# Patient Record
Sex: MALE | Race: WHITE | Employment: OTHER | URBAN - NONMETROPOLITAN AREA
[De-identification: names, ages, dates, MRNs, and addresses within clinical notes are randomized per-mention and may not be internally consistent; named-entity substitution may affect disease eponyms.]

---

## 2017-03-02 ENCOUNTER — OFFICE VISIT (OUTPATIENT)
Dept: FAMILY MEDICINE CLINIC | Age: 72
End: 2017-03-02

## 2017-03-02 VITALS
OXYGEN SATURATION: 95 % | WEIGHT: 302.6 LBS | DIASTOLIC BLOOD PRESSURE: 88 MMHG | SYSTOLIC BLOOD PRESSURE: 138 MMHG | HEART RATE: 57 BPM | BODY MASS INDEX: 40.11 KG/M2 | HEIGHT: 73 IN

## 2017-03-02 DIAGNOSIS — I10 ESSENTIAL HYPERTENSION: ICD-10-CM

## 2017-03-02 DIAGNOSIS — Z23 NEED FOR PNEUMOCOCCAL VACCINATION: Primary | ICD-10-CM

## 2017-03-02 PROCEDURE — 99214 OFFICE O/P EST MOD 30 MIN: CPT | Performed by: FAMILY MEDICINE

## 2017-03-02 PROCEDURE — G0009 ADMIN PNEUMOCOCCAL VACCINE: HCPCS | Performed by: FAMILY MEDICINE

## 2017-03-02 PROCEDURE — G8510 SCR DEP NEG, NO PLAN REQD: HCPCS | Performed by: FAMILY MEDICINE

## 2017-03-02 PROCEDURE — 90732 PPSV23 VACC 2 YRS+ SUBQ/IM: CPT | Performed by: FAMILY MEDICINE

## 2017-03-02 PROCEDURE — 3288F FALL RISK ASSESSMENT DOCD: CPT | Performed by: FAMILY MEDICINE

## 2017-03-02 ASSESSMENT — PATIENT HEALTH QUESTIONNAIRE - PHQ9
SUM OF ALL RESPONSES TO PHQ9 QUESTIONS 1 & 2: 0
1. LITTLE INTEREST OR PLEASURE IN DOING THINGS: 0
2. FEELING DOWN, DEPRESSED OR HOPELESS: 0
SUM OF ALL RESPONSES TO PHQ QUESTIONS 1-9: 0

## 2017-03-15 RX ORDER — BUPROPION HYDROCHLORIDE 75 MG/1
TABLET ORAL
Qty: 180 TABLET | Refills: 0 | Status: SHIPPED | OUTPATIENT
Start: 2017-03-15 | End: 2017-03-29 | Stop reason: SDUPTHER

## 2017-03-29 RX ORDER — BUPROPION HYDROCHLORIDE 75 MG/1
TABLET ORAL
Qty: 60 TABLET | Refills: 0 | Status: SHIPPED | OUTPATIENT
Start: 2017-03-29 | End: 2017-07-18 | Stop reason: SDUPTHER

## 2017-04-05 ENCOUNTER — OFFICE VISIT (OUTPATIENT)
Dept: FAMILY MEDICINE CLINIC | Age: 72
End: 2017-04-05
Payer: MEDICARE

## 2017-04-05 VITALS
SYSTOLIC BLOOD PRESSURE: 150 MMHG | BODY MASS INDEX: 39.23 KG/M2 | HEART RATE: 61 BPM | WEIGHT: 296 LBS | OXYGEN SATURATION: 96 % | HEIGHT: 73 IN | DIASTOLIC BLOOD PRESSURE: 80 MMHG

## 2017-04-05 DIAGNOSIS — H53.2 DIPLOPIA: Primary | ICD-10-CM

## 2017-04-05 DIAGNOSIS — I10 ESSENTIAL HYPERTENSION: ICD-10-CM

## 2017-04-05 PROCEDURE — 93880 EXTRACRANIAL BILAT STUDY: CPT | Performed by: FAMILY MEDICINE

## 2017-04-05 PROCEDURE — 99214 OFFICE O/P EST MOD 30 MIN: CPT | Performed by: FAMILY MEDICINE

## 2017-04-05 RX ORDER — LISINOPRIL 10 MG/1
10 TABLET ORAL 3 TIMES DAILY
Qty: 270 TABLET | Refills: 3 | Status: SHIPPED | OUTPATIENT
Start: 2017-04-05 | End: 2017-05-26 | Stop reason: SDUPTHER

## 2017-04-12 ENCOUNTER — OFFICE VISIT (OUTPATIENT)
Dept: FAMILY MEDICINE CLINIC | Age: 72
End: 2017-04-12
Payer: MEDICARE

## 2017-04-12 VITALS
HEIGHT: 73 IN | SYSTOLIC BLOOD PRESSURE: 134 MMHG | RESPIRATION RATE: 16 BRPM | HEART RATE: 80 BPM | WEIGHT: 302 LBS | BODY MASS INDEX: 40.02 KG/M2 | DIASTOLIC BLOOD PRESSURE: 76 MMHG

## 2017-04-12 DIAGNOSIS — I10 ESSENTIAL HYPERTENSION: Primary | ICD-10-CM

## 2017-04-12 DIAGNOSIS — H53.2 DIPLOPIA: ICD-10-CM

## 2017-04-12 PROCEDURE — 99213 OFFICE O/P EST LOW 20 MIN: CPT | Performed by: FAMILY MEDICINE

## 2017-04-12 PROCEDURE — 93880 EXTRACRANIAL BILAT STUDY: CPT | Performed by: FAMILY MEDICINE

## 2017-04-13 ENCOUNTER — OFFICE VISIT (OUTPATIENT)
Dept: OPHTHALMOLOGY | Age: 72
End: 2017-04-13
Payer: MEDICARE

## 2017-04-13 ENCOUNTER — TELEPHONE (OUTPATIENT)
Dept: GENERAL RADIOLOGY | Age: 72
End: 2017-04-13

## 2017-04-13 DIAGNOSIS — G45.0 VERTEBROBASILAR TIAS: Primary | ICD-10-CM

## 2017-04-13 DIAGNOSIS — H53.2 DIPLOPIA: Primary | ICD-10-CM

## 2017-04-13 PROCEDURE — 99204 OFFICE O/P NEW MOD 45 MIN: CPT | Performed by: OPHTHALMOLOGY

## 2017-04-13 RX ORDER — BENOXINATE HCL/FLUORESCEIN SOD 0.4%-0.25%
1 DROPS OPHTHALMIC (EYE) ONCE
Status: COMPLETED | OUTPATIENT
Start: 2017-04-13 | End: 2017-04-13

## 2017-04-13 RX ADMIN — Medication 1 DROP: at 11:32

## 2017-04-13 ASSESSMENT — TONOMETRY
OS_IOP_MMHG: 13
OD_IOP_MMHG: 14
IOP_METHOD: APPLANATION W FLURESS DROP

## 2017-04-13 ASSESSMENT — CONF VISUAL FIELD
OS_NORMAL: 1
OD_NORMAL: 1
METHOD: COUNTING FINGERS

## 2017-04-13 ASSESSMENT — VISUAL ACUITY
OD_SC: 20/30
METHOD: SNELLEN - LINEAR
OS_SC: 20/30

## 2017-04-13 ASSESSMENT — SLIT LAMP EXAM - LIDS
COMMENTS: 1+ DERMATOCHALASIS - UPPER LID
COMMENTS: 1+ DERMATOCHALASIS - UPPER LID

## 2017-04-14 DIAGNOSIS — I65.21 CAROTID STENOSIS, RIGHT: Primary | ICD-10-CM

## 2017-04-17 ENCOUNTER — OFFICE VISIT (OUTPATIENT)
Dept: CARDIOLOGY CLINIC | Age: 72
End: 2017-04-17
Payer: MEDICARE

## 2017-04-17 VITALS
HEART RATE: 60 BPM | HEIGHT: 73 IN | SYSTOLIC BLOOD PRESSURE: 136 MMHG | DIASTOLIC BLOOD PRESSURE: 80 MMHG | WEIGHT: 303 LBS | BODY MASS INDEX: 40.16 KG/M2

## 2017-04-17 DIAGNOSIS — G45.9 TRANSIENT CEREBRAL ISCHEMIA, UNSPECIFIED TYPE: ICD-10-CM

## 2017-04-17 DIAGNOSIS — I10 ESSENTIAL HYPERTENSION: Primary | ICD-10-CM

## 2017-04-17 DIAGNOSIS — E78.5 HYPERLIPIDEMIA, UNSPECIFIED HYPERLIPIDEMIA TYPE: ICD-10-CM

## 2017-04-17 DIAGNOSIS — I25.810 CORONARY ARTERY DISEASE INVOLVING CORONARY BYPASS GRAFT OF NATIVE HEART WITHOUT ANGINA PECTORIS: ICD-10-CM

## 2017-04-17 PROCEDURE — 99213 OFFICE O/P EST LOW 20 MIN: CPT | Performed by: INTERNAL MEDICINE

## 2017-04-17 PROCEDURE — 93000 ELECTROCARDIOGRAM COMPLETE: CPT | Performed by: INTERNAL MEDICINE

## 2017-04-17 RX ORDER — CLOPIDOGREL BISULFATE 75 MG/1
75 TABLET ORAL DAILY
Qty: 30 TABLET | Refills: 6 | Status: SHIPPED | OUTPATIENT
Start: 2017-04-17 | End: 2017-11-09 | Stop reason: SDUPTHER

## 2017-05-26 DIAGNOSIS — I10 ESSENTIAL HYPERTENSION: ICD-10-CM

## 2017-05-30 RX ORDER — LISINOPRIL 10 MG/1
10 TABLET ORAL 3 TIMES DAILY
Qty: 270 TABLET | Refills: 3 | Status: SHIPPED | OUTPATIENT
Start: 2017-05-30 | End: 2018-07-19 | Stop reason: SDUPTHER

## 2017-06-12 ENCOUNTER — OFFICE VISIT (OUTPATIENT)
Dept: FAMILY MEDICINE CLINIC | Age: 72
End: 2017-06-12
Payer: MEDICARE

## 2017-06-12 VITALS
HEART RATE: 59 BPM | BODY MASS INDEX: 41.22 KG/M2 | DIASTOLIC BLOOD PRESSURE: 84 MMHG | RESPIRATION RATE: 18 BRPM | HEIGHT: 73 IN | WEIGHT: 311 LBS | OXYGEN SATURATION: 97 % | SYSTOLIC BLOOD PRESSURE: 152 MMHG

## 2017-06-12 DIAGNOSIS — N40.0 BENIGN NON-NODULAR PROSTATIC HYPERPLASIA WITHOUT LOWER URINARY TRACT SYMPTOMS: ICD-10-CM

## 2017-06-12 DIAGNOSIS — I10 ESSENTIAL HYPERTENSION: Primary | ICD-10-CM

## 2017-06-12 DIAGNOSIS — I25.810 CORONARY ARTERY DISEASE INVOLVING CORONARY BYPASS GRAFT OF NATIVE HEART WITHOUT ANGINA PECTORIS: ICD-10-CM

## 2017-06-12 DIAGNOSIS — Z12.5 PROSTATE CANCER SCREENING: ICD-10-CM

## 2017-06-12 DIAGNOSIS — M17.12 PRIMARY OSTEOARTHRITIS OF LEFT KNEE: ICD-10-CM

## 2017-06-12 PROCEDURE — 99214 OFFICE O/P EST MOD 30 MIN: CPT | Performed by: FAMILY MEDICINE

## 2017-07-10 DIAGNOSIS — R60.0 PEDAL EDEMA: ICD-10-CM

## 2017-07-10 RX ORDER — FUROSEMIDE 20 MG/1
TABLET ORAL
Qty: 60 TABLET | Refills: 3 | Status: SHIPPED | OUTPATIENT
Start: 2017-07-10 | End: 2018-03-05 | Stop reason: SDUPTHER

## 2017-07-18 RX ORDER — BUPROPION HYDROCHLORIDE 75 MG/1
TABLET ORAL
Qty: 60 TABLET | Refills: 0 | Status: SHIPPED | OUTPATIENT
Start: 2017-07-18 | End: 2017-08-15 | Stop reason: SDUPTHER

## 2017-08-16 RX ORDER — BUPROPION HYDROCHLORIDE 75 MG/1
TABLET ORAL
Qty: 60 TABLET | Refills: 0 | Status: SHIPPED | OUTPATIENT
Start: 2017-08-16 | End: 2017-09-19 | Stop reason: SDUPTHER

## 2017-08-29 ENCOUNTER — HOSPITAL ENCOUNTER (OUTPATIENT)
Dept: LAB | Age: 72
Discharge: HOME | End: 2017-08-29
Payer: MEDICARE

## 2017-08-29 DIAGNOSIS — Z12.5 PROSTATE CANCER SCREENING: ICD-10-CM

## 2017-08-29 DIAGNOSIS — I10 ESSENTIAL HYPERTENSION: ICD-10-CM

## 2017-08-29 LAB
ALBUMIN SERPL-MCNC: 4.5 G/DL (ref 3.5–5.2)
ALBUMIN/GLOBULIN RATIO: 1.5 (ref 1–2.5)
ALP BLD-CCNC: 85 U/L (ref 40–129)
ALT SERPL-CCNC: 22 U/L (ref 5–41)
ANION GAP SERPL CALCULATED.3IONS-SCNC: 15 MMOL/L (ref 9–17)
AST SERPL-CCNC: 23 U/L
BILIRUB SERPL-MCNC: 0.54 MG/DL (ref 0.3–1.2)
BUN BLDV-MCNC: 22 MG/DL (ref 8–23)
BUN/CREAT BLD: 22 (ref 9–20)
CALCIUM SERPL-MCNC: 9.5 MG/DL (ref 8.6–10.4)
CHLORIDE BLD-SCNC: 103 MMOL/L (ref 98–107)
CHOLESTEROL/HDL RATIO: 2.9
CHOLESTEROL: 92 MG/DL
CO2: 24 MMOL/L (ref 20–31)
CREAT SERPL-MCNC: 1.01 MG/DL (ref 0.7–1.2)
GFR AFRICAN AMERICAN: >60 ML/MIN
GFR NON-AFRICAN AMERICAN: >60 ML/MIN
GFR SERPL CREATININE-BSD FRML MDRD: ABNORMAL ML/MIN/{1.73_M2}
GFR SERPL CREATININE-BSD FRML MDRD: ABNORMAL ML/MIN/{1.73_M2}
GLUCOSE BLD-MCNC: 104 MG/DL (ref 70–99)
HCT VFR BLD CALC: 45.7 % (ref 41–53)
HDLC SERPL-MCNC: 32 MG/DL
HEMOGLOBIN: 14.6 G/DL (ref 13.5–17.5)
LDL CHOLESTEROL: 36 MG/DL (ref 0–130)
MCH RBC QN AUTO: 30.6 PG (ref 26–34)
MCHC RBC AUTO-ENTMCNC: 31.9 G/DL (ref 31–37)
MCV RBC AUTO: 96.1 FL (ref 80–100)
PDW BLD-RTO: 14.9 % (ref 11–14.5)
PLATELET # BLD: 196 K/UL (ref 140–450)
PMV BLD AUTO: 7.9 FL (ref 6–12)
POTASSIUM SERPL-SCNC: 4.7 MMOL/L (ref 3.7–5.3)
PROSTATE SPECIFIC ANTIGEN: 2.4 UG/L
RBC # BLD: 4.76 M/UL (ref 4.5–5.9)
SODIUM BLD-SCNC: 142 MMOL/L (ref 135–144)
TOTAL PROTEIN: 7.6 G/DL (ref 6.4–8.3)
TRIGL SERPL-MCNC: 118 MG/DL
VLDLC SERPL CALC-MCNC: ABNORMAL MG/DL (ref 1–30)
WBC # BLD: 7.5 K/UL (ref 3.5–11)

## 2017-08-29 PROCEDURE — 36415 COLL VENOUS BLD VENIPUNCTURE: CPT

## 2017-08-29 PROCEDURE — 80061 LIPID PANEL: CPT

## 2017-08-29 PROCEDURE — G0103 PSA SCREENING: HCPCS

## 2017-08-29 PROCEDURE — 82652 VIT D 1 25-DIHYDROXY: CPT

## 2017-08-29 PROCEDURE — 80053 COMPREHEN METABOLIC PANEL: CPT

## 2017-08-29 PROCEDURE — 85027 COMPLETE CBC AUTOMATED: CPT

## 2017-09-01 LAB — VITAMIN D 1,25-DIHYDROXY: 32.8 PG/ML (ref 19.9–79.3)

## 2017-09-05 ENCOUNTER — HOSPITAL ENCOUNTER (OUTPATIENT)
Dept: GENERAL RADIOLOGY | Age: 72
Discharge: HOME | End: 2017-09-05
Payer: MEDICARE

## 2017-09-05 ENCOUNTER — OFFICE VISIT (OUTPATIENT)
Dept: FAMILY MEDICINE CLINIC | Age: 72
End: 2017-09-05
Payer: MEDICARE

## 2017-09-05 ENCOUNTER — HOSPITAL ENCOUNTER (OUTPATIENT)
Dept: LAB | Age: 72
Discharge: HOME | End: 2017-09-05
Payer: MEDICARE

## 2017-09-05 VITALS
HEIGHT: 73 IN | BODY MASS INDEX: 40.95 KG/M2 | SYSTOLIC BLOOD PRESSURE: 138 MMHG | HEART RATE: 62 BPM | OXYGEN SATURATION: 95 % | WEIGHT: 309 LBS | DIASTOLIC BLOOD PRESSURE: 70 MMHG

## 2017-09-05 DIAGNOSIS — M10.9 ACUTE GOUT INVOLVING TOE OF RIGHT FOOT, UNSPECIFIED CAUSE: ICD-10-CM

## 2017-09-05 DIAGNOSIS — I10 ESSENTIAL HYPERTENSION: Primary | ICD-10-CM

## 2017-09-05 DIAGNOSIS — I25.810 CORONARY ARTERY DISEASE INVOLVING CORONARY BYPASS GRAFT OF NATIVE HEART WITHOUT ANGINA PECTORIS: ICD-10-CM

## 2017-09-05 DIAGNOSIS — M17.12 PRIMARY OSTEOARTHRITIS OF LEFT KNEE: ICD-10-CM

## 2017-09-05 LAB
ABSOLUTE EOS #: 0.1 K/UL (ref 0–0.4)
ABSOLUTE LYMPH #: 2.6 K/UL (ref 1–4.8)
ABSOLUTE MONO #: 1.1 K/UL (ref 0.1–1.2)
BASOPHILS # BLD: 0 %
BASOPHILS ABSOLUTE: 0 K/UL (ref 0–0.2)
DIFFERENTIAL TYPE: ABNORMAL
EOSINOPHILS RELATIVE PERCENT: 1 %
HCT VFR BLD CALC: 46.1 % (ref 41–53)
HEMOGLOBIN: 14.6 G/DL (ref 13.5–17.5)
LYMPHOCYTES # BLD: 28 %
MCH RBC QN AUTO: 30.3 PG (ref 26–34)
MCHC RBC AUTO-ENTMCNC: 31.7 G/DL (ref 31–37)
MCV RBC AUTO: 95.6 FL (ref 80–100)
MONOCYTES # BLD: 13 %
PDW BLD-RTO: 14.7 % (ref 11–14.5)
PLATELET # BLD: 193 K/UL (ref 140–450)
PLATELET ESTIMATE: ABNORMAL
PMV BLD AUTO: 8.2 FL (ref 6–12)
RBC # BLD: 4.83 M/UL (ref 4.5–5.9)
RBC # BLD: ABNORMAL 10*6/UL
SEG NEUTROPHILS: 58 %
SEGMENTED NEUTROPHILS ABSOLUTE COUNT: 5.3 K/UL (ref 1.8–7.7)
URIC ACID: 11 MG/DL (ref 3.4–7)
WBC # BLD: 9.1 K/UL (ref 3.5–11)
WBC # BLD: ABNORMAL 10*3/UL

## 2017-09-05 PROCEDURE — 85025 COMPLETE CBC W/AUTO DIFF WBC: CPT

## 2017-09-05 PROCEDURE — 84550 ASSAY OF BLOOD/URIC ACID: CPT

## 2017-09-05 PROCEDURE — 73620 X-RAY EXAM OF FOOT: CPT

## 2017-09-05 PROCEDURE — 36415 COLL VENOUS BLD VENIPUNCTURE: CPT

## 2017-09-05 PROCEDURE — 99214 OFFICE O/P EST MOD 30 MIN: CPT | Performed by: FAMILY MEDICINE

## 2017-09-05 RX ORDER — CEPHALEXIN 500 MG/1
500 CAPSULE ORAL 4 TIMES DAILY
Qty: 1 CAPSULE | Refills: 0 | Status: SHIPPED | OUTPATIENT
Start: 2017-09-05 | End: 2017-09-15

## 2017-09-20 RX ORDER — BUPROPION HYDROCHLORIDE 75 MG/1
TABLET ORAL
Qty: 60 TABLET | Refills: 5 | Status: SHIPPED | OUTPATIENT
Start: 2017-09-20 | End: 2018-03-11 | Stop reason: SDUPTHER

## 2017-10-06 ENCOUNTER — OFFICE VISIT (OUTPATIENT)
Dept: FAMILY MEDICINE CLINIC | Age: 72
End: 2017-10-06
Payer: MEDICARE

## 2017-10-06 VITALS
HEIGHT: 73 IN | WEIGHT: 313 LBS | RESPIRATION RATE: 16 BRPM | BODY MASS INDEX: 41.48 KG/M2 | HEART RATE: 68 BPM | SYSTOLIC BLOOD PRESSURE: 130 MMHG | DIASTOLIC BLOOD PRESSURE: 78 MMHG

## 2017-10-06 DIAGNOSIS — I10 ESSENTIAL HYPERTENSION: ICD-10-CM

## 2017-10-06 DIAGNOSIS — Z23 NEED FOR INFLUENZA VACCINATION: ICD-10-CM

## 2017-10-06 DIAGNOSIS — M17.12 PRIMARY OSTEOARTHRITIS OF LEFT KNEE: ICD-10-CM

## 2017-10-06 DIAGNOSIS — M1A.9XX0 CHRONIC GOUT INVOLVING TOE OF RIGHT FOOT WITHOUT TOPHUS, UNSPECIFIED CAUSE: Primary | ICD-10-CM

## 2017-10-06 PROCEDURE — 99213 OFFICE O/P EST LOW 20 MIN: CPT | Performed by: FAMILY MEDICINE

## 2017-10-06 PROCEDURE — 90662 IIV NO PRSV INCREASED AG IM: CPT | Performed by: FAMILY MEDICINE

## 2017-10-06 PROCEDURE — G0008 ADMIN INFLUENZA VIRUS VAC: HCPCS | Performed by: FAMILY MEDICINE

## 2017-10-06 RX ORDER — ALLOPURINOL 300 MG/1
300 TABLET ORAL DAILY
COMMUNITY
Start: 2017-10-03 | End: 2018-04-14 | Stop reason: SDUPTHER

## 2017-10-06 NOTE — PROGRESS NOTES
HPI:  Patient comes in today for   Chief Complaint   Patient presents with    Gout     1 month follow up   Patient here for f/u on  Gout. Pain and swelling in right big toe  is improved ,was started on zyloprim. H/O HTN ,CAD is stable,is awaiting surgery  On his left knee. No chest pain or SOB. No urinary problems since his Prostate surgery. HISTORY:  Past Medical History:   Diagnosis Date    Adenomatous polyp     history of     Chronic venous insufficiency     DVT (deep venous thrombosis) (HCC)     history of     Edema     related to venous stasis     Gout     history of     Hypertension     Onychomycosis     Osteoarthritis     Plantar fasciitis     Tobacco abuse        Family History   Problem Relation Age of Onset    Other Mother 52     Rheumatic fever    Kidney Disease Brother     Heart Disease Brother     Sudden Death Father      auto accident     Heart Surgery Sister      bypass surgery    Other Brother      abdominal aortic aneursym    Hypertension Other      siblings       Social History     Social History    Marital status: Single     Spouse name: N/A    Number of children: N/A    Years of education: N/A     Occupational History    Not on file.      Social History Main Topics    Smoking status: Former Smoker     Packs/day: 0.00     Years: 55.00     Types: Cigarettes     Quit date: 10/27/2014    Smokeless tobacco: Never Used    Alcohol use 0.0 oz/week     0 Standard drinks or equivalent per week      Comment: occasionally    Drug use: No    Sexual activity: Yes     Partners: Female     Other Topics Concern    Not on file     Social History Narrative       Current Outpatient Prescriptions   Medication Sig Dispense Refill    allopurinol (ZYLOPRIM) 300 MG tablet Take 300 mg by mouth daily      buPROPion (WELLBUTRIN) 75 MG tablet TAKE ONE TABLET BY MOUTH TWICE DAILY 60 tablet 5    furosemide (LASIX) 20 MG tablet TAKE ONE TABLET BY MOUTH ONCE DAILY AS NEEDED FOR  PEDAL  EDEMA 60 tablet

## 2017-10-06 NOTE — MR AVS SNAPSHOT
Additional Information about your Body Mass Index (BMI)           Your BMI as listed above is considered obese (30 or more). BMI is an estimate of body fat, calculated from your height and weight. The higher your BMI, the greater your risk of heart disease, high blood pressure, type 2 diabetes, stroke, gallstones, arthritis, sleep apnea, and certain cancers. BMI is not perfect. It may overestimate body fat in athletes and people who are more muscular. Even a small weight loss (between 5 and 10 percent of your current weight) by decreasing your calorie intake and becoming more physically active will help lower your risk of developing or worsening diseases associated with obesity. Learn more at: Showbucks.uk          [facility]    Hospital Outpatient Visit on 09/05/2017   Component Date Value Ref Range Status    Uric Acid 09/05/2017 11.0* 3.4 - 7.0 mg/dL Final    Comment: Performed at [facility] [address], [address]   [phone]      WBC 09/05/2017 9.1  3.5 - 11.0 k/uL Final    RBC 09/05/2017 4.83  4.5 - 5.9 m/uL Final    Hemoglobin 09/05/2017 14.6  13.5 - 17.5 g/dL Final    Hematocrit 09/05/2017 46.1  41 - 53 % Final    MCV 09/05/2017 95.6  80 - 100 fL Final    MCH 09/05/2017 30.3  26 - 34 pg Final    MCHC 09/05/2017 31.7  31 - 37 g/dL Final    RDW 09/05/2017 14.7* 11.0 - 14.5 % Final    Platelets 17/71/7597 193  140 - 450 k/uL Final    MPV 09/05/2017 8.2  6.0 - 12.0 fL Final    Differential Type 09/05/2017 NOT REPORTED   Final    WBC Morphology 09/05/2017 NOT REPORTED   Final    RBC Morphology 09/05/2017 NOT REPORTED   Final    Platelet Estimate 55/76/3394 NOT REPORTED   Final    Seg Neutrophils 09/05/2017 58  % Final    Lymphocytes 09/05/2017 28  % Final    Monocytes 09/05/2017 13  % Final    Eosinophils % 09/05/2017 1  % Final    Basophils 09/05/2017 0  % Final  Segs Absolute 09/05/2017 5.30  1.8 - 7.7 k/uL Final    Absolute Lymph # 09/05/2017 2.60  1.0 - 4.8 k/uL Final    Absolute Mono # 09/05/2017 1.10  0.1 - 1.2 k/uL Final    Absolute Eos # 09/05/2017 0.10  0.0 - 0.4 k/uL Final    Basophils # 09/05/2017 0.00  0.0 - 0.2 k/uL Final    Comment: Performed at [facility] [address], [address] [place]   [phone]          Gout: Care Instructions  Your Care Instructions  Gout is a form of arthritis caused by a buildup of uric acid crystals in a joint. It causes sudden attacks of pain, swelling, redness, and stiffness, usually in one joint, especially the big toe. Gout usually comes on without a cause. But it can be brought on by drinking alcohol (especially beer) or eating seafood and red meat. Taking certain medicines, such as diuretics or aspirin, also can bring on an attack of gout. Taking your medicines as prescribed and following up with your doctor regularly can help you avoid gout attacks in the future. Follow-up care is a key part of your treatment and safety. Be sure to make and go to all appointments, and call your doctor if you are having problems. Its also a good idea to know your test results and keep a list of the medicines you take. How can you care for yourself at home? · If the joint is swollen, put ice or a cold pack on the area for 10 to 20 minutes at a time. Put a thin cloth between the ice and your skin. · Prop up the sore limb on a pillow when you ice it or anytime you sit or lie down during the next 3 days. Try to keep it above the level of your heart. This will help reduce swelling. · Rest sore joints. Avoid activities that put weight or strain on the joints for a few days. Take short rest breaks from your regular activities during the day. · Take your medicines exactly as prescribed. Call your doctor if you think you are having a problem with your medicine. · Take pain medicines exactly as directed. ¨ If the doctor gave you a prescription medicine for pain, take it as prescribed. ¨ If you are not taking a prescription pain medicine, ask your doctor if you can take an over-the-counter medicine. · Eat less seafood and red meat. · Check with your doctor before drinking alcohol. · Losing weight, if you are overweight, may help reduce attacks of gout. But do not go on a [employer]. \" Losing a lot of weight in a short amount of time can cause a gout attack. When should you call for help? Call your doctor now or seek immediate medical care if:  · You have a fever. · The joint is so painful you cannot use it. · You have sudden, unexplained swelling, redness, warmth, or severe pain in one or more joints. Watch closely for changes in your health, and be sure to contact your doctor if:  · You have joint pain. · Your symptoms get worse or are not improving after 2 or 3 days. Where can you learn more? Go to [email or web address] org and sign in to your [employer] account. Enter W695 in the Decisive [employer] box to learn more about \"Gout: Care Instructions. \"     If you do not have an account, please click on the \"Sign Up Now\" link. Current as of: October 31, 2016  Content Version: 11.3  © 1092-0944 Room. Care instructions adapted under license by [facility] ([facility]). If you have questions about a medical condition or this instruction, always ask your healthcare professional. [place] [address] any warranty or liability for your use of this information. Purine-Restricted Diet: Care Instructions  Your Care Instructions  Purines are substances that are found in some foods. Your body turns purines into uric acid. High levels of uric acid can cause gout, which is a form of arthritis that causes pain and inflammation in joints. You may be able to help control the amount of uric acid in your body by limiting high-purine foods in your diet. Pneumococcal Polysaccharide (Tnapyolos63) 3/2/2017      Preventive Care        Date Due    One-time abdominal aortic aneurism (AAA) screening is recommended for all men between the age of 73-68 who have ever smoked 10/31/2017 (Originally 1945)    Yearly Flu Vaccine (1) 11/5/2017 (Originally 9/1/2017)    Zoster Vaccine 12/12/2017 (Originally 9/2/2004)    Tetanus Combination Vaccine (1 - Tdap) 12/31/2018 (Originally 9/2/1964)    Colonoscopy 12/12/2017    Cholesterol Screening 8/29/2022            MyChart Signup           Workspace allows you to send messages to your doctor, view your test results, renew your prescriptions, schedule appointments, view visit notes, and more. How Do I Sign Up? 1. In your Internet browser, go to [email or web address]  2. Click on the Sign Up Now link in the Sign In box. You will see the New Member Sign Up page. 3. Enter your Workspace Access Code exactly as it appears below. You will not need to use this code after youve completed the sign-up process. If you do not sign up before the expiration date, you must request a new code. Workspace Access Code: [email or web address]  Expires: 11/4/2017 10:05 AM    4. Enter your Social Security Number ([id number]) and Date of Birth (mm/dd/yyyy) as indicated and click Submit. You will be taken to the next sign-up page. 5. Create a Workspace ID. This will be your Workspace login ID and cannot be changed, so think of one that is secure and easy to remember. 6. Create a Workspace password. You can change your password at any time. 7. Enter your Password Reset Question and Answer. This can be used at a later time if you forget your password. 8. Enter your e-mail address. You will receive e-mail notification when new information is available in [address]. 9. Click Sign Up. You can now view your medical record.      Additional Information  If you have questions, please contact the physician practice where you

## 2017-10-06 NOTE — PATIENT INSTRUCTIONS
Hospital Outpatient Visit on 09/05/2017   Component Date Value Ref Range Status    Uric Acid 09/05/2017 11.0* 3.4 - 7.0 mg/dL Final    Comment: Performed at [facility] [address], [address] [place]   [phone]      WBC 09/05/2017 9.1  3.5 - 11.0 k/uL Final    RBC 09/05/2017 4.83  4.5 - 5.9 m/uL Final    Hemoglobin 09/05/2017 14.6  13.5 - 17.5 g/dL Final    Hematocrit 09/05/2017 46.1  41 - 53 % Final    MCV 09/05/2017 95.6  80 - 100 fL Final    MCH 09/05/2017 30.3  26 - 34 pg Final    MCHC 09/05/2017 31.7  31 - 37 g/dL Final    RDW 09/05/2017 14.7* 11.0 - 14.5 % Final    Platelets 39/04/2825 193  140 - 450 k/uL Final    MPV 09/05/2017 8.2  6.0 - 12.0 fL Final    Differential Type 09/05/2017 NOT REPORTED   Final    WBC Morphology 09/05/2017 NOT REPORTED   Final    RBC Morphology 09/05/2017 NOT REPORTED   Final    Platelet Estimate 07/59/3293 NOT REPORTED   Final    Seg Neutrophils 09/05/2017 58  % Final    Lymphocytes 09/05/2017 28  % Final    Monocytes 09/05/2017 13  % Final    Eosinophils % 09/05/2017 1  % Final    Basophils 09/05/2017 0  % Final    Segs Absolute 09/05/2017 5.30  1.8 - 7.7 k/uL Final    Absolute Lymph # 09/05/2017 2.60  1.0 - 4.8 k/uL Final    Absolute Mono # 09/05/2017 1.10  0.1 - 1.2 k/uL Final    Absolute Eos # 09/05/2017 0.10  0.0 - 0.4 k/uL Final    Basophils # 09/05/2017 0.00  0.0 - 0.2 k/uL Final    Comment: Performed at [address], [address]   [phone]          Gout: Care Instructions  Your Care Instructions  Gout is a form of arthritis caused by a buildup of uric acid crystals in a joint. It causes sudden attacks of pain, swelling, redness, and stiffness, usually in one joint, especially the big toe. Gout usually comes on without a cause. But it can be brought on by drinking alcohol (especially beer) or eating seafood and red meat.  Taking certain medicines, such as diuretics or aspirin, also can bring on an attack of gout. Taking your medicines as prescribed and following up with your doctor regularly can help you avoid gout attacks in the future. Follow-up care is a key part of your treatment and safety. Be sure to make and go to all appointments, and call your doctor if you are having problems. Its also a good idea to know your test results and keep a list of the medicines you take. How can you care for yourself at home? · If the joint is swollen, put ice or a cold pack on the area for 10 to 20 minutes at a time. Put a thin cloth between the ice and your skin. · Prop up the sore limb on a pillow when you ice it or anytime you sit or lie down during the next 3 days. Try to keep it above the level of your heart. This will help reduce swelling. · Rest sore joints. Avoid activities that put weight or strain on the joints for a few days. Take short rest breaks from your regular activities during the day. · Take your medicines exactly as prescribed. Call your doctor if you think you are having a problem with your medicine. · Take pain medicines exactly as directed. ¨ If the doctor gave you a prescription medicine for pain, take it as prescribed. ¨ If you are not taking a prescription pain medicine, ask your doctor if you can take an over-the-counter medicine. · Eat less seafood and red meat. · Check with your doctor before drinking alcohol. · Losing weight, if you are overweight, may help reduce attacks of gout. But do not go on a [employer]. \" Losing a lot of weight in a short amount of time can cause a gout attack. When should you call for help? Call your doctor now or seek immediate medical care if:  · You have a fever. · The joint is so painful you cannot use it. · You have sudden, unexplained swelling, redness, warmth, or severe pain in one or more joints. Watch closely for changes in your health, and be sure to contact your doctor if:  · You have joint pain.   · Your symptoms

## 2017-10-23 ENCOUNTER — OFFICE VISIT (OUTPATIENT)
Dept: CARDIOLOGY | Age: 72
End: 2017-10-23
Payer: MEDICARE

## 2017-10-23 VITALS
SYSTOLIC BLOOD PRESSURE: 130 MMHG | HEIGHT: 73 IN | WEIGHT: 308.2 LBS | BODY MASS INDEX: 40.85 KG/M2 | HEART RATE: 62 BPM | DIASTOLIC BLOOD PRESSURE: 70 MMHG

## 2017-10-23 DIAGNOSIS — I44.7 LBBB (LEFT BUNDLE BRANCH BLOCK): ICD-10-CM

## 2017-10-23 DIAGNOSIS — I10 ESSENTIAL HYPERTENSION: Primary | ICD-10-CM

## 2017-10-23 DIAGNOSIS — I25.10 CORONARY ARTERY DISEASE INVOLVING NATIVE CORONARY ARTERY OF NATIVE HEART WITHOUT ANGINA PECTORIS: ICD-10-CM

## 2017-10-23 PROCEDURE — G8598 ASA/ANTIPLAT THER USED: HCPCS | Performed by: INTERNAL MEDICINE

## 2017-10-23 PROCEDURE — 1123F ACP DISCUSS/DSCN MKR DOCD: CPT | Performed by: INTERNAL MEDICINE

## 2017-10-23 PROCEDURE — 93000 ELECTROCARDIOGRAM COMPLETE: CPT | Performed by: INTERNAL MEDICINE

## 2017-10-23 PROCEDURE — 1036F TOBACCO NON-USER: CPT | Performed by: INTERNAL MEDICINE

## 2017-10-23 PROCEDURE — 3017F COLORECTAL CA SCREEN DOC REV: CPT | Performed by: INTERNAL MEDICINE

## 2017-10-23 PROCEDURE — 99213 OFFICE O/P EST LOW 20 MIN: CPT | Performed by: INTERNAL MEDICINE

## 2017-10-23 PROCEDURE — G8484 FLU IMMUNIZE NO ADMIN: HCPCS | Performed by: INTERNAL MEDICINE

## 2017-10-23 PROCEDURE — G8417 CALC BMI ABV UP PARAM F/U: HCPCS | Performed by: INTERNAL MEDICINE

## 2017-10-23 PROCEDURE — 4040F PNEUMOC VAC/ADMIN/RCVD: CPT | Performed by: INTERNAL MEDICINE

## 2017-10-23 PROCEDURE — G8427 DOCREV CUR MEDS BY ELIG CLIN: HCPCS | Performed by: INTERNAL MEDICINE

## 2017-11-09 DIAGNOSIS — G45.9 TRANSIENT CEREBRAL ISCHEMIA, UNSPECIFIED TYPE: ICD-10-CM

## 2017-11-09 RX ORDER — CLOPIDOGREL BISULFATE 75 MG/1
75 TABLET ORAL DAILY
Qty: 90 TABLET | Refills: 3 | Status: SHIPPED | OUTPATIENT
Start: 2017-11-09 | End: 2018-11-21 | Stop reason: SDUPTHER

## 2017-12-04 DIAGNOSIS — E78.5 HYPERLIPIDEMIA, UNSPECIFIED HYPERLIPIDEMIA TYPE: ICD-10-CM

## 2017-12-04 DIAGNOSIS — R60.0 PEDAL EDEMA: ICD-10-CM

## 2017-12-04 DIAGNOSIS — I10 ESSENTIAL HYPERTENSION: ICD-10-CM

## 2017-12-04 RX ORDER — METOPROLOL TARTRATE 50 MG/1
TABLET, FILM COATED ORAL
Qty: 180 TABLET | Refills: 3 | Status: SHIPPED | OUTPATIENT
Start: 2017-12-04 | End: 2018-11-13 | Stop reason: SDUPTHER

## 2017-12-04 RX ORDER — ATORVASTATIN CALCIUM 40 MG/1
TABLET, FILM COATED ORAL
Qty: 90 TABLET | Refills: 3 | Status: SHIPPED | OUTPATIENT
Start: 2017-12-04 | End: 2018-12-17 | Stop reason: SDUPTHER

## 2017-12-05 ENCOUNTER — OFFICE VISIT (OUTPATIENT)
Dept: FAMILY MEDICINE CLINIC | Age: 72
End: 2017-12-05
Payer: MEDICARE

## 2017-12-05 VITALS
RESPIRATION RATE: 16 BRPM | HEART RATE: 59 BPM | DIASTOLIC BLOOD PRESSURE: 72 MMHG | SYSTOLIC BLOOD PRESSURE: 110 MMHG | OXYGEN SATURATION: 96 % | HEIGHT: 73 IN | WEIGHT: 314 LBS | BODY MASS INDEX: 41.62 KG/M2

## 2017-12-05 DIAGNOSIS — I25.810 CORONARY ARTERY DISEASE INVOLVING CORONARY BYPASS GRAFT OF NATIVE HEART WITHOUT ANGINA PECTORIS: ICD-10-CM

## 2017-12-05 DIAGNOSIS — E79.0 HYPERURICEMIA: Primary | ICD-10-CM

## 2017-12-05 DIAGNOSIS — M1A.0710 CHRONIC GOUT OF RIGHT FOOT, UNSPECIFIED CAUSE: ICD-10-CM

## 2017-12-05 DIAGNOSIS — M17.12 PRIMARY OSTEOARTHRITIS OF LEFT KNEE: ICD-10-CM

## 2017-12-05 DIAGNOSIS — I10 ESSENTIAL HYPERTENSION: ICD-10-CM

## 2017-12-05 PROCEDURE — 4040F PNEUMOC VAC/ADMIN/RCVD: CPT | Performed by: FAMILY MEDICINE

## 2017-12-05 PROCEDURE — G8427 DOCREV CUR MEDS BY ELIG CLIN: HCPCS | Performed by: FAMILY MEDICINE

## 2017-12-05 PROCEDURE — 99214 OFFICE O/P EST MOD 30 MIN: CPT | Performed by: FAMILY MEDICINE

## 2017-12-05 PROCEDURE — 3017F COLORECTAL CA SCREEN DOC REV: CPT | Performed by: FAMILY MEDICINE

## 2017-12-05 PROCEDURE — G8598 ASA/ANTIPLAT THER USED: HCPCS | Performed by: FAMILY MEDICINE

## 2017-12-05 PROCEDURE — 1036F TOBACCO NON-USER: CPT | Performed by: FAMILY MEDICINE

## 2017-12-05 PROCEDURE — G8484 FLU IMMUNIZE NO ADMIN: HCPCS | Performed by: FAMILY MEDICINE

## 2017-12-05 PROCEDURE — G8417 CALC BMI ABV UP PARAM F/U: HCPCS | Performed by: FAMILY MEDICINE

## 2017-12-05 PROCEDURE — 1123F ACP DISCUSS/DSCN MKR DOCD: CPT | Performed by: FAMILY MEDICINE

## 2017-12-05 NOTE — PATIENT INSTRUCTIONS
\"crash diet. \" Losing a lot of weight in a short amount of time can cause a gout attack. When should you call for help? Call your doctor now or seek immediate medical care if:  · You have a fever. · The joint is so painful you cannot use it. · You have sudden, unexplained swelling, redness, warmth, or severe pain in one or more joints. Watch closely for changes in your health, and be sure to contact your doctor if:  · You have joint pain. · Your symptoms get worse or are not improving after 2 or 3 days. Where can you learn more? Go to [email or web address] and sign in to your [employer] account. Enter V502 in the [employer] box to learn more about \"Gout: Care Instructions. \"     If you do not have an account, please click on the \"Sign Up Now\" link. Current as of: October 31, 2016  Content Version: 11.3  © 3122-7719 [employer] Care instructions adapted under license by [facility] ([facility]). If you have questions about a medical condition or this instruction, always ask your healthcare professional. [place] [address] any warranty or liability for your use of this information.

## 2018-03-05 DIAGNOSIS — R60.0 PEDAL EDEMA: ICD-10-CM

## 2018-03-06 ENCOUNTER — OFFICE VISIT (OUTPATIENT)
Dept: FAMILY MEDICINE CLINIC | Age: 73
End: 2018-03-06
Payer: MEDICARE

## 2018-03-06 VITALS
RESPIRATION RATE: 16 BRPM | BODY MASS INDEX: 41.08 KG/M2 | SYSTOLIC BLOOD PRESSURE: 124 MMHG | WEIGHT: 310 LBS | DIASTOLIC BLOOD PRESSURE: 78 MMHG | HEART RATE: 65 BPM | OXYGEN SATURATION: 95 % | HEIGHT: 73 IN

## 2018-03-06 DIAGNOSIS — I44.7 LBBB (LEFT BUNDLE BRANCH BLOCK): ICD-10-CM

## 2018-03-06 DIAGNOSIS — M17.12 PRIMARY OSTEOARTHRITIS OF LEFT KNEE: ICD-10-CM

## 2018-03-06 DIAGNOSIS — Z01.818 PREOP EXAMINATION: Primary | ICD-10-CM

## 2018-03-06 DIAGNOSIS — I10 ESSENTIAL HYPERTENSION: ICD-10-CM

## 2018-03-06 DIAGNOSIS — I25.810 CORONARY ARTERY DISEASE INVOLVING CORONARY BYPASS GRAFT OF NATIVE HEART WITHOUT ANGINA PECTORIS: ICD-10-CM

## 2018-03-06 PROCEDURE — 1036F TOBACCO NON-USER: CPT | Performed by: FAMILY MEDICINE

## 2018-03-06 PROCEDURE — 99215 OFFICE O/P EST HI 40 MIN: CPT | Performed by: FAMILY MEDICINE

## 2018-03-06 PROCEDURE — 4040F PNEUMOC VAC/ADMIN/RCVD: CPT | Performed by: FAMILY MEDICINE

## 2018-03-06 PROCEDURE — G8427 DOCREV CUR MEDS BY ELIG CLIN: HCPCS | Performed by: FAMILY MEDICINE

## 2018-03-06 PROCEDURE — G8598 ASA/ANTIPLAT THER USED: HCPCS | Performed by: FAMILY MEDICINE

## 2018-03-06 PROCEDURE — G8417 CALC BMI ABV UP PARAM F/U: HCPCS | Performed by: FAMILY MEDICINE

## 2018-03-06 PROCEDURE — G8484 FLU IMMUNIZE NO ADMIN: HCPCS | Performed by: FAMILY MEDICINE

## 2018-03-06 PROCEDURE — 3017F COLORECTAL CA SCREEN DOC REV: CPT | Performed by: FAMILY MEDICINE

## 2018-03-06 PROCEDURE — 1123F ACP DISCUSS/DSCN MKR DOCD: CPT | Performed by: FAMILY MEDICINE

## 2018-03-06 RX ORDER — FUROSEMIDE 20 MG/1
TABLET ORAL
Qty: 90 TABLET | Refills: 3 | Status: SHIPPED | OUTPATIENT
Start: 2018-03-06 | End: 2019-04-15 | Stop reason: SDUPTHER

## 2018-03-06 ASSESSMENT — PATIENT HEALTH QUESTIONNAIRE - PHQ9
SUM OF ALL RESPONSES TO PHQ QUESTIONS 1-9: 0
SUM OF ALL RESPONSES TO PHQ9 QUESTIONS 1 & 2: 0
1. LITTLE INTEREST OR PLEASURE IN DOING THINGS: 0
2. FEELING DOWN, DEPRESSED OR HOPELESS: 0

## 2018-03-06 NOTE — PROGRESS NOTES
HPI:  Patient comes in today for   Chief Complaint   Patient presents with    Knee Pain     here for pre op for knee surgery Left knee   Patient here  Preop exam for left knee replacement later this month at [facility] had knee problems for several years had rightTKA  3 years ago. H/O HTN ,LBBB,CAD,s/p CABG. BPH,s/p TURP and Gout.uses a cane on and off,uses tylenol for pain. No chest pain or SOB. No weight gain in fact   has lost a few pounds. HISTORY:  Past Medical History:   Diagnosis Date    Adenomatous polyp     history of     Chronic venous insufficiency     DVT (deep venous thrombosis) (HCC)     history of     Edema     related to venous stasis     Gout     history of     Hypertension     Onychomycosis     Osteoarthritis     Plantar fasciitis     Tobacco abuse        Family History   Problem Relation Age of Onset    Other Mother 52     Rheumatic fever    Kidney Disease Brother     Heart Disease Brother     Sudden Death Father      auto accident     Heart Surgery Sister      bypass surgery    Other Brother      abdominal aortic aneursym    Hypertension Other      siblings       Social History     Social History    Marital status: Single     Spouse name: N/A    Number of children: N/A    Years of education: N/A     Occupational History    Not on file.      Social History Main Topics    Smoking status: Former Smoker     Packs/day: 0.00     Years: 55.00     Types: Cigarettes     Quit date: 10/27/2014    Smokeless tobacco: Never Used    Alcohol use 0.0 oz/week      Comment: occasionally    Drug use: No    Sexual activity: Yes     Partners: Female     Other Topics Concern    Not on file     Social History Narrative    No narrative on file       Current Outpatient Prescriptions   Medication Sig Dispense Refill    metoprolol tartrate (LOPRESSOR) 50 MG tablet TAKE ONE TABLET BY MOUTH TWICE DAILY 180 tablet 3    atorvastatin (LIPITOR) 40 MG tablet TAKE ONE TABLET BY MOUTH ONCE DAILY 90

## 2018-03-12 ENCOUNTER — OFFICE VISIT (OUTPATIENT)
Dept: CARDIOLOGY | Age: 73
End: 2018-03-12
Payer: MEDICARE

## 2018-03-12 VITALS
HEART RATE: 69 BPM | DIASTOLIC BLOOD PRESSURE: 70 MMHG | SYSTOLIC BLOOD PRESSURE: 118 MMHG | WEIGHT: 310 LBS | BODY MASS INDEX: 41.08 KG/M2 | HEIGHT: 73 IN

## 2018-03-12 DIAGNOSIS — I25.10 CORONARY ARTERY DISEASE INVOLVING NATIVE CORONARY ARTERY OF NATIVE HEART WITHOUT ANGINA PECTORIS: ICD-10-CM

## 2018-03-12 DIAGNOSIS — I10 ESSENTIAL HYPERTENSION: Primary | ICD-10-CM

## 2018-03-12 PROCEDURE — 93000 ELECTROCARDIOGRAM COMPLETE: CPT | Performed by: INTERNAL MEDICINE

## 2018-03-12 PROCEDURE — 99213 OFFICE O/P EST LOW 20 MIN: CPT | Performed by: INTERNAL MEDICINE

## 2018-03-13 RX ORDER — BUPROPION HYDROCHLORIDE 75 MG/1
TABLET ORAL
Qty: 60 TABLET | Refills: 5 | Status: SHIPPED | OUTPATIENT
Start: 2018-03-13 | End: 2018-09-21 | Stop reason: SDUPTHER

## 2018-04-16 RX ORDER — ALLOPURINOL 300 MG/1
TABLET ORAL
Qty: 30 TABLET | Refills: 6 | Status: SHIPPED | OUTPATIENT
Start: 2018-04-16 | End: 2018-11-21 | Stop reason: SDUPTHER

## 2018-06-11 ENCOUNTER — OFFICE VISIT (OUTPATIENT)
Dept: FAMILY MEDICINE CLINIC | Age: 73
End: 2018-06-11
Payer: MEDICARE

## 2018-06-11 VITALS
OXYGEN SATURATION: 98 % | HEIGHT: 73 IN | WEIGHT: 301 LBS | BODY MASS INDEX: 39.89 KG/M2 | DIASTOLIC BLOOD PRESSURE: 80 MMHG | SYSTOLIC BLOOD PRESSURE: 140 MMHG | HEART RATE: 62 BPM

## 2018-06-11 DIAGNOSIS — M25.472 EDEMA OF BOTH ANKLES: ICD-10-CM

## 2018-06-11 DIAGNOSIS — M17.0 PRIMARY OSTEOARTHRITIS OF BOTH KNEES: ICD-10-CM

## 2018-06-11 DIAGNOSIS — I25.810 CORONARY ARTERY DISEASE INVOLVING CORONARY BYPASS GRAFT OF NATIVE HEART WITHOUT ANGINA PECTORIS: ICD-10-CM

## 2018-06-11 DIAGNOSIS — M25.471 EDEMA OF BOTH ANKLES: ICD-10-CM

## 2018-06-11 DIAGNOSIS — Z12.5 PROSTATE CANCER SCREENING: ICD-10-CM

## 2018-06-11 DIAGNOSIS — I10 ESSENTIAL HYPERTENSION: Primary | ICD-10-CM

## 2018-06-11 PROCEDURE — 1123F ACP DISCUSS/DSCN MKR DOCD: CPT | Performed by: FAMILY MEDICINE

## 2018-06-11 PROCEDURE — 1036F TOBACCO NON-USER: CPT | Performed by: FAMILY MEDICINE

## 2018-06-11 PROCEDURE — 99214 OFFICE O/P EST MOD 30 MIN: CPT | Performed by: FAMILY MEDICINE

## 2018-06-11 PROCEDURE — 4040F PNEUMOC VAC/ADMIN/RCVD: CPT | Performed by: FAMILY MEDICINE

## 2018-06-11 PROCEDURE — G8598 ASA/ANTIPLAT THER USED: HCPCS | Performed by: FAMILY MEDICINE

## 2018-06-11 PROCEDURE — 3017F COLORECTAL CA SCREEN DOC REV: CPT | Performed by: FAMILY MEDICINE

## 2018-06-11 PROCEDURE — G8417 CALC BMI ABV UP PARAM F/U: HCPCS | Performed by: FAMILY MEDICINE

## 2018-06-11 PROCEDURE — G8427 DOCREV CUR MEDS BY ELIG CLIN: HCPCS | Performed by: FAMILY MEDICINE

## 2018-07-19 DIAGNOSIS — I10 ESSENTIAL HYPERTENSION: ICD-10-CM

## 2018-07-23 RX ORDER — LISINOPRIL 10 MG/1
TABLET ORAL
Qty: 270 TABLET | Refills: 3 | Status: SHIPPED | OUTPATIENT
Start: 2018-07-23 | End: 2018-09-10 | Stop reason: SDUPTHER

## 2018-09-10 ENCOUNTER — OFFICE VISIT (OUTPATIENT)
Dept: CARDIOLOGY | Age: 73
End: 2018-09-10
Payer: MEDICARE

## 2018-09-10 VITALS
SYSTOLIC BLOOD PRESSURE: 156 MMHG | WEIGHT: 314 LBS | DIASTOLIC BLOOD PRESSURE: 88 MMHG | HEART RATE: 62 BPM | BODY MASS INDEX: 41.62 KG/M2 | HEIGHT: 73 IN

## 2018-09-10 DIAGNOSIS — I44.7 LBBB (LEFT BUNDLE BRANCH BLOCK): ICD-10-CM

## 2018-09-10 DIAGNOSIS — I10 ESSENTIAL HYPERTENSION: Primary | ICD-10-CM

## 2018-09-10 PROCEDURE — 99213 OFFICE O/P EST LOW 20 MIN: CPT | Performed by: INTERNAL MEDICINE

## 2018-09-10 PROCEDURE — 93000 ELECTROCARDIOGRAM COMPLETE: CPT | Performed by: INTERNAL MEDICINE

## 2018-09-10 RX ORDER — LISINOPRIL 20 MG/1
20 TABLET ORAL 2 TIMES DAILY
Qty: 60 TABLET | Refills: 6 | Status: SHIPPED | OUTPATIENT
Start: 2018-09-10 | End: 2019-03-13 | Stop reason: SDUPTHER

## 2018-09-24 RX ORDER — BUPROPION HYDROCHLORIDE 75 MG/1
TABLET ORAL
Qty: 60 TABLET | Refills: 5 | Status: SHIPPED | OUTPATIENT
Start: 2018-09-24 | End: 2019-03-25 | Stop reason: SDUPTHER

## 2018-10-29 ENCOUNTER — NURSE ONLY (OUTPATIENT)
Dept: LAB | Age: 73
End: 2018-10-29
Payer: MEDICARE

## 2018-10-29 DIAGNOSIS — Z23 NEED FOR VACCINATION: Primary | ICD-10-CM

## 2018-10-29 PROCEDURE — 90662 IIV NO PRSV INCREASED AG IM: CPT | Performed by: FAMILY MEDICINE

## 2018-10-29 PROCEDURE — G0008 ADMIN INFLUENZA VIRUS VAC: HCPCS | Performed by: FAMILY MEDICINE

## 2018-11-13 DIAGNOSIS — I10 ESSENTIAL HYPERTENSION: ICD-10-CM

## 2018-11-13 RX ORDER — METOPROLOL TARTRATE 50 MG/1
TABLET, FILM COATED ORAL
Qty: 180 TABLET | Refills: 3 | Status: SHIPPED | OUTPATIENT
Start: 2018-11-13 | End: 2019-12-16 | Stop reason: SDUPTHER

## 2018-11-21 DIAGNOSIS — G45.9 TRANSIENT CEREBRAL ISCHEMIA, UNSPECIFIED TYPE: ICD-10-CM

## 2018-11-21 RX ORDER — CLOPIDOGREL BISULFATE 75 MG/1
TABLET ORAL
Qty: 90 TABLET | Refills: 3 | Status: SHIPPED | OUTPATIENT
Start: 2018-11-21 | End: 2019-12-26

## 2018-11-26 RX ORDER — ALLOPURINOL 300 MG/1
TABLET ORAL
Qty: 30 TABLET | Refills: 6 | Status: SHIPPED | OUTPATIENT
Start: 2018-11-26 | End: 2020-02-06

## 2018-12-10 ENCOUNTER — HOSPITAL ENCOUNTER (OUTPATIENT)
Dept: LAB | Age: 73
Discharge: HOME | End: 2018-12-10
Payer: MEDICARE

## 2018-12-10 DIAGNOSIS — Z12.5 PROSTATE CANCER SCREENING: ICD-10-CM

## 2018-12-10 DIAGNOSIS — I10 ESSENTIAL HYPERTENSION: ICD-10-CM

## 2018-12-10 LAB
ALBUMIN SERPL-MCNC: 4.6 G/DL (ref 3.5–5.2)
ALBUMIN/GLOBULIN RATIO: 1.5 (ref 1–2.5)
ALP BLD-CCNC: 85 U/L (ref 40–129)
ALT SERPL-CCNC: 17 U/L (ref 5–41)
ANION GAP SERPL CALCULATED.3IONS-SCNC: 12 MMOL/L (ref 9–17)
AST SERPL-CCNC: 19 U/L
BILIRUB SERPL-MCNC: 0.7 MG/DL (ref 0.3–1.2)
BUN BLDV-MCNC: 22 MG/DL (ref 8–23)
BUN/CREAT BLD: 18 (ref 9–20)
CALCIUM SERPL-MCNC: 9.6 MG/DL (ref 8.6–10.4)
CHLORIDE BLD-SCNC: 105 MMOL/L (ref 98–107)
CHOLESTEROL/HDL RATIO: 2.7
CHOLESTEROL: 89 MG/DL
CO2: 24 MMOL/L (ref 20–31)
CREAT SERPL-MCNC: 1.21 MG/DL (ref 0.7–1.2)
GFR AFRICAN AMERICAN: >60 ML/MIN
GFR NON-AFRICAN AMERICAN: 59 ML/MIN
GFR SERPL CREATININE-BSD FRML MDRD: ABNORMAL ML/MIN/{1.73_M2}
GFR SERPL CREATININE-BSD FRML MDRD: ABNORMAL ML/MIN/{1.73_M2}
GLUCOSE BLD-MCNC: 103 MG/DL (ref 70–99)
HCT VFR BLD CALC: 45 % (ref 41–53)
HDLC SERPL-MCNC: 33 MG/DL
HEMOGLOBIN: 14.2 G/DL (ref 13.5–17.5)
LDL CHOLESTEROL: 26 MG/DL (ref 0–130)
MCH RBC QN AUTO: 30.7 PG (ref 26–34)
MCHC RBC AUTO-ENTMCNC: 31.7 G/DL (ref 31–37)
MCV RBC AUTO: 97 FL (ref 80–100)
NRBC AUTOMATED: ABNORMAL PER 100 WBC
PDW BLD-RTO: 14.9 % (ref 11–14.5)
PLATELET # BLD: 179 K/UL (ref 140–450)
PMV BLD AUTO: 8.8 FL (ref 6–12)
POTASSIUM SERPL-SCNC: 4.4 MMOL/L (ref 3.7–5.3)
PROSTATE SPECIFIC ANTIGEN: 3.64 UG/L
RBC # BLD: 4.64 M/UL (ref 4.5–5.9)
SODIUM BLD-SCNC: 141 MMOL/L (ref 135–144)
TOTAL PROTEIN: 7.6 G/DL (ref 6.4–8.3)
TRIGL SERPL-MCNC: 151 MG/DL
VLDLC SERPL CALC-MCNC: ABNORMAL MG/DL (ref 1–30)
WBC # BLD: 7.8 K/UL (ref 3.5–11)

## 2018-12-10 PROCEDURE — G0103 PSA SCREENING: HCPCS

## 2018-12-10 PROCEDURE — 36415 COLL VENOUS BLD VENIPUNCTURE: CPT

## 2018-12-10 PROCEDURE — 80061 LIPID PANEL: CPT

## 2018-12-10 PROCEDURE — 80053 COMPREHEN METABOLIC PANEL: CPT

## 2018-12-10 PROCEDURE — 85027 COMPLETE CBC AUTOMATED: CPT

## 2018-12-17 ENCOUNTER — OFFICE VISIT (OUTPATIENT)
Dept: FAMILY MEDICINE CLINIC | Age: 73
End: 2018-12-17
Payer: MEDICARE

## 2018-12-17 VITALS
OXYGEN SATURATION: 96 % | BODY MASS INDEX: 41.08 KG/M2 | DIASTOLIC BLOOD PRESSURE: 66 MMHG | HEIGHT: 73 IN | SYSTOLIC BLOOD PRESSURE: 128 MMHG | RESPIRATION RATE: 16 BRPM | WEIGHT: 310 LBS | HEART RATE: 61 BPM

## 2018-12-17 DIAGNOSIS — Z12.11 COLON CANCER SCREENING: ICD-10-CM

## 2018-12-17 DIAGNOSIS — N40.1 BENIGN PROSTATIC HYPERPLASIA WITH POST-VOID DRIBBLING: ICD-10-CM

## 2018-12-17 DIAGNOSIS — E78.5 HYPERLIPIDEMIA, UNSPECIFIED HYPERLIPIDEMIA TYPE: ICD-10-CM

## 2018-12-17 DIAGNOSIS — I10 ESSENTIAL HYPERTENSION: Primary | ICD-10-CM

## 2018-12-17 DIAGNOSIS — N39.43 BENIGN PROSTATIC HYPERPLASIA WITH POST-VOID DRIBBLING: ICD-10-CM

## 2018-12-17 PROCEDURE — 4040F PNEUMOC VAC/ADMIN/RCVD: CPT | Performed by: FAMILY MEDICINE

## 2018-12-17 PROCEDURE — 1123F ACP DISCUSS/DSCN MKR DOCD: CPT | Performed by: FAMILY MEDICINE

## 2018-12-17 PROCEDURE — 3017F COLORECTAL CA SCREEN DOC REV: CPT | Performed by: FAMILY MEDICINE

## 2018-12-17 PROCEDURE — 99214 OFFICE O/P EST MOD 30 MIN: CPT | Performed by: FAMILY MEDICINE

## 2018-12-17 PROCEDURE — 1036F TOBACCO NON-USER: CPT | Performed by: FAMILY MEDICINE

## 2018-12-17 PROCEDURE — G8482 FLU IMMUNIZE ORDER/ADMIN: HCPCS | Performed by: FAMILY MEDICINE

## 2018-12-17 PROCEDURE — G8598 ASA/ANTIPLAT THER USED: HCPCS | Performed by: FAMILY MEDICINE

## 2018-12-17 PROCEDURE — G8427 DOCREV CUR MEDS BY ELIG CLIN: HCPCS | Performed by: FAMILY MEDICINE

## 2018-12-17 PROCEDURE — G8417 CALC BMI ABV UP PARAM F/U: HCPCS | Performed by: FAMILY MEDICINE

## 2018-12-17 PROCEDURE — 1101F PT FALLS ASSESS-DOCD LE1/YR: CPT | Performed by: FAMILY MEDICINE

## 2018-12-17 RX ORDER — ATORVASTATIN CALCIUM 40 MG/1
40 TABLET, FILM COATED ORAL DAILY
Qty: 90 TABLET | Refills: 3 | Status: SHIPPED | OUTPATIENT
Start: 2018-12-17 | End: 2019-12-16 | Stop reason: SDUPTHER

## 2018-12-17 ASSESSMENT — PATIENT HEALTH QUESTIONNAIRE - PHQ9
1. LITTLE INTEREST OR PLEASURE IN DOING THINGS: 0
SUM OF ALL RESPONSES TO PHQ QUESTIONS 1-9: 0
SUM OF ALL RESPONSES TO PHQ9 QUESTIONS 1 & 2: 0
SUM OF ALL RESPONSES TO PHQ QUESTIONS 1-9: 0
2. FEELING DOWN, DEPRESSED OR HOPELESS: 0

## 2018-12-17 NOTE — PROGRESS NOTES
HPI:  Patient comes in today for   Chief Complaint   Patient presents with    Hypertension     6 month  follow up   Patient here to f/u. H/O HTN ,LBBB,CAD,s/p CABG. BPH,s/p TURP and Gout. H/O Bilateral knee replacement is doing good ,has to use a cane if has to walk long distance. No chest pain or SOB. Has not been able to loose weight since last visit has gained some is working on weight loss. His last colonoscopy was 5 years ago ,does not want to go for repeat colonoscopy. Denies any bowel problems no blood in stools. sees urology for BPH. HISTORY:  Past Medical History:   Diagnosis Date    Adenomatous polyp     history of     Chronic venous insufficiency     DVT (deep venous thrombosis) (HCC)     history of     Edema     related to venous stasis     Gout     history of     Hypertension     Onychomycosis     Osteoarthritis     Plantar fasciitis     Tobacco abuse        Family History   Problem Relation Age of Onset    Other Mother 52        Rheumatic fever    Kidney Disease Brother     Heart Disease Brother     Sudden Death Father         auto accident     Heart Surgery Sister         bypass surgery    Other Brother         abdominal aortic aneursym    Hypertension Other         siblings       Social History     Social History    Marital status: Single     Spouse name: N/A    Number of children: N/A    Years of education: N/A     Occupational History    Not on file.      Social History Main Topics    Smoking status: Former Smoker     Packs/day: 0.00     Years: 55.00     Types: Cigarettes     Quit date: 10/27/2014    Smokeless tobacco: Never Used    Alcohol use 0.0 oz/week      Comment: occasionally    Drug use: No    Sexual activity: Yes     Partners: Female     Other Topics Concern    Not on file     Social History Narrative    No narrative on file       Current Outpatient Prescriptions   Medication Sig Dispense Refill    allopurinol (ZYLOPRIM) 300 MG tablet TAKE 1 TABLET BY MOUTH ONCE G  ABDOMEN:Obese, Soft and nontender without palpable abnormalities  EXTREMITIES:   No calf tenderness ,both knees with post surgical changes. Swelling in both  legs chronic is wearing support stockings. NEURO:No focal deficits. Skin :Has keratotic changes in hands. ASSESSMENT/PLAN:   Diagnosis Orders   1. Essential hypertension     2. Hyperlipidemia, unspecified hyperlipidemia type     3. Benign prostatic hyperplasia with post-void dribbling     4. Colon cancer screening  COLOGUARD (For external results only)           Orders Placed This Encounter   Procedures    COLOGUARD (For external results only)     This test is performed by an external laboratory and is used for result attachment only. It is required that this order requisition be faxed to: [facility] [employer] @ [phone]. See [email or web address] for further information. Standing Status:   Future     Standing Expiration Date:   12/17/2019     Requested Prescriptions      No prescriptions requested or ordered in this encounter   Due for colonoscopy declines will get cologuard test  f/u with cardiology and urology as scheduled. Advised  weight loss. Labs reviewed. LDL is really low can cut back on lipitor to 1/2 tablet daily. Return in about 3 months (around 3/17/2019).

## 2019-03-11 ENCOUNTER — OFFICE VISIT (OUTPATIENT)
Dept: CARDIOLOGY | Age: 74
End: 2019-03-11
Payer: MEDICARE

## 2019-03-11 VITALS
DIASTOLIC BLOOD PRESSURE: 86 MMHG | HEART RATE: 67 BPM | BODY MASS INDEX: 41.75 KG/M2 | WEIGHT: 315 LBS | HEIGHT: 73 IN | SYSTOLIC BLOOD PRESSURE: 140 MMHG

## 2019-03-11 DIAGNOSIS — I10 ESSENTIAL HYPERTENSION: Primary | ICD-10-CM

## 2019-03-11 PROCEDURE — 3017F COLORECTAL CA SCREEN DOC REV: CPT | Performed by: INTERNAL MEDICINE

## 2019-03-11 PROCEDURE — 1036F TOBACCO NON-USER: CPT | Performed by: INTERNAL MEDICINE

## 2019-03-11 PROCEDURE — 1123F ACP DISCUSS/DSCN MKR DOCD: CPT | Performed by: INTERNAL MEDICINE

## 2019-03-11 PROCEDURE — 4040F PNEUMOC VAC/ADMIN/RCVD: CPT | Performed by: INTERNAL MEDICINE

## 2019-03-11 PROCEDURE — 93000 ELECTROCARDIOGRAM COMPLETE: CPT | Performed by: INTERNAL MEDICINE

## 2019-03-11 PROCEDURE — G8482 FLU IMMUNIZE ORDER/ADMIN: HCPCS | Performed by: INTERNAL MEDICINE

## 2019-03-11 PROCEDURE — 1101F PT FALLS ASSESS-DOCD LE1/YR: CPT | Performed by: INTERNAL MEDICINE

## 2019-03-11 PROCEDURE — G8417 CALC BMI ABV UP PARAM F/U: HCPCS | Performed by: INTERNAL MEDICINE

## 2019-03-11 PROCEDURE — 99213 OFFICE O/P EST LOW 20 MIN: CPT | Performed by: INTERNAL MEDICINE

## 2019-03-11 PROCEDURE — G8427 DOCREV CUR MEDS BY ELIG CLIN: HCPCS | Performed by: INTERNAL MEDICINE

## 2019-03-13 DIAGNOSIS — I10 ESSENTIAL HYPERTENSION: ICD-10-CM

## 2019-03-14 RX ORDER — LISINOPRIL 20 MG/1
20 TABLET ORAL 2 TIMES DAILY
Qty: 180 TABLET | Refills: 3 | Status: SHIPPED | OUTPATIENT
Start: 2019-03-14 | End: 2020-03-23

## 2019-03-19 ENCOUNTER — OFFICE VISIT (OUTPATIENT)
Dept: FAMILY MEDICINE CLINIC | Age: 74
End: 2019-03-19
Payer: MEDICARE

## 2019-03-19 ENCOUNTER — HOSPITAL ENCOUNTER (OUTPATIENT)
Dept: INTERVENTIONAL RADIOLOGY/VASCULAR | Age: 74
Discharge: HOME | End: 2019-03-21
Payer: MEDICARE

## 2019-03-19 ENCOUNTER — HOSPITAL ENCOUNTER (EMERGENCY)
Age: 74
Discharge: ANOTHER HOSPITAL | End: 2019-03-20
Attending: EMERGENCY MEDICINE | Admitting: FAMILY MEDICINE
Payer: MEDICARE

## 2019-03-19 VITALS
HEART RATE: 60 BPM | HEIGHT: 73 IN | SYSTOLIC BLOOD PRESSURE: 112 MMHG | DIASTOLIC BLOOD PRESSURE: 60 MMHG | BODY MASS INDEX: 40.69 KG/M2 | WEIGHT: 307 LBS

## 2019-03-19 DIAGNOSIS — R10.811 RIGHT UPPER QUADRANT ABDOMINAL TENDERNESS WITHOUT REBOUND TENDERNESS: ICD-10-CM

## 2019-03-19 DIAGNOSIS — K81.9 CHOLECYSTITIS: Primary | ICD-10-CM

## 2019-03-19 DIAGNOSIS — H11.32 SUBCONJUNCTIVAL HEMORRHAGE OF LEFT EYE: ICD-10-CM

## 2019-03-19 DIAGNOSIS — Z12.11 SCREENING FOR COLON CANCER: ICD-10-CM

## 2019-03-19 DIAGNOSIS — Z13.9 SCREENING FOR CONDITION: ICD-10-CM

## 2019-03-19 DIAGNOSIS — I10 ESSENTIAL HYPERTENSION: Primary | ICD-10-CM

## 2019-03-19 DIAGNOSIS — Z23 NEED FOR VACCINATION: ICD-10-CM

## 2019-03-19 LAB
ABSOLUTE BANDS #: 0.44 K/UL
ABSOLUTE EOS #: 0 K/UL (ref 0–0.4)
ABSOLUTE IMMATURE GRANULOCYTE: ABNORMAL K/UL (ref 0–0.3)
ABSOLUTE LYMPH #: 2 K/UL (ref 1–4.8)
ABSOLUTE MONO #: 1.67 K/UL (ref 0.1–1.2)
ALBUMIN SERPL-MCNC: 3.7 G/DL (ref 3.5–5.2)
ALBUMIN/GLOBULIN RATIO: 1.1 (ref 1–2.5)
ALP BLD-CCNC: 98 U/L (ref 40–129)
ALT SERPL-CCNC: 36 U/L (ref 5–41)
ANION GAP SERPL CALCULATED.3IONS-SCNC: 20 MMOL/L (ref 9–17)
AST SERPL-CCNC: 50 U/L
BANDS: 4 %
BASOPHILS # BLD: 0 % (ref 0–2)
BASOPHILS ABSOLUTE: 0 K/UL (ref 0–0.2)
BILIRUB SERPL-MCNC: 0.69 MG/DL (ref 0.3–1.2)
BUN BLDV-MCNC: 26 MG/DL (ref 8–23)
BUN/CREAT BLD: 22 (ref 9–20)
CALCIUM SERPL-MCNC: 9.3 MG/DL (ref 8.6–10.4)
CHLORIDE BLD-SCNC: 98 MMOL/L (ref 98–107)
CO2: 23 MMOL/L (ref 20–31)
CREAT SERPL-MCNC: 1.16 MG/DL (ref 0.7–1.2)
DIFFERENTIAL TYPE: ABNORMAL
EOSINOPHILS RELATIVE PERCENT: 0 % (ref 1–8)
GFR AFRICAN AMERICAN: >60 ML/MIN
GFR NON-AFRICAN AMERICAN: >60 ML/MIN
GFR SERPL CREATININE-BSD FRML MDRD: ABNORMAL ML/MIN/{1.73_M2}
GFR SERPL CREATININE-BSD FRML MDRD: ABNORMAL ML/MIN/{1.73_M2}
GLUCOSE BLD-MCNC: 95 MG/DL (ref 70–99)
HCT VFR BLD CALC: 41.3 % (ref 41–53)
HEMOGLOBIN: 13.2 G/DL (ref 13.5–17.5)
IMMATURE GRANULOCYTES: ABNORMAL %
INR BLD: 1
LIPASE: 33 U/L (ref 13–60)
LYMPHOCYTES # BLD: 18 % (ref 15–43)
MCH RBC QN AUTO: 30.1 PG (ref 26–34)
MCHC RBC AUTO-ENTMCNC: 31.9 G/DL (ref 31–37)
MCV RBC AUTO: 94.4 FL (ref 80–100)
MONOCYTES # BLD: 15 % (ref 6–14)
MORPHOLOGY: ABNORMAL
MORPHOLOGY: ABNORMAL
NRBC AUTOMATED: ABNORMAL PER 100 WBC
PDW BLD-RTO: 15.8 % (ref 11–14.5)
PLATELET # BLD: 280 K/UL (ref 140–450)
PLATELET ESTIMATE: ABNORMAL
PMV BLD AUTO: 8.2 FL (ref 6–12)
POTASSIUM SERPL-SCNC: 4.5 MMOL/L (ref 3.7–5.3)
PROTHROMBIN TIME: 10.8 SEC (ref 9.4–11.3)
RBC # BLD: 4.37 M/UL (ref 4.5–5.9)
RBC # BLD: ABNORMAL 10*6/UL
SEG NEUTROPHILS: 63 % (ref 44–74)
SEGMENTED NEUTROPHILS ABSOLUTE COUNT: 6.99 K/UL (ref 1.8–7.7)
SODIUM BLD-SCNC: 141 MMOL/L (ref 135–144)
TOTAL PROTEIN: 7.1 G/DL (ref 6.4–8.3)
WBC # BLD: 11.1 K/UL (ref 3.5–11)
WBC # BLD: ABNORMAL 10*3/UL

## 2019-03-19 PROCEDURE — 85610 PROTHROMBIN TIME: CPT

## 2019-03-19 PROCEDURE — 80053 COMPREHEN METABOLIC PANEL: CPT

## 2019-03-19 PROCEDURE — G8417 CALC BMI ABV UP PARAM F/U: HCPCS | Performed by: FAMILY MEDICINE

## 2019-03-19 PROCEDURE — 99285 EMERGENCY DEPT VISIT HI MDM: CPT

## 2019-03-19 PROCEDURE — 83690 ASSAY OF LIPASE: CPT

## 2019-03-19 PROCEDURE — 99283 EMERGENCY DEPT VISIT LOW MDM: CPT | Performed by: SURGERY

## 2019-03-19 PROCEDURE — 4040F PNEUMOC VAC/ADMIN/RCVD: CPT | Performed by: FAMILY MEDICINE

## 2019-03-19 PROCEDURE — 76705 ECHO EXAM OF ABDOMEN: CPT

## 2019-03-19 PROCEDURE — 1036F TOBACCO NON-USER: CPT | Performed by: FAMILY MEDICINE

## 2019-03-19 PROCEDURE — 90471 IMMUNIZATION ADMIN: CPT | Performed by: FAMILY MEDICINE

## 2019-03-19 PROCEDURE — G8427 DOCREV CUR MEDS BY ELIG CLIN: HCPCS | Performed by: FAMILY MEDICINE

## 2019-03-19 PROCEDURE — 90715 TDAP VACCINE 7 YRS/> IM: CPT | Performed by: FAMILY MEDICINE

## 2019-03-19 PROCEDURE — 1101F PT FALLS ASSESS-DOCD LE1/YR: CPT | Performed by: FAMILY MEDICINE

## 2019-03-19 PROCEDURE — G8482 FLU IMMUNIZE ORDER/ADMIN: HCPCS | Performed by: FAMILY MEDICINE

## 2019-03-19 PROCEDURE — 99214 OFFICE O/P EST MOD 30 MIN: CPT | Performed by: FAMILY MEDICINE

## 2019-03-19 PROCEDURE — 85025 COMPLETE CBC W/AUTO DIFF WBC: CPT

## 2019-03-19 PROCEDURE — 1123F ACP DISCUSS/DSCN MKR DOCD: CPT | Performed by: FAMILY MEDICINE

## 2019-03-19 PROCEDURE — 3017F COLORECTAL CA SCREEN DOC REV: CPT | Performed by: FAMILY MEDICINE

## 2019-03-19 PROCEDURE — 76706 US ABDL AORTA SCREEN AAA: CPT

## 2019-03-19 RX ORDER — MAGNESIUM SULFATE 1 G/100ML
1 INJECTION INTRAVENOUS PRN
Status: CANCELLED | OUTPATIENT
Start: 2019-03-19

## 2019-03-19 RX ORDER — SODIUM CHLORIDE 0.9 % (FLUSH) 0.9 %
10 SYRINGE (ML) INJECTION EVERY 12 HOURS SCHEDULED
Status: CANCELLED | OUTPATIENT
Start: 2019-03-19

## 2019-03-19 RX ORDER — SODIUM CHLORIDE 9 MG/ML
INJECTION, SOLUTION INTRAVENOUS CONTINUOUS
Status: CANCELLED | OUTPATIENT
Start: 2019-03-19

## 2019-03-19 RX ORDER — ONDANSETRON 2 MG/ML
4 INJECTION INTRAMUSCULAR; INTRAVENOUS EVERY 6 HOURS PRN
Status: CANCELLED | OUTPATIENT
Start: 2019-03-19

## 2019-03-19 RX ORDER — SODIUM CHLORIDE 0.9 % (FLUSH) 0.9 %
10 SYRINGE (ML) INJECTION PRN
Status: CANCELLED | OUTPATIENT
Start: 2019-03-19

## 2019-03-19 RX ORDER — ALLOPURINOL 300 MG/1
1 TABLET ORAL DAILY
Status: CANCELLED | OUTPATIENT
Start: 2019-03-19

## 2019-03-19 RX ORDER — POTASSIUM CHLORIDE 7.45 MG/ML
10 INJECTION INTRAVENOUS PRN
Status: CANCELLED | OUTPATIENT
Start: 2019-03-19

## 2019-03-19 RX ORDER — LISINOPRIL 5 MG/1
20 TABLET ORAL 2 TIMES DAILY
Status: CANCELLED | OUTPATIENT
Start: 2019-03-19

## 2019-03-19 RX ORDER — OXYBUTYNIN CHLORIDE 5 MG/1
5 TABLET, EXTENDED RELEASE ORAL DAILY
Status: CANCELLED | OUTPATIENT
Start: 2019-03-19

## 2019-03-19 RX ORDER — HYDROCODONE BITARTRATE AND ACETAMINOPHEN 5; 325 MG/1; MG/1
1 TABLET ORAL EVERY 4 HOURS PRN
Status: CANCELLED | OUTPATIENT
Start: 2019-03-19

## 2019-03-19 RX ORDER — ACETAMINOPHEN 325 MG/1
650 TABLET ORAL EVERY 4 HOURS PRN
Status: CANCELLED | OUTPATIENT
Start: 2019-03-19

## 2019-03-19 RX ORDER — METOPROLOL TARTRATE 50 MG/1
1 TABLET, FILM COATED ORAL 2 TIMES DAILY
Status: CANCELLED | OUTPATIENT
Start: 2019-03-19

## 2019-03-19 RX ORDER — BUPROPION HYDROCHLORIDE 75 MG/1
1 TABLET ORAL 2 TIMES DAILY
Status: CANCELLED | OUTPATIENT
Start: 2019-03-19

## 2019-03-19 RX ORDER — ATORVASTATIN CALCIUM 40 MG/1
40 TABLET, FILM COATED ORAL DAILY
Status: CANCELLED | OUTPATIENT
Start: 2019-03-19

## 2019-03-19 RX ORDER — HYDROCODONE BITARTRATE AND ACETAMINOPHEN 5; 325 MG/1; MG/1
2 TABLET ORAL EVERY 4 HOURS PRN
Status: CANCELLED | OUTPATIENT
Start: 2019-03-19

## 2019-03-19 ASSESSMENT — ENCOUNTER SYMPTOMS
NAUSEA: 1
DIARRHEA: 0
WHEEZING: 0
TROUBLE SWALLOWING: 0
BACK PAIN: 0
SORE THROAT: 0
SHORTNESS OF BREATH: 0
ABDOMINAL PAIN: 1
BLOOD IN STOOL: 0
EYE REDNESS: 1
EYE PAIN: 0
PHOTOPHOBIA: 0
CONSTIPATION: 0
VOMITING: 0

## 2019-03-19 ASSESSMENT — PATIENT HEALTH QUESTIONNAIRE - PHQ9
SUM OF ALL RESPONSES TO PHQ QUESTIONS 1-9: 0
SUM OF ALL RESPONSES TO PHQ9 QUESTIONS 1 & 2: 0
SUM OF ALL RESPONSES TO PHQ QUESTIONS 1-9: 0
1. LITTLE INTEREST OR PLEASURE IN DOING THINGS: 0
2. FEELING DOWN, DEPRESSED OR HOPELESS: 0

## 2019-03-20 VITALS
HEIGHT: 74 IN | SYSTOLIC BLOOD PRESSURE: 150 MMHG | HEART RATE: 59 BPM | OXYGEN SATURATION: 96 % | WEIGHT: 307 LBS | BODY MASS INDEX: 39.4 KG/M2 | DIASTOLIC BLOOD PRESSURE: 83 MMHG | TEMPERATURE: 97.3 F | RESPIRATION RATE: 16 BRPM

## 2019-03-20 PROCEDURE — 2580000003 HC RX 258: Performed by: EMERGENCY MEDICINE

## 2019-03-20 RX ORDER — SODIUM CHLORIDE 9 MG/ML
INJECTION, SOLUTION INTRAVENOUS CONTINUOUS
Status: DISCONTINUED | OUTPATIENT
Start: 2019-03-20 | End: 2019-03-20 | Stop reason: HOSPADM

## 2019-03-20 RX ADMIN — SODIUM CHLORIDE: 9 INJECTION, SOLUTION INTRAVENOUS at 11:23

## 2019-03-25 ENCOUNTER — TELEPHONE (OUTPATIENT)
Dept: FAMILY MEDICINE CLINIC | Age: 74
End: 2019-03-25

## 2019-03-25 RX ORDER — BUPROPION HYDROCHLORIDE 75 MG/1
TABLET ORAL
Qty: 60 TABLET | Refills: 5 | Status: SHIPPED | OUTPATIENT
Start: 2019-03-25 | End: 2019-10-04 | Stop reason: SDUPTHER

## 2019-03-25 NOTE — TELEPHONE ENCOUNTER
Wife calling stating that pt was at [facility] and she would like to speak with nurse. Please call wife.

## 2019-03-25 NOTE — TELEPHONE ENCOUNTER
Wife wants to know if pt is to stay off plavix or restart? Was recently discharged from [facility] 3/21/2019, cholecystitis. Is scheduled to have surgery on 4/25/19 with Dr Amisha Snyder at [facility]. Did call and request medical records.

## 2019-03-26 NOTE — TELEPHONE ENCOUNTER
Hold plavix for 5 days before surgery unless was specifically told to hold plavix till surgery when he was discharged  From [facility]

## 2019-04-15 DIAGNOSIS — R60.0 PEDAL EDEMA: ICD-10-CM

## 2019-04-15 RX ORDER — FUROSEMIDE 20 MG/1
TABLET ORAL
Qty: 90 TABLET | Refills: 3 | Status: SHIPPED | OUTPATIENT
Start: 2019-04-15 | End: 2020-06-29

## 2019-05-15 ENCOUNTER — OFFICE VISIT (OUTPATIENT)
Dept: FAMILY MEDICINE CLINIC | Age: 74
End: 2019-05-15
Payer: MEDICARE

## 2019-05-15 VITALS
HEIGHT: 74 IN | SYSTOLIC BLOOD PRESSURE: 132 MMHG | DIASTOLIC BLOOD PRESSURE: 80 MMHG | BODY MASS INDEX: 37.47 KG/M2 | WEIGHT: 292 LBS | OXYGEN SATURATION: 96 % | HEART RATE: 66 BPM

## 2019-05-15 DIAGNOSIS — I25.810 CORONARY ARTERY DISEASE INVOLVING CORONARY BYPASS GRAFT OF NATIVE HEART WITHOUT ANGINA PECTORIS: ICD-10-CM

## 2019-05-15 DIAGNOSIS — K59.00 CONSTIPATION, UNSPECIFIED CONSTIPATION TYPE: ICD-10-CM

## 2019-05-15 DIAGNOSIS — I10 ESSENTIAL HYPERTENSION: ICD-10-CM

## 2019-05-15 DIAGNOSIS — Z90.49 S/P LAPAROSCOPIC CHOLECYSTECTOMY: Primary | ICD-10-CM

## 2019-05-15 PROCEDURE — G8598 ASA/ANTIPLAT THER USED: HCPCS | Performed by: FAMILY MEDICINE

## 2019-05-15 PROCEDURE — 1123F ACP DISCUSS/DSCN MKR DOCD: CPT | Performed by: FAMILY MEDICINE

## 2019-05-15 PROCEDURE — G8427 DOCREV CUR MEDS BY ELIG CLIN: HCPCS | Performed by: FAMILY MEDICINE

## 2019-05-15 PROCEDURE — 3017F COLORECTAL CA SCREEN DOC REV: CPT | Performed by: FAMILY MEDICINE

## 2019-05-15 PROCEDURE — 99214 OFFICE O/P EST MOD 30 MIN: CPT | Performed by: FAMILY MEDICINE

## 2019-05-15 PROCEDURE — 4040F PNEUMOC VAC/ADMIN/RCVD: CPT | Performed by: FAMILY MEDICINE

## 2019-05-15 PROCEDURE — 1036F TOBACCO NON-USER: CPT | Performed by: FAMILY MEDICINE

## 2019-05-15 PROCEDURE — G8417 CALC BMI ABV UP PARAM F/U: HCPCS | Performed by: FAMILY MEDICINE

## 2019-05-15 NOTE — PROGRESS NOTES
HPI:  Patient comes in today for   Chief Complaint   Patient presents with    Follow Up After Procedure     had gallbladder taken out 19   Patient here to f/u is s/p Emergency Laprascopic cholecystectomy for gangrenous cholecystectomy. Has a bile duct stent in place,states is doing ok. Bowels with some constipation since surgery,no abdominal pain. H/O HTN ,LBBB,CAD,s/p CABG. BPH,s/p TURP and Gout. Bilateral knee replacement is doing good ,has to use a cane if has to walk long distance. No chest pain or SOB. Hasw lost  A few pounds.      HISTORY:  Past Medical History:   Diagnosis Date    Adenomatous polyp     history of     Chronic venous insufficiency     DVT (deep venous thrombosis) (HCC)     history of     Edema     related to venous stasis     Gout     history of     Hypertension     Onychomycosis     Osteoarthritis     Plantar fasciitis     Tobacco abuse        Family History   Problem Relation Age of Onset    Other Mother 52        Rheumatic fever    Kidney Disease Brother     Heart Disease Brother     Sudden Death Father         auto accident     Heart Surgery Sister         bypass surgery    Other Brother         abdominal aortic aneursym    Hypertension Other         siblings       Social History     Socioeconomic History    Marital status: Single     Spouse name: Not on file    Number of children: Not on file    Years of education: Not on file    Highest education level: Not on file   Occupational History    Not on file   Social Needs    Financial resource strain: Not on file    Food insecurity:     Worry: Not on file     Inability: Not on file    Transportation needs:     Medical: Not on file     Non-medical: Not on file   Tobacco Use    Smoking status: Former Smoker     Packs/day: 0.00     Years: 55.00     Pack years: 0.00     Types: Cigarettes     Last attempt to quit: 10/27/2014     Years since quittin.5    Smokeless tobacco: Never Used   Substance and Sexual Activity  Alcohol use:  Yes     Alcohol/week: 0.0 oz     Comment: occasionally    Drug use: No    Sexual activity: Yes     Partners: Female   Lifestyle    Physical activity:     Days per week: Not on file     Minutes per session: Not on file    Stress: Not on file   Relationships    Social connections:     Talks on phone: Not on file     Gets together: Not on file     Attends [employer] service: Not on file     Active member of club or organization: Not on file     Attends meetings of clubs or organizations: Not on file     Relationship status: Not on file    Intimate partner violence:     Fear of current or ex partner: Not on file     Emotionally abused: Not on file     Physically abused: Not on file     Forced sexual activity: Not on file   Other Topics Concern    Not on file   Social History Narrative    Not on file       Current Outpatient Medications   Medication Sig Dispense Refill    furosemide (LASIX) 20 MG tablet TAKE 1 TABLET BY MOUTH ONCE DAILY AS NEEDED FOR PEDAL EDEMA 90 tablet 3    buPROPion (WELLBUTRIN) 75 MG tablet TAKE 1 TABLET BY MOUTH TWICE DAILY 60 tablet 5    zoster recombinant adjuvanted vaccine (SHINGRIX) 50 MCG/0.5ML SUSR injection Inject 0.5 mLs into the muscle See Admin Instructions 1 dose now and repeat in 2-6 months 1 each 0    lisinopril (PRINIVIL;ZESTRIL) 20 MG tablet Take 1 tablet by mouth 2 times daily 180 tablet 3    atorvastatin (LIPITOR) 40 MG tablet Take 1 tablet by mouth daily 90 tablet 3    allopurinol (ZYLOPRIM) 300 MG tablet TAKE 1 TABLET BY MOUTH ONCE DAILY 30 tablet 6    clopidogrel (PLAVIX) 75 MG tablet TAKE ONE TABLET BY MOUTH ONCE DAILY 90 tablet 3    metoprolol tartrate (LOPRESSOR) 50 MG tablet TAKE ONE TABLET BY MOUTH TWICE DAILY 180 tablet 3    oxybutynin (DITROPAN-XL) 5 MG CR tablet Take 5 mg by mouth daily       Omega-3 Fatty Acids (FISH OIL PO) Take 1,200 mg by mouth daily      Acetaminophen (TYLENOL PO) Take by mouth Per pt, takes 2 in am; 2 in pm      aspirin 81 MG tablet Take 81 mg by mouth daily.  Handicap Placard MISC by Does not apply route. 5 year renewal. 1 each 0     No current facility-administered medications for this visit. REVIEW OF SYSTEMS:  General: No fevers, chills, has lost a few pounds of weight  HEENT: No double vision ,no runny nose, sore throat or tinnitus  Cardio: No chest pain, palpitations, QUILES, edema, PND  Pulmonary: No cough, hemoptysis, SOB  GI: No nausea, vomiting, dysphagia, odynophagia, diarrhea,mild constipation. : No dysuria, hematuria, urgency,has some incontinence. Nocturia x1  Musculoskeletal: knee doing better since surgery  Neuro:No dizziness/lightheadedness, no seizures. no headaches  Skin:No lesions. Sleep:fair. Psychiatric:No depression or anxiety. PHYSICAL EXAM:  VS:    /80   Pulse 66   Ht 6' 2\" (1.88 m)   Wt 292 lb (132.5 kg)   SpO2 96%   BMI 37.49 kg/m²   General:  Alert and oriented, NAD  HEENT:  TMs, BETTY, EOMI,Throat currently clear. NECK:  Supple without adenopathy or thyromegaly, no carotid bruits  LUNGS:  CTA all fields  HEART:  RRR without M, R, or G  ABDOMEN:Obese,soft,wound sites look ok ,no palpable abnormalities  EXTREMITIES:   No calf tenderness ,both knees with post surgical changes. Swelling in both  legs chronic is wearing support stockings. NEURO:No focal deficits. Skin :Has keratotic changes in hands. ASSESSMENT/PLAN:   Diagnosis Orders   1. S/P laparoscopic cholecystectomy     2. Essential hypertension     3. Coronary artery disease involving coronary bypass graft of native heart without angina pectoris     4. Constipation, unspecified constipation type             No orders of the defined types were placed in this encounter. Requested Prescriptions      No prescriptions requested or ordered in this encounter   Continue current meds. Stool softener as needed for constipation  F/u with surgery as scheduled. Return in about 3 months (around 8/15/2019).

## 2019-08-20 ENCOUNTER — OFFICE VISIT (OUTPATIENT)
Dept: FAMILY MEDICINE CLINIC | Age: 74
End: 2019-08-20
Payer: MEDICARE

## 2019-08-20 VITALS
WEIGHT: 295.8 LBS | BODY MASS INDEX: 37.96 KG/M2 | SYSTOLIC BLOOD PRESSURE: 132 MMHG | DIASTOLIC BLOOD PRESSURE: 84 MMHG | OXYGEN SATURATION: 97 % | HEART RATE: 88 BPM | HEIGHT: 74 IN

## 2019-08-20 DIAGNOSIS — I10 ESSENTIAL HYPERTENSION: Primary | ICD-10-CM

## 2019-08-20 DIAGNOSIS — M79.674 PAIN AND SWELLING OF TOE OF RIGHT FOOT: ICD-10-CM

## 2019-08-20 DIAGNOSIS — Z86.19 HISTORY OF ONYCHOMYCOSIS: ICD-10-CM

## 2019-08-20 DIAGNOSIS — M15.9 PRIMARY OSTEOARTHRITIS INVOLVING MULTIPLE JOINTS: ICD-10-CM

## 2019-08-20 DIAGNOSIS — M79.89 PAIN AND SWELLING OF TOE OF RIGHT FOOT: ICD-10-CM

## 2019-08-20 PROCEDURE — 4040F PNEUMOC VAC/ADMIN/RCVD: CPT | Performed by: FAMILY MEDICINE

## 2019-08-20 PROCEDURE — G8427 DOCREV CUR MEDS BY ELIG CLIN: HCPCS | Performed by: FAMILY MEDICINE

## 2019-08-20 PROCEDURE — 3017F COLORECTAL CA SCREEN DOC REV: CPT | Performed by: FAMILY MEDICINE

## 2019-08-20 PROCEDURE — 99214 OFFICE O/P EST MOD 30 MIN: CPT | Performed by: FAMILY MEDICINE

## 2019-08-20 PROCEDURE — G8598 ASA/ANTIPLAT THER USED: HCPCS | Performed by: FAMILY MEDICINE

## 2019-08-20 PROCEDURE — G8417 CALC BMI ABV UP PARAM F/U: HCPCS | Performed by: FAMILY MEDICINE

## 2019-08-20 PROCEDURE — 1036F TOBACCO NON-USER: CPT | Performed by: FAMILY MEDICINE

## 2019-08-20 PROCEDURE — 1123F ACP DISCUSS/DSCN MKR DOCD: CPT | Performed by: FAMILY MEDICINE

## 2019-08-20 RX ORDER — CEPHALEXIN 500 MG/1
500 CAPSULE ORAL 4 TIMES DAILY
Qty: 28 CAPSULE | Refills: 0 | Status: SHIPPED | OUTPATIENT
Start: 2019-08-20 | End: 2019-10-31 | Stop reason: ALTCHOICE

## 2019-08-20 ASSESSMENT — PATIENT HEALTH QUESTIONNAIRE - PHQ9
SUM OF ALL RESPONSES TO PHQ QUESTIONS 1-9: 0
1. LITTLE INTEREST OR PLEASURE IN DOING THINGS: 0
SUM OF ALL RESPONSES TO PHQ9 QUESTIONS 1 & 2: 0
SUM OF ALL RESPONSES TO PHQ QUESTIONS 1-9: 0
2. FEELING DOWN, DEPRESSED OR HOPELESS: 0

## 2019-08-20 NOTE — PROGRESS NOTES
[facility]) 50 MCG/0.5ML SUSR injection Inject 0.5 mLs into the muscle See Admin Instructions 1 dose now and repeat in 2-6 months (Patient not taking: Reported on 8/20/2019) 1 each 0     No current facility-administered medications for this visit. REVIEW OF SYSTEMS:  General: No fevers, chills, has lost a few pounds of weight  HEENT: No double vision ,no runny nose, sore throat or tinnitus  Cardio: No chest pain, palpitations, QUILES, edema, PND  Pulmonary: No cough, hemoptysis, SOB  GI: No nausea, vomiting, dysphagia, odynophagia, diarrhea,mild constipation. : No dysuria, hematuria, urgency,has some incontinence. Nocturia x1  Musculoskeletal: knee doing better since surgery,pain in  Low back no radiation,also pain in right middle toe. Neuro:No dizziness/lightheadedness, no seizures. no headaches  Skin:Has dry skin and keratotic lesions in arms. .  Sleep:fair. Psychiatric:No depression or anxiety. PHYSICAL EXAM:  VS:    /84   Pulse 88   Ht 6' 2\" (1.88 m)   Wt 295 lb 12.8 oz (134.2 kg)   SpO2 97%   BMI 37.98 kg/m²   General:  Alert and oriented, NAD  HEENT:  TMs, BETTY, EOMI,Throat currently clear. NECK:  Supple without adenopathy or thyromegaly, no carotid bruits  LUNGS:  CTA all fields  HEART:  RRR without M, R, or G  ABDOMEN:Obese,soft,wound sites look ok ,no palpable abnormalities  BACK:Non tender. EXTREMITIES:   No calf tenderness ,both knees with post surgical changes. Swelling in both  legs chronic is improved. Has dystrophic toe nails with discoloration and some swelling in tip of right middle toe. NEURO:No focal deficits. Skin :Has multiple seborrhoic keratotic lesions in hands and forearm ,dry skin. ASSESSMENT/PLAN:   Diagnosis Orders   1. Essential hypertension     2. Primary osteoarthritis involving multiple joints     3. History of onychomycosis  Catalina Larose DPM, Podiatry, [place]   4.  Pain and swelling of toe of right foot  Catalina Larose DPM, [facility] [address], [address] [place] [address] [address] Orders Placed This Encounter   Procedures   Valerie Rockwell DPM, Podiatry, [place]     Referral Priority:   Routine     Referral Type:   Eval and Treat     Referral Reason:   Specialty Services Required     Referred to Provider:   Dianne Melchor DPM     Requested Specialty:   Podiatry     Number of Visits Requested:   1     Requested Prescriptions     Signed Prescriptions Disp Refills    cephALEXin (KEFLEX) 500 MG capsule 28 capsule 0     Sig: Take 1 capsule by mouth 4 times daily    Handicap Placard MISC 1 each 0     Sig: by Does not apply route Permanent   Continue current meds. Will start on keflex for possible toe infection secondary infection. Patient to see podiatry for toe problems  Handicap parking permit script given. Tylenol prn for back pain. Dry skin lotion to legs. Return in about 3 months (around 11/20/2019).

## 2019-08-22 ENCOUNTER — OFFICE VISIT (OUTPATIENT)
Dept: PODIATRY | Age: 74
End: 2019-08-22
Payer: MEDICARE

## 2019-08-22 VITALS
DIASTOLIC BLOOD PRESSURE: 82 MMHG | HEIGHT: 74 IN | HEART RATE: 84 BPM | BODY MASS INDEX: 38.71 KG/M2 | SYSTOLIC BLOOD PRESSURE: 134 MMHG | RESPIRATION RATE: 20 BRPM | WEIGHT: 301.6 LBS

## 2019-08-22 DIAGNOSIS — B35.1 DERMATOPHYTOSIS OF NAIL: ICD-10-CM

## 2019-08-22 DIAGNOSIS — L97.511 SKIN ULCER OF TOE OF RIGHT FOOT, LIMITED TO BREAKDOWN OF SKIN (HCC): Primary | ICD-10-CM

## 2019-08-22 DIAGNOSIS — I73.9 PVD (PERIPHERAL VASCULAR DISEASE) (HCC): ICD-10-CM

## 2019-08-22 DIAGNOSIS — L84 CORNS AND CALLOSITIES: ICD-10-CM

## 2019-08-22 DIAGNOSIS — M20.40 HAMMER TOE, UNSPECIFIED LATERALITY: ICD-10-CM

## 2019-08-22 PROCEDURE — 3017F COLORECTAL CA SCREEN DOC REV: CPT | Performed by: PODIATRIST

## 2019-08-22 PROCEDURE — 1036F TOBACCO NON-USER: CPT | Performed by: PODIATRIST

## 2019-08-22 PROCEDURE — 4040F PNEUMOC VAC/ADMIN/RCVD: CPT | Performed by: PODIATRIST

## 2019-08-22 PROCEDURE — G8427 DOCREV CUR MEDS BY ELIG CLIN: HCPCS | Performed by: PODIATRIST

## 2019-08-22 PROCEDURE — 11055 PARING/CUTG B9 HYPRKER LES 1: CPT | Performed by: PODIATRIST

## 2019-08-22 PROCEDURE — 1123F ACP DISCUSS/DSCN MKR DOCD: CPT | Performed by: PODIATRIST

## 2019-08-22 PROCEDURE — G8417 CALC BMI ABV UP PARAM F/U: HCPCS | Performed by: PODIATRIST

## 2019-08-22 PROCEDURE — 97597 DBRDMT OPN WND 1ST 20 CM/<: CPT | Performed by: PODIATRIST

## 2019-08-22 PROCEDURE — G8598 ASA/ANTIPLAT THER USED: HCPCS | Performed by: PODIATRIST

## 2019-08-22 PROCEDURE — 11721 DEBRIDE NAIL 6 OR MORE: CPT | Performed by: PODIATRIST

## 2019-08-22 PROCEDURE — 99203 OFFICE O/P NEW LOW 30 MIN: CPT | Performed by: PODIATRIST

## 2019-08-22 NOTE — PROGRESS NOTES
Subjective:    Aubrie Gomes is a 75 y.o. male who presents for an new problem of pain R toe. . Patient has a wound which is located on the toes right, 3rd toe. The wound has been present for unknown time frame. Patient relates pain is Present. Pain is rated 2 out of 10 and is described as mild. Treatments prior to today's visit include: none. Pt also request routine foot care. .  Currently denies F/C/N/V. No Known Allergies    Past Medical History:   Diagnosis Date    Adenomatous polyp     history of     Chronic venous insufficiency     DVT (deep venous thrombosis) (HCC)     history of     Edema     related to venous stasis     Gout     history of     Hypertension     Onychomycosis     Osteoarthritis     Plantar fasciitis     Tobacco abuse        Prior to Admission medications    Medication Sig Start Date End Date Taking?  Authorizing Provider   cephALEXin (KEFLEX) 500 MG capsule Take 1 capsule by mouth 4 times daily 8/20/19  Yes Nicci Mari MD   Handicap Placard MISC by Does not apply route Permanent 8/20/19  Yes Nicci Mari MD   furosemide (LASIX) 20 MG tablet TAKE 1 TABLET BY MOUTH ONCE DAILY AS NEEDED FOR PEDAL EDEMA 4/15/19  Yes Jocelyn Evans DO   buPROPion (WELLBUTRIN) 75 MG tablet TAKE 1 TABLET BY MOUTH TWICE DAILY 3/25/19  Yes Nicci Mari MD   zoster recombinant adjuvanted vaccine [facility]) 50 MCG/0.5ML SUSR injection Inject 0.5 mLs into the muscle See Admin Instructions 1 dose now and repeat in 2-6 months 3/19/19  Yes Nicci Mari MD   lisinopril (PRINIVIL;ZESTRIL) 20 MG tablet Take 1 tablet by mouth 2 times daily 3/14/19  Yes Dyana Caballero MD   atorvastatin (LIPITOR) 40 MG tablet Take 1 tablet by mouth daily 12/17/18  Yes Alok Musa MD   allopurinol (ZYLOPRIM) 300 MG tablet TAKE 1 TABLET BY MOUTH ONCE DAILY 11/26/18  Yes Nicci Mari MD   clopidogrel (PLAVIX) 75 MG tablet TAKE ONE TABLET BY MOUTH ONCE DAILY 11/21/18  Yes LESION, ONE       Ulcer Classification:  Full thickness grade 1 C. IDSA  no infection. Plan:   Patient examined and evaluated. Current condition and treatment options discussed in detail. Active wound management took place 100% non excisional with the use of  tissue nippers. All non viable tissue (including epidermis and/or dermis) and bio burden was removed to promote healing. Bleeding was present. Please see chart for exact measurements, if not documented then size was less than 20 sq cm. All nails as mentioned above debrided in length and thickness. Paring of 1 benign hyperkeratotic lesions (as listed above) took place with #10 blade or tissue nippers. Patient advised OTC methods for treatment of the mycotic nails. Patient will follow up in 10 weeks. Dressing: silvadene  Digital buttress pad to offload. Contact clinic with any questions/problems/concerns. Follow-up at [facility] in 1week(s).

## 2019-08-29 ENCOUNTER — OFFICE VISIT (OUTPATIENT)
Dept: PODIATRY | Age: 74
End: 2019-08-29
Payer: MEDICARE

## 2019-08-29 VITALS
BODY MASS INDEX: 38.89 KG/M2 | HEART RATE: 78 BPM | HEIGHT: 74 IN | WEIGHT: 303 LBS | SYSTOLIC BLOOD PRESSURE: 132 MMHG | DIASTOLIC BLOOD PRESSURE: 70 MMHG

## 2019-08-29 DIAGNOSIS — L97.511 SKIN ULCER OF TOE OF RIGHT FOOT, LIMITED TO BREAKDOWN OF SKIN (HCC): Primary | ICD-10-CM

## 2019-08-29 DIAGNOSIS — I73.9 PVD (PERIPHERAL VASCULAR DISEASE) (HCC): ICD-10-CM

## 2019-08-29 PROCEDURE — 99212 OFFICE O/P EST SF 10 MIN: CPT | Performed by: PODIATRIST

## 2019-08-29 PROCEDURE — 3017F COLORECTAL CA SCREEN DOC REV: CPT | Performed by: PODIATRIST

## 2019-08-29 PROCEDURE — 4040F PNEUMOC VAC/ADMIN/RCVD: CPT | Performed by: PODIATRIST

## 2019-08-29 PROCEDURE — G8598 ASA/ANTIPLAT THER USED: HCPCS | Performed by: PODIATRIST

## 2019-08-29 PROCEDURE — 1036F TOBACCO NON-USER: CPT | Performed by: PODIATRIST

## 2019-08-29 PROCEDURE — G8427 DOCREV CUR MEDS BY ELIG CLIN: HCPCS | Performed by: PODIATRIST

## 2019-08-29 PROCEDURE — 1123F ACP DISCUSS/DSCN MKR DOCD: CPT | Performed by: PODIATRIST

## 2019-08-29 PROCEDURE — G8417 CALC BMI ABV UP PARAM F/U: HCPCS | Performed by: PODIATRIST

## 2019-08-29 NOTE — PROGRESS NOTES
Subjective:    Alexus Orozco is a 75 y.o. male who presents for an ulcer R 3rd toe. Pt has been compliant with dressings. Pt has offloaded as advised. Patient relates pain is absent. Currently denies F/C/N/V. No Known Allergies    Past Medical History:   Diagnosis Date    Adenomatous polyp     history of     Chronic venous insufficiency     DVT (deep venous thrombosis) (HCC)     history of     Edema     related to venous stasis     Gout     history of     Hypertension     Onychomycosis     Osteoarthritis     Plantar fasciitis     Tobacco abuse        Prior to Admission medications    Medication Sig Start Date End Date Taking?  Authorizing Provider   cephALEXin (KEFLEX) 500 MG capsule Take 1 capsule by mouth 4 times daily 8/20/19  Yes Cassandra Robert MD   Handicap Placard MISC by Does not apply route Permanent 8/20/19  Yes Cassandra Robert MD   furosemide (LASIX) 20 MG tablet TAKE 1 TABLET BY MOUTH ONCE DAILY AS NEEDED FOR PEDAL EDEMA 4/15/19  Yes Jocelyn Evans DO   buPROPion (WELLBUTRIN) 75 MG tablet TAKE 1 TABLET BY MOUTH TWICE DAILY 3/25/19  Yes Cassandra Robert MD   zoster recombinant adjuvanted vaccine [facility]) 50 MCG/0.5ML SUSR injection Inject 0.5 mLs into the muscle See Admin Instructions 1 dose now and repeat in 2-6 months 3/19/19  Yes Cassandra Robert MD   lisinopril (PRINIVIL;ZESTRIL) 20 MG tablet Take 1 tablet by mouth 2 times daily 3/14/19  Yes Matthew Rae MD   atorvastatin (LIPITOR) 40 MG tablet Take 1 tablet by mouth daily 12/17/18  Yes Chantale Montaño MD   allopurinol (ZYLOPRIM) 300 MG tablet TAKE 1 TABLET BY MOUTH ONCE DAILY 11/26/18  Yes Cassandra Robert MD   clopidogrel (PLAVIX) 75 MG tablet TAKE ONE TABLET BY MOUTH ONCE DAILY 11/21/18  Yes Mindy Lemus MD   metoprolol tartrate (LOPRESSOR) 50 MG tablet TAKE ONE TABLET BY MOUTH TWICE DAILY 11/13/18  Yes Jocelyn Evans DO   oxybutynin (DITROPAN-XL) 5 MG CR tablet Take 5 mg by mouth daily

## 2019-09-30 ENCOUNTER — OFFICE VISIT (OUTPATIENT)
Dept: CARDIOLOGY | Age: 74
End: 2019-09-30
Payer: MEDICARE

## 2019-09-30 ENCOUNTER — IMMUNIZATION (OUTPATIENT)
Dept: LAB | Age: 74
End: 2019-09-30
Payer: MEDICARE

## 2019-09-30 VITALS
HEIGHT: 74 IN | HEART RATE: 61 BPM | SYSTOLIC BLOOD PRESSURE: 148 MMHG | WEIGHT: 302.03 LBS | DIASTOLIC BLOOD PRESSURE: 82 MMHG | BODY MASS INDEX: 38.76 KG/M2

## 2019-09-30 DIAGNOSIS — I10 ESSENTIAL HYPERTENSION: Primary | ICD-10-CM

## 2019-09-30 PROCEDURE — G8417 CALC BMI ABV UP PARAM F/U: HCPCS | Performed by: INTERNAL MEDICINE

## 2019-09-30 PROCEDURE — G8598 ASA/ANTIPLAT THER USED: HCPCS | Performed by: INTERNAL MEDICINE

## 2019-09-30 PROCEDURE — G0008 ADMIN INFLUENZA VIRUS VAC: HCPCS | Performed by: FAMILY MEDICINE

## 2019-09-30 PROCEDURE — 90653 IIV ADJUVANT VACCINE IM: CPT | Performed by: FAMILY MEDICINE

## 2019-09-30 PROCEDURE — 3017F COLORECTAL CA SCREEN DOC REV: CPT | Performed by: INTERNAL MEDICINE

## 2019-09-30 PROCEDURE — 93000 ELECTROCARDIOGRAM COMPLETE: CPT | Performed by: INTERNAL MEDICINE

## 2019-09-30 PROCEDURE — 1123F ACP DISCUSS/DSCN MKR DOCD: CPT | Performed by: INTERNAL MEDICINE

## 2019-09-30 PROCEDURE — 1036F TOBACCO NON-USER: CPT | Performed by: INTERNAL MEDICINE

## 2019-09-30 PROCEDURE — 99214 OFFICE O/P EST MOD 30 MIN: CPT | Performed by: INTERNAL MEDICINE

## 2019-09-30 PROCEDURE — 4040F PNEUMOC VAC/ADMIN/RCVD: CPT | Performed by: INTERNAL MEDICINE

## 2019-09-30 PROCEDURE — G8427 DOCREV CUR MEDS BY ELIG CLIN: HCPCS | Performed by: INTERNAL MEDICINE

## 2019-10-05 RX ORDER — BUPROPION HYDROCHLORIDE 75 MG/1
TABLET ORAL
Qty: 60 TABLET | Refills: 5 | Status: SHIPPED | OUTPATIENT
Start: 2019-10-05 | End: 2020-04-16

## 2019-10-31 ENCOUNTER — OFFICE VISIT (OUTPATIENT)
Dept: PODIATRY | Age: 74
End: 2019-10-31
Payer: MEDICARE

## 2019-10-31 VITALS
DIASTOLIC BLOOD PRESSURE: 74 MMHG | HEIGHT: 74 IN | WEIGHT: 305 LBS | SYSTOLIC BLOOD PRESSURE: 128 MMHG | BODY MASS INDEX: 39.14 KG/M2 | HEART RATE: 82 BPM

## 2019-10-31 DIAGNOSIS — B35.1 DERMATOPHYTOSIS OF NAIL: ICD-10-CM

## 2019-10-31 DIAGNOSIS — I82.5Z3 CHRONIC EMBOLISM AND THROMBOSIS OF UNSPECIFIED DEEP VEINS OF DISTAL LOWER EXTREMITY, BILATERAL (HCC): Primary | ICD-10-CM

## 2019-10-31 DIAGNOSIS — L84 CORNS AND CALLOSITIES: ICD-10-CM

## 2019-10-31 PROCEDURE — 11721 DEBRIDE NAIL 6 OR MORE: CPT | Performed by: PODIATRIST

## 2019-10-31 PROCEDURE — 11055 PARING/CUTG B9 HYPRKER LES 1: CPT | Performed by: PODIATRIST

## 2019-11-20 ENCOUNTER — OFFICE VISIT (OUTPATIENT)
Dept: FAMILY MEDICINE CLINIC | Age: 74
End: 2019-11-20
Payer: MEDICARE

## 2019-11-20 VITALS
SYSTOLIC BLOOD PRESSURE: 124 MMHG | WEIGHT: 306 LBS | HEART RATE: 62 BPM | DIASTOLIC BLOOD PRESSURE: 70 MMHG | OXYGEN SATURATION: 95 % | BODY MASS INDEX: 39.29 KG/M2 | RESPIRATION RATE: 16 BRPM

## 2019-11-20 DIAGNOSIS — Z00.00 MEDICARE ANNUAL WELLNESS VISIT, SUBSEQUENT: Primary | ICD-10-CM

## 2019-11-20 DIAGNOSIS — E66.09 CLASS 2 OBESITY DUE TO EXCESS CALORIES WITH BODY MASS INDEX (BMI) OF 39.0 TO 39.9 IN ADULT, UNSPECIFIED WHETHER SERIOUS COMORBIDITY PRESENT: ICD-10-CM

## 2019-11-20 DIAGNOSIS — I25.810 CORONARY ARTERY DISEASE INVOLVING CORONARY BYPASS GRAFT OF NATIVE HEART WITHOUT ANGINA PECTORIS: ICD-10-CM

## 2019-11-20 DIAGNOSIS — I10 ESSENTIAL HYPERTENSION: ICD-10-CM

## 2019-11-20 PROCEDURE — G0439 PPPS, SUBSEQ VISIT: HCPCS | Performed by: FAMILY MEDICINE

## 2019-11-20 PROCEDURE — 1123F ACP DISCUSS/DSCN MKR DOCD: CPT | Performed by: FAMILY MEDICINE

## 2019-11-20 PROCEDURE — 3017F COLORECTAL CA SCREEN DOC REV: CPT | Performed by: FAMILY MEDICINE

## 2019-11-20 PROCEDURE — G8482 FLU IMMUNIZE ORDER/ADMIN: HCPCS | Performed by: FAMILY MEDICINE

## 2019-11-20 PROCEDURE — 4040F PNEUMOC VAC/ADMIN/RCVD: CPT | Performed by: FAMILY MEDICINE

## 2019-11-20 PROCEDURE — G8598 ASA/ANTIPLAT THER USED: HCPCS | Performed by: FAMILY MEDICINE

## 2019-11-20 ASSESSMENT — PATIENT HEALTH QUESTIONNAIRE - PHQ9
2. FEELING DOWN, DEPRESSED OR HOPELESS: 0
SUM OF ALL RESPONSES TO PHQ QUESTIONS 1-9: 0
1. LITTLE INTEREST OR PLEASURE IN DOING THINGS: 0
SUM OF ALL RESPONSES TO PHQ QUESTIONS 1-9: 0
SUM OF ALL RESPONSES TO PHQ9 QUESTIONS 1 & 2: 0

## 2019-11-20 ASSESSMENT — LIFESTYLE VARIABLES
HOW OFTEN DO YOU HAVE A DRINK CONTAINING ALCOHOL: 1
HOW MANY STANDARD DRINKS CONTAINING ALCOHOL DO YOU HAVE ON A TYPICAL DAY: 0
AUDIT-C TOTAL SCORE: 1
HOW OFTEN DO YOU HAVE SIX OR MORE DRINKS ON ONE OCCASION: 0

## 2019-12-16 DIAGNOSIS — I10 ESSENTIAL HYPERTENSION: ICD-10-CM

## 2019-12-16 DIAGNOSIS — E78.5 HYPERLIPIDEMIA, UNSPECIFIED HYPERLIPIDEMIA TYPE: ICD-10-CM

## 2019-12-16 RX ORDER — METOPROLOL TARTRATE 50 MG/1
TABLET, FILM COATED ORAL
Qty: 180 TABLET | Refills: 3 | Status: SHIPPED | OUTPATIENT
Start: 2019-12-16 | End: 2020-12-21

## 2019-12-16 RX ORDER — ATORVASTATIN CALCIUM 40 MG/1
TABLET, FILM COATED ORAL
Qty: 90 TABLET | Refills: 3 | Status: SHIPPED | OUTPATIENT
Start: 2019-12-16 | End: 2020-12-28 | Stop reason: SDUPTHER

## 2019-12-23 DIAGNOSIS — G45.9 TRANSIENT CEREBRAL ISCHEMIA, UNSPECIFIED TYPE: ICD-10-CM

## 2019-12-27 RX ORDER — CLOPIDOGREL BISULFATE 75 MG/1
TABLET ORAL
Qty: 90 TABLET | Refills: 3 | Status: SHIPPED | OUTPATIENT
Start: 2019-12-27 | End: 2020-12-18 | Stop reason: SDUPTHER

## 2020-01-09 ENCOUNTER — OFFICE VISIT (OUTPATIENT)
Dept: PODIATRY | Age: 75
End: 2020-01-09
Payer: MEDICARE

## 2020-01-09 VITALS
SYSTOLIC BLOOD PRESSURE: 138 MMHG | BODY MASS INDEX: 39.66 KG/M2 | HEART RATE: 78 BPM | WEIGHT: 309 LBS | DIASTOLIC BLOOD PRESSURE: 78 MMHG | HEIGHT: 74 IN

## 2020-01-09 PROCEDURE — 11055 PARING/CUTG B9 HYPRKER LES 1: CPT | Performed by: PODIATRIST

## 2020-01-09 PROCEDURE — 99999 PR OFFICE/OUTPT VISIT,PROCEDURE ONLY: CPT | Performed by: PODIATRIST

## 2020-01-09 PROCEDURE — 11721 DEBRIDE NAIL 6 OR MORE: CPT | Performed by: PODIATRIST

## 2020-01-09 NOTE — PROGRESS NOTES
Subjective:    Shameka Diez is a 75 y.o. male who presents for routine foot care. .  Currently denies F/C/N/V. No Known Allergies    Past Medical History:   Diagnosis Date    Adenomatous polyp     history of     Chronic venous insufficiency     DVT (deep venous thrombosis) (HCC)     history of     Edema     related to venous stasis     Gout     history of     Hypertension     Onychomycosis     Osteoarthritis     Plantar fasciitis     Tobacco abuse        Prior to Admission medications    Medication Sig Start Date End Date Taking?  Authorizing Provider   clopidogrel (PLAVIX) 75 MG tablet TAKE 1 TABLET BY MOUTH ONCE DAILY 12/27/19  Yes Jocelyn Evans DO   atorvastatin (LIPITOR) 40 MG tablet TAKE 1 TABLET BY MOUTH ONCE DAILY 12/16/19  Yes Jocelyn Evans DO   metoprolol tartrate (LOPRESSOR) 50 MG tablet TAKE 1 TABLET BY MOUTH TWICE DAILY 12/16/19  Yes Jocelyn Evans DO   buPROPion (WELLBUTRIN) 75 MG tablet TAKE 1 TABLET BY MOUTH TWICE DAILY 10/5/19  Yes Jennifer Scales MD   Handicap Placard MISC by Does not apply route Permanent 8/20/19  Yes Jennifer Scales MD   furosemide (LASIX) 20 MG tablet TAKE 1 TABLET BY MOUTH ONCE DAILY AS NEEDED FOR PEDAL EDEMA 4/15/19  Yes Jocelyn Evans DO   zoster recombinant adjuvanted vaccine (SHINGRIX) 50 MCG/0.5ML SUSR injection Inject 0.5 mLs into the muscle See Admin Instructions 1 dose now and repeat in 2-6 months 3/19/19  Yes Jennifer Scales MD   lisinopril (PRINIVIL;ZESTRIL) 20 MG tablet Take 1 tablet by mouth 2 times daily 3/14/19  Yes Ana Gómez MD   allopurinol (ZYLOPRIM) 300 MG tablet TAKE 1 TABLET BY MOUTH ONCE DAILY 11/26/18  Yes Jennifer Scales MD   oxybutynin (DITROPAN-XL) 5 MG CR tablet Take 5 mg by mouth daily  2/9/16  Yes Historical Provider, MD   Omega-3 Fatty Acids (FISH OIL PO) Take 1,200 mg by mouth daily   Yes Historical Provider, MD   Acetaminophen (TYLENOL PO) Take by mouth Per pt, takes 2 in am; 2 in pm   Yes negative Valleix sign, bilateral.  Vibratory intact bilateral.  Reflexes Decreased bilateral.  Paresthesias negative. Dysthesias negative. Sharp/dull intact bilateral.  Musculoskeletal: Muscle strength 5/5, bilateral.  Pain absent upon palpation bilateral. Normal medial longitudinal arch, bilateral.  Ankle ROM within normal limits,bilateral.  1st MPJ ROM within normal limits, bilateral.  Dorsally contracted digits present. No other foot deformities. Integument: Warm, dry, supple, bilateral.  Open lesion absent, Interdigital maceration absent to web spaces bilateral.   Nails left 1, 2, 3, 4, 5 and right 1, 2, 3, 4, 5 thickened, dystrophic and crumbly, discolored with subungual debris. Fissures absent, bilateral. Hyperkeratotic tissue is present. Seen distal 3rd toe R    Asessment: Patient is a 75 y.o. male with:    Diagnosis Orders   1. 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 Dermatophytosis of nail     3. Corns and callosities         Plan:   Patient examined and evaluated. Current condition and treatment options discussed in detail. All nails as mentioned above debrided in length and thickness. Paring of 1 benign hyperkeratotic lesions (as listed above) took place with #10 blade or tissue nippers. Patient advised OTC methods for treatment of the mycotic nails. Patient will follow up in 10 weeks. Contact clinic with any questions/problems/concerns.

## 2020-02-05 NOTE — TELEPHONE ENCOUNTER
Ubaldo Pratt called requesting a refill of the below medication which has been pended for you:     Requested Prescriptions     Pending Prescriptions Disp Refills    allopurinol (ZYLOPRIM) 300 MG tablet [Pharmacy Med Name: Allopurinol 300 MG Oral Tablet] 30 tablet 0     Sig: TAKE 1 TABLET BY MOUTH ONCE DAILY       Last Appointment Date: 11/20/2019  Next Appointment Date: 2/21/2020    No Known Allergies

## 2020-02-06 RX ORDER — ALLOPURINOL 300 MG/1
TABLET ORAL
Qty: 30 TABLET | Refills: 0 | Status: SHIPPED | OUTPATIENT
Start: 2020-02-06 | End: 2020-04-28

## 2020-02-07 ENCOUNTER — TELEPHONE (OUTPATIENT)
Dept: FAMILY MEDICINE CLINIC | Age: 75
End: 2020-02-07

## 2020-02-07 NOTE — TELEPHONE ENCOUNTER
Lab calling stating that patient has an appointment for fasting labs but there are not orders placed.

## 2020-02-14 ENCOUNTER — HOSPITAL ENCOUNTER (OUTPATIENT)
Dept: LAB | Age: 75
Discharge: HOME | End: 2020-02-14
Payer: MEDICARE

## 2020-02-14 LAB
ABSOLUTE EOS #: 0.08 K/UL (ref 0–0.44)
ABSOLUTE IMMATURE GRANULOCYTE: 0.04 K/UL (ref 0–0.3)
ABSOLUTE LYMPH #: 2.36 K/UL (ref 1.1–3.7)
ABSOLUTE MONO #: 1 K/UL (ref 0.1–1.2)
ALBUMIN SERPL-MCNC: 4.5 G/DL (ref 3.5–5.2)
ALBUMIN/GLOBULIN RATIO: 1.4 (ref 1–2.5)
ALP BLD-CCNC: 87 U/L (ref 40–129)
ALT SERPL-CCNC: 20 U/L (ref 5–41)
ANION GAP SERPL CALCULATED.3IONS-SCNC: 13 MMOL/L (ref 9–17)
AST SERPL-CCNC: 22 U/L
BASOPHILS # BLD: 1 % (ref 0–2)
BASOPHILS ABSOLUTE: 0.04 K/UL (ref 0–0.2)
BILIRUB SERPL-MCNC: 0.72 MG/DL (ref 0.3–1.2)
BUN BLDV-MCNC: 21 MG/DL (ref 8–23)
BUN/CREAT BLD: 18 (ref 9–20)
CALCIUM SERPL-MCNC: 9.9 MG/DL (ref 8.6–10.4)
CHLORIDE BLD-SCNC: 101 MMOL/L (ref 98–107)
CHOLESTEROL/HDL RATIO: 3.1
CHOLESTEROL: 95 MG/DL
CO2: 26 MMOL/L (ref 20–31)
CREAT SERPL-MCNC: 1.14 MG/DL (ref 0.7–1.2)
DIFFERENTIAL TYPE: ABNORMAL
EOSINOPHILS RELATIVE PERCENT: 1 % (ref 1–4)
GFR AFRICAN AMERICAN: >60 ML/MIN
GFR NON-AFRICAN AMERICAN: >60 ML/MIN
GFR SERPL CREATININE-BSD FRML MDRD: ABNORMAL ML/MIN/{1.73_M2}
GFR SERPL CREATININE-BSD FRML MDRD: ABNORMAL ML/MIN/{1.73_M2}
GLUCOSE BLD-MCNC: 114 MG/DL (ref 70–99)
HCT VFR BLD CALC: 49.2 % (ref 40.7–50.3)
HDLC SERPL-MCNC: 31 MG/DL
HEMOGLOBIN: 15.2 G/DL (ref 13–17)
IMMATURE GRANULOCYTES: 1 %
LDL CHOLESTEROL: 36 MG/DL (ref 0–130)
LYMPHOCYTES # BLD: 30 % (ref 24–43)
MCH RBC QN AUTO: 30 PG (ref 25.2–33.5)
MCHC RBC AUTO-ENTMCNC: 30.9 G/DL (ref 25.2–33.5)
MCV RBC AUTO: 97 FL (ref 82.6–102.9)
MONOCYTES # BLD: 13 % (ref 3–12)
NRBC AUTOMATED: 0 PER 100 WBC
PDW BLD-RTO: 14.4 % (ref 11.8–14.4)
PLATELET # BLD: 190 K/UL (ref 138–453)
PLATELET ESTIMATE: ABNORMAL
PMV BLD AUTO: 10.1 FL (ref 8.1–13.5)
POTASSIUM SERPL-SCNC: 4.6 MMOL/L (ref 3.7–5.3)
PROSTATE SPECIFIC ANTIGEN: 7.71 UG/L
RBC # BLD: 5.07 M/UL (ref 4.21–5.77)
RBC # BLD: ABNORMAL 10*6/UL
SEG NEUTROPHILS: 55 % (ref 36–65)
SEGMENTED NEUTROPHILS ABSOLUTE COUNT: 4.37 K/UL (ref 1.5–8.1)
SODIUM BLD-SCNC: 140 MMOL/L (ref 135–144)
TOTAL PROTEIN: 7.8 G/DL (ref 6.4–8.3)
TRIGL SERPL-MCNC: 141 MG/DL
VLDLC SERPL CALC-MCNC: ABNORMAL MG/DL (ref 1–30)
WBC # BLD: 7.9 K/UL (ref 3.5–11.3)
WBC # BLD: ABNORMAL 10*3/UL

## 2020-02-14 PROCEDURE — 85025 COMPLETE CBC W/AUTO DIFF WBC: CPT

## 2020-02-14 PROCEDURE — 80061 LIPID PANEL: CPT

## 2020-02-14 PROCEDURE — 36415 COLL VENOUS BLD VENIPUNCTURE: CPT

## 2020-02-14 PROCEDURE — G0103 PSA SCREENING: HCPCS

## 2020-02-14 PROCEDURE — 80053 COMPREHEN METABOLIC PANEL: CPT

## 2020-02-21 ENCOUNTER — OFFICE VISIT (OUTPATIENT)
Dept: FAMILY MEDICINE CLINIC | Age: 75
End: 2020-02-21
Payer: MEDICARE

## 2020-02-21 VITALS
TEMPERATURE: 96.5 F | HEIGHT: 74 IN | HEART RATE: 73 BPM | WEIGHT: 311 LBS | DIASTOLIC BLOOD PRESSURE: 80 MMHG | OXYGEN SATURATION: 97 % | BODY MASS INDEX: 39.91 KG/M2 | SYSTOLIC BLOOD PRESSURE: 136 MMHG | RESPIRATION RATE: 12 BRPM

## 2020-02-21 PROCEDURE — 4040F PNEUMOC VAC/ADMIN/RCVD: CPT | Performed by: FAMILY MEDICINE

## 2020-02-21 PROCEDURE — G8427 DOCREV CUR MEDS BY ELIG CLIN: HCPCS | Performed by: FAMILY MEDICINE

## 2020-02-21 PROCEDURE — 1123F ACP DISCUSS/DSCN MKR DOCD: CPT | Performed by: FAMILY MEDICINE

## 2020-02-21 PROCEDURE — 99214 OFFICE O/P EST MOD 30 MIN: CPT | Performed by: FAMILY MEDICINE

## 2020-02-21 PROCEDURE — 3017F COLORECTAL CA SCREEN DOC REV: CPT | Performed by: FAMILY MEDICINE

## 2020-02-21 PROCEDURE — 1036F TOBACCO NON-USER: CPT | Performed by: FAMILY MEDICINE

## 2020-02-21 PROCEDURE — G8417 CALC BMI ABV UP PARAM F/U: HCPCS | Performed by: FAMILY MEDICINE

## 2020-02-21 PROCEDURE — 99212 OFFICE O/P EST SF 10 MIN: CPT

## 2020-02-21 PROCEDURE — G8482 FLU IMMUNIZE ORDER/ADMIN: HCPCS | Performed by: FAMILY MEDICINE

## 2020-02-21 ASSESSMENT — PATIENT HEALTH QUESTIONNAIRE - PHQ9
SUM OF ALL RESPONSES TO PHQ9 QUESTIONS 1 & 2: 0
2. FEELING DOWN, DEPRESSED OR HOPELESS: 0
1. LITTLE INTEREST OR PLEASURE IN DOING THINGS: 0
SUM OF ALL RESPONSES TO PHQ QUESTIONS 1-9: 0
SUM OF ALL RESPONSES TO PHQ QUESTIONS 1-9: 0

## 2020-02-21 NOTE — PROGRESS NOTES
HPI:  Patient comes in today for   Chief Complaint   Patient presents with    Hypertension     Patient is here for a 3 month follow up and go over labs that were done 2020. Patient here for 3 month f/u   H/O HTN ,LBBB,CAD,s/p CABG. BPH,s/p TURP and Gout. Bilateral knee replacement is doing good ,has to use a cane if has to walk long distance. Now hassome pain low back no radaition . No chest pain or SOB. Has chronic problems with toe nail fungus ,sees podaitry. Recent labs show elavated PSA has h/o BPH seeing urology in [place], [place].   HISTORY:  Past Medical History:   Diagnosis Date    Adenomatous polyp     history of     Chronic venous insufficiency     DVT (deep venous thrombosis) (HCC)     history of     Edema     related to venous stasis     Gout     history of     Hypertension     Onychomycosis     Osteoarthritis     Plantar fasciitis     Tobacco abuse        Family History   Problem Relation Age of Onset    Other Mother 52        Rheumatic fever    Kidney Disease Brother     Heart Disease Brother     Sudden Death Father         auto accident     Heart Surgery Sister         bypass surgery    Other Brother         abdominal aortic aneursym    Hypertension Other         siblings       Social History     Socioeconomic History    Marital status: Single     Spouse name: Not on file    Number of children: Not on file    Years of education: Not on file    Highest education level: Not on file   Occupational History    Not on file   Social Needs    Financial resource strain: Not on file    Food insecurity:     Worry: Not on file     Inability: Not on file    Transportation needs:     Medical: Not on file     Non-medical: Not on file   Tobacco Use    Smoking status: Former Smoker     Packs/day: 0.00     Years: 55.00     Pack years: 0.00     Types: Cigarettes     Last attempt to quit: 10/27/2014     Years since quittin.3    Smokeless tobacco: Never Used   Substance and Sexual Activity    Alcohol use:  Yes     Alcohol/week: 0.0 standard drinks     Comment: occasionally    Drug use: No    Sexual activity: Yes     Partners: Female   Lifestyle    Physical activity:     Days per week: Not on file     Minutes per session: Not on file    Stress: Not on file   Relationships    Social connections:     Talks on phone: Not on file     Gets together: Not on file     Attends [employer] service: Not on file     Active member of club or organization: Not on file     Attends meetings of clubs or organizations: Not on file     Relationship status: Not on file    Intimate partner violence:     Fear of current or ex partner: Not on file     Emotionally abused: Not on file     Physically abused: Not on file     Forced sexual activity: Not on file   Other Topics Concern    Not on file   Social History Narrative    Not on file       Current Outpatient Medications   Medication Sig Dispense Refill    allopurinol (ZYLOPRIM) 300 MG tablet TAKE 1 TABLET BY MOUTH ONCE DAILY 30 tablet 0    clopidogrel (PLAVIX) 75 MG tablet TAKE 1 TABLET BY MOUTH ONCE DAILY 90 tablet 3    atorvastatin (LIPITOR) 40 MG tablet TAKE 1 TABLET BY MOUTH ONCE DAILY 90 tablet 3    metoprolol tartrate (LOPRESSOR) 50 MG tablet TAKE 1 TABLET BY MOUTH TWICE DAILY 180 tablet 3    buPROPion (WELLBUTRIN) 75 MG tablet TAKE 1 TABLET BY MOUTH TWICE DAILY 60 tablet 5    Handicap Placard MISC by Does not apply route Permanent 1 each 0    furosemide (LASIX) 20 MG tablet TAKE 1 TABLET BY MOUTH ONCE DAILY AS NEEDED FOR PEDAL EDEMA 90 tablet 3    zoster recombinant adjuvanted vaccine (SHINGRIX) 50 MCG/0.5ML SUSR injection Inject 0.5 mLs into the muscle See Admin Instructions 1 dose now and repeat in 2-6 months 1 each 0    lisinopril (PRINIVIL;ZESTRIL) 20 MG tablet Take 1 tablet by mouth 2 times daily 180 tablet 3    oxybutynin (DITROPAN-XL) 5 MG CR tablet Take 5 mg by mouth daily       Omega-3 Fatty Acids (FISH OIL PO) Take 1,200 mg by mouth daily      Acetaminophen (TYLENOL PO) Take by mouth Per pt, takes 2 in am; 2 in pm      aspirin 81 MG tablet Take 81 mg by mouth daily. No current facility-administered medications for this visit. REVIEW OF SYSTEMS:  General: No fevers, chills, has lost a few pounds of weight  HEENT: No double vision ,no runny nose, sore throat or tinnitus  Cardio: No chest pain, palpitations, QUILES, edema, PND  Pulmonary: No cough, hemoptysis, SOB  GI: No nausea, vomiting, dysphagia, odynophagia, diarrhea,mild constipation. : No dysuria, hematuria, urgency,has some incontinence. Nocturia x1  Musculoskeletal: Pain in  Low back no radiation,knees are better since surgery. Neuro:No dizziness/lightheadedness, no seizures. no headaches  Skin:Has dry skin and keratotic lesions in arms. .  Sleep:fair. Psychiatric:No depression or anxiety. PHYSICAL EXAM:  VS:    /80 (Site: Right Upper Arm, Position: Sitting, Cuff Size: Large Adult)   Pulse 73   Temp 96.5 °F (35.8 °C) (Tympanic)   Resp 12   Ht 6' 2\" (1.88 m)   Wt (!) 311 lb (141.1 kg)   SpO2 97%   BMI 39.93 kg/m²   General:  Alert and oriented, NAD  HEENT:  TMs, BETTY, EOMI,Throat currently clear. NECK:  Supple without adenopathy or thyromegaly, no carotid bruits  LUNGS:  CTA all fields  HEART:  RRR without M, R, or G  ABDOMEN:Obese,soft,wound sites look ok ,no palpable abnormalities  BACK:Non tender. EXTREMITIES:   No calf tenderness ,both knees with post surgical changes. Swelling in both  legs chronic is improved. Has dystrophic finger and toe nails with discoloration . NEURO:No focal deficits. Skin :Has multiple seborrhoic keratotic lesions in hands and forearm ,dry skin. ASSESSMENT/PLAN:   Diagnosis Orders   1. Essential hypertension     2. History of onychomycosis     3. Primary osteoarthritis involving multiple joints     4.  Class 2 obesity due to excess calories with body mass index (BMI) of 39.0 to 39.9 in adult, unspecified whether serious comorbidity present 5. Coronary artery disease involving coronary bypass graft of native heart without angina pectoris     6. Elevated PSA             No orders of the defined types were placed in this encounter. Requested Prescriptions      No prescriptions requested or ordered in this encounter   Continue current meds. Patient to see podiatry for toe problems  Tylenol prn for back pain. Dry skin lotion to legs. F/u with urology for elevated PSA  F/u with cardiology. Return in about 3 months (around 5/21/2020).

## 2020-03-23 RX ORDER — LISINOPRIL 20 MG/1
TABLET ORAL
Qty: 180 TABLET | Refills: 3 | Status: SHIPPED | OUTPATIENT
Start: 2020-03-23 | End: 2021-03-30

## 2020-04-16 RX ORDER — BUPROPION HYDROCHLORIDE 75 MG/1
TABLET ORAL
Qty: 60 TABLET | Refills: 0 | Status: SHIPPED | OUTPATIENT
Start: 2020-04-16 | End: 2020-05-18

## 2020-04-30 RX ORDER — ALLOPURINOL 300 MG/1
TABLET ORAL
Qty: 30 TABLET | Refills: 0 | Status: SHIPPED | OUTPATIENT
Start: 2020-04-30 | End: 2020-05-27

## 2020-05-18 RX ORDER — BUPROPION HYDROCHLORIDE 75 MG/1
TABLET ORAL
Qty: 60 TABLET | Refills: 0 | Status: SHIPPED | OUTPATIENT
Start: 2020-05-18 | End: 2020-06-15

## 2020-05-18 NOTE — TELEPHONE ENCOUNTER
Wandy Wray called requesting a refill of the below medication which has been pended for you:     Requested Prescriptions     Pending Prescriptions Disp Refills    buPROPion (WELLBUTRIN) 75 MG tablet [Pharmacy Med Name: buPROPion HCl 75 MG Oral Tablet] 60 tablet 0     Sig: Take 1 tablet by mouth twice daily       Last Appointment Date: 2/21/2020  Next Appointment Date: 5/22/2020    No Known Allergies

## 2020-05-20 ENCOUNTER — OFFICE VISIT (OUTPATIENT)
Dept: PODIATRY | Age: 75
End: 2020-05-20
Payer: MEDICARE

## 2020-05-20 VITALS
HEIGHT: 74 IN | WEIGHT: 314 LBS | BODY MASS INDEX: 40.3 KG/M2 | DIASTOLIC BLOOD PRESSURE: 82 MMHG | HEART RATE: 78 BPM | SYSTOLIC BLOOD PRESSURE: 138 MMHG

## 2020-05-20 PROCEDURE — 99999 PR OFFICE/OUTPT VISIT,PROCEDURE ONLY: CPT | Performed by: PODIATRIST

## 2020-05-20 PROCEDURE — 11721 DEBRIDE NAIL 6 OR MORE: CPT | Performed by: PODIATRIST

## 2020-05-20 PROCEDURE — 11055 PARING/CUTG B9 HYPRKER LES 1: CPT | Performed by: PODIATRIST

## 2020-05-20 RX ORDER — FINASTERIDE 5 MG/1
TABLET, FILM COATED ORAL
Status: ON HOLD | COMMUNITY
Start: 2020-05-04 | End: 2022-04-17 | Stop reason: HOSPADM

## 2020-05-20 RX ORDER — OXYBUTYNIN CHLORIDE 15 MG/1
TABLET, EXTENDED RELEASE ORAL
COMMUNITY
Start: 2020-04-19 | End: 2022-05-25 | Stop reason: ALTCHOICE

## 2020-05-20 NOTE — PROGRESS NOTES
daily  16  Yes Historical Provider, MD   Omega-3 Fatty Acids (FISH OIL PO) Take 1,200 mg by mouth daily   Yes Historical Provider, MD   Acetaminophen (TYLENOL PO) Take by mouth Per pt, takes 2 in am; 2 in pm   Yes Historical Provider, MD   aspirin 81 MG tablet Take 81 mg by mouth daily. Yes Historical Provider, MD       Past Surgical History:   Procedure Laterality Date    COLONOSCOPY  12    polyp    COLONOSCOPY      adenomatous polyps     COLONOSCOPY  2009    COLONOSCOPY  2008    with polypectomy    CORONARY ARTERY BYPASS GRAFT      OTHER SURGICAL HISTORY      teeth extracted     TOTAL KNEE ARTHROPLASTY Right 10/2015    VASECTOMY  1980       Family History   Problem Relation Age of Onset    Other Mother 52        Rheumatic fever    Kidney Disease Brother     Heart Disease Brother     Sudden Death Father         auto accident     Heart Surgery Sister         bypass surgery    Other Brother         abdominal aortic aneursym    Hypertension Other         siblings       Social History     Tobacco Use    Smoking status: Former Smoker     Packs/day: 0.00     Years: 55.00     Pack years: 0.00     Types: Cigarettes     Last attempt to quit: 10/27/2014     Years since quittin.5    Smokeless tobacco: Never Used   Substance Use Topics    Alcohol use: Yes     Alcohol/week: 0.0 standard drinks     Comment: occasionally       Review of Systems: All 12 systems reviewed and pertinent positives noted above. Lower Extremity Physical Examination:     Vitals:   Vitals:    20 0932   BP: 138/82   Pulse: 78     General: AAO x 3 in NAD.      Vascular: DP and PT pulses palpable 2/4, bilateral.  CFT <3 seconds, bilateral.  Hair growth present to the level of the digits, bilateral.  Edema present, bilateral.  Varicosities present, bilateral. Erythema absent, bilateral. Distal Rubor absent bilateral.  Temperature within normal limits bilateral. Hyperpigmentation present bilateral. Atrophic

## 2020-05-27 ENCOUNTER — OFFICE VISIT (OUTPATIENT)
Dept: FAMILY MEDICINE CLINIC | Age: 75
End: 2020-05-27
Payer: MEDICARE

## 2020-05-27 VITALS
HEART RATE: 70 BPM | DIASTOLIC BLOOD PRESSURE: 72 MMHG | BODY MASS INDEX: 40.43 KG/M2 | SYSTOLIC BLOOD PRESSURE: 130 MMHG | HEIGHT: 74 IN | WEIGHT: 315 LBS | TEMPERATURE: 97.5 F

## 2020-05-27 PROCEDURE — 1123F ACP DISCUSS/DSCN MKR DOCD: CPT | Performed by: FAMILY MEDICINE

## 2020-05-27 PROCEDURE — 99211 OFF/OP EST MAY X REQ PHY/QHP: CPT

## 2020-05-27 PROCEDURE — 1036F TOBACCO NON-USER: CPT | Performed by: FAMILY MEDICINE

## 2020-05-27 PROCEDURE — G8427 DOCREV CUR MEDS BY ELIG CLIN: HCPCS | Performed by: FAMILY MEDICINE

## 2020-05-27 PROCEDURE — 99214 OFFICE O/P EST MOD 30 MIN: CPT | Performed by: FAMILY MEDICINE

## 2020-05-27 PROCEDURE — 3017F COLORECTAL CA SCREEN DOC REV: CPT | Performed by: FAMILY MEDICINE

## 2020-05-27 PROCEDURE — G8417 CALC BMI ABV UP PARAM F/U: HCPCS | Performed by: FAMILY MEDICINE

## 2020-05-27 PROCEDURE — 4040F PNEUMOC VAC/ADMIN/RCVD: CPT | Performed by: FAMILY MEDICINE

## 2020-05-27 NOTE — PROGRESS NOTES
HPI:  Patient comes in today for   Chief Complaint   Patient presents with    Hypertension     3 mo follow up    Leg Swelling     fell about 2 months ago and still has some swelling on R lower leg   Patient here for 3 month  f/u   H/O HTN ,LBBB,CAD,s/p CABG. BPH,s/p TURP and Gout. States he stumbled and bumped his right leg about 2 months ago and has a swelling which is slowly decreasing in size h/o Bilateral knee replacement is doing good ,has to use a cane if has to walk long distance. No chest pain or SOB. Has  h/o BPH and elevated PSAseeing urology in [place], [place]. H/o right carotid stenosis   being monitored by vascular.   HISTORY:  Past Medical History:   Diagnosis Date    Adenomatous polyp     history of     Chronic venous insufficiency     DVT (deep venous thrombosis) (HCC)     history of     Edema     related to venous stasis     Gout     history of     Hypertension     Onychomycosis     Osteoarthritis     Plantar fasciitis     Tobacco abuse        Family History   Problem Relation Age of Onset    Other Mother 52        Rheumatic fever    Kidney Disease Brother     Heart Disease Brother     Sudden Death Father         auto accident     Heart Surgery Sister         bypass surgery    Other Brother         abdominal aortic aneursym    Hypertension Other         siblings       Social History     Socioeconomic History    Marital status: Single     Spouse name: Not on file    Number of children: Not on file    Years of education: Not on file    Highest education level: Not on file   Occupational History    Not on file   Social Needs    Financial resource strain: Not on file    Food insecurity     Worry: Not on file     Inability: Not on file    Transportation needs     Medical: Not on file     Non-medical: Not on file   Tobacco Use    Smoking status: Former Smoker     Packs/day: 0.00     Years: 55.00     Pack years: 0.00     Types: Cigarettes     Last attempt to quit: 10/27/2014     Years injection Inject 0.5 mLs into the muscle See Admin Instructions 1 dose now and repeat in 2-6 months 1 each 0    oxybutynin (DITROPAN-XL) 5 MG CR tablet Take 5 mg by mouth daily       Omega-3 Fatty Acids (FISH OIL PO) Take 1,200 mg by mouth daily      Acetaminophen (TYLENOL PO) Take by mouth Per pt, takes 2 in am; 2 in pm      aspirin 81 MG tablet Take 81 mg by mouth daily. No current facility-administered medications for this visit. REVIEW OF SYSTEMS:  General: No fevers, chills, has lost a few pounds of weight  HEENT: No double vision ,no runny nose, sore throat or tinnitus  Cardio: No chest pain, palpitations, QUILES, edema, PND  Pulmonary: No cough, hemoptysis, SOB  GI: No nausea, vomiting, dysphagia, odynophagia, diarrhea,mild constipation. : No dysuria, hematuria, urgency,has some incontinence. Nocturia x1  Musculoskeletal: Pain in  Low back the same no radiation,knees are better since surgery. swelling right leg following injury. Neuro:No dizziness/lightheadedness, no seizures. no headaches  Skin:Has dry skin and keratotic lesions in arms. .  Sleep:fair. Psychiatric:No depression or anxiety. PHYSICAL EXAM:  VS:    /72 (Site: Left Upper Arm, Position: Sitting, Cuff Size: Large Adult)   Pulse 70   Temp 97.5 °F (36.4 °C)   Ht 6' 2\" (1.88 m)   Wt (!) 315 lb (142.9 kg)   BMI 40.44 kg/m²   General:  Alert and oriented, NAD  HEENT:  TMs, BETTY, EOMI,Throat currently clear. NECK:  Supple without adenopathy or thyromegaly, no carotid bruits  LUNGS:  CTA all fields  HEART:  RRR without M, R, or G  ABDOMEN:Obese,soft,no palpable abnormalities  BACK:Non tender. EXTREMITIES: Right leg with smalllocalized lateral swelling with chronic swelling both legs  No calf tenderness ,both knees with post surgical changes. Harvis Pock NEURO:No focal deficits. Skin :Has multiple seborrhoic keratotic lesions in hands and forearm ,dry skin. ASSESSMENT/PLAN:   Diagnosis Orders   1. Essential hypertension     2. Traumatic hematoma of right lower leg, sequela     3. Class 2 obesity due to excess calories with body mass index (BMI) of 39.0 to 39.9 in adult, unspecified whether serious comorbidity present             No orders of the defined types were placed in this encounter. Requested Prescriptions      No prescriptions requested or ordered in this encounter   Continue current meds. Patient was advised to wait and watch on the hematoma right leg since it seems to be slowly resolving on its own. Dry skin lotion to legs. Advised weight loss. Fall precautions  F/u with urology for elevated PSA  F/u with cardiology. Due for annual carotid scan in 12/2020,will have to schedule since his vascular surgeon has moved. Return in about 3 months (around 8/27/2020).

## 2020-05-28 RX ORDER — ALLOPURINOL 300 MG/1
TABLET ORAL
Qty: 30 TABLET | Refills: 0 | Status: SHIPPED | OUTPATIENT
Start: 2020-05-28 | End: 2020-08-03

## 2020-06-17 RX ORDER — BUPROPION HYDROCHLORIDE 75 MG/1
TABLET ORAL
Qty: 60 TABLET | Refills: 5 | Status: SHIPPED | OUTPATIENT
Start: 2020-06-17 | End: 2020-12-18 | Stop reason: SDUPTHER

## 2020-06-29 RX ORDER — FUROSEMIDE 20 MG/1
TABLET ORAL
Qty: 90 TABLET | Refills: 0 | Status: SHIPPED | OUTPATIENT
Start: 2020-06-29 | End: 2020-10-05

## 2020-07-29 ENCOUNTER — OFFICE VISIT (OUTPATIENT)
Dept: PODIATRY | Age: 75
End: 2020-07-29
Payer: MEDICARE

## 2020-07-29 VITALS
DIASTOLIC BLOOD PRESSURE: 72 MMHG | HEART RATE: 76 BPM | RESPIRATION RATE: 20 BRPM | SYSTOLIC BLOOD PRESSURE: 124 MMHG | WEIGHT: 315 LBS | BODY MASS INDEX: 40.88 KG/M2

## 2020-07-29 PROCEDURE — 11056 PARNG/CUTG B9 HYPRKR LES 2-4: CPT | Performed by: PODIATRIST

## 2020-07-29 PROCEDURE — 11721 DEBRIDE NAIL 6 OR MORE: CPT | Performed by: PODIATRIST

## 2020-07-29 NOTE — PROGRESS NOTES
mouth daily  16  Yes Historical Provider, MD   Omega-3 Fatty Acids (FISH OIL PO) Take 1,200 mg by mouth daily   Yes Historical Provider, MD   Acetaminophen (TYLENOL PO) Take by mouth Per pt, takes 2 in am; 2 in pm   Yes Historical Provider, MD   aspirin 81 MG tablet Take 81 mg by mouth daily. Yes Historical Provider, MD       Past Surgical History:   Procedure Laterality Date    COLONOSCOPY  12    polyp    COLONOSCOPY      adenomatous polyps     COLONOSCOPY  2009    COLONOSCOPY  2008    with polypectomy    CORONARY ARTERY BYPASS GRAFT      OTHER SURGICAL HISTORY      teeth extracted     TOTAL KNEE ARTHROPLASTY Right 10/2015    VASECTOMY  1980       Family History   Problem Relation Age of Onset    Other Mother 52        Rheumatic fever    Kidney Disease Brother     Heart Disease Brother     Sudden Death Father         auto accident     Heart Surgery Sister         bypass surgery    Other Brother         abdominal aortic aneursym    Hypertension Other         siblings       Social History     Tobacco Use    Smoking status: Former Smoker     Packs/day: 0.00     Years: 55.00     Pack years: 0.00     Types: Cigarettes     Last attempt to quit: 10/27/2014     Years since quittin.7    Smokeless tobacco: Never Used   Substance Use Topics    Alcohol use: Yes     Alcohol/week: 0.0 standard drinks     Comment: occasionally       ROS: All 14 ROS systems reviewed and pertinent positives noted above, all others negative. Lower Extremity Physical Examination:     Vitals:   Vitals:    20 0942   BP: 124/72   Pulse: 76   Resp: 20     General: AAO x 3 in NAD.      Vascular: DP and PT pulses palpable 2/4, bilateral.  CFT <3 seconds, bilateral.  Hair growth present to the level of the digits, bilateral.  Edema present, bilateral.  Varicosities present, bilateral. Erythema absent, bilateral. Distal Rubor absent bilateral.  Temperature within normal limits bilateral. Hyperpigmentation present bilateral. Atrophic skin yes. Neurological: Sensation intact to light touch to level of digits, bilateral.  Protective sensation intact 10/10 sites via 5.07/10g Riley-Leon Monofilament, bilateral.  negative Tinel's, bilateral.  negative Valleix sign, bilateral.  Vibratory intact bilateral.  Reflexes Decreased bilateral.  Paresthesias negative. Dysthesias negative. Sharp/dull intact bilateral.  Musculoskeletal: Muscle strength 5/5, bilateral.  Pain absent upon palpation bilateral. Normal medial longitudinal arch, bilateral.  Ankle ROM within normal limits,bilateral.  1st MPJ ROM within normal limits, bilateral.  Dorsally contracted digits present. No other foot deformities. Integument: Warm, dry, supple, bilateral.  Open lesion absent, Interdigital maceration absent to web spaces bilateral.   Nails left 1, 2, 3, 4, 5 and right 1, 2, 3, 4, 5 thickened, dystrophic and crumbly, discolored with subungual debris. Fissures absent, bilateral. Hyperkeratotic tissue is present. Seen distal R 3rd toe and distal R hallux    Asessment: Patient is a 75 y.o. male with:    Diagnosis Orders   1. 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 Dermatophytosis of nail     3. Corns and callosities         Plan:   Patient examined and evaluated. Current condition and treatment options discussed in detail. All nails as mentioned above debrided in length and thickness. Paring of 2 benign hyperkeratotic lesions (as listed above) took place with #10 blade or tissue nippers. Patient advised OTC methods for treatment of the mycotic nails. Patient will follow up in 10 weeks. Contact clinic with any questions/problems/concerns.

## 2020-07-29 NOTE — PROGRESS NOTES
Foot Care Worksheet  PCP: Carlos Alberto Woodall MD  Last visit: 05 / 27 / 2020    Nail description:  Thick , Yellow , Crumbly , Marked limitation of ambulation     Pain resulting from thickened and dystrophy of nail plate No    Nails involved  Right   1, 2, 3, 4, 5  (T5-T9)  Left     1, 2, 3, 4, 5  (TA-T4)    Q7 1 Class A Finding - Non traumatic amputation of foot No    Q8 2 Class B Findings - Absent DP pulse No, Absent PT pulse No, Advanced tropic changes (3 required) Yes    Decrease hair growth Yes, Nail changes/thickening Yes, Pigmented changes/discoloration Yes, Skin texture (thin, shiny) Yes, Skin color (rubor/redness) No    Q9 1 Class B and 2 Class C Findings  Claudication No, Temperature change Yes, Paresthesia No, Burning No, Edema Yes

## 2020-08-03 NOTE — TELEPHONE ENCOUNTER
Ela Melchor called requesting a refill of the below medication which has been pended for you:     Requested Prescriptions     Pending Prescriptions Disp Refills    allopurinol (ZYLOPRIM) 300 MG tablet [Pharmacy Med Name: Allopurinol 300 MG Oral Tablet] 30 tablet 0     Sig: Take 1 tablet by mouth once daily       Last Appointment Date: 5/27/2020  Next Appointment Date: 8/28/2020    No Known Allergies

## 2020-08-04 RX ORDER — ALLOPURINOL 300 MG/1
TABLET ORAL
Qty: 30 TABLET | Refills: 1 | Status: SHIPPED | OUTPATIENT
Start: 2020-08-04 | End: 2021-01-20

## 2020-08-19 ENCOUNTER — OFFICE VISIT (OUTPATIENT)
Dept: CARDIOLOGY | Age: 75
End: 2020-08-19
Payer: MEDICARE

## 2020-08-19 VITALS
DIASTOLIC BLOOD PRESSURE: 71 MMHG | BODY MASS INDEX: 40.43 KG/M2 | HEIGHT: 74 IN | WEIGHT: 315 LBS | HEART RATE: 63 BPM | SYSTOLIC BLOOD PRESSURE: 138 MMHG

## 2020-08-19 PROCEDURE — 1123F ACP DISCUSS/DSCN MKR DOCD: CPT | Performed by: INTERNAL MEDICINE

## 2020-08-19 PROCEDURE — G8417 CALC BMI ABV UP PARAM F/U: HCPCS | Performed by: INTERNAL MEDICINE

## 2020-08-19 PROCEDURE — 93005 ELECTROCARDIOGRAM TRACING: CPT | Performed by: INTERNAL MEDICINE

## 2020-08-19 PROCEDURE — 4040F PNEUMOC VAC/ADMIN/RCVD: CPT | Performed by: INTERNAL MEDICINE

## 2020-08-19 PROCEDURE — 3017F COLORECTAL CA SCREEN DOC REV: CPT | Performed by: INTERNAL MEDICINE

## 2020-08-19 PROCEDURE — 93010 ELECTROCARDIOGRAM REPORT: CPT | Performed by: INTERNAL MEDICINE

## 2020-08-19 PROCEDURE — 99214 OFFICE O/P EST MOD 30 MIN: CPT | Performed by: INTERNAL MEDICINE

## 2020-08-19 PROCEDURE — G8427 DOCREV CUR MEDS BY ELIG CLIN: HCPCS | Performed by: INTERNAL MEDICINE

## 2020-08-19 PROCEDURE — 1036F TOBACCO NON-USER: CPT | Performed by: INTERNAL MEDICINE

## 2020-08-19 NOTE — PROGRESS NOTES
Today's Date: 8/19/2020  Patient's Name: Jeremiah العراقي  Patient's age: 75 y.o., 1945    Subjective:  Jeremiah العراقي  is doing well from a cardiac standpoint. No chest pain, no dyspnea, no PND, no syncope or pre-syncope, no orthopnea. He uses rios for ambulation. He denies any falls. He denies any bleeding. Past Medical History:   has a past medical history of Adenomatous polyp, Chronic venous insufficiency, DVT (deep venous thrombosis) ([address] Edema, Gout, Hypertension, Onychomycosis, Osteoarthritis, Plantar fasciitis, and Tobacco abuse. Past Surgical History:   has a past surgical history that includes Colonoscopy (12-12-12); Vasectomy (1980); Colonoscopy (2003); other surgical history; Colonoscopy (08/2009); Colonoscopy (06/2008); Coronary artery bypass graft; and Total knee arthroplasty (Right, 10/2015). Home Medications:  Prior to Admission medications    Medication Sig Start Date End Date Taking?  Authorizing Provider   allopurinol (ZYLOPRIM) 300 MG tablet Take 1 tablet by mouth once daily 8/4/20  Yes Sam Ndiaye MD   furosemide (LASIX) 20 MG tablet TAKE 1 TABLET BY MOUTH ONCE DAILY AS NEEDED FOR  PEDAL  EDEMA 6/29/20  Yes Jocelyn Evans DO   buPROPion (WELLBUTRIN) 75 MG tablet Take 1 tablet by mouth twice daily 6/17/20  Yes Sam Ndiaye MD   finasteride (PROSCAR) 5 MG tablet TAKE 1 TABLET BY MOUTH ONCE DAILY 5/4/20  Yes Historical Provider, MD   oxybutynin (DITROPAN XL) 15 MG extended release tablet TAKE 1 TABLET BY MOUTH ONCE DAILY 4/19/20  Yes Historical Provider, MD   lisinopril (PRINIVIL;ZESTRIL) 20 MG tablet Take 1 tablet by mouth twice daily 3/23/20  Yes Rob Evans DO   clopidogrel (PLAVIX) 75 MG tablet TAKE 1 TABLET BY MOUTH ONCE DAILY 12/27/19  Yes Jocelyn Evans DO   atorvastatin (LIPITOR) 40 MG tablet TAKE 1 TABLET BY MOUTH ONCE DAILY 12/16/19  Yes Jocelyn Evans DO   metoprolol tartrate (LOPRESSOR) 50 MG tablet TAKE 1 TABLET BY MOUTH TWICE DAILY 12/16/19 Yes Gilberto Jorge,    Handicap Placard MISC by Does not apply route Permanent 8/20/19  Yes Hannah Noe MD   zoster recombinant adjuvanted vaccine [facility]) 50 MCG/0.5ML SUSR injection Inject 0.5 mLs into the muscle See Admin Instructions 1 dose now and repeat in 2-6 months 3/19/19  Yes Hannah Noe MD   oxybutynin (DITROPAN-XL) 5 MG CR tablet Take 5 mg by mouth daily  2/9/16  Yes Historical Provider, MD   Omega-3 Fatty Acids (FISH OIL PO) Take 1,200 mg by mouth daily   Yes Historical Provider, MD   Acetaminophen (TYLENOL PO) Take by mouth Per pt, takes 2 in am; 2 in pm   Yes Historical Provider, MD   aspirin 81 MG tablet Take 81 mg by mouth daily. Yes Historical Provider, MD       Allergies:  Patient has no known allergies. Social History:   reports that he quit smoking about 5 years ago. His smoking use included cigarettes. He smoked 0.00 packs per day for 55.00 years. He has never used smokeless tobacco. He reports current alcohol use. He reports that he does not use drugs. Family History: family history includes Heart Disease in his brother; Heart Surgery in his sister; Hypertension in an other family member; Kidney Disease in his brother; Other in his brother; Other (age of onset: 52) in his mother; Sudden Death in his father. No h/o sudden cardiac death. No for premature CAD    REVIEW OF SYSTEMS:    · Constitutional: there has been no unanticipated weight loss. There's been No change in energy level, No change in activity level. · Eyes: No visual changes or diplopia. No scleral icterus. · ENT: No Headaches, hearing loss or vertigo. No mouth sores or sore throat. · Cardiovascular: see above  · Respiratory: see above  · Gastrointestinal: No abdominal pain, appetite loss, blood in stools. · Genitourinary: No dysuria, trouble voiding, or hematuria. · Musculoskeletal:  No gait disturbance, No weakness or joint complaints. · Integumentary: No rash or pruritis.   · Neurological: No headache or diplopia. No tingling  · Psychiatric: No anxiety, or depression. · Endocrine: No temperature intolerance. · Hematologic/Lymphatic: No abnormal bruising or bleeding, blood clots or swollen lymph nodes. · Allergic/Immunologic: No nasal congestion or hives. PHYSICAL EXAM:      Pulse 63   Ht 6' 2\" (1.88 m)   Wt (!) 319 lb (144.7 kg)   BMI 40.96 kg/m²    Vitals:    08/19/20 1015   BP: 138/71   Pulse:         Constitutional and General Appearance: alert, cooperative, no distress and appears stated age  HEENT: PERRL, no cervical lymphadenopathy. No masses palpable. Normal oral mucosa  Respiratory:  · Normal excursion and expansion without use of accessory muscles  · Resp Auscultation: Good respiratory effort. No for increased work of breathing. On auscultation: clear to auscultation bilaterally  Cardiovascular:  · Heart tones are crisp and normal. regular S1 and S2.  · Jugular venous pulsation Normal  · The carotid upstroke is normal in amplitude and contour without delay or bruit   Abdomen:   · soft  · Bowel sounds present  Extremities:  ·  No edema  Neurological:  · Alert and oriented. Cardiac Data:  EKG: SR, first degree AV block, LBBB    Labs:     CBC: No results for input(s): WBC, HGB, HCT, PLT in the last 72 hours. BMP: No results for input(s): NA, K, CO2, BUN, CREATININE, LABGLOM, GLUCOSE in the last 72 hours. PT/INR: No results for input(s): PROTIME, INR in the last 72 hours. FASTING LIPID PANEL:  Lab Results   Component Value Date    HDL 31 02/14/2020    TRIG 141 02/14/2020     LIVER PROFILE:No results for input(s): AST, ALT, LABALBU in the last 72 hours. Assessment and plan:    1. CAD/CABG SVG-OM1, SVG-OM2 and SVG-PDA on 10/28/2014.  TTE 02/2015 LVEF 30% improved to LVEF 50% on subsequent TTE. Continue current meds. 2. LBBB  3. Carotid u/s 11/18 at [facility]- < 50% bilateral- He wants to [facility] from [address]. Will refer to vascular surgery here. 4.  HTN-stable at goal, continue current meds   5. Obesity - encouraged diet, exercise, and discussed weight loss extensively. 6. Hyperlipidemia- continue statin. LDL 36 on 2/14/2020  7. DJD  8. S/p L knee replacement 3/2018.     Eligio Trevino [facility]           [phone]

## 2020-08-28 ENCOUNTER — OFFICE VISIT (OUTPATIENT)
Dept: FAMILY MEDICINE CLINIC | Age: 75
End: 2020-08-28
Payer: MEDICARE

## 2020-08-28 VITALS
HEIGHT: 74 IN | SYSTOLIC BLOOD PRESSURE: 138 MMHG | DIASTOLIC BLOOD PRESSURE: 70 MMHG | TEMPERATURE: 96.2 F | BODY MASS INDEX: 40.43 KG/M2 | HEART RATE: 61 BPM | WEIGHT: 315 LBS | OXYGEN SATURATION: 98 %

## 2020-08-28 PROCEDURE — 3017F COLORECTAL CA SCREEN DOC REV: CPT | Performed by: FAMILY MEDICINE

## 2020-08-28 PROCEDURE — 99214 OFFICE O/P EST MOD 30 MIN: CPT | Performed by: FAMILY MEDICINE

## 2020-08-28 PROCEDURE — 1036F TOBACCO NON-USER: CPT | Performed by: FAMILY MEDICINE

## 2020-08-28 PROCEDURE — 1123F ACP DISCUSS/DSCN MKR DOCD: CPT | Performed by: FAMILY MEDICINE

## 2020-08-28 PROCEDURE — G8427 DOCREV CUR MEDS BY ELIG CLIN: HCPCS | Performed by: FAMILY MEDICINE

## 2020-08-28 PROCEDURE — 99212 OFFICE O/P EST SF 10 MIN: CPT

## 2020-08-28 PROCEDURE — G8417 CALC BMI ABV UP PARAM F/U: HCPCS | Performed by: FAMILY MEDICINE

## 2020-08-28 PROCEDURE — 4040F PNEUMOC VAC/ADMIN/RCVD: CPT | Performed by: FAMILY MEDICINE

## 2020-08-28 NOTE — PROGRESS NOTES
HPI:  Patient comes in today for   Chief Complaint   Patient presents with    3 Month Follow-Up     HTN   Patient here for 3 month  f/u  H/o HTN,CAD,s/p CABG. BPH,s/p TURP and Gout. c/o smal budge henok the abdominal wall . h/o Bilateral knee replacement is doing good ,has to use a cane if has to walk long distance. No chest pain or SOB. Has  h/o BPH and elevated PSA seeing urology in [place], [place]. H/o right carotid stenosis being monitored by vascular. No chest pain or SOB. HISTORY:  Past Medical History:   Diagnosis Date    Adenomatous polyp     history of     Chronic venous insufficiency     DVT (deep venous thrombosis) (HCC)     history of     Edema     related to venous stasis     Gout     history of     Hypertension     Onychomycosis     Osteoarthritis     Plantar fasciitis     Tobacco abuse        Family History   Problem Relation Age of Onset    Other Mother 52        Rheumatic fever    Kidney Disease Brother     Heart Disease Brother     Sudden Death Father         auto accident     Heart Surgery Sister         bypass surgery    Other Brother         abdominal aortic aneursym    Hypertension Other         siblings       Social History     Socioeconomic History    Marital status: Single     Spouse name: Not on file    Number of children: Not on file    Years of education: Not on file    Highest education level: Not on file   Occupational History    Not on file   Social Needs    Financial resource strain: Not on file    Food insecurity     Worry: Not on file     Inability: Not on file    Transportation needs     Medical: Not on file     Non-medical: Not on file   Tobacco Use    Smoking status: Former Smoker     Packs/day: 0.00     Years: 55.00     Pack years: 0.00     Types: Cigarettes     Last attempt to quit: 10/27/2014     Years since quittin.8    Smokeless tobacco: Never Used   Substance and Sexual Activity    Alcohol use:  Yes     Alcohol/week: 0.0 standard drinks     Comment: occasionally    Drug use: No    Sexual activity: Yes     Partners: Female   Lifestyle    Physical activity     Days per week: Not on file     Minutes per session: Not on file    Stress: Not on file   Relationships    Social connections     Talks on phone: Not on file     Gets together: Not on file     Attends Restorationism service: Not on file     Active member of club or organization: Not on file     Attends meetings of clubs or organizations: Not on file     Relationship status: Not on file    Intimate partner violence     Fear of current or ex partner: Not on file     Emotionally abused: Not on file     Physically abused: Not on file     Forced sexual activity: Not on file   Other Topics Concern    Not on file   Social History Narrative    Not on file       Current Outpatient Medications   Medication Sig Dispense Refill    allopurinol (ZYLOPRIM) 300 MG tablet Take 1 tablet by mouth once daily 30 tablet 1    furosemide (LASIX) 20 MG tablet TAKE 1 TABLET BY MOUTH ONCE DAILY AS NEEDED FOR  PEDAL  EDEMA 90 tablet 0    buPROPion (WELLBUTRIN) 75 MG tablet Take 1 tablet by mouth twice daily 60 tablet 5    finasteride (PROSCAR) 5 MG tablet TAKE 1 TABLET BY MOUTH ONCE DAILY      oxybutynin (DITROPAN XL) 15 MG extended release tablet TAKE 1 TABLET BY MOUTH ONCE DAILY      lisinopril (PRINIVIL;ZESTRIL) 20 MG tablet Take 1 tablet by mouth twice daily 180 tablet 3    clopidogrel (PLAVIX) 75 MG tablet TAKE 1 TABLET BY MOUTH ONCE DAILY 90 tablet 3    atorvastatin (LIPITOR) 40 MG tablet TAKE 1 TABLET BY MOUTH ONCE DAILY 90 tablet 3    metoprolol tartrate (LOPRESSOR) 50 MG tablet TAKE 1 TABLET BY MOUTH TWICE DAILY 180 tablet 3    Handicap Placard MISC by Does not apply route Permanent 1 each 0    zoster recombinant adjuvanted vaccine (SHINGRIX) 50 MCG/0.5ML SUSR injection Inject 0.5 mLs into the muscle See Admin Instructions 1 dose now and repeat in 2-6 months 1 each 0    oxybutynin (DITROPAN-XL) 5 MG CR tablet Take 5 mg by mouth daily       Omega-3 Fatty Acids (FISH OIL PO) Take 1,200 mg by mouth daily      Acetaminophen (TYLENOL PO) Take by mouth Per pt, takes 2 in am; 2 in pm      aspirin 81 MG tablet Take 81 mg by mouth daily. No current facility-administered medications for this visit. REVIEW OF SYSTEMS:  General: No fevers, chills, no weightchanges  HEENT: No double vision ,no runny nose, sore throat or tinnitus  Cardio: No chest pain, palpitations, QUILES, edema, PND  Pulmonary: No cough, hemoptysis, SOB  GI: No nausea, vomiting, dysphagia, odynophagia, diarrhea,mild constipation. : No dysuria, hematuria, urgency,has some incontinence. Nocturia x1  Musculoskeletal: Pain in  Low back chronic no radiation,knees are better since surgery. Neuro:No dizziness/lightheadedness, no seizures. no headaches  Skin:Has dry skin and keratotic lesions in arms. .  Sleep:fair. Psychiatric:No depression or anxiety. PHYSICAL EXAM:  VS:    /70   Pulse 61   Temp 96.2 °F (35.7 °C)   Ht 6' 2\" (1.88 m)   Wt (!) 317 lb (143.8 kg)   SpO2 98%   BMI 40.70 kg/m²   General:  Alert and oriented, NAD  HEENT:  TMs, BETTY, EOMI,Throat currently clear. NECK:  Supple without adenopathy or thyromegaly, no carotid bruits  LUNGS:  CTA all fields  HEART:  RRR without M, R, or G  ABDOMEN:Obese,has a small hernia in the midline above umbulicus,no palpable abnormalities  BACK:Non tender. EXTREMITIES:  Has bilateral chronic led edema,support stockings in place. No calf tenderness . NEURO:No focal deficits. Skin :Has multiple seborrhoic keratotic lesions in hands and forearm ,dry skin. ASSESSMENT/PLAN:   Diagnosis Orders   1. Essential hypertension     2. Primary osteoarthritis involving multiple joints     3. Ventral hernia without obstruction or gangrene             No orders of the defined types were placed in this encounter.     Requested Prescriptions      No prescriptions requested or ordered in this encounter   Continue current meds. Patient was advised to wait and watch on the hernia if worsens consider surgical cnsult   Advised weight loss. Dry skin care  F/u with urology. F/u with cardiology. Due for annual carotid scan in 12/2020,will have to schedule since his vascular surgeon has moved. No follow-ups on file.

## 2020-10-05 RX ORDER — FUROSEMIDE 20 MG/1
TABLET ORAL
Qty: 90 TABLET | Refills: 1 | Status: SHIPPED | OUTPATIENT
Start: 2020-10-05 | End: 2021-05-12

## 2020-10-21 ENCOUNTER — IMMUNIZATION (OUTPATIENT)
Dept: LAB | Age: 75
End: 2020-10-21
Payer: MEDICARE

## 2020-10-21 ENCOUNTER — OFFICE VISIT (OUTPATIENT)
Dept: PODIATRY | Age: 75
End: 2020-10-21
Payer: MEDICARE

## 2020-10-21 VITALS
HEIGHT: 74 IN | BODY MASS INDEX: 40.3 KG/M2 | DIASTOLIC BLOOD PRESSURE: 80 MMHG | SYSTOLIC BLOOD PRESSURE: 132 MMHG | WEIGHT: 314 LBS | HEART RATE: 64 BPM

## 2020-10-21 PROCEDURE — 11721 DEBRIDE NAIL 6 OR MORE: CPT | Performed by: PODIATRIST

## 2020-10-21 PROCEDURE — 11056 PARNG/CUTG B9 HYPRKR LES 2-4: CPT | Performed by: PODIATRIST

## 2020-10-21 PROCEDURE — PBSHW INFLUENZA, QUADV, ADJUVANTED, 65 YRS +, IM, PF, PREFILL SYR, 0.5ML (FLUAD): Performed by: FAMILY MEDICINE

## 2020-10-21 PROCEDURE — 99999 PR OFFICE/OUTPT VISIT,PROCEDURE ONLY: CPT | Performed by: PODIATRIST

## 2020-10-21 PROCEDURE — 90694 VACC AIIV4 NO PRSRV 0.5ML IM: CPT | Performed by: FAMILY MEDICINE

## 2020-10-21 NOTE — PROGRESS NOTES
Foot Care Worksheet  PCP: Thomas Schwartz MD  Last visit: 8/28/20    Nail description:  Thick , Yellow , Crumbly , Marked limitation of ambulation     Pain resulting from thickened and dystrophy of nail plate No    Nails involved  Right   1, 2, 3, 4, 5  (T5-T9)  Left     1, 2, 3, 4, 5  (TA-T4)    Q7 1 Class A Finding - Non traumatic amputation of foot No    Q8 2 Class B Findings - Absent DP pulse No, Absent PT pulse No, Advanced tropic changes (3 required) Yes    Decrease hair growth Yes, Nail changes/thickening Yes, Pigmented changes/discoloration Yes, Skin texture (thin, shiny) Yes, Skin color (rubor/redness) No    Q9 1 Class B and 2 Class C Findings  Claudication No, Temperature change Yes, Paresthesia No, Burning No, Edema Yes

## 2020-11-10 ENCOUNTER — HOSPITAL ENCOUNTER (OUTPATIENT)
Dept: INTERVENTIONAL RADIOLOGY/VASCULAR | Age: 75
Discharge: HOME | End: 2020-11-12
Payer: MEDICARE

## 2020-11-10 PROCEDURE — 93880 EXTRACRANIAL BILAT STUDY: CPT

## 2020-11-11 NOTE — PROGRESS NOTES
Physical Therapy Daily Treatment Note     Name: Tommy Lamar Sr.  Clinic Number: [id number]    Therapy Diagnosis:   Encounter Diagnoses   Name Primary?    Acute right ankle pain     Decreased range of motion of right ankle     Decreased strength, endurance, and mobility     Difficulty walking      Physician: Christy Workman MD    Visit Date: 11/12/2020    Physician Orders: PT Eval and Treat   Medical Diagnosis from Referral:   Closed bimalleolar fracture of right ankle with routine healing, subsequent encounter   M25.531 (ICD-10-CM) - Wrist pain, right   Evaluation Date: 9/21/2020    PN Due: 11/21/2020  Authorization Period Expiration: 12/31/2020  Plan of Care Expiration: 12/31/2020  Visit # / Visits authorized: 13/30 (+2)     Time In: 11:55a  Time Out: 12:50p  Total Billable Time: 50 minutes    Precautions: Standard and Weightbearing as tolerated; fall risk   S/P: closed bimalleolar fracture on 7/29/2020:   POW 15 by 11/11/2020  Subjective     Pt reports: he reverted back to the boot. He feels basically no pain when wearing the boot.     He was compliant with home exercise program.  Response to previous treatment: good  Functional change: none     Pain: 0/10 (right wrist) & 4/10 (right ankle)    Objective      Tommy received therapeutic exercises to develop strength, endurance, ROM, flexibility, posture and core stabilization for 36 minutes including:    Upright bike, no boot on x5 min, L2    Gastroc stretch on wedge [id number]   Soleus stretch on wedge [id number]   +Wall squats 2x10   Seated heel raises, 25# 3x10    DF self-mobilization x20, 2 sec hold   Step ups on 4 in step x10     Sidelying eversion/inversion, RTB x20 ea   Single leg bridges  x30  4 way ankle with GTB 3x10   Long sitting gastroc stretch [id number]   +Prone stretch for PF x45 sec  Shuttle squats 1.5 cords 3x10  Shuttle heel raises red cord 3x10  Shuttle soleus raises red cord 3x10     Tommy participated in neuromuscular re-education activities  daily  16  Yes Historical Provider, MD   Omega-3 Fatty Acids (FISH OIL PO) Take 1,200 mg by mouth daily   Yes Historical Provider, MD   Acetaminophen (TYLENOL PO) Take by mouth Per pt, takes 2 in am; 2 in pm   Yes Historical Provider, MD   aspirin 81 MG tablet Take 81 mg by mouth daily. Yes Historical Provider, MD       Past Surgical History:   Procedure Laterality Date    COLONOSCOPY  12    polyp    COLONOSCOPY      adenomatous polyps     COLONOSCOPY  2009    COLONOSCOPY  2008    with polypectomy    CORONARY ARTERY BYPASS GRAFT      OTHER SURGICAL HISTORY      teeth extracted     TOTAL KNEE ARTHROPLASTY Right 10/2015    VASECTOMY  1980       Family History   Problem Relation Age of Onset    Other Mother 52        Rheumatic fever    Kidney Disease Brother     Heart Disease Brother     Sudden Death Father         auto accident     Heart Surgery Sister         bypass surgery    Other Brother         abdominal aortic aneursym    Hypertension Other         siblings       Social History     Tobacco Use    Smoking status: Former Smoker     Packs/day: 0.00     Years: 55.00     Pack years: 0.00     Types: Cigarettes     Last attempt to quit: 10/27/2014     Years since quittin.9    Smokeless tobacco: Never Used   Substance Use Topics    Alcohol use: Yes     Alcohol/week: 0.0 standard drinks     Comment: occasionally       ROS: All 14 ROS systems reviewed and pertinent positives noted above, all others negative. Lower Extremity Physical Examination:     Vitals:   Vitals:    10/21/20 1014   BP: 132/80   Pulse: 64     General: AAO x 3 in NAD.      Vascular: DP and PT pulses palpable 2/4, bilateral.  CFT <3 seconds, bilateral.  Hair growth present to the level of the digits, bilateral.  Edema present, bilateral.  Varicosities present, bilateral. Erythema absent, bilateral. Distal Rubor absent bilateral.  Temperature within normal limits bilateral. Hyperpigmentation present bilateral. to improve: Balance, Coordination, Sense, Proprioception and Posture for 6 minutes. The following activities were included:  No boot:   Pre-gait training for coordination and neuro-re-ed to improve DF x30   Static standing on foam in // bars 2x1 min (NBOS)   Single leg stance with LLE on yoga block [id number]   Tandem stance on foam [id number] ea   Rockerboard 3 way x1 min ea     Tommy received the following manual therapy techniques: Soft tissue Mobilization were applied to the: R ankle/foot for 8 minutes, including:  TC joint mobs - gentle - AP grade 3-4 joint mobs   tibfib gentle grade 1-2 joint mobs   MWM DF x30     Tommy participated in gait training to improve functional mobility and safety for 00 minutes, including:  Walking in // bars with no boot on -0 to 1 UE support     Home Exercises Provided and Patient Education Provided     Education provided:   - continue with exercises at home     Written Home Exercises Provided: Patient instructed to cont prior HEP.  Exercises were reviewed and Tommy was able to demonstrate them prior to the end of the session.  Tommy demonstrated good  understanding of the education provided.     See EMR under Patient Instructions for exercises provided 9/21/2020.    Assessment     Pt tolerated treatment good today. Pt presented to therapy with boot on. He was re-educated and instructed on how to wean from the boot. He has not been doing many exercises at home. He was instructed on most important things to be focusing on, such as ankle mobility, stretches, regaining strength, and minimizing swelling and pain with mobility. Pt had good understanding. He had increased swelling, impaired joint mobility, decreased ankle mobility in all directions, and decreased calf strength. Pt did not have pain when we took boot off. He has anterior impingement type symptoms due to decreased talocrural movement and feels fatigue in achilles. He is appropriate to continue with therapy. Focusing on improving  Atrophic skin yes. Neurological: Sensation intact to light touch to level of digits, bilateral.  Protective sensation intact 10/10 sites via 5.07/10g [place] Monofilament, bilateral.  negative Tinel's, bilateral.  negative Valleix sign, bilateral.  Vibratory intact bilateral.  Reflexes Decreased bilateral.  Paresthesias negative. Dysthesias negative. Sharp/dull intact bilateral.  Musculoskeletal: Muscle strength 5/5, bilateral.  Pain absent upon palpation bilateral. Normal medial longitudinal arch, bilateral.  Ankle ROM within normal limits,bilateral.  1st MPJ ROM within normal limits, bilateral.  Dorsally contracted digits present. No other foot deformities. Integument: Warm, dry, supple, bilateral.  Open lesion absent, Interdigital maceration absent to web spaces bilateral.   Nails left 1, 2, 3, 4, 5 and right 1, 2, 3, 4, 5 thickened, dystrophic and crumbly, discolored with subungual debris. Fissures absent, bilateral. Hyperkeratotic tissue is present. Seen distal R 3rd toe and distal R hallux    Asessment: Patient is a 67 y.o. male with:    Diagnosis Orders   1. 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 Dermatophytosis of nail     3. Corns and callosities         Plan:   Patient examined and evaluated. Current condition and treatment options discussed in detail. All nails as mentioned above debrided in length and thickness. Paring of 2 benign hyperkeratotic lesions (as listed above) took place with #10 blade or tissue nippers. Patient advised OTC methods for treatment of the mycotic nails. Patient will follow up in 10 weeks. Contact clinic with any questions/problems/concerns. noted impairments and weaning from boot as efficiently as possible.  Tommy is progressing well towards his goals.   Pt prognosis is Good.     Pt will continue to benefit from skilled outpatient physical therapy to address the deficits listed in the problem list box on initial evaluation, provide pt/family education and to maximize pt's level of independence in the home and community environment.   Pt's spiritual, cultural and educational needs considered and pt agreeable to plan of care and goals.     Anticipated barriers to physical therapy: none    Goals:   Short Term GOALS: 6 weeks. Pt agrees with goals set.   1. Patient demonstrates independence with HEP. Goal met   2. Patient demonstrates independence with Postural Awareness. Goal met   3. Patient demonstrates improved R wrist motion by 10 degrees in all directions to improve gripping and manipulating objects. Goal met   4. Patient demonstrates increased L ankle ROM to be symmetrical with R ankle ROM in all directions to improve tolerance to functional activities pain free.  ongoing, nearly met   5. Patient demonstrates ability to walk 500 feet with no significant increase in pain to improve independence with household mobility. Ongoing, patient walking with crutches (reassess in 2 weeks)      Long Term GOALS: 12 weeks. Pt agrees with goals set. Ongoing, in progress   1. Patient demonstrates increased R wrist mobility to be symmetrical to L wrist mobility to improve tolerance to functional activities pain free. Goal met   2. Patient demonstrates increased strength BLE's to 4+/5 or greater to improve tolerance to functional activities pain free.   3. Patient demonstrates improved overall function per FOTO Ankle Survey to 35% Limitation or less.   4. Patient demonstrates ability to walk 1 mile with no discomfort or gait abnormalities to improve independence with community mobility.   5. Patient will return to work with minimal to no modifications required to  perform job independently.     Plan     Plan of care Certification: 9/21/2020 to 12/21/2020.    Closed chain exercises as able; ankle mobility and strength      Kortney Gaines, PT   11/12/2020

## 2020-12-10 ENCOUNTER — OFFICE VISIT (OUTPATIENT)
Dept: VASCULAR SURGERY | Age: 75
End: 2020-12-10
Payer: MEDICARE

## 2020-12-10 VITALS
HEART RATE: 76 BPM | SYSTOLIC BLOOD PRESSURE: 126 MMHG | HEIGHT: 74 IN | DIASTOLIC BLOOD PRESSURE: 80 MMHG | BODY MASS INDEX: 40.43 KG/M2 | WEIGHT: 315 LBS

## 2020-12-10 PROCEDURE — G8484 FLU IMMUNIZE NO ADMIN: HCPCS | Performed by: SURGERY

## 2020-12-10 PROCEDURE — G8417 CALC BMI ABV UP PARAM F/U: HCPCS | Performed by: SURGERY

## 2020-12-10 PROCEDURE — 1036F TOBACCO NON-USER: CPT | Performed by: SURGERY

## 2020-12-10 PROCEDURE — G8427 DOCREV CUR MEDS BY ELIG CLIN: HCPCS | Performed by: SURGERY

## 2020-12-10 PROCEDURE — 1123F ACP DISCUSS/DSCN MKR DOCD: CPT | Performed by: SURGERY

## 2020-12-10 PROCEDURE — 99204 OFFICE O/P NEW MOD 45 MIN: CPT | Performed by: SURGERY

## 2020-12-10 PROCEDURE — 99214 OFFICE O/P EST MOD 30 MIN: CPT | Performed by: SURGERY

## 2020-12-10 PROCEDURE — 4040F PNEUMOC VAC/ADMIN/RCVD: CPT | Performed by: SURGERY

## 2020-12-10 NOTE — PROGRESS NOTES
[id number] [facility]  [facility]  [address]  VASCULAR SURG [address]  [address], [address]  [facility] [address]  [address]  1945  67 y.o.male       Chief Complaint:  Chief Complaint   Patient presents with    New Patient     carotid, transfer of care       History of present Illness:  Pt is here today for evaluation and treatment of his carotid stenosis. On questioning him he denies any amaurosis fugax, lateralizing symptoms or claudication. He previously smoked and quit many years ago. Per the patient he has had moderate right carotid plaque for many years which is essentially unchanged. Recent carotid duplex performed on 11/10/2020 shows 50 to 69% stenosis in the right internal carotid artery and less than 50% stenosis in the left internal carotid artery. Velocities suggest 50 to 60% stenosis on the right. He denies any family history of aortic aneurysm. He does complain of some right sciatica. Past Medical History:  has a past medical history of Adenomatous polyp, Chronic venous insufficiency, DVT (deep venous thrombosis) ([address] Edema, Gout, Hypertension, Onychomycosis, Osteoarthritis, Plantar fasciitis, and Tobacco abuse. Past Surgical History:   Past Surgical History:   Procedure Laterality Date    COLONOSCOPY  12-12-12    polyp    COLONOSCOPY  2003    adenomatous polyps     COLONOSCOPY  08/2009    COLONOSCOPY  06/2008    with polypectomy    CORONARY ARTERY BYPASS GRAFT      OTHER SURGICAL HISTORY      teeth extracted     TOTAL KNEE ARTHROPLASTY Right 10/2015    VASECTOMY  1980       Social History:  reports that he quit smoking about 6 years ago. His smoking use included cigarettes. He smoked 0.00 packs per day for 55.00 years. He has never used smokeless tobacco. He reports current alcohol use. He reports that he does not use drugs.     Family History: family history includes Heart Disease in [facility], by Does not apply route Permanent, Disp: 1 each, Rfl: 0    zoster recombinant adjuvanted vaccine (SHINGRIX) 50 MCG/0.5ML SUSR injection, Inject 0.5 mLs into the muscle See Admin Instructions 1 dose now and repeat in 2-6 months, Disp: 1 each, Rfl: 0    oxybutynin (DITROPAN-XL) 5 MG CR tablet, Take 5 mg by mouth daily , Disp: , Rfl:     Omega-3 Fatty Acids (FISH OIL PO), Take 1,200 mg by mouth daily, Disp: , Rfl:     Acetaminophen (TYLENOL PO), Take by mouth Per pt, takes 2 in am; 2 in pm, Disp: , Rfl:     aspirin 81 MG tablet, Take 81 mg by mouth daily. , Disp: , Rfl:     Vital Signs:  Vitals:    12/10/20 0932   BP: 126/80   Pulse: 76       Physical Exam:  General appearance - alert, well appearing and in no acute distress  Mental status - oriented to person, place and time with normal affect  Head - normocephalic and atraumatic  Eyes - pupils equal and reactive, extraocular eye movements intact, conjunctiva clear  Ears - hearing appears to be intact  Nose - no drainage noted  Mouth - mucous membranes moist  Neck - supple, no carotid bruits, thyroid not palpable, no JVD  Chest - clear to auscultation, normal effort  Heart - normal rate, regular rhythm, no murmurs  Abdomen - soft, non-tender, non-distended, bowel sounds present all four quadrants, no masses, hepatomegaly, splenomegaly or aortic enlargement  Neurological - normal speech, no focal findings or movement disorder noted, cranial nerves II through XII grossly intact  Extremities - peripheral pulses palpable, no pedal edema or calf pain with palpation  Skin - no gross lesions, rashes, or induration noted      R brachial 2+ L brachial 2+   R radial 2+ L radial 2+   R femoral 2+ L femoral 2+   R popliteal 2+ L popliteal 2+   R posterior tibial 2+ L posterior tibial 2+   R dorsalis pedis 2+ L dorsalis pedis 2+     Labs:   Lab Results   Component Value Date    WBC 7.9 02/14/2020    HGB 15.2 02/14/2020    HCT 49.2 02/14/2020    MCV 97.0 02/14/2020     02/14/2020     Lab Results   Component Value Date     02/14/2020    K 4.6 02/14/2020     02/14/2020    CO2 26 02/14/2020    BUN 21 02/14/2020    CREATININE 1.14 02/14/2020    GLUCOSE 114 02/14/2020    CALCIUM 9.9 02/14/2020     Lab Results   Component Value Date    ALKPHOS 87 02/14/2020    ALT 20 02/14/2020    AST 22 02/14/2020    PROT 7.8 02/14/2020    BILITOT 0.72 02/14/2020    LABALBU 4.5 02/14/2020     No results found for: LACTA  No results found for: AMYLASE  Lab Results   Component Value Date    LIPASE 33 03/19/2019     Lab Results   Component Value Date    INR 1.0 03/19/2019         Assessment:  1. Moderate 50 to 69% right internal carotid artery stenosis, likely in the 50 to 60% range  2. Mild less than 50% left internal carotid artery stenosis    1. Asymptomatic bilateral carotid artery stenosis        Plan:   1. Continue Plavix and aspirin daily  2. Continue Lipitor daily  3. Repeat carotid duplex in 1 year with a follow-up appointment at the [facility]    No orders of the defined types were placed in this encounter.           Electronically signed by Nikolay West MD on 12/10/2020 at 9:48 AM     Copy sent to Dr. Evelyn Hill MD

## 2020-12-18 RX ORDER — BUPROPION HYDROCHLORIDE 75 MG/1
TABLET ORAL
Qty: 60 TABLET | Refills: 5 | Status: SHIPPED | OUTPATIENT
Start: 2020-12-18 | End: 2020-12-23

## 2020-12-18 RX ORDER — CLOPIDOGREL BISULFATE 75 MG/1
TABLET ORAL
Qty: 90 TABLET | Refills: 3 | Status: SHIPPED | OUTPATIENT
Start: 2020-12-18 | End: 2020-12-22

## 2020-12-18 NOTE — TELEPHONE ENCOUNTER
Umesh Canchola called requesting a refill of the below medication which has been pended for you:     Requested Prescriptions     Pending Prescriptions Disp Refills    buPROPion (WELLBUTRIN) 75 MG tablet 60 tablet 5     Sig: Take 1 tablet by mouth twice daily       Last Appointment Date: 8/28/2020  Next Appointment Date: Visit date not found    No Known Allergies

## 2020-12-21 RX ORDER — METOPROLOL TARTRATE 50 MG/1
TABLET, FILM COATED ORAL
Qty: 180 TABLET | Refills: 0 | Status: SHIPPED | OUTPATIENT
Start: 2020-12-21 | End: 2021-03-24

## 2020-12-22 RX ORDER — CLOPIDOGREL BISULFATE 75 MG/1
TABLET ORAL
Qty: 90 TABLET | Refills: 3 | Status: SHIPPED | OUTPATIENT
Start: 2020-12-22 | End: 2022-05-18 | Stop reason: ALTCHOICE

## 2020-12-23 RX ORDER — BUPROPION HYDROCHLORIDE 75 MG/1
TABLET ORAL
Qty: 60 TABLET | Refills: 0 | Status: SHIPPED | OUTPATIENT
Start: 2020-12-23 | End: 2021-07-21 | Stop reason: SDUPTHER

## 2020-12-28 RX ORDER — ATORVASTATIN CALCIUM 40 MG/1
TABLET, FILM COATED ORAL
Qty: 90 TABLET | Refills: 3 | Status: SHIPPED | OUTPATIENT
Start: 2020-12-28 | End: 2022-08-09 | Stop reason: SDUPTHER

## 2020-12-30 RX ORDER — ATORVASTATIN CALCIUM 40 MG/1
TABLET, FILM COATED ORAL
Qty: 90 TABLET | Refills: 0 | OUTPATIENT
Start: 2020-12-30

## 2021-01-05 ENCOUNTER — OFFICE VISIT (OUTPATIENT)
Dept: PODIATRY | Age: 76
End: 2021-01-05
Payer: MEDICARE

## 2021-01-05 VITALS
RESPIRATION RATE: 20 BRPM | HEART RATE: 76 BPM | SYSTOLIC BLOOD PRESSURE: 124 MMHG | DIASTOLIC BLOOD PRESSURE: 68 MMHG | WEIGHT: 315 LBS | BODY MASS INDEX: 41.75 KG/M2

## 2021-01-05 DIAGNOSIS — I82.5Z3 CHRONIC EMBOLISM AND THROMBOSIS OF UNSPECIFIED DEEP VEINS OF DISTAL LOWER EXTREMITY, BILATERAL (HCC): Primary | ICD-10-CM

## 2021-01-05 DIAGNOSIS — L84 CORNS AND CALLOSITIES: ICD-10-CM

## 2021-01-05 DIAGNOSIS — B35.1 DERMATOPHYTOSIS OF NAIL: ICD-10-CM

## 2021-01-05 PROCEDURE — 99999 PR OFFICE/OUTPT VISIT,PROCEDURE ONLY: CPT | Performed by: PODIATRIST

## 2021-01-05 PROCEDURE — 11721 DEBRIDE NAIL 6 OR MORE: CPT | Performed by: PODIATRIST

## 2021-01-05 PROCEDURE — 11056 PARNG/CUTG B9 HYPRKR LES 2-4: CPT | Performed by: PODIATRIST

## 2021-01-05 NOTE — PROGRESS NOTES
Subjective:    Elisa Bowser is a 67 y.o. male who presents for routine foot care. .  Currently denies F/C/N/V. No Known Allergies    Past Medical History:   Diagnosis Date    Adenomatous polyp     history of     Chronic venous insufficiency     DVT (deep venous thrombosis) (HCC)     history of     Edema     related to venous stasis     Gout     history of     Hypertension     Onychomycosis     Osteoarthritis     Plantar fasciitis     Tobacco abuse        Prior to Admission medications    Medication Sig Start Date End Date Taking?  Authorizing Provider   atorvastatin (LIPITOR) 40 MG tablet TAKE 1 TABLET BY MOUTH ONCE DAILY 12/28/20  Yes Rob Evans DO   buPROPion (WELLBUTRIN) 75 MG tablet Take 1 tablet by mouth twice daily 12/23/20  Yes Jennifer Aburto MD   clopidogrel (PLAVIX) 75 MG tablet Take 1 tablet by mouth once daily 12/22/20  Yes Ubaldo Galeano MD   metoprolol tartrate (LOPRESSOR) 50 MG tablet Take 1 tablet by mouth twice daily 12/21/20  Yes Rob Evans DO   furosemide (LASIX) 20 MG tablet TAKE 1 TABLET BY MOUTH ONCE DAILY AS NEEDED FOR  PEDAL  EDEMA 10/5/20  Yes Rob Evans DO   allopurinol (ZYLOPRIM) 300 MG tablet Take 1 tablet by mouth once daily 8/4/20  Yes Jennifer Aburto MD   finasteride (PROSCAR) 5 MG tablet TAKE 1 TABLET BY MOUTH ONCE DAILY 5/4/20  Yes Historical Provider, MD   oxybutynin (DITROPAN XL) 15 MG extended release tablet TAKE 1 TABLET BY MOUTH ONCE DAILY 4/19/20  Yes Historical Provider, MD   lisinopril (PRINIVIL;ZESTRIL) 20 MG tablet Take 1 tablet by mouth twice daily 3/23/20  Yes Rob Evans DO   Handicap Placard MISC by Does not apply route Permanent 8/20/19  Yes Jennifer Aburto MD   zoster recombinant adjuvanted vaccine [facility]) 50 MCG/0.5ML SUSR injection Inject 0.5 mLs into the muscle See Admin Instructions 1 dose now and repeat in 2-6 months 3/19/19  Yes Jennifer Aburto MD   oxybutynin (DITROPAN-XL) 5 MG CR tablet Take 5 mg by mouth daily 16  Yes Historical Provider, MD   Omega-3 Fatty Acids (FISH OIL PO) Take 1,200 mg by mouth daily   Yes Historical Provider, MD   Acetaminophen (TYLENOL PO) Take by mouth Per pt, takes 2 in am; 2 in pm   Yes Historical Provider, MD   aspirin 81 MG tablet Take 81 mg by mouth daily. Yes Historical Provider, MD       Past Surgical History:   Procedure Laterality Date    COLONOSCOPY  12    polyp    COLONOSCOPY      adenomatous polyps     COLONOSCOPY  2009    COLONOSCOPY  2008    with polypectomy    CORONARY ARTERY BYPASS GRAFT      OTHER SURGICAL HISTORY      teeth extracted     TOTAL KNEE ARTHROPLASTY Right 10/2015    VASECTOMY  1980       Family History   Problem Relation Age of Onset    Other Mother 52        Rheumatic fever    Kidney Disease Brother     Heart Disease Brother     Sudden Death Father         auto accident     Heart Surgery Sister         bypass surgery    Other Brother         abdominal aortic aneursym    Hypertension Other         siblings       Social History     Tobacco Use    Smoking status: Former Smoker     Packs/day: 0.00     Years: 55.00     Pack years: 0.00     Types: Cigarettes     Quit date: 10/27/2014     Years since quittin.1    Smokeless tobacco: Never Used   Substance Use Topics    Alcohol use: Yes     Alcohol/week: 0.0 standard drinks     Comment: occasionally       ROS: All 14 ROS systems reviewed and pertinent positives noted above, all others negative. Lower Extremity Physical Examination:     Vitals:   Vitals:    21 1049   BP: 124/68   Pulse: 76   Resp: 20     General: AAO x 3 in NAD.      Vascular: DP and PT pulses palpable 2/4, bilateral.  CFT <3 seconds, bilateral.  Hair growth present to the level of the digits, bilateral.  Edema present, bilateral.  Varicosities present, bilateral. Erythema absent, bilateral. Distal Rubor absent bilateral.  Temperature within normal limits bilateral. Hyperpigmentation present bilateral.

## 2021-01-05 NOTE — PROGRESS NOTES
Foot Care Worksheet  PCP: David Davis MD  Last visit: 8 / 28 / 2020    Nail description:  Thick , Yellow , Crumbly , Marked limitation of ambulation     Pain resulting from thickened and dystrophy of nail plate No    Nails involved  Right   1, 2, 3, 4, 5  (T5-T9)  Left     1, 2, 3, 4, 5  (TA-T4)    Q7 1 Class A Finding - Non traumatic amputation of foot No    Q8 2 Class B Findings - Absent DP pulse No, Absent PT pulse No, Advanced tropic changes (3 required) Yes    Decrease hair growth Yes, Nail changes/thickening Yes, Pigmented changes/discoloration Yes, Skin texture (thin, shiny) Yes, Skin color (rubor/redness) No    Q9 1 Class B and 2 Class C Findings  Claudication No, Temperature change Yes, Paresthesia No, Burning No, Edema Yes

## 2021-01-18 DIAGNOSIS — M79.89 PAIN AND SWELLING OF TOE OF RIGHT FOOT: ICD-10-CM

## 2021-01-18 DIAGNOSIS — M79.674 PAIN AND SWELLING OF TOE OF RIGHT FOOT: ICD-10-CM

## 2021-01-20 RX ORDER — ALLOPURINOL 300 MG/1
TABLET ORAL
Qty: 30 TABLET | Refills: 0 | Status: SHIPPED | OUTPATIENT
Start: 2021-01-20 | End: 2021-03-24

## 2021-01-25 ENCOUNTER — IMMUNIZATION (OUTPATIENT)
Dept: LAB | Age: 76
End: 2021-01-25
Payer: MEDICARE

## 2021-01-25 PROCEDURE — 91301 COVID-19, MODERNA VACCINE 100MCG/0.5ML DOSE: CPT

## 2021-02-22 ENCOUNTER — IMMUNIZATION (OUTPATIENT)
Dept: LAB | Age: 76
End: 2021-02-22
Payer: MEDICARE

## 2021-02-22 PROCEDURE — 91301 COVID-19, MODERNA VACCINE 100MCG/0.5ML DOSE: CPT

## 2021-02-24 ENCOUNTER — OFFICE VISIT (OUTPATIENT)
Dept: CARDIOLOGY | Age: 76
End: 2021-02-24
Payer: MEDICARE

## 2021-02-24 VITALS
HEIGHT: 74 IN | HEART RATE: 67 BPM | BODY MASS INDEX: 40.43 KG/M2 | WEIGHT: 315 LBS | SYSTOLIC BLOOD PRESSURE: 123 MMHG | DIASTOLIC BLOOD PRESSURE: 71 MMHG

## 2021-02-24 DIAGNOSIS — I25.10 CORONARY ARTERY DISEASE INVOLVING NATIVE CORONARY ARTERY OF NATIVE HEART WITHOUT ANGINA PECTORIS: Primary | ICD-10-CM

## 2021-02-24 DIAGNOSIS — I10 ESSENTIAL HYPERTENSION: ICD-10-CM

## 2021-02-24 DIAGNOSIS — E78.5 HYPERLIPIDEMIA, UNSPECIFIED HYPERLIPIDEMIA TYPE: ICD-10-CM

## 2021-02-24 PROCEDURE — 99214 OFFICE O/P EST MOD 30 MIN: CPT

## 2021-02-24 PROCEDURE — 93005 ELECTROCARDIOGRAM TRACING: CPT | Performed by: INTERNAL MEDICINE

## 2021-02-24 PROCEDURE — 93010 ELECTROCARDIOGRAM REPORT: CPT | Performed by: INTERNAL MEDICINE

## 2021-02-24 PROCEDURE — G8417 CALC BMI ABV UP PARAM F/U: HCPCS | Performed by: INTERNAL MEDICINE

## 2021-02-24 PROCEDURE — G8484 FLU IMMUNIZE NO ADMIN: HCPCS | Performed by: INTERNAL MEDICINE

## 2021-02-24 PROCEDURE — 4040F PNEUMOC VAC/ADMIN/RCVD: CPT | Performed by: INTERNAL MEDICINE

## 2021-02-24 PROCEDURE — G8427 DOCREV CUR MEDS BY ELIG CLIN: HCPCS | Performed by: INTERNAL MEDICINE

## 2021-02-24 PROCEDURE — 1123F ACP DISCUSS/DSCN MKR DOCD: CPT | Performed by: INTERNAL MEDICINE

## 2021-02-24 PROCEDURE — 99214 OFFICE O/P EST MOD 30 MIN: CPT | Performed by: INTERNAL MEDICINE

## 2021-02-24 PROCEDURE — 1036F TOBACCO NON-USER: CPT | Performed by: INTERNAL MEDICINE

## 2021-02-24 NOTE — PROGRESS NOTES
Today's Date: 2/24/2021  Patient's Name: Becky Carlos  Patient's age: 67 y.o., 1945    Subjective:  Becky Carlos  is here for follow up. He had COVID vaccine. o chest pain, no dyspnea, no PND, no syncope or pre-syncope, no orthopnea. He uses rios for ambulation. He denies any falls or bleeding. Past Medical History:   has a past medical history of Adenomatous polyp, Chronic venous insufficiency, DVT (deep venous thrombosis) ([address] Edema, Gout, Hypertension, Onychomycosis, Osteoarthritis, Plantar fasciitis, and Tobacco abuse. Past Surgical History:   has a past surgical history that includes Colonoscopy (12-12-12); Vasectomy (1980); Colonoscopy (2003); other surgical history; Colonoscopy (08/2009); Colonoscopy (06/2008); Coronary artery bypass graft; and Total knee arthroplasty (Right, 10/2015). Home Medications:  Prior to Admission medications    Medication Sig Start Date End Date Taking?  Authorizing Provider   allopurinol (ZYLOPRIM) 300 MG tablet Take 1 tablet by mouth once daily 1/20/21   Belen Li MD   atorvastatin (LIPITOR) 40 MG tablet TAKE 1 TABLET BY MOUTH ONCE DAILY 12/28/20   Rob Evans DO   buPROPion (WELLBUTRIN) 75 MG tablet Take 1 tablet by mouth twice daily 12/23/20   Belen Li MD   clopidogrel (PLAVIX) 75 MG tablet Take 1 tablet by mouth once daily 12/22/20   Tee Jones MD   metoprolol tartrate (LOPRESSOR) 50 MG tablet Take 1 tablet by mouth twice daily 12/21/20   Rob Evans DO   furosemide (LASIX) 20 MG tablet TAKE 1 TABLET BY MOUTH ONCE DAILY AS NEEDED FOR  PEDAL  EDEMA 10/5/20   Rob Evans DO   finasteride (PROSCAR) 5 MG tablet TAKE 1 TABLET BY MOUTH ONCE DAILY 5/4/20   Historical Provider, MD   oxybutynin (DITROPAN XL) 15 MG extended release tablet TAKE 1 TABLET BY MOUTH ONCE DAILY 4/19/20   Historical Provider, MD   lisinopril (PRINIVIL;ZESTRIL) 20 MG tablet Take 1 tablet by mouth twice daily 3/23/20   Rob Evans DO   Handicap [facility] by Does not apply route Permanent 8/20/19   Isidra Naik MD   zoster recombinant adjuvanted vaccine [facility]) 50 MCG/0.5ML SUSR injection Inject 0.5 mLs into the muscle See Admin Instructions 1 dose now and repeat in 2-6 months 3/19/19   Isidra Naik MD   oxybutynin (DITROPAN-XL) 5 MG CR tablet Take 5 mg by mouth daily  2/9/16   Historical Provider, MD   Omega-3 Fatty Acids (FISH OIL PO) Take 1,200 mg by mouth daily    Historical Provider, MD   Acetaminophen (TYLENOL PO) Take by mouth Per pt, takes 2 in am; 2 in pm    Historical Provider, MD   aspirin 81 MG tablet Take 81 mg by mouth daily. Historical Provider, MD       Allergies:  Patient has no known allergies. Social History:   reports that he quit smoking about 6 years ago. His smoking use included cigarettes. He smoked 0.00 packs per day for 55.00 years. He has never used smokeless tobacco. He reports current alcohol use. He reports that he does not use drugs. Family History: family history includes Heart Disease in his brother; Heart Surgery in his sister; Hypertension in an other family member; Kidney Disease in his brother; Other in his brother; Other (age of onset: 52) in his mother; Sudden Death in his father. No h/o sudden cardiac death. No for premature CAD    REVIEW OF SYSTEMS:    · Constitutional: there has been no unanticipated weight loss. There's been No change in energy level, No change in activity level. · Eyes: No visual changes or diplopia. No scleral icterus. · ENT: No Headaches, hearing loss or vertigo. No mouth sores or sore throat. · Cardiovascular: see above  · Respiratory: see above  · Gastrointestinal: No abdominal pain, appetite loss, blood in stools. · Genitourinary: No dysuria, trouble voiding, or hematuria. · Musculoskeletal:  No gait disturbance, No weakness or joint complaints. · Integumentary: No rash or pruritis. · Neurological: No headache or diplopia.  No tingling  · Psychiatric: No anxiety, or depression. · Endocrine: No temperature intolerance. · Hematologic/Lymphatic: No abnormal bruising or bleeding, blood clots or swollen lymph nodes. · Allergic/Immunologic: No nasal congestion or hives. PHYSICAL EXAM:      There were no vitals taken for this visit. Constitutional and General Appearance: alert, cooperative, no distress and appears stated age  [de-identified]: PERRL, no cervical lymphadenopathy. No masses palpable. Normal oral mucosa  Respiratory:  · Normal excursion and expansion without use of accessory muscles  · Resp Auscultation: Good respiratory effort. No for increased work of breathing. On auscultation: clear to auscultation bilaterally  Cardiovascular:  · Heart tones are crisp and normal. regular S1 and S2.  · Jugular venous pulsation Normal  · The carotid upstroke is normal in amplitude and contour without delay or bruit   Abdomen:   · soft  · Bowel sounds present  Extremities:  ·  No edema  Neurological:  · Alert and oriented. Cardiac Data:  EKG: SR, first degree AV block, LBBB    Labs:     CBC: No results for input(s): WBC, HGB, HCT, PLT in the last 72 hours. BMP: No results for input(s): NA, K, CO2, BUN, CREATININE, LABGLOM, GLUCOSE in the last 72 hours. PT/INR: No results for input(s): PROTIME, INR in the last 72 hours. FASTING LIPID PANEL:  Lab Results   Component Value Date    HDL 31 02/14/2020    TRIG 141 02/14/2020     LIVER PROFILE:No results for input(s): AST, ALT, LABALBU in the last 72 hours. Assessment and plan:     1. CAD/CABG SVG-OM1, SVG-OM2 and SVG-PDA on 10/28/2014.  TTE 02/2015 LVEF 30% improved to LVEF 50% on subsequent TTE. Continue current meds. I recommended TTE and lexiscan stress test. He wants to monitor symptoms and does not want testing. 2. LBBB  3. Carotid u/s 11/18 at [facility]- < 50% bilateral- Follows with Vascular surgery  4.  HTN-stable at goal, continue current meds   5. Obesity - encouraged diet, exercise, and discussed weight loss extensively. 6. Hyperlipidemia- continue statin. LDL 36 on 2/14/2020. Obtain lipid panel. 7. DJD  8. S/p L knee replacement 3/2018.  9. RTC 6 months.     Marrianne Bosworth, [address] [facility]           [phone]

## 2021-03-05 ENCOUNTER — HOSPITAL ENCOUNTER (OUTPATIENT)
Dept: LAB | Age: 76
Discharge: HOME | End: 2021-03-05
Payer: MEDICARE

## 2021-03-05 DIAGNOSIS — E78.5 HYPERLIPIDEMIA, UNSPECIFIED HYPERLIPIDEMIA TYPE: ICD-10-CM

## 2021-03-05 LAB
CHOLESTEROL/HDL RATIO: 2.8
CHOLESTEROL: 94 MG/DL
HDLC SERPL-MCNC: 34 MG/DL
LDL CHOLESTEROL: 28 MG/DL (ref 0–130)
TRIGL SERPL-MCNC: 159 MG/DL
VLDLC SERPL CALC-MCNC: ABNORMAL MG/DL (ref 1–30)

## 2021-03-05 PROCEDURE — 80061 LIPID PANEL: CPT

## 2021-03-05 PROCEDURE — 36415 COLL VENOUS BLD VENIPUNCTURE: CPT

## 2021-03-16 ENCOUNTER — OFFICE VISIT (OUTPATIENT)
Dept: PODIATRY | Age: 76
End: 2021-03-16
Payer: MEDICARE

## 2021-03-16 VITALS
HEART RATE: 68 BPM | WEIGHT: 315 LBS | DIASTOLIC BLOOD PRESSURE: 72 MMHG | BODY MASS INDEX: 40.43 KG/M2 | SYSTOLIC BLOOD PRESSURE: 132 MMHG | HEIGHT: 74 IN

## 2021-03-16 DIAGNOSIS — L84 CORNS AND CALLOSITIES: ICD-10-CM

## 2021-03-16 DIAGNOSIS — B35.1 DERMATOPHYTOSIS OF NAIL: ICD-10-CM

## 2021-03-16 DIAGNOSIS — I82.5Z3 CHRONIC EMBOLISM AND THROMBOSIS OF UNSPECIFIED DEEP VEINS OF DISTAL LOWER EXTREMITY, BILATERAL (HCC): Primary | ICD-10-CM

## 2021-03-16 PROCEDURE — 99999 PR OFFICE/OUTPT VISIT,PROCEDURE ONLY: CPT | Performed by: PODIATRIST

## 2021-03-16 PROCEDURE — 11056 PARNG/CUTG B9 HYPRKR LES 2-4: CPT | Performed by: PODIATRIST

## 2021-03-16 PROCEDURE — 11721 DEBRIDE NAIL 6 OR MORE: CPT | Performed by: PODIATRIST

## 2021-03-16 NOTE — PROGRESS NOTES
Foot Care Worksheet  PCP: Esther Villagran MD  Last visit: 8 / 28 / 2020    Nail description:  Thick , Yellow , Crumbly , Marked limitation of ambulation     Pain resulting from thickened and dystrophy of nail plate No    Nails involved  Right   1, 2, 3, 4, 5  (T5-T9)  Left     1, 2, 3, 4, 5  (TA-T4)    Q7 1 Class A Finding - Non traumatic amputation of foot No    Q8 2 Class B Findings - Absent DP pulse No, Absent PT pulse No, Advanced tropic changes (3 required) Yes    Decrease hair growth Yes, Nail changes/thickening Yes, Pigmented changes/discoloration Yes, Skin texture (thin, shiny) Yes, Skin color (rubor/redness) No    Q9 1 Class B and 2 Class C Findings  Claudication No, Temperature change Yes, Paresthesia No, Burning No, Edema Yes

## 2021-03-16 NOTE — PROGRESS NOTES
Subjective:    Leslie Bowser is a 67 y.o. male who presents for routine foot care. .  Currently denies F/C/N/V. No Known Allergies    Past Medical History:   Diagnosis Date    Adenomatous polyp     history of     Chronic venous insufficiency     DVT (deep venous thrombosis) (HCC)     history of     Edema     related to venous stasis     Gout     history of     Hypertension     Onychomycosis     Osteoarthritis     Plantar fasciitis     Tobacco abuse        Prior to Admission medications    Medication Sig Start Date End Date Taking?  Authorizing Provider   allopurinol (ZYLOPRIM) 300 MG tablet Take 1 tablet by mouth once daily 1/20/21  Yes Licha Henderson MD   atorvastatin (LIPITOR) 40 MG tablet TAKE 1 TABLET BY MOUTH ONCE DAILY 12/28/20  Yes Rob Evans DO   buPROPion (WELLBUTRIN) 75 MG tablet Take 1 tablet by mouth twice daily 12/23/20  Yes Licha Henderson MD   clopidogrel (PLAVIX) 75 MG tablet Take 1 tablet by mouth once daily 12/22/20  Yes Naveen Guadarrama MD   metoprolol tartrate (LOPRESSOR) 50 MG tablet Take 1 tablet by mouth twice daily 12/21/20  Yes Rob Evans DO   furosemide (LASIX) 20 MG tablet TAKE 1 TABLET BY MOUTH ONCE DAILY AS NEEDED FOR  PEDAL  EDEMA 10/5/20  Yes Rob Evans DO   finasteride (PROSCAR) 5 MG tablet TAKE 1 TABLET BY MOUTH ONCE DAILY 5/4/20  Yes Historical Provider, MD   oxybutynin (DITROPAN XL) 15 MG extended release tablet TAKE 1 TABLET BY MOUTH ONCE DAILY 4/19/20  Yes Historical Provider, MD   lisinopril (PRINIVIL;ZESTRIL) 20 MG tablet Take 1 tablet by mouth twice daily 3/23/20  Yes Rob Evans DO   Handicap Placard MISC by Does not apply route Permanent 8/20/19  Yes Licha Henderson MD   zoster recombinant adjuvanted vaccine [facility]) 50 MCG/0.5ML SUSR injection Inject 0.5 mLs into the muscle See Admin Instructions 1 dose now and repeat in 2-6 months 3/19/19  Yes Licha Henderson MD   oxybutynin (DITROPAN-XL) 5 MG CR tablet Take 5 mg by mouth daily 16  Yes Historical Provider, MD   Omega-3 Fatty Acids (FISH OIL PO) Take 1,200 mg by mouth daily   Yes Historical Provider, MD   Acetaminophen (TYLENOL PO) Take by mouth Per pt, takes 2 in am; 2 in pm   Yes Historical Provider, MD   aspirin 81 MG tablet Take 81 mg by mouth daily. Yes Historical Provider, MD       Past Surgical History:   Procedure Laterality Date    COLONOSCOPY  12    polyp    COLONOSCOPY      adenomatous polyps     COLONOSCOPY  2009    COLONOSCOPY  2008    with polypectomy    CORONARY ARTERY BYPASS GRAFT      OTHER SURGICAL HISTORY      teeth extracted     TOTAL KNEE ARTHROPLASTY Right 10/2015    VASECTOMY  1980       Family History   Problem Relation Age of Onset    Other Mother 52        Rheumatic fever    Kidney Disease Brother     Heart Disease Brother     Sudden Death Father         auto accident     Heart Surgery Sister         bypass surgery    Other Brother         abdominal aortic aneursym    Hypertension Other         siblings       Social History     Tobacco Use    Smoking status: Former Smoker     Packs/day: 0.00     Years: 55.00     Pack years: 0.00     Types: Cigarettes     Quit date: 10/27/2014     Years since quittin.3    Smokeless tobacco: Never Used   Substance Use Topics    Alcohol use: Yes     Alcohol/week: 0.0 standard drinks     Comment: occasionally       ROS: All 14 ROS systems reviewed and pertinent positives noted above, all others negative. Lower Extremity Physical Examination:     Vitals:   Vitals:    21 1106   BP: 132/72   Pulse: 68     General: AAO x 3 in NAD.      Vascular: DP and PT pulses palpable 2/4, bilateral.  CFT <3 seconds, bilateral.  Hair growth present to the level of the digits, bilateral.  Edema present, bilateral.  Varicosities present, bilateral. Erythema absent, bilateral. Distal Rubor absent bilateral.  Temperature within normal limits bilateral. Hyperpigmentation present bilateral. Atrophic skin yes.  Neurological: Sensation intact to light touch to level of digits, bilateral.  Protective sensation intact 10/10 sites via 5.07/10g [place] Monofilament, bilateral.  negative Tinel's, bilateral.  negative Valleix sign, bilateral.  Vibratory intact bilateral.  Reflexes Decreased bilateral.  Paresthesias negative. Dysthesias negative. Sharp/dull intact bilateral.  Musculoskeletal: Muscle strength 5/5, bilateral.  Pain absent upon palpation bilateral. Normal medial longitudinal arch, bilateral.  Ankle ROM within normal limits,bilateral.  1st MPJ ROM within normal limits, bilateral.  Dorsally contracted digits present. No other foot deformities. Integument: Warm, dry, supple, bilateral.  Open lesion absent, Interdigital maceration absent to web spaces bilateral.   Nails left 1, 2, 3, 4, 5 and right 1, 2, 3, 4, 5 thickened, dystrophic and crumbly, discolored with subungual debris. Fissures absent, bilateral. Hyperkeratotic tissue is present. Seen distal R 3rd toe and distal R hallux    Asessment: Patient is a 67 y.o. male with:    Diagnosis Orders   1. 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 Dermatophytosis of nail     3. Corns and callosities         Plan:   Patient examined and evaluated. Current condition and treatment options discussed in detail. All nails as mentioned above debrided in length and thickness. Paring of 2 benign hyperkeratotic lesions (as listed above) took place with #10 blade or tissue nippers. Patient advised OTC methods for treatment of the mycotic nails. Patient will follow up in 10 weeks. Contact clinic with any questions/problems/concerns.

## 2021-03-24 DIAGNOSIS — M79.89 PAIN AND SWELLING OF TOE OF RIGHT FOOT: ICD-10-CM

## 2021-03-24 DIAGNOSIS — I10 ESSENTIAL HYPERTENSION: ICD-10-CM

## 2021-03-24 DIAGNOSIS — M79.674 PAIN AND SWELLING OF TOE OF RIGHT FOOT: ICD-10-CM

## 2021-03-24 RX ORDER — ALLOPURINOL 300 MG/1
TABLET ORAL
Qty: 90 TABLET | Refills: 1 | Status: SHIPPED | OUTPATIENT
Start: 2021-03-24 | End: 2022-08-09 | Stop reason: SDUPTHER

## 2021-03-24 RX ORDER — METOPROLOL TARTRATE 50 MG/1
TABLET, FILM COATED ORAL
Qty: 180 TABLET | Refills: 0 | Status: SHIPPED | OUTPATIENT
Start: 2021-03-24 | End: 2021-06-29

## 2021-03-26 ENCOUNTER — OFFICE VISIT (OUTPATIENT)
Dept: FAMILY MEDICINE CLINIC | Age: 76
End: 2021-03-26
Payer: MEDICARE

## 2021-03-26 VITALS
HEART RATE: 74 BPM | OXYGEN SATURATION: 93 % | BODY MASS INDEX: 40.43 KG/M2 | SYSTOLIC BLOOD PRESSURE: 140 MMHG | WEIGHT: 315 LBS | DIASTOLIC BLOOD PRESSURE: 72 MMHG | RESPIRATION RATE: 18 BRPM | HEIGHT: 74 IN

## 2021-03-26 DIAGNOSIS — Z12.5 PROSTATE CANCER SCREENING: ICD-10-CM

## 2021-03-26 DIAGNOSIS — I10 ESSENTIAL HYPERTENSION: Primary | ICD-10-CM

## 2021-03-26 DIAGNOSIS — E78.5 HYPERLIPIDEMIA, UNSPECIFIED HYPERLIPIDEMIA TYPE: ICD-10-CM

## 2021-03-26 DIAGNOSIS — M15.9 PRIMARY OSTEOARTHRITIS INVOLVING MULTIPLE JOINTS: ICD-10-CM

## 2021-03-26 DIAGNOSIS — I25.810 CORONARY ARTERY DISEASE INVOLVING CORONARY BYPASS GRAFT OF NATIVE HEART WITHOUT ANGINA PECTORIS: ICD-10-CM

## 2021-03-26 PROCEDURE — 1123F ACP DISCUSS/DSCN MKR DOCD: CPT | Performed by: FAMILY MEDICINE

## 2021-03-26 PROCEDURE — G8484 FLU IMMUNIZE NO ADMIN: HCPCS | Performed by: FAMILY MEDICINE

## 2021-03-26 PROCEDURE — 99214 OFFICE O/P EST MOD 30 MIN: CPT | Performed by: FAMILY MEDICINE

## 2021-03-26 PROCEDURE — G8417 CALC BMI ABV UP PARAM F/U: HCPCS | Performed by: FAMILY MEDICINE

## 2021-03-26 PROCEDURE — 99212 OFFICE O/P EST SF 10 MIN: CPT

## 2021-03-26 PROCEDURE — 4040F PNEUMOC VAC/ADMIN/RCVD: CPT | Performed by: FAMILY MEDICINE

## 2021-03-26 PROCEDURE — 1036F TOBACCO NON-USER: CPT | Performed by: FAMILY MEDICINE

## 2021-03-26 PROCEDURE — G8427 DOCREV CUR MEDS BY ELIG CLIN: HCPCS | Performed by: FAMILY MEDICINE

## 2021-03-26 ASSESSMENT — PATIENT HEALTH QUESTIONNAIRE - PHQ9
1. LITTLE INTEREST OR PLEASURE IN DOING THINGS: 0
SUM OF ALL RESPONSES TO PHQ9 QUESTIONS 1 & 2: 0
SUM OF ALL RESPONSES TO PHQ QUESTIONS 1-9: 0
SUM OF ALL RESPONSES TO PHQ QUESTIONS 1-9: 0

## 2021-03-26 NOTE — PROGRESS NOTES
activity     Days per week: Not on file     Minutes per session: Not on file    Stress: Not on file   Relationships    Social connections     Talks on phone: Not on file     Gets together: Not on file     Attends [employer] service: Not on file     Active member of club or organization: Not on file     Attends meetings of clubs or organizations: Not on file     Relationship status: Not on file    Intimate partner violence     Fear of current or ex partner: Not on file     Emotionally abused: Not on file     Physically abused: Not on file     Forced sexual activity: Not on file   Other Topics Concern    Not on file   Social History Narrative    Not on file       Current Outpatient Medications   Medication Sig Dispense Refill    metoprolol tartrate (LOPRESSOR) 50 MG tablet Take 1 tablet by mouth twice daily 180 tablet 0    allopurinol (ZYLOPRIM) 300 MG tablet Take 1 tablet by mouth once daily 90 tablet 1    atorvastatin (LIPITOR) 40 MG tablet TAKE 1 TABLET BY MOUTH ONCE DAILY 90 tablet 3    buPROPion (WELLBUTRIN) 75 MG tablet Take 1 tablet by mouth twice daily 60 tablet 0    clopidogrel (PLAVIX) 75 MG tablet Take 1 tablet by mouth once daily 90 tablet 3    furosemide (LASIX) 20 MG tablet TAKE 1 TABLET BY MOUTH ONCE DAILY AS NEEDED FOR  PEDAL  EDEMA 90 tablet 1    finasteride (PROSCAR) 5 MG tablet TAKE 1 TABLET BY MOUTH ONCE DAILY      oxybutynin (DITROPAN XL) 15 MG extended release tablet TAKE 1 TABLET BY MOUTH ONCE DAILY      lisinopril (PRINIVIL;ZESTRIL) 20 MG tablet Take 1 tablet by mouth twice daily 180 tablet 3    Handicap Placard MISC by Does not apply route Permanent 1 each 0    zoster recombinant adjuvanted vaccine ([facility]) 50 MCG/0.5ML SUSR injection Inject 0.5 mLs into the muscle See Admin Instructions 1 dose now and repeat in 2-6 months 1 each 0    oxybutynin (DITROPAN-XL) 5 MG CR tablet Take 5 mg by mouth daily       Omega-3 Fatty Acids (FISH OIL PO) Take 1,200 mg by mouth daily      Acetaminophen (TYLENOL PO) Take by mouth Per pt, takes 2 in am; 2 in pm      aspirin 81 MG tablet Take 81 mg by mouth daily. No current facility-administered medications for this visit. REVIEW OF SYSTEMS:  General: No fevers, chills, no weightchanges  HEENT: No double vision ,no runny nose, sore throat or tinnitus  Cardio: No chest pain, palpitations, QUILES, edema, PND  Pulmonary: No cough, hemoptysis, SOB  GI: No nausea, vomiting, dysphagia, odynophagia, diarrhea,mild constipation. : No dysuria, hematuria, urgency,has some incontinence. Nocturia x1  Musculoskeletal: Pain in  Low back chronic no radiation,knees are better since surgery. Neuro:No dizziness/lightheadedness, no seizures. no headaches  Skin:Has dry skin and keratotic lesions in arms. .  Sleep:fair. Psychiatric:No depression or anxiety. PHYSICAL EXAM:  VS:    BP (!) 140/72 (Site: Left Upper Arm, Position: Sitting, Cuff Size: Large Adult)   Pulse 74   Resp 18   Ht 6' 2\" (1.88 m)   Wt (!) 324 lb (147 kg)   SpO2 93%   BMI 41.60 kg/m²   General:  Alert and oriented, NAD  HEENT:  TMs, BETTY, EOMI,Throat currently clear. NECK:  Supple without adenopathy or thyromegaly, no carotid bruits  LUNGS:  CTA all fields  HEART:  RRR without M, R, or G  ABDOMEN:Obese,has a small hernia in the midline above umbulicus,no palpable abnormalities  BACK:Non tender. EXTREMITIES:  Has bilateral chronic led edema,support stockings in place. No calf tenderness . NEURO:No focal deficits. Skin :Has multiple seborrhoic keratotic ,bruising in hands and forearm ,dry skin. ASSESSMENT/PLAN:   Diagnosis Orders   1. Essential hypertension  CBC    Comprehensive Metabolic Panel    TSH without Reflex   2. Primary osteoarthritis involving multiple joints     3. Coronary artery disease involving coronary bypass graft of native heart without angina pectoris  TSH without Reflex   4. Hyperlipidemia, unspecified hyperlipidemia type     5.  Prostate cancer screening  PSA screening           Orders Placed This Encounter   Procedures    CBC     Standing Status:   Future     Standing Expiration Date:   3/26/2022    Comprehensive Metabolic Panel     Standing Status:   Future     Standing Expiration Date:   3/26/2022    TSH without Reflex     Standing Status:   Future     Standing Expiration Date:   3/26/2022    PSA screening     Standing Status:   Future     Standing Expiration Date:   3/26/2022     Requested Prescriptions      No prescriptions requested or ordered in this encounter   Lipid panel looks good  Continue current meds. Advised weight loss. Dry skin care  F/u with urology. F/u with cardiology. Due for annual carotid scan in 12/2021,sees vascular  Return in about 3 months (around 6/26/2021).

## 2021-03-30 DIAGNOSIS — I10 ESSENTIAL HYPERTENSION: ICD-10-CM

## 2021-03-30 RX ORDER — LISINOPRIL 20 MG/1
TABLET ORAL
Qty: 180 TABLET | Refills: 3 | Status: ON HOLD | OUTPATIENT
Start: 2021-03-30 | End: 2022-01-08 | Stop reason: HOSPADM

## 2021-05-12 DIAGNOSIS — R60.0 PEDAL EDEMA: ICD-10-CM

## 2021-05-12 RX ORDER — FUROSEMIDE 20 MG/1
TABLET ORAL
Qty: 90 TABLET | Refills: 3 | Status: ON HOLD | OUTPATIENT
Start: 2021-05-12 | End: 2022-04-16

## 2021-06-16 ENCOUNTER — OFFICE VISIT (OUTPATIENT)
Dept: PODIATRY | Age: 76
End: 2021-06-16
Payer: MEDICARE

## 2021-06-16 VITALS
BODY MASS INDEX: 41.83 KG/M2 | DIASTOLIC BLOOD PRESSURE: 76 MMHG | HEART RATE: 68 BPM | SYSTOLIC BLOOD PRESSURE: 128 MMHG | RESPIRATION RATE: 20 BRPM | WEIGHT: 315 LBS

## 2021-06-16 DIAGNOSIS — I82.5Z3 CHRONIC EMBOLISM AND THROMBOSIS OF UNSPECIFIED DEEP VEINS OF DISTAL LOWER EXTREMITY, BILATERAL (HCC): Primary | ICD-10-CM

## 2021-06-16 DIAGNOSIS — L84 CORNS AND CALLOSITIES: ICD-10-CM

## 2021-06-16 DIAGNOSIS — B35.1 DERMATOPHYTOSIS OF NAIL: ICD-10-CM

## 2021-06-16 PROCEDURE — 11056 PARNG/CUTG B9 HYPRKR LES 2-4: CPT | Performed by: PODIATRIST

## 2021-06-16 PROCEDURE — 99999 PR OFFICE/OUTPT VISIT,PROCEDURE ONLY: CPT | Performed by: PODIATRIST

## 2021-06-16 PROCEDURE — 11721 DEBRIDE NAIL 6 OR MORE: CPT | Performed by: PODIATRIST

## 2021-06-16 NOTE — PROGRESS NOTES
Subjective:    Cuauhtemoc Ramírez is a 67 y.o. male who presents for routine foot care. .  Currently denies F/C/N/V. No Known Allergies    Past Medical History:   Diagnosis Date    Adenomatous polyp     history of     Chronic venous insufficiency     DVT (deep venous thrombosis) (HCC)     history of     Edema     related to venous stasis     Gout     history of     Hypertension     Onychomycosis     Osteoarthritis     Plantar fasciitis     Tobacco abuse        Prior to Admission medications    Medication Sig Start Date End Date Taking?  Authorizing Provider   furosemide (LASIX) 20 MG tablet TAKE 1 TABLET BY MOUTH ONCE DAILY AS NEEDED FOR  PEDAL  EDEMA 5/12/21  Yes Rafal Calloway MD   lisinopril (PRINIVIL;ZESTRIL) 20 MG tablet Take 1 tablet by mouth twice daily 3/30/21  Yes Rafal Calloway MD   metoprolol tartrate (LOPRESSOR) 50 MG tablet Take 1 tablet by mouth twice daily 3/24/21  Yes Glenna España MD   allopurinol (ZYLOPRIM) 300 MG tablet Take 1 tablet by mouth once daily 3/24/21  Yes Melita Katz MD   atorvastatin (LIPITOR) 40 MG tablet TAKE 1 TABLET BY MOUTH ONCE DAILY 12/28/20  Yes Rob Evans DO   buPROPion (WELLBUTRIN) 75 MG tablet Take 1 tablet by mouth twice daily 12/23/20  Yes Melita Katz MD   clopidogrel (PLAVIX) 75 MG tablet Take 1 tablet by mouth once daily 12/22/20  Yes Rafal Calloway MD   finasteride (PROSCAR) 5 MG tablet TAKE 1 TABLET BY MOUTH ONCE DAILY 5/4/20  Yes Historical Provider, MD   oxybutynin (DITROPAN XL) 15 MG extended release tablet TAKE 1 TABLET BY MOUTH ONCE DAILY 4/19/20  Yes Historical Provider, MD   Handicap Placard [facility] by Does not apply route Permanent 8/20/19  Yes Melita Katz MD   zoster recombinant adjuvanted vaccine [facility]) 50 MCG/0.5ML SUSR injection Inject 0.5 mLs into the muscle See Admin Instructions 1 dose now and repeat in 2-6 months 3/19/19  Yes Melita Katz MD   oxybutynin (DITROPAN-XL) 5 MG CR tablet Take 5 mg bilateral. Atrophic skin yes. Neurological: Sensation intact to light touch to level of digits, bilateral.  Protective sensation intact 10/10 sites via 5.07/10g [place] Monofilament, bilateral.  negative Tinel's, bilateral.  negative Valleix sign, bilateral.  Vibratory intact bilateral.  Reflexes Decreased bilateral.  Paresthesias negative. Dysthesias negative. Sharp/dull intact bilateral.  Musculoskeletal: Muscle strength 5/5, bilateral.  Pain absent upon palpation bilateral. Normal medial longitudinal arch, bilateral.  Ankle ROM within normal limits,bilateral.  1st MPJ ROM within normal limits, bilateral.  Dorsally contracted digits present. No other foot deformities. Integument: Warm, dry, supple, bilateral.  Open lesion absent, Interdigital maceration absent to web spaces bilateral.   Nails left 1, 2, 3, 4, 5 and right 1, 2, 3, 4, 5 thickened, dystrophic and crumbly, discolored with subungual debris. Fissures absent, bilateral. Hyperkeratotic tissue is present. Seen distal R 3rd toe and distal R hallux    Asessment: Patient is a 67 y.o. male with:    Diagnosis Orders   1. 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 Dermatophytosis of nail     3. Corns and callosities         Plan:   Patient examined and evaluated. Current condition and treatment options discussed in detail. All nails as mentioned above debrided in length and thickness. Paring of 2 benign hyperkeratotic lesions (as listed above) took place with #10 blade or tissue nippers. Patient advised OTC methods for treatment of the mycotic nails. Patient will follow up in 10 weeks. Contact clinic with any questions/problems/concerns.

## 2021-06-16 NOTE — PROGRESS NOTES
Foot Care Worksheet  PCP: Shandra Alarcon MD  Last visit: 8 / 28 / 2020    Nail description:  Thick , Yellow , Crumbly , Marked limitation of ambulation     Pain resulting from thickened and dystrophy of nail plate No    Nails involved  Right   1, 2, 3, 4, 5  (T5-T9)  Left     1, 2, 3, 4, 5  (TA-T4)    Q7 1 Class A Finding - Non traumatic amputation of foot No    Q8 2 Class B Findings - Absent DP pulse No, Absent PT pulse No, Advanced tropic changes (3 required) Yes    Decrease hair growth Yes, Nail changes/thickening Yes, Pigmented changes/discoloration Yes, Skin texture (thin, shiny) Yes, Skin color (rubor/redness) No    Q9 1 Class B and 2 Class C Findings  Claudication No, Temperature change Yes, Paresthesia No, Burning No, Edema Yes

## 2021-06-22 ENCOUNTER — HOSPITAL ENCOUNTER (OUTPATIENT)
Dept: LAB | Age: 76
Discharge: HOME | End: 2021-06-22
Payer: MEDICARE

## 2021-06-22 ENCOUNTER — OFFICE VISIT (OUTPATIENT)
Dept: FAMILY MEDICINE CLINIC | Age: 76
End: 2021-06-22
Payer: MEDICARE

## 2021-06-22 VITALS
SYSTOLIC BLOOD PRESSURE: 130 MMHG | HEART RATE: 61 BPM | WEIGHT: 315 LBS | OXYGEN SATURATION: 96 % | TEMPERATURE: 97.9 F | BODY MASS INDEX: 40.43 KG/M2 | DIASTOLIC BLOOD PRESSURE: 80 MMHG | HEIGHT: 74 IN

## 2021-06-22 DIAGNOSIS — I25.810 CORONARY ARTERY DISEASE INVOLVING CORONARY BYPASS GRAFT OF NATIVE HEART WITHOUT ANGINA PECTORIS: ICD-10-CM

## 2021-06-22 DIAGNOSIS — Z11.59 NEED FOR HEPATITIS C SCREENING TEST: ICD-10-CM

## 2021-06-22 DIAGNOSIS — M15.9 PRIMARY OSTEOARTHRITIS INVOLVING MULTIPLE JOINTS: ICD-10-CM

## 2021-06-22 DIAGNOSIS — I10 ESSENTIAL HYPERTENSION: ICD-10-CM

## 2021-06-22 DIAGNOSIS — E66.01 CLASS 3 SEVERE OBESITY DUE TO EXCESS CALORIES WITH BODY MASS INDEX (BMI) OF 40.0 TO 44.9 IN ADULT, UNSPECIFIED WHETHER SERIOUS COMORBIDITY PRESENT (HCC): ICD-10-CM

## 2021-06-22 DIAGNOSIS — I10 ESSENTIAL HYPERTENSION: Primary | ICD-10-CM

## 2021-06-22 DIAGNOSIS — Z12.5 PROSTATE CANCER SCREENING: ICD-10-CM

## 2021-06-22 LAB
ALBUMIN SERPL-MCNC: 4.5 G/DL (ref 3.5–5.2)
ALBUMIN/GLOBULIN RATIO: 1.8 (ref 1–2.5)
ALP BLD-CCNC: 78 U/L (ref 40–129)
ALT SERPL-CCNC: 23 U/L (ref 5–41)
ANION GAP SERPL CALCULATED.3IONS-SCNC: 10 MMOL/L (ref 9–17)
AST SERPL-CCNC: 24 U/L
BILIRUB SERPL-MCNC: 0.49 MG/DL (ref 0.3–1.2)
BUN BLDV-MCNC: 18 MG/DL (ref 8–23)
BUN/CREAT BLD: 16 (ref 9–20)
CALCIUM SERPL-MCNC: 9.6 MG/DL (ref 8.6–10.4)
CHLORIDE BLD-SCNC: 106 MMOL/L (ref 98–107)
CO2: 26 MMOL/L (ref 20–31)
CREAT SERPL-MCNC: 1.11 MG/DL (ref 0.7–1.2)
GFR AFRICAN AMERICAN: >60 ML/MIN
GFR NON-AFRICAN AMERICAN: >60 ML/MIN
GFR SERPL CREATININE-BSD FRML MDRD: ABNORMAL ML/MIN/{1.73_M2}
GFR SERPL CREATININE-BSD FRML MDRD: ABNORMAL ML/MIN/{1.73_M2}
GLUCOSE BLD-MCNC: 114 MG/DL (ref 70–99)
HCT VFR BLD CALC: 44.5 % (ref 40.7–50.3)
HEMOGLOBIN: 13.8 G/DL (ref 13–17)
HEPATITIS C ANTIBODY: NONREACTIVE
MCH RBC QN AUTO: 30.9 PG (ref 25.2–33.5)
MCHC RBC AUTO-ENTMCNC: 31 G/DL (ref 25.2–33.5)
MCV RBC AUTO: 99.6 FL (ref 82.6–102.9)
NRBC AUTOMATED: 0 PER 100 WBC
PDW BLD-RTO: 14.4 % (ref 11.8–14.4)
PLATELET # BLD: 172 K/UL (ref 138–453)
PMV BLD AUTO: 9.9 FL (ref 8.1–13.5)
POTASSIUM SERPL-SCNC: 4.5 MMOL/L (ref 3.7–5.3)
PROSTATE SPECIFIC ANTIGEN: 2.8 UG/L
RBC # BLD: 4.47 M/UL (ref 4.21–5.77)
SODIUM BLD-SCNC: 142 MMOL/L (ref 135–144)
TOTAL PROTEIN: 7 G/DL (ref 6.4–8.3)
TSH SERPL DL<=0.05 MIU/L-ACNC: 10.55 MIU/L (ref 0.3–5)
WBC # BLD: 7.9 K/UL (ref 3.5–11.3)

## 2021-06-22 PROCEDURE — 4040F PNEUMOC VAC/ADMIN/RCVD: CPT | Performed by: FAMILY MEDICINE

## 2021-06-22 PROCEDURE — 85027 COMPLETE CBC AUTOMATED: CPT

## 2021-06-22 PROCEDURE — 36415 COLL VENOUS BLD VENIPUNCTURE: CPT

## 2021-06-22 PROCEDURE — G8417 CALC BMI ABV UP PARAM F/U: HCPCS | Performed by: FAMILY MEDICINE

## 2021-06-22 PROCEDURE — 99214 OFFICE O/P EST MOD 30 MIN: CPT | Performed by: FAMILY MEDICINE

## 2021-06-22 PROCEDURE — 86803 HEPATITIS C AB TEST: CPT

## 2021-06-22 PROCEDURE — 1036F TOBACCO NON-USER: CPT | Performed by: FAMILY MEDICINE

## 2021-06-22 PROCEDURE — G0103 PSA SCREENING: HCPCS

## 2021-06-22 PROCEDURE — 1123F ACP DISCUSS/DSCN MKR DOCD: CPT | Performed by: FAMILY MEDICINE

## 2021-06-22 PROCEDURE — 80053 COMPREHEN METABOLIC PANEL: CPT

## 2021-06-22 PROCEDURE — 84443 ASSAY THYROID STIM HORMONE: CPT

## 2021-06-22 PROCEDURE — 99212 OFFICE O/P EST SF 10 MIN: CPT

## 2021-06-22 PROCEDURE — G8427 DOCREV CUR MEDS BY ELIG CLIN: HCPCS | Performed by: FAMILY MEDICINE

## 2021-06-22 RX ORDER — ZOSTER VACCINE RECOMBINANT, ADJUVANTED 50 MCG/0.5
0.5 KIT INTRAMUSCULAR SEE ADMIN INSTRUCTIONS
Qty: 0.5 ML | Refills: 0 | Status: ON HOLD | OUTPATIENT
Start: 2021-06-22 | End: 2022-01-08 | Stop reason: HOSPADM

## 2021-06-22 SDOH — ECONOMIC STABILITY: FOOD INSECURITY: WITHIN THE PAST 12 MONTHS, THE FOOD YOU BOUGHT JUST DIDN'T LAST AND YOU DIDN'T HAVE MONEY TO GET MORE.: NEVER TRUE

## 2021-06-22 SDOH — ECONOMIC STABILITY: FOOD INSECURITY: WITHIN THE PAST 12 MONTHS, YOU WORRIED THAT YOUR FOOD WOULD RUN OUT BEFORE YOU GOT MONEY TO BUY MORE.: NEVER TRUE

## 2021-06-22 ASSESSMENT — SOCIAL DETERMINANTS OF HEALTH (SDOH): HOW HARD IS IT FOR YOU TO PAY FOR THE VERY BASICS LIKE FOOD, HOUSING, MEDICAL CARE, AND HEATING?: NOT HARD AT ALL

## 2021-06-22 NOTE — PROGRESS NOTES
Paying Living Expenses: Not hard at all   Food Insecurity: No Food Insecurity    Worried About Running Out of Food in the Last Year: Never true    Ran Out of Food in the Last Year: Never true   Transportation Needs:     Lack of Transportation (Medical):      Lack of Transportation (Non-Medical):    Physical Activity:     Days of Exercise per Week:     Minutes of Exercise per Session:    Stress:     Feeling of Stress :    Social Connections:     Frequency of Communication with Friends and Family:     Frequency of Social Gatherings with Friends and Family:     Attends Shinto Services:     Active Member of Clubs or Organizations:     Attends Club or Organization Meetings:     Marital Status:    Intimate Partner Violence:     Fear of Current or Ex-Partner:     Emotionally Abused:     Physically Abused:     Sexually Abused:        Current Outpatient Medications   Medication Sig Dispense Refill    furosemide (LASIX) 20 MG tablet TAKE 1 TABLET BY MOUTH ONCE DAILY AS NEEDED FOR  PEDAL  EDEMA 90 tablet 3    lisinopril (PRINIVIL;ZESTRIL) 20 MG tablet Take 1 tablet by mouth twice daily 180 tablet 3    metoprolol tartrate (LOPRESSOR) 50 MG tablet Take 1 tablet by mouth twice daily 180 tablet 0    allopurinol (ZYLOPRIM) 300 MG tablet Take 1 tablet by mouth once daily 90 tablet 1    atorvastatin (LIPITOR) 40 MG tablet TAKE 1 TABLET BY MOUTH ONCE DAILY 90 tablet 3    buPROPion (WELLBUTRIN) 75 MG tablet Take 1 tablet by mouth twice daily 60 tablet 0    clopidogrel (PLAVIX) 75 MG tablet Take 1 tablet by mouth once daily 90 tablet 3    finasteride (PROSCAR) 5 MG tablet TAKE 1 TABLET BY MOUTH ONCE DAILY      oxybutynin (DITROPAN XL) 15 MG extended release tablet TAKE 1 TABLET BY MOUTH ONCE DAILY      Handicap Placard MISC by Does not apply route Permanent 1 each 0    Omega-3 Fatty Acids (FISH OIL PO) Take 1,200 mg by mouth daily      Acetaminophen (TYLENOL PO) Take by mouth Per pt, takes 2 in am; 2 in pm  aspirin 81 MG tablet Take 81 mg by mouth daily. No current facility-administered medications for this visit. REVIEW OF SYSTEMS:  General: No fevers, chills, has gained some weight. HEENT: No double vision ,no runny nose, sore throat or tinnitus  Cardio: No chest pain, palpitations, QUILES, edema, PND  Pulmonary: No cough, hemoptysis, SOB  GI: No nausea, vomiting, dysphagia, odynophagia, diarrhea,mild constipation. : No dysuria, hematuria, urgency,has some incontinence. Nocturia x1  Musculoskeletal: Pain in  Low back chronic no radiation seeing chiropractor,knees are better since surgery. Neuro:No dizziness/lightheadedness, no seizures. no headaches  Skin:Has dry skin and keratotic lesions in arms. .  Sleep:fair. Psychiatric:No depression or anxiety. PHYSICAL EXAM:  VS:    /80   Pulse 61   Temp 97.9 °F (36.6 °C)   Ht 6' 2\" (1.88 m)   Wt (!) 331 lb (150.1 kg)   SpO2 96%   BMI 42.50 kg/m²   General:  Alert and oriented, NAD  HEENT:  TMs, BETTY, EOMI,Throat currently clear. NECK:  Supple without adenopathy or thyromegaly, no carotid bruits  LUNGS:  CTA all fields  HEART:  RRR without M, R, or G  ABDOMEN:Obese,has a small hernia in the midline above umbulicus,no palpable abnormalities  BACK:Non tender. EXTREMITIES:  Has bilateral chronic leg edema the same . support stockings in place. No calf tenderness . NEURO:No focal deficits. Skin :Has multiple seborrhoic keratotic ,bruising in hands and forearm ,dry skin. ASSESSMENT/PLAN:   Diagnosis Orders   1. Essential hypertension     2. Coronary artery disease involving coronary bypass graft of native heart without angina pectoris     3. Primary osteoarthritis involving multiple joints     4. Class 3 severe obesity due to excess calories with body mass index (BMI) of 40.0 to 44.9 in adult, unspecified whether serious comorbidity present ([facility] [address]     5.  Need for hepatitis C screening test  Hepatitis C Antibody           Orders Placed This Encounter Procedures    Hepatitis C Antibody     Standing Status:   Future     Standing Expiration Date:   6/22/2022     Requested Prescriptions     Signed Prescriptions Disp Refills    zoster recombinant adjuvanted vaccine [facility]) 50 MCG/0.5ML SUSR injection 0.5 mL 0     Sig: Inject 0.5 mLs into the muscle See Admin Instructions 1 dose now and repeat in 2-6 months   labs are pending  Continue current meds. Advised weight loss. Dry skin care. Recommend shingrix vaccine  F/u with urology. F/u with cardiology. Due for annual carotid scan in 12/2021,sees vascular. Return in about 3 months (around 9/22/2021).

## 2021-06-23 DIAGNOSIS — R79.89 ELEVATED TSH: ICD-10-CM

## 2021-06-23 DIAGNOSIS — E03.9 HYPOTHYROIDISM, UNSPECIFIED TYPE: Primary | ICD-10-CM

## 2021-06-23 RX ORDER — LEVOTHYROXINE SODIUM 0.05 MG/1
50 TABLET ORAL DAILY
Qty: 30 TABLET | Refills: 5 | Status: SHIPPED | OUTPATIENT
Start: 2021-06-23 | End: 2021-09-15 | Stop reason: DRUGHIGH

## 2021-06-29 DIAGNOSIS — I10 ESSENTIAL HYPERTENSION: ICD-10-CM

## 2021-06-29 RX ORDER — METOPROLOL TARTRATE 50 MG/1
TABLET, FILM COATED ORAL
Qty: 180 TABLET | Refills: 3 | Status: ON HOLD | OUTPATIENT
Start: 2021-06-29 | End: 2022-04-16

## 2021-07-22 RX ORDER — BUPROPION HYDROCHLORIDE 75 MG/1
TABLET ORAL
Qty: 60 TABLET | Refills: 0 | Status: SHIPPED | OUTPATIENT
Start: 2021-07-22 | End: 2021-08-17

## 2021-08-17 NOTE — TELEPHONE ENCOUNTER
Bryan Must called requesting a refill of the below medication which has been pended for you:     Requested Prescriptions     Pending Prescriptions Disp Refills    buPROPion (WELLBUTRIN) 75 MG tablet [Pharmacy Med Name: buPROPion HCl 75 MG Oral Tablet] 60 tablet 1     Sig: Take 1 tablet by mouth twice daily       Last Appointment Date: 6/22/2021  Next Appointment Date: 9/15/2021    No Known Allergies

## 2021-08-18 RX ORDER — BUPROPION HYDROCHLORIDE 75 MG/1
TABLET ORAL
Qty: 60 TABLET | Refills: 1 | Status: SHIPPED | OUTPATIENT
Start: 2021-08-18 | End: 2021-10-22

## 2021-08-25 ENCOUNTER — OFFICE VISIT (OUTPATIENT)
Dept: CARDIOLOGY | Age: 76
End: 2021-08-25
Payer: MEDICARE

## 2021-08-25 ENCOUNTER — OFFICE VISIT (OUTPATIENT)
Dept: PODIATRY | Age: 76
End: 2021-08-25
Payer: MEDICARE

## 2021-08-25 VITALS
BODY MASS INDEX: 41.09 KG/M2 | RESPIRATION RATE: 24 BRPM | WEIGHT: 315 LBS | DIASTOLIC BLOOD PRESSURE: 60 MMHG | SYSTOLIC BLOOD PRESSURE: 116 MMHG | HEART RATE: 68 BPM

## 2021-08-25 VITALS
SYSTOLIC BLOOD PRESSURE: 116 MMHG | DIASTOLIC BLOOD PRESSURE: 58 MMHG | BODY MASS INDEX: 40.43 KG/M2 | HEART RATE: 67 BPM | WEIGHT: 315 LBS | HEIGHT: 74 IN

## 2021-08-25 DIAGNOSIS — I25.10 CORONARY ARTERY DISEASE INVOLVING NATIVE CORONARY ARTERY OF NATIVE HEART WITHOUT ANGINA PECTORIS: Primary | ICD-10-CM

## 2021-08-25 DIAGNOSIS — I10 ESSENTIAL HYPERTENSION: ICD-10-CM

## 2021-08-25 DIAGNOSIS — E78.5 HYPERLIPIDEMIA, UNSPECIFIED HYPERLIPIDEMIA TYPE: ICD-10-CM

## 2021-08-25 DIAGNOSIS — I82.5Z3 CHRONIC EMBOLISM AND THROMBOSIS OF UNSPECIFIED DEEP VEINS OF DISTAL LOWER EXTREMITY, BILATERAL (HCC): Primary | ICD-10-CM

## 2021-08-25 DIAGNOSIS — B35.1 DERMATOPHYTOSIS OF NAIL: ICD-10-CM

## 2021-08-25 DIAGNOSIS — L84 CORNS AND CALLOSITIES: ICD-10-CM

## 2021-08-25 PROCEDURE — 11721 DEBRIDE NAIL 6 OR MORE: CPT | Performed by: PODIATRIST

## 2021-08-25 PROCEDURE — G8417 CALC BMI ABV UP PARAM F/U: HCPCS | Performed by: INTERNAL MEDICINE

## 2021-08-25 PROCEDURE — G8428 CUR MEDS NOT DOCUMENT: HCPCS | Performed by: INTERNAL MEDICINE

## 2021-08-25 PROCEDURE — 93005 ELECTROCARDIOGRAM TRACING: CPT | Performed by: INTERNAL MEDICINE

## 2021-08-25 PROCEDURE — 99214 OFFICE O/P EST MOD 30 MIN: CPT | Performed by: INTERNAL MEDICINE

## 2021-08-25 PROCEDURE — 11056 PARNG/CUTG B9 HYPRKR LES 2-4: CPT | Performed by: PODIATRIST

## 2021-08-25 PROCEDURE — 1123F ACP DISCUSS/DSCN MKR DOCD: CPT | Performed by: INTERNAL MEDICINE

## 2021-08-25 PROCEDURE — 99999 PR OFFICE/OUTPT VISIT,PROCEDURE ONLY: CPT | Performed by: PODIATRIST

## 2021-08-25 PROCEDURE — 93010 ELECTROCARDIOGRAM REPORT: CPT | Performed by: INTERNAL MEDICINE

## 2021-08-25 PROCEDURE — 1036F TOBACCO NON-USER: CPT | Performed by: INTERNAL MEDICINE

## 2021-08-25 PROCEDURE — 4040F PNEUMOC VAC/ADMIN/RCVD: CPT | Performed by: INTERNAL MEDICINE

## 2021-08-25 NOTE — PROGRESS NOTES
Subjective:    Rafia Macedo is a 67 y.o. male who presents for routine foot care. .  Currently denies F/C/N/V. No Known Allergies    Past Medical History:   Diagnosis Date    Adenomatous polyp     history of     Chronic venous insufficiency     DVT (deep venous thrombosis) (HCC)     history of     Edema     related to venous stasis     Gout     history of     Hypertension     Onychomycosis     Osteoarthritis     Plantar fasciitis     Tobacco abuse        Prior to Admission medications    Medication Sig Start Date End Date Taking?  Authorizing Provider   buPROPion (WELLBUTRIN) 75 MG tablet Take 1 tablet by mouth twice daily 8/18/21  Yes Eliana Gamez MD   metoprolol tartrate (LOPRESSOR) 50 MG tablet Take 1 tablet by mouth twice daily 6/29/21  Yes Zakia Marquis MD   levothyroxine (SYNTHROID) 50 MCG tablet Take 1 tablet by mouth daily 6/23/21  Yes Eliana Gamez MD   zoster recombinant adjuvanted vaccine [facility]) 50 MCG/0.5ML SUSR injection Inject 0.5 mLs into the muscle See Admin Instructions 1 dose now and repeat in 2-6 months 6/22/21 12/19/21 Yes Eliana Gamez MD   furosemide (LASIX) 20 MG tablet TAKE 1 TABLET BY MOUTH ONCE DAILY AS NEEDED FOR  PEDAL  EDEMA 5/12/21  Yes Zakia Marquis MD   lisinopril (PRINIVIL;ZESTRIL) 20 MG tablet Take 1 tablet by mouth twice daily 3/30/21  Yes Zakia Marquis MD   allopurinol (ZYLOPRIM) 300 MG tablet Take 1 tablet by mouth once daily 3/24/21  Yes Eliana Gamez MD   atorvastatin (LIPITOR) 40 MG tablet TAKE 1 TABLET BY MOUTH ONCE DAILY 12/28/20  Yes Rob Evans DO   clopidogrel (PLAVIX) 75 MG tablet Take 1 tablet by mouth once daily 12/22/20  Yes Zakia Marquis MD   finasteride (PROSCAR) 5 MG tablet TAKE 1 TABLET BY MOUTH ONCE DAILY 5/4/20  Yes Historical Provider, MD   oxybutynin (DITROPAN XL) 15 MG extended release tablet TAKE 1 TABLET BY MOUTH ONCE DAILY 4/19/20  Yes Historical Provider, MD   Handicap Placard [facility] [place] Does not apply route Permanent 19  Yes Arelis Trejo MD   Omega-3 Fatty Acids (FISH OIL PO) Take 1,200 mg by mouth daily   Yes Historical Provider, MD   Acetaminophen (TYLENOL PO) Take by mouth Per pt, takes 2 in am; 2 in pm   Yes Historical Provider, MD   aspirin 81 MG tablet Take 81 mg by mouth daily. Yes Historical Provider, MD       Past Surgical History:   Procedure Laterality Date    COLONOSCOPY  12    polyp    COLONOSCOPY      adenomatous polyps     COLONOSCOPY  2009    COLONOSCOPY  2008    with polypectomy    CORONARY ARTERY BYPASS GRAFT      OTHER SURGICAL HISTORY      teeth extracted     TOTAL KNEE ARTHROPLASTY Right 10/2015    VASECTOMY  1980       Family History   Problem Relation Age of Onset    Other Mother 52        Rheumatic fever    Kidney Disease Brother     Heart Disease Brother     Sudden Death Father         auto accident     Heart Surgery Sister         bypass surgery    Other Brother         abdominal aortic aneursym    Hypertension Other         siblings       Social History     Tobacco Use    Smoking status: Former Smoker     Packs/day: 0.00     Years: 55.00     Pack years: 0.00     Types: Cigarettes     Quit date: 10/27/2014     Years since quittin.8    Smokeless tobacco: Never Used   Substance Use Topics    Alcohol use: Yes     Alcohol/week: 0.0 standard drinks     Comment: occasionally       ROS: All 14 ROS systems reviewed and pertinent positives noted above, all others negative. Lower Extremity Physical Examination:     Vitals:   Vitals:    21 1059   BP: 116/60   Pulse: 68   Resp: 24     General: AAO x 3 in NAD.      Vascular: DP and PT pulses palpable 2/4, bilateral.  CFT <3 seconds, bilateral.  Hair growth present to the level of the digits, bilateral.  Edema present, bilateral.  Varicosities present, bilateral. Erythema absent, bilateral. Distal Rubor absent bilateral.  Temperature within normal limits bilateral. Hyperpigmentation present bilateral. Atrophic skin yes. Neurological: Sensation intact to light touch to level of digits, bilateral.  Protective sensation intact 10/10 sites via 5.07/10g [place] Monofilament, bilateral.  negative Tinel's, bilateral.  negative Valleix sign, bilateral.  Vibratory intact bilateral.  Reflexes Decreased bilateral.  Paresthesias negative. Dysthesias negative. Sharp/dull intact bilateral.  Musculoskeletal: Muscle strength 5/5, bilateral.  Pain absent upon palpation bilateral. Normal medial longitudinal arch, bilateral.  Ankle ROM within normal limits,bilateral.  1st MPJ ROM within normal limits, bilateral.  Dorsally contracted digits present. No other foot deformities. Integument: Warm, dry, supple, bilateral.  Open lesion absent, Interdigital maceration absent to web spaces bilateral.   Nails left 1, 2, 3, 4, 5 and right 1, 2, 3, 4, 5 thickened, dystrophic and crumbly, discolored with subungual debris. Fissures absent, bilateral. Hyperkeratotic tissue is present. Seen distal R 3rd toe and distal R hallux    Asessment: Patient is a 67 y.o. male with:    Diagnosis Orders   1. 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 Dermatophytosis of nail     3. Corns and callosities         Plan:   Patient examined and evaluated. Current condition and treatment options discussed in detail. All nails as mentioned above debrided in length and thickness. Paring of 2 benign hyperkeratotic lesions (as listed above) took place with #10 blade or tissue nippers. Patient advised OTC methods for treatment of the mycotic nails. Patient will follow up in 10 weeks. Contact clinic with any questions/problems/concerns.

## 2021-09-07 ENCOUNTER — HOSPITAL ENCOUNTER (OUTPATIENT)
Dept: LAB | Age: 76
Discharge: HOME | End: 2021-09-07
Payer: MEDICARE

## 2021-09-07 DIAGNOSIS — E03.9 HYPOTHYROIDISM, UNSPECIFIED TYPE: ICD-10-CM

## 2021-09-07 LAB
THYROXINE, FREE: 1 NG/DL (ref 0.93–1.7)
TSH SERPL DL<=0.05 MIU/L-ACNC: 9.04 MIU/L (ref 0.3–5)

## 2021-09-07 PROCEDURE — 84443 ASSAY THYROID STIM HORMONE: CPT

## 2021-09-07 PROCEDURE — 36415 COLL VENOUS BLD VENIPUNCTURE: CPT

## 2021-09-07 PROCEDURE — 84439 ASSAY OF FREE THYROXINE: CPT

## 2021-09-08 ENCOUNTER — TELEPHONE (OUTPATIENT)
Dept: FAMILY MEDICINE CLINIC | Age: 76
End: 2021-09-08

## 2021-09-08 DIAGNOSIS — E03.9 HYPOTHYROIDISM, UNSPECIFIED TYPE: Primary | ICD-10-CM

## 2021-09-14 RX ORDER — LEVOTHYROXINE SODIUM 0.07 MG/1
75 TABLET ORAL DAILY
Qty: 30 TABLET | Refills: 1 | Status: SHIPPED | OUTPATIENT
Start: 2021-09-14 | End: 2021-12-07

## 2021-09-15 ENCOUNTER — OFFICE VISIT (OUTPATIENT)
Dept: FAMILY MEDICINE CLINIC | Age: 76
End: 2021-09-15
Payer: MEDICARE

## 2021-09-15 VITALS
BODY MASS INDEX: 40.43 KG/M2 | HEIGHT: 74 IN | HEART RATE: 68 BPM | SYSTOLIC BLOOD PRESSURE: 132 MMHG | DIASTOLIC BLOOD PRESSURE: 68 MMHG | TEMPERATURE: 96.8 F | WEIGHT: 315 LBS | OXYGEN SATURATION: 98 %

## 2021-09-15 DIAGNOSIS — Z23 FLU VACCINE NEED: ICD-10-CM

## 2021-09-15 DIAGNOSIS — E66.01 CLASS 3 SEVERE OBESITY DUE TO EXCESS CALORIES WITH BODY MASS INDEX (BMI) OF 40.0 TO 44.9 IN ADULT, UNSPECIFIED WHETHER SERIOUS COMORBIDITY PRESENT (HCC): ICD-10-CM

## 2021-09-15 DIAGNOSIS — I10 ESSENTIAL HYPERTENSION: ICD-10-CM

## 2021-09-15 DIAGNOSIS — I25.810 CORONARY ARTERY DISEASE INVOLVING CORONARY BYPASS GRAFT OF NATIVE HEART WITHOUT ANGINA PECTORIS: ICD-10-CM

## 2021-09-15 DIAGNOSIS — E03.9 HYPOTHYROIDISM, UNSPECIFIED TYPE: ICD-10-CM

## 2021-09-15 DIAGNOSIS — M15.9 PRIMARY OSTEOARTHRITIS INVOLVING MULTIPLE JOINTS: ICD-10-CM

## 2021-09-15 PROCEDURE — 99214 OFFICE O/P EST MOD 30 MIN: CPT | Performed by: FAMILY MEDICINE

## 2021-09-15 PROCEDURE — 4040F PNEUMOC VAC/ADMIN/RCVD: CPT | Performed by: FAMILY MEDICINE

## 2021-09-15 PROCEDURE — 1036F TOBACCO NON-USER: CPT | Performed by: FAMILY MEDICINE

## 2021-09-15 PROCEDURE — 99212 OFFICE O/P EST SF 10 MIN: CPT

## 2021-09-15 PROCEDURE — G8417 CALC BMI ABV UP PARAM F/U: HCPCS | Performed by: FAMILY MEDICINE

## 2021-09-15 PROCEDURE — G8427 DOCREV CUR MEDS BY ELIG CLIN: HCPCS | Performed by: FAMILY MEDICINE

## 2021-09-15 PROCEDURE — 1123F ACP DISCUSS/DSCN MKR DOCD: CPT | Performed by: FAMILY MEDICINE

## 2021-09-15 PROCEDURE — G0008 ADMIN INFLUENZA VIRUS VAC: HCPCS | Performed by: FAMILY MEDICINE

## 2021-09-15 NOTE — PROGRESS NOTES
Have you had an allergic reaction to the flu (influenza) shot? no  Are you allergic to eggs or any component of the flu vaccine? no  Do you have a history of Guillain-[place] Syndrome (GBS), which is paralysis after receiving the flu vaccine? no  Are you feeling well today? yes  Flu vaccine given as ordered. Patient tolerated it well. No questions re: VIS information.

## 2021-09-15 NOTE — PROGRESS NOTES
HPI:  Patient comes in today for   Chief Complaint   Patient presents with    3 Month Follow-Up     HTN, thyroid problem   Patient here for 3 month  f/u  H/o HTN,CAD,s/p CABG and Gout.h/o Hypothyroidism recent TSH I still up and his levoxyl was increased. h/o BPH s/p TURP and elevated PSA seeing urology in [place]. H/o right carotid stenosis being monitored by vascular. No falls uses a cane to ambulate since his knee replacement. Chronic low back is stable is seeing chiroprcator. HISTORY:  Past Medical History:   Diagnosis Date    Adenomatous polyp     history of     Chronic venous insufficiency     DVT (deep venous thrombosis) ([facility])     history of     Edema     related to venous stasis     Gout     history of     Hypertension     Onychomycosis     Osteoarthritis     Plantar fasciitis     Tobacco abuse        Family History   Problem Relation Age of Onset    Other Mother 52        Rheumatic fever    Kidney Disease Brother     Heart Disease Brother     Sudden Death Father         auto accident     Heart Surgery Sister         bypass surgery    Other Brother         abdominal aortic aneursym    Hypertension Other         siblings       Social History     Socioeconomic History    Marital status: Single     Spouse name: Not on file    Number of children: Not on file    Years of education: Not on file    Highest education level: Not on file   Occupational History    Not on file   Tobacco Use    Smoking status: Former Smoker     Packs/day: 0.00     Years: 55.00     Pack years: 0.00     Types: Cigarettes     Quit date: 10/27/2014     Years since quittin.8    Smokeless tobacco: Never Used   Vaping Use    Vaping Use: Never used   Substance and Sexual Activity    Alcohol use:  Yes     Alcohol/week: 0.0 standard drinks     Comment: occasionally    Drug use: No    Sexual activity: Yes     Partners: Female   Other Topics Concern    Not on file   Social History Narrative    Not on file Social Determinants of Health     Financial Resource Strain: Low Risk     Difficulty of Paying Living Expenses: Not hard at all   Food Insecurity: No Food Insecurity    Worried About Running Out of Food in the Last Year: Never true    [place] of Food in the Last Year: Never true   Transportation Needs:     Lack of Transportation (Medical):      Lack of Transportation (Non-Medical):    Physical Activity:     Days of Exercise per Week:     Minutes of Exercise per Session:    Stress:     Feeling of Stress :    Social Connections:     Frequency of Communication with Friends and Family:     Frequency of Social Gatherings with Friends and Family:     Attends Mandaen Services:     Active Member of Clubs or Organizations:     Attends Club or Organization Meetings:     Marital Status:    Intimate Partner Violence:     Fear of Current or Ex-Partner:     Emotionally Abused:     Physically Abused:     Sexually Abused:        Current Outpatient Medications   Medication Sig Dispense Refill    levothyroxine (SYNTHROID) 75 MCG tablet Take 1 tablet by mouth daily 30 tablet 1    buPROPion (WELLBUTRIN) 75 MG tablet Take 1 tablet by mouth twice daily 60 tablet 1    metoprolol tartrate (LOPRESSOR) 50 MG tablet Take 1 tablet by mouth twice daily 180 tablet 3    zoster recombinant adjuvanted vaccine (SHINGRIX) 50 MCG/0.5ML SUSR injection Inject 0.5 mLs into the muscle See Admin Instructions 1 dose now and repeat in 2-6 months 0.5 mL 0    furosemide (LASIX) 20 MG tablet TAKE 1 TABLET BY MOUTH ONCE DAILY AS NEEDED FOR  PEDAL  EDEMA 90 tablet 3    lisinopril (PRINIVIL;ZESTRIL) 20 MG tablet Take 1 tablet by mouth twice daily 180 tablet 3    allopurinol (ZYLOPRIM) 300 MG tablet Take 1 tablet by mouth once daily 90 tablet 1    atorvastatin (LIPITOR) 40 MG tablet TAKE 1 TABLET BY MOUTH ONCE DAILY 90 tablet 3    clopidogrel (PLAVIX) 75 MG tablet Take 1 tablet by mouth once daily 90 tablet 3    finasteride (PROSCAR) 5 MG tablet TAKE 1 TABLET BY MOUTH ONCE DAILY      oxybutynin (DITROPAN XL) 15 MG extended release tablet TAKE 1 TABLET BY MOUTH ONCE DAILY      Handicap Placard MISC by Does not apply route Permanent 1 each 0    Omega-3 Fatty Acids (FISH OIL PO) Take 1,200 mg by mouth daily      Acetaminophen (TYLENOL PO) Take by mouth Per pt, takes 2 in am; 2 in pm      aspirin 81 MG tablet Take 81 mg by mouth daily. No current facility-administered medications for this visit. REVIEW OF SYSTEMS:  General: No fevers, chills, has gained some weight. HEENT: No double vision ,no runny nose, sore throat or tinnitus  Cardio: No chest pain, palpitations, QUILES, edema, PND  Pulmonary: No cough, hemoptysis, SOB  GI: No nausea, vomiting, dysphagia, odynophagia, diarrhea,mild constipation. : No dysuria, hematuria, urgency,has some incontinence. Nocturia x1  Musculoskeletal: Pain in  Low back chronic no radiation seeing chiropractor,knees are better since surgery. Neuro:No dizziness/lightheadedness, no seizures. no headaches  Skin:Has dry skin and keratotic lesions in arms. .  Sleep:fair. Psychiatric:No depression or anxiety. PHYSICAL EXAM:  VS:    /68   Pulse 68   Temp 96.8 °F (36 °C)   Ht 6' 2\" (1.88 m)   Wt (!) 321 lb (145.6 kg)   SpO2 98%   BMI 41.21 kg/m²   General:  Alert and oriented, NAD  HEENT:  TMs, BETTY, EOMI,Throat currently clear. NECK:  Supple without adenopathy or thyromegaly, no carotid bruits  LUNGS:  CTA all fields  HEART:  RRR without M, R, or G  ABDOMEN:Obese,has a small hernia in the midline above umbulicus,no palpable abnormalities  BACK:Non tender. EXTREMITIES:  Has bilateral chronic leg edema the same . support stockings in place. No calf tenderness . NEURO:No focal deficits. Skin :Has multiple seborrhoic keratotic ,bruising in hands and forearm ,dry skin. ASSESSMENT/PLAN:   Diagnosis Orders   1. Hypothyroidism, unspecified type     2. Essential hypertension     3.  Coronary artery disease involving coronary bypass graft of native heart without angina pectoris     4. Class 3 severe obesity due to excess calories with body mass index (BMI) of 40.0 to 44.9 in adult, unspecified whether serious comorbidity present ([facility] [address]     5. Flu vaccine need  INFLUENZA, QUADV, ADJUVANTED, 65 YRS =, IM, PF, PREFILL SYR, 0.5ML (FLUAD)   6. Primary osteoarthritis involving multiple joints             Orders Placed This Encounter   Procedures    INFLUENZA, QUADV, ADJUVANTED, 72 YRS =, IM, PF, PREFILL SYR, 0.5ML (FLUAD)     Requested Prescriptions      No prescriptions requested or ordered in this encounter   labs disused TSH remains elevated his levoxyl was increased. Repeat TSH in 2 months. Advised weight loss. Flu vaccine today  F/u with urology. F/u with cardiology. Due for annual carotid scan in 12/2021,sees vascular. No follow-ups on file.

## 2021-10-22 RX ORDER — BUPROPION HYDROCHLORIDE 75 MG/1
TABLET ORAL
Qty: 60 TABLET | Refills: 0 | Status: SHIPPED | OUTPATIENT
Start: 2021-10-22 | End: 2021-11-23

## 2021-10-22 NOTE — TELEPHONE ENCOUNTER
Cornelio Vidal called requesting a refill of the below medication which has been pended for you:     Requested Prescriptions     Pending Prescriptions Disp Refills    buPROPion (WELLBUTRIN) 75 MG tablet [Pharmacy Med Name: buPROPion HCl 75 MG Oral Tablet] 60 tablet 0     Sig: Take 1 tablet by mouth twice daily       Last Appointment Date: 9/15/2021  Next Appointment Date: 3/15/2022    No Known Allergies

## 2021-11-11 ENCOUNTER — HOSPITAL ENCOUNTER (OUTPATIENT)
Dept: INTERVENTIONAL RADIOLOGY/VASCULAR | Age: 76
Discharge: HOME | End: 2021-11-13
Payer: MEDICARE

## 2021-11-11 ENCOUNTER — OFFICE VISIT (OUTPATIENT)
Dept: PODIATRY | Age: 76
End: 2021-11-11
Payer: MEDICARE

## 2021-11-11 VITALS
BODY MASS INDEX: 40.43 KG/M2 | DIASTOLIC BLOOD PRESSURE: 78 MMHG | WEIGHT: 315 LBS | SYSTOLIC BLOOD PRESSURE: 130 MMHG | HEART RATE: 62 BPM | HEIGHT: 74 IN | OXYGEN SATURATION: 96 %

## 2021-11-11 DIAGNOSIS — I82.5Z3 CHRONIC EMBOLISM AND THROMBOSIS OF UNSPECIFIED DEEP VEINS OF DISTAL LOWER EXTREMITY, BILATERAL (HCC): Primary | ICD-10-CM

## 2021-11-11 DIAGNOSIS — L84 CORNS AND CALLOSITIES: ICD-10-CM

## 2021-11-11 DIAGNOSIS — B35.1 DERMATOPHYTOSIS OF NAIL: ICD-10-CM

## 2021-11-11 DIAGNOSIS — I65.23 ASYMPTOMATIC BILATERAL CAROTID ARTERY STENOSIS: ICD-10-CM

## 2021-11-11 PROCEDURE — 99999 PR OFFICE/OUTPT VISIT,PROCEDURE ONLY: CPT | Performed by: PODIATRIST

## 2021-11-11 PROCEDURE — 11056 PARNG/CUTG B9 HYPRKR LES 2-4: CPT | Performed by: PODIATRIST

## 2021-11-11 PROCEDURE — 11721 DEBRIDE NAIL 6 OR MORE: CPT | Performed by: PODIATRIST

## 2021-11-11 PROCEDURE — 93880 EXTRACRANIAL BILAT STUDY: CPT

## 2021-11-11 NOTE — PROGRESS NOTES
Subjective:    Everlene Saint is a 67 y.o. male who presents for routine foot care. .  Currently denies F/C/N/V. No Known Allergies    Past Medical History:   Diagnosis Date    Adenomatous polyp     history of     Chronic venous insufficiency     DVT (deep venous thrombosis) (HCC)     history of     Edema     related to venous stasis     Gout     history of     Hypertension     Onychomycosis     Osteoarthritis     Plantar fasciitis     Tobacco abuse        Prior to Admission medications    Medication Sig Start Date End Date Taking?  Authorizing Provider   buPROPion (WELLBUTRIN) 75 MG tablet Take 1 tablet by mouth twice daily 10/22/21  Yes Rianna Carrera MD   levothyroxine (SYNTHROID) 75 MCG tablet Take 1 tablet by mouth daily 9/14/21  Yes Rianna Carrera MD   metoprolol tartrate (LOPRESSOR) 50 MG tablet Take 1 tablet by mouth twice daily 6/29/21  Yes Layton Cortez MD   zoster recombinant adjuvanted vaccine [facility]) 50 MCG/0.5ML SUSR injection Inject 0.5 mLs into the muscle See Admin Instructions 1 dose now and repeat in 2-6 months 6/22/21 12/19/21 Yes Rianna Carrera MD   furosemide (LASIX) 20 MG tablet TAKE 1 TABLET BY MOUTH ONCE DAILY AS NEEDED FOR  PEDAL  EDEMA 5/12/21  Yes Layton Cortez MD   lisinopril (PRINIVIL;ZESTRIL) 20 MG tablet Take 1 tablet by mouth twice daily 3/30/21  Yes Layton Cortez MD   allopurinol (ZYLOPRIM) 300 MG tablet Take 1 tablet by mouth once daily 3/24/21  Yes Rianna Carrera MD   atorvastatin (LIPITOR) 40 MG tablet TAKE 1 TABLET BY MOUTH ONCE DAILY 12/28/20  Yes Rob Evans DO   clopidogrel (PLAVIX) 75 MG tablet Take 1 tablet by mouth once daily 12/22/20  Yes Layton Cortez MD   finasteride (PROSCAR) 5 MG tablet TAKE 1 TABLET BY MOUTH ONCE DAILY 5/4/20  Yes Historical Provider, MD   oxybutynin (DITROPAN XL) 15 MG extended release tablet TAKE 1 TABLET BY MOUTH ONCE DAILY 4/19/20  Yes Historical Provider, MD   Handicap Placard [facility] [place] Does not apply route Permanent 19  Yes Arelis Bullard MD   Omega-3 Fatty Acids (FISH OIL PO) Take 1,200 mg by mouth daily   Yes Historical Provider, MD   Acetaminophen (TYLENOL PO) Take by mouth Per pt, takes 2 in am; 2 in pm   Yes Historical Provider, MD   aspirin 81 MG tablet Take 81 mg by mouth daily. Yes Historical Provider, MD       Past Surgical History:   Procedure Laterality Date    COLONOSCOPY  12    polyp    COLONOSCOPY      adenomatous polyps     COLONOSCOPY  2009    COLONOSCOPY  2008    with polypectomy    CORONARY ARTERY BYPASS GRAFT      OTHER SURGICAL HISTORY      teeth extracted     TOTAL KNEE ARTHROPLASTY Right 10/2015    VASECTOMY  1980       Family History   Problem Relation Age of Onset    Other Mother 52        Rheumatic fever    Kidney Disease Brother     Heart Disease Brother     Sudden Death Father         auto accident     Heart Surgery Sister         bypass surgery    Other Brother         abdominal aortic aneursym    Hypertension Other         siblings       Social History     Tobacco Use    Smoking status: Former Smoker     Packs/day: 0.00     Years: 55.00     Pack years: 0.00     Types: Cigarettes     Quit date: 10/27/2014     Years since quittin.0    Smokeless tobacco: Never Used   Substance Use Topics    Alcohol use: Yes     Alcohol/week: 0.0 standard drinks     Comment: occasionally       ROS: All 14 ROS systems reviewed and pertinent positives noted above, all others negative. Lower Extremity Physical Examination:     Vitals:   Vitals:    21 1023   BP: 130/78   Pulse: 62   SpO2: 96%     General: AAO x 3 in NAD.      Vascular: DP and PT pulses palpable 2/4, bilateral.  CFT <3 seconds, bilateral.  Hair growth present to the level of the digits, bilateral.  Edema present, bilateral.  Varicosities present, bilateral. Erythema absent, bilateral. Distal Rubor absent bilateral.  Temperature within normal limits bilateral. Hyperpigmentation present bilateral. Atrophic skin yes. Neurological: Sensation intact to light touch to level of digits, bilateral.  Protective sensation intact 10/10 sites via 5.07/10g Sanders-Leon Monofilament, bilateral.  negative Tinel's, bilateral.  negative Valleix sign, bilateral.  Vibratory intact bilateral.  Reflexes Decreased bilateral.  Paresthesias negative. Dysthesias negative. Sharp/dull intact bilateral.  Musculoskeletal: Muscle strength 5/5, bilateral.  Pain absent upon palpation bilateral. Normal medial longitudinal arch, bilateral.  Ankle ROM within normal limits,bilateral.  1st MPJ ROM within normal limits, bilateral.  Dorsally contracted digits present. No other foot deformities. Integument: Warm, dry, supple, bilateral.  Open lesion absent, Interdigital maceration absent to web spaces bilateral.   Nails left 1, 2, 3, 4, 5 and right 1, 2, 3, 4, 5 thickened, dystrophic and crumbly, discolored with subungual debris. Fissures absent, bilateral. Hyperkeratotic tissue is present. Seen distal R 3rd toe and distal R hallux    Asessment: Patient is a 67 y.o. male with:    Diagnosis Orders   1. 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 Dermatophytosis of nail     3. Corns and callosities         Plan:   Patient examined and evaluated. Current condition and treatment options discussed in detail. All nails as mentioned above debrided in length and thickness. Paring of 2 benign hyperkeratotic lesions (as listed above) took place with #10 blade or tissue nippers. Patient advised OTC methods for treatment of the mycotic nails. Patient will follow up in 10 weeks. Contact clinic with any questions/problems/concerns.

## 2021-11-11 NOTE — PROGRESS NOTES
Foot Care Worksheet  PCP: Lisa Lopez MD  Last visit: 09/15/21    Nail description:  Thick , Yellow , Crumbly , Marked limitation of ambulation     Pain resulting from thickened and dystrophy of nail plate No    Nails involved  Right   1, 2, 3, 4, 5  (T5-T9)  Left     1, 2, 3, 4, 5  (TA-T4)    Q7 1 Class A Finding - Non traumatic amputation of foot No    Q8 2 Class B Findings - Absent DP pulse No, Absent PT pulse No, Advanced tropic changes (3 required) Yes    Decrease hair growth Yes, Nail changes/thickening Yes, Pigmented changes/discoloration Yes, Skin texture (thin, shiny) Yes, Skin color (rubor/redness) No    Q9 1 Class B and 2 Class C Findings  Claudication No, Temperature change Yes, Paresthesia No, Burning No, Edema Yes

## 2021-11-23 RX ORDER — BUPROPION HYDROCHLORIDE 75 MG/1
TABLET ORAL
Qty: 60 TABLET | Refills: 3 | Status: SHIPPED | OUTPATIENT
Start: 2021-11-23 | End: 2022-08-09 | Stop reason: SDUPTHER

## 2021-11-23 NOTE — TELEPHONE ENCOUNTER
Neelam Washington is requesting a refill on the following medication(s):  Requested Prescriptions     Pending Prescriptions Disp Refills    buPROPion (WELLBUTRIN) 75 MG tablet [Pharmacy Med Name: buPROPion HCl 75 MG Oral Tablet] 60 tablet 0     Sig: Take 1 tablet by mouth twice daily       Last Visit Date (If Applicable):  1/01/8648    Next Visit Date:    3/15/2022

## 2021-12-07 DIAGNOSIS — E03.9 HYPOTHYROIDISM, UNSPECIFIED TYPE: ICD-10-CM

## 2021-12-07 NOTE — TELEPHONE ENCOUNTER
Elvi Santiago is requesting a refill on the following medication(s):  Requested Prescriptions     Pending Prescriptions Disp Refills    EUTHYROX 75 MCG tablet [Pharmacy Med Name: Euthyrox 75 MCG Oral Tablet] 30 tablet 0     Sig: Take 1 tablet by mouth once daily       Last Visit Date (If Applicable):  6/21/7328    Next Visit Date:    3/15/2022

## 2021-12-08 ENCOUNTER — HOSPITAL ENCOUNTER (INPATIENT)
Age: 76
LOS: 31 days | Discharge: OTHER INSTITUTION | DRG: 177 | End: 2022-01-08
Attending: EMERGENCY MEDICINE | Admitting: FAMILY MEDICINE
Payer: MEDICARE

## 2021-12-08 ENCOUNTER — APPOINTMENT (OUTPATIENT)
Dept: GENERAL RADIOLOGY | Age: 76
DRG: 177 | End: 2021-12-08
Payer: MEDICARE

## 2021-12-08 ENCOUNTER — APPOINTMENT (OUTPATIENT)
Dept: CT IMAGING | Age: 76
DRG: 177 | End: 2021-12-08
Payer: MEDICARE

## 2021-12-08 ENCOUNTER — OFFICE VISIT (OUTPATIENT)
Dept: PRIMARY CARE CLINIC | Age: 76
DRG: 177 | End: 2021-12-08
Payer: MEDICARE

## 2021-12-08 DIAGNOSIS — U07.1 PNEUMONIA DUE TO COVID-19 VIRUS: Primary | ICD-10-CM

## 2021-12-08 DIAGNOSIS — J12.82 PNEUMONIA DUE TO COVID-19 VIRUS: Primary | ICD-10-CM

## 2021-12-08 DIAGNOSIS — E03.9 HYPOTHYROIDISM, UNSPECIFIED TYPE: ICD-10-CM

## 2021-12-08 DIAGNOSIS — R09.02 HYPOXIA: ICD-10-CM

## 2021-12-08 DIAGNOSIS — R06.02 SOB (SHORTNESS OF BREATH): Primary | ICD-10-CM

## 2021-12-08 LAB
ABSOLUTE EOS #: 0 K/UL (ref 0–0.44)
ABSOLUTE IMMATURE GRANULOCYTE: 0.83 K/UL (ref 0–0.3)
ABSOLUTE LYMPH #: 1.49 K/UL (ref 1.1–3.7)
ABSOLUTE MONO #: 1.66 K/UL (ref 0.1–1.2)
ALBUMIN SERPL-MCNC: 3.8 G/DL (ref 3.5–5.2)
ALBUMIN/GLOBULIN RATIO: 0.9 (ref 1–2.5)
ALP BLD-CCNC: 93 U/L (ref 40–129)
ALT SERPL-CCNC: 18 U/L (ref 5–41)
ANION GAP SERPL CALCULATED.3IONS-SCNC: 19 MMOL/L (ref 9–17)
AST SERPL-CCNC: 40 U/L
BASOPHILS # BLD: 1 % (ref 0–2)
BASOPHILS ABSOLUTE: 0.17 K/UL (ref 0–0.2)
BILIRUB SERPL-MCNC: 1.09 MG/DL (ref 0.3–1.2)
BNP INTERPRETATION: ABNORMAL
BUN BLDV-MCNC: 36 MG/DL (ref 8–23)
BUN/CREAT BLD: 29 (ref 9–20)
CALCIUM SERPL-MCNC: 9.8 MG/DL (ref 8.6–10.4)
CHLORIDE BLD-SCNC: 99 MMOL/L (ref 98–107)
CO2: 20 MMOL/L (ref 20–31)
CREAT SERPL-MCNC: 1.25 MG/DL (ref 0.7–1.2)
D-DIMER QUANTITATIVE: >20 MG/L FEU (ref 0–0.59)
DIFFERENTIAL TYPE: ABNORMAL
EOSINOPHILS RELATIVE PERCENT: 0 % (ref 1–4)
GFR AFRICAN AMERICAN: >60 ML/MIN
GFR NON-AFRICAN AMERICAN: 56 ML/MIN
GFR SERPL CREATININE-BSD FRML MDRD: ABNORMAL ML/MIN/{1.73_M2}
GFR SERPL CREATININE-BSD FRML MDRD: ABNORMAL ML/MIN/{1.73_M2}
GLUCOSE BLD-MCNC: 140 MG/DL (ref 70–99)
HCT VFR BLD CALC: 46.9 % (ref 40.7–50.3)
HEMOGLOBIN: 15.1 G/DL (ref 13–17)
IMMATURE GRANULOCYTES: 5 %
LACTIC ACID, SEPSIS WHOLE BLOOD: ABNORMAL MMOL/L (ref 0.5–1.9)
LACTIC ACID, SEPSIS WHOLE BLOOD: ABNORMAL MMOL/L (ref 0.5–1.9)
LACTIC ACID, SEPSIS: 3.2 MMOL/L (ref 0.5–1.9)
LACTIC ACID, SEPSIS: 5.7 MMOL/L (ref 0.5–1.9)
LACTIC ACID: 1.8 MMOL/L (ref 0.5–2.2)
LYMPHOCYTES # BLD: 9 % (ref 24–43)
MCH RBC QN AUTO: 30.2 PG (ref 25.2–33.5)
MCHC RBC AUTO-ENTMCNC: 32.2 G/DL (ref 25.2–33.5)
MCV RBC AUTO: 93.8 FL (ref 82.6–102.9)
MONOCYTES # BLD: 10 % (ref 3–12)
MORPHOLOGY: ABNORMAL
MORPHOLOGY: ABNORMAL
NRBC AUTOMATED: 1 PER 100 WBC
PDW BLD-RTO: 14.8 % (ref 11.8–14.4)
PLATELET # BLD: 296 K/UL (ref 138–453)
PLATELET ESTIMATE: ABNORMAL
PMV BLD AUTO: 9.6 FL (ref 8.1–13.5)
POTASSIUM SERPL-SCNC: 4.5 MMOL/L (ref 3.7–5.3)
PRO-BNP: 3162 PG/ML
RBC # BLD: 5 M/UL (ref 4.21–5.77)
RBC # BLD: ABNORMAL 10*6/UL
SARS-COV-2, RAPID: DETECTED
SEG NEUTROPHILS: 75 % (ref 36–65)
SEGMENTED NEUTROPHILS ABSOLUTE COUNT: 12.45 K/UL (ref 1.5–8.1)
SODIUM BLD-SCNC: 138 MMOL/L (ref 135–144)
SPECIMEN DESCRIPTION: ABNORMAL
TOTAL PROTEIN: 8.2 G/DL (ref 6.4–8.3)
TROPONIN INTERP: ABNORMAL
TROPONIN T: ABNORMAL NG/ML
TROPONIN, HIGH SENSITIVITY: 23 NG/L (ref 0–22)
TROPONIN, HIGH SENSITIVITY: 24 NG/L (ref 0–22)
TROPONIN, HIGH SENSITIVITY: 28 NG/L (ref 0–22)
WBC # BLD: 16.6 K/UL (ref 3.5–11.3)
WBC # BLD: ABNORMAL 10*3/UL

## 2021-12-08 PROCEDURE — 6370000000 HC RX 637 (ALT 250 FOR IP): Performed by: FAMILY MEDICINE

## 2021-12-08 PROCEDURE — 94660 CPAP INITIATION&MGMT: CPT

## 2021-12-08 PROCEDURE — 71045 X-RAY EXAM CHEST 1 VIEW: CPT

## 2021-12-08 PROCEDURE — 87635 SARS-COV-2 COVID-19 AMP PRB: CPT

## 2021-12-08 PROCEDURE — 84484 ASSAY OF TROPONIN QUANT: CPT

## 2021-12-08 PROCEDURE — 83605 ASSAY OF LACTIC ACID: CPT

## 2021-12-08 PROCEDURE — 2060000000 HC ICU INTERMEDIATE R&B

## 2021-12-08 PROCEDURE — 2700000000 HC OXYGEN THERAPY PER DAY

## 2021-12-08 PROCEDURE — 85025 COMPLETE CBC W/AUTO DIFF WBC: CPT

## 2021-12-08 PROCEDURE — 94761 N-INVAS EAR/PLS OXIMETRY MLT: CPT

## 2021-12-08 PROCEDURE — 6360000002 HC RX W HCPCS: Performed by: FAMILY MEDICINE

## 2021-12-08 PROCEDURE — 2580000003 HC RX 258: Performed by: FAMILY MEDICINE

## 2021-12-08 PROCEDURE — 80053 COMPREHEN METABOLIC PANEL: CPT

## 2021-12-08 PROCEDURE — 71260 CT THORAX DX C+: CPT

## 2021-12-08 PROCEDURE — 6360000004 HC RX CONTRAST MEDICATION: Performed by: EMERGENCY MEDICINE

## 2021-12-08 PROCEDURE — 36415 COLL VENOUS BLD VENIPUNCTURE: CPT

## 2021-12-08 PROCEDURE — 99211 OFF/OP EST MAY X REQ PHY/QHP: CPT

## 2021-12-08 PROCEDURE — 83880 ASSAY OF NATRIURETIC PEPTIDE: CPT

## 2021-12-08 PROCEDURE — 99285 EMERGENCY DEPT VISIT HI MDM: CPT

## 2021-12-08 PROCEDURE — 99999 PR OFFICE/OUTPT VISIT,PROCEDURE ONLY: CPT | Performed by: NURSE PRACTITIONER

## 2021-12-08 PROCEDURE — 93005 ELECTROCARDIOGRAM TRACING: CPT | Performed by: EMERGENCY MEDICINE

## 2021-12-08 PROCEDURE — 85379 FIBRIN DEGRADATION QUANT: CPT

## 2021-12-08 RX ORDER — ATORVASTATIN CALCIUM 40 MG/1
40 TABLET, FILM COATED ORAL DAILY
Status: DISCONTINUED | OUTPATIENT
Start: 2021-12-09 | End: 2022-01-08 | Stop reason: HOSPADM

## 2021-12-08 RX ORDER — SODIUM CHLORIDE 0.9 % (FLUSH) 0.9 %
5-40 SYRINGE (ML) INJECTION PRN
Status: DISCONTINUED | OUTPATIENT
Start: 2021-12-08 | End: 2022-01-08 | Stop reason: HOSPADM

## 2021-12-08 RX ORDER — LEVOTHYROXINE SODIUM 0.07 MG/1
75 TABLET ORAL DAILY
Status: DISCONTINUED | OUTPATIENT
Start: 2021-12-09 | End: 2022-01-08 | Stop reason: HOSPADM

## 2021-12-08 RX ORDER — POLYETHYLENE GLYCOL 3350 17 G/17G
17 POWDER, FOR SOLUTION ORAL DAILY PRN
Status: DISCONTINUED | OUTPATIENT
Start: 2021-12-08 | End: 2022-01-08 | Stop reason: HOSPADM

## 2021-12-08 RX ORDER — BUPROPION HYDROCHLORIDE 75 MG/1
75 TABLET ORAL DAILY
Status: DISCONTINUED | OUTPATIENT
Start: 2021-12-09 | End: 2022-01-08 | Stop reason: HOSPADM

## 2021-12-08 RX ORDER — ACETAMINOPHEN 325 MG/1
650 TABLET ORAL EVERY 6 HOURS PRN
Status: DISCONTINUED | OUTPATIENT
Start: 2021-12-08 | End: 2022-01-08 | Stop reason: HOSPADM

## 2021-12-08 RX ORDER — LISINOPRIL 20 MG/1
20 TABLET ORAL DAILY
Status: DISCONTINUED | OUTPATIENT
Start: 2021-12-09 | End: 2022-01-08 | Stop reason: HOSPADM

## 2021-12-08 RX ORDER — ALBUTEROL SULFATE 2.5 MG/3ML
2.5 SOLUTION RESPIRATORY (INHALATION) EVERY 4 HOURS PRN
Status: DISCONTINUED | OUTPATIENT
Start: 2021-12-08 | End: 2021-12-09

## 2021-12-08 RX ORDER — OXYBUTYNIN CHLORIDE 5 MG/1
15 TABLET, EXTENDED RELEASE ORAL DAILY
Status: DISCONTINUED | OUTPATIENT
Start: 2021-12-09 | End: 2022-01-08 | Stop reason: HOSPADM

## 2021-12-08 RX ORDER — OMEGA-3/DHA/EPA/FISH OIL 60 MG-90MG
1200 CAPSULE ORAL DAILY
Status: DISCONTINUED | OUTPATIENT
Start: 2021-12-09 | End: 2021-12-09

## 2021-12-08 RX ORDER — LEVOTHYROXINE SODIUM 75 UG/1
TABLET ORAL
Qty: 30 TABLET | Refills: 2 | Status: ON HOLD | OUTPATIENT
Start: 2021-12-08 | End: 2022-01-08 | Stop reason: HOSPADM

## 2021-12-08 RX ORDER — FINASTERIDE 5 MG/1
5 TABLET, FILM COATED ORAL DAILY
Status: DISCONTINUED | OUTPATIENT
Start: 2021-12-09 | End: 2022-01-08 | Stop reason: HOSPADM

## 2021-12-08 RX ORDER — SODIUM CHLORIDE 0.9 % (FLUSH) 0.9 %
5-40 SYRINGE (ML) INJECTION EVERY 12 HOURS SCHEDULED
Status: DISCONTINUED | OUTPATIENT
Start: 2021-12-08 | End: 2022-01-08 | Stop reason: HOSPADM

## 2021-12-08 RX ORDER — DEXAMETHASONE SODIUM PHOSPHATE 10 MG/ML
10 INJECTION INTRAMUSCULAR; INTRAVENOUS EVERY 24 HOURS
Status: DISCONTINUED | OUTPATIENT
Start: 2021-12-08 | End: 2021-12-09

## 2021-12-08 RX ORDER — ASPIRIN 81 MG/1
81 TABLET ORAL DAILY
Status: DISCONTINUED | OUTPATIENT
Start: 2021-12-09 | End: 2022-01-08 | Stop reason: HOSPADM

## 2021-12-08 RX ORDER — CLOPIDOGREL BISULFATE 75 MG/1
75 TABLET ORAL DAILY
Status: DISCONTINUED | OUTPATIENT
Start: 2021-12-09 | End: 2022-01-08 | Stop reason: HOSPADM

## 2021-12-08 RX ORDER — METOPROLOL TARTRATE 50 MG/1
50 TABLET, FILM COATED ORAL 2 TIMES DAILY
Status: DISCONTINUED | OUTPATIENT
Start: 2021-12-08 | End: 2022-01-08 | Stop reason: HOSPADM

## 2021-12-08 RX ORDER — SODIUM CHLORIDE 9 MG/ML
25 INJECTION, SOLUTION INTRAVENOUS PRN
Status: DISCONTINUED | OUTPATIENT
Start: 2021-12-08 | End: 2022-01-08 | Stop reason: HOSPADM

## 2021-12-08 RX ORDER — ACETAMINOPHEN 650 MG/1
650 SUPPOSITORY RECTAL EVERY 6 HOURS PRN
Status: DISCONTINUED | OUTPATIENT
Start: 2021-12-08 | End: 2022-01-08 | Stop reason: HOSPADM

## 2021-12-08 RX ADMIN — SODIUM CHLORIDE, PRESERVATIVE FREE 10 ML: 5 INJECTION INTRAVENOUS at 23:26

## 2021-12-08 RX ADMIN — CEFTRIAXONE 1000 MG: 1 INJECTION, POWDER, FOR SOLUTION INTRAMUSCULAR; INTRAVENOUS at 23:21

## 2021-12-08 RX ADMIN — DEXAMETHASONE SODIUM PHOSPHATE 10 MG: 10 INJECTION INTRAMUSCULAR; INTRAVENOUS at 23:24

## 2021-12-08 RX ADMIN — METOPROLOL TARTRATE 50 MG: 50 TABLET, FILM COATED ORAL at 23:26

## 2021-12-08 RX ADMIN — IOPAMIDOL 100 ML: 755 INJECTION, SOLUTION INTRAVENOUS at 17:26

## 2021-12-08 ASSESSMENT — ENCOUNTER SYMPTOMS
BLOOD IN STOOL: 0
SHORTNESS OF BREATH: 1
COUGH: 1
WHEEZING: 0
TROUBLE SWALLOWING: 0
NAUSEA: 0
ABDOMINAL PAIN: 0
DIARRHEA: 0
CONSTIPATION: 0
SORE THROAT: 0
VOMITING: 0
BACK PAIN: 0

## 2021-12-08 NOTE — PROGRESS NOTES
Pt was pulled back by nurse to be roomed with wife at side. Upon walking in [place], pt lost balance and was caught by nurses, placed in wheelchair. Pt visibly SOB, dusky, no longer answering any questions from staff. Initial pulse ox unable to detect higher than 46 %. 2L O2 placed on pt and pt taken to ER by nursing staff. ER notified of pt transfer.

## 2021-12-08 NOTE — ED PROVIDER NOTES
[id number] [facility]  eMERGENCY dEPARTMENT eNCOUnter      Pt Name: Mariella Franks  MRN: [id number]  [place] 1945  Date of evaluation: 12/8/2021      CHIEF COMPLAINT       Chief Complaint   Patient presents with    Respiratory Distress         HISTORY OF PRESENT ILLNESS    Mariella Franks is a 67 y.o. male who presents after collapsing in urgent care he came in with complaints of cough and shortness of breath he had sats in the 45s patient relates that he has been sick for about 7 days with a cough he has had some leg swelling but it has been symmetric he has been taking water pills he has had both the Covid vaccine and the booster  Wife says he has been sick for 9 days  He is on aspirin and Plavix  REVIEW OF SYSTEMS         Review of Systems   Constitutional: Positive for activity change, appetite change, chills, diaphoresis, fatigue and fever. HENT: Negative for congestion, dental problem, sore throat and trouble swallowing. Eyes: Negative for visual disturbance. Respiratory: Positive for cough and shortness of breath. Negative for wheezing. Cardiovascular: Positive for chest pain and leg swelling. Negative for palpitations. Gastrointestinal: Negative for abdominal pain, blood in stool, constipation, diarrhea, nausea and vomiting. Genitourinary: Negative for difficulty urinating and dysuria. Musculoskeletal: Negative for back pain, joint swelling and neck pain. Skin: Negative for rash. Neurological: Negative for dizziness, syncope, weakness and headaches. Hematological: Negative for adenopathy. Does not bruise/bleed easily. Psychiatric/Behavioral: Negative for confusion and suicidal ideas. PAST MEDICAL HISTORY    has a past medical history of Adenomatous polyp, Chronic venous insufficiency, DVT (deep venous thrombosis) ([facility] [address] Edema, Gout, Hypertension, Onychomycosis, Osteoarthritis, Plantar fasciitis, and Tobacco abuse.     SURGICAL HISTORY      has a past surgical history that includes Colonoscopy (12); Vasectomy (); Colonoscopy (); other surgical history; Colonoscopy (2009); Colonoscopy (2008); Coronary artery bypass graft; and Total knee arthroplasty (Right, 10/2015). CURRENT MEDICATIONS       Previous Medications    ACETAMINOPHEN (TYLENOL PO)    Take by mouth Per pt, takes 2 in am; 2 in pm    ALLOPURINOL (ZYLOPRIM) 300 MG TABLET    Take 1 tablet by mouth once daily    ASPIRIN 81 MG TABLET    Take 81 mg by mouth daily. ATORVASTATIN (LIPITOR) 40 MG TABLET    TAKE 1 TABLET BY MOUTH ONCE DAILY    BUPROPION (WELLBUTRIN) 75 MG TABLET    Take 1 tablet by mouth twice daily    CLOPIDOGREL (PLAVIX) 75 MG TABLET    Take 1 tablet by mouth once daily    FINASTERIDE (PROSCAR) 5 MG TABLET    TAKE 1 TABLET BY MOUTH ONCE DAILY    FUROSEMIDE (LASIX) 20 MG TABLET    TAKE 1 TABLET BY MOUTH ONCE DAILY AS NEEDED FOR  PEDAL  EDEMA    HANDICAP PLACARD MISC    by Does not apply route Permanent    LISINOPRIL (PRINIVIL;ZESTRIL) 20 MG TABLET    Take 1 tablet by mouth twice daily    METOPROLOL TARTRATE (LOPRESSOR) 50 MG TABLET    Take 1 tablet by mouth twice daily    OMEGA-3 FATTY ACIDS (FISH OIL PO)    Take 1,200 mg by mouth daily    OXYBUTYNIN (DITROPAN XL) 15 MG EXTENDED RELEASE TABLET    TAKE 1 TABLET BY MOUTH ONCE DAILY    ZOSTER RECOMBINANT ADJUVANTED VACCINE (SHINGRIX) 50 MCG/0.5ML SUSR INJECTION    Inject 0.5 mLs into the muscle See Admin Instructions 1 dose now and repeat in 2-6 months       ALLERGIES     has No Known Allergies. FAMILY HISTORY     He indicated that his mother is . He indicated that his father is . He indicated that all of his three sisters are alive. He indicated that only one of his three brothers is alive. He indicated that the status of his other is unknown.     family history includes Heart Disease in his brother; Heart Surgery in his sister; Hypertension in an other family member; Kidney Disease in his brother;  Other in his brother; Other (age of onset: 52) in his mother; Sudden Death in his father. SOCIAL HISTORY      reports that he quit smoking about 7 years ago. His smoking use included cigarettes. He smoked 0.00 packs per day for 55.00 years. He has never used smokeless tobacco. He reports current alcohol use. He reports that he does not use drugs. PHYSICAL EXAM     INITIAL VITALS:  height is 6' (1.829 m) and weight is 315 lb (142.9 kg) (abnormal). His temperature is 97.6 °F (36.4 °C). His blood pressure is 114/66 and his pulse is 81. His respiration is 35 (abnormal) and oxygen saturation is 96%. Physical Exam  Constitutional:       General: He is in acute distress. Appearance: He is well-developed. He is obese. He is ill-appearing. Comments: She comes in deeply cyanotic hold the hose speaking in full sentences pulse ox in the 60s on 4 L   HENT:      Head: Normocephalic and atraumatic. Eyes:      Pupils: Pupils are equal, round, and reactive to light. Cardiovascular:      Rate and Rhythm: Normal rate and regular rhythm. Pulmonary:      Effort: Respiratory distress present. Breath sounds: Normal breath sounds. Abdominal:      General: Bowel sounds are normal.      Palpations: Abdomen is soft. Musculoskeletal:         General: Normal range of motion. Cervical back: Normal range of motion and neck supple. Right lower leg: Edema present. Left lower leg: Edema present. Skin:     General: Skin is warm and dry. Capillary Refill: Capillary refill takes less than 2 seconds. Neurological:      General: No focal deficit present. Mental Status: He is alert and oriented to person, place, and time.    Psychiatric:         Behavior: Behavior normal.           DIFFERENTIAL DIAGNOSIS/ MDM:     Respiratory distress probable Covid versus CHF will do a work-up    DIAGNOSTIC RESULTS     EKG: All EKG's are interpreted by the Emergency Department Physician who either signs or Co-signs this chart in the absence of a cardiologist.  Normal sinus rhythm rate 83 bpm MI interval is 180 ms QRS durations 160 ms  axis is -40 he has a lead left bundle branch block and left axis deviation    The left bundle branch block was seen on previous EKGs  RADIOLOGY:   I directly visualized the following  images and reviewed the radiologist interpretations:     EXAMINATION:   ONE XRAY VIEW OF THE CHEST       12/8/2021 3:46 pm       COMPARISON:   None.       HISTORY:   ORDERING SYSTEM PROVIDED HISTORY: shortness of breath   TECHNOLOGIST PROVIDED HISTORY:   shortness of breath   Reason for Exam: Respiratory Distress       FINDINGS:   Status post median sternotomy.  Cardiomegaly.  Bilateral ill-defined   pulmonary opacities.  No pneumothorax.  No pleural effusion.           Impression   Bilateral ill-defined pulmonary opacities could represent edema or infection            EXAMINATION:   CTA OF THE CHEST 12/8/2021 11:45 am       TECHNIQUE:   CTA of the chest was performed after the administration of intravenous   contrast.  Multiplanar reformatted images are provided for review.  MIP   images are provided for review.  Dose modulation, iterative reconstruction,   and/or weight based adjustment of the mA/kV was utilized to reduce the   radiation dose to as low as reasonably achievable.       COMPARISON:   Chest x-ray dated December 8, 2021       HISTORY:   ORDERING SYSTEM PROVIDED HISTORY: Patient here with positive Covid elevated   D-dimer   TECHNOLOGIST PROVIDED HISTORY:   Patient here with positive Covid elevated D-dimer   Decision Support Exception - unselect if not a suspected or confirmed   emergency medical condition->Emergency Medical Condition (MA)   Reason for Exam: r/o pe       FINDINGS:   Pulmonary Arteries: Pulmonary arteries are adequately opacified for   evaluation.  No evidence of intraluminal filling defect to suggest pulmonary   embolism.  Main pulmonary artery is slightly enlarged, measuring 32 mm, suggesting pulmonary arterial hypertension. .       Mediastinum: Nonspecific mediastinal and hilar lymph nodes are present,   likely reactive.  Coronary arterial calcifications are present.  Patient is   status post prior CABG.  Multiple calcified right hilar and subcarinal lymph   nodes are present. .  The heart and pericardium demonstrate no acute   abnormality.  There is no acute abnormality of the thoracic aorta.       Lungs/pleura: Multiple ground-glass opacities are present throughout the   lungs, highly suggestive of COVID-19 pulmonary disease given the clinical   history.  No pneumothorax or pleural effusion is present.  Emphysematous   changes are present bilaterally within the lungs, with an upper lobe   predominance.       Upper Abdomen: Limited images of the upper abdomen are unremarkable.       Soft Tissues/Bones: No acute bone or soft tissue abnormality.           Impression   1. No evidence of pulmonary embolism   2. Extensive ground-glass opacities present throughout the lungs bilaterally,   highly suggestive of COVID-19 pulmonary disease given the clinical history   3.  Prior CABG                 ED BEDSIDE ULTRASOUND:       LABS:  Labs Reviewed   COVID-19, RAPID - Abnormal; Notable for the following components:       Result Value    SARS-CoV-2, Rapid DETECTED (*)     All other components within normal limits   COMPREHENSIVE METABOLIC PANEL - Abnormal; Notable for the following components:    Glucose 140 (*)     BUN 36 (*)     CREATININE 1.25 (*)     Bun/Cre Ratio 29 (*)     Anion Gap 19 (*)     AST 40 (*)     Albumin/Globulin Ratio 0.9 (*)     GFR Non- 56 (*)     All other components within normal limits   CBC WITH AUTO DIFFERENTIAL - Abnormal; Notable for the following components:    WBC 16.6 (*)     RDW 14.8 (*)     NRBC Automated 1.0 (*)     Lymphocytes 9 (*)     Seg Neutrophils 75 (*)     Eosinophils % 0 (*)     Immature Granulocytes 5 (*)     Absolute Mono # 1.66 (*)     Segs Absolute 12.45 (*)     Absolute Immature Granulocyte 0.83 (*)     All other components within normal limits   LACTATE, SEPSIS - Abnormal; Notable for the following components:    Lactic Acid, Sepsis 5.7 (*)     All other components within normal limits   LACTATE, SEPSIS - Abnormal; Notable for the following components:    Lactic Acid, Sepsis 3.2 (*)     All other components within normal limits   TROPONIN - Abnormal; Notable for the following components:    Troponin, High Sensitivity 28 (*)     All other components within normal limits   D-DIMER, QUANTITATIVE - Abnormal; Notable for the following components:    D-Dimer, Quant >20.00 (*)     All other components within normal limits   BRAIN NATRIURETIC PEPTIDE - Abnormal; Notable for the following components:    Pro-BNP 3,162 (*)     All other components within normal limits         EMERGENCY DEPARTMENT COURSE:   Vitals:    Vitals:    12/08/21 1528 12/08/21 1545 12/08/21 1600 12/08/21 1615   BP:  118/70 (!) 101/57 114/66   Pulse: 88 80 83 81   Resp: (!) 41 (!) 42 (!) 36 (!) 35   Temp:       SpO2: (!) 87% 95% 95% 96%   Weight:       Height:         -------------------------  BP: 114/66, Temp: 97.6 °F (36.4 °C), Pulse: 81, Resp: (!) 35        Re-evaluation Notes    On reevaluation his cyanosis is completely resolved he is making jokes he is on BiPAP his is a lactic acid is gone from 5.7 down to 3 this is most likely secondary to his extreme hypoxia he does not appear septic and has good vitals otherwise    CRITICAL CARE:   IP CONSULT TO HOSPITALIST  Due to the immediate potential for life-threatening deterioration due to cyanosis and respiratory distress, I spent 40 minutes providing critical care. This time is excluding time spent performing procedures. CONSULTS:      PROCEDURES:  None    FINAL IMPRESSION      1. Pneumonia due to COVID-19 virus    2.  Hypoxia          DISPOSITION/PLAN   DISPOSITION admitted    Condition on Disposition    Stable    PATIENT REFERRED TO:  No follow-up provider specified. DISCHARGE MEDICATIONS:  New Prescriptions    No medications on file       (Please note that portions of this note were completed with a voice recognition program.  Efforts were made to edit the dictations but occasionally words are mis-transcribed.)    Radha Jiménez MD,, MD, F.A.A.E.M.   Attending Emergency Physician                          Radha Jiménez MD  12/08/21 [id number]

## 2021-12-09 ENCOUNTER — APPOINTMENT (OUTPATIENT)
Dept: GENERAL RADIOLOGY | Age: 76
DRG: 177 | End: 2021-12-09
Payer: MEDICARE

## 2021-12-09 PROBLEM — J44.1 COPD WITH ACUTE EXACERBATION (HCC): Status: ACTIVE | Noted: 2021-12-09

## 2021-12-09 PROBLEM — I65.23 BILATERAL CAROTID ARTERY STENOSIS: Status: ACTIVE | Noted: 2021-12-09

## 2021-12-09 PROBLEM — I87.2 CHRONIC VENOUS INSUFFICIENCY: Status: ACTIVE | Noted: 2021-12-09

## 2021-12-09 PROBLEM — J43.9 EMPHYSEMA/COPD (HCC): Status: ACTIVE | Noted: 2021-12-09

## 2021-12-09 PROBLEM — E66.01 CLASS 3 SEVERE OBESITY DUE TO EXCESS CALORIES WITH SERIOUS COMORBIDITY AND BODY MASS INDEX (BMI) OF 40.0 TO 44.9 IN ADULT (HCC): Status: ACTIVE | Noted: 2021-12-09

## 2021-12-09 PROBLEM — I44.7 LEFT BUNDLE BRANCH BLOCK (LBBB): Status: ACTIVE | Noted: 2021-12-09

## 2021-12-09 PROBLEM — E78.5 HYPERLIPIDEMIA: Status: ACTIVE | Noted: 2021-12-09

## 2021-12-09 PROBLEM — I25.5 ISCHEMIC CARDIOMYOPATHY: Status: ACTIVE | Noted: 2021-12-09

## 2021-12-09 PROBLEM — K81.9 CHOLECYSTITIS: Status: RESOLVED | Noted: 2019-03-19 | Resolved: 2021-12-09

## 2021-12-09 PROBLEM — I50.42 CHRONIC COMBINED SYSTOLIC AND DIASTOLIC CHF (CONGESTIVE HEART FAILURE) (HCC): Status: ACTIVE | Noted: 2021-12-09

## 2021-12-09 PROBLEM — N40.1 LOWER URINARY TRACT SYMPTOMS DUE TO BENIGN PROSTATIC HYPERPLASIA: Status: ACTIVE | Noted: 2021-12-09

## 2021-12-09 PROBLEM — M51.369 DEGENERATION OF LUMBAR INTERVERTEBRAL DISC: Status: ACTIVE | Noted: 2021-12-09

## 2021-12-09 PROBLEM — J12.82 PNEUMONIA DUE TO COVID-19 VIRUS: Status: ACTIVE | Noted: 2021-12-08

## 2021-12-09 PROBLEM — R26.9 IMPAIRED GAIT: Status: ACTIVE | Noted: 2021-12-09

## 2021-12-09 PROBLEM — Z96.651 PRESENCE OF RIGHT ARTIFICIAL KNEE JOINT: Status: ACTIVE | Noted: 2021-12-09

## 2021-12-09 PROBLEM — K43.9 VENTRAL HERNIA: Status: ACTIVE | Noted: 2021-12-09

## 2021-12-09 PROBLEM — M21.069 ACQUIRED GENU VALGUM: Status: ACTIVE | Noted: 2021-12-09

## 2021-12-09 PROBLEM — E66.813 CLASS 3 SEVERE OBESITY DUE TO EXCESS CALORIES WITH SERIOUS COMORBIDITY AND BODY MASS INDEX (BMI) OF 40.0 TO 44.9 IN ADULT: Status: ACTIVE | Noted: 2021-12-09

## 2021-12-09 PROBLEM — J96.01 ACUTE RESPIRATORY FAILURE WITH HYPOXIA (HCC): Status: ACTIVE | Noted: 2021-12-09

## 2021-12-09 PROBLEM — E03.9 HYPOTHYROIDISM: Status: ACTIVE | Noted: 2021-12-09

## 2021-12-09 PROBLEM — M51.36 DEGENERATION OF LUMBAR INTERVERTEBRAL DISC: Status: ACTIVE | Noted: 2021-12-09

## 2021-12-09 PROBLEM — I25.10 CORONARY ARTERY DISEASE INVOLVING NATIVE CORONARY ARTERY OF NATIVE HEART: Status: ACTIVE | Noted: 2021-12-09

## 2021-12-09 PROBLEM — I82.5Z3: Status: ACTIVE | Noted: 2021-12-09

## 2021-12-09 LAB
-: ABNORMAL
ABSOLUTE EOS #: 0 K/UL (ref 0–0.44)
ABSOLUTE IMMATURE GRANULOCYTE: 0.2 K/UL (ref 0–0.3)
ABSOLUTE LYMPH #: 0.59 K/UL (ref 1.1–3.7)
ABSOLUTE MONO #: 0.4 K/UL (ref 0.1–1.2)
ALBUMIN SERPL-MCNC: 3 G/DL (ref 3.5–5.2)
ALBUMIN/GLOBULIN RATIO: 0.8 (ref 1–2.5)
ALP BLD-CCNC: 98 U/L (ref 40–129)
ALT SERPL-CCNC: 15 U/L (ref 5–41)
AMORPHOUS: ABNORMAL
ANION GAP SERPL CALCULATED.3IONS-SCNC: 14 MMOL/L (ref 9–17)
AST SERPL-CCNC: 32 U/L
BACTERIA: ABNORMAL
BASOPHILS # BLD: 0 % (ref 0–2)
BASOPHILS ABSOLUTE: 0 K/UL (ref 0–0.2)
BILIRUB SERPL-MCNC: 0.72 MG/DL (ref 0.3–1.2)
BILIRUBIN URINE: NEGATIVE
BUN BLDV-MCNC: 36 MG/DL (ref 8–23)
BUN/CREAT BLD: 37 (ref 9–20)
C-REACTIVE PROTEIN: 280.4 MG/L (ref 0–5)
CALCIUM SERPL-MCNC: 8.8 MG/DL (ref 8.6–10.4)
CASTS UA: ABNORMAL /LPF (ref 0–2)
CHLORIDE BLD-SCNC: 104 MMOL/L (ref 98–107)
CO2: 22 MMOL/L (ref 20–31)
COLOR: ABNORMAL
COMMENT UA: ABNORMAL
CREAT SERPL-MCNC: 0.97 MG/DL (ref 0.7–1.2)
CRYSTALS, UA: ABNORMAL /HPF
D-DIMER QUANTITATIVE: >20 MG/L FEU (ref 0–0.59)
DIFFERENTIAL TYPE: ABNORMAL
EKG ATRIAL RATE: 83 BPM
EKG P AXIS: 11 DEGREES
EKG P-R INTERVAL: 180 MS
EKG Q-T INTERVAL: 418 MS
EKG QRS DURATION: 160 MS
EKG QTC CALCULATION (BAZETT): 491 MS
EKG R AXIS: -40 DEGREES
EKG T AXIS: -114 DEGREES
EKG VENTRICULAR RATE: 83 BPM
EOSINOPHILS RELATIVE PERCENT: 0 % (ref 1–4)
EPITHELIAL CELLS UA: ABNORMAL /HPF (ref 0–5)
FERRITIN: 732 UG/L (ref 30–400)
GFR AFRICAN AMERICAN: >60 ML/MIN
GFR NON-AFRICAN AMERICAN: >60 ML/MIN
GFR SERPL CREATININE-BSD FRML MDRD: ABNORMAL ML/MIN/{1.73_M2}
GFR SERPL CREATININE-BSD FRML MDRD: ABNORMAL ML/MIN/{1.73_M2}
GLUCOSE BLD-MCNC: 142 MG/DL (ref 75–110)
GLUCOSE BLD-MCNC: 168 MG/DL (ref 70–99)
GLUCOSE URINE: NEGATIVE
HCT VFR BLD CALC: 40.9 % (ref 40.7–50.3)
HEMOGLOBIN: 13.1 G/DL (ref 13–17)
IMMATURE GRANULOCYTES: 2 %
INR BLD: 1.3
KETONES, URINE: NEGATIVE
LEUKOCYTE ESTERASE, URINE: NEGATIVE
LV EF: 40 %
LVEF MODALITY: NORMAL
LYMPHOCYTES # BLD: 6 % (ref 24–43)
MCH RBC QN AUTO: 29.8 PG (ref 25.2–33.5)
MCHC RBC AUTO-ENTMCNC: 32 G/DL (ref 25.2–33.5)
MCV RBC AUTO: 93.2 FL (ref 82.6–102.9)
MONOCYTES # BLD: 4 % (ref 3–12)
MORPHOLOGY: ABNORMAL
MORPHOLOGY: ABNORMAL
MUCUS: ABNORMAL
NITRITE, URINE: NEGATIVE
NRBC AUTOMATED: 0.6 PER 100 WBC
OTHER OBSERVATIONS UA: ABNORMAL
PARTIAL THROMBOPLASTIN TIME: 31 SEC (ref 23.9–33.8)
PDW BLD-RTO: 14.7 % (ref 11.8–14.4)
PH UA: 6 (ref 5–6)
PLATELET # BLD: 183 K/UL (ref 138–453)
PLATELET ESTIMATE: ABNORMAL
PMV BLD AUTO: 9.8 FL (ref 8.1–13.5)
POTASSIUM SERPL-SCNC: 4.4 MMOL/L (ref 3.7–5.3)
PROTEIN UA: ABNORMAL
PROTHROMBIN TIME: 16 SEC (ref 11.5–14.2)
RBC # BLD: 4.39 M/UL (ref 4.21–5.77)
RBC # BLD: ABNORMAL 10*6/UL
RBC UA: ABNORMAL /HPF (ref 0–4)
RENAL EPITHELIAL, UA: ABNORMAL /HPF
SEG NEUTROPHILS: 88 % (ref 36–65)
SEGMENTED NEUTROPHILS ABSOLUTE COUNT: 8.71 K/UL (ref 1.5–8.1)
SODIUM BLD-SCNC: 140 MMOL/L (ref 135–144)
SPECIFIC GRAVITY UA: 1.02 (ref 1.01–1.02)
TOTAL PROTEIN: 6.8 G/DL (ref 6.4–8.3)
TRICHOMONAS: ABNORMAL
TSH SERPL DL<=0.05 MIU/L-ACNC: 1.36 MIU/L (ref 0.3–5)
TURBIDITY: ABNORMAL
URINE HGB: ABNORMAL
UROBILINOGEN, URINE: NORMAL
WBC # BLD: 9.9 K/UL (ref 3.5–11.3)
WBC # BLD: ABNORMAL 10*3/UL
WBC UA: ABNORMAL /HPF (ref 0–4)
YEAST: ABNORMAL

## 2021-12-09 PROCEDURE — 2580000003 HC RX 258: Performed by: NURSE PRACTITIONER

## 2021-12-09 PROCEDURE — 99232 SBSQ HOSP IP/OBS MODERATE 35: CPT | Performed by: FAMILY MEDICINE

## 2021-12-09 PROCEDURE — 81001 URINALYSIS AUTO W/SCOPE: CPT

## 2021-12-09 PROCEDURE — 94761 N-INVAS EAR/PLS OXIMETRY MLT: CPT

## 2021-12-09 PROCEDURE — 2060000000 HC ICU INTERMEDIATE R&B

## 2021-12-09 PROCEDURE — XW0DXM6 INTRODUCTION OF BARICITINIB INTO MOUTH AND PHARYNX, EXTERNAL APPROACH, NEW TECHNOLOGY GROUP 6: ICD-10-PCS | Performed by: FAMILY MEDICINE

## 2021-12-09 PROCEDURE — 82947 ASSAY GLUCOSE BLOOD QUANT: CPT

## 2021-12-09 PROCEDURE — 85379 FIBRIN DEGRADATION QUANT: CPT

## 2021-12-09 PROCEDURE — 82728 ASSAY OF FERRITIN: CPT

## 2021-12-09 PROCEDURE — 94660 CPAP INITIATION&MGMT: CPT

## 2021-12-09 PROCEDURE — 2580000003 HC RX 258: Performed by: FAMILY MEDICINE

## 2021-12-09 PROCEDURE — 85025 COMPLETE CBC W/AUTO DIFF WBC: CPT

## 2021-12-09 PROCEDURE — 93306 TTE W/DOPPLER COMPLETE: CPT

## 2021-12-09 PROCEDURE — APPSS45 APP SPLIT SHARED TIME 31-45 MINUTES: Performed by: NURSE PRACTITIONER

## 2021-12-09 PROCEDURE — 86140 C-REACTIVE PROTEIN: CPT

## 2021-12-09 PROCEDURE — 80053 COMPREHEN METABOLIC PANEL: CPT

## 2021-12-09 PROCEDURE — 85730 THROMBOPLASTIN TIME PARTIAL: CPT

## 2021-12-09 PROCEDURE — 6370000000 HC RX 637 (ALT 250 FOR IP): Performed by: FAMILY MEDICINE

## 2021-12-09 PROCEDURE — 71045 X-RAY EXAM CHEST 1 VIEW: CPT

## 2021-12-09 PROCEDURE — 51702 INSERT TEMP BLADDER CATH: CPT

## 2021-12-09 PROCEDURE — 84443 ASSAY THYROID STIM HORMONE: CPT

## 2021-12-09 PROCEDURE — 36415 COLL VENOUS BLD VENIPUNCTURE: CPT

## 2021-12-09 PROCEDURE — 94640 AIRWAY INHALATION TREATMENT: CPT

## 2021-12-09 PROCEDURE — 6360000002 HC RX W HCPCS: Performed by: FAMILY MEDICINE

## 2021-12-09 PROCEDURE — 6360000002 HC RX W HCPCS: Performed by: NURSE PRACTITIONER

## 2021-12-09 PROCEDURE — 85610 PROTHROMBIN TIME: CPT

## 2021-12-09 RX ORDER — ALBUTEROL SULFATE 90 UG/1
2 AEROSOL, METERED RESPIRATORY (INHALATION) EVERY 4 HOURS PRN
Status: DISCONTINUED | OUTPATIENT
Start: 2021-12-09 | End: 2022-01-08 | Stop reason: HOSPADM

## 2021-12-09 RX ORDER — DEXTROSE AND SODIUM CHLORIDE 5; .45 G/100ML; G/100ML
INJECTION, SOLUTION INTRAVENOUS CONTINUOUS
Status: DISCONTINUED | OUTPATIENT
Start: 2021-12-09 | End: 2021-12-12

## 2021-12-09 RX ORDER — SODIUM CHLORIDE FOR INHALATION 0.9 %
3 VIAL, NEBULIZER (ML) INHALATION
Status: DISCONTINUED | OUTPATIENT
Start: 2021-12-09 | End: 2021-12-09

## 2021-12-09 RX ORDER — IPRATROPIUM BROMIDE AND ALBUTEROL SULFATE 2.5; .5 MG/3ML; MG/3ML
1 SOLUTION RESPIRATORY (INHALATION) 4 TIMES DAILY
Status: DISCONTINUED | OUTPATIENT
Start: 2021-12-09 | End: 2021-12-09

## 2021-12-09 RX ORDER — ALBUTEROL SULFATE 90 UG/1
2 AEROSOL, METERED RESPIRATORY (INHALATION) 4 TIMES DAILY
Status: DISCONTINUED | OUTPATIENT
Start: 2021-12-09 | End: 2022-01-08 | Stop reason: HOSPADM

## 2021-12-09 RX ORDER — GUAIFENESIN/DEXTROMETHORPHAN 100-10MG/5
5 SYRUP ORAL EVERY 4 HOURS PRN
Status: DISCONTINUED | OUTPATIENT
Start: 2021-12-09 | End: 2022-01-08 | Stop reason: HOSPADM

## 2021-12-09 RX ORDER — BENZONATATE 100 MG/1
200 CAPSULE ORAL 3 TIMES DAILY PRN
Status: DISCONTINUED | OUTPATIENT
Start: 2021-12-09 | End: 2022-01-08 | Stop reason: HOSPADM

## 2021-12-09 RX ORDER — ALBUTEROL SULFATE 2.5 MG/3ML
2.5 SOLUTION RESPIRATORY (INHALATION)
Status: DISCONTINUED | OUTPATIENT
Start: 2021-12-09 | End: 2021-12-09

## 2021-12-09 RX ADMIN — ALBUTEROL SULFATE 2 PUFF: 90 AEROSOL, METERED RESPIRATORY (INHALATION) at 16:46

## 2021-12-09 RX ADMIN — OXYBUTYNIN CHLORIDE 15 MG: 5 TABLET, EXTENDED RELEASE ORAL at 09:17

## 2021-12-09 RX ADMIN — ATORVASTATIN CALCIUM 40 MG: 40 TABLET, FILM COATED ORAL at 09:27

## 2021-12-09 RX ADMIN — METOPROLOL TARTRATE 50 MG: 50 TABLET, FILM COATED ORAL at 09:27

## 2021-12-09 RX ADMIN — FINASTERIDE 5 MG: 5 TABLET, FILM COATED ORAL at 09:26

## 2021-12-09 RX ADMIN — SODIUM CHLORIDE, PRESERVATIVE FREE 10 ML: 5 INJECTION INTRAVENOUS at 21:16

## 2021-12-09 RX ADMIN — ENOXAPARIN SODIUM 40 MG: 100 INJECTION SUBCUTANEOUS at 21:14

## 2021-12-09 RX ADMIN — ASPIRIN 81 MG: 81 TABLET, COATED ORAL at 09:18

## 2021-12-09 RX ADMIN — BUPROPION HYDROCHLORIDE 75 MG: 75 TABLET ORAL at 09:27

## 2021-12-09 RX ADMIN — LEVOTHYROXINE SODIUM 75 MCG: 0.07 TABLET ORAL at 06:43

## 2021-12-09 RX ADMIN — AZITHROMYCIN MONOHYDRATE 500 MG: 500 INJECTION, POWDER, LYOPHILIZED, FOR SOLUTION INTRAVENOUS at 00:48

## 2021-12-09 RX ADMIN — CEFTRIAXONE 1000 MG: 1 INJECTION, POWDER, FOR SOLUTION INTRAMUSCULAR; INTRAVENOUS at 23:38

## 2021-12-09 RX ADMIN — ALBUTEROL SULFATE 2 PUFF: 90 AEROSOL, METERED RESPIRATORY (INHALATION) at 20:15

## 2021-12-09 RX ADMIN — CLOPIDOGREL BISULFATE 75 MG: 75 TABLET ORAL at 09:26

## 2021-12-09 RX ADMIN — SODIUM CHLORIDE 25 ML: 9 INJECTION, SOLUTION INTRAVENOUS at 09:09

## 2021-12-09 RX ADMIN — LISINOPRIL 20 MG: 20 TABLET ORAL at 09:27

## 2021-12-09 RX ADMIN — BARICITINIB 4 MG: 2 TABLET, FILM COATED ORAL at 09:17

## 2021-12-09 RX ADMIN — METOPROLOL TARTRATE 50 MG: 50 TABLET, FILM COATED ORAL at 21:15

## 2021-12-09 RX ADMIN — DEXAMETHASONE SODIUM PHOSPHATE 20 MG: 10 INJECTION, SOLUTION INTRAMUSCULAR; INTRAVENOUS at 09:10

## 2021-12-09 RX ADMIN — ENOXAPARIN SODIUM 40 MG: 100 INJECTION SUBCUTANEOUS at 09:25

## 2021-12-09 RX ADMIN — SODIUM CHLORIDE, PRESERVATIVE FREE 10 ML: 5 INJECTION INTRAVENOUS at 09:08

## 2021-12-09 RX ADMIN — DEXTROSE AND SODIUM CHLORIDE: 5; 450 INJECTION, SOLUTION INTRAVENOUS at 14:28

## 2021-12-09 ASSESSMENT — PAIN SCALES - GENERAL
PAINLEVEL_OUTOF10: 0

## 2021-12-09 NOTE — PROGRESS NOTES
Hospitalist Progress Note  12/9/2021 2:08 PM  Subjective:   Admit Date: 12/8/2021  PCP: Marshall Goodman MD     Full Code      C/c:  Chief Complaint   Patient presents with    Respiratory Distress         Interval History: pt more sob/on bipap crp elevated/dexa/antibiotics/on bacinitib    Diet: ADULT DIET; Regular; 4 carb choices (60 gm/meal); Low Fat/Low Chol/High Fiber/PARISH                                ip days:1  Medications:   Scheduled Meds:   dexamethasone  20 mg IntraVENous Daily    enoxaparin  40 mg SubCUTAneous BID    albuterol sulfate HFA  2 puff Inhalation 4x daily    aspirin  81 mg Oral Daily    atorvastatin  40 mg Oral Daily    buPROPion  75 mg Oral Daily    clopidogrel  75 mg Oral Daily    levothyroxine  75 mcg Oral Daily    finasteride  5 mg Oral Daily    oxybutynin  15 mg Oral Daily    metoprolol tartrate  50 mg Oral BID    lisinopril  20 mg Oral Daily    sodium chloride flush  5-40 mL IntraVENous 2 times per day    cefTRIAXone (ROCEPHIN) IV  1,000 mg IntraVENous Q24H    azithromycin  500 mg IntraVENous Q24H    baricitinib  4 mg Oral Daily     Continuous Infusions:   dextrose 5 % and 0.45 % NaCl      sodium chloride Stopped (12/09/21 1029)     PRN Meds:.benzonatate, guaiFENesin-dextromethorphan, albuterol sulfate HFA, sodium chloride flush, sodium chloride, polyethylene glycol, acetaminophen **OR** acetaminophen     CBC:   Recent Labs     12/08/21  1522 12/09/21  0537   WBC 16.6* 9.9   HGB 15.1 13.1    183     BMP:    Recent Labs     12/08/21  1522 12/09/21  0537    140   K 4.5 4.4   CL 99 104   CO2 20 22   BUN 36* 36*   CREATININE 1.25* 0.97   GLUCOSE 140* 168*     Hepatic:   Recent Labs     12/08/21  1522 12/09/21  0537   AST 40* 32   ALT 18 15   BILITOT 1.09 0.72   ALKPHOS 93 98     Troponin: No results for input(s): TROPONINI in the last 72 hours. BNP: No results for input(s): BNP in the last 72 hours.   Lipids: No results for input(s): CHOL, HDL in the last 72 hours. Invalid input(s): LDLCALCU  INR:   Recent Labs     12/09/21  0537   INR 1.3       Objective:   Vitals: BP (!) 140/70   Pulse 61   Temp 97.8 °F (36.6 °C) (Tympanic)   Resp 29   Ht 6' (1.829 m)   Wt 293 lb 8 oz (133.1 kg)   SpO2 91%   BMI 39.81 kg/m²   General appearance: alert, appears stated age and cooperative  Skin: Skin color, texture, turgor normal. No rashes or lesions  Lungs: clear to auscultation bilaterally  Heart: diminished rate and rhythm, S1, S2 normal, no murmur, click, rub or gallop  Abdomen: soft, non-tender; bowel sounds normal; no masses,  no organomegaly  Extremities: extremities normal, atraumatic, no cyanosis or edema  Neurologic: Mental status: Alert, oriented, thought content appropriate    Prophylaxis:   DVT with  [x] lovenox        [] heparin        [] Scd        [] none:     Radiology:  ECHO Complete 2D W Doppler W Color    Result Date: 12/9/2021  Transthoracic Echocardiography Report (TTE)  Patient Name [facility]     Date of Study               12/09/2021               Delynn Hunger   Date of      1945  Gender                      Male  Birth   Age          67 year(s)  Race                           Room Number  2056        Height:                     72 inch, 182.88 cm   Corporate ID [id number]    Weight:                     315 pounds, 142.9 kg  #   Patient Acct [de-identified]   BSA:          2.58 m^2      BMI:       42.72  #                                                               kg/m^2   MR #         [id number]     Ramesh Santana [facility]   Accession #  [id number]  Interpreting Physician      Micheal Hwang   Fellow                   Referring Nurse                           Practitioner   Interpreting             Referring Physician         Tavo Workman, KARLEE  Fellow  Additional Comments Technically difficult study due to body habitus and condition. Type of Study   TTE procedure:2D Echocardiogram, M-Mode, Doppler, Color Doppler. Procedure Date Date: 12/09/2021 Start: 10:29 AM Study Location: [facility] Technical Quality: Adequate visualization Indications:Shortness of breath, Hypoxia, Congestive heart failure, combined systolic and diastolic dysfunction and COVID 19. History / Tech. Comments: Procedure explained to patient. Patient Status: Inpatient Height: 72 inches Weight: 315.01 pounds BSA: 2.58 m^2 BMI: 42.72 kg/m^2 CONCLUSIONS Summary Technically difficult echo. LV systolic function is mildly reduced, estimated EF 40% Abnormal septal wall motion. Hypokinetic mid to basal inferolateral, inferior and anterolateral walls. Grade I left ventricular diastolic dysfunction. Right ventricular dilatation with reduced systolic function. Aortic valve sclerosis without stenosis. Mild tricuspid regurgitation. Estimated right ventricular systolic pressure is 55 mmHg. No pericardial effusion. Signature ----------------------------------------------------------------------------  Electronically signed by Aminah Parmar RDCS(Sonographer) on 12/09/2021  11:57 AM ---------------------------------------------------------------------------- ----------------------------------------------------------------------------  Electronically signed by Micheal Hwang(Interpreting physician) on  12/09/2021 12:23 PM ---------------------------------------------------------------------------- FINDINGS Left Atrium Left atrium is mildly dilated. Left Ventricle Left ventricle is moderately dilated. Mildly hypertrophic interventricular septum. Abnormal septal wall motion. Hypokinetic inferoseptal and inferior walls. Left ventricular ejection fraction 40 %. Grade I left ventricular diastolic dysfunction. Right Atrium Right atrial dilatation. Right Ventricle Right ventricular dilatation with reduced systolic function. Mitral Valve Normal mitral valve structure and function. Aortic Valve Aortic valve sclerosis without stenosis.  Tricuspid Valve Normal tricuspid valve leaflets. Mild tricuspid regurgitation. Estimated right ventricular systolic pressure is 55 mmHg. Moderate pulmonary hypertension. Pulmonic Valve The pulmonic valve is normal in structure. Pericardial Effusion No significant pericardial effusion is seen. Miscellaneous Aortic root dimension is at upper limit of normal. IVC not visualized, unable to assess size and respiratory collapse. E/E' average = 8. M-mode / 2D Measurements & Calculations:   LVIDd:6.43 cm(3.7 - 5.6 cm)      Diastolic GJQVUY:870.3 ml  LVIDs:6 cm(2.2 - 4.0 cm)         Systolic PXVHZA:462.0 ml  IVSd:1.27 cm(0.6 - 1.1 cm)       LA Dimension: 4.5 cm(1.9 - 4.0 cm)  LVPWd:0.88 cm(0.6 - 1.1 cm)      LA volume/Index: 79 ml /31m^2  Fractional Shortenin.69 %     LVOT:3.7 cm  Calculated LVEF (%): 42.12 %   Mitral:                                  Aortic   Peak E-Wave: 0.50 m/s                    Peak Velocity: 1.10 m/s  Peak A-Wave: 0.72 m/s                    Peak Gradient: 4.84 mmHg  E/A Ratio: 0.7  Peak Gradient: 1 mmHg  Deceleration Time: 430 msec   Tricuspid:                               Pulmonic:   Peak TR Velocity: 3.44 m/s               Peak Velocity: 0.90 m/s  Peak TR Gradient: 47.3344 mmHg           Peak Gradient: 3.21 mmHg  Septal Wall E' velocity:0.04 m/s Lateral Wall E' velocity:0.10 m/s    XR CHEST PORTABLE    Result Date: 2021  EXAMINATION: ONE XRAY VIEW OF THE CHEST 2021 9:47 am COMPARISON: 2021 HISTORY: ORDERING SYSTEM PROVIDED HISTORY: covid TECHNOLOGIST PROVIDED HISTORY: covid Reason for Exam: Follow up due to inpatient status. FINDINGS: Sternal wires are in place. The heart and mediastinal structures are stable. Bilateral lung infiltrates are present similar to prior exam.  Findings are consistent with COVID-19 pneumonia. Persistent bilateral lung infiltrates compatible with the patient's clinical diagnosis of COVID-19 pneumonia.      XR CHEST PORTABLE    Result Date: 2021  EXAMINATION: ONE XRAY VIEW OF present throughout the lungs, highly suggestive of COVID-19 pulmonary disease given the clinical history. No pneumothorax or pleural effusion is present. Emphysematous changes are present bilaterally within the lungs, with an upper lobe predominance. Upper Abdomen: Limited images of the upper abdomen are unremarkable. Soft Tissues/Bones: No acute bone or soft tissue abnormality. 1. No evidence of pulmonary embolism 2. Extensive ground-glass opacities present throughout the lungs bilaterally, highly suggestive of COVID-19 pulmonary disease given the clinical history 3. Prior CABG       Assessment :   1. covid /antibiotics/bacinitib/antibiotics/supplemental oxygen  2. Continue present care     Plan:   1.   See order        Patient Active Problem List:     Hypertension     Osteoarthritis     Pneumonia due to COVID-19 virus     Chronic venous insufficiency     Acquired genu valgum     Degeneration of lumbar intervertebral disc     Lower urinary tract symptoms due to benign prostatic hyperplasia     Presence of right artificial knee joint     Bilateral carotid artery stenosis     Hypothyroidism     Ischemic cardiomyopathy     Ventral hernia     Left bundle branch block (LBBB)     Chronic embolism and thrombosis of deep vein of both distal legs (HCC)     Coronary artery disease involving native coronary artery of native heart     Hyperlipidemia     Class 3 severe obesity due to excess calories with serious comorbidity and body mass index (BMI) of 40.0 to 44.9 in adult [facility])     Chronic combined systolic and diastolic CHF (congestive heart failure) (HCC)     Impaired gait     Emphysema/COPD (HCC)     Acute respiratory failure with hypoxia (HCC)     COPD with acute exacerbation ([facility] [address]      Anticipated Disposition upon discharge: [] Home                                                                         [] Home with Home Health                                                                         [] Skilled Nursing Facility                                                                         [] [address]      Patient is admitted as inpatient status because of co-morbidities listed above, severity of signs and symptoms as outlined, requirement for current medical therapies and most importantly because of direct risk to patient if care not provided in a hospital setting.           Yrn Cherry MD, MD  Rounding Hospitalist

## 2021-12-09 NOTE — PROGRESS NOTES
SW attempted to contact patient to complete assessment and ACP Note. No answer. SW will continue to monitor needs and assist as appropriate.        Electronically signed by VISHAL Morgan on 12/9/2021 at 8:52 AM

## 2021-12-09 NOTE — PLAN OF CARE
Problem: Airway Clearance - Ineffective  Goal: Achieve or maintain patent airway  12/9/2021 1439 by Samson Cowart RN  Outcome: Ongoing     Problem: Gas Exchange - Impaired  Goal: Absence of hypoxia  12/9/2021 1439 by Samson Cowart RN  Outcome: Ongoing  Goal: Promote optimal lung function  12/9/2021 1439 by Samson Cowart RN  Outcome: Ongoing     Problem: Breathing Pattern - Ineffective  Goal: Ability to achieve and maintain a regular respiratory rate  12/9/2021 1439 by Samson Cowart RN  Outcome: Ongoing     Problem:  Body Temperature -  Risk of, Imbalanced  Goal: Ability to maintain a body temperature within defined limits  12/9/2021 1439 by Samson Cowart RN  Outcome: Ongoing  Goal: Will regain or maintain usual level of consciousness  12/9/2021 1439 by Samson Cowart RN  Outcome: Ongoing  Goal: Complications related to the disease process, condition or treatment will be avoided or minimized  12/9/2021 1439 by Samson Cowart RN  Outcome: Ongoing     Problem: Isolation Precautions - Risk of Spread of Infection  Goal: Prevent transmission of infection  12/9/2021 1439 by Samson Cowart RN  Outcome: Ongoing     Problem: Nutrition Deficits  Goal: Optimize nutritional status  12/9/2021 1439 by Samson Cowart RN  Outcome: Ongoing     Problem: Risk for Fluid Volume Deficit  Goal: Maintain normal heart rhythm  12/9/2021 1439 by Samson Cowart RN  Outcome: Ongoing  Goal: Maintain absence of muscle cramping  12/9/2021 1439 by Samson Cowart RN  Outcome: Ongoing  Goal: Maintain normal serum potassium, sodium, calcium, phosphorus, and pH  12/9/2021 1439 by Samson Cowart RN  Outcome: Ongoing     Problem: Loneliness or Risk for Loneliness  Goal: Demonstrate positive use of time alone when socialization is not possible  12/9/2021 1439 by Samson Cowart RN  Outcome: Ongoing     Problem: Fatigue  Goal: Verbalize increase energy and improved vitality  12/9/2021 1439 by Samson Cowart RN  Outcome: Ongoing     Problem: Patient Education: Go to Patient Education Activity  Goal: Patient/Family Education  12/9/2021 1439 by Lisa Munson RN  Outcome: Ongoing     Problem: Skin Integrity:  Goal: Will show no infection signs and symptoms  Description: Will show no infection signs and symptoms  12/9/2021 1439 by Lisa Munson RN  Outcome: Ongoing  Goal: Absence of new skin breakdown  Description: Absence of new skin breakdown  12/9/2021 1439 by Lisa Munson RN  Outcome: Ongoing     Problem: Falls - Risk of:  Goal: Will remain free from falls  Description: Will remain free from falls  12/9/2021 1439 by Lisa Munson RN  Outcome: Ongoing    Goal: Absence of physical injury  Description: Absence of physical injury  12/9/2021 1439 by Lisa Munson RN  Outcome: Ongoing     Care plan reviewed with patient. Patient verbalize understanding of the plan of care and contribute to goal setting.     Electronically signed by Lisa Munson RN on 12/9/2021 at 2:41 PM

## 2021-12-09 NOTE — PROGRESS NOTES
External catheter dislodged. Incontinent of large amount urine. Linens and gown changed. Brief applied. Patient provided minimal assistance to prone position.

## 2021-12-09 NOTE — H&P
HOSPITALIST ADMISSION H&P      REASON FOR ADMISSION:  Covid-19 pneumonia with hypoxia, acute respiratory failure, COPD with acute exacerbation   ESTIMATED LENGTH OF STAY:>2 midnights, 3-4 days    ATTENDING/ADMITTING PHYSICIAN: Santana Petersen MD  PCP: Kin Franklin MD    HISTORY OF PRESENT ILLNESS:      The patient is a 67 y.o. male patient of Kin Franklin MD who presents from the ER (sent over from Urgent Care) with c/o cough, shortness of breath, decreased appetite, fatigue, chills, and generalized weakness -- gradually worsening over the past 9 days. Symptom onset was November 30, 2021. In urgent care he became very short of breath, dusky in appearance, and weak and was assisted into a wheelchair where he was found to be hypoxic at 46% on room air. He was taken immediately to ER. He had 2 Moderna Covid vaccines with his last one in February. He denies any vomiting, chest pain, headaches, or diarrhea. He has many comorbidities including morbid obesity, hypertension, CAD, COPD, CHF, and is a former smoker of 55 years. In ER, he continued to be hypoxic at 62% on 4 L with respiratory rate in the 40's -- was placed on a nonrebreather mask and was still hypoxic at 77% -- required bipap. Afebrile, WBC 16.2, lactic acid 5.7 --> 3.2 --> 1.8 -- sepsis ruled out 12/08/2021. Chest x-ray showed bilateral pneumonia. CTA of the chest showed no PE. COVID-19 testing was positive. Troponin 28, EKG showed sinus rhythm with PACs and left bundle branch block. D-dimer greater than 20. ProBNP 3,162. CKD stage 2 -- creatinine 1.25, GFR 56 showing mild dehydration    Per EMR review his cardiologist recommended a stress test and TTE in February 2021 however the patient declined. In speaking with the patient tonight he is requesting full code status and is agreeable to a 2D echo. Last known EF was 50% in 2015 (up from 30% in 2014), grade 1 diastolic dysfunction.  CTA from ER is suggesting pulmonary hypertension. Hypertension     See below for additional PMH. Patient zkku-sjdqohctyi-[id number]-available records reviewed, including, but not limited to ER records, imaging results, lab results, office records, personal records, and [place]AR[facility] -- no signs of abuse or diversion.      Past Medical History:   Diagnosis Date    Adenomatous polyp     history of     Cholecystitis 3/19/2019    Chronic venous insufficiency     DVT (deep venous thrombosis) (HCC)     history of     Edema     related to venous stasis     Gout     history of     Hypertension     Onychomycosis     Osteoarthritis     Plantar fasciitis     Tobacco abuse            Past Surgical History:   Procedure Laterality Date    COLONOSCOPY  12/12/2012    polyp    COLONOSCOPY  2003    adenomatous polyps     COLONOSCOPY  08/2009    COLONOSCOPY  06/2008    with polypectomy    CORONARY ARTERY BYPASS GRAFT  10/28/2014    OTHER SURGICAL HISTORY      teeth extracted     TOTAL KNEE ARTHROPLASTY Right 10/2015    TOTAL KNEE ARTHROPLASTY Left 03/2018    TURP      VASECTOMY  1980       Medications Prior to Admission:    Medications Prior to Admission: buPROPion (WELLBUTRIN) 75 MG tablet, Take 1 tablet by mouth twice daily  metoprolol tartrate (LOPRESSOR) 50 MG tablet, Take 1 tablet by mouth twice daily  furosemide (LASIX) 20 MG tablet, TAKE 1 TABLET BY MOUTH ONCE DAILY AS NEEDED FOR  PEDAL  EDEMA  lisinopril (PRINIVIL;ZESTRIL) 20 MG tablet, Take 1 tablet by mouth twice daily  allopurinol (ZYLOPRIM) 300 MG tablet, Take 1 tablet by mouth once daily  atorvastatin (LIPITOR) 40 MG tablet, TAKE 1 TABLET BY MOUTH ONCE DAILY  clopidogrel (PLAVIX) 75 MG tablet, Take 1 tablet by mouth once daily  finasteride (PROSCAR) 5 MG tablet, TAKE 1 TABLET BY MOUTH ONCE DAILY  oxybutynin (DITROPAN XL) 15 MG extended release tablet, TAKE 1 TABLET BY MOUTH ONCE DAILY  Omega-3 Fatty Acids (FISH OIL PO), Take 1,200 mg by mouth daily  Acetaminophen (TYLENOL PO), Take by mouth Per pt, takes 2 in am; 2 in pm  aspirin 81 MG tablet, Take 81 mg by mouth daily. EUTHYROX 75 MCG tablet, Take 1 tablet by mouth once daily  zoster recombinant adjuvanted vaccine [facility]) 50 MCG/0.5ML SUSR injection, Inject 0.5 mLs into the muscle See Admin Instructions 1 dose now and repeat in 2-6 months  Handicap Placard MISC, by Does not apply route Permanent    Allergies:    Patient has no known allergies. Social History:    reports that he quit smoking about 7 years ago. His smoking use included cigarettes. He smoked 0.00 packs per day for 55.00 years. He has never used smokeless tobacco. He reports current alcohol use. He reports that he does not use drugs. Family History:   family history includes Heart Disease in his brother; Heart Surgery in his sister; Hypertension in an other family member; Kidney Disease in his brother; Other in his brother; Other (age of onset: 52) in his mother; Sudden Death in his father. REVIEW OF SYSTEMS:  See HPI and problem list; otherwise no other new complaints with respect to eyes, ENT, neck, pulmonary, coronary, chest, GI, , endocrine, musculoskeletal, immune system/connective tissue disease, hematologic, neurologic, psychiatric, skin, lymphatics, or malignancies. Code status: patient/family wishes for Full Code at this time.     PHYSICAL EXAM:  Vitals:  BP (!) 148/82   Pulse 70   Temp 97.9 °F (36.6 °C) (Tympanic)   Resp (!) 36   Ht 6' (1.829 m)   Wt (!) 315 lb (142.9 kg)   SpO2 93%   BMI 42.72 kg/m²     General: awake, alert and cooperative, appears fatigued  HEENT: External nose normal, Normocephalic, Atraumatic and Neck with full ROM, on bipap  Neck: Supple, Carotid Pulses Present, No Masses, Tenderness, Nodularity and No Lymphadenopathy  Chest/Lungs: Diminished throughout, with crackles in the bases, with tachypnea, with conversational dyspnea, able to speak in 2 word fragments, Bases Diminished Bilaterally, Prolonged Expiratory Phase, Poor Air Movement and Distant Breath Sounds  Cardiac: Regular Rate and Rhythm  GI/Abdomen: obese, Bowel Sounds Present and Soft, Non-tender, without Guarding or Rebound Tenderness  : Not examined  Extremities/Musculoskeletal: BLE with edema and Generalized weakness  Skin: BLE with hyperpigmentation, No Cyanosis and Skin warm and dry  Neuro: gait impairment at baseline (uses cane), Alert and Oriented, to Person, to Place, to Situation and No Localizing Signs/Symptoms  Psychiatric: poor concentration      LABS:    CBC with Differential:    Lab Results   Component Value Date    WBC 16.6 12/08/2021    RBC 5.00 12/08/2021    HGB 15.1 12/08/2021    HCT 46.9 12/08/2021     12/08/2021    MCV 93.8 12/08/2021    MCH 30.2 12/08/2021    MCHC 32.2 12/08/2021    RDW 14.8 12/08/2021    LYMPHOPCT 9 12/08/2021    MONOPCT 10 12/08/2021    BASOPCT 1 12/08/2021    MONOSABS 1.66 12/08/2021    LYMPHSABS 1.49 12/08/2021    EOSABS 0.00 12/08/2021    BASOSABS 0.17 12/08/2021    DIFFTYPE NOT REPORTED 12/08/2021     CMP:    Lab Results   Component Value Date     12/08/2021    K 4.5 12/08/2021    CL 99 12/08/2021    CO2 20 12/08/2021    BUN 36 12/08/2021    CREATININE 1.25 12/08/2021    GFRAA >60 12/08/2021    LABGLOM 56 12/08/2021    GLUCOSE 140 12/08/2021    PROT 8.2 12/08/2021    LABALBU 3.8 12/08/2021    CALCIUM 9.8 12/08/2021    BILITOT 1.09 12/08/2021    ALKPHOS 93 12/08/2021    AST 40 12/08/2021    ALT 18 12/08/2021       ASSESSMENT:      Patient Active Problem List   Diagnosis    Hypertension    Osteoarthritis    Pneumonia due to COVID-19 virus    Chronic venous insufficiency    Acquired genu valgum    Degeneration of lumbar intervertebral disc    Lower urinary tract symptoms due to benign prostatic hyperplasia    Presence of right artificial knee joint    Bilateral carotid artery stenosis    Hypothyroidism    Ischemic cardiomyopathy    Ventral hernia    Left bundle branch block (LBBB)    Chronic embolism and thrombosis of deep vein of both distal legs (HCC)    Coronary artery disease involving native coronary artery of native heart    Hyperlipidemia    Class 3 severe obesity due to excess calories with serious comorbidity and body mass index (BMI) of 40.0 to 44.9 in adult [facility])    Chronic combined systolic and diastolic CHF (congestive heart failure) (HCC)    Impaired gait    Emphysema/COPD (HCC)    Acute respiratory failure with hypoxia (HCC)    COPD with acute exacerbation ([facility] [address]       PLAN:    1. Covid-19 pneumonia with hypoxia and acute respiratory failure; COPD with acute exacerbation -- Telemetry monitoring, con't pulse oximetry, supplemental oxygen prn, encourage prone positioning, RT protocols, prn albuterol, decadron, IV antibiotics, baricitinib, DVT prophylaxis, droplet plus isolation, supportive care, labs pending  2. Dehydration, mild -- home lasix held, monitor I&O and renal function -- prefer mild dehydration to fluid volume overload with covid-19  3. CHF, chronic combined -- monitor I&O and daily weight, 2D echo pending  4. Hypertension -- stable -- con't home medications and monitor  5. TSH pending -- his synthroid dose was increased 09/08/2021  6. Home medications reviewed  7. See orders     Note that over 50 minutes was spent in evaluation of the patient, review of the chart and pertinent records, discussion with family/staff, etc    KISHOR Meza - CNP,NP-C, FNP-BC  2:30 AM  12/9/2021  Attending Supervising Physicians Attestation Statement  I saw and evaluated the patient.   I discussed the findings and plans with nurse practitioner and agree as documented in her note     Electronically signed by Neal Cabrera MD on 12/9/21 at 2:07 PM EST                (Please note that portions of this note were completed with a voice recognition program. Efforts are made to edit these dictations, but occasionally words are mis-transcribed.)

## 2021-12-10 ENCOUNTER — APPOINTMENT (OUTPATIENT)
Dept: GENERAL RADIOLOGY | Age: 76
DRG: 177 | End: 2021-12-10
Payer: MEDICARE

## 2021-12-10 LAB
ABSOLUTE EOS #: 0 K/UL (ref 0–0.44)
ABSOLUTE IMMATURE GRANULOCYTE: 0.61 K/UL (ref 0–0.3)
ABSOLUTE LYMPH #: 0.85 K/UL (ref 1.1–3.7)
ABSOLUTE MONO #: 0.61 K/UL (ref 0.1–1.2)
ALBUMIN SERPL-MCNC: 3 G/DL (ref 3.5–5.2)
ALBUMIN/GLOBULIN RATIO: 0.9 (ref 1–2.5)
ALP BLD-CCNC: 82 U/L (ref 40–129)
ALT SERPL-CCNC: 22 U/L (ref 5–41)
ANION GAP SERPL CALCULATED.3IONS-SCNC: 12 MMOL/L (ref 9–17)
AST SERPL-CCNC: 43 U/L
BASOPHILS # BLD: 0 % (ref 0–2)
BASOPHILS ABSOLUTE: 0 K/UL (ref 0–0.2)
BILIRUB SERPL-MCNC: 0.5 MG/DL (ref 0.3–1.2)
BILIRUBIN DIRECT: 0.2 MG/DL
BILIRUBIN, INDIRECT: 0.3 MG/DL (ref 0–1)
BUN BLDV-MCNC: 43 MG/DL (ref 8–23)
C-REACTIVE PROTEIN: 164 MG/L (ref 0–5)
CALCIUM SERPL-MCNC: 9 MG/DL (ref 8.6–10.4)
CHLORIDE BLD-SCNC: 103 MMOL/L (ref 98–107)
CO2: 23 MMOL/L (ref 20–31)
CREAT SERPL-MCNC: 0.91 MG/DL (ref 0.7–1.2)
D-DIMER QUANTITATIVE: >20 MG/L FEU (ref 0–0.59)
DIFFERENTIAL TYPE: ABNORMAL
EOSINOPHILS RELATIVE PERCENT: 0 % (ref 1–4)
FERRITIN: 962 UG/L (ref 30–400)
GFR AFRICAN AMERICAN: >60 ML/MIN
GFR NON-AFRICAN AMERICAN: >60 ML/MIN
GFR SERPL CREATININE-BSD FRML MDRD: ABNORMAL ML/MIN/{1.73_M2}
GFR SERPL CREATININE-BSD FRML MDRD: ABNORMAL ML/MIN/{1.73_M2}
GLUCOSE BLD-MCNC: 133 MG/DL (ref 70–99)
HCT VFR BLD CALC: 38.6 % (ref 40.7–50.3)
HEMOGLOBIN: 12.3 G/DL (ref 13–17)
IMMATURE GRANULOCYTES: 5 %
LACTATE DEHYDROGENASE: 490 U/L (ref 135–225)
LYMPHOCYTES # BLD: 7 % (ref 24–43)
MCH RBC QN AUTO: 30 PG (ref 25.2–33.5)
MCHC RBC AUTO-ENTMCNC: 31.9 G/DL (ref 25.2–33.5)
MCV RBC AUTO: 94.1 FL (ref 82.6–102.9)
MONOCYTES # BLD: 5 % (ref 3–12)
MORPHOLOGY: ABNORMAL
NRBC AUTOMATED: 0.6 PER 100 WBC
PDW BLD-RTO: 15 % (ref 11.8–14.4)
PLATELET # BLD: 186 K/UL (ref 138–453)
PLATELET ESTIMATE: ABNORMAL
PMV BLD AUTO: 10.3 FL (ref 8.1–13.5)
POTASSIUM SERPL-SCNC: 4.4 MMOL/L (ref 3.7–5.3)
RBC # BLD: 4.1 M/UL (ref 4.21–5.77)
RBC # BLD: ABNORMAL 10*6/UL
SEG NEUTROPHILS: 83 % (ref 36–65)
SEGMENTED NEUTROPHILS ABSOLUTE COUNT: 10.13 K/UL (ref 1.5–8.1)
SODIUM BLD-SCNC: 138 MMOL/L (ref 135–144)
TOTAL PROTEIN: 6.5 G/DL (ref 6.4–8.3)
WBC # BLD: 12.2 K/UL (ref 3.5–11.3)
WBC # BLD: ABNORMAL 10*3/UL

## 2021-12-10 PROCEDURE — 36415 COLL VENOUS BLD VENIPUNCTURE: CPT

## 2021-12-10 PROCEDURE — 82728 ASSAY OF FERRITIN: CPT

## 2021-12-10 PROCEDURE — 71045 X-RAY EXAM CHEST 1 VIEW: CPT

## 2021-12-10 PROCEDURE — 2060000000 HC ICU INTERMEDIATE R&B

## 2021-12-10 PROCEDURE — 6360000002 HC RX W HCPCS: Performed by: NURSE PRACTITIONER

## 2021-12-10 PROCEDURE — 94761 N-INVAS EAR/PLS OXIMETRY MLT: CPT

## 2021-12-10 PROCEDURE — 2580000003 HC RX 258: Performed by: FAMILY MEDICINE

## 2021-12-10 PROCEDURE — 82248 BILIRUBIN DIRECT: CPT

## 2021-12-10 PROCEDURE — 85025 COMPLETE CBC W/AUTO DIFF WBC: CPT

## 2021-12-10 PROCEDURE — 94660 CPAP INITIATION&MGMT: CPT

## 2021-12-10 PROCEDURE — 80053 COMPREHEN METABOLIC PANEL: CPT

## 2021-12-10 PROCEDURE — 2700000000 HC OXYGEN THERAPY PER DAY

## 2021-12-10 PROCEDURE — 86140 C-REACTIVE PROTEIN: CPT

## 2021-12-10 PROCEDURE — 85379 FIBRIN DEGRADATION QUANT: CPT

## 2021-12-10 PROCEDURE — 6370000000 HC RX 637 (ALT 250 FOR IP): Performed by: FAMILY MEDICINE

## 2021-12-10 PROCEDURE — 94640 AIRWAY INHALATION TREATMENT: CPT

## 2021-12-10 PROCEDURE — 83615 LACTATE (LD) (LDH) ENZYME: CPT

## 2021-12-10 PROCEDURE — 6360000002 HC RX W HCPCS: Performed by: FAMILY MEDICINE

## 2021-12-10 PROCEDURE — 2580000003 HC RX 258: Performed by: NURSE PRACTITIONER

## 2021-12-10 RX ADMIN — ENOXAPARIN SODIUM 40 MG: 100 INJECTION SUBCUTANEOUS at 21:48

## 2021-12-10 RX ADMIN — LEVOTHYROXINE SODIUM 75 MCG: 0.07 TABLET ORAL at 06:52

## 2021-12-10 RX ADMIN — ATORVASTATIN CALCIUM 40 MG: 40 TABLET, FILM COATED ORAL at 08:01

## 2021-12-10 RX ADMIN — OXYBUTYNIN CHLORIDE 15 MG: 5 TABLET, EXTENDED RELEASE ORAL at 08:01

## 2021-12-10 RX ADMIN — SODIUM CHLORIDE, PRESERVATIVE FREE 10 ML: 5 INJECTION INTRAVENOUS at 21:49

## 2021-12-10 RX ADMIN — ALBUTEROL SULFATE 2 PUFF: 90 AEROSOL, METERED RESPIRATORY (INHALATION) at 16:13

## 2021-12-10 RX ADMIN — BARICITINIB 4 MG: 2 TABLET, FILM COATED ORAL at 08:01

## 2021-12-10 RX ADMIN — LISINOPRIL 20 MG: 20 TABLET ORAL at 12:51

## 2021-12-10 RX ADMIN — ALBUTEROL SULFATE 2 PUFF: 90 AEROSOL, METERED RESPIRATORY (INHALATION) at 12:00

## 2021-12-10 RX ADMIN — AZITHROMYCIN MONOHYDRATE 500 MG: 500 INJECTION, POWDER, LYOPHILIZED, FOR SOLUTION INTRAVENOUS at 00:12

## 2021-12-10 RX ADMIN — CLOPIDOGREL BISULFATE 75 MG: 75 TABLET ORAL at 08:01

## 2021-12-10 RX ADMIN — DEXTROSE AND SODIUM CHLORIDE: 5; 450 INJECTION, SOLUTION INTRAVENOUS at 05:34

## 2021-12-10 RX ADMIN — METOPROLOL TARTRATE 50 MG: 50 TABLET, FILM COATED ORAL at 12:51

## 2021-12-10 RX ADMIN — CEFTRIAXONE 1000 MG: 1 INJECTION, POWDER, FOR SOLUTION INTRAMUSCULAR; INTRAVENOUS at 23:19

## 2021-12-10 RX ADMIN — ENOXAPARIN SODIUM 40 MG: 100 INJECTION SUBCUTANEOUS at 08:01

## 2021-12-10 RX ADMIN — METOPROLOL TARTRATE 50 MG: 50 TABLET, FILM COATED ORAL at 21:48

## 2021-12-10 RX ADMIN — DEXAMETHASONE SODIUM PHOSPHATE 20 MG: 10 INJECTION, SOLUTION INTRAMUSCULAR; INTRAVENOUS at 08:12

## 2021-12-10 RX ADMIN — ALBUTEROL SULFATE 2 PUFF: 90 AEROSOL, METERED RESPIRATORY (INHALATION) at 19:30

## 2021-12-10 RX ADMIN — ALBUTEROL SULFATE 2 PUFF: 90 AEROSOL, METERED RESPIRATORY (INHALATION) at 08:21

## 2021-12-10 RX ADMIN — FINASTERIDE 5 MG: 5 TABLET, FILM COATED ORAL at 08:01

## 2021-12-10 RX ADMIN — AZITHROMYCIN MONOHYDRATE 500 MG: 500 INJECTION, POWDER, LYOPHILIZED, FOR SOLUTION INTRAVENOUS at 23:58

## 2021-12-10 RX ADMIN — ASPIRIN 81 MG: 81 TABLET, COATED ORAL at 08:01

## 2021-12-10 RX ADMIN — BUPROPION HYDROCHLORIDE 75 MG: 75 TABLET ORAL at 12:50

## 2021-12-10 ASSESSMENT — PAIN SCALES - GENERAL
PAINLEVEL_OUTOF10: 0

## 2021-12-10 NOTE — PLAN OF CARE
Problem: Airway Clearance - Ineffective  Goal: Achieve or maintain patent airway  12/9/2021 2257 by Derick Panchal RN  Outcome: Ongoing  12/9/2021 1439 by Lisa Munson RN  Outcome: Ongoing     Problem: Gas Exchange - Impaired  Goal: Absence of hypoxia  12/9/2021 2257 by Derick Panchal RN  Outcome: Ongoing  12/9/2021 1439 by Lisa Munson RN  Outcome: Ongoing  Goal: Promote optimal lung function  12/9/2021 2257 by Derick Panchal RN  Outcome: Ongoing  12/9/2021 1439 by Lisa Munson RN  Outcome: Ongoing     Problem: Breathing Pattern - Ineffective  Goal: Ability to achieve and maintain a regular respiratory rate  12/9/2021 2257 by Derick Panchal RN  Outcome: Ongoing  12/9/2021 1439 by Lisa Munson RN  Outcome: Ongoing     Problem:  Body Temperature -  Risk of, Imbalanced  Goal: Ability to maintain a body temperature within defined limits  12/9/2021 2257 by Derick Panchal RN  Outcome: Ongoing  12/9/2021 1439 by Lisa Munson RN  Outcome: Ongoing  Goal: Will regain or maintain usual level of consciousness  12/9/2021 2257 by Derick Panchal RN  Outcome: Ongoing  12/9/2021 1439 by Lisa Munson RN  Outcome: Ongoing  Goal: Complications related to the disease process, condition or treatment will be avoided or minimized  12/9/2021 2257 by Derick Panchal RN  Outcome: Ongoing  12/9/2021 1439 by Lisa Munson RN  Outcome: Ongoing     Problem: Isolation Precautions - Risk of Spread of Infection  Goal: Prevent transmission of infection  12/9/2021 2257 by Derick Panchal RN  Outcome: Ongoing  12/9/2021 1439 by Lisa Munson RN  Outcome: Ongoing     Problem: Nutrition Deficits  Goal: Optimize nutritional status  12/9/2021 2257 by Derick Panchal RN  Outcome: Ongoing  12/9/2021 1439 by Lisa Munson RN  Outcome: Ongoing     Problem: Risk for Fluid Volume Deficit  Goal: Maintain normal heart rhythm  12/9/2021 2257 by Derick Panchal RN  Outcome: Ongoing  12/9/2021 1439 by Josh Hernandez RN  Outcome: Ongoing  Goal: Maintain absence of muscle cramping  12/9/2021 2257 by Belén Michaud RN  Outcome: Ongoing  12/9/2021 1439 by Josh Hernandez RN  Outcome: Ongoing  Goal: Maintain normal serum potassium, sodium, calcium, phosphorus, and pH  12/9/2021 2257 by Belén Michaud RN  Outcome: Ongoing  12/9/2021 1439 by Josh Hernandez RN  Outcome: Ongoing     Problem: Loneliness or Risk for Loneliness  Goal: Demonstrate positive use of time alone when socialization is not possible  12/9/2021 2257 by Belén Michaud RN  Outcome: Ongoing  12/9/2021 1439 by Josh Hernandez RN  Outcome: Ongoing     Problem: Fatigue  Goal: Verbalize increase energy and improved vitality  12/9/2021 2257 by Belén Michaud RN  Outcome: Ongoing  12/9/2021 1439 by Josh Hernandez RN  Outcome: Ongoing     Problem: Patient Education: Go to Patient Education Activity  Goal: Patient/Family Education  12/9/2021 2257 by Belén Michaud RN  Outcome: Ongoing  12/9/2021 1439 by Josh Hernandez RN  Outcome: Ongoing     Problem: Skin Integrity:  Goal: Will show no infection signs and symptoms  Description: Will show no infection signs and symptoms  12/9/2021 2257 by Belén Michaud RN  Outcome: Ongoing  12/9/2021 1439 by Josh Hernandez RN  Outcome: Ongoing  Goal: Absence of new skin breakdown  Description: Absence of new skin breakdown  12/9/2021 2257 by Belén Michaud RN  Outcome: Ongoing  12/9/2021 1439 by Josh Hernandez RN  Outcome: Ongoing     Problem: Falls - Risk of:  Goal: Will remain free from falls  Description: Will remain free from falls  12/9/2021 2257 by Belén Michaud RN  Outcome: Ongoing  12/9/2021 1439 by Josh Hernandez RN  Outcome: Ongoing  Goal: Absence of physical injury  Description: Absence of physical injury  12/9/2021 2257 by Belén Michaud RN  Outcome: Ongoing  12/9/2021 1439 by Josh Hernandez RN  Outcome: Ongoing

## 2021-12-10 NOTE — ACP (ADVANCE CARE PLANNING)
Advance Care Planning     Advance Care Planning Activator (Inpatient)  Conversation Note      Date of ACP Conversation: 12/10/2021     Conversation Conducted with: Patient with Decision Making Capacity   Healthcare Decision Maker: Next of Kin by law (only applies in absence of above) (name) Spouse Angelica Nurse    ACP Activator: Claudette Hacker, [address] [facility] Decision Maker:     Current Designated Health Care Decision Maker:     Click here to complete Healthcare Decision Makers including section of the Healthcare Decision Maker Relationship (ie \"Primary\")  Today we documented Decision Maker(s) consistent with Legal Next of Kin hierarchy. Care Preferences    Ventilation: \"If you were in your present state of health and suddenly became very ill and were unable to breathe on your own, what would your preference be about the use of a ventilator (breathing machine) if it were available to you? \"      Would the patient desire the use of ventilator (breathing machine)?: \"He always told me no life support. That was always his orders. But, if it is a temporary thing then he would want it\". \"If your health worsens and it becomes clear that your chance of recovery is unlikely, what would your preference be about the use of a ventilator (breathing machine) if it were available to you? \"     Would the patient desire the use of ventilator (breathing machine)?: No      Resuscitation  \"CPR works best to restart the heart when there is a sudden event, like a heart attack, in someone who is otherwise healthy. Unfortunately, CPR does not typically restart the heart for people who have serious health conditions or who are very sick. \"    \"In the event your heart stopped as a result of an underlying serious health condition, would you want attempts to be made to restart your heart (answer \"yes\" for attempt to resuscitate) or would you prefer a natural death (answer \"no\" for do not attempt to resuscitate)? \" yes       [] Yes   [x] No   Educated Patient / [place] regarding differences between Advance Directives and portable DNR orders. Length of ACP Conversation in minutes:  9 minutes     Conversation Outcomes:  [x] ACP discussion completed  [] Existing advance directive reviewed with patient; no changes to patient's previously recorded wishes  [] New Advance Directive completed  [] Portable Do Not Rescitate prepared for Provider review and signature  [] POLST/POST/MOLST/MOST prepared for Provider review and signature      ----ACP conversation completed with patients spouse Tobi Mcleod. She states patient has an Advance Directive. Wife is primary Healthcare Power of  and son Karla Haines is [address] [facility]. EDIE encouraged copy for medical records. Patient on BiPAP and unable to answer questions at this time however, told nurse EDIE could contact his wife to complete assessment.     Follow-up plan:    [] Schedule follow-up conversation to continue planning  [] Referred individual to Provider for additional questions/concerns   [] Advised patient/agent/surrogate to review completed ACP document and update if needed with changes in condition, patient preferences or care setting    [x] This note routed to one or more involved healthcare providers

## 2021-12-10 NOTE — FLOWSHEET NOTE
Pt's wife, Jalen Morgan, updated regarding plan of care. All questions answered at this time. Pt's wife verbalizes understanding. Wife asked to bring in pt's wellbutrin, states she will shortly.

## 2021-12-10 NOTE — PROGRESS NOTES
Comprehensive Nutrition Assessment    Type and Reason for Visit:  Initial    Nutrition Recommendations/Plan: 1. Continue current diet and encourage po intake as able. Recommend ONS if able to be off BIPAP. 2. If unable to consume adequate nutrition intake consider nutrition support    Nutrition Assessment:  Admission related to +COVID-19 virus- placed in droplet plus precautions. Pt has been sick for 9 days. Pt currently on BiPAP- unable to answer questions. No reports of weight loss, no change in appetite. Difficulty chewing or swallowing food only when dentures arent in. Suggest ONS if pt able to be off BiPAP long enough to eat. If unable to eat consider nutrition support. Monitor pt status, weights and labs. Malnutrition Assessment:  Malnutrition Status:  Insufficient data      Estimated Daily Nutrient Needs:  Energy (kcal):  4744-7596 kcal (16-18 kcal/kg); Weight Used for Energy Requirements: Current  Protein (g):  113-138 gm protein (1.4-1.7 g/kg); Weight Used for Protein Requirements:  Ideal           Nutrition Related Findings:  +COVID-19 virus. Pt hypoxic at 46% on room air. Loss of appetite. Active bowel sounds. Edema: BLE +1 non-pitting. Wounds:  None       Current Nutrition Therapies:    ADULT DIET; Regular; 4 carb choices (60 gm/meal);  Low Fat/Low Chol/High Fiber/PARISH    Anthropometric Measures:  · Height: 6' (182.9 cm)  · Current Body Weight: 293 lb 8 oz (133.1 kg)   · Admission Body Weight: 315 lb (142.9 kg)    · Usual Body Weight: 325 lb 3.2 oz (147.5 kg) (1 year ago)     · Ideal Body Weight: 178 lbs; % Ideal Body Weight 164.9 %   · BMI: 39.8  · Adjusted Body Weight:  ; No Adjustment   · BMI Categories: Obese Class 2 (BMI 35.0 -39.9)       Nutrition Diagnosis:   · Inadequate oral intake related to inadequate protein-energy intake, impaired respiratory function as evidenced by intake 0-25%, weight loss (COVID-19 virus requiring BiPAP)    Nutrition Interventions:   Food and/or Nutrient Delivery:  Continue Current Diet  Nutrition Education/Counseling:  Education not indicated   Coordination of Nutrition Care:  Continue to monitor while inpatient    Goals:  PO intakes to provide >50% of estimated nutritional needs       Nutrition Monitoring and Evaluation:   Behavioral-Environmental Outcomes:  None Identified   Food/Nutrient Intake Outcomes:  Diet Advancement/Tolerance, Food and Nutrient Intake  Physical Signs/Symptoms Outcomes:  Biochemical Data, Chewing or Swallowing, Fluid Status or Edema, Weight     Discharge Planning:     Too soon to determine     Mike Ross, [address] [place] [address], [address] [place] [address]  Office Number: [phone]

## 2021-12-10 NOTE — PROGRESS NOTES
DISCHARGE BARRIERS      Assessment completed with patients spouse Uziel Bess. She states patient has an Advance Directive. Wife is primary Healthcare Power of  and son Armstead Habermann is [address] EDIE encouraged copy for medical records. Patient on BiPAP and unable to answer questions at this time however, told nurse SW could contact his wife to complete assessment. Reason for Referral:  SW completed a Psychosocial Assessment for evaluation of patient's mental health, social status, and functional capacity within the community. Dario Carcamo is a 67 y.o. male admitted due to COVID-19 virus. SW provided supportive listening while spouse discussed patients past medical history and events leading up to hospitalization. Mental Status:  Not assessed at this time. Decision Making:  Makes own decisions. Family/Social/Home Environment: Lives with spouse in [place] [address]   Support: Discussed a good social support network   Current Services: None  Current DMEs: Walker and cane. PCP: Eran Rodriguez MD and repots no issues affording medication.  status:  [employer]  ADLs and means of transportation: Independent in ADLs and able to transport himself. Food insecurity or needed financial assistance: Denies any food insecurity or financial concerns at this time. ACP and Code Status:  Dario Carcamo is a Full Code status and has an Advance Directive. Wife is primary Healthcare Power of  and son Armstead Habermann is [address] EDIE encouraged copy for medical records. Collaborative List of SNF/ECF/HH were provided: Not assessed at this time. No discharge order at this time. Anticipated Needs/Discharge Plan: SW will continue to monitor needs and assist as appropriate.           Electronically signed by VISHAL Powers on 12/10/2021 at 10:19 AM

## 2021-12-10 NOTE — PROGRESS NOTES
Hospitalist Progress Note  12/10/2021 11:27 AM  Subjective:   Admit Date: 12/8/2021  PCP: Lolly Menendez MD     Full Code      C/c:  Chief Complaint   Patient presents with    Respiratory Distress         Interval History: doing better, still on bipap, xray unchanged    Diet: ADULT DIET; Regular; 4 carb choices (60 gm/meal);  Low Fat/Low Chol/High Fiber/PARISH                                ip days:2  Medications:   Scheduled Meds:   dexamethasone  20 mg IntraVENous Daily    enoxaparin  40 mg SubCUTAneous BID    albuterol sulfate HFA  2 puff Inhalation 4x daily    aspirin  81 mg Oral Daily    atorvastatin  40 mg Oral Daily    buPROPion  75 mg Oral Daily    clopidogrel  75 mg Oral Daily    levothyroxine  75 mcg Oral Daily    finasteride  5 mg Oral Daily    oxybutynin  15 mg Oral Daily    metoprolol tartrate  50 mg Oral BID    lisinopril  20 mg Oral Daily    sodium chloride flush  5-40 mL IntraVENous 2 times per day    cefTRIAXone (ROCEPHIN) IV  1,000 mg IntraVENous Q24H    azithromycin  500 mg IntraVENous Q24H    baricitinib  4 mg Oral Daily     Continuous Infusions:   dextrose 5 % and 0.45 % NaCl 75 mL/hr at 12/10/21 1106    sodium chloride Stopped (12/09/21 1026)     PRN Meds:.benzonatate, guaiFENesin-dextromethorphan, albuterol sulfate HFA, sodium chloride flush, sodium chloride, polyethylene glycol, acetaminophen **OR** acetaminophen     CBC:   Recent Labs     12/08/21  1522 12/09/21  0537 12/10/21  0457   WBC 16.6* 9.9 12.2*   HGB 15.1 13.1 12.3*    183 186     BMP:    Recent Labs     12/08/21  1522 12/09/21  0537 12/10/21  0457    140 138   K 4.5 4.4 4.4   CL 99 104 103   CO2 20 22 23   BUN 36* 36* 43*   CREATININE 1.25* 0.97 0.91   GLUCOSE 140* 168* 133*     Hepatic:   Recent Labs     12/08/21  1522 12/09/21  0537 12/10/21  0457   AST 40* 32 43*   ALT 18 15 22   BILITOT 1.09 0.72 0.50   ALKPHOS 93 98 82     Troponin: No results for input(s): TROPONINI in the last 72 hours.  BNP: No results for input(s): BNP in the last 72 hours. Lipids: No results for input(s): CHOL, HDL in the last 72 hours.     Invalid input(s): LDLCALCU  INR:   Recent Labs     12/09/21  0537   INR 1.3       Objective:   Vitals: BP (!) 160/86   Pulse 66   Temp 97 °F (36.1 °C) (Temporal)   Resp (!) 44   Ht 6' (1.829 m)   Wt 293 lb 8 oz (133.1 kg)   SpO2 93%   BMI 39.81 kg/m²   General appearance: alert, appears stated age and cooperative  Skin: Skin color, texture, turgor normal. No rashes or lesions  Lungs: clear to auscultation bilaterally  Heart: regular rate and rhythm, S1, S2 normal, no murmur, click, rub or gallop  Abdomen: soft, non-tender; bowel sounds normal; no masses,  no organomegaly  Extremities: extremities normal, atraumatic, no cyanosis or edema  Neurologic: Mental status: Alert, oriented, thought content appropriate    Prophylaxis:   DVT with  [x] lovenox        [] heparin        [] Scd        [] none:     Radiology:  ECHO Complete 2D W Doppler W Color    Result Date: 12/9/2021  Transthoracic Echocardiography Report (TTE)  Patient Name [facility]     Date of Study               12/09/2021               Lambertlynn Santana   Date of      1945  Gender                      Male  Birth   Age          67 year(s)  Race                           Room Number  0209        Height:                     72 inch, 182.88 cm   Corporate ID [id number]    Weight:                     315 pounds, 142.9 kg  #   Patient Acct [de-identified]   BSA:          2.58 m^2      BMI:       42.72  #                                                               kg/m^2   MR #         [id number]     [facility]   Accession #  [id number]  Interpreting Physician      Micheal Hwang   Fellow                   Referring Nurse                           Practitioner   Interpreting             Referring Physician         Luke Escalante CNP  Fellow  Additional Comments Technically difficult study due to body habitus and condition. Type of Study   TTE procedure:2D Echocardiogram, M-Mode, Doppler, Color Doppler. Procedure Date Date: 12/09/2021 Start: 10:29 AM Study Location: [facility] Technical Quality: Adequate visualization Indications:Shortness of breath, Hypoxia, Congestive heart failure, combined systolic and diastolic dysfunction and COVID 19. History / Tech. Comments: Procedure explained to patient. Patient Status: Inpatient Height: 72 inches Weight: 315.01 pounds BSA: 2.58 m^2 BMI: 42.72 kg/m^2 CONCLUSIONS Summary Technically difficult echo. LV systolic function is mildly reduced, estimated EF 40% Abnormal septal wall motion. Hypokinetic mid to basal inferolateral, inferior and anterolateral walls. Grade I left ventricular diastolic dysfunction. Right ventricular dilatation with reduced systolic function. Aortic valve sclerosis without stenosis. Mild tricuspid regurgitation. Estimated right ventricular systolic pressure is 55 mmHg. No pericardial effusion. Signature ----------------------------------------------------------------------------  Electronically signed by Krissy Lin RDCS(Sonographer) on 12/09/2021  11:57 AM ---------------------------------------------------------------------------- ----------------------------------------------------------------------------  Electronically signed by Linda HwangInterpreting physician) on  12/09/2021 12:23 PM ---------------------------------------------------------------------------- FINDINGS Left Atrium Left atrium is mildly dilated. Left Ventricle Left ventricle is moderately dilated. Mildly hypertrophic interventricular septum. Abnormal septal wall motion. Hypokinetic inferoseptal and inferior walls. Left ventricular ejection fraction 40 %. Grade I left ventricular diastolic dysfunction. Right Atrium Right atrial dilatation. Right Ventricle Right ventricular dilatation with reduced systolic function.  Mitral Valve Normal mitral valve structure and function. Aortic Valve Aortic valve sclerosis without stenosis. Tricuspid Valve Normal tricuspid valve leaflets. Mild tricuspid regurgitation. Estimated right ventricular systolic pressure is 55 mmHg. Moderate pulmonary hypertension. Pulmonic Valve The pulmonic valve is normal in structure. Pericardial Effusion No significant pericardial effusion is seen. Miscellaneous Aortic root dimension is at upper limit of normal. IVC not visualized, unable to assess size and respiratory collapse. E/E' average = 8. M-mode / 2D Measurements & Calculations:   LVIDd:6.43 cm(3.7 - 5.6 cm)      Diastolic JMRRMH:369.3 ml  LVIDs:6 cm(2.2 - 4.0 cm)         Systolic ZRLCHA:608.9 ml  IVSd:1.27 cm(0.6 - 1.1 cm)       LA Dimension: 4.5 cm(1.9 - 4.0 cm)  LVPWd:0.88 cm(0.6 - 1.1 cm)      LA volume/Index: 79 ml /31m^2  Fractional Shortenin.69 %     LVOT:3.7 cm  Calculated LVEF (%): 42.12 %   Mitral:                                  Aortic   Peak E-Wave: 0.50 m/s                    Peak Velocity: 1.10 m/s  Peak A-Wave: 0.72 m/s                    Peak Gradient: 4.84 mmHg  E/A Ratio: 0.7  Peak Gradient: 1 mmHg  Deceleration Time: 430 msec   Tricuspid:                               Pulmonic:   Peak TR Velocity: 3.44 m/s               Peak Velocity: 0.90 m/s  Peak TR Gradient: 47.3344 mmHg           Peak Gradient: 3.21 mmHg  Septal Wall E' velocity:0.04 m/s Lateral Wall E' velocity:0.10 m/s    XR CHEST PORTABLE    Result Date: 2021  EXAMINATION: ONE XRAY VIEW OF THE CHEST 2021 9:47 am COMPARISON: 2021 HISTORY: ORDERING SYSTEM PROVIDED HISTORY: covid TECHNOLOGIST PROVIDED HISTORY: covid Reason for Exam: Follow up due to inpatient status. FINDINGS: Sternal wires are in place. The heart and mediastinal structures are stable. Bilateral lung infiltrates are present similar to prior exam.  Findings are consistent with COVID-19 pneumonia.      Persistent bilateral lung infiltrates compatible with the patient's clinical diagnosis of COVID-19 pneumonia. XR CHEST PORTABLE    Result Date: 12/8/2021  EXAMINATION: ONE XRAY VIEW OF THE CHEST 12/8/2021 3:46 pm COMPARISON: None. HISTORY: ORDERING SYSTEM PROVIDED HISTORY: shortness of breath TECHNOLOGIST PROVIDED HISTORY: shortness of breath Reason for Exam: Respiratory Distress FINDINGS: Status post median sternotomy. Cardiomegaly. Bilateral ill-defined pulmonary opacities. No pneumothorax. No pleural effusion. Bilateral ill-defined pulmonary opacities could represent edema or infection     CT CHEST PULMONARY EMBOLISM W CONTRAST    Result Date: 12/8/2021  EXAMINATION: CTA OF THE CHEST 12/8/2021 11:45 am TECHNIQUE: CTA of the chest was performed after the administration of intravenous contrast.  Multiplanar reformatted images are provided for review. MIP images are provided for review. Dose modulation, iterative reconstruction, and/or weight based adjustment of the mA/kV was utilized to reduce the radiation dose to as low as reasonably achievable. COMPARISON: Chest x-ray dated December 8, 2021 HISTORY: ORDERING SYSTEM PROVIDED HISTORY: Patient here with positive Covid elevated D-dimer TECHNOLOGIST PROVIDED HISTORY: Patient here with positive Covid elevated D-dimer Decision Support Exception - unselect if not a suspected or confirmed emergency medical condition->Emergency Medical Condition (MA) Reason for Exam: r/o pe FINDINGS: Pulmonary Arteries: Pulmonary arteries are adequately opacified for evaluation. No evidence of intraluminal filling defect to suggest pulmonary embolism. Main pulmonary artery is slightly enlarged, measuring 32 mm, suggesting pulmonary arterial hypertension. . Mediastinum: Nonspecific mediastinal and hilar lymph nodes are present, likely reactive. Coronary arterial calcifications are present. Patient is status post prior CABG. Multiple calcified right hilar and subcarinal lymph nodes are present. .  The heart and pericardium demonstrate no acute abnormality. There is no acute abnormality of the thoracic aorta. Lungs/pleura: Multiple ground-glass opacities are present throughout the lungs, highly suggestive of COVID-19 pulmonary disease given the clinical history. No pneumothorax or pleural effusion is present. Emphysematous changes are present bilaterally within the lungs, with an upper lobe predominance. Upper Abdomen: Limited images of the upper abdomen are unremarkable. Soft Tissues/Bones: No acute bone or soft tissue abnormality. 1. No evidence of pulmonary embolism 2. Extensive ground-glass opacities present throughout the lungs bilaterally, highly suggestive of COVID-19 pulmonary disease given the clinical history 3. Prior CABG       Assessment :   1. covid 19/superimposed pneumonia/on rocephin/azithromycin/bacinitib  2. Continue present care     Plan:   1. Continue present care  2.   See order    Patient Active Problem List:     Hypertension     Osteoarthritis     Pneumonia due to COVID-19 virus     Chronic venous insufficiency     Acquired genu valgum     Degeneration of lumbar intervertebral disc     Lower urinary tract symptoms due to benign prostatic hyperplasia     Presence of right artificial knee joint     Bilateral carotid artery stenosis     Hypothyroidism     Ischemic cardiomyopathy     Ventral hernia     Left bundle branch block (LBBB)     Chronic embolism and thrombosis of deep vein of both distal legs (HCC)     Coronary artery disease involving native coronary artery of native heart     Hyperlipidemia     Class 3 severe obesity due to excess calories with serious comorbidity and body mass index (BMI) of 40.0 to 44.9 in [facility])     Chronic combined systolic and diastolic CHF (congestive heart failure) (HCC)     Impaired gait     Emphysema/COPD (HCC)     Acute respiratory failure with hypoxia (HCC)     COPD with acute exacerbation ([address]      Anticipated Disposition upon discharge: [] Home

## 2021-12-10 NOTE — PLAN OF CARE
Nutrition Problem #1: Inadequate oral intake  Intervention: Food and/or Nutrient Delivery: Continue Current Diet  Nutritional Goals: PO intakes to provide >50% of estimated nutritional needs

## 2021-12-10 NOTE — PLAN OF CARE
Problem: Airway Clearance - Ineffective  Goal: Achieve or maintain patent airway  12/10/2021 1104 by Vee Horan RN  Outcome: Ongoing  12/9/2021 2257 by Belén Michaud RN  Outcome: Ongoing     Problem: Gas Exchange - Impaired  Goal: Absence of hypoxia  12/10/2021 1104 by Vee Horan RN  Outcome: Ongoing  12/9/2021 2257 by Belén Michaud RN  Outcome: Ongoing  Goal: Promote optimal lung function  12/10/2021 1104 by Vee Horan RN  Outcome: Ongoing  12/9/2021 2257 by Belén Michaud RN  Outcome: Ongoing     Problem: Breathing Pattern - Ineffective  Goal: Ability to achieve and maintain a regular respiratory rate  12/10/2021 1104 by Vee Horan RN  Outcome: Ongoing  12/9/2021 2257 by Belén Michaud RN  Outcome: Ongoing     Problem:  Body Temperature -  Risk of, Imbalanced  Goal: Ability to maintain a body temperature within defined limits  12/10/2021 1104 by Vee Horan RN  Outcome: Ongoing  12/9/2021 2257 by Belén Michaud RN  Outcome: Ongoing  Goal: Will regain or maintain usual level of consciousness  12/10/2021 1104 by Vee Horan RN  Outcome: Ongoing  12/9/2021 2257 by Belén Michaud RN  Outcome: Ongoing  Goal: Complications related to the disease process, condition or treatment will be avoided or minimized  12/10/2021 1104 by Vee Horan RN  Outcome: Ongoing  12/9/2021 2257 by Belén Michaud RN  Outcome: Ongoing     Problem: Isolation Precautions - Risk of Spread of Infection  Goal: Prevent transmission of infection  12/10/2021 1104 by Vee Horan RN  Outcome: Ongoing  12/9/2021 2257 by Belén Michaud RN  Outcome: Ongoing     Problem: Nutrition Deficits  Goal: Optimize nutritional status  12/10/2021 1104 by Vee Horan RN  Outcome: Ongoing  12/9/2021 2257 by Belén Michuad RN  Outcome: Ongoing     Problem: Risk for Fluid Volume Deficit  Goal: Maintain normal heart rhythm  12/10/2021 1104 by Vee Horan RN  Outcome: Ongoing  12/9/2021 2257 by Ezequiel Lepe Angie Villarreal RN  Outcome: Ongoing  Goal: Maintain absence of muscle cramping  12/10/2021 1104 by Cristofer Pool RN  Outcome: Ongoing  12/9/2021 2257 by Gregg Milner RN  Outcome: Ongoing  Goal: Maintain normal serum potassium, sodium, calcium, phosphorus, and pH  12/10/2021 1104 by Cristofer Pool RN  Outcome: Ongoing  12/9/2021 2257 by Gregg Milner RN  Outcome: Ongoing     Problem: Loneliness or Risk for Loneliness  Goal: Demonstrate positive use of time alone when socialization is not possible  12/10/2021 1104 by Cristofer Pool RN  Outcome: Ongoing  12/9/2021 2257 by Gregg Milner RN  Outcome: Ongoing     Problem: Fatigue  Goal: Verbalize increase energy and improved vitality  12/10/2021 1104 by Cristofer Pool RN  Outcome: Ongoing  12/9/2021 2257 by Gregg Milner RN  Outcome: Ongoing     Problem: Patient Education: Go to Patient Education Activity  Goal: Patient/Family Education  12/10/2021 1104 by Cristofer Pool RN  Outcome: Ongoing  12/9/2021 2257 by Gregg Milner RN  Outcome: Ongoing     Problem: Skin Integrity:  Goal: Will show no infection signs and symptoms  Description: Will show no infection signs and symptoms  12/10/2021 1104 by Cristofer Pool RN  Outcome: Ongoing  12/9/2021 2257 by Gregg Milner RN  Outcome: Ongoing  Goal: Absence of new skin breakdown  Description: Absence of new skin breakdown  12/10/2021 1104 by Cristofer Pool RN  Outcome: Ongoing  12/9/2021 2257 by Gregg Milner RN  Outcome: Ongoing     Problem: Falls - Risk of:  Goal: Will remain free from falls  Description: Will remain free from falls  12/10/2021 1104 by Cristofer Pool RN  Outcome: Ongoing  12/9/2021 2257 by Gregg Milner RN  Outcome: Ongoing  Goal: Absence of physical injury  Description: Absence of physical injury  12/10/2021 1104 by Cristofer Pool RN  Outcome: Ongoing  12/9/2021 2257 by Gregg Milner RN  Outcome: Ongoing

## 2021-12-11 ENCOUNTER — APPOINTMENT (OUTPATIENT)
Dept: GENERAL RADIOLOGY | Age: 76
DRG: 177 | End: 2021-12-11
Payer: MEDICARE

## 2021-12-11 LAB
ABSOLUTE EOS #: 0 K/UL (ref 0–0.4)
ABSOLUTE IMMATURE GRANULOCYTE: 0.42 K/UL (ref 0–0.3)
ABSOLUTE LYMPH #: 0 K/UL (ref 1–4.8)
ABSOLUTE MONO #: 0.11 K/UL (ref 0.1–1.2)
ALBUMIN SERPL-MCNC: 3 G/DL (ref 3.5–5.2)
ALBUMIN/GLOBULIN RATIO: 1 (ref 1–2.5)
ALP BLD-CCNC: 79 U/L (ref 40–129)
ALT SERPL-CCNC: 28 U/L (ref 5–41)
ANION GAP SERPL CALCULATED.3IONS-SCNC: 12 MMOL/L (ref 9–17)
AST SERPL-CCNC: 40 U/L
BASOPHILS # BLD: 0 % (ref 0–2)
BASOPHILS ABSOLUTE: 0 K/UL (ref 0–0.2)
BILIRUB SERPL-MCNC: 0.45 MG/DL (ref 0.3–1.2)
BILIRUBIN DIRECT: 0.21 MG/DL
BILIRUBIN, INDIRECT: 0.24 MG/DL (ref 0–1)
BNP INTERPRETATION: ABNORMAL
BUN BLDV-MCNC: 34 MG/DL (ref 8–23)
C-REACTIVE PROTEIN: 53.5 MG/L (ref 0–5)
CALCIUM SERPL-MCNC: 8.8 MG/DL (ref 8.6–10.4)
CHLORIDE BLD-SCNC: 103 MMOL/L (ref 98–107)
CO2: 22 MMOL/L (ref 20–31)
CREAT SERPL-MCNC: 0.79 MG/DL (ref 0.7–1.2)
D-DIMER QUANTITATIVE: >20 MG/L FEU (ref 0–0.59)
DIFFERENTIAL TYPE: ABNORMAL
EOSINOPHILS RELATIVE PERCENT: 0 % (ref 1–8)
FERRITIN: 715 UG/L (ref 30–400)
GFR AFRICAN AMERICAN: >60 ML/MIN
GFR NON-AFRICAN AMERICAN: >60 ML/MIN
GFR SERPL CREATININE-BSD FRML MDRD: ABNORMAL ML/MIN/{1.73_M2}
GFR SERPL CREATININE-BSD FRML MDRD: ABNORMAL ML/MIN/{1.73_M2}
GLUCOSE BLD-MCNC: 123 MG/DL (ref 70–99)
HCT VFR BLD CALC: 38.1 % (ref 40.7–50.3)
HEMOGLOBIN: 11.9 G/DL (ref 13–17)
IMMATURE GRANULOCYTES: 4 %
LACTATE DEHYDROGENASE: 427 U/L (ref 135–225)
LYMPHOCYTES # BLD: 0 % (ref 15–43)
MCH RBC QN AUTO: 29.9 PG (ref 25.2–33.5)
MCHC RBC AUTO-ENTMCNC: 31.2 G/DL (ref 25.2–33.5)
MCV RBC AUTO: 95.7 FL (ref 82.6–102.9)
MONOCYTES # BLD: 1 % (ref 6–14)
MORPHOLOGY: ABNORMAL
MORPHOLOGY: ABNORMAL
NRBC AUTOMATED: 0.3 PER 100 WBC
PDW BLD-RTO: 14.7 % (ref 11.8–14.4)
PLATELET # BLD: 200 K/UL (ref 138–453)
PLATELET ESTIMATE: ABNORMAL
PMV BLD AUTO: 10.3 FL (ref 8.1–13.5)
POTASSIUM SERPL-SCNC: 4.8 MMOL/L (ref 3.7–5.3)
PRO-BNP: 1689 PG/ML
RBC # BLD: 3.98 M/UL (ref 4.21–5.77)
RBC # BLD: ABNORMAL 10*6/UL
SEG NEUTROPHILS: 95 % (ref 44–74)
SEGMENTED NEUTROPHILS ABSOLUTE COUNT: 9.97 K/UL (ref 1.5–8.1)
SODIUM BLD-SCNC: 137 MMOL/L (ref 135–144)
TOTAL PROTEIN: 6 G/DL (ref 6.4–8.3)
WBC # BLD: 10.5 K/UL (ref 3.5–11.3)
WBC # BLD: ABNORMAL 10*3/UL

## 2021-12-11 PROCEDURE — 6360000002 HC RX W HCPCS: Performed by: FAMILY MEDICINE

## 2021-12-11 PROCEDURE — 80053 COMPREHEN METABOLIC PANEL: CPT

## 2021-12-11 PROCEDURE — 82728 ASSAY OF FERRITIN: CPT

## 2021-12-11 PROCEDURE — 2580000003 HC RX 258: Performed by: FAMILY MEDICINE

## 2021-12-11 PROCEDURE — 36415 COLL VENOUS BLD VENIPUNCTURE: CPT

## 2021-12-11 PROCEDURE — 94761 N-INVAS EAR/PLS OXIMETRY MLT: CPT

## 2021-12-11 PROCEDURE — 6370000000 HC RX 637 (ALT 250 FOR IP): Performed by: FAMILY MEDICINE

## 2021-12-11 PROCEDURE — 2700000000 HC OXYGEN THERAPY PER DAY

## 2021-12-11 PROCEDURE — 82248 BILIRUBIN DIRECT: CPT

## 2021-12-11 PROCEDURE — 6360000002 HC RX W HCPCS: Performed by: NURSE PRACTITIONER

## 2021-12-11 PROCEDURE — 83880 ASSAY OF NATRIURETIC PEPTIDE: CPT

## 2021-12-11 PROCEDURE — 85025 COMPLETE CBC W/AUTO DIFF WBC: CPT

## 2021-12-11 PROCEDURE — 94660 CPAP INITIATION&MGMT: CPT

## 2021-12-11 PROCEDURE — 85379 FIBRIN DEGRADATION QUANT: CPT

## 2021-12-11 PROCEDURE — 2060000000 HC ICU INTERMEDIATE R&B

## 2021-12-11 PROCEDURE — 86140 C-REACTIVE PROTEIN: CPT

## 2021-12-11 PROCEDURE — 99232 SBSQ HOSP IP/OBS MODERATE 35: CPT | Performed by: INTERNAL MEDICINE

## 2021-12-11 PROCEDURE — 2580000003 HC RX 258: Performed by: NURSE PRACTITIONER

## 2021-12-11 PROCEDURE — 94640 AIRWAY INHALATION TREATMENT: CPT

## 2021-12-11 PROCEDURE — 6360000002 HC RX W HCPCS: Performed by: INTERNAL MEDICINE

## 2021-12-11 PROCEDURE — 71045 X-RAY EXAM CHEST 1 VIEW: CPT

## 2021-12-11 PROCEDURE — 83615 LACTATE (LD) (LDH) ENZYME: CPT

## 2021-12-11 RX ORDER — FUROSEMIDE 10 MG/ML
20 INJECTION INTRAMUSCULAR; INTRAVENOUS DAILY
Status: DISCONTINUED | OUTPATIENT
Start: 2021-12-11 | End: 2022-01-08 | Stop reason: HOSPADM

## 2021-12-11 RX ADMIN — BARICITINIB 4 MG: 2 TABLET, FILM COATED ORAL at 08:53

## 2021-12-11 RX ADMIN — DEXTROSE AND SODIUM CHLORIDE: 5; 450 INJECTION, SOLUTION INTRAVENOUS at 01:57

## 2021-12-11 RX ADMIN — FINASTERIDE 5 MG: 5 TABLET, FILM COATED ORAL at 08:53

## 2021-12-11 RX ADMIN — BUPROPION HYDROCHLORIDE 75 MG: 75 TABLET ORAL at 08:59

## 2021-12-11 RX ADMIN — SODIUM CHLORIDE, PRESERVATIVE FREE 10 ML: 5 INJECTION INTRAVENOUS at 21:27

## 2021-12-11 RX ADMIN — LEVOTHYROXINE SODIUM 75 MCG: 0.07 TABLET ORAL at 06:16

## 2021-12-11 RX ADMIN — ALBUTEROL SULFATE 2 PUFF: 90 AEROSOL, METERED RESPIRATORY (INHALATION) at 12:03

## 2021-12-11 RX ADMIN — FUROSEMIDE 20 MG: 10 INJECTION, SOLUTION INTRAMUSCULAR; INTRAVENOUS at 16:03

## 2021-12-11 RX ADMIN — LISINOPRIL 20 MG: 20 TABLET ORAL at 08:53

## 2021-12-11 RX ADMIN — METOPROLOL TARTRATE 50 MG: 50 TABLET, FILM COATED ORAL at 21:27

## 2021-12-11 RX ADMIN — DEXAMETHASONE SODIUM PHOSPHATE 20 MG: 10 INJECTION, SOLUTION INTRAMUSCULAR; INTRAVENOUS at 08:54

## 2021-12-11 RX ADMIN — ATORVASTATIN CALCIUM 40 MG: 40 TABLET, FILM COATED ORAL at 08:53

## 2021-12-11 RX ADMIN — OXYBUTYNIN CHLORIDE 15 MG: 5 TABLET, EXTENDED RELEASE ORAL at 08:54

## 2021-12-11 RX ADMIN — ALBUTEROL SULFATE 2 PUFF: 90 AEROSOL, METERED RESPIRATORY (INHALATION) at 16:13

## 2021-12-11 RX ADMIN — CEFTRIAXONE 1000 MG: 1 INJECTION, POWDER, FOR SOLUTION INTRAMUSCULAR; INTRAVENOUS at 23:27

## 2021-12-11 RX ADMIN — ENOXAPARIN SODIUM 40 MG: 100 INJECTION SUBCUTANEOUS at 08:54

## 2021-12-11 RX ADMIN — CLOPIDOGREL BISULFATE 75 MG: 75 TABLET ORAL at 08:53

## 2021-12-11 RX ADMIN — ENOXAPARIN SODIUM 40 MG: 100 INJECTION SUBCUTANEOUS at 21:27

## 2021-12-11 RX ADMIN — ALBUTEROL SULFATE 2 PUFF: 90 AEROSOL, METERED RESPIRATORY (INHALATION) at 08:11

## 2021-12-11 RX ADMIN — ASPIRIN 81 MG: 81 TABLET, COATED ORAL at 08:53

## 2021-12-11 RX ADMIN — DEXTROSE AND SODIUM CHLORIDE: 5; 450 INJECTION, SOLUTION INTRAVENOUS at 16:04

## 2021-12-11 RX ADMIN — ALBUTEROL SULFATE 2 PUFF: 90 AEROSOL, METERED RESPIRATORY (INHALATION) at 19:45

## 2021-12-11 ASSESSMENT — PAIN SCALES - GENERAL
PAINLEVEL_OUTOF10: 0

## 2021-12-11 NOTE — FLOWSHEET NOTE
RN updated pt's wife, all questions answered at this time.   Jazmin Díaz verbalized understanding regarding plan of care

## 2021-12-11 NOTE — PLAN OF CARE
Problem: Airway Clearance - Ineffective  Goal: Achieve or maintain patent airway  12/10/2021 2234 by Cj Douglas RN  Outcome: Ongoing  12/10/2021 1104 by Fahad Ledesma RN  Outcome: Ongoing     Problem: Gas Exchange - Impaired  Goal: Absence of hypoxia  12/10/2021 2234 by Cj Douglas RN  Outcome: Ongoing  12/10/2021 1104 by Fahad Ledesma RN  Outcome: Ongoing  Goal: Promote optimal lung function  12/10/2021 2234 by Cj Douglas RN  Outcome: Ongoing  12/10/2021 1104 by Fahad Ledesma RN  Outcome: Ongoing     Problem: Breathing Pattern - Ineffective  Goal: Ability to achieve and maintain a regular respiratory rate  12/10/2021 2234 by Cj Douglas RN  Outcome: Ongoing  12/10/2021 1104 by Fahad Ledesma RN  Outcome: Ongoing     Problem:  Body Temperature -  Risk of, Imbalanced  Goal: Ability to maintain a body temperature within defined limits  12/10/2021 2234 by Cj Douglas RN  Outcome: Ongoing  12/10/2021 1104 by Fahad Ledesma RN  Outcome: Ongoing  Goal: Will regain or maintain usual level of consciousness  12/10/2021 2234 by Cj Douglas RN  Outcome: Ongoing  12/10/2021 1104 by Fahad Ledesma RN  Outcome: Ongoing  Goal: Complications related to the disease process, condition or treatment will be avoided or minimized  12/10/2021 2234 by Cj Douglas RN  Outcome: Ongoing  12/10/2021 1104 by Fahad Ledesma RN  Outcome: Ongoing     Problem: Isolation Precautions - Risk of Spread of Infection  Goal: Prevent transmission of infection  12/10/2021 2234 by Cj Douglas RN  Outcome: Ongoing  12/10/2021 1104 by Fahad Ledesma RN  Outcome: Ongoing     Problem: Nutrition Deficits  Goal: Optimize nutritional status  12/10/2021 2234 by Cj Douglas RN  Outcome: Ongoing  12/10/2021 1104 by Fahad Ledesma RN  Outcome: Ongoing     Problem: Risk for Fluid Volume Deficit  Goal: Maintain normal heart rhythm  12/10/2021 2234 by Cj Douglas RN  Outcome: Ongoing  12/10/2021 1104 by Fahad Ledesma RN  Outcome: Ongoing  Goal:

## 2021-12-11 NOTE — PROGRESS NOTES
Hospitalist Progress Note  12/11/2021 12:49 PM  Subjective:   Admit Date: 12/8/2021  PCP: Keely Garcia MD  Interval History: He feels about the same. No CP. Diet: ADULT DIET; Regular; 4 carb choices (60 gm/meal); Low Fat/Low Chol/High Fiber/PARISH  Medications:   Scheduled Meds:   furosemide  20 mg IntraVENous Daily    dexamethasone  20 mg IntraVENous Daily    enoxaparin  40 mg SubCUTAneous BID    albuterol sulfate HFA  2 puff Inhalation 4x daily    aspirin  81 mg Oral Daily    atorvastatin  40 mg Oral Daily    buPROPion  75 mg Oral Daily    clopidogrel  75 mg Oral Daily    levothyroxine  75 mcg Oral Daily    finasteride  5 mg Oral Daily    oxybutynin  15 mg Oral Daily    metoprolol tartrate  50 mg Oral BID    lisinopril  20 mg Oral Daily    sodium chloride flush  5-40 mL IntraVENous 2 times per day    cefTRIAXone (ROCEPHIN) IV  1,000 mg IntraVENous Q24H    azithromycin  500 mg IntraVENous Q24H    baricitinib  4 mg Oral Daily     Continuous Infusions:   dextrose 5 % and 0.45 % NaCl 75 mL/hr at 12/11/21 0157    sodium chloride Stopped (12/09/21 1026)     CBC:   Recent Labs     12/09/21  0537 12/10/21  0457 12/11/21  0608   WBC 9.9 12.2* 10.5   HGB 13.1 12.3* 11.9*    186 200     BMP:    Recent Labs     12/09/21  0537 12/10/21  0457 12/11/21  0608    138 137   K 4.4 4.4 4.8    103 103   CO2 22 23 22   BUN 36* 43* 34*   CREATININE 0.97 0.91 0.79   GLUCOSE 168* 133* 123*     Hepatic:   Recent Labs     12/09/21  0537 12/10/21  0457 12/11/21  0608   AST 32 43* 40*   ALT 15 22 28   BILITOT 0.72 0.50 0.45   ALKPHOS 98 82 79     Troponin: No results for input(s): TROPONINI in the last 72 hours. BNP: No results for input(s): BNP in the last 72 hours. Lipids: No results for input(s): CHOL, HDL in the last 72 hours.     Invalid input(s): LDLCALCU  INR:   Recent Labs     12/09/21  0537   INR 1.3       Objective:   Vitals: BP (!) 155/92   Pulse 70   Temp 97.9 °F (36.6 °C) (Tympanic)   Resp 21   Ht 6' (1.829 m)   Wt (!) 305 lb 6.4 oz (138.5 kg)   SpO2 95%   BMI 41.42 kg/m²     Intake/Output Summary (Last 24 hours) at 12/11/2021 1249  Last data filed at 12/11/2021 0515  Gross per 24 hour   Intake 1000 ml   Output 1250 ml   Net -250 ml     Patient Vitals for the past 96 hrs (Last 3 readings):   Weight   12/11/21 0515 (!) 305 lb 6.4 oz (138.5 kg)   12/09/21 0500 293 lb 8 oz (133.1 kg)   12/08/21 1519 (!) 315 lb (142.9 kg)     General appearance: alert and cooperative with exam  HEENT: Head: Normocephalic, no lesions, without obvious abnormality. Neck: no adenopathy, no carotid bruit, no JVD, supple, symmetrical, trachea midline and thyroid not enlarged, symmetric, no tenderness/mass/nodules  Lungs: rales bilaterally  Heart: regular rate and rhythm, S1, S2 normal, no murmur, click, rub or gallop  Abdomen: soft, non-tender; bowel sounds normal; no masses,  no organomegaly  Extremities: edema 1+  Neurologic: Mental status: Alert, oriented, thought content appropriate    Assessment and Plan:         Pneumonia due to COVID-19 virus    Acute respiratory failure with hypoxia (HCC)    COPD with acute exacerbation (HCC)  No significant change. Continue current rx. Barictinib, decadron, Bipap, zithro, rocephin      Ischemic cardiomyopathy-EF--40% segmental wall motion ab 12/8/21    Coronary artery disease involving native coronary artery of native     HTN  Add lasix. Check BNP.     Continue lopressor and zestril      Tresa Inman MD, MD  Rounding Hospitalist

## 2021-12-12 LAB
ABSOLUTE EOS #: 0 K/UL (ref 0–0.44)
ABSOLUTE IMMATURE GRANULOCYTE: 0.42 K/UL (ref 0–0.3)
ABSOLUTE LYMPH #: 0.5 K/UL (ref 1.1–3.7)
ABSOLUTE MONO #: 0.33 K/UL (ref 0.1–1.2)
ALBUMIN SERPL-MCNC: 2.8 G/DL (ref 3.5–5.2)
ALBUMIN/GLOBULIN RATIO: 0.9 (ref 1–2.5)
ALP BLD-CCNC: 77 U/L (ref 40–129)
ALT SERPL-CCNC: 24 U/L (ref 5–41)
ANION GAP SERPL CALCULATED.3IONS-SCNC: 11 MMOL/L (ref 9–17)
AST SERPL-CCNC: 29 U/L
BASOPHILS # BLD: 1 % (ref 0–2)
BASOPHILS ABSOLUTE: 0.08 K/UL (ref 0–0.2)
BILIRUB SERPL-MCNC: 0.57 MG/DL (ref 0.3–1.2)
BILIRUBIN DIRECT: 0.22 MG/DL
BILIRUBIN, INDIRECT: 0.35 MG/DL (ref 0–1)
BUN BLDV-MCNC: 29 MG/DL (ref 8–23)
C-REACTIVE PROTEIN: 37.3 MG/L (ref 0–5)
CALCIUM SERPL-MCNC: 8.6 MG/DL (ref 8.6–10.4)
CHLORIDE BLD-SCNC: 99 MMOL/L (ref 98–107)
CO2: 22 MMOL/L (ref 20–31)
CREAT SERPL-MCNC: 0.74 MG/DL (ref 0.7–1.2)
D-DIMER QUANTITATIVE: >20 MG/L FEU (ref 0–0.59)
DIFFERENTIAL TYPE: ABNORMAL
EOSINOPHILS RELATIVE PERCENT: 0 % (ref 1–4)
FERRITIN: 647 UG/L (ref 30–400)
GFR AFRICAN AMERICAN: >60 ML/MIN
GFR NON-AFRICAN AMERICAN: >60 ML/MIN
GFR SERPL CREATININE-BSD FRML MDRD: ABNORMAL ML/MIN/{1.73_M2}
GFR SERPL CREATININE-BSD FRML MDRD: ABNORMAL ML/MIN/{1.73_M2}
GLUCOSE BLD-MCNC: 153 MG/DL (ref 70–99)
HCT VFR BLD CALC: 37 % (ref 40.7–50.3)
HEMOGLOBIN: 11.9 G/DL (ref 13–17)
IMMATURE GRANULOCYTES: 5 %
LACTATE DEHYDROGENASE: 382 U/L (ref 135–225)
LYMPHOCYTES # BLD: 6 % (ref 24–43)
MCH RBC QN AUTO: 30.3 PG (ref 25.2–33.5)
MCHC RBC AUTO-ENTMCNC: 32.2 G/DL (ref 25.2–33.5)
MCV RBC AUTO: 94.1 FL (ref 82.6–102.9)
MONOCYTES # BLD: 4 % (ref 3–12)
MORPHOLOGY: ABNORMAL
MORPHOLOGY: ABNORMAL
NRBC AUTOMATED: 0 PER 100 WBC
PDW BLD-RTO: 14.5 % (ref 11.8–14.4)
PLATELET # BLD: 199 K/UL (ref 138–453)
PLATELET ESTIMATE: ABNORMAL
PMV BLD AUTO: 9.8 FL (ref 8.1–13.5)
POTASSIUM SERPL-SCNC: 5 MMOL/L (ref 3.7–5.3)
RBC # BLD: 3.93 M/UL (ref 4.21–5.77)
RBC # BLD: ABNORMAL 10*6/UL
SEG NEUTROPHILS: 84 % (ref 36–65)
SEGMENTED NEUTROPHILS ABSOLUTE COUNT: 6.97 K/UL (ref 1.5–8.1)
SODIUM BLD-SCNC: 132 MMOL/L (ref 135–144)
TOTAL PROTEIN: 5.9 G/DL (ref 6.4–8.3)
WBC # BLD: 8.3 K/UL (ref 3.5–11.3)
WBC # BLD: ABNORMAL 10*3/UL

## 2021-12-12 PROCEDURE — 82728 ASSAY OF FERRITIN: CPT

## 2021-12-12 PROCEDURE — 86140 C-REACTIVE PROTEIN: CPT

## 2021-12-12 PROCEDURE — 99232 SBSQ HOSP IP/OBS MODERATE 35: CPT | Performed by: INTERNAL MEDICINE

## 2021-12-12 PROCEDURE — 2580000003 HC RX 258: Performed by: NURSE PRACTITIONER

## 2021-12-12 PROCEDURE — 83615 LACTATE (LD) (LDH) ENZYME: CPT

## 2021-12-12 PROCEDURE — 2060000000 HC ICU INTERMEDIATE R&B

## 2021-12-12 PROCEDURE — 2580000003 HC RX 258: Performed by: FAMILY MEDICINE

## 2021-12-12 PROCEDURE — 6370000000 HC RX 637 (ALT 250 FOR IP): Performed by: NURSE PRACTITIONER

## 2021-12-12 PROCEDURE — 85379 FIBRIN DEGRADATION QUANT: CPT

## 2021-12-12 PROCEDURE — 2700000000 HC OXYGEN THERAPY PER DAY

## 2021-12-12 PROCEDURE — 36415 COLL VENOUS BLD VENIPUNCTURE: CPT

## 2021-12-12 PROCEDURE — 6370000000 HC RX 637 (ALT 250 FOR IP): Performed by: FAMILY MEDICINE

## 2021-12-12 PROCEDURE — 6360000002 HC RX W HCPCS: Performed by: INTERNAL MEDICINE

## 2021-12-12 PROCEDURE — 85025 COMPLETE CBC W/AUTO DIFF WBC: CPT

## 2021-12-12 PROCEDURE — 94640 AIRWAY INHALATION TREATMENT: CPT

## 2021-12-12 PROCEDURE — 6360000002 HC RX W HCPCS: Performed by: NURSE PRACTITIONER

## 2021-12-12 PROCEDURE — 82248 BILIRUBIN DIRECT: CPT

## 2021-12-12 PROCEDURE — 94660 CPAP INITIATION&MGMT: CPT

## 2021-12-12 PROCEDURE — 80053 COMPREHEN METABOLIC PANEL: CPT

## 2021-12-12 PROCEDURE — 6360000002 HC RX W HCPCS: Performed by: FAMILY MEDICINE

## 2021-12-12 PROCEDURE — 94761 N-INVAS EAR/PLS OXIMETRY MLT: CPT

## 2021-12-12 RX ORDER — SODIUM CHLORIDE 9 MG/ML
INJECTION, SOLUTION INTRAVENOUS CONTINUOUS
Status: DISCONTINUED | OUTPATIENT
Start: 2021-12-12 | End: 2021-12-24

## 2021-12-12 RX ORDER — ZOLPIDEM TARTRATE 5 MG/1
5 TABLET ORAL NIGHTLY PRN
Status: DISCONTINUED | OUTPATIENT
Start: 2021-12-12 | End: 2022-01-08 | Stop reason: HOSPADM

## 2021-12-12 RX ORDER — DEXAMETHASONE SODIUM PHOSPHATE 10 MG/ML
10 INJECTION INTRAMUSCULAR; INTRAVENOUS DAILY
Status: DISCONTINUED | OUTPATIENT
Start: 2021-12-14 | End: 2022-01-08 | Stop reason: HOSPADM

## 2021-12-12 RX ADMIN — LEVOTHYROXINE SODIUM 75 MCG: 0.07 TABLET ORAL at 06:00

## 2021-12-12 RX ADMIN — ALBUTEROL SULFATE 2 PUFF: 90 AEROSOL, METERED RESPIRATORY (INHALATION) at 11:57

## 2021-12-12 RX ADMIN — CLOPIDOGREL BISULFATE 75 MG: 75 TABLET ORAL at 08:38

## 2021-12-12 RX ADMIN — BARICITINIB 4 MG: 2 TABLET, FILM COATED ORAL at 08:38

## 2021-12-12 RX ADMIN — SODIUM CHLORIDE: 9 INJECTION, SOLUTION INTRAVENOUS at 21:14

## 2021-12-12 RX ADMIN — DEXTROSE AND SODIUM CHLORIDE: 5; 450 INJECTION, SOLUTION INTRAVENOUS at 08:39

## 2021-12-12 RX ADMIN — OXYBUTYNIN CHLORIDE 15 MG: 5 TABLET, EXTENDED RELEASE ORAL at 08:38

## 2021-12-12 RX ADMIN — DEXAMETHASONE SODIUM PHOSPHATE 20 MG: 10 INJECTION, SOLUTION INTRAMUSCULAR; INTRAVENOUS at 08:41

## 2021-12-12 RX ADMIN — FINASTERIDE 5 MG: 5 TABLET, FILM COATED ORAL at 08:38

## 2021-12-12 RX ADMIN — LISINOPRIL 20 MG: 20 TABLET ORAL at 08:38

## 2021-12-12 RX ADMIN — AZITHROMYCIN MONOHYDRATE 500 MG: 500 INJECTION, POWDER, LYOPHILIZED, FOR SOLUTION INTRAVENOUS at 00:13

## 2021-12-12 RX ADMIN — ZOLPIDEM TARTRATE 5 MG: 5 TABLET ORAL at 23:08

## 2021-12-12 RX ADMIN — SODIUM CHLORIDE, PRESERVATIVE FREE 10 ML: 5 INJECTION INTRAVENOUS at 21:10

## 2021-12-12 RX ADMIN — FUROSEMIDE 20 MG: 10 INJECTION, SOLUTION INTRAMUSCULAR; INTRAVENOUS at 08:38

## 2021-12-12 RX ADMIN — ENOXAPARIN SODIUM 40 MG: 100 INJECTION SUBCUTANEOUS at 21:10

## 2021-12-12 RX ADMIN — CEFTRIAXONE 1000 MG: 1 INJECTION, POWDER, FOR SOLUTION INTRAMUSCULAR; INTRAVENOUS at 23:09

## 2021-12-12 RX ADMIN — BUPROPION HYDROCHLORIDE 75 MG: 75 TABLET ORAL at 08:39

## 2021-12-12 RX ADMIN — ENOXAPARIN SODIUM 40 MG: 100 INJECTION SUBCUTANEOUS at 08:37

## 2021-12-12 RX ADMIN — METOPROLOL TARTRATE 50 MG: 50 TABLET, FILM COATED ORAL at 21:10

## 2021-12-12 RX ADMIN — ALBUTEROL SULFATE 2 PUFF: 90 AEROSOL, METERED RESPIRATORY (INHALATION) at 08:01

## 2021-12-12 RX ADMIN — ASPIRIN 81 MG: 81 TABLET, COATED ORAL at 08:38

## 2021-12-12 RX ADMIN — METOPROLOL TARTRATE 50 MG: 50 TABLET, FILM COATED ORAL at 08:41

## 2021-12-12 RX ADMIN — ATORVASTATIN CALCIUM 40 MG: 40 TABLET, FILM COATED ORAL at 08:38

## 2021-12-12 RX ADMIN — ALBUTEROL SULFATE 2 PUFF: 90 AEROSOL, METERED RESPIRATORY (INHALATION) at 15:48

## 2021-12-12 RX ADMIN — ALBUTEROL SULFATE 2 PUFF: 90 AEROSOL, METERED RESPIRATORY (INHALATION) at 20:00

## 2021-12-12 ASSESSMENT — PAIN SCALES - GENERAL
PAINLEVEL_OUTOF10: 0

## 2021-12-12 NOTE — PROGRESS NOTES
Hospitalist Progress Note  12/12/2021 1:35 PM  Subjective:   Admit Date: 12/8/2021  PCP: Cory Thompson MD  Interval History: Slept well. Feels better than yesterday. No new concerns    Diet: ADULT DIET; Regular; 4 carb choices (60 gm/meal); Low Fat/Low Chol/High Fiber/PARISH  Medications:   Scheduled Meds:   [START ON 12/14/2021] dexamethasone  10 mg IntraVENous Daily    furosemide  20 mg IntraVENous Daily    dexamethasone  20 mg IntraVENous Daily    enoxaparin  40 mg SubCUTAneous BID    albuterol sulfate HFA  2 puff Inhalation 4x daily    aspirin  81 mg Oral Daily    atorvastatin  40 mg Oral Daily    buPROPion  75 mg Oral Daily    clopidogrel  75 mg Oral Daily    levothyroxine  75 mcg Oral Daily    finasteride  5 mg Oral Daily    oxybutynin  15 mg Oral Daily    metoprolol tartrate  50 mg Oral BID    lisinopril  20 mg Oral Daily    sodium chloride flush  5-40 mL IntraVENous 2 times per day    cefTRIAXone (ROCEPHIN) IV  1,000 mg IntraVENous Q24H    azithromycin  500 mg IntraVENous Q24H    baricitinib  4 mg Oral Daily     Continuous Infusions:   dextrose 5 % and 0.45 % NaCl 75 mL/hr at 12/12/21 0839    sodium chloride Stopped (12/09/21 1026)     CBC:   Recent Labs     12/10/21  0457 12/11/21  0608 12/12/21  0642   WBC 12.2* 10.5 8.3   HGB 12.3* 11.9* 11.9*    200 199     BMP:    Recent Labs     12/10/21  0457 12/11/21  0608 12/12/21  0642    137 132*   K 4.4 4.8 5.0    103 99   CO2 23 22 22   BUN 43* 34* 29*   CREATININE 0.91 0.79 0.74   GLUCOSE 133* 123* 153*     Hepatic:   Recent Labs     12/10/21  0457 12/11/21  0608 12/12/21  0642   AST 43* 40* 29   ALT 22 28 24   BILITOT 0.50 0.45 0.57   ALKPHOS 82 79 77     Troponin: No results for input(s): TROPONINI in the last 72 hours. BNP: No results for input(s): BNP in the last 72 hours. Lipids: No results for input(s): CHOL, HDL in the last 72 hours.     Invalid input(s): LDLCALCU  INR: No results for input(s): INR in the last 72 hours. Objective:   Vitals: /71   Pulse 58   Temp 97.4 °F (36.3 °C) (Tympanic)   Resp (!) 35   Ht 6' (1.829 m)   Wt (!) 301 lb 9.6 oz (136.8 kg)   SpO2 90%   BMI 40.90 kg/m²     Intake/Output Summary (Last 24 hours) at 12/12/2021 1335  Last data filed at 12/12/2021 1238  Gross per 24 hour   Intake 700 ml   Output 3950 ml   Net -3250 ml     Patient Vitals for the past 96 hrs (Last 3 readings):   Weight   12/12/21 0540 (!) 301 lb 9.6 oz (136.8 kg)   12/11/21 0515 (!) 305 lb 6.4 oz (138.5 kg)   12/09/21 0500 293 lb 8 oz (133.1 kg)     General appearance: alert and cooperative with exam  HEENT: Head: Normocephalic, no lesions, without obvious abnormality. Neck: no adenopathy, no carotid bruit, no JVD, supple, symmetrical, trachea midline and thyroid not enlarged, symmetric, no tenderness/mass/nodules  Lungs: few rales only  Heart: regular rate and rhythm, S1, S2 normal, no murmur, click, rub or gallop  Abdomen: soft, non-tender; bowel sounds normal; no masses,  no organomegaly  Extremities: extremities normal, atraumatic, no cyanosis or edema  Neurologic: Mental status: Alert, oriented, thought content appropriate    Assessment and Plan:            Pneumonia due to COVID-19 virus    Acute respiratory failure with hypoxia (HCC)    COPD with acute exacerbation (HCC)  No significant change. Continue current rx.  Barictinib, decadron, Bipap, zithro, rocephin       Ischemic cardiomyopathy-EF--40% segmental wall motion ab 12/8/21    Coronary artery disease involving native coronary artery of native     HTN  1400 CCs off wgt down 4 lbs- continue lasix  Continue lopressor and zestril      Yuridia Bah MD, MD  Rounding Hospitalist

## 2021-12-12 NOTE — PLAN OF CARE
Problem: Airway Clearance - Ineffective  Goal: Achieve or maintain patent airway  Outcome: Ongoing     Problem: Gas Exchange - Impaired  Goal: Absence of hypoxia  Outcome: Ongoing  Goal: Promote optimal lung function  Outcome: Ongoing     Problem: Breathing Pattern - Ineffective  Goal: Ability to achieve and maintain a regular respiratory rate  Outcome: Ongoing     Problem:  Body Temperature -  Risk of, Imbalanced  Goal: Ability to maintain a body temperature within defined limits  Outcome: Ongoing  Goal: Will regain or maintain usual level of consciousness  Outcome: Ongoing  Goal: Complications related to the disease process, condition or treatment will be avoided or minimized  Outcome: Ongoing     Problem: Isolation Precautions - Risk of Spread of Infection  Goal: Prevent transmission of infection  Outcome: Ongoing     Problem: Nutrition Deficits  Goal: Optimize nutritional status  Outcome: Ongoing     Problem: Risk for Fluid Volume Deficit  Goal: Maintain normal heart rhythm  Outcome: Ongoing  Goal: Maintain absence of muscle cramping  Outcome: Ongoing  Goal: Maintain normal serum potassium, sodium, calcium, phosphorus, and pH  Outcome: Ongoing     Problem: Loneliness or Risk for Loneliness  Goal: Demonstrate positive use of time alone when socialization is not possible  Outcome: Ongoing     Problem: Fatigue  Goal: Verbalize increase energy and improved vitality  Outcome: Ongoing     Problem: Patient Education: Go to Patient Education Activity  Goal: Patient/Family Education  Outcome: Ongoing     Problem: Skin Integrity:  Goal: Will show no infection signs and symptoms  Description: Will show no infection signs and symptoms  Outcome: Ongoing  Goal: Absence of new skin breakdown  Description: Absence of new skin breakdown  Outcome: Ongoing     Problem: Falls - Risk of:  Goal: Will remain free from falls  Description: Will remain free from falls  Outcome: Ongoing  Goal: Absence of physical injury  Description: Absence of physical injury  Outcome: Ongoing     Problem: Nutrition  Goal: Optimal nutrition therapy  Outcome: Ongoing

## 2021-12-13 ENCOUNTER — APPOINTMENT (OUTPATIENT)
Dept: GENERAL RADIOLOGY | Age: 76
DRG: 177 | End: 2021-12-13
Payer: MEDICARE

## 2021-12-13 LAB
ABSOLUTE EOS #: 0 K/UL (ref 0–0.4)
ABSOLUTE IMMATURE GRANULOCYTE: 0.37 K/UL (ref 0–0.3)
ABSOLUTE LYMPH #: 0.73 K/UL (ref 1–4.8)
ABSOLUTE MONO #: 0.37 K/UL (ref 0.1–1.2)
ALBUMIN SERPL-MCNC: 2.9 G/DL (ref 3.5–5.2)
ALBUMIN/GLOBULIN RATIO: 0.9 (ref 1–2.5)
ALP BLD-CCNC: 77 U/L (ref 40–129)
ALT SERPL-CCNC: 23 U/L (ref 5–41)
ANION GAP SERPL CALCULATED.3IONS-SCNC: 12 MMOL/L (ref 9–17)
AST SERPL-CCNC: 27 U/L
BASOPHILS # BLD: 0 % (ref 0–2)
BASOPHILS ABSOLUTE: 0 K/UL (ref 0–0.2)
BILIRUB SERPL-MCNC: 0.57 MG/DL (ref 0.3–1.2)
BILIRUBIN DIRECT: 0.19 MG/DL
BILIRUBIN, INDIRECT: 0.38 MG/DL (ref 0–1)
BUN BLDV-MCNC: 28 MG/DL (ref 8–23)
C-REACTIVE PROTEIN: 29.3 MG/L (ref 0–5)
CALCIUM SERPL-MCNC: 8.9 MG/DL (ref 8.6–10.4)
CHLORIDE BLD-SCNC: 98 MMOL/L (ref 98–107)
CO2: 22 MMOL/L (ref 20–31)
CREAT SERPL-MCNC: 0.7 MG/DL (ref 0.7–1.2)
D-DIMER QUANTITATIVE: >20 MG/L FEU (ref 0–0.59)
DIFFERENTIAL TYPE: ABNORMAL
EOSINOPHILS RELATIVE PERCENT: 0 % (ref 1–8)
FERRITIN: 673 UG/L (ref 30–400)
GFR AFRICAN AMERICAN: >60 ML/MIN
GFR NON-AFRICAN AMERICAN: >60 ML/MIN
GFR SERPL CREATININE-BSD FRML MDRD: ABNORMAL ML/MIN/{1.73_M2}
GFR SERPL CREATININE-BSD FRML MDRD: ABNORMAL ML/MIN/{1.73_M2}
GLUCOSE BLD-MCNC: 109 MG/DL (ref 70–99)
HCT VFR BLD CALC: 38.7 % (ref 40.7–50.3)
HEMOGLOBIN: 12.4 G/DL (ref 13–17)
IMMATURE GRANULOCYTES: 3 %
LACTATE DEHYDROGENASE: 429 U/L (ref 135–225)
LYMPHOCYTES # BLD: 6 % (ref 15–43)
MCH RBC QN AUTO: 29.7 PG (ref 25.2–33.5)
MCHC RBC AUTO-ENTMCNC: 32 G/DL (ref 25.2–33.5)
MCV RBC AUTO: 92.6 FL (ref 82.6–102.9)
MONOCYTES # BLD: 3 % (ref 6–14)
MORPHOLOGY: ABNORMAL
NRBC AUTOMATED: 0 PER 100 WBC
PDW BLD-RTO: 14.2 % (ref 11.8–14.4)
PLATELET # BLD: 235 K/UL (ref 138–453)
PLATELET ESTIMATE: ABNORMAL
PMV BLD AUTO: 10.3 FL (ref 8.1–13.5)
POTASSIUM SERPL-SCNC: 5.1 MMOL/L (ref 3.7–5.3)
RBC # BLD: 4.18 M/UL (ref 4.21–5.77)
RBC # BLD: ABNORMAL 10*6/UL
SEG NEUTROPHILS: 88 % (ref 44–74)
SEGMENTED NEUTROPHILS ABSOLUTE COUNT: 10.73 K/UL (ref 1.5–8.1)
SODIUM BLD-SCNC: 132 MMOL/L (ref 135–144)
TOTAL PROTEIN: 6 G/DL (ref 6.4–8.3)
WBC # BLD: 12.2 K/UL (ref 3.5–11.3)
WBC # BLD: ABNORMAL 10*3/UL

## 2021-12-13 PROCEDURE — 2060000000 HC ICU INTERMEDIATE R&B

## 2021-12-13 PROCEDURE — 71045 X-RAY EXAM CHEST 1 VIEW: CPT

## 2021-12-13 PROCEDURE — 83615 LACTATE (LD) (LDH) ENZYME: CPT

## 2021-12-13 PROCEDURE — 6360000002 HC RX W HCPCS: Performed by: FAMILY MEDICINE

## 2021-12-13 PROCEDURE — 2700000000 HC OXYGEN THERAPY PER DAY

## 2021-12-13 PROCEDURE — 85025 COMPLETE CBC W/AUTO DIFF WBC: CPT

## 2021-12-13 PROCEDURE — 80053 COMPREHEN METABOLIC PANEL: CPT

## 2021-12-13 PROCEDURE — 94640 AIRWAY INHALATION TREATMENT: CPT

## 2021-12-13 PROCEDURE — 82248 BILIRUBIN DIRECT: CPT

## 2021-12-13 PROCEDURE — 2580000003 HC RX 258: Performed by: NURSE PRACTITIONER

## 2021-12-13 PROCEDURE — 6360000002 HC RX W HCPCS: Performed by: INTERNAL MEDICINE

## 2021-12-13 PROCEDURE — 2580000003 HC RX 258: Performed by: FAMILY MEDICINE

## 2021-12-13 PROCEDURE — 85379 FIBRIN DEGRADATION QUANT: CPT

## 2021-12-13 PROCEDURE — 94660 CPAP INITIATION&MGMT: CPT

## 2021-12-13 PROCEDURE — 6360000002 HC RX W HCPCS: Performed by: NURSE PRACTITIONER

## 2021-12-13 PROCEDURE — 6370000000 HC RX 637 (ALT 250 FOR IP): Performed by: FAMILY MEDICINE

## 2021-12-13 PROCEDURE — 86140 C-REACTIVE PROTEIN: CPT

## 2021-12-13 PROCEDURE — 94761 N-INVAS EAR/PLS OXIMETRY MLT: CPT

## 2021-12-13 PROCEDURE — 82728 ASSAY OF FERRITIN: CPT

## 2021-12-13 PROCEDURE — 99232 SBSQ HOSP IP/OBS MODERATE 35: CPT | Performed by: INTERNAL MEDICINE

## 2021-12-13 PROCEDURE — 36415 COLL VENOUS BLD VENIPUNCTURE: CPT

## 2021-12-13 RX ADMIN — LEVOTHYROXINE SODIUM 75 MCG: 0.07 TABLET ORAL at 06:27

## 2021-12-13 RX ADMIN — CLOPIDOGREL BISULFATE 75 MG: 75 TABLET ORAL at 10:03

## 2021-12-13 RX ADMIN — ENOXAPARIN SODIUM 40 MG: 100 INJECTION SUBCUTANEOUS at 21:14

## 2021-12-13 RX ADMIN — SODIUM CHLORIDE, PRESERVATIVE FREE 10 ML: 5 INJECTION INTRAVENOUS at 21:14

## 2021-12-13 RX ADMIN — ENOXAPARIN SODIUM 40 MG: 100 INJECTION SUBCUTANEOUS at 10:03

## 2021-12-13 RX ADMIN — AZITHROMYCIN MONOHYDRATE 500 MG: 500 INJECTION, POWDER, LYOPHILIZED, FOR SOLUTION INTRAVENOUS at 00:01

## 2021-12-13 RX ADMIN — FINASTERIDE 5 MG: 5 TABLET, FILM COATED ORAL at 10:03

## 2021-12-13 RX ADMIN — BUPROPION HYDROCHLORIDE 75 MG: 75 TABLET ORAL at 10:04

## 2021-12-13 RX ADMIN — ALBUTEROL SULFATE 2 PUFF: 90 AEROSOL, METERED RESPIRATORY (INHALATION) at 15:45

## 2021-12-13 RX ADMIN — OXYBUTYNIN CHLORIDE 15 MG: 5 TABLET, EXTENDED RELEASE ORAL at 10:03

## 2021-12-13 RX ADMIN — CEFTRIAXONE 1000 MG: 1 INJECTION, POWDER, FOR SOLUTION INTRAMUSCULAR; INTRAVENOUS at 23:16

## 2021-12-13 RX ADMIN — LISINOPRIL 20 MG: 20 TABLET ORAL at 10:03

## 2021-12-13 RX ADMIN — ALBUTEROL SULFATE 2 PUFF: 90 AEROSOL, METERED RESPIRATORY (INHALATION) at 11:52

## 2021-12-13 RX ADMIN — ALBUTEROL SULFATE 2 PUFF: 90 AEROSOL, METERED RESPIRATORY (INHALATION) at 19:38

## 2021-12-13 RX ADMIN — ATORVASTATIN CALCIUM 40 MG: 40 TABLET, FILM COATED ORAL at 10:03

## 2021-12-13 RX ADMIN — POLYETHYLENE GLYCOL 3350 17 G: 17 POWDER, FOR SOLUTION ORAL at 15:06

## 2021-12-13 RX ADMIN — SODIUM CHLORIDE, PRESERVATIVE FREE 10 ML: 5 INJECTION INTRAVENOUS at 10:04

## 2021-12-13 RX ADMIN — METOPROLOL TARTRATE 50 MG: 50 TABLET, FILM COATED ORAL at 21:14

## 2021-12-13 RX ADMIN — FUROSEMIDE 20 MG: 10 INJECTION, SOLUTION INTRAMUSCULAR; INTRAVENOUS at 10:00

## 2021-12-13 RX ADMIN — ASPIRIN 81 MG: 81 TABLET, COATED ORAL at 10:03

## 2021-12-13 RX ADMIN — METOPROLOL TARTRATE 50 MG: 50 TABLET, FILM COATED ORAL at 10:03

## 2021-12-13 RX ADMIN — ALBUTEROL SULFATE 2 PUFF: 90 AEROSOL, METERED RESPIRATORY (INHALATION) at 07:40

## 2021-12-13 RX ADMIN — BARICITINIB 4 MG: 2 TABLET, FILM COATED ORAL at 10:03

## 2021-12-13 RX ADMIN — DEXAMETHASONE SODIUM PHOSPHATE 20 MG: 10 INJECTION, SOLUTION INTRAMUSCULAR; INTRAVENOUS at 10:00

## 2021-12-13 RX ADMIN — SODIUM CHLORIDE: 9 INJECTION, SOLUTION INTRAVENOUS at 18:14

## 2021-12-13 ASSESSMENT — PAIN SCALES - GENERAL
PAINLEVEL_OUTOF10: 0

## 2021-12-13 NOTE — PLAN OF CARE
Problem: Airway Clearance - Ineffective  Goal: Achieve or maintain patent airway  12/13/2021 1146 by Tanesha Anderson RN  Outcome: Ongoing  12/13/2021 0329 by Salvador Hurtado RN  Outcome: Ongoing     Problem: Gas Exchange - Impaired  Goal: Absence of hypoxia  12/13/2021 1146 by Tanesha Anderson RN  Outcome: Ongoing  12/13/2021 0329 by Salvador Hurtado RN  Outcome: Ongoing  Goal: Promote optimal lung function  12/13/2021 1146 by Tanesha Anderson RN  Outcome: Ongoing  12/13/2021 0329 by Salvador Hurtado RN  Outcome: Ongoing     Problem: Breathing Pattern - Ineffective  Goal: Ability to achieve and maintain a regular respiratory rate  12/13/2021 1146 by Tanesha Anderson RN  Outcome: Ongoing  12/13/2021 0329 by Salvador Hurtado RN  Outcome: Ongoing     Problem:  Body Temperature -  Risk of, Imbalanced  Goal: Ability to maintain a body temperature within defined limits  12/13/2021 1146 by Tanesha Anderson RN  Outcome: Ongoing  12/13/2021 0329 by Salvador Hurtado RN  Outcome: Ongoing  Goal: Will regain or maintain usual level of consciousness  12/13/2021 1146 by Tanesha Anderson RN  Outcome: Ongoing  12/13/2021 0329 by Salvador Hurtado RN  Outcome: Ongoing  Goal: Complications related to the disease process, condition or treatment will be avoided or minimized  12/13/2021 1146 by Tanesha Anderson RN  Outcome: Ongoing  12/13/2021 0329 by Salvador Hurtado RN  Outcome: Ongoing     Problem: Isolation Precautions - Risk of Spread of Infection  Goal: Prevent transmission of infection  12/13/2021 1146 by Tanesha Anderson RN  Outcome: Ongoing  12/13/2021 0329 by Salvador Hurtado RN  Outcome: Ongoing

## 2021-12-13 NOTE — PROGRESS NOTES
SW contacted by patients wife Scooter Levin regarding legal documents. SW encouraged Scooter Levin to bring documents as soon as possible for medical records. She states she was tested for COVID-19 virus and test came back negative. SW provided supportive listening. EDIE will continue to monitor needs and assist as appropriate.          Electronically signed by VISHAL Reyes on 12/13/2021 at 10:00 AM

## 2021-12-13 NOTE — PROGRESS NOTES
Hospitalist Progress Note    Patient:  Minh Henriquez     YOB: 1945    MRN: [id number]   Admit date: 12/8/2021     Acct: [de-identified]     PCP: Dillon Gusman MD    CC--Interval History: Covid-19 PNA----on IV antibiotics---Baricitinib---Decadron---BiPAP---elevated inflammatory markers    HTN    CHF    COPD--emphysema    Chronic pulmonary emboli--DVT    CKD--Stage 2    See note below     All other ROS negative except noted in HPI    Diet:  ADULT DIET; Regular; 4 carb choices (60 gm/meal);  Low Fat/Low Chol/High Fiber/PARISH    Medications:  Scheduled Meds:   [START ON 12/14/2021] dexamethasone  10 mg IntraVENous Daily    furosemide  20 mg IntraVENous Daily    enoxaparin  40 mg SubCUTAneous BID    albuterol sulfate HFA  2 puff Inhalation 4x daily    aspirin  81 mg Oral Daily    atorvastatin  40 mg Oral Daily    buPROPion  75 mg Oral Daily    clopidogrel  75 mg Oral Daily    levothyroxine  75 mcg Oral Daily    finasteride  5 mg Oral Daily    oxybutynin  15 mg Oral Daily    metoprolol tartrate  50 mg Oral BID    lisinopril  20 mg Oral Daily    sodium chloride flush  5-40 mL IntraVENous 2 times per day    cefTRIAXone (ROCEPHIN) IV  1,000 mg IntraVENous Q24H    azithromycin  500 mg IntraVENous Q24H    baricitinib  4 mg Oral Daily     Continuous Infusions:   sodium chloride 50 mL/hr at 12/13/21 0629    sodium chloride Stopped (12/09/21 1026)     PRN Meds:zolpidem, benzonatate, guaiFENesin-dextromethorphan, albuterol sulfate HFA, sodium chloride flush, sodium chloride, polyethylene glycol, acetaminophen **OR** acetaminophen    Objective:  Labs:  CBC with Differential:    Lab Results   Component Value Date    WBC 12.2 12/13/2021    RBC 4.18 12/13/2021    HGB 12.4 12/13/2021    HCT 38.7 12/13/2021     12/13/2021    MCV 92.6 12/13/2021    MCH 29.7 12/13/2021    MCHC 32.0 12/13/2021    RDW 14.2 12/13/2021    LYMPHOPCT 6 12/13/2021    MONOPCT 3 12/13/2021    BASOPCT 0 12/13/2021 MONOSABS 0.37 12/13/2021    LYMPHSABS 0.73 12/13/2021    EOSABS 0.00 12/13/2021    BASOSABS 0.00 12/13/2021    DIFFTYPE NOT REPORTED 12/13/2021     BMP:    Lab Results   Component Value Date     12/13/2021    K 5.1 12/13/2021    CL 98 12/13/2021    CO2 22 12/13/2021    BUN 28 12/13/2021    LABALBU 2.9 12/13/2021    CREATININE 0.70 12/13/2021    CALCIUM 8.9 12/13/2021    GFRAA >60 12/13/2021    LABGLOM >60 12/13/2021    GLUCOSE 109 12/13/2021           Physical Exam:  Vitals: /78   Pulse 62   Temp 97.8 °F (36.6 °C) (Tympanic)   Resp 25   Ht 6' (1.829 m)   Wt (!) 302 lb 8 oz (137.2 kg)   SpO2 91%   BMI 41.03 kg/m²   24 hour intake/output:    Intake/Output Summary (Last 24 hours) at 12/13/2021 1703  Last data filed at 12/13/2021 1500  Gross per 24 hour   Intake 3156 ml   Output 4225 ml   Net -1069 ml     Last 3 weights: Wt Readings from Last 3 Encounters:   12/13/21 (!) 302 lb 8 oz (137.2 kg)   11/11/21 (!) 319 lb 12.8 oz (145.1 kg)   09/15/21 (!) 321 lb (145.6 kg)     HEENT: BiPAP---, Normocephalic and Atraumatic  Neck: Supple, No Masses, Tenderness, Nodularity and No Lymphadenopathy  Chest/Lungs: coarse breath sounds--- and Distant Breath Sounds  Cardiac: Regular Rate and Rhythm  GI/Abdomen: Bowel Sounds Present and Soft, Non-tender, without Guarding or Rebound Tenderness  : Not examined  EXT/Skin: No Edema, No Cyanosis and No Clubbing  Neuro: alert----- and No Localizing Signs/Symptoms      Assessment:    Principal Problem:    Pneumonia due to COVID-19 virus  Active Problems:    Ischemic cardiomyopathy    Coronary artery disease involving native coronary artery of native heart    Acute respiratory failure with hypoxia (HCC)    COPD with acute exacerbation (HCC)  Resolved Problems:    * No resolved hospital problems. *          Plan:  1. Covid--19 PNA--IV Rocephin--Zithromax--Baricitinib--Decadron---respiratory regimen--BiPAP  2. Wean FiO2 as tolerated   3. Sodium replacement  4. Michael--energy conservation  5.   See orders    Electronically signed by Deborah Spangler on 12/13/2021 at 5:03 PM    Hospitalist

## 2021-12-14 ENCOUNTER — APPOINTMENT (OUTPATIENT)
Dept: GENERAL RADIOLOGY | Age: 76
DRG: 177 | End: 2021-12-14
Payer: MEDICARE

## 2021-12-14 LAB
ABSOLUTE EOS #: 0 K/UL (ref 0–0.4)
ABSOLUTE IMMATURE GRANULOCYTE: 0.52 K/UL (ref 0–0.3)
ABSOLUTE LYMPH #: 0.91 K/UL (ref 1–4.8)
ABSOLUTE MONO #: 0.78 K/UL (ref 0.1–1.2)
ALBUMIN SERPL-MCNC: 2.9 G/DL (ref 3.5–5.2)
ALBUMIN/GLOBULIN RATIO: 0.9 (ref 1–2.5)
ALP BLD-CCNC: 79 U/L (ref 40–129)
ALT SERPL-CCNC: 24 U/L (ref 5–41)
ANION GAP SERPL CALCULATED.3IONS-SCNC: 11 MMOL/L (ref 9–17)
AST SERPL-CCNC: 25 U/L
BASOPHILS # BLD: 0 % (ref 0–2)
BASOPHILS ABSOLUTE: 0 K/UL (ref 0–0.2)
BILIRUB SERPL-MCNC: 0.58 MG/DL (ref 0.3–1.2)
BILIRUBIN DIRECT: 0.21 MG/DL
BILIRUBIN, INDIRECT: 0.37 MG/DL (ref 0–1)
BUN BLDV-MCNC: 30 MG/DL (ref 8–23)
C-REACTIVE PROTEIN: 13.2 MG/L (ref 0–5)
CALCIUM SERPL-MCNC: 8.8 MG/DL (ref 8.6–10.4)
CHLORIDE BLD-SCNC: 98 MMOL/L (ref 98–107)
CO2: 22 MMOL/L (ref 20–31)
CREAT SERPL-MCNC: 0.68 MG/DL (ref 0.7–1.2)
D-DIMER QUANTITATIVE: 17.53 MG/L FEU (ref 0–0.59)
DIFFERENTIAL TYPE: ABNORMAL
EOSINOPHILS RELATIVE PERCENT: 0 % (ref 1–8)
FERRITIN: 784 UG/L (ref 30–400)
GFR AFRICAN AMERICAN: >60 ML/MIN
GFR NON-AFRICAN AMERICAN: >60 ML/MIN
GFR SERPL CREATININE-BSD FRML MDRD: ABNORMAL ML/MIN/{1.73_M2}
GFR SERPL CREATININE-BSD FRML MDRD: ABNORMAL ML/MIN/{1.73_M2}
GLUCOSE BLD-MCNC: 102 MG/DL (ref 70–99)
HCT VFR BLD CALC: 39.8 % (ref 40.7–50.3)
HEMOGLOBIN: 13 G/DL (ref 13–17)
IMMATURE GRANULOCYTES: 4 %
LACTATE DEHYDROGENASE: 451 U/L (ref 135–225)
LYMPHOCYTES # BLD: 7 % (ref 15–43)
MCH RBC QN AUTO: 30.1 PG (ref 25.2–33.5)
MCHC RBC AUTO-ENTMCNC: 32.7 G/DL (ref 25.2–33.5)
MCV RBC AUTO: 92.1 FL (ref 82.6–102.9)
MONOCYTES # BLD: 6 % (ref 6–14)
MORPHOLOGY: ABNORMAL
MORPHOLOGY: ABNORMAL
NRBC AUTOMATED: 0 PER 100 WBC
PDW BLD-RTO: 14.2 % (ref 11.8–14.4)
PLATELET # BLD: 266 K/UL (ref 138–453)
PLATELET ESTIMATE: ABNORMAL
PMV BLD AUTO: 10.3 FL (ref 8.1–13.5)
POTASSIUM SERPL-SCNC: 5 MMOL/L (ref 3.7–5.3)
RBC # BLD: 4.32 M/UL (ref 4.21–5.77)
RBC # BLD: ABNORMAL 10*6/UL
SEG NEUTROPHILS: 83 % (ref 44–74)
SEGMENTED NEUTROPHILS ABSOLUTE COUNT: 10.79 K/UL (ref 1.5–8.1)
SODIUM BLD-SCNC: 131 MMOL/L (ref 135–144)
TOTAL PROTEIN: 6.1 G/DL (ref 6.4–8.3)
WBC # BLD: 13 K/UL (ref 3.5–11.3)
WBC # BLD: ABNORMAL 10*3/UL

## 2021-12-14 PROCEDURE — 6370000000 HC RX 637 (ALT 250 FOR IP): Performed by: FAMILY MEDICINE

## 2021-12-14 PROCEDURE — 85025 COMPLETE CBC W/AUTO DIFF WBC: CPT

## 2021-12-14 PROCEDURE — 82728 ASSAY OF FERRITIN: CPT

## 2021-12-14 PROCEDURE — 83615 LACTATE (LD) (LDH) ENZYME: CPT

## 2021-12-14 PROCEDURE — 85379 FIBRIN DEGRADATION QUANT: CPT

## 2021-12-14 PROCEDURE — 71045 X-RAY EXAM CHEST 1 VIEW: CPT

## 2021-12-14 PROCEDURE — 2700000000 HC OXYGEN THERAPY PER DAY

## 2021-12-14 PROCEDURE — 6370000000 HC RX 637 (ALT 250 FOR IP): Performed by: INTERNAL MEDICINE

## 2021-12-14 PROCEDURE — 6360000002 HC RX W HCPCS: Performed by: INTERNAL MEDICINE

## 2021-12-14 PROCEDURE — 2580000003 HC RX 258: Performed by: FAMILY MEDICINE

## 2021-12-14 PROCEDURE — 6360000002 HC RX W HCPCS: Performed by: FAMILY MEDICINE

## 2021-12-14 PROCEDURE — 94761 N-INVAS EAR/PLS OXIMETRY MLT: CPT

## 2021-12-14 PROCEDURE — 99232 SBSQ HOSP IP/OBS MODERATE 35: CPT | Performed by: INTERNAL MEDICINE

## 2021-12-14 PROCEDURE — 6360000002 HC RX W HCPCS: Performed by: NURSE PRACTITIONER

## 2021-12-14 PROCEDURE — 2580000003 HC RX 258: Performed by: NURSE PRACTITIONER

## 2021-12-14 PROCEDURE — 94660 CPAP INITIATION&MGMT: CPT

## 2021-12-14 PROCEDURE — 36415 COLL VENOUS BLD VENIPUNCTURE: CPT

## 2021-12-14 PROCEDURE — 94640 AIRWAY INHALATION TREATMENT: CPT

## 2021-12-14 PROCEDURE — 80053 COMPREHEN METABOLIC PANEL: CPT

## 2021-12-14 PROCEDURE — 86140 C-REACTIVE PROTEIN: CPT

## 2021-12-14 PROCEDURE — 2060000000 HC ICU INTERMEDIATE R&B

## 2021-12-14 PROCEDURE — 82248 BILIRUBIN DIRECT: CPT

## 2021-12-14 RX ORDER — SODIUM CHLORIDE 1000 MG
1 TABLET, SOLUBLE MISCELLANEOUS
Status: DISCONTINUED | OUTPATIENT
Start: 2021-12-14 | End: 2021-12-15

## 2021-12-14 RX ADMIN — METOPROLOL TARTRATE 50 MG: 50 TABLET, FILM COATED ORAL at 20:11

## 2021-12-14 RX ADMIN — BUPROPION HYDROCHLORIDE 75 MG: 75 TABLET ORAL at 10:09

## 2021-12-14 RX ADMIN — ALBUTEROL SULFATE 2 PUFF: 90 AEROSOL, METERED RESPIRATORY (INHALATION) at 11:55

## 2021-12-14 RX ADMIN — OXYBUTYNIN CHLORIDE 15 MG: 5 TABLET, EXTENDED RELEASE ORAL at 10:08

## 2021-12-14 RX ADMIN — ATORVASTATIN CALCIUM 40 MG: 40 TABLET, FILM COATED ORAL at 10:08

## 2021-12-14 RX ADMIN — SODIUM CHLORIDE TAB 1 GM 1 G: 1 TAB at 15:02

## 2021-12-14 RX ADMIN — ENOXAPARIN SODIUM 40 MG: 100 INJECTION SUBCUTANEOUS at 20:11

## 2021-12-14 RX ADMIN — BARICITINIB 4 MG: 2 TABLET, FILM COATED ORAL at 10:07

## 2021-12-14 RX ADMIN — SODIUM CHLORIDE, PRESERVATIVE FREE 10 ML: 5 INJECTION INTRAVENOUS at 10:09

## 2021-12-14 RX ADMIN — ALBUTEROL SULFATE 2 PUFF: 90 AEROSOL, METERED RESPIRATORY (INHALATION) at 08:12

## 2021-12-14 RX ADMIN — ALBUTEROL SULFATE 2 PUFF: 90 AEROSOL, METERED RESPIRATORY (INHALATION) at 20:08

## 2021-12-14 RX ADMIN — METOPROLOL TARTRATE 50 MG: 50 TABLET, FILM COATED ORAL at 10:08

## 2021-12-14 RX ADMIN — LISINOPRIL 20 MG: 20 TABLET ORAL at 10:08

## 2021-12-14 RX ADMIN — SODIUM CHLORIDE TAB 1 GM 1 G: 1 TAB at 17:38

## 2021-12-14 RX ADMIN — CEFTRIAXONE 1000 MG: 1 INJECTION, POWDER, FOR SOLUTION INTRAMUSCULAR; INTRAVENOUS at 22:39

## 2021-12-14 RX ADMIN — CLOPIDOGREL BISULFATE 75 MG: 75 TABLET ORAL at 10:08

## 2021-12-14 RX ADMIN — FUROSEMIDE 20 MG: 10 INJECTION, SOLUTION INTRAMUSCULAR; INTRAVENOUS at 10:07

## 2021-12-14 RX ADMIN — SODIUM CHLORIDE, PRESERVATIVE FREE 10 ML: 5 INJECTION INTRAVENOUS at 20:16

## 2021-12-14 RX ADMIN — AZITHROMYCIN MONOHYDRATE 500 MG: 500 INJECTION, POWDER, LYOPHILIZED, FOR SOLUTION INTRAVENOUS at 00:00

## 2021-12-14 RX ADMIN — ALBUTEROL SULFATE 2 PUFF: 90 AEROSOL, METERED RESPIRATORY (INHALATION) at 16:29

## 2021-12-14 RX ADMIN — SODIUM CHLORIDE: 9 INJECTION, SOLUTION INTRAVENOUS at 15:05

## 2021-12-14 RX ADMIN — LEVOTHYROXINE SODIUM 75 MCG: 0.07 TABLET ORAL at 05:40

## 2021-12-14 RX ADMIN — FINASTERIDE 5 MG: 5 TABLET, FILM COATED ORAL at 10:07

## 2021-12-14 RX ADMIN — ASPIRIN 81 MG: 81 TABLET, COATED ORAL at 10:08

## 2021-12-14 RX ADMIN — ENOXAPARIN SODIUM 40 MG: 100 INJECTION SUBCUTANEOUS at 10:07

## 2021-12-14 RX ADMIN — AZITHROMYCIN MONOHYDRATE 500 MG: 500 INJECTION, POWDER, LYOPHILIZED, FOR SOLUTION INTRAVENOUS at 23:17

## 2021-12-14 RX ADMIN — DEXAMETHASONE SODIUM PHOSPHATE 10 MG: 10 INJECTION INTRAMUSCULAR; INTRAVENOUS at 10:07

## 2021-12-14 ASSESSMENT — PAIN SCALES - GENERAL
PAINLEVEL_OUTOF10: 0

## 2021-12-14 ASSESSMENT — PAIN DESCRIPTION - PAIN TYPE: TYPE: ACUTE PAIN

## 2021-12-14 NOTE — PROGRESS NOTES
Hospitalist Progress Note    Patient:  Darlin Springer     YOB: 1945    MRN: [id number]   Admit date: 12/8/2021     Acct: [de-identified]     PCP: Samantha Moeller MD    CC--Interval History: Covid---19 PNA---acute respiratory failure with hypoxia--on Rocephin--Zithromax---Baricitinib---Decadron---supplemental oxygen currently with BiPAP  Inflammatory markers elevated and variable. Hyponatremia--sodium replacement    See note below     All other ROS negative except noted in HPI    Diet:  ADULT DIET; Regular; 4 carb choices (60 gm/meal);  Low Fat/Low Chol/High Fiber/2 gm Na; 1800 ml    Medications:  Scheduled Meds:   sodium chloride  1 g Oral TID WC    dexamethasone  10 mg IntraVENous Daily    furosemide  20 mg IntraVENous Daily    enoxaparin  40 mg SubCUTAneous BID    albuterol sulfate HFA  2 puff Inhalation 4x daily    aspirin  81 mg Oral Daily    atorvastatin  40 mg Oral Daily    buPROPion  75 mg Oral Daily    clopidogrel  75 mg Oral Daily    levothyroxine  75 mcg Oral Daily    finasteride  5 mg Oral Daily    oxybutynin  15 mg Oral Daily    metoprolol tartrate  50 mg Oral BID    lisinopril  20 mg Oral Daily    sodium chloride flush  5-40 mL IntraVENous 2 times per day    cefTRIAXone (ROCEPHIN) IV  1,000 mg IntraVENous Q24H    azithromycin  500 mg IntraVENous Q24H    baricitinib  4 mg Oral Daily     Continuous Infusions:   sodium chloride 50 mL/hr at 12/14/21 1505    sodium chloride Stopped (12/09/21 1026)     PRN Meds:zolpidem, benzonatate, guaiFENesin-dextromethorphan, albuterol sulfate HFA, sodium chloride flush, sodium chloride, polyethylene glycol, acetaminophen **OR** acetaminophen    Objective:  Labs:  CBC with Differential:    Lab Results   Component Value Date    WBC 13.0 12/14/2021    RBC 4.32 12/14/2021    HGB 13.0 12/14/2021    HCT 39.8 12/14/2021     12/14/2021    MCV 92.1 12/14/2021    MCH 30.1 12/14/2021    MCHC 32.7 12/14/2021    RDW 14.2 12/14/2021 LYMPHOPCT 7 12/14/2021    MONOPCT 6 12/14/2021    BASOPCT 0 12/14/2021    MONOSABS 0.78 12/14/2021    LYMPHSABS 0.91 12/14/2021    EOSABS 0.00 12/14/2021    BASOSABS 0.00 12/14/2021    DIFFTYPE NOT REPORTED 12/14/2021     BMP:    Lab Results   Component Value Date     12/14/2021    K 5.0 12/14/2021    CL 98 12/14/2021    CO2 22 12/14/2021    BUN 30 12/14/2021    LABALBU 2.9 12/14/2021    CREATININE 0.68 12/14/2021    CALCIUM 8.8 12/14/2021    GFRAA >60 12/14/2021    LABGLOM >60 12/14/2021    GLUCOSE 102 12/14/2021           Physical Exam:  Vitals: BP (!) 124/55   Pulse 72   Temp 98.1 °F (36.7 °C) (Tympanic)   Resp 21   Ht 6' (1.829 m)   Wt (!) 302 lb 8 oz (137.2 kg)   SpO2 (!) 88%   BMI 41.03 kg/m²   24 hour intake/output:    Intake/Output Summary (Last 24 hours) at 12/14/2021 1841  Last data filed at 12/14/2021 1107  Gross per 24 hour   Intake 2521.69 ml   Output 2050 ml   Net 471.69 ml     Last 3 weights: Wt Readings from Last 3 Encounters:   12/13/21 (!) 302 lb 8 oz (137.2 kg)   11/11/21 (!) 319 lb 12.8 oz (145.1 kg)   09/15/21 (!) 321 lb (145.6 kg)     HEENT: BiPAP----, Normocephalic and Atraumatic  Neck: Supple, No Masses, Tenderness, Nodularity and No Lymphadenopathy  Chest/Lungs: Distant Breath Sounds  Cardiac: Regular Rate and Rhythm  GI/Abdomen: Bowel Sounds Present and Soft, Non-tender, without Guarding or Rebound Tenderness  : Not examined  EXT/Skin: No Edema, No Cyanosis and No Clubbing  Neuro: alert---- and No Localizing Signs/Symptoms      Assessment:    Principal Problem:    Pneumonia due to COVID-19 virus  Active Problems:    Ischemic cardiomyopathy    Coronary artery disease involving native coronary artery of native heart    Acute respiratory failure with hypoxia (HCC)    COPD with acute exacerbation (HCC)  Resolved Problems:    * No resolved hospital problems.  *    Tory Cowart.  67 WM  [world call]  FULL CODE    LOVENOX---PLAVIX    SUPPLEMENTAL OXYGEN----BiPAP  COVID-19--POSITIVE---12.8.2021----Moderna---1.25.2021---2.22.2021    Anti-infectives:  Rocephin IV, Zithromax IV, Baricitinib, [Decadron 20]    Covid-19 PNA----12.8.2021    Acute respiratory failure with hypoxia----12.8.2021  COPD exacerbation---due to Covid-19 PNA----12.8.2021  COPD----emphysema   ASCVD          CABG--10.28.2014---SVG-OM1---SVG-OM2-----SVG-PDA          Cardiac catheterization---2014  Ischemic cardiomyopathy           2D ECHO---1.28.2021---[facility]---LVEF ~ 40%    CHF----chronic combined systolic---diastolic  Chronic PE and BLE DVT  CKD--Stage 2  PVD        Bilateral carotid stenosis  Hypertension  Hyperlipidemia  Hypothyroidism  LBBB  BLE venous insufficiency  Gait-balance instability  Insomnia   Tobacco abuse---quit---10.27.2014  PMH:    BLE edema, OA, onychomycosis,   PSH:   see above,  colonoscopy--2012--2009---2008---2003--adenomatous polyp, TURP,              dental extractions, left TKA---2018, right TKA---2015     Allergies----NKDA     Plan:  1. Covid-19---PNA----cont'd current regimen  2. Wean FiO2 as tolerated  3. Sodium replacement  4.    See orders    Electronically signed by Travis Maza on 12/14/2021 at 6:41 PM    Hospitalist

## 2021-12-15 ENCOUNTER — APPOINTMENT (OUTPATIENT)
Dept: GENERAL RADIOLOGY | Age: 76
DRG: 177 | End: 2021-12-15
Payer: MEDICARE

## 2021-12-15 LAB
ABSOLUTE EOS #: <0.03 K/UL (ref 0–0.44)
ABSOLUTE IMMATURE GRANULOCYTE: 0.4 K/UL (ref 0–0.3)
ABSOLUTE LYMPH #: 0.85 K/UL (ref 1.1–3.7)
ABSOLUTE MONO #: 0.94 K/UL (ref 0.1–1.2)
ALBUMIN SERPL-MCNC: 2.7 G/DL (ref 3.5–5.2)
ALBUMIN/GLOBULIN RATIO: 0.8 (ref 1–2.5)
ALP BLD-CCNC: 73 U/L (ref 40–129)
ALT SERPL-CCNC: 22 U/L (ref 5–41)
ANION GAP SERPL CALCULATED.3IONS-SCNC: 11 MMOL/L (ref 9–17)
AST SERPL-CCNC: 34 U/L
BASOPHILS # BLD: 0 % (ref 0–2)
BASOPHILS ABSOLUTE: 0.04 K/UL (ref 0–0.2)
BILIRUB SERPL-MCNC: 0.75 MG/DL (ref 0.3–1.2)
BILIRUBIN DIRECT: 0.17 MG/DL
BILIRUBIN, INDIRECT: 0.58 MG/DL (ref 0–1)
BUN BLDV-MCNC: 30 MG/DL (ref 8–23)
C-REACTIVE PROTEIN: 11.5 MG/L (ref 0–5)
CALCIUM SERPL-MCNC: 9 MG/DL (ref 8.6–10.4)
CHLORIDE BLD-SCNC: 100 MMOL/L (ref 98–107)
CO2: 20 MMOL/L (ref 20–31)
CREAT SERPL-MCNC: 0.63 MG/DL (ref 0.7–1.2)
D-DIMER QUANTITATIVE: 16.7 MG/L FEU (ref 0–0.59)
DIFFERENTIAL TYPE: ABNORMAL
EOSINOPHILS RELATIVE PERCENT: 0 % (ref 1–4)
FERRITIN: 853 UG/L (ref 30–400)
GFR AFRICAN AMERICAN: >60 ML/MIN
GFR NON-AFRICAN AMERICAN: >60 ML/MIN
GFR SERPL CREATININE-BSD FRML MDRD: ABNORMAL ML/MIN/{1.73_M2}
GFR SERPL CREATININE-BSD FRML MDRD: ABNORMAL ML/MIN/{1.73_M2}
GLUCOSE BLD-MCNC: 70 MG/DL (ref 70–99)
HCT VFR BLD CALC: 42.7 % (ref 40.7–50.3)
HEMOGLOBIN: 13.5 G/DL (ref 13–17)
IMMATURE GRANULOCYTES: 2 %
LACTATE DEHYDROGENASE: 603 U/L (ref 135–225)
LYMPHOCYTES # BLD: 5 % (ref 24–43)
MCH RBC QN AUTO: 29.6 PG (ref 25.2–33.5)
MCHC RBC AUTO-ENTMCNC: 31.6 G/DL (ref 25.2–33.5)
MCV RBC AUTO: 93.6 FL (ref 82.6–102.9)
MONOCYTES # BLD: 6 % (ref 3–12)
NRBC AUTOMATED: 0 PER 100 WBC
PDW BLD-RTO: 14.2 % (ref 11.8–14.4)
PLATELET # BLD: 268 K/UL (ref 138–453)
PLATELET ESTIMATE: ABNORMAL
PMV BLD AUTO: 10.2 FL (ref 8.1–13.5)
POTASSIUM SERPL-SCNC: 5.4 MMOL/L (ref 3.7–5.3)
RBC # BLD: 4.56 M/UL (ref 4.21–5.77)
RBC # BLD: ABNORMAL 10*6/UL
SEG NEUTROPHILS: 87 % (ref 36–65)
SEGMENTED NEUTROPHILS ABSOLUTE COUNT: 14.49 K/UL (ref 1.5–8.1)
SODIUM BLD-SCNC: 131 MMOL/L (ref 135–144)
TOTAL PROTEIN: 6 G/DL (ref 6.4–8.3)
WBC # BLD: 16.7 K/UL (ref 3.5–11.3)
WBC # BLD: ABNORMAL 10*3/UL

## 2021-12-15 PROCEDURE — 80053 COMPREHEN METABOLIC PANEL: CPT

## 2021-12-15 PROCEDURE — 82728 ASSAY OF FERRITIN: CPT

## 2021-12-15 PROCEDURE — 36415 COLL VENOUS BLD VENIPUNCTURE: CPT

## 2021-12-15 PROCEDURE — 99232 SBSQ HOSP IP/OBS MODERATE 35: CPT | Performed by: INTERNAL MEDICINE

## 2021-12-15 PROCEDURE — 94761 N-INVAS EAR/PLS OXIMETRY MLT: CPT

## 2021-12-15 PROCEDURE — 6370000000 HC RX 637 (ALT 250 FOR IP): Performed by: INTERNAL MEDICINE

## 2021-12-15 PROCEDURE — 2580000003 HC RX 258: Performed by: NURSE PRACTITIONER

## 2021-12-15 PROCEDURE — 94640 AIRWAY INHALATION TREATMENT: CPT

## 2021-12-15 PROCEDURE — 6360000002 HC RX W HCPCS: Performed by: NURSE PRACTITIONER

## 2021-12-15 PROCEDURE — 86140 C-REACTIVE PROTEIN: CPT

## 2021-12-15 PROCEDURE — 85379 FIBRIN DEGRADATION QUANT: CPT

## 2021-12-15 PROCEDURE — 2060000000 HC ICU INTERMEDIATE R&B

## 2021-12-15 PROCEDURE — 94760 N-INVAS EAR/PLS OXIMETRY 1: CPT

## 2021-12-15 PROCEDURE — 6370000000 HC RX 637 (ALT 250 FOR IP): Performed by: FAMILY MEDICINE

## 2021-12-15 PROCEDURE — 2580000003 HC RX 258: Performed by: FAMILY MEDICINE

## 2021-12-15 PROCEDURE — 94660 CPAP INITIATION&MGMT: CPT

## 2021-12-15 PROCEDURE — 6360000002 HC RX W HCPCS: Performed by: FAMILY MEDICINE

## 2021-12-15 PROCEDURE — 2700000000 HC OXYGEN THERAPY PER DAY

## 2021-12-15 PROCEDURE — 83615 LACTATE (LD) (LDH) ENZYME: CPT

## 2021-12-15 PROCEDURE — 6360000002 HC RX W HCPCS: Performed by: INTERNAL MEDICINE

## 2021-12-15 PROCEDURE — 82248 BILIRUBIN DIRECT: CPT

## 2021-12-15 PROCEDURE — 71045 X-RAY EXAM CHEST 1 VIEW: CPT

## 2021-12-15 PROCEDURE — 85025 COMPLETE CBC W/AUTO DIFF WBC: CPT

## 2021-12-15 RX ORDER — SODIUM CHLORIDE 1000 MG
2 TABLET, SOLUBLE MISCELLANEOUS
Status: DISCONTINUED | OUTPATIENT
Start: 2021-12-15 | End: 2021-12-21

## 2021-12-15 RX ORDER — SODIUM CHLORIDE 1000 MG
1 TABLET, SOLUBLE MISCELLANEOUS ONCE
Status: DISCONTINUED | OUTPATIENT
Start: 2021-12-15 | End: 2021-12-19

## 2021-12-15 RX ADMIN — ALBUTEROL SULFATE 2 PUFF: 90 AEROSOL, METERED RESPIRATORY (INHALATION) at 08:51

## 2021-12-15 RX ADMIN — AZITHROMYCIN MONOHYDRATE 500 MG: 500 INJECTION, POWDER, LYOPHILIZED, FOR SOLUTION INTRAVENOUS at 22:46

## 2021-12-15 RX ADMIN — LEVOTHYROXINE SODIUM 75 MCG: 0.07 TABLET ORAL at 08:08

## 2021-12-15 RX ADMIN — SODIUM CHLORIDE TAB 1 GM 2 G: 1 TAB at 18:19

## 2021-12-15 RX ADMIN — OXYBUTYNIN CHLORIDE 15 MG: 5 TABLET, EXTENDED RELEASE ORAL at 08:19

## 2021-12-15 RX ADMIN — CEFTRIAXONE 1000 MG: 1 INJECTION, POWDER, FOR SOLUTION INTRAMUSCULAR; INTRAVENOUS at 23:54

## 2021-12-15 RX ADMIN — CLOPIDOGREL BISULFATE 75 MG: 75 TABLET ORAL at 08:08

## 2021-12-15 RX ADMIN — ATORVASTATIN CALCIUM 40 MG: 40 TABLET, FILM COATED ORAL at 08:07

## 2021-12-15 RX ADMIN — BARICITINIB 4 MG: 2 TABLET, FILM COATED ORAL at 08:07

## 2021-12-15 RX ADMIN — ALBUTEROL SULFATE 2 PUFF: 90 AEROSOL, METERED RESPIRATORY (INHALATION) at 20:07

## 2021-12-15 RX ADMIN — ASPIRIN 81 MG: 81 TABLET, COATED ORAL at 08:07

## 2021-12-15 RX ADMIN — METOPROLOL TARTRATE 50 MG: 50 TABLET, FILM COATED ORAL at 08:07

## 2021-12-15 RX ADMIN — BUPROPION HYDROCHLORIDE 75 MG: 75 TABLET ORAL at 08:08

## 2021-12-15 RX ADMIN — SODIUM CHLORIDE, PRESERVATIVE FREE 10 ML: 5 INJECTION INTRAVENOUS at 22:47

## 2021-12-15 RX ADMIN — SODIUM CHLORIDE, PRESERVATIVE FREE 10 ML: 5 INJECTION INTRAVENOUS at 08:08

## 2021-12-15 RX ADMIN — ALBUTEROL SULFATE 2 PUFF: 90 AEROSOL, METERED RESPIRATORY (INHALATION) at 15:59

## 2021-12-15 RX ADMIN — ENOXAPARIN SODIUM 40 MG: 100 INJECTION SUBCUTANEOUS at 08:06

## 2021-12-15 RX ADMIN — FUROSEMIDE 20 MG: 10 INJECTION, SOLUTION INTRAMUSCULAR; INTRAVENOUS at 08:07

## 2021-12-15 RX ADMIN — DEXAMETHASONE SODIUM PHOSPHATE 10 MG: 10 INJECTION INTRAMUSCULAR; INTRAVENOUS at 08:07

## 2021-12-15 RX ADMIN — FINASTERIDE 5 MG: 5 TABLET, FILM COATED ORAL at 08:07

## 2021-12-15 RX ADMIN — SODIUM CHLORIDE TAB 1 GM 2 G: 1 TAB at 12:34

## 2021-12-15 RX ADMIN — METOPROLOL TARTRATE 50 MG: 50 TABLET, FILM COATED ORAL at 20:44

## 2021-12-15 RX ADMIN — ENOXAPARIN SODIUM 40 MG: 100 INJECTION SUBCUTANEOUS at 20:44

## 2021-12-15 RX ADMIN — SODIUM CHLORIDE: 9 INJECTION, SOLUTION INTRAVENOUS at 05:18

## 2021-12-15 RX ADMIN — ALBUTEROL SULFATE 2 PUFF: 90 AEROSOL, METERED RESPIRATORY (INHALATION) at 11:59

## 2021-12-15 RX ADMIN — SODIUM CHLORIDE TAB 1 GM 1 G: 1 TAB at 08:07

## 2021-12-15 NOTE — PROGRESS NOTES
Hospitalist Progress Note    Patient:  Bubba Escobar     YOB: 1945    MRN: [id number]   Admit date: 12/8/2021     Acct: [de-identified]     PCP: Ashley Flannery MD    CC--Interval History:   Covid-19 PNA--COPD exacerbation---acute respiratory fialure---on Rocephin IV--Zithromax  IV---Baricitinib--Decadron--BiPAP currently    Inflammatory markers----elevated----variable trends    ASCVD--ischemic cardiomyopathy---CHF    CKD---Stage 2----stable    HTN---128/62    Na = 131 ---> replacement    K = 5.4 ----holding ACEI lisinopril    See orders         All other ROS negative except noted in HPI    Diet:  ADULT DIET; Regular; 4 carb choices (60 gm/meal);  Low Fat/Low Chol/High Fiber/2 gm Na; 1800 ml    Medications:  Scheduled Meds:   sodium chloride  2 g Oral TID WC    sodium chloride  1 g Oral Once    dexamethasone  10 mg IntraVENous Daily    furosemide  20 mg IntraVENous Daily    enoxaparin  40 mg SubCUTAneous BID    albuterol sulfate HFA  2 puff Inhalation 4x daily    aspirin  81 mg Oral Daily    atorvastatin  40 mg Oral Daily    buPROPion  75 mg Oral Daily    clopidogrel  75 mg Oral Daily    levothyroxine  75 mcg Oral Daily    finasteride  5 mg Oral Daily    oxybutynin  15 mg Oral Daily    metoprolol tartrate  50 mg Oral BID    [Held by provider] lisinopril  20 mg Oral Daily    sodium chloride flush  5-40 mL IntraVENous 2 times per day    cefTRIAXone (ROCEPHIN) IV  1,000 mg IntraVENous Q24H    azithromycin  500 mg IntraVENous Q24H    baricitinib  4 mg Oral Daily     Continuous Infusions:   sodium chloride 50 mL/hr at 12/15/21 0518    sodium chloride Stopped (12/09/21 1026)     PRN Meds:zolpidem, benzonatate, guaiFENesin-dextromethorphan, albuterol sulfate HFA, sodium chloride flush, sodium chloride, polyethylene glycol, acetaminophen **OR** acetaminophen    Objective:  Labs:  CBC with Differential:    Lab Results   Component Value Date    WBC 16.7 12/15/2021    RBC 4.56 12/15/2021 HGB 13.5 12/15/2021    HCT 42.7 12/15/2021     12/15/2021    MCV 93.6 12/15/2021    MCH 29.6 12/15/2021    MCHC 31.6 12/15/2021    RDW 14.2 12/15/2021    LYMPHOPCT 5 12/15/2021    MONOPCT 6 12/15/2021    BASOPCT 0 12/15/2021    MONOSABS 0.94 12/15/2021    LYMPHSABS 0.85 12/15/2021    EOSABS <0.03 12/15/2021    BASOSABS 0.04 12/15/2021    DIFFTYPE NOT REPORTED 12/15/2021     BMP:    Lab Results   Component Value Date     12/15/2021    K 5.4 12/15/2021     12/15/2021    CO2 20 12/15/2021    BUN 30 12/15/2021    LABALBU 2.7 12/15/2021    CREATININE 0.63 12/15/2021    CALCIUM 9.0 12/15/2021    GFRAA >60 12/15/2021    LABGLOM >60 12/15/2021    GLUCOSE 70 12/15/2021           Physical Exam:  Vitals: /62   Pulse 66   Temp 98.3 °F (36.8 °C) (Tympanic)   Resp (!) 37   Ht 6' (1.829 m)   Wt (!) 321 lb 3.2 oz (145.7 kg)   SpO2 (!) 88%   BMI 43.56 kg/m²   24 hour intake/output:    Intake/Output Summary (Last 24 hours) at 12/15/2021 0856  Last data filed at 12/15/2021 5249  Gross per 24 hour   Intake --   Output 3650 ml   Net -3650 ml     Last 3 weights: Wt Readings from Last 3 Encounters:   12/15/21 (!) 321 lb 3.2 oz (145.7 kg)   11/11/21 (!) 319 lb 12.8 oz (145.1 kg)   09/15/21 (!) 321 lb (145.6 kg)     HEENT: Normocephalic and Atraumatic  Neck: Supple, No Masses, Tenderness, Nodularity and No Lymphadenopathy  Chest/Lungs: Distant Breath Sounds  Cardiac: Regular Rate and Rhythm  GI/Abdomen:  Bowel Sounds Present and Soft, Non-tender, without Guarding or Rebound Tenderness  : Not examined  EXT/Skin: No Cyanosis, No Clubbing and Edema Present  Neuro: Alert ---generalized weakness No Localizing Signs/Symptoms      Assessment:    Principal Problem:    Pneumonia due to COVID-19 virus  Active Problems:    Ischemic cardiomyopathy    Coronary artery disease involving native coronary artery of native heart    Acute respiratory failure with hypoxia (HCC)    COPD with acute exacerbation (HCC)  Resolved Problems:    * No resolved hospital problems. *    Alexandrea Rust.  67 WM  [world call]  FULL CODE    LOVENOX---PLAVIX    SUPPLEMENTAL OXYGEN----BiPAP  COVID-19--POSITIVE---12.8.2021----Moderna---1.25.2021---2.22.2021    Anti-infectives:  Rocephin IV, Zithromax IV, Baricitinib, [Decadron 20]    Covid-19 PNA----12.8.2021            CXR----12.15.2021----bilateral infiltrates----NACs    Acute respiratory failure with hypoxia----12.8.2021  COPD exacerbation---due to Covid-19 PNA----12.8.2021  COPD----emphysema   ASCVD          CABG--10.28.2014---SVG-OM1---SVG-OM2-----SVG-PDA          Cardiac catheterization---2014  Ischemic cardiomyopathy           2D ECHO---1.28.2021---[facility]---LVEF ~ 40%    CHF----chronic combined systolic---diastolic  Chronic PE and BLE DVT  CKD--Stage 2  PVD        Bilateral carotid stenosis  Hypertension  Hyperlipidemia  Hypothyroidism  LBBB  BLE venous insufficiency  Gait-balance instability  Insomnia   Tobacco abuse---quit---10.27.2014  PMH:    BLE edema, OA, onychomycosis,   PSH:   see above,  colonoscopy--2012--2009---2008---2003--adenomatous polyp, TURP,              dental extractions, left TKA---2018, right TKA---2015     Allergies----NKDA       Plan:  1. Covid-19----cont'd current regimen  2. Wean FiO2 as tolerated  3.    See orders     Electronically signed by Jeremías Vazquez on 12/15/2021 at 8:56 AM    Hospitalist

## 2021-12-15 NOTE — PROGRESS NOTES
Outcomes:  None Identified   Food/Nutrient Intake Outcomes:  Food and Nutrient Intake, Supplement Intake  Physical Signs/Symptoms Outcomes:  Biochemical Data, Chewing or Swallowing, Fluid Status or Edema, Weight     Discharge Planning:     Too soon to determine     Misti Bernal N [address] [place] [address], [address] [place] [address]  Office Number: [phone]

## 2021-12-15 NOTE — PLAN OF CARE
Problem: Airway Clearance - Ineffective  Goal: Achieve or maintain patent airway  12/15/2021 0957 by Tiffany Whitt RN  Outcome: Ongoing  12/15/2021 0153 by Telma Oropeza RN  Outcome: Ongoing     Problem: Gas Exchange - Impaired  Goal: Absence of hypoxia  12/15/2021 0957 by Tiffany Whitt RN  Outcome: Ongoing  12/15/2021 0153 by Telma Oropeza RN  Outcome: Ongoing  Goal: Promote optimal lung function  12/15/2021 0957 by Tiffany Whitt RN  Outcome: Ongoing  12/15/2021 0153 by Telma Oropeza RN  Outcome: Ongoing     Problem: Breathing Pattern - Ineffective  Goal: Ability to achieve and maintain a regular respiratory rate  12/15/2021 0957 by Tiffany Whitt RN  Outcome: Ongoing  12/15/2021 0153 by Telma Oropeza RN  Outcome: Ongoing     Problem:  Body Temperature -  Risk of, Imbalanced  Goal: Ability to maintain a body temperature within defined limits  12/15/2021 0957 by Tiffany Whitt RN  Outcome: Ongoing  12/15/2021 0153 by Telma Oropeza RN  Outcome: Ongoing  Goal: Will regain or maintain usual level of consciousness  12/15/2021 0957 by Tiffany Whitt RN  Outcome: Ongoing  12/15/2021 0153 by Telma Oropeza RN  Outcome: Ongoing  Goal: Complications related to the disease process, condition or treatment will be avoided or minimized  12/15/2021 0957 by Tiffany Whitt RN  Outcome: Ongoing  12/15/2021 0153 by Telma Oropeza RN  Outcome: Ongoing     Problem: Nutrition Deficits  Goal: Optimize nutritional status  12/15/2021 0957 by Tiffany Whitt RN  Outcome: Ongoing  12/15/2021 0153 by Telma Oropeza RN  Outcome: Ongoing

## 2021-12-15 NOTE — PLAN OF CARE
Nutrition Problem #1: Inadequate oral intake  Intervention: Food and/or Nutrient Delivery: Continue Current Diet, Start Oral Nutrition Supplement  Nutritional Goals: PO intakes to provide >50% of estimated nutritional needs

## 2021-12-16 ENCOUNTER — APPOINTMENT (OUTPATIENT)
Dept: GENERAL RADIOLOGY | Age: 76
DRG: 177 | End: 2021-12-16
Payer: MEDICARE

## 2021-12-16 LAB
ABSOLUTE EOS #: <0.03 K/UL (ref 0–0.44)
ABSOLUTE IMMATURE GRANULOCYTE: 0.25 K/UL (ref 0–0.3)
ABSOLUTE LYMPH #: 0.76 K/UL (ref 1.1–3.7)
ABSOLUTE MONO #: 0.82 K/UL (ref 0.1–1.2)
ALBUMIN SERPL-MCNC: 2.8 G/DL (ref 3.5–5.2)
ALBUMIN/GLOBULIN RATIO: 0.9 (ref 1–2.5)
ALP BLD-CCNC: 70 U/L (ref 40–129)
ALT SERPL-CCNC: 17 U/L (ref 5–41)
ANION GAP SERPL CALCULATED.3IONS-SCNC: 12 MMOL/L (ref 9–17)
AST SERPL-CCNC: 23 U/L
BASOPHILS # BLD: 0 % (ref 0–2)
BASOPHILS ABSOLUTE: 0.03 K/UL (ref 0–0.2)
BILIRUB SERPL-MCNC: 0.8 MG/DL (ref 0.3–1.2)
BUN BLDV-MCNC: 26 MG/DL (ref 8–23)
BUN/CREAT BLD: 38 (ref 9–20)
C-REACTIVE PROTEIN: 113 MG/L (ref 0–5)
CALCIUM SERPL-MCNC: 8.8 MG/DL (ref 8.6–10.4)
CHLORIDE BLD-SCNC: 99 MMOL/L (ref 98–107)
CO2: 23 MMOL/L (ref 20–31)
CREAT SERPL-MCNC: 0.69 MG/DL (ref 0.7–1.2)
D-DIMER QUANTITATIVE: 8.73 MG/L FEU (ref 0–0.59)
DIFFERENTIAL TYPE: ABNORMAL
EOSINOPHILS RELATIVE PERCENT: 0 % (ref 1–4)
GFR AFRICAN AMERICAN: >60 ML/MIN
GFR NON-AFRICAN AMERICAN: >60 ML/MIN
GFR SERPL CREATININE-BSD FRML MDRD: ABNORMAL ML/MIN/{1.73_M2}
GFR SERPL CREATININE-BSD FRML MDRD: ABNORMAL ML/MIN/{1.73_M2}
GLUCOSE BLD-MCNC: 81 MG/DL (ref 70–99)
HCT VFR BLD CALC: 41.8 % (ref 40.7–50.3)
HEMOGLOBIN: 13.4 G/DL (ref 13–17)
IMMATURE GRANULOCYTES: 2 %
LACTATE DEHYDROGENASE: 382 U/L (ref 135–225)
LYMPHOCYTES # BLD: 5 % (ref 24–43)
MCH RBC QN AUTO: 29.8 PG (ref 25.2–33.5)
MCHC RBC AUTO-ENTMCNC: 32.1 G/DL (ref 25.2–33.5)
MCV RBC AUTO: 93.1 FL (ref 82.6–102.9)
MONOCYTES # BLD: 6 % (ref 3–12)
NRBC AUTOMATED: 0 PER 100 WBC
PDW BLD-RTO: 14.2 % (ref 11.8–14.4)
PLATELET # BLD: 281 K/UL (ref 138–453)
PLATELET ESTIMATE: ABNORMAL
PMV BLD AUTO: 10 FL (ref 8.1–13.5)
POTASSIUM SERPL-SCNC: 4.5 MMOL/L (ref 3.7–5.3)
RBC # BLD: 4.49 M/UL (ref 4.21–5.77)
RBC # BLD: ABNORMAL 10*6/UL
SEG NEUTROPHILS: 87 % (ref 36–65)
SEGMENTED NEUTROPHILS ABSOLUTE COUNT: 12.65 K/UL (ref 1.5–8.1)
SODIUM BLD-SCNC: 134 MMOL/L (ref 135–144)
TOTAL PROTEIN: 6 G/DL (ref 6.4–8.3)
WBC # BLD: 14.5 K/UL (ref 3.5–11.3)
WBC # BLD: ABNORMAL 10*3/UL

## 2021-12-16 PROCEDURE — 71045 X-RAY EXAM CHEST 1 VIEW: CPT

## 2021-12-16 PROCEDURE — 6370000000 HC RX 637 (ALT 250 FOR IP): Performed by: FAMILY MEDICINE

## 2021-12-16 PROCEDURE — 82728 ASSAY OF FERRITIN: CPT

## 2021-12-16 PROCEDURE — 36415 COLL VENOUS BLD VENIPUNCTURE: CPT

## 2021-12-16 PROCEDURE — 94761 N-INVAS EAR/PLS OXIMETRY MLT: CPT

## 2021-12-16 PROCEDURE — 85379 FIBRIN DEGRADATION QUANT: CPT

## 2021-12-16 PROCEDURE — 99232 SBSQ HOSP IP/OBS MODERATE 35: CPT | Performed by: INTERNAL MEDICINE

## 2021-12-16 PROCEDURE — 80053 COMPREHEN METABOLIC PANEL: CPT

## 2021-12-16 PROCEDURE — 94640 AIRWAY INHALATION TREATMENT: CPT

## 2021-12-16 PROCEDURE — 85025 COMPLETE CBC W/AUTO DIFF WBC: CPT

## 2021-12-16 PROCEDURE — 83615 LACTATE (LD) (LDH) ENZYME: CPT

## 2021-12-16 PROCEDURE — 2580000003 HC RX 258: Performed by: NURSE PRACTITIONER

## 2021-12-16 PROCEDURE — 6360000002 HC RX W HCPCS: Performed by: FAMILY MEDICINE

## 2021-12-16 PROCEDURE — 2060000000 HC ICU INTERMEDIATE R&B

## 2021-12-16 PROCEDURE — 6360000002 HC RX W HCPCS: Performed by: INTERNAL MEDICINE

## 2021-12-16 PROCEDURE — 6370000000 HC RX 637 (ALT 250 FOR IP): Performed by: INTERNAL MEDICINE

## 2021-12-16 PROCEDURE — 6360000002 HC RX W HCPCS: Performed by: NURSE PRACTITIONER

## 2021-12-16 PROCEDURE — 86140 C-REACTIVE PROTEIN: CPT

## 2021-12-16 PROCEDURE — 2580000003 HC RX 258: Performed by: FAMILY MEDICINE

## 2021-12-16 PROCEDURE — 94660 CPAP INITIATION&MGMT: CPT

## 2021-12-16 RX ADMIN — SODIUM CHLORIDE TAB 1 GM 2 G: 1 TAB at 12:38

## 2021-12-16 RX ADMIN — ATORVASTATIN CALCIUM 40 MG: 40 TABLET, FILM COATED ORAL at 09:01

## 2021-12-16 RX ADMIN — ALBUTEROL SULFATE 2 PUFF: 90 AEROSOL, METERED RESPIRATORY (INHALATION) at 11:35

## 2021-12-16 RX ADMIN — ALBUTEROL SULFATE 2 PUFF: 90 AEROSOL, METERED RESPIRATORY (INHALATION) at 16:03

## 2021-12-16 RX ADMIN — SODIUM CHLORIDE, PRESERVATIVE FREE 10 ML: 5 INJECTION INTRAVENOUS at 20:37

## 2021-12-16 RX ADMIN — ENOXAPARIN SODIUM 40 MG: 100 INJECTION SUBCUTANEOUS at 09:02

## 2021-12-16 RX ADMIN — OXYBUTYNIN CHLORIDE 15 MG: 5 TABLET, EXTENDED RELEASE ORAL at 09:01

## 2021-12-16 RX ADMIN — CEFTRIAXONE 1000 MG: 1 INJECTION, POWDER, FOR SOLUTION INTRAMUSCULAR; INTRAVENOUS at 22:59

## 2021-12-16 RX ADMIN — AZITHROMYCIN MONOHYDRATE 500 MG: 500 INJECTION, POWDER, LYOPHILIZED, FOR SOLUTION INTRAVENOUS at 23:50

## 2021-12-16 RX ADMIN — METOPROLOL TARTRATE 50 MG: 50 TABLET, FILM COATED ORAL at 09:01

## 2021-12-16 RX ADMIN — CLOPIDOGREL BISULFATE 75 MG: 75 TABLET ORAL at 09:01

## 2021-12-16 RX ADMIN — SODIUM CHLORIDE: 9 INJECTION, SOLUTION INTRAVENOUS at 03:00

## 2021-12-16 RX ADMIN — ASPIRIN 81 MG: 81 TABLET, COATED ORAL at 09:01

## 2021-12-16 RX ADMIN — BUPROPION HYDROCHLORIDE 75 MG: 75 TABLET ORAL at 09:02

## 2021-12-16 RX ADMIN — FINASTERIDE 5 MG: 5 TABLET, FILM COATED ORAL at 09:01

## 2021-12-16 RX ADMIN — ALBUTEROL SULFATE 2 PUFF: 90 AEROSOL, METERED RESPIRATORY (INHALATION) at 20:12

## 2021-12-16 RX ADMIN — ENOXAPARIN SODIUM 40 MG: 100 INJECTION SUBCUTANEOUS at 20:32

## 2021-12-16 RX ADMIN — SODIUM CHLORIDE TAB 1 GM 2 G: 1 TAB at 09:01

## 2021-12-16 RX ADMIN — DEXAMETHASONE SODIUM PHOSPHATE 10 MG: 10 INJECTION INTRAMUSCULAR; INTRAVENOUS at 09:02

## 2021-12-16 RX ADMIN — SODIUM CHLORIDE, PRESERVATIVE FREE 10 ML: 5 INJECTION INTRAVENOUS at 09:03

## 2021-12-16 RX ADMIN — BARICITINIB 4 MG: 2 TABLET, FILM COATED ORAL at 09:01

## 2021-12-16 RX ADMIN — LEVOTHYROXINE SODIUM 75 MCG: 0.07 TABLET ORAL at 06:12

## 2021-12-16 RX ADMIN — ALBUTEROL SULFATE 2 PUFF: 90 AEROSOL, METERED RESPIRATORY (INHALATION) at 07:30

## 2021-12-16 RX ADMIN — FUROSEMIDE 20 MG: 10 INJECTION, SOLUTION INTRAMUSCULAR; INTRAVENOUS at 09:02

## 2021-12-16 ASSESSMENT — PAIN SCALES - GENERAL: PAINLEVEL_OUTOF10: 0

## 2021-12-16 NOTE — PLAN OF CARE
Problem: Airway Clearance - Ineffective  Goal: Achieve or maintain patent airway  12/16/2021 1108 by Viviana Bravo RN  Outcome: Ongoing  Note: Airway assessment completed by this RN, patient has a patent airway. The patient does not have a cough, no sputum present. Problem: Gas Exchange - Impaired  Goal: Absence of hypoxia  12/16/2021 1108 by Viviana Bravo RN  Outcome: Ongoing  Note: Lung sounds diminished. Respirations are easy, labored, and even. Oxygen saturation greater than 90% with a continuous pulse ox. Patient is on BiPAP. Will continue to monitor oxygenation. Problem: Body Temperature -  Risk of, Imbalanced  Goal: Ability to maintain a body temperature within defined limits  12/16/2021 1108 by Viviana Bravo RN  Outcome: Ongoing  Note: Patient does not have a fever at this time, will monitor for a fever. If the patient has a fever will give Tylenol if indicated. Problem: Isolation Precautions - Risk of Spread of Infection  Goal: Prevent transmission of infection  12/16/2021 1108 by Viviana Bravo RN  Outcome: Ongoing  Note: Patient has a droplet plus isolation order. The Droplet Plus sign is placed on the door and isolation flag hanging outside the patients door. Personal protective equipment (PPE) used by personal when entering room, easily accessible. Hand hygiene used and encouraged by the patient. Patient is aware that the patient must wear a mask when personal are present in the room/and or if the patient leaves the room. Problem: Nutrition Deficits  Goal: Optimize nutritional status  12/16/2021 1108 by Viviana Bravo RN  Outcome: Ongoing  Note: Patient is on a general diet. Patient not able to tolerate due to oxygenation issues.       Problem: Risk for Fluid Volume Deficit  Goal: Maintain normal serum potassium, sodium, calcium, phosphorus, and pH  12/16/2021 1108 by Viviana Bravo RN  Outcome: Ongoing  Note: Results for Nicole Garcia (MRN [id number]) as of 12/16/2021 11:08   Ref. Range 12/16/2021 05:40   Sodium Latest Ref Range: 135 - 144 mmol/L 134 (L)   Potassium Latest Ref Range: 3.7 - 5.3 mmol/L 4.5   Chloride Latest Ref Range: 98 - 107 mmol/L 99   CO2 Latest Ref Range: 20 - 31 mmol/L 23   BUN Latest Ref Range: 8 - 23 mg/dL 26 (H)   Creatinine Latest Ref Range: 0.70 - 1.20 mg/dL 0.69 (L)   Bun/Cre Ratio Latest Ref Range: 9 - 20  38 (H)   Anion Gap Latest Ref Range: 9 - 17 mmol/L 12   GFR Non- Latest Ref Range: >60 mL/min >60   GFR African American Latest Ref Range: >60 mL/min >60   Glucose Latest Ref Range: 70 - 99 mg/dL 81   Calcium Latest Ref Range: 8.6 - 10.4 mg/dL 8.8     The patient is on salt tablets. Problem: Loneliness or Risk for Loneliness  Goal: Demonstrate positive use of time alone when socialization is not possible  12/16/2021 1108 by Viviana Bravo RN  Outcome: Ongoing  Note: Loneliness assessment completed. Patient denies loneliness at this time. Family support present. Denies any suicidal thoughts. Problem: Skin Integrity:  Goal: Will show no infection signs and symptoms  Description: Will show no infection signs and symptoms  12/16/2021 1108 by Viviana Bravo RN  Outcome: Ongoing  Note: No signs of skin breakdown. Skin warm, dry, and intact. Mucous membranes pink and moist.  Assistance with turns provided every two hours, patient is able to turn self. Will continue to monitor. Problem: Falls - Risk of:  Goal: Will remain free from falls  Description: Will remain free from falls  12/16/2021 1108 by Viviana Bravo RN  Outcome: Ongoing  Note: Call light in reach, bed in lowest position, and bed alarm activated. Education given on use of call light before ambulation and when in need of assistance. Patient expressed understanding. Hourly visual checks performed and charted. Patient has a mullins. No falls this shift, at any time. Arm band and falling star in place. Will continue to monitor.       Problem: Pain:  Goal: Pain level will decrease  Description: Pain level will decrease  12/16/2021 1108 by Ken Mathews RN  Outcome: Ongoing  Note: Patient is unable to use 0-10 pain scale. FACES scale used. Patient is not in pain at this time, 0. Agreeable to take PRN pain medications. Problem: Discharge Planning:  Goal: Discharged to appropriate level of care  Description: Discharged to appropriate level of care  12/16/2021 1108 by Ken Mathews RN  Outcome: Ongoing  Note: Discharge planning in process and discussed with patient/family. Social work consulted for any additional needs. Care manager aware of discharge needs. Care plan reviewed with patient and wife. Patient and wife verbalize understanding of the plan of care and contribute to goal setting.

## 2021-12-16 NOTE — PROGRESS NOTES
RN had an extensive conversation with the patient regarding intubation if an emergency were to occur, the patient shakes head no. RN explained to the patient the differences between code status' with the patient. Patient shakes head yes when discussion DNR-CCA. Wife is updated. Will update Sandro MATHUR and Dr. Kathryn Simpson.

## 2021-12-17 ENCOUNTER — ANESTHESIA EVENT (OUTPATIENT)
Dept: INPATIENT UNIT | Age: 76
DRG: 177 | End: 2021-12-17
Payer: MEDICARE

## 2021-12-17 ENCOUNTER — APPOINTMENT (OUTPATIENT)
Dept: GENERAL RADIOLOGY | Age: 76
DRG: 177 | End: 2021-12-17
Payer: MEDICARE

## 2021-12-17 ENCOUNTER — ANESTHESIA (OUTPATIENT)
Dept: INPATIENT UNIT | Age: 76
DRG: 177 | End: 2021-12-17
Payer: MEDICARE

## 2021-12-17 LAB
ABSOLUTE EOS #: <0.03 K/UL (ref 0–0.44)
ABSOLUTE IMMATURE GRANULOCYTE: 0.18 K/UL (ref 0–0.3)
ABSOLUTE LYMPH #: 0.76 K/UL (ref 1.1–3.7)
ABSOLUTE MONO #: 0.9 K/UL (ref 0.1–1.2)
ALBUMIN SERPL-MCNC: 2.9 G/DL (ref 3.5–5.2)
ALBUMIN/GLOBULIN RATIO: 0.8 (ref 1–2.5)
ALP BLD-CCNC: 70 U/L (ref 40–129)
ALT SERPL-CCNC: 16 U/L (ref 5–41)
ANION GAP SERPL CALCULATED.3IONS-SCNC: 13 MMOL/L (ref 9–17)
AST SERPL-CCNC: 23 U/L
BASOPHILS # BLD: 0 % (ref 0–2)
BASOPHILS ABSOLUTE: <0.03 K/UL (ref 0–0.2)
BILIRUB SERPL-MCNC: 0.8 MG/DL (ref 0.3–1.2)
BUN BLDV-MCNC: 28 MG/DL (ref 8–23)
BUN/CREAT BLD: 46 (ref 9–20)
C-REACTIVE PROTEIN: 89.3 MG/L (ref 0–5)
CALCIUM SERPL-MCNC: 8.9 MG/DL (ref 8.6–10.4)
CHLORIDE BLD-SCNC: 98 MMOL/L (ref 98–107)
CO2: 22 MMOL/L (ref 20–31)
CREAT SERPL-MCNC: 0.61 MG/DL (ref 0.7–1.2)
D-DIMER QUANTITATIVE: 6.69 MG/L FEU (ref 0–0.59)
DIFFERENTIAL TYPE: ABNORMAL
EOSINOPHILS RELATIVE PERCENT: 0 % (ref 1–4)
FERRITIN: 1075 UG/L (ref 30–400)
FERRITIN: 1481 UG/L (ref 30–400)
GFR AFRICAN AMERICAN: >60 ML/MIN
GFR NON-AFRICAN AMERICAN: >60 ML/MIN
GFR SERPL CREATININE-BSD FRML MDRD: ABNORMAL ML/MIN/{1.73_M2}
GFR SERPL CREATININE-BSD FRML MDRD: ABNORMAL ML/MIN/{1.73_M2}
GLUCOSE BLD-MCNC: 114 MG/DL (ref 75–110)
GLUCOSE BLD-MCNC: 92 MG/DL (ref 70–99)
HCT VFR BLD CALC: 42.7 % (ref 40.7–50.3)
HEMOGLOBIN: 13.8 G/DL (ref 13–17)
IMMATURE GRANULOCYTES: 1 %
LACTATE DEHYDROGENASE: 452 U/L (ref 135–225)
LYMPHOCYTES # BLD: 6 % (ref 24–43)
MCH RBC QN AUTO: 30.1 PG (ref 25.2–33.5)
MCHC RBC AUTO-ENTMCNC: 32.3 G/DL (ref 25.2–33.5)
MCV RBC AUTO: 93.2 FL (ref 82.6–102.9)
MONOCYTES # BLD: 7 % (ref 3–12)
NRBC AUTOMATED: 0 PER 100 WBC
PDW BLD-RTO: 14.2 % (ref 11.8–14.4)
PLATELET # BLD: 308 K/UL (ref 138–453)
PLATELET ESTIMATE: ABNORMAL
PMV BLD AUTO: 9.8 FL (ref 8.1–13.5)
POTASSIUM SERPL-SCNC: 4.5 MMOL/L (ref 3.7–5.3)
RBC # BLD: 4.58 M/UL (ref 4.21–5.77)
RBC # BLD: ABNORMAL 10*6/UL
SEG NEUTROPHILS: 86 % (ref 36–65)
SEGMENTED NEUTROPHILS ABSOLUTE COUNT: 11.63 K/UL (ref 1.5–8.1)
SODIUM BLD-SCNC: 133 MMOL/L (ref 135–144)
TOTAL PROTEIN: 6.5 G/DL (ref 6.4–8.3)
WBC # BLD: 13.5 K/UL (ref 3.5–11.3)
WBC # BLD: ABNORMAL 10*3/UL

## 2021-12-17 PROCEDURE — 86140 C-REACTIVE PROTEIN: CPT

## 2021-12-17 PROCEDURE — 82947 ASSAY GLUCOSE BLOOD QUANT: CPT

## 2021-12-17 PROCEDURE — 36415 COLL VENOUS BLD VENIPUNCTURE: CPT

## 2021-12-17 PROCEDURE — 94761 N-INVAS EAR/PLS OXIMETRY MLT: CPT

## 2021-12-17 PROCEDURE — 80053 COMPREHEN METABOLIC PANEL: CPT

## 2021-12-17 PROCEDURE — 83615 LACTATE (LD) (LDH) ENZYME: CPT

## 2021-12-17 PROCEDURE — 2060000000 HC ICU INTERMEDIATE R&B

## 2021-12-17 PROCEDURE — 6360000002 HC RX W HCPCS: Performed by: INTERNAL MEDICINE

## 2021-12-17 PROCEDURE — 2580000003 HC RX 258: Performed by: FAMILY MEDICINE

## 2021-12-17 PROCEDURE — 71045 X-RAY EXAM CHEST 1 VIEW: CPT

## 2021-12-17 PROCEDURE — 6370000000 HC RX 637 (ALT 250 FOR IP): Performed by: INTERNAL MEDICINE

## 2021-12-17 PROCEDURE — 85379 FIBRIN DEGRADATION QUANT: CPT

## 2021-12-17 PROCEDURE — 6360000002 HC RX W HCPCS: Performed by: NURSE PRACTITIONER

## 2021-12-17 PROCEDURE — 2580000003 HC RX 258: Performed by: NURSE PRACTITIONER

## 2021-12-17 PROCEDURE — 82728 ASSAY OF FERRITIN: CPT

## 2021-12-17 PROCEDURE — 6370000000 HC RX 637 (ALT 250 FOR IP): Performed by: FAMILY MEDICINE

## 2021-12-17 PROCEDURE — 2700000000 HC OXYGEN THERAPY PER DAY

## 2021-12-17 PROCEDURE — 94660 CPAP INITIATION&MGMT: CPT

## 2021-12-17 PROCEDURE — 94640 AIRWAY INHALATION TREATMENT: CPT

## 2021-12-17 PROCEDURE — 85025 COMPLETE CBC W/AUTO DIFF WBC: CPT

## 2021-12-17 PROCEDURE — 99232 SBSQ HOSP IP/OBS MODERATE 35: CPT | Performed by: INTERNAL MEDICINE

## 2021-12-17 RX ADMIN — SODIUM CHLORIDE TAB 1 GM 2 G: 1 TAB at 12:12

## 2021-12-17 RX ADMIN — ASPIRIN 81 MG: 81 TABLET, COATED ORAL at 09:04

## 2021-12-17 RX ADMIN — SODIUM CHLORIDE, PRESERVATIVE FREE 10 ML: 5 INJECTION INTRAVENOUS at 09:05

## 2021-12-17 RX ADMIN — SODIUM CHLORIDE TAB 1 GM 2 G: 1 TAB at 17:16

## 2021-12-17 RX ADMIN — ENOXAPARIN SODIUM 40 MG: 100 INJECTION SUBCUTANEOUS at 22:14

## 2021-12-17 RX ADMIN — BARICITINIB 4 MG: 2 TABLET, FILM COATED ORAL at 09:04

## 2021-12-17 RX ADMIN — FUROSEMIDE 20 MG: 10 INJECTION, SOLUTION INTRAMUSCULAR; INTRAVENOUS at 09:05

## 2021-12-17 RX ADMIN — SODIUM CHLORIDE TAB 1 GM 2 G: 1 TAB at 09:04

## 2021-12-17 RX ADMIN — SODIUM CHLORIDE, PRESERVATIVE FREE 10 ML: 5 INJECTION INTRAVENOUS at 22:15

## 2021-12-17 RX ADMIN — ALBUTEROL SULFATE 2 PUFF: 90 AEROSOL, METERED RESPIRATORY (INHALATION) at 20:19

## 2021-12-17 RX ADMIN — BUPROPION HYDROCHLORIDE 75 MG: 75 TABLET ORAL at 09:05

## 2021-12-17 RX ADMIN — OXYBUTYNIN CHLORIDE 15 MG: 5 TABLET, EXTENDED RELEASE ORAL at 09:05

## 2021-12-17 RX ADMIN — ALBUTEROL SULFATE 2 PUFF: 90 AEROSOL, METERED RESPIRATORY (INHALATION) at 08:02

## 2021-12-17 RX ADMIN — LEVOTHYROXINE SODIUM 75 MCG: 0.07 TABLET ORAL at 06:17

## 2021-12-17 RX ADMIN — SODIUM CHLORIDE: 9 INJECTION, SOLUTION INTRAVENOUS at 21:26

## 2021-12-17 RX ADMIN — ENOXAPARIN SODIUM 40 MG: 100 INJECTION SUBCUTANEOUS at 09:05

## 2021-12-17 RX ADMIN — METOPROLOL TARTRATE 50 MG: 50 TABLET, FILM COATED ORAL at 22:14

## 2021-12-17 RX ADMIN — ALBUTEROL SULFATE 2 PUFF: 90 AEROSOL, METERED RESPIRATORY (INHALATION) at 11:54

## 2021-12-17 RX ADMIN — CLOPIDOGREL BISULFATE 75 MG: 75 TABLET ORAL at 09:04

## 2021-12-17 RX ADMIN — METOPROLOL TARTRATE 50 MG: 50 TABLET, FILM COATED ORAL at 09:04

## 2021-12-17 RX ADMIN — ALBUTEROL SULFATE 2 PUFF: 90 AEROSOL, METERED RESPIRATORY (INHALATION) at 15:46

## 2021-12-17 RX ADMIN — DEXAMETHASONE SODIUM PHOSPHATE 10 MG: 10 INJECTION INTRAMUSCULAR; INTRAVENOUS at 09:05

## 2021-12-17 RX ADMIN — ATORVASTATIN CALCIUM 40 MG: 40 TABLET, FILM COATED ORAL at 09:04

## 2021-12-17 RX ADMIN — FINASTERIDE 5 MG: 5 TABLET, FILM COATED ORAL at 09:04

## 2021-12-17 ASSESSMENT — PAIN SCALES - GENERAL
PAINLEVEL_OUTOF10: 0

## 2021-12-17 NOTE — PROGRESS NOTES
Hospitalist Progress Note    Patient:  oJel Tellez     YOB: 1945    MRN: [id number]   Admit date: 12/8/2021     Acct: [de-identified]     PCP: Ray Irene MD    CC--Interval History:    Covid-19 PNA---on Rocephin IV---Zithromax IV---Baricitinib--Decadron-----still dependent on BiPAP with a multiplicity of co-morbid conditions which further decrease the patient's survival odds---did have Moderna vaccine but no booster    See note below     All other ROS negative except noted in HPI    Diet:  ADULT DIET; Regular; 4 carb choices (60 gm/meal);  Low Fat/Low Chol/High Fiber/2 gm Na; 1800 ml    Medications:  Scheduled Meds:   sodium chloride  2 g Oral TID WC    sodium chloride  1 g Oral Once    dexamethasone  10 mg IntraVENous Daily    furosemide  20 mg IntraVENous Daily    enoxaparin  40 mg SubCUTAneous BID    albuterol sulfate HFA  2 puff Inhalation 4x daily    aspirin  81 mg Oral Daily    atorvastatin  40 mg Oral Daily    buPROPion  75 mg Oral Daily    clopidogrel  75 mg Oral Daily    levothyroxine  75 mcg Oral Daily    finasteride  5 mg Oral Daily    oxybutynin  15 mg Oral Daily    metoprolol tartrate  50 mg Oral BID    [Held by provider] lisinopril  20 mg Oral Daily    sodium chloride flush  5-40 mL IntraVENous 2 times per day    cefTRIAXone (ROCEPHIN) IV  1,000 mg IntraVENous Q24H    azithromycin  500 mg IntraVENous Q24H    baricitinib  4 mg Oral Daily     Continuous Infusions:   sodium chloride 50 mL/hr at 12/16/21 1502    sodium chloride Stopped (12/09/21 1026)     PRN Meds:zolpidem, benzonatate, guaiFENesin-dextromethorphan, albuterol sulfate HFA, sodium chloride flush, sodium chloride, polyethylene glycol, acetaminophen **OR** acetaminophen    Objective:  Labs:  CBC with Differential:    Lab Results   Component Value Date    WBC 14.5 12/16/2021    RBC 4.49 12/16/2021    HGB 13.4 12/16/2021    HCT 41.8 12/16/2021     12/16/2021    MCV 93.1 12/16/2021    MCH 29.8 12/16/2021    MCHC 32.1 12/16/2021    RDW 14.2 12/16/2021    LYMPHOPCT 5 12/16/2021    MONOPCT 6 12/16/2021    BASOPCT 0 12/16/2021    MONOSABS 0.82 12/16/2021    LYMPHSABS 0.76 12/16/2021    EOSABS <0.03 12/16/2021    BASOSABS 0.03 12/16/2021    DIFFTYPE NOT REPORTED 12/16/2021     BMP:    Lab Results   Component Value Date     12/16/2021    K 4.5 12/16/2021    CL 99 12/16/2021    CO2 23 12/16/2021    BUN 26 12/16/2021    LABALBU 2.8 12/16/2021    CREATININE 0.69 12/16/2021    CALCIUM 8.8 12/16/2021    GFRAA >60 12/16/2021    LABGLOM >60 12/16/2021    GLUCOSE 81 12/16/2021           Physical Exam:  Vitals: /73   Pulse 56   Temp 97.4 °F (36.3 °C) (Tympanic)   Resp 22   Ht 6' (1.829 m)   Wt (!) 314 lb 11.2 oz (142.7 kg)   SpO2 97%   BMI 42.68 kg/m²   24 hour intake/output:    Intake/Output Summary (Last 24 hours) at 12/16/2021 2107  Last data filed at 12/16/2021 1503  Gross per 24 hour   Intake 4244.3 ml   Output 2200 ml   Net 2044.3 ml     Last 3 weights: Wt Readings from Last 3 Encounters:   12/16/21 (!) 314 lb 11.2 oz (142.7 kg)   11/11/21 (!) 319 lb 12.8 oz (145.1 kg)   09/15/21 (!) 321 lb (145.6 kg)     HEENT: BiPAP------, Normocephalic and Atraumatic  Neck: Supple, No Masses, Tenderness, Nodularity and No Lymphadenopathy  Chest/Lungs: coarse breath sounds---- and Distant Breath Sounds  Cardiac: Regular Rate and Rhythm  GI/Abdomen: Bowel Sounds Present and Soft, Non-tender, without Guarding or Rebound Tenderness  : Not examined  EXT/Skin: No Cyanosis, No Clubbing and Edema Present----minimal  Neuro: alert----very [place]---- and No Localizing Signs/Symptoms      Assessment:    Principal Problem:    Pneumonia due to COVID-19 virus  Active Problems:    Ischemic cardiomyopathy    Coronary artery disease involving native coronary artery of native heart    Acute respiratory failure with hypoxia (HCC)    COPD with acute exacerbation (HCC)  Resolved Problems:    * No resolved hospital problems. *    Grecia ORR  76 WM  [world call]  DNR-CCA    LOVENOX---PLAVIX    SUPPLEMENTAL OXYGEN----BiPAP  COVID-19--POSITIVE---12.8.2021----Moderna---1.25.2021---2.22.2021    Anti-infectives:  Rocephin IV, Zithromax IV, Baricitinib, [Decadron 20]    Covid-19 PNA----12.8.2021             CXR---12.16.2021--diffuse pulmonary infiltrates            CXR----12.15.2021----bilateral infiltrates----NACs    Acute respiratory failure with hypoxia----12.8.2021  COPD exacerbation---due to Covid-19 PNA----12.8.2021  hyponatremia  COPD----emphysema   ASCVD          CABG--10.28.2014---SVG-OM1---SVG-OM2-----SVG-PDA          Cardiac catheterization---2014  Ischemic cardiomyopathy           2D ECHO---1.28.2021---[facility]---LVEF ~ 40%    CHF----chronic combined systolic---diastolic  Chronic PE and BLE DVT  CKD--Stage 2  PVD        Bilateral carotid stenosis  Hypertension  Hyperlipidemia  Hypothyroidism  LBBB  BLE venous insufficiency  Gait-balance instability  Insomnia   Tobacco abuse---quit---10.27.2014  PMH:    BLE edema, OA, onychomycosis,   PSH:   see above,  colonoscopy--2012--2009---2008---2003--adenomatous polyp, TURP,              dental extractions, left TKA---2018, right TKA---2015     Allergies----NKDA           Plan:  1. Covid-19 PNA---cont'd IV antibiotics----Baricitinib--Decadron----BiPAP and will attempt to wean as tolerated  2. Code Status now DNR-CCA  3. Extensive conversation with the patient's wife---regarding treatment--condition---prognosis  4.    See orders    Electronically signed by Roman Weeks on 12/16/2021 at 9:07 PM    Hospitalist

## 2021-12-17 NOTE — ANESTHESIA PROCEDURE NOTES
PICC/Midline:    A midline catheter    was placed in the patient floor for the following indication(s): intravenous access. 12/17/2021 1:10 PM12/17/2021 1:30 PM.  Staffing  Resident/CRNA: KISHOR Quesada - CRNA  Preanesthetic Checklist  Completed: patient identified, IV checked, site marked, risks and benefits discussed, surgical consent, monitors and equipment checked, pre-op evaluation, timeout performed, anesthesia consent given, oxygen available and patient being monitored    Sterility preparation included the following: hand hygiene performed prior to procedure, maximum sterile barriers used and sterile technique used to drape from head to toe. The right basilic vein was prepped. A 5.5 Fr (size), lidocaine 1%, double lumen Total 0 cm. Post-procedure education provided and Patient verbalized understanding of education. Advanced 15 cm., Exposed 0 cm., Chloraprepnone

## 2021-12-17 NOTE — PROGRESS NOTES
Hospitalist Progress Note    Patient:  Raad Zimmerman     YOB: 1945    MRN: [id number]   Admit date: 12/8/2021     Acct: [de-identified]     PCP: Alex Enriquez MD    CC--Interval History:   Covid--19 PNA---has completed antibiotics----cont'd Baricitinib--Decadron----still requiring BiPAP    Inflammatory markers elevated--variable    Na = 133 --> sodium replacement    Underlying COPD---emphysema----chronic PE--DVT----ischemic cardiomyopathy    CHF---chronic combined systolic-diastolic    See note below     All other ROS negative except noted in HPI    Diet:  ADULT DIET; Regular; 4 carb choices (60 gm/meal);  Low Fat/Low Chol/High Fiber/2 gm Na; 1800 ml    Medications:  Scheduled Meds:   sodium chloride  2 g Oral TID WC    sodium chloride  1 g Oral Once    dexamethasone  10 mg IntraVENous Daily    furosemide  20 mg IntraVENous Daily    enoxaparin  40 mg SubCUTAneous BID    albuterol sulfate HFA  2 puff Inhalation 4x daily    aspirin  81 mg Oral Daily    atorvastatin  40 mg Oral Daily    buPROPion  75 mg Oral Daily    clopidogrel  75 mg Oral Daily    levothyroxine  75 mcg Oral Daily    finasteride  5 mg Oral Daily    oxybutynin  15 mg Oral Daily    metoprolol tartrate  50 mg Oral BID    [Held by provider] lisinopril  20 mg Oral Daily    sodium chloride flush  5-40 mL IntraVENous 2 times per day    cefTRIAXone (ROCEPHIN) IV  1,000 mg IntraVENous Q24H    azithromycin  500 mg IntraVENous Q24H    baricitinib  4 mg Oral Daily     Continuous Infusions:   sodium chloride 50 mL/hr at 12/16/21 1502    sodium chloride Stopped (12/09/21 1026)     PRN Meds:zolpidem, benzonatate, guaiFENesin-dextromethorphan, albuterol sulfate HFA, sodium chloride flush, sodium chloride, polyethylene glycol, acetaminophen **OR** acetaminophen    Objective:  Labs:  CBC with Differential:    Lab Results   Component Value Date    WBC 13.5 12/17/2021    RBC 4.58 12/17/2021    HGB 13.8 12/17/2021    HCT 42.7 12/17/2021     12/17/2021    MCV 93.2 12/17/2021    MCH 30.1 12/17/2021    MCHC 32.3 12/17/2021    RDW 14.2 12/17/2021    LYMPHOPCT 6 12/17/2021    MONOPCT 7 12/17/2021    BASOPCT 0 12/17/2021    MONOSABS 0.90 12/17/2021    LYMPHSABS 0.76 12/17/2021    EOSABS <0.03 12/17/2021    BASOSABS <0.03 12/17/2021    DIFFTYPE NOT REPORTED 12/17/2021     BMP:    Lab Results   Component Value Date     12/17/2021    K 4.5 12/17/2021    CL 98 12/17/2021    CO2 22 12/17/2021    BUN 28 12/17/2021    LABALBU 2.9 12/17/2021    CREATININE 0.61 12/17/2021    CALCIUM 8.9 12/17/2021    GFRAA >60 12/17/2021    LABGLOM >60 12/17/2021    GLUCOSE 92 12/17/2021           Physical Exam:  Vitals: /70   Pulse 60   Temp 97 °F (36.1 °C) (Tympanic)   Resp (!) 33   Ht 6' (1.829 m)   Wt (!) 317 lb 4.8 oz (143.9 kg)   SpO2 90%   BMI 43.03 kg/m²   24 hour intake/output:    Intake/Output Summary (Last 24 hours) at 12/17/2021 1543  Last data filed at 12/17/2021 1015  Gross per 24 hour   Intake 1102 ml   Output 2050 ml   Net -948 ml     Last 3 weights: Wt Readings from Last 3 Encounters:   12/17/21 (!) 317 lb 4.8 oz (143.9 kg)   11/11/21 (!) 319 lb 12.8 oz (145.1 kg)   09/15/21 (!) 321 lb (145.6 kg)     HEENT: BiPAP----, Normocephalic and Atraumatic  Neck: Supple, No Masses, Tenderness, Nodularity and No Lymphadenopathy  Chest/Lungs: Distant Breath Sounds  Cardiac: Regular Rate and Rhythm  GI/Abdomen:  Bowel Sounds Present and Soft, Non-tender, without Guarding or Rebound Tenderness  : Not examined  EXT/Skin: No Cyanosis, No Clubbing and Edema Present ---trivial  Neuro: alert---very hard of hearing ---- and No Localizing Signs/Symptoms      Assessment:    Principal Problem:    Pneumonia due to COVID-19 virus  Active Problems:    Ischemic cardiomyopathy    Coronary artery disease involving native coronary artery of native heart    Acute respiratory failure with hypoxia (HCC)    COPD with acute exacerbation (HCC)  Resolved

## 2021-12-17 NOTE — PLAN OF CARE
Problem: Airway Clearance - Ineffective  Goal: Achieve or maintain patent airway  Outcome: Ongoing     Problem: Gas Exchange - Impaired  Goal: Absence of hypoxia  Outcome: Ongoing  Goal: Promote optimal lung function  Outcome: Ongoing     Problem: Breathing Pattern - Ineffective  Goal: Ability to achieve and maintain a regular respiratory rate  Outcome: Ongoing     Problem:  Body Temperature -  Risk of, Imbalanced  Goal: Ability to maintain a body temperature within defined limits  Outcome: Ongoing  Goal: Will regain or maintain usual level of consciousness  Outcome: Ongoing  Goal: Complications related to the disease process, condition or treatment will be avoided or minimized  Outcome: Ongoing     Problem: Isolation Precautions - Risk of Spread of Infection  Goal: Prevent transmission of infection  Outcome: Ongoing     Problem: Nutrition Deficits  Goal: Optimize nutritional status  Outcome: Ongoing     Problem: Risk for Fluid Volume Deficit  Goal: Maintain normal heart rhythm  Outcome: Ongoing  Goal: Maintain absence of muscle cramping  Outcome: Ongoing  Goal: Maintain normal serum potassium, sodium, calcium, phosphorus, and pH  Outcome: Ongoing     Problem: Loneliness or Risk for Loneliness  Goal: Demonstrate positive use of time alone when socialization is not possible  Outcome: Ongoing     Problem: Fatigue  Goal: Verbalize increase energy and improved vitality  Outcome: Ongoing     Problem: Patient Education: Go to Patient Education Activity  Goal: Patient/Family Education  Outcome: Ongoing     Problem: Skin Integrity:  Goal: Will show no infection signs and symptoms  Description: Will show no infection signs and symptoms  Outcome: Ongoing     Problem: Skin Integrity:  Goal: Will show no infection signs and symptoms  Description: Will show no infection signs and symptoms  Outcome: Ongoing     Problem: Skin Integrity:  Goal: Will show no infection signs and symptoms  Description: Will show no infection signs and symptoms  Outcome: Ongoing  Goal: Absence of new skin breakdown  Description: Absence of new skin breakdown  Outcome: Ongoing     Problem: Discharge Planning:  Goal: Discharged to appropriate level of care  Description: Discharged to appropriate level of care  Outcome: Ongoing

## 2021-12-18 ENCOUNTER — APPOINTMENT (OUTPATIENT)
Dept: GENERAL RADIOLOGY | Age: 76
DRG: 177 | End: 2021-12-18
Payer: MEDICARE

## 2021-12-18 LAB
ABSOLUTE EOS #: <0.03 K/UL (ref 0–0.44)
ABSOLUTE IMMATURE GRANULOCYTE: 0.21 K/UL (ref 0–0.3)
ABSOLUTE LYMPH #: 0.74 K/UL (ref 1.1–3.7)
ABSOLUTE MONO #: 1.03 K/UL (ref 0.1–1.2)
ALBUMIN SERPL-MCNC: 2.7 G/DL (ref 3.5–5.2)
ALBUMIN/GLOBULIN RATIO: 0.9 (ref 1–2.5)
ALP BLD-CCNC: 63 U/L (ref 40–129)
ALT SERPL-CCNC: 23 U/L (ref 5–41)
ANION GAP SERPL CALCULATED.3IONS-SCNC: 10 MMOL/L (ref 9–17)
AST SERPL-CCNC: 24 U/L
BASOPHILS # BLD: 0 % (ref 0–2)
BASOPHILS ABSOLUTE: <0.03 K/UL (ref 0–0.2)
BILIRUB SERPL-MCNC: 0.62 MG/DL (ref 0.3–1.2)
BUN BLDV-MCNC: 32 MG/DL (ref 8–23)
BUN/CREAT BLD: 41 (ref 9–20)
C-REACTIVE PROTEIN: 42.7 MG/L (ref 0–5)
CALCIUM SERPL-MCNC: 8.3 MG/DL (ref 8.6–10.4)
CHLORIDE BLD-SCNC: 101 MMOL/L (ref 98–107)
CO2: 23 MMOL/L (ref 20–31)
CREAT SERPL-MCNC: 0.79 MG/DL (ref 0.7–1.2)
D-DIMER QUANTITATIVE: 3.53 MG/L FEU (ref 0–0.59)
DIFFERENTIAL TYPE: ABNORMAL
EOSINOPHILS RELATIVE PERCENT: 0 % (ref 1–4)
FERRITIN: 1345 UG/L (ref 30–400)
GFR AFRICAN AMERICAN: >60 ML/MIN
GFR NON-AFRICAN AMERICAN: >60 ML/MIN
GFR SERPL CREATININE-BSD FRML MDRD: ABNORMAL ML/MIN/{1.73_M2}
GFR SERPL CREATININE-BSD FRML MDRD: ABNORMAL ML/MIN/{1.73_M2}
GLUCOSE BLD-MCNC: 116 MG/DL (ref 75–110)
GLUCOSE BLD-MCNC: 95 MG/DL (ref 70–99)
HCT VFR BLD CALC: 39.3 % (ref 40.7–50.3)
HEMOGLOBIN: 12.4 G/DL (ref 13–17)
IMMATURE GRANULOCYTES: 2 %
LACTATE DEHYDROGENASE: 320 U/L (ref 135–225)
LYMPHOCYTES # BLD: 6 % (ref 24–43)
MCH RBC QN AUTO: 30 PG (ref 25.2–33.5)
MCHC RBC AUTO-ENTMCNC: 31.6 G/DL (ref 25.2–33.5)
MCV RBC AUTO: 95.2 FL (ref 82.6–102.9)
MONOCYTES # BLD: 8 % (ref 3–12)
NRBC AUTOMATED: 0 PER 100 WBC
PDW BLD-RTO: 14.2 % (ref 11.8–14.4)
PLATELET # BLD: 331 K/UL (ref 138–453)
PLATELET ESTIMATE: ABNORMAL
PMV BLD AUTO: 9.9 FL (ref 8.1–13.5)
POTASSIUM SERPL-SCNC: 4.3 MMOL/L (ref 3.7–5.3)
RBC # BLD: 4.13 M/UL (ref 4.21–5.77)
RBC # BLD: ABNORMAL 10*6/UL
SEG NEUTROPHILS: 84 % (ref 36–65)
SEGMENTED NEUTROPHILS ABSOLUTE COUNT: 10.75 K/UL (ref 1.5–8.1)
SODIUM BLD-SCNC: 134 MMOL/L (ref 135–144)
TOTAL PROTEIN: 5.7 G/DL (ref 6.4–8.3)
WBC # BLD: 12.8 K/UL (ref 3.5–11.3)
WBC # BLD: ABNORMAL 10*3/UL

## 2021-12-18 PROCEDURE — 86140 C-REACTIVE PROTEIN: CPT

## 2021-12-18 PROCEDURE — 85379 FIBRIN DEGRADATION QUANT: CPT

## 2021-12-18 PROCEDURE — 80053 COMPREHEN METABOLIC PANEL: CPT

## 2021-12-18 PROCEDURE — 71045 X-RAY EXAM CHEST 1 VIEW: CPT

## 2021-12-18 PROCEDURE — 36592 COLLECT BLOOD FROM PICC: CPT

## 2021-12-18 PROCEDURE — 94760 N-INVAS EAR/PLS OXIMETRY 1: CPT

## 2021-12-18 PROCEDURE — 85025 COMPLETE CBC W/AUTO DIFF WBC: CPT

## 2021-12-18 PROCEDURE — 2580000003 HC RX 258: Performed by: FAMILY MEDICINE

## 2021-12-18 PROCEDURE — 6370000000 HC RX 637 (ALT 250 FOR IP): Performed by: FAMILY MEDICINE

## 2021-12-18 PROCEDURE — 99232 SBSQ HOSP IP/OBS MODERATE 35: CPT | Performed by: FAMILY MEDICINE

## 2021-12-18 PROCEDURE — 36415 COLL VENOUS BLD VENIPUNCTURE: CPT

## 2021-12-18 PROCEDURE — 2580000003 HC RX 258: Performed by: NURSE PRACTITIONER

## 2021-12-18 PROCEDURE — 94660 CPAP INITIATION&MGMT: CPT

## 2021-12-18 PROCEDURE — 94640 AIRWAY INHALATION TREATMENT: CPT

## 2021-12-18 PROCEDURE — 6360000002 HC RX W HCPCS: Performed by: INTERNAL MEDICINE

## 2021-12-18 PROCEDURE — 2700000000 HC OXYGEN THERAPY PER DAY

## 2021-12-18 PROCEDURE — 2060000000 HC ICU INTERMEDIATE R&B

## 2021-12-18 PROCEDURE — 6370000000 HC RX 637 (ALT 250 FOR IP): Performed by: INTERNAL MEDICINE

## 2021-12-18 PROCEDURE — 82728 ASSAY OF FERRITIN: CPT

## 2021-12-18 PROCEDURE — 83615 LACTATE (LD) (LDH) ENZYME: CPT

## 2021-12-18 PROCEDURE — 82947 ASSAY GLUCOSE BLOOD QUANT: CPT

## 2021-12-18 PROCEDURE — 6360000002 HC RX W HCPCS: Performed by: NURSE PRACTITIONER

## 2021-12-18 RX ADMIN — FINASTERIDE 5 MG: 5 TABLET, FILM COATED ORAL at 07:49

## 2021-12-18 RX ADMIN — BARICITINIB 4 MG: 2 TABLET, FILM COATED ORAL at 07:49

## 2021-12-18 RX ADMIN — ASPIRIN 81 MG: 81 TABLET, COATED ORAL at 07:49

## 2021-12-18 RX ADMIN — SODIUM CHLORIDE: 9 INJECTION, SOLUTION INTRAVENOUS at 16:33

## 2021-12-18 RX ADMIN — CLOPIDOGREL BISULFATE 75 MG: 75 TABLET ORAL at 07:49

## 2021-12-18 RX ADMIN — LEVOTHYROXINE SODIUM 75 MCG: 0.07 TABLET ORAL at 05:40

## 2021-12-18 RX ADMIN — SODIUM CHLORIDE, PRESERVATIVE FREE 10 ML: 5 INJECTION INTRAVENOUS at 21:51

## 2021-12-18 RX ADMIN — SODIUM CHLORIDE TAB 1 GM 2 G: 1 TAB at 07:49

## 2021-12-18 RX ADMIN — ENOXAPARIN SODIUM 40 MG: 100 INJECTION SUBCUTANEOUS at 07:50

## 2021-12-18 RX ADMIN — ALBUTEROL SULFATE 2 PUFF: 90 AEROSOL, METERED RESPIRATORY (INHALATION) at 12:57

## 2021-12-18 RX ADMIN — DEXAMETHASONE SODIUM PHOSPHATE 10 MG: 10 INJECTION INTRAMUSCULAR; INTRAVENOUS at 07:49

## 2021-12-18 RX ADMIN — SODIUM CHLORIDE, PRESERVATIVE FREE 10 ML: 5 INJECTION INTRAVENOUS at 07:50

## 2021-12-18 RX ADMIN — ENOXAPARIN SODIUM 40 MG: 100 INJECTION SUBCUTANEOUS at 21:51

## 2021-12-18 RX ADMIN — SODIUM CHLORIDE TAB 1 GM 2 G: 1 TAB at 13:54

## 2021-12-18 RX ADMIN — ALBUTEROL SULFATE 2 PUFF: 90 AEROSOL, METERED RESPIRATORY (INHALATION) at 17:00

## 2021-12-18 RX ADMIN — FUROSEMIDE 20 MG: 10 INJECTION, SOLUTION INTRAMUSCULAR; INTRAVENOUS at 07:49

## 2021-12-18 RX ADMIN — ATORVASTATIN CALCIUM 40 MG: 40 TABLET, FILM COATED ORAL at 07:50

## 2021-12-18 RX ADMIN — BUPROPION HYDROCHLORIDE 75 MG: 75 TABLET ORAL at 07:50

## 2021-12-18 RX ADMIN — ALBUTEROL SULFATE 2 PUFF: 90 AEROSOL, METERED RESPIRATORY (INHALATION) at 08:59

## 2021-12-18 RX ADMIN — OXYBUTYNIN CHLORIDE 15 MG: 5 TABLET, EXTENDED RELEASE ORAL at 07:49

## 2021-12-18 RX ADMIN — ALBUTEROL SULFATE 2 PUFF: 90 AEROSOL, METERED RESPIRATORY (INHALATION) at 19:52

## 2021-12-18 RX ADMIN — SODIUM CHLORIDE TAB 1 GM 2 G: 1 TAB at 16:33

## 2021-12-18 RX ADMIN — METOPROLOL TARTRATE 50 MG: 50 TABLET, FILM COATED ORAL at 07:49

## 2021-12-18 ASSESSMENT — PAIN SCALES - GENERAL
PAINLEVEL_OUTOF10: 0

## 2021-12-18 NOTE — FLOWSHEET NOTE
RN updated Graham Smith regarding plan of care and current patient status. Graham Smith verbalized understanding, no further questions at this time.

## 2021-12-18 NOTE — PLAN OF CARE
Nutrition Problem #1: Inadequate oral intake  Intervention: Food and/or Nutrient Delivery: Continue Current Diet,Start Oral Nutrition Supplement  Nutritional Goals: PO intakes to provide >50% of estimated nutritional needs

## 2021-12-18 NOTE — PROGRESS NOTES
Nutrition Assessment     Type and Reason for Visit: Reassess    Nutrition Recommendations/Plan:  Continue current diet and add ONS as able/ tolerated. Monitor po intakes, pt status, wts and labs. Consider nutrition support if po intakes remain inadequate    Nutrition Assessment:  Pt remains on BiPAP for COViD-19 virus. Remains in droplet plus precautions. Patient reports not wanting to be intubated- code status changed [address]. Unable to verify wt changes. PO intake 12/17: %. Suggest adding ONS 2x/d as tolerated. If po intakes unable to provide adequate nutrition consider nutrition support. Monitor po intakes, ONS intake, weights and labs. Malnutrition Assessment:  Malnutrition Status: Insufficient data    Estimated Daily Nutrient Needs:  Energy (kcal): 0547-7470 kcal (16-18 kcal/kg); Weight Used for Energy Requirements: Current  Protein (g): 113-138 gm protein (1.4-1.7 g/kg); Weight Used for Protein Requirements:  Ideal          Nutrition Related Findings: COVID-19 virus. BiPAP. Labs reviewed. Active bowel sounds. Edema: BLE trace. Current Nutrition Therapies:    ADULT DIET; Regular; 4 carb choices (60 gm/meal);  Low Fat/Low Chol/High Fiber/2 gm Na; 1800 ml    Anthropometric Measures:  · Height: 6' (182.9 cm)  · Current Body Wt: 293 lb 8 oz (133.1 kg) (317 lb 4.8 oz (Accurate?))   · BMI: 39.8    Nutrition Diagnosis:   · Inadequate oral intake related to inadequate protein-energy intake,impaired respiratory function as evidenced by intake 0-25%,intake 26-50% (COVID-19 virus requiring BiPAP)    Nutrition Interventions:   Food and/or Nutrient Delivery:  Continue Current Diet,Start Oral Nutrition Supplement  Nutrition Education/Counseling:  Education not indicated   Coordination of Nutrition Care:  Continue to monitor while inpatient    Goals:  PO intakes to provide >50% of estimated nutritional needs       Nutrition Monitoring and Evaluation:   Behavioral-Environmental Outcomes:  None Identified Food/Nutrient Intake Outcomes:  Food and Nutrient Intake,Supplement Intake  Physical Signs/Symptoms Outcomes:  Biochemical Data,Chewing or Swallowing,Fluid Status or Edema,Weight     Discharge Planning:     Too soon to determine     Fermin Fonseca, [address] [place] [address], [address] [place] [address]  Office Number: [phone]

## 2021-12-18 NOTE — PROGRESS NOTES
Hospitalist Progress Note  12/18/2021 10:49 AM  Subjective:   Admit Date: 12/8/2021  PCP: Julia Soto MD    Interval History: Patient with covid pneumonia remains on HHF at [facility] intermittently. Is off antibiotics on barcitinib and decadron. CRP still up D dimer trending down    Diet: ADULT DIET; Regular; 4 carb choices (60 gm/meal); Low Fat/Low Chol/High Fiber/2 gm Na; 1800 ml  Medications:   Scheduled Meds:   sodium chloride  2 g Oral TID WC    sodium chloride  1 g Oral Once    dexamethasone  10 mg IntraVENous Daily    furosemide  20 mg IntraVENous Daily    enoxaparin  40 mg SubCUTAneous BID    albuterol sulfate HFA  2 puff Inhalation 4x daily    aspirin  81 mg Oral Daily    atorvastatin  40 mg Oral Daily    buPROPion  75 mg Oral Daily    clopidogrel  75 mg Oral Daily    levothyroxine  75 mcg Oral Daily    finasteride  5 mg Oral Daily    oxybutynin  15 mg Oral Daily    metoprolol tartrate  50 mg Oral BID    [Held by provider] lisinopril  20 mg Oral Daily    sodium chloride flush  5-40 mL IntraVENous 2 times per day    baricitinib  4 mg Oral Daily     Continuous Infusions:   sodium chloride 50 mL/hr at 12/18/21 0542    sodium chloride Stopped (12/09/21 1026)     CBC:   Recent Labs     12/16/21  0540 12/17/21  0938 12/18/21  0536   WBC 14.5* 13.5* 12.8*   HGB 13.4 13.8 12.4*    308 331     BMP:    Recent Labs     12/16/21  0540 12/17/21  0938 12/18/21  0536   * 133* 134*   K 4.5 4.5 4.3   CL 99 98 101   CO2 23 22 23   BUN 26* 28* 32*   CREATININE 0.69* 0.61* 0.79   GLUCOSE 81 92 95     Hepatic:   Recent Labs     12/16/21  0540 12/17/21  0938 12/18/21  0536   AST 23 23 24   ALT 17 16 23   BILITOT 0.80 0.80 0.62   ALKPHOS 70 70 63     Troponin: No results for input(s): TROPONINI in the last 72 hours. BNP: No results for input(s): BNP in the last 72 hours. Lipids: No results for input(s): CHOL, HDL in the last 72 hours.     Invalid input(s): LDLCALCU  INR: No results for input(s): INR in the last 72 hours. Objective:   Vitals: /71   Pulse 66   Temp 97.5 °F (36.4 °C) (Tympanic)   Resp 24   Ht 6' (1.829 m)   Wt (!) 317 lb 4.8 oz (143.9 kg)   SpO2 94%   BMI 43.03 kg/m²   General appearance: alert and cooperative with exam  HEENT: Eye: Normal external eye, conjunctiva, lids cornea, BETTY. Neck: no adenopathy, no carotid bruit, no JVD, supple, symmetrical, trachea midline and thyroid not enlarged, symmetric, no tenderness/mass/nodules  Lungs: clear to auscultation bilaterally  Heart: regular rate and rhythm, S1, S2 normal, no murmur, click, rub or gallop  Abdomen: soft, non-tender; bowel sounds normal; no masses,  no organomegaly  Extremities: edema bilateral,no calf tenderness  Neurologic: Mental status: Alert, oriented, thought content appropriate    Assessment and Plan:   1. Covid 19 pneumonia. 2. HTN  3. Chronic CHF combined. 4. hypothyroidism    Plan:Wean down oxygen as possible. 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

## 2021-12-19 ENCOUNTER — APPOINTMENT (OUTPATIENT)
Dept: GENERAL RADIOLOGY | Age: 76
DRG: 177 | End: 2021-12-19
Payer: MEDICARE

## 2021-12-19 LAB
ABSOLUTE EOS #: <0.03 K/UL (ref 0–0.44)
ABSOLUTE IMMATURE GRANULOCYTE: 0.15 K/UL (ref 0–0.3)
ABSOLUTE LYMPH #: 0.96 K/UL (ref 1.1–3.7)
ABSOLUTE MONO #: 1.34 K/UL (ref 0.1–1.2)
ANION GAP SERPL CALCULATED.3IONS-SCNC: 9 MMOL/L (ref 9–17)
BASOPHILS # BLD: 0 % (ref 0–2)
BASOPHILS ABSOLUTE: <0.03 K/UL (ref 0–0.2)
BUN BLDV-MCNC: 31 MG/DL (ref 8–23)
BUN/CREAT BLD: 42 (ref 9–20)
C-REACTIVE PROTEIN: 20 MG/L (ref 0–5)
CALCIUM SERPL-MCNC: 8.4 MG/DL (ref 8.6–10.4)
CHLORIDE BLD-SCNC: 102 MMOL/L (ref 98–107)
CO2: 24 MMOL/L (ref 20–31)
CREAT SERPL-MCNC: 0.74 MG/DL (ref 0.7–1.2)
D-DIMER QUANTITATIVE: 3.42 MG/L FEU (ref 0–0.59)
DIFFERENTIAL TYPE: ABNORMAL
EOSINOPHILS RELATIVE PERCENT: 0 % (ref 1–4)
FERRITIN: 1267 UG/L (ref 30–400)
GFR AFRICAN AMERICAN: >60 ML/MIN
GFR NON-AFRICAN AMERICAN: >60 ML/MIN
GFR SERPL CREATININE-BSD FRML MDRD: ABNORMAL ML/MIN/{1.73_M2}
GFR SERPL CREATININE-BSD FRML MDRD: ABNORMAL ML/MIN/{1.73_M2}
GLUCOSE BLD-MCNC: 87 MG/DL (ref 70–99)
HCT VFR BLD CALC: 39.3 % (ref 40.7–50.3)
HEMOGLOBIN: 12.4 G/DL (ref 13–17)
IMMATURE GRANULOCYTES: 1 %
LACTATE DEHYDROGENASE: 348 U/L (ref 135–225)
LYMPHOCYTES # BLD: 7 % (ref 24–43)
MCH RBC QN AUTO: 30 PG (ref 25.2–33.5)
MCHC RBC AUTO-ENTMCNC: 31.6 G/DL (ref 25.2–33.5)
MCV RBC AUTO: 94.9 FL (ref 82.6–102.9)
MONOCYTES # BLD: 10 % (ref 3–12)
NRBC AUTOMATED: 0 PER 100 WBC
PDW BLD-RTO: 14.2 % (ref 11.8–14.4)
PLATELET # BLD: 365 K/UL (ref 138–453)
PLATELET ESTIMATE: ABNORMAL
PMV BLD AUTO: 9.3 FL (ref 8.1–13.5)
POTASSIUM SERPL-SCNC: 4.5 MMOL/L (ref 3.7–5.3)
RBC # BLD: 4.14 M/UL (ref 4.21–5.77)
RBC # BLD: ABNORMAL 10*6/UL
SEG NEUTROPHILS: 82 % (ref 36–65)
SEGMENTED NEUTROPHILS ABSOLUTE COUNT: 10.72 K/UL (ref 1.5–8.1)
SODIUM BLD-SCNC: 135 MMOL/L (ref 135–144)
WBC # BLD: 13.2 K/UL (ref 3.5–11.3)
WBC # BLD: ABNORMAL 10*3/UL

## 2021-12-19 PROCEDURE — 6370000000 HC RX 637 (ALT 250 FOR IP): Performed by: INTERNAL MEDICINE

## 2021-12-19 PROCEDURE — 83615 LACTATE (LD) (LDH) ENZYME: CPT

## 2021-12-19 PROCEDURE — 94640 AIRWAY INHALATION TREATMENT: CPT

## 2021-12-19 PROCEDURE — 2580000003 HC RX 258: Performed by: NURSE PRACTITIONER

## 2021-12-19 PROCEDURE — 94761 N-INVAS EAR/PLS OXIMETRY MLT: CPT

## 2021-12-19 PROCEDURE — 85025 COMPLETE CBC W/AUTO DIFF WBC: CPT

## 2021-12-19 PROCEDURE — 6360000002 HC RX W HCPCS: Performed by: NURSE PRACTITIONER

## 2021-12-19 PROCEDURE — 6370000000 HC RX 637 (ALT 250 FOR IP): Performed by: FAMILY MEDICINE

## 2021-12-19 PROCEDURE — 86140 C-REACTIVE PROTEIN: CPT

## 2021-12-19 PROCEDURE — 51702 INSERT TEMP BLADDER CATH: CPT

## 2021-12-19 PROCEDURE — 2580000003 HC RX 258: Performed by: FAMILY MEDICINE

## 2021-12-19 PROCEDURE — 82728 ASSAY OF FERRITIN: CPT

## 2021-12-19 PROCEDURE — 6360000002 HC RX W HCPCS: Performed by: INTERNAL MEDICINE

## 2021-12-19 PROCEDURE — 99232 SBSQ HOSP IP/OBS MODERATE 35: CPT | Performed by: FAMILY MEDICINE

## 2021-12-19 PROCEDURE — 80048 BASIC METABOLIC PNL TOTAL CA: CPT

## 2021-12-19 PROCEDURE — 2700000000 HC OXYGEN THERAPY PER DAY

## 2021-12-19 PROCEDURE — 85379 FIBRIN DEGRADATION QUANT: CPT

## 2021-12-19 PROCEDURE — 71045 X-RAY EXAM CHEST 1 VIEW: CPT

## 2021-12-19 PROCEDURE — 36415 COLL VENOUS BLD VENIPUNCTURE: CPT

## 2021-12-19 PROCEDURE — 2060000000 HC ICU INTERMEDIATE R&B

## 2021-12-19 RX ADMIN — ALBUTEROL SULFATE 2 PUFF: 90 AEROSOL, METERED RESPIRATORY (INHALATION) at 12:46

## 2021-12-19 RX ADMIN — SODIUM CHLORIDE TAB 1 GM 2 G: 1 TAB at 13:11

## 2021-12-19 RX ADMIN — SODIUM CHLORIDE TAB 1 GM 2 G: 1 TAB at 16:34

## 2021-12-19 RX ADMIN — METOPROLOL TARTRATE 50 MG: 50 TABLET, FILM COATED ORAL at 21:23

## 2021-12-19 RX ADMIN — ENOXAPARIN SODIUM 40 MG: 100 INJECTION SUBCUTANEOUS at 08:53

## 2021-12-19 RX ADMIN — ALBUTEROL SULFATE 2 PUFF: 90 AEROSOL, METERED RESPIRATORY (INHALATION) at 17:39

## 2021-12-19 RX ADMIN — ALBUTEROL SULFATE 2 PUFF: 90 AEROSOL, METERED RESPIRATORY (INHALATION) at 09:22

## 2021-12-19 RX ADMIN — SODIUM CHLORIDE TAB 1 GM 2 G: 1 TAB at 07:50

## 2021-12-19 RX ADMIN — SODIUM CHLORIDE, PRESERVATIVE FREE 10 ML: 5 INJECTION INTRAVENOUS at 08:53

## 2021-12-19 RX ADMIN — OXYBUTYNIN CHLORIDE 15 MG: 5 TABLET, EXTENDED RELEASE ORAL at 08:47

## 2021-12-19 RX ADMIN — FUROSEMIDE 20 MG: 10 INJECTION, SOLUTION INTRAMUSCULAR; INTRAVENOUS at 08:50

## 2021-12-19 RX ADMIN — ENOXAPARIN SODIUM 40 MG: 100 INJECTION SUBCUTANEOUS at 21:23

## 2021-12-19 RX ADMIN — LEVOTHYROXINE SODIUM 75 MCG: 0.07 TABLET ORAL at 05:56

## 2021-12-19 RX ADMIN — ASPIRIN 81 MG: 81 TABLET, COATED ORAL at 08:47

## 2021-12-19 RX ADMIN — SODIUM CHLORIDE: 9 INJECTION, SOLUTION INTRAVENOUS at 11:35

## 2021-12-19 RX ADMIN — SODIUM CHLORIDE, PRESERVATIVE FREE 10 ML: 5 INJECTION INTRAVENOUS at 21:23

## 2021-12-19 RX ADMIN — METOPROLOL TARTRATE 50 MG: 50 TABLET, FILM COATED ORAL at 08:48

## 2021-12-19 RX ADMIN — ALBUTEROL SULFATE 2 PUFF: 90 AEROSOL, METERED RESPIRATORY (INHALATION) at 20:16

## 2021-12-19 RX ADMIN — BARICITINIB 4 MG: 2 TABLET, FILM COATED ORAL at 08:46

## 2021-12-19 RX ADMIN — BUPROPION HYDROCHLORIDE 75 MG: 75 TABLET ORAL at 08:49

## 2021-12-19 RX ADMIN — FINASTERIDE 5 MG: 5 TABLET, FILM COATED ORAL at 08:48

## 2021-12-19 RX ADMIN — CLOPIDOGREL BISULFATE 75 MG: 75 TABLET ORAL at 08:48

## 2021-12-19 RX ADMIN — DEXAMETHASONE SODIUM PHOSPHATE 10 MG: 10 INJECTION INTRAMUSCULAR; INTRAVENOUS at 08:51

## 2021-12-19 RX ADMIN — ATORVASTATIN CALCIUM 40 MG: 40 TABLET, FILM COATED ORAL at 08:48

## 2021-12-19 ASSESSMENT — PAIN SCALES - GENERAL
PAINLEVEL_OUTOF10: 0

## 2021-12-19 NOTE — PLAN OF CARE
Problem: Airway Clearance - Ineffective  Goal: Achieve or maintain patent airway  Outcome: Ongoing     Problem: Gas Exchange - Impaired  Goal: Absence of hypoxia  Outcome: Ongoing  Goal: Promote optimal lung function  Outcome: Ongoing     Problem: Breathing Pattern - Ineffective  Goal: Ability to achieve and maintain a regular respiratory rate  Outcome: Ongoing     Problem:  Body Temperature -  Risk of, Imbalanced  Goal: Ability to maintain a body temperature within defined limits  Outcome: Ongoing  Goal: Will regain or maintain usual level of consciousness  Outcome: Ongoing  Goal: Complications related to the disease process, condition or treatment will be avoided or minimized  Outcome: Ongoing     Problem: Isolation Precautions - Risk of Spread of Infection  Goal: Prevent transmission of infection  Outcome: Ongoing     Problem: Nutrition Deficits  Goal: Optimize nutritional status  Outcome: Ongoing     Problem: Risk for Fluid Volume Deficit  Goal: Maintain normal heart rhythm  Outcome: Ongoing  Goal: Maintain absence of muscle cramping  Outcome: Ongoing  Goal: Maintain normal serum potassium, sodium, calcium, phosphorus, and pH  Outcome: Ongoing     Problem: Loneliness or Risk for Loneliness  Goal: Demonstrate positive use of time alone when socialization is not possible  Outcome: Ongoing     Problem: Fatigue  Goal: Verbalize increase energy and improved vitality  Outcome: Ongoing     Problem: Patient Education: Go to Patient Education Activity  Goal: Patient/Family Education  Outcome: Ongoing     Problem: Skin Integrity:  Goal: Will show no infection signs and symptoms  Description: Will show no infection signs and symptoms  Outcome: Ongoing  Goal: Absence of new skin breakdown  Description: Absence of new skin breakdown  Outcome: Ongoing     Problem: Falls - Risk of:  Goal: Will remain free from falls  Description: Will remain free from falls  Outcome: Ongoing  Goal: Absence of physical injury  Description: Absence of physical injury  Outcome: Ongoing     Problem: Nutrition  Goal: Optimal nutrition therapy  Outcome: Ongoing     Problem: Pain:  Goal: Pain level will decrease  Description: Pain level will decrease  Outcome: Ongoing  Goal: Control of acute pain  Description: Control of acute pain  Outcome: Ongoing  Goal: Control of chronic pain  Description: Control of chronic pain  Outcome: Ongoing     Problem: Discharge Planning:  Goal: Discharged to appropriate level of care  Description: Discharged to appropriate level of care  Outcome: Ongoing

## 2021-12-19 NOTE — FLOWSHEET NOTE
Wife called with an update at 2025. Informed wife patient was placed on bipap from [facility] close to 7p.m. No further concerns at this time.

## 2021-12-19 NOTE — PLAN OF CARE
Problem: Airway Clearance - Ineffective  Goal: Achieve or maintain patent airway  12/19/2021 0958 by Kiara Gregorio RN  Outcome: Ongoing  12/18/2021 2348 by Steve Alicea RN  Outcome: Ongoing     Problem: Gas Exchange - Impaired  Goal: Absence of hypoxia  12/19/2021 0958 by Kiara Gregorio RN  Outcome: Ongoing  12/18/2021 2348 by Steve Alicea RN  Outcome: Ongoing  Goal: Promote optimal lung function  12/19/2021 0958 by Kiara Gregorio RN  Outcome: Ongoing  12/18/2021 2348 by Steve Alicea RN  Outcome: Ongoing     Problem: Breathing Pattern - Ineffective  Goal: Ability to achieve and maintain a regular respiratory rate  12/19/2021 0958 by Kiara Gregorio RN  Outcome: Ongoing  12/18/2021 2348 by Steve Alicea RN  Outcome: Ongoing     Problem:  Body Temperature -  Risk of, Imbalanced  Goal: Ability to maintain a body temperature within defined limits  12/19/2021 0958 by Kiara Gregorio RN  Outcome: Ongoing  12/18/2021 2348 by Steve Alicea RN  Outcome: Ongoing  Goal: Will regain or maintain usual level of consciousness  12/19/2021 0958 by Kiara Gregorio RN  Outcome: Ongoing  12/18/2021 2348 by Steve Alicea RN  Outcome: Ongoing  Goal: Complications related to the disease process, condition or treatment will be avoided or minimized  12/19/2021 0958 by Kiara Gregorio RN  Outcome: Ongoing  12/18/2021 2348 by Steve Alicea RN  Outcome: Ongoing     Problem: Isolation Precautions - Risk of Spread of Infection  Goal: Prevent transmission of infection  12/19/2021 0958 by Kiara Gregorio RN  Outcome: Ongoing  12/18/2021 2348 by Steve Alicea RN  Outcome: Ongoing     Problem: Nutrition Deficits  Goal: Optimize nutritional status  12/19/2021 0958 by Kiara Gregorio RN  Outcome: Ongoing  12/18/2021 2348 by Steve Alicea RN  Outcome: Ongoing     Problem: Risk for Fluid Volume Deficit  Goal: Maintain normal heart rhythm  12/19/2021 0958 by Kiara Gregorio RN  Outcome: Ongoing  12/18/2021 2348 by Kate Manzo RN  Outcome: Ongoing  Goal: Maintain absence of muscle cramping  12/19/2021 0958 by Isabella Couch RN  Outcome: Ongoing  12/18/2021 2348 by Kate Manzo RN  Outcome: Ongoing  Goal: Maintain normal serum potassium, sodium, calcium, phosphorus, and pH  12/19/2021 0958 by Isabella Couch RN  Outcome: Ongoing  12/18/2021 2348 by Kate Manzo RN  Outcome: Ongoing     Problem: Loneliness or Risk for Loneliness  Goal: Demonstrate positive use of time alone when socialization is not possible  12/19/2021 0958 by Isabella Couch RN  Outcome: Ongoing  12/18/2021 2348 by Kate Manzo RN  Outcome: Ongoing     Problem: Fatigue  Goal: Verbalize increase energy and improved vitality  12/19/2021 0958 by Isabella Couch RN  Outcome: Ongoing  12/18/2021 2348 by Kate Manzo RN  Outcome: Ongoing     Problem: Patient Education: Go to Patient Education Activity  Goal: Patient/Family Education  12/19/2021 0386 by Isabella Couch RN  Outcome: Ongoing  12/18/2021 2348 by Kate Manzo RN  Outcome: Ongoing     Problem: Skin Integrity:  Goal: Will show no infection signs and symptoms  Description: Will show no infection signs and symptoms  12/19/2021 0958 by Isabella Couch RN  Outcome: Ongoing  12/18/2021 2348 by Kate Manzo RN  Outcome: Ongoing  Goal: Absence of new skin breakdown  Description: Absence of new skin breakdown  12/19/2021 0958 by Isabella Couch RN  Outcome: Ongoing  12/18/2021 2348 by Kate Manzo RN  Outcome: Ongoing     Problem: Falls - Risk of:  Goal: Will remain free from falls  Description: Will remain free from falls  12/19/2021 0958 by Isabella Couch RN  Outcome: Ongoing  12/18/2021 2348 by Kate Manzo RN  Outcome: Ongoing  Goal: Absence of physical injury  Description: Absence of physical injury  12/19/2021 0958 by Isabella Couch RN  Outcome: Ongoing  12/18/2021 2348 by Inga Boudreaux Do Cortés RN  Outcome: Ongoing     Problem: Nutrition  Goal: Optimal nutrition therapy  12/19/2021 0958 by Gonzalo Omalley RN  Outcome: Ongoing  12/18/2021 2348 by Sandy Killian RN  Outcome: Ongoing     Problem: Pain:  Goal: Pain level will decrease  Description: Pain level will decrease  12/19/2021 0958 by Gonzalo Omalley RN  Outcome: Ongoing  12/18/2021 2348 by Sandy Killian RN  Outcome: Ongoing  Goal: Control of acute pain  Description: Control of acute pain  12/19/2021 0958 by Gonzalo Omalley RN  Outcome: Ongoing  12/18/2021 2348 by Sandy Killian RN  Outcome: Ongoing  Goal: Control of chronic pain  Description: Control of chronic pain  12/19/2021 0958 by Gonzalo Omalley RN  Outcome: Ongoing  12/18/2021 2348 by Sandy Killian RN  Outcome: Ongoing     Problem: Discharge Planning:  Goal: Discharged to appropriate level of care  Description: Discharged to appropriate level of care  12/19/2021 0958 by Gonzalo Omalley RN  Outcome: Ongoing  12/18/2021 2348 by Sandy Killian RN  Outcome: Ongoing

## 2021-12-19 NOTE — PROGRESS NOTES
Hospitalist Progress Note  12/19/2021 9:46 AM  Subjective:   Admit Date: 12/8/2021  PCP: Erin Jacobo MD    Interval History: Patient with covid pneumonia remains on HHF at 61 L and Bipap intermittently. Is off antibiotics on barcitinib and decadron. CRP and D dimer trending down. Feeling a little  better tiday    Diet: ADULT DIET; Regular; 4 carb choices (60 gm/meal); Low Fat/Low Chol/High Fiber/2 gm Na; 1800 ml  Medications:   Scheduled Meds:   sodium chloride  2 g Oral TID WC    sodium chloride  1 g Oral Once    dexamethasone  10 mg IntraVENous Daily    furosemide  20 mg IntraVENous Daily    enoxaparin  40 mg SubCUTAneous BID    albuterol sulfate HFA  2 puff Inhalation 4x daily    aspirin  81 mg Oral Daily    atorvastatin  40 mg Oral Daily    buPROPion  75 mg Oral Daily    clopidogrel  75 mg Oral Daily    levothyroxine  75 mcg Oral Daily    finasteride  5 mg Oral Daily    oxybutynin  15 mg Oral Daily    metoprolol tartrate  50 mg Oral BID    [Held by provider] lisinopril  20 mg Oral Daily    sodium chloride flush  5-40 mL IntraVENous 2 times per day    baricitinib  4 mg Oral Daily     Continuous Infusions:   sodium chloride 50 mL/hr at 12/19/21 0558    sodium chloride Stopped (12/09/21 1026)     CBC:   Recent Labs     12/17/21  0938 12/18/21  0536 12/19/21  0619   WBC 13.5* 12.8* 13.2*   HGB 13.8 12.4* 12.4*    331 365     BMP:    Recent Labs     12/17/21  0938 12/18/21  0536 12/19/21  0619   * 134* 135   K 4.5 4.3 4.5   CL 98 101 102   CO2 22 23 24   BUN 28* 32* 31*   CREATININE 0.61* 0.79 0.74   GLUCOSE 92 95 87     Hepatic:   Recent Labs     12/17/21  0938 12/18/21  0536   AST 23 24   ALT 16 23   BILITOT 0.80 0.62   ALKPHOS 70 63     Troponin: No results for input(s): TROPONINI in the last 72 hours. BNP: No results for input(s): BNP in the last 72 hours. Lipids: No results for input(s): CHOL, HDL in the last 72 hours.     Invalid input(s): LDLCALCU  INR: No results for input(s): INR in the last 72 hours. Objective:   Vitals: /69   Pulse 71   Temp 98.3 °F (36.8 °C) (Oral)   Resp 25   Ht 6' (1.829 m)   Wt (!) 317 lb 4.8 oz (143.9 kg)   SpO2 (!) 89%   BMI 43.03 kg/m²   General appearance: alert and cooperative with exam  HEENT: Eye: Normal external eye, conjunctiva, lids cornea, BETTY. Neck: no adenopathy, no carotid bruit, no JVD, supple, symmetrical, trachea midline and thyroid not enlarged, symmetric, no tenderness/mass/nodules  Lungs: clear to auscultation bilaterally  Heart: regular rate and rhythm, S1, S2 normal, no murmur, click, rub or gallop  Abdomen: soft, non-tender; bowel sounds normal; no masses,  no organomegaly  Extremities: edema bilateral,no calf tenderness  Neurologic: Mental status: Alert, oriented, thought content appropriate    Assessment and Plan:   1. Covid 19 pneumonia. 2. HTN  3. Chronic CHF combined. 4. hypothyroidism    Plan:Try to wean down oxygen as possible. monitor daily labs continue supportive care  Patient Active Problem List:     Hypertension     Osteoarthritis     Pneumonia due to COVID-19 virus     Chronic venous insufficiency     Acquired genu valgum     Degeneration of lumbar intervertebral disc     Lower urinary tract symptoms due to benign prostatic hyperplasia     Presence of right artificial knee joint     Bilateral carotid artery stenosis     Hypothyroidism     Ischemic cardiomyopathy     Ventral hernia     Left bundle branch block (LBBB)     Chronic embolism and thrombosis of deep vein of both distal legs (HCC)     Coronary artery disease involving native coronary artery of native heart     Hyperlipidemia     Class 3 severe obesity due to excess calories with serious comorbidity and body mass index (BMI) of 40.0 to 44.9 in adult [facility])     Chronic combined systolic and diastolic CHF (congestive heart failure) (HCC)     Impaired gait     Emphysema/COPD (HCC)     Acute respiratory failure with hypoxia ([facility] [address]     COPD with acute exacerbation (HCC)      Marshall Goodman MD, MD  Rounding Hospitalist

## 2021-12-20 LAB
ABSOLUTE EOS #: <0.03 K/UL (ref 0–0.44)
ABSOLUTE IMMATURE GRANULOCYTE: 0.13 K/UL (ref 0–0.3)
ABSOLUTE LYMPH #: 0.99 K/UL (ref 1.1–3.7)
ABSOLUTE MONO #: 1.23 K/UL (ref 0.1–1.2)
ALBUMIN SERPL-MCNC: 2.8 G/DL (ref 3.5–5.2)
ALBUMIN/GLOBULIN RATIO: 0.9 (ref 1–2.5)
ALP BLD-CCNC: 68 U/L (ref 40–129)
ALT SERPL-CCNC: 19 U/L (ref 5–41)
ANION GAP SERPL CALCULATED.3IONS-SCNC: 10 MMOL/L (ref 9–17)
AST SERPL-CCNC: 20 U/L
BASOPHILS # BLD: 0 % (ref 0–2)
BASOPHILS ABSOLUTE: <0.03 K/UL (ref 0–0.2)
BILIRUB SERPL-MCNC: 0.84 MG/DL (ref 0.3–1.2)
BUN BLDV-MCNC: 28 MG/DL (ref 8–23)
BUN/CREAT BLD: 42 (ref 9–20)
C-REACTIVE PROTEIN: 21.6 MG/L (ref 0–5)
CALCIUM SERPL-MCNC: 8.7 MG/DL (ref 8.6–10.4)
CHLORIDE BLD-SCNC: 101 MMOL/L (ref 98–107)
CO2: 26 MMOL/L (ref 20–31)
CREAT SERPL-MCNC: 0.67 MG/DL (ref 0.7–1.2)
D-DIMER QUANTITATIVE: 10.28 MG/L FEU (ref 0–0.59)
DIFFERENTIAL TYPE: ABNORMAL
EOSINOPHILS RELATIVE PERCENT: 0 % (ref 1–4)
FERRITIN: 1231 UG/L (ref 30–400)
GFR AFRICAN AMERICAN: >60 ML/MIN
GFR NON-AFRICAN AMERICAN: >60 ML/MIN
GFR SERPL CREATININE-BSD FRML MDRD: ABNORMAL ML/MIN/{1.73_M2}
GFR SERPL CREATININE-BSD FRML MDRD: ABNORMAL ML/MIN/{1.73_M2}
GLUCOSE BLD-MCNC: 74 MG/DL (ref 70–99)
HCT VFR BLD CALC: 39.5 % (ref 40.7–50.3)
HEMOGLOBIN: 12.4 G/DL (ref 13–17)
IMMATURE GRANULOCYTES: 1 %
LACTATE DEHYDROGENASE: 432 U/L (ref 135–225)
LYMPHOCYTES # BLD: 8 % (ref 24–43)
MCH RBC QN AUTO: 30 PG (ref 25.2–33.5)
MCHC RBC AUTO-ENTMCNC: 31.4 G/DL (ref 25.2–33.5)
MCV RBC AUTO: 95.4 FL (ref 82.6–102.9)
MONOCYTES # BLD: 10 % (ref 3–12)
NRBC AUTOMATED: 0 PER 100 WBC
PDW BLD-RTO: 14.1 % (ref 11.8–14.4)
PLATELET # BLD: 350 K/UL (ref 138–453)
PLATELET ESTIMATE: ABNORMAL
PMV BLD AUTO: 9.7 FL (ref 8.1–13.5)
POTASSIUM SERPL-SCNC: 4.5 MMOL/L (ref 3.7–5.3)
RBC # BLD: 4.14 M/UL (ref 4.21–5.77)
RBC # BLD: ABNORMAL 10*6/UL
SEG NEUTROPHILS: 81 % (ref 36–65)
SEGMENTED NEUTROPHILS ABSOLUTE COUNT: 10.51 K/UL (ref 1.5–8.1)
SODIUM BLD-SCNC: 137 MMOL/L (ref 135–144)
TOTAL PROTEIN: 6 G/DL (ref 6.4–8.3)
WBC # BLD: 12.9 K/UL (ref 3.5–11.3)
WBC # BLD: ABNORMAL 10*3/UL

## 2021-12-20 PROCEDURE — 2700000000 HC OXYGEN THERAPY PER DAY

## 2021-12-20 PROCEDURE — 6370000000 HC RX 637 (ALT 250 FOR IP): Performed by: INTERNAL MEDICINE

## 2021-12-20 PROCEDURE — 94761 N-INVAS EAR/PLS OXIMETRY MLT: CPT

## 2021-12-20 PROCEDURE — 6370000000 HC RX 637 (ALT 250 FOR IP): Performed by: FAMILY MEDICINE

## 2021-12-20 PROCEDURE — 6360000002 HC RX W HCPCS: Performed by: NURSE PRACTITIONER

## 2021-12-20 PROCEDURE — 80053 COMPREHEN METABOLIC PANEL: CPT

## 2021-12-20 PROCEDURE — 36592 COLLECT BLOOD FROM PICC: CPT

## 2021-12-20 PROCEDURE — 83615 LACTATE (LD) (LDH) ENZYME: CPT

## 2021-12-20 PROCEDURE — 94640 AIRWAY INHALATION TREATMENT: CPT

## 2021-12-20 PROCEDURE — 2060000000 HC ICU INTERMEDIATE R&B

## 2021-12-20 PROCEDURE — 2580000003 HC RX 258: Performed by: FAMILY MEDICINE

## 2021-12-20 PROCEDURE — 36415 COLL VENOUS BLD VENIPUNCTURE: CPT

## 2021-12-20 PROCEDURE — 99232 SBSQ HOSP IP/OBS MODERATE 35: CPT | Performed by: INTERNAL MEDICINE

## 2021-12-20 PROCEDURE — 6360000002 HC RX W HCPCS: Performed by: INTERNAL MEDICINE

## 2021-12-20 PROCEDURE — 86140 C-REACTIVE PROTEIN: CPT

## 2021-12-20 PROCEDURE — 94660 CPAP INITIATION&MGMT: CPT

## 2021-12-20 PROCEDURE — 85379 FIBRIN DEGRADATION QUANT: CPT

## 2021-12-20 PROCEDURE — 82728 ASSAY OF FERRITIN: CPT

## 2021-12-20 PROCEDURE — 2580000003 HC RX 258: Performed by: NURSE PRACTITIONER

## 2021-12-20 PROCEDURE — 85025 COMPLETE CBC W/AUTO DIFF WBC: CPT

## 2021-12-20 RX ORDER — ONDANSETRON 2 MG/ML
4 INJECTION INTRAMUSCULAR; INTRAVENOUS EVERY 6 HOURS PRN
Status: DISCONTINUED | OUTPATIENT
Start: 2021-12-20 | End: 2022-01-08 | Stop reason: HOSPADM

## 2021-12-20 RX ADMIN — METOPROLOL TARTRATE 50 MG: 50 TABLET, FILM COATED ORAL at 09:00

## 2021-12-20 RX ADMIN — DEXAMETHASONE SODIUM PHOSPHATE 10 MG: 10 INJECTION INTRAMUSCULAR; INTRAVENOUS at 08:59

## 2021-12-20 RX ADMIN — ATORVASTATIN CALCIUM 40 MG: 40 TABLET, FILM COATED ORAL at 08:59

## 2021-12-20 RX ADMIN — LEVOTHYROXINE SODIUM 75 MCG: 0.07 TABLET ORAL at 06:25

## 2021-12-20 RX ADMIN — SODIUM CHLORIDE TAB 1 GM 2 G: 1 TAB at 17:35

## 2021-12-20 RX ADMIN — METOPROLOL TARTRATE 50 MG: 50 TABLET, FILM COATED ORAL at 20:21

## 2021-12-20 RX ADMIN — ASPIRIN 81 MG: 81 TABLET, COATED ORAL at 08:59

## 2021-12-20 RX ADMIN — BUPROPION HYDROCHLORIDE 75 MG: 75 TABLET ORAL at 09:02

## 2021-12-20 RX ADMIN — SODIUM CHLORIDE TAB 1 GM 2 G: 1 TAB at 08:59

## 2021-12-20 RX ADMIN — SODIUM CHLORIDE, PRESERVATIVE FREE 10 ML: 5 INJECTION INTRAVENOUS at 09:00

## 2021-12-20 RX ADMIN — FUROSEMIDE 20 MG: 10 INJECTION, SOLUTION INTRAMUSCULAR; INTRAVENOUS at 09:00

## 2021-12-20 RX ADMIN — OXYBUTYNIN CHLORIDE 15 MG: 5 TABLET, EXTENDED RELEASE ORAL at 09:00

## 2021-12-20 RX ADMIN — SODIUM CHLORIDE TAB 1 GM 2 G: 1 TAB at 12:00

## 2021-12-20 RX ADMIN — ENOXAPARIN SODIUM 40 MG: 100 INJECTION SUBCUTANEOUS at 20:22

## 2021-12-20 RX ADMIN — FINASTERIDE 5 MG: 5 TABLET, FILM COATED ORAL at 09:00

## 2021-12-20 RX ADMIN — SODIUM CHLORIDE, PRESERVATIVE FREE 10 ML: 5 INJECTION INTRAVENOUS at 20:23

## 2021-12-20 RX ADMIN — BARICITINIB 4 MG: 2 TABLET, FILM COATED ORAL at 09:00

## 2021-12-20 RX ADMIN — ALBUTEROL SULFATE 2 PUFF: 90 AEROSOL, METERED RESPIRATORY (INHALATION) at 20:24

## 2021-12-20 RX ADMIN — ENOXAPARIN SODIUM 40 MG: 100 INJECTION SUBCUTANEOUS at 08:59

## 2021-12-20 RX ADMIN — SODIUM CHLORIDE: 9 INJECTION, SOLUTION INTRAVENOUS at 04:33

## 2021-12-20 RX ADMIN — CLOPIDOGREL BISULFATE 75 MG: 75 TABLET ORAL at 08:59

## 2021-12-20 RX ADMIN — ONDANSETRON 4 MG: 2 INJECTION INTRAMUSCULAR; INTRAVENOUS at 20:22

## 2021-12-20 RX ADMIN — ALBUTEROL SULFATE 2 PUFF: 90 AEROSOL, METERED RESPIRATORY (INHALATION) at 11:23

## 2021-12-20 RX ADMIN — ALBUTEROL SULFATE 2 PUFF: 90 AEROSOL, METERED RESPIRATORY (INHALATION) at 07:58

## 2021-12-20 RX ADMIN — ALBUTEROL SULFATE 2 PUFF: 90 AEROSOL, METERED RESPIRATORY (INHALATION) at 15:32

## 2021-12-20 ASSESSMENT — PAIN SCALES - GENERAL
PAINLEVEL_OUTOF10: 0

## 2021-12-20 NOTE — PROGRESS NOTES
Hospitalist Progress Note    Patient:  Dorian Murcia     YOB: 1945    MRN: [id number]   Admit date: 12/8/2021     Acct: [de-identified]     PCP: Rajni Monae MD    CC--Interval History:   Covid-19 PNA---on Rocephin--Zithromax--Barcitinib--respiratory regimen---has transitioned from continuous BiPAP to HHF--improved    HTN----BP = 117/66    COPD---see above    Chronic PE--DVT---on Lovenox--Plavix    Ischemic cardiomyopathy--CHF--stable----LBBB chronic    See note below        All other ROS negative except noted in HPI    Diet:  ADULT DIET;  Regular; 4 carb choices (60 gm/meal); 1800 ml    Medications:  Scheduled Meds:   sodium chloride  2 g Oral TID WC    dexamethasone  10 mg IntraVENous Daily    furosemide  20 mg IntraVENous Daily    enoxaparin  40 mg SubCUTAneous BID    albuterol sulfate HFA  2 puff Inhalation 4x daily    aspirin  81 mg Oral Daily    atorvastatin  40 mg Oral Daily    buPROPion  75 mg Oral Daily    clopidogrel  75 mg Oral Daily    levothyroxine  75 mcg Oral Daily    finasteride  5 mg Oral Daily    oxybutynin  15 mg Oral Daily    metoprolol tartrate  50 mg Oral BID    [Held by provider] lisinopril  20 mg Oral Daily    sodium chloride flush  5-40 mL IntraVENous 2 times per day    baricitinib  4 mg Oral Daily     Continuous Infusions:   sodium chloride 50 mL/hr at 12/20/21 0625    sodium chloride Stopped (12/09/21 1026)     PRN Meds:zolpidem, benzonatate, guaiFENesin-dextromethorphan, albuterol sulfate HFA, sodium chloride flush, sodium chloride, polyethylene glycol, acetaminophen **OR** acetaminophen    Objective:  Labs:  CBC with Differential:    Lab Results   Component Value Date    WBC 12.9 12/20/2021    RBC 4.14 12/20/2021    HGB 12.4 12/20/2021    HCT 39.5 12/20/2021     12/20/2021    MCV 95.4 12/20/2021    MCH 30.0 12/20/2021    MCHC 31.4 12/20/2021    RDW 14.1 12/20/2021    LYMPHOPCT 8 12/20/2021    MONOPCT 10 12/20/2021    BASOPCT 0 12/20/2021 MONOSABS 1.23 12/20/2021    LYMPHSABS 0.99 12/20/2021    EOSABS <0.03 12/20/2021    BASOSABS <0.03 12/20/2021    DIFFTYPE NOT REPORTED 12/20/2021     BMP:    Lab Results   Component Value Date     12/20/2021    K 4.5 12/20/2021     12/20/2021    CO2 26 12/20/2021    BUN 28 12/20/2021    LABALBU 2.8 12/20/2021    CREATININE 0.67 12/20/2021    CALCIUM 8.7 12/20/2021    GFRAA >60 12/20/2021    LABGLOM >60 12/20/2021    GLUCOSE 74 12/20/2021           Physical Exam:  Vitals: /65   Pulse 73   Temp 98.4 °F (36.9 °C) (Tympanic)   Resp 24   Ht 6' (1.829 m)   Wt (!) 319 lb 9.6 oz (145 kg)   SpO2 92%   BMI 43.35 kg/m²   24 hour intake/output:    Intake/Output Summary (Last 24 hours) at 12/20/2021 1304  Last data filed at 12/20/2021 1230  Gross per 24 hour   Intake 1263 ml   Output 3650 ml   Net -2387 ml     Last 3 weights: Wt Readings from Last 3 Encounters:   12/20/21 (!) 319 lb 9.6 oz (145 kg)   11/11/21 (!) 319 lb 12.8 oz (145.1 kg)   09/15/21 (!) 321 lb (145.6 kg)     HEENT: HHF O2---, Normocephalic and Atraumatic  Neck: Supple, No Masses, Tenderness, Nodularity and No Lymphadenopathy  Chest/Lungs: Poor Air Movement and Distant Breath Sounds  Cardiac: Regular Rate and Rhythm  GI/Abdomen: Bowel Sounds Present and Soft, Non-tender, without Guarding or Rebound Tenderness  : Not examined  EXT/Skin: No Cyanosis, No Clubbing and Edema Present---mild  Neuro: alert----generalized weakness---very, very hard of hearing---- and No Localizing Signs/Symptoms      Assessment:    Principal Problem:    Pneumonia due to COVID-19 virus  Active Problems:    Ischemic cardiomyopathy    Coronary artery disease involving native coronary artery of native heart    Acute respiratory failure with hypoxia (HCC)    COPD with acute exacerbation (HCC)  Resolved Problems:    * No resolved hospital problems.  *    John Webster  76 WM  [world call]  DNR-CCA    LOVENOX---PLAVIX    SUPPLEMENTAL OXYGEN----BiPAP  à  HHF and BiPAP nights--rest  COVID-19--POSITIVE---12.8.2021----Moderna---1.25.2021---2.22.2021   MIDLINE---12.17.2021    Anti-infectives:  Rocephin IV, Zithromax IV, Baricitinib, [Decadron 20]    Covid-19 PNA----12.8.2021---NACs--bilateral airspace disease             CXR----12.18.2021--NACs--bilateral airspace disease            CXR---12.17.2021---contd bilateral airspace disease            CXR---12.16.2021--diffuse pulmonary infiltrates            CXR----12.15.2021----bilateral infiltrates----NACs    Acute respiratory failure with hypoxia----12.8.2021  COPD exacerbation---due to Covid-19 PNA----12.8.2021  Hyponatremia  COPD----emphysema   ASCVD          CABG--10.28.2014---SVG-OM1---SVG-OM2-----SVG-PDA          Cardiac catheterization---2014  Ischemic cardiomyopathy           2D ECHO---1.28.2021---WMA---LVEF ~ 40%    CHF----chronic combined systolic---diastolic  Chronic PE and BLE DVT  CKD--Stage 2  PVD        Bilateral carotid stenosis  Hypertension  Hyperlipidemia  Hypothyroidism  LBBB  BLE venous insufficiency  Gait-balance instability  Insomnia   Tobacco abuse---quit---10.27.2014  HEARING IMPAIRMENT  PMH:    BLE edema, OA, onychomycosis,   PSH:   see above,  colonoscopy--2012--2009---2008---2003--adenomatous polyp, TURP,              dental extractions, left TKA---2018, right TKA---2015     Allergies----NKDA     Plan:  1. Covid-19 PNA---cont'd IV antibiotics--Baricitinib---Decadron---respiratory regimen  2. KYLE--CPAP----BiPAP hospital  3. Wean FiO2 as tolerated  4.    See orders       Electronically signed by Aline Charles on 12/20/2021 at 1:04 PM    Hospitalist

## 2021-12-20 NOTE — PLAN OF CARE
Problem: Airway Clearance - Ineffective  Goal: Achieve or maintain patent airway  12/20/2021 1057 by Kit Duncan RN  Outcome: Ongoing  12/20/2021 0233 by Denia Kessler RN  Outcome: Ongoing     Problem: Gas Exchange - Impaired  Goal: Absence of hypoxia  12/20/2021 1057 by Kit Duncan RN  Outcome: Ongoing  12/20/2021 0233 by Denia Kessler RN  Outcome: Ongoing     Problem: Gas Exchange - Impaired  Goal: Promote optimal lung function  12/20/2021 1057 by Kit Duncan RN  Outcome: Ongoing  12/20/2021 0233 by Denia Kessler RN  Outcome: Ongoing     Problem: Breathing Pattern - Ineffective  Goal: Ability to achieve and maintain a regular respiratory rate  12/20/2021 1057 by Kit Duncna RN  Outcome: Ongoing  12/20/2021 0233 by Denia Kessler RN  Outcome: Ongoing     Problem: Body Temperature -  Risk of, Imbalanced  Goal: Ability to maintain a body temperature within defined limits  12/20/2021 1057 by Kit Duncan RN  Outcome: Ongoing  12/20/2021 0233 by Denia Kessler RN  Outcome: Ongoing     Problem:  Body Temperature -  Risk of, Imbalanced  Goal: Complications related to the disease process, condition or treatment will be avoided or minimized  12/20/2021 1057 by Kit Duncan RN  Outcome: Ongoing  12/20/2021 0233 by Denia Kessler RN  Outcome: Ongoing

## 2021-12-21 ENCOUNTER — APPOINTMENT (OUTPATIENT)
Dept: GENERAL RADIOLOGY | Age: 76
DRG: 177 | End: 2021-12-21
Payer: MEDICARE

## 2021-12-21 LAB
ABSOLUTE EOS #: <0.03 K/UL (ref 0–0.44)
ABSOLUTE IMMATURE GRANULOCYTE: 0.15 K/UL (ref 0–0.3)
ABSOLUTE LYMPH #: 0.92 K/UL (ref 1.1–3.7)
ABSOLUTE MONO #: 1.01 K/UL (ref 0.1–1.2)
ALBUMIN SERPL-MCNC: 2.8 G/DL (ref 3.5–5.2)
ALBUMIN/GLOBULIN RATIO: 0.9 (ref 1–2.5)
ALP BLD-CCNC: 64 U/L (ref 40–129)
ALT SERPL-CCNC: 17 U/L (ref 5–41)
ANION GAP SERPL CALCULATED.3IONS-SCNC: 9 MMOL/L (ref 9–17)
AST SERPL-CCNC: 16 U/L
BASOPHILS # BLD: 0 % (ref 0–2)
BASOPHILS ABSOLUTE: <0.03 K/UL (ref 0–0.2)
BILIRUB SERPL-MCNC: 0.58 MG/DL (ref 0.3–1.2)
BUN BLDV-MCNC: 27 MG/DL (ref 8–23)
BUN/CREAT BLD: 40 (ref 9–20)
C-REACTIVE PROTEIN: 59.9 MG/L (ref 0–5)
CALCIUM SERPL-MCNC: 8.8 MG/DL (ref 8.6–10.4)
CHLORIDE BLD-SCNC: 100 MMOL/L (ref 98–107)
CO2: 25 MMOL/L (ref 20–31)
CREAT SERPL-MCNC: 0.67 MG/DL (ref 0.7–1.2)
D-DIMER QUANTITATIVE: 2.15 MG/L FEU (ref 0–0.59)
DIFFERENTIAL TYPE: ABNORMAL
EOSINOPHILS RELATIVE PERCENT: 0 % (ref 1–4)
FERRITIN: 1136 UG/L (ref 30–400)
GFR AFRICAN AMERICAN: >60 ML/MIN
GFR NON-AFRICAN AMERICAN: >60 ML/MIN
GFR SERPL CREATININE-BSD FRML MDRD: ABNORMAL ML/MIN/{1.73_M2}
GFR SERPL CREATININE-BSD FRML MDRD: ABNORMAL ML/MIN/{1.73_M2}
GLUCOSE BLD-MCNC: 133 MG/DL (ref 75–110)
GLUCOSE BLD-MCNC: 133 MG/DL (ref 75–110)
GLUCOSE BLD-MCNC: 92 MG/DL (ref 70–99)
HCT VFR BLD CALC: 38 % (ref 40.7–50.3)
HEMOGLOBIN: 12 G/DL (ref 13–17)
IMMATURE GRANULOCYTES: 1 %
LACTATE DEHYDROGENASE: 275 U/L (ref 135–225)
LYMPHOCYTES # BLD: 8 % (ref 24–43)
MCH RBC QN AUTO: 29.9 PG (ref 25.2–33.5)
MCHC RBC AUTO-ENTMCNC: 31.6 G/DL (ref 25.2–33.5)
MCV RBC AUTO: 94.8 FL (ref 82.6–102.9)
MONOCYTES # BLD: 9 % (ref 3–12)
NRBC AUTOMATED: 0 PER 100 WBC
PDW BLD-RTO: 14.3 % (ref 11.8–14.4)
PLATELET # BLD: ABNORMAL K/UL (ref 138–453)
PLATELET ESTIMATE: ABNORMAL
PLATELET, FLUORESCENCE: 353 K/UL (ref 138–453)
PLATELET, IMMATURE FRACTION: 2.1 % (ref 1.1–10.3)
PMV BLD AUTO: ABNORMAL FL (ref 8.1–13.5)
POTASSIUM SERPL-SCNC: 4.6 MMOL/L (ref 3.7–5.3)
RBC # BLD: 4.01 M/UL (ref 4.21–5.77)
RBC # BLD: ABNORMAL 10*6/UL
SEG NEUTROPHILS: 81 % (ref 36–65)
SEGMENTED NEUTROPHILS ABSOLUTE COUNT: 8.86 K/UL (ref 1.5–8.1)
SODIUM BLD-SCNC: 134 MMOL/L (ref 135–144)
TOTAL PROTEIN: 6 G/DL (ref 6.4–8.3)
WBC # BLD: 11 K/UL (ref 3.5–11.3)
WBC # BLD: ABNORMAL 10*3/UL

## 2021-12-21 PROCEDURE — 6360000002 HC RX W HCPCS: Performed by: NURSE PRACTITIONER

## 2021-12-21 PROCEDURE — 94760 N-INVAS EAR/PLS OXIMETRY 1: CPT

## 2021-12-21 PROCEDURE — 85379 FIBRIN DEGRADATION QUANT: CPT

## 2021-12-21 PROCEDURE — 94640 AIRWAY INHALATION TREATMENT: CPT

## 2021-12-21 PROCEDURE — 6370000000 HC RX 637 (ALT 250 FOR IP): Performed by: FAMILY MEDICINE

## 2021-12-21 PROCEDURE — 82947 ASSAY GLUCOSE BLOOD QUANT: CPT

## 2021-12-21 PROCEDURE — 6360000002 HC RX W HCPCS: Performed by: INTERNAL MEDICINE

## 2021-12-21 PROCEDURE — 2700000000 HC OXYGEN THERAPY PER DAY

## 2021-12-21 PROCEDURE — 2580000003 HC RX 258: Performed by: NURSE PRACTITIONER

## 2021-12-21 PROCEDURE — 86140 C-REACTIVE PROTEIN: CPT

## 2021-12-21 PROCEDURE — 85025 COMPLETE CBC W/AUTO DIFF WBC: CPT

## 2021-12-21 PROCEDURE — 71045 X-RAY EXAM CHEST 1 VIEW: CPT

## 2021-12-21 PROCEDURE — 83615 LACTATE (LD) (LDH) ENZYME: CPT

## 2021-12-21 PROCEDURE — 94761 N-INVAS EAR/PLS OXIMETRY MLT: CPT

## 2021-12-21 PROCEDURE — 36415 COLL VENOUS BLD VENIPUNCTURE: CPT

## 2021-12-21 PROCEDURE — 2060000000 HC ICU INTERMEDIATE R&B

## 2021-12-21 PROCEDURE — 82728 ASSAY OF FERRITIN: CPT

## 2021-12-21 PROCEDURE — 80053 COMPREHEN METABOLIC PANEL: CPT

## 2021-12-21 PROCEDURE — 85055 RETICULATED PLATELET ASSAY: CPT

## 2021-12-21 PROCEDURE — 6370000000 HC RX 637 (ALT 250 FOR IP): Performed by: INTERNAL MEDICINE

## 2021-12-21 PROCEDURE — 99232 SBSQ HOSP IP/OBS MODERATE 35: CPT | Performed by: INTERNAL MEDICINE

## 2021-12-21 PROCEDURE — 2580000003 HC RX 258: Performed by: FAMILY MEDICINE

## 2021-12-21 PROCEDURE — 36592 COLLECT BLOOD FROM PICC: CPT

## 2021-12-21 RX ORDER — SODIUM CHLORIDE 1000 MG
3 TABLET, SOLUBLE MISCELLANEOUS
Status: DISCONTINUED | OUTPATIENT
Start: 2021-12-21 | End: 2021-12-26

## 2021-12-21 RX ADMIN — SODIUM CHLORIDE 3 G: 1 TABLET ORAL at 17:19

## 2021-12-21 RX ADMIN — ALBUTEROL SULFATE 2 PUFF: 90 AEROSOL, METERED RESPIRATORY (INHALATION) at 20:37

## 2021-12-21 RX ADMIN — BARICITINIB 4 MG: 2 TABLET, FILM COATED ORAL at 09:11

## 2021-12-21 RX ADMIN — ENOXAPARIN SODIUM 40 MG: 100 INJECTION SUBCUTANEOUS at 20:38

## 2021-12-21 RX ADMIN — ASPIRIN 81 MG: 81 TABLET, COATED ORAL at 09:11

## 2021-12-21 RX ADMIN — ALBUTEROL SULFATE 2 PUFF: 90 AEROSOL, METERED RESPIRATORY (INHALATION) at 11:59

## 2021-12-21 RX ADMIN — SODIUM CHLORIDE, PRESERVATIVE FREE 10 ML: 5 INJECTION INTRAVENOUS at 20:38

## 2021-12-21 RX ADMIN — OXYBUTYNIN CHLORIDE 15 MG: 5 TABLET, EXTENDED RELEASE ORAL at 09:10

## 2021-12-21 RX ADMIN — ENOXAPARIN SODIUM 40 MG: 100 INJECTION SUBCUTANEOUS at 09:10

## 2021-12-21 RX ADMIN — SODIUM CHLORIDE: 9 INJECTION, SOLUTION INTRAVENOUS at 18:48

## 2021-12-21 RX ADMIN — METOPROLOL TARTRATE 50 MG: 50 TABLET, FILM COATED ORAL at 20:38

## 2021-12-21 RX ADMIN — FINASTERIDE 5 MG: 5 TABLET, FILM COATED ORAL at 09:11

## 2021-12-21 RX ADMIN — ONDANSETRON 4 MG: 2 INJECTION INTRAMUSCULAR; INTRAVENOUS at 15:06

## 2021-12-21 RX ADMIN — FUROSEMIDE 20 MG: 10 INJECTION, SOLUTION INTRAMUSCULAR; INTRAVENOUS at 09:11

## 2021-12-21 RX ADMIN — BUPROPION HYDROCHLORIDE 75 MG: 75 TABLET ORAL at 09:12

## 2021-12-21 RX ADMIN — LEVOTHYROXINE SODIUM 75 MCG: 0.07 TABLET ORAL at 06:53

## 2021-12-21 RX ADMIN — SODIUM CHLORIDE TAB 1 GM 2 G: 1 TAB at 09:10

## 2021-12-21 RX ADMIN — SODIUM CHLORIDE, PRESERVATIVE FREE 10 ML: 5 INJECTION INTRAVENOUS at 09:11

## 2021-12-21 RX ADMIN — ALBUTEROL SULFATE 2 PUFF: 90 AEROSOL, METERED RESPIRATORY (INHALATION) at 17:08

## 2021-12-21 RX ADMIN — ATORVASTATIN CALCIUM 40 MG: 40 TABLET, FILM COATED ORAL at 09:11

## 2021-12-21 RX ADMIN — SODIUM CHLORIDE: 9 INJECTION, SOLUTION INTRAVENOUS at 00:11

## 2021-12-21 RX ADMIN — SODIUM CHLORIDE 3 G: 1 TABLET ORAL at 11:54

## 2021-12-21 RX ADMIN — CLOPIDOGREL BISULFATE 75 MG: 75 TABLET ORAL at 09:11

## 2021-12-21 RX ADMIN — DEXAMETHASONE SODIUM PHOSPHATE 10 MG: 10 INJECTION INTRAMUSCULAR; INTRAVENOUS at 09:11

## 2021-12-21 RX ADMIN — METOPROLOL TARTRATE 50 MG: 50 TABLET, FILM COATED ORAL at 09:11

## 2021-12-21 ASSESSMENT — PAIN SCALES - GENERAL
PAINLEVEL_OUTOF10: 0

## 2021-12-21 NOTE — PROGRESS NOTES
Hospitalist Progress Note    Patient:  Rosalina Leyden     YOB: 1945    MRN: [id number]   Admit date: 12/8/2021     Acct: [de-identified]     PCP: Stacy Lynn MD    CC--Interval History: Covid-19 PNA---currently on HHF O2---was able to use HHF all night--on Rocephin IV---Zithromax IV--Barcitinib---Decadron     Inflammatory markers variable and cont'd elevated    HTN---ischemic cardiomyopathy--stable    Na = 134 --> sodium replacement    Uout 775 overnight    See note below    All other ROS negative except noted in HPI    Diet:  ADULT DIET;  Regular; 4 carb choices (60 gm/meal); 1800 ml    Medications:  Scheduled Meds:   sodium chloride  3 g Oral TID WC    dexamethasone  10 mg IntraVENous Daily    furosemide  20 mg IntraVENous Daily    enoxaparin  40 mg SubCUTAneous BID    albuterol sulfate HFA  2 puff Inhalation 4x daily    aspirin  81 mg Oral Daily    atorvastatin  40 mg Oral Daily    buPROPion  75 mg Oral Daily    clopidogrel  75 mg Oral Daily    levothyroxine  75 mcg Oral Daily    finasteride  5 mg Oral Daily    oxybutynin  15 mg Oral Daily    metoprolol tartrate  50 mg Oral BID    [Held by provider] lisinopril  20 mg Oral Daily    sodium chloride flush  5-40 mL IntraVENous 2 times per day    baricitinib  4 mg Oral Daily     Continuous Infusions:   sodium chloride 50 mL/hr at 12/21/21 0654    sodium chloride Stopped (12/09/21 1026)     PRN Meds:ondansetron, zolpidem, benzonatate, guaiFENesin-dextromethorphan, albuterol sulfate HFA, sodium chloride flush, sodium chloride, polyethylene glycol, acetaminophen **OR** acetaminophen    Objective:  Labs:  CBC with Differential:    Lab Results   Component Value Date    WBC 11.0 12/21/2021    RBC 4.01 12/21/2021    HGB 12.0 12/21/2021    HCT 38.0 12/21/2021    PLT See Reflexed IPF Result 12/21/2021    MCV 94.8 12/21/2021    MCH 29.9 12/21/2021    MCHC 31.6 12/21/2021    RDW 14.3 12/21/2021    LYMPHOPCT 8 12/21/2021    MONOPCT 9 12/21/2021    BASOPCT 0 12/21/2021    MONOSABS 1.01 12/21/2021    LYMPHSABS 0.92 12/21/2021    EOSABS <0.03 12/21/2021    BASOSABS <0.03 12/21/2021    DIFFTYPE NOT REPORTED 12/21/2021     BMP:    Lab Results   Component Value Date     12/21/2021    K 4.6 12/21/2021     12/21/2021    CO2 25 12/21/2021    BUN 27 12/21/2021    LABALBU 2.8 12/21/2021    CREATININE 0.67 12/21/2021    CALCIUM 8.8 12/21/2021    GFRAA >60 12/21/2021    LABGLOM >60 12/21/2021    GLUCOSE 92 12/21/2021           Physical Exam:  Vitals: /72   Pulse 61   Temp 97.4 °F (36.3 °C) (Tympanic)   Resp 27   Ht 6' (1.829 m)   Wt (!) 311 lb 14.4 oz (141.5 kg)   SpO2 92%   BMI 42.30 kg/m²   24 hour intake/output:    Intake/Output Summary (Last 24 hours) at 12/21/2021 0848  Last data filed at 12/21/2021 0654  Gross per 24 hour   Intake 2945.72 ml   Output 1875 ml   Net 1070.72 ml     Last 3 weights: Wt Readings from Last 3 Encounters:   12/21/21 (!) 311 lb 14.4 oz (141.5 kg)   11/11/21 (!) 319 lb 12.8 oz (145.1 kg)   09/15/21 (!) 321 lb (145.6 kg)     HEENT: HHF O2, Normocephalic and Atraumatic  Neck: Supple, No Masses, Tenderness, Nodularity and No Lymphadenopathy  Chest/Lungs: coarse breath sounds--- and Distant Breath Sounds  Cardiac: Regular Rate and Rhythm  GI/Abdomen: Bowel Sounds Present and Soft, Non-tender, without Guarding or Rebound Tenderness  : Not examined  EXT/Skin: No Cyanosis, No Clubbing and Edema Present---mild  Neuro: alert----generalzied weakness---very [place]----- and No Localizing Signs/Symptoms      Assessment:    Principal Problem:    Pneumonia due to COVID-19 virus  Active Problems:    Ischemic cardiomyopathy    Coronary artery disease involving native coronary artery of native heart    Acute respiratory failure with hypoxia (HCC)    COPD with acute exacerbation (HCC)  Resolved Problems:    * No resolved hospital problems.  *    Renay Holliday  76 WM  [world call]  DNR-CCA    LOVENOX---PLAVIX SUPPLEMENTAL OXYGEN----BiPAP  à  HHF and BiPAP nights--rest  COVID-19--POSITIVE---12.8.2021----Moderna---1.25.2021---2.22.2021   MIDLINE---12.17.2021    Anti-infectives:  Rocephin IV, Zithromax IV, Baricitinib, [Decadron 20]    Covid-19 PNA----12.8.2021---NACs--bilateral airspace disease            CXR---12.21.2021---NACs            CXR----12.18.2021--NACs--bilateral airspace disease            CXR---12.17.2021---contd bilateral airspace disease            CXR---12.16.2021--diffuse pulmonary infiltrates            CXR----12.15.2021----bilateral infiltrates----NACs    Acute respiratory failure with hypoxia----12.8.2021  COPD exacerbation---due to Covid-19 PNA----12.8.2021  Hyponatremia  COPD----emphysema   ASCVD          CABG--10.28.2014---SVG-OM1---SVG-OM2-----SVG-PDA          Cardiac catheterization---2014  Ischemic cardiomyopathy           2D ECHO---1.28.2021---W[facility]---LVEF ~ 40%    CHF----chronic combined systolic---diastolic  Chronic PE and BLE DVT  CKD--Stage 2  PVD        Bilateral carotid stenosis  Hypertension  Hyperlipidemia  Hypothyroidism  LBBB  BLE venous insufficiency  Gait-balance instability  Insomnia   Tobacco abuse---quit---10.27.2014  HEARING IMPAIRMENT  PMH:    BLE edema, OA, onychomycosis,   PSH:   see above,  colonoscopy--2012--2009---2008---2003--adenomatous polyp, TURP,              dental extractions, left TKA---2018, right TKA---2015     Allergies----NKDA           Plan:  1. Covid-19 PNA---cont'd current regimen  2. Wean FiO2 as tolerated  3. Sodium replacement  4.      See orders    Electronically signed by Fausto Alcaraz on 12/21/2021 at 8:48 AM    Hospitalist

## 2021-12-21 NOTE — PLAN OF CARE
Problem: Airway Clearance - Ineffective  Goal: Achieve or maintain patent airway  12/21/2021 0951 by Marisol Capellan RN  Outcome: Ongoing  12/21/2021 0326 by Rubi Foster RN  Outcome: Ongoing     Problem: Gas Exchange - Impaired  Goal: Absence of hypoxia  12/21/2021 0951 by Marisol Capellan RN  Outcome: Ongoing  12/21/2021 0326 by Rubi Foster RN  Outcome: Ongoing     Problem: Breathing Pattern - Ineffective  Goal: Ability to achieve and maintain a regular respiratory rate  12/21/2021 0951 by Marisol Capellan RN  Outcome: Ongoing  12/21/2021 0326 by Rubi Foster RN  Outcome: Ongoing     Problem: Nutrition Deficits  Goal: Optimize nutritional status  12/21/2021 0951 by Marisol Capellan RN  Outcome: Ongoing  12/21/2021 0326 by Rubi Foster RN  Outcome: Ongoing     Problem: Loneliness or Risk for Loneliness  Goal: Demonstrate positive use of time alone when socialization is not possible  12/21/2021 0951 by Marisol Capellan RN  Outcome: Ongoing  12/21/2021 0326 by Rubi Foster RN  Outcome: Ongoing

## 2021-12-22 ENCOUNTER — APPOINTMENT (OUTPATIENT)
Dept: GENERAL RADIOLOGY | Age: 76
DRG: 177 | End: 2021-12-22
Payer: MEDICARE

## 2021-12-22 LAB
ABSOLUTE EOS #: <0.03 K/UL (ref 0–0.44)
ABSOLUTE IMMATURE GRANULOCYTE: 0.09 K/UL (ref 0–0.3)
ABSOLUTE LYMPH #: 1.02 K/UL (ref 1.1–3.7)
ABSOLUTE MONO #: 1.14 K/UL (ref 0.1–1.2)
ALBUMIN SERPL-MCNC: 2.8 G/DL (ref 3.5–5.2)
ALBUMIN/GLOBULIN RATIO: 1 (ref 1–2.5)
ALP BLD-CCNC: 62 U/L (ref 40–129)
ALT SERPL-CCNC: 16 U/L (ref 5–41)
ANION GAP SERPL CALCULATED.3IONS-SCNC: 8 MMOL/L (ref 9–17)
AST SERPL-CCNC: 15 U/L
BASOPHILS # BLD: 0 % (ref 0–2)
BASOPHILS ABSOLUTE: <0.03 K/UL (ref 0–0.2)
BILIRUB SERPL-MCNC: 0.58 MG/DL (ref 0.3–1.2)
BUN BLDV-MCNC: 30 MG/DL (ref 8–23)
BUN/CREAT BLD: 45 (ref 9–20)
C-REACTIVE PROTEIN: 33.4 MG/L (ref 0–5)
CALCIUM SERPL-MCNC: 8.4 MG/DL (ref 8.6–10.4)
CHLORIDE BLD-SCNC: 102 MMOL/L (ref 98–107)
CO2: 26 MMOL/L (ref 20–31)
CREAT SERPL-MCNC: 0.66 MG/DL (ref 0.7–1.2)
D-DIMER QUANTITATIVE: 2.5 MG/L FEU (ref 0–0.59)
DIFFERENTIAL TYPE: ABNORMAL
EOSINOPHILS RELATIVE PERCENT: 0 % (ref 1–4)
FERRITIN: 1017 UG/L (ref 30–400)
GFR AFRICAN AMERICAN: >60 ML/MIN
GFR NON-AFRICAN AMERICAN: >60 ML/MIN
GFR SERPL CREATININE-BSD FRML MDRD: ABNORMAL ML/MIN/{1.73_M2}
GFR SERPL CREATININE-BSD FRML MDRD: ABNORMAL ML/MIN/{1.73_M2}
GLUCOSE BLD-MCNC: 110 MG/DL (ref 75–110)
GLUCOSE BLD-MCNC: 134 MG/DL (ref 75–110)
GLUCOSE BLD-MCNC: 81 MG/DL (ref 70–99)
HCT VFR BLD CALC: 37.2 % (ref 40.7–50.3)
HEMOGLOBIN: 11.6 G/DL (ref 13–17)
IMMATURE GRANULOCYTES: 1 %
LACTATE DEHYDROGENASE: 276 U/L (ref 135–225)
LYMPHOCYTES # BLD: 8 % (ref 24–43)
MCH RBC QN AUTO: 29.7 PG (ref 25.2–33.5)
MCHC RBC AUTO-ENTMCNC: 31.2 G/DL (ref 25.2–33.5)
MCV RBC AUTO: 95.4 FL (ref 82.6–102.9)
MONOCYTES # BLD: 9 % (ref 3–12)
NRBC AUTOMATED: 0 PER 100 WBC
PDW BLD-RTO: 14.5 % (ref 11.8–14.4)
PLATELET # BLD: 320 K/UL (ref 138–453)
PLATELET ESTIMATE: ABNORMAL
PMV BLD AUTO: 9.2 FL (ref 8.1–13.5)
POTASSIUM SERPL-SCNC: 4.4 MMOL/L (ref 3.7–5.3)
RBC # BLD: 3.9 M/UL (ref 4.21–5.77)
RBC # BLD: ABNORMAL 10*6/UL
SEG NEUTROPHILS: 82 % (ref 36–65)
SEGMENTED NEUTROPHILS ABSOLUTE COUNT: 10.71 K/UL (ref 1.5–8.1)
SODIUM BLD-SCNC: 136 MMOL/L (ref 135–144)
TOTAL PROTEIN: 5.7 G/DL (ref 6.4–8.3)
WBC # BLD: 13 K/UL (ref 3.5–11.3)
WBC # BLD: ABNORMAL 10*3/UL

## 2021-12-22 PROCEDURE — 71045 X-RAY EXAM CHEST 1 VIEW: CPT

## 2021-12-22 PROCEDURE — 6370000000 HC RX 637 (ALT 250 FOR IP): Performed by: NURSE PRACTITIONER

## 2021-12-22 PROCEDURE — 82947 ASSAY GLUCOSE BLOOD QUANT: CPT

## 2021-12-22 PROCEDURE — 94761 N-INVAS EAR/PLS OXIMETRY MLT: CPT

## 2021-12-22 PROCEDURE — 2700000000 HC OXYGEN THERAPY PER DAY

## 2021-12-22 PROCEDURE — 2580000003 HC RX 258: Performed by: FAMILY MEDICINE

## 2021-12-22 PROCEDURE — 82728 ASSAY OF FERRITIN: CPT

## 2021-12-22 PROCEDURE — 6360000002 HC RX W HCPCS: Performed by: INTERNAL MEDICINE

## 2021-12-22 PROCEDURE — 6360000002 HC RX W HCPCS: Performed by: NURSE PRACTITIONER

## 2021-12-22 PROCEDURE — 83615 LACTATE (LD) (LDH) ENZYME: CPT

## 2021-12-22 PROCEDURE — 85025 COMPLETE CBC W/AUTO DIFF WBC: CPT

## 2021-12-22 PROCEDURE — 2060000000 HC ICU INTERMEDIATE R&B

## 2021-12-22 PROCEDURE — 6370000000 HC RX 637 (ALT 250 FOR IP): Performed by: FAMILY MEDICINE

## 2021-12-22 PROCEDURE — 36415 COLL VENOUS BLD VENIPUNCTURE: CPT

## 2021-12-22 PROCEDURE — 2580000003 HC RX 258: Performed by: NURSE PRACTITIONER

## 2021-12-22 PROCEDURE — 94660 CPAP INITIATION&MGMT: CPT

## 2021-12-22 PROCEDURE — 80053 COMPREHEN METABOLIC PANEL: CPT

## 2021-12-22 PROCEDURE — 94640 AIRWAY INHALATION TREATMENT: CPT

## 2021-12-22 PROCEDURE — 85379 FIBRIN DEGRADATION QUANT: CPT

## 2021-12-22 PROCEDURE — 36592 COLLECT BLOOD FROM PICC: CPT

## 2021-12-22 PROCEDURE — 99232 SBSQ HOSP IP/OBS MODERATE 35: CPT | Performed by: INTERNAL MEDICINE

## 2021-12-22 PROCEDURE — 6370000000 HC RX 637 (ALT 250 FOR IP): Performed by: INTERNAL MEDICINE

## 2021-12-22 PROCEDURE — 86140 C-REACTIVE PROTEIN: CPT

## 2021-12-22 RX ADMIN — FUROSEMIDE 20 MG: 10 INJECTION, SOLUTION INTRAMUSCULAR; INTRAVENOUS at 08:26

## 2021-12-22 RX ADMIN — SODIUM CHLORIDE 3 G: 1 TABLET ORAL at 17:10

## 2021-12-22 RX ADMIN — SODIUM CHLORIDE: 9 INJECTION, SOLUTION INTRAVENOUS at 15:41

## 2021-12-22 RX ADMIN — ZOLPIDEM TARTRATE 5 MG: 5 TABLET ORAL at 22:34

## 2021-12-22 RX ADMIN — SODIUM CHLORIDE 3 G: 1 TABLET ORAL at 08:27

## 2021-12-22 RX ADMIN — CLOPIDOGREL BISULFATE 75 MG: 75 TABLET ORAL at 08:27

## 2021-12-22 RX ADMIN — ENOXAPARIN SODIUM 40 MG: 100 INJECTION SUBCUTANEOUS at 22:34

## 2021-12-22 RX ADMIN — METOPROLOL TARTRATE 50 MG: 50 TABLET, FILM COATED ORAL at 08:27

## 2021-12-22 RX ADMIN — ALBUTEROL SULFATE 2 PUFF: 90 AEROSOL, METERED RESPIRATORY (INHALATION) at 15:20

## 2021-12-22 RX ADMIN — LEVOTHYROXINE SODIUM 75 MCG: 0.07 TABLET ORAL at 05:52

## 2021-12-22 RX ADMIN — BARICITINIB 4 MG: 2 TABLET, FILM COATED ORAL at 08:27

## 2021-12-22 RX ADMIN — ASPIRIN 81 MG: 81 TABLET, COATED ORAL at 08:27

## 2021-12-22 RX ADMIN — ALBUTEROL SULFATE 2 PUFF: 90 AEROSOL, METERED RESPIRATORY (INHALATION) at 20:05

## 2021-12-22 RX ADMIN — OXYBUTYNIN CHLORIDE 15 MG: 5 TABLET, EXTENDED RELEASE ORAL at 08:26

## 2021-12-22 RX ADMIN — FINASTERIDE 5 MG: 5 TABLET, FILM COATED ORAL at 08:27

## 2021-12-22 RX ADMIN — SODIUM CHLORIDE, PRESERVATIVE FREE 10 ML: 5 INJECTION INTRAVENOUS at 22:34

## 2021-12-22 RX ADMIN — BUPROPION HYDROCHLORIDE 75 MG: 75 TABLET ORAL at 08:28

## 2021-12-22 RX ADMIN — DEXAMETHASONE SODIUM PHOSPHATE 10 MG: 10 INJECTION INTRAMUSCULAR; INTRAVENOUS at 08:26

## 2021-12-22 RX ADMIN — METOPROLOL TARTRATE 50 MG: 50 TABLET, FILM COATED ORAL at 22:34

## 2021-12-22 RX ADMIN — ALBUTEROL SULFATE 2 PUFF: 90 AEROSOL, METERED RESPIRATORY (INHALATION) at 08:01

## 2021-12-22 RX ADMIN — ZOLPIDEM TARTRATE 5 MG: 5 TABLET ORAL at 00:29

## 2021-12-22 RX ADMIN — ATORVASTATIN CALCIUM 40 MG: 40 TABLET, FILM COATED ORAL at 08:27

## 2021-12-22 RX ADMIN — SODIUM CHLORIDE 3 G: 1 TABLET ORAL at 12:51

## 2021-12-22 RX ADMIN — SODIUM CHLORIDE, PRESERVATIVE FREE 10 ML: 5 INJECTION INTRAVENOUS at 08:27

## 2021-12-22 RX ADMIN — ENOXAPARIN SODIUM 40 MG: 100 INJECTION SUBCUTANEOUS at 08:26

## 2021-12-22 RX ADMIN — ALBUTEROL SULFATE 2 PUFF: 90 AEROSOL, METERED RESPIRATORY (INHALATION) at 11:35

## 2021-12-22 ASSESSMENT — PAIN SCALES - GENERAL
PAINLEVEL_OUTOF10: 0

## 2021-12-22 NOTE — PROGRESS NOTES
Hospitalist Progress Note    Patient:  Chris Lipoma     YOB: 1945    MRN: [id number]   Admit date: 12/8/2021     Acct: [de-identified]     PCP: Sanjeev Mcintyre MD    CC--Interval History:    Covid-19 PNA---on HHF O2---\"sputtering\" course----outside infectivity window----on Baricitinib----Decadron----respiratory regimen----has finished antibiotic regimen---consider LTAC    Inflammatory markers elevated---variable    See note below     All other ROS negative except noted in HPI    Diet:  ADULT DIET;  Regular; 4 carb choices (60 gm/meal); 1800 ml    Medications:  Scheduled Meds:   sodium chloride  3 g Oral TID WC    dexamethasone  10 mg IntraVENous Daily    furosemide  20 mg IntraVENous Daily    enoxaparin  40 mg SubCUTAneous BID    albuterol sulfate HFA  2 puff Inhalation 4x daily    aspirin  81 mg Oral Daily    atorvastatin  40 mg Oral Daily    buPROPion  75 mg Oral Daily    clopidogrel  75 mg Oral Daily    levothyroxine  75 mcg Oral Daily    finasteride  5 mg Oral Daily    oxybutynin  15 mg Oral Daily    metoprolol tartrate  50 mg Oral BID    [Held by provider] lisinopril  20 mg Oral Daily    sodium chloride flush  5-40 mL IntraVENous 2 times per day     Continuous Infusions:   sodium chloride 50 mL/hr at 12/22/21 1423    sodium chloride Stopped (12/09/21 1026)     PRN Meds:ondansetron, zolpidem, benzonatate, guaiFENesin-dextromethorphan, albuterol sulfate HFA, sodium chloride flush, sodium chloride, polyethylene glycol, acetaminophen **OR** acetaminophen    Objective:  Labs:  CBC with Differential:    Lab Results   Component Value Date    WBC 13.0 12/22/2021    RBC 3.90 12/22/2021    HGB 11.6 12/22/2021    HCT 37.2 12/22/2021     12/22/2021    MCV 95.4 12/22/2021    MCH 29.7 12/22/2021    MCHC 31.2 12/22/2021    RDW 14.5 12/22/2021    LYMPHOPCT 8 12/22/2021    MONOPCT 9 12/22/2021    BASOPCT 0 12/22/2021    MONOSABS 1.14 12/22/2021    LYMPHSABS 1.02 12/22/2021    EOSABS <0.03 12/22/2021    BASOSABS <0.03 12/22/2021    DIFFTYPE NOT REPORTED 12/22/2021     BMP:    Lab Results   Component Value Date     12/22/2021    K 4.4 12/22/2021     12/22/2021    CO2 26 12/22/2021    BUN 30 12/22/2021    LABALBU 2.8 12/22/2021    CREATININE 0.66 12/22/2021    CALCIUM 8.4 12/22/2021    GFRAA >60 12/22/2021    LABGLOM >60 12/22/2021    GLUCOSE 81 12/22/2021           Physical Exam:  Vitals: /61   Pulse 76   Temp 97.8 °F (36.6 °C) (Tympanic)   Resp (!) 40   Ht 6' (1.829 m)   Wt (!) 314 lb 3.2 oz (142.5 kg)   SpO2 (!) 88%   BMI 42.61 kg/m²   24 hour intake/output:    Intake/Output Summary (Last 24 hours) at 12/22/2021 1536  Last data filed at 12/22/2021 1423  Gross per 24 hour   Intake 2118.89 ml   Output 2125 ml   Net -6.11 ml     Last 3 weights: Wt Readings from Last 3 Encounters:   12/22/21 (!) 314 lb 3.2 oz (142.5 kg)   11/11/21 (!) 319 lb 12.8 oz (145.1 kg)   09/15/21 (!) 321 lb (145.6 kg)     HEENT: Normocephalic and Atraumatic   HHF O2  Neck: Supple, No Masses, Tenderness, Nodularity and No Lymphadenopathy  Chest/Lungs: Distant Breath Sounds  Cardiac: Regular Rate and Rhythm  GI/Abdomen: Bowel Sounds Present and Soft, Non-tender, without Guarding or Rebound Tenderness  : mullins--clear yellow-hunter  EXT/Skin: No Cyanosis, No Clubbing and Edema Present----trivial  Neuro: alert---generalized weakness---very hard of hearing---- and No Localizing Signs/Symptoms      Assessment:    Principal Problem:    Pneumonia due to COVID-19 virus  Active Problems:    Ischemic cardiomyopathy    Coronary artery disease involving native coronary artery of native heart    Acute respiratory failure with hypoxia (HCC)    COPD with acute exacerbation (HCC)  Resolved Problems:    * No resolved hospital problems.  *    Dudley ORR  76 WM  [world call]  DNR-CCA    LOVENOX---PLAVIX    SUPPLEMENTAL OXYGEN----BiPAP  à  HHF and BiPAP nights--rest  COVID-19--POSITIVE---12.8.2021----Moderna---1.25.2021---2.22.2021   MIDLINE---12.17.2021   MAY    Anti-infectives:  Rocephin IV, Zithromax IV, Baricitinib, [Decadron 20]    Covid-19 PNA----12.8.2021---NACs--bilateral airspace disease            CXR--12.22.2021---NACs            CXR---12.21.2021---NACs            CXR----12.18.2021--NACs--bilateral airspace disease            CXR---12.17.2021---contd bilateral airspace disease            CXR---12.16.2021--diffuse pulmonary infiltrates            CXR----12.15.2021----bilateral infiltrates----NACs    Acute respiratory failure with hypoxia----12.8.2021  COPD exacerbation---due to Covid-19 PNA----12.8.2021  Hyponatremia  COPD----emphysema   ASCVD          CABG--10.28.2014---SVG-OM1---SVG-OM2-----SVG-PDA          Cardiac catheterization---2014  Ischemic cardiomyopathy           2D ECHO---1.28.2021---[facility]---LVEF ~ 40%    CHF----chronic combined systolic---diastolic  Chronic PE and BLE DVT  CKD--Stage 2  PVD        Bilateral carotid stenosis  Hypertension  Hyperlipidemia  Hypothyroidism  LBBB  BLE venous insufficiency  Gait-balance instability  Insomnia   Tobacco abuse---quit---10.27.2014  HEARING IMPAIRMENT  PMH:    BLE edema, OA, onychomycosis,   PSH:   see above,  colonoscopy--2012--2009---2008---2003--adenomatous polyp, TURP,              dental extractions, left TKA---2018, right TKA---2015     Allergies----NKDA     Plan:  1. Covid-19 PNA--cont'd baricitinib---Decadron--HHF---respiratory regimen  2. Physical--OT therapies  3.    See orders     Electronically signed by Annie Ruiz on 12/22/2021 at 3:36 PM    Hospitalist

## 2021-12-22 NOTE — PROGRESS NOTES
Food/Nutrient Intake Outcomes:  Food and Nutrient Intake,Supplement Intake  Physical Signs/Symptoms Outcomes:  Biochemical Data,Chewing or Swallowing,Fluid Status or Edema,Weight     Discharge Planning:     Too soon to determine     Michael Atkinson, [address] [place] [address]  Office Number: [phone]

## 2021-12-22 NOTE — FLOWSHEET NOTE
Patient educated on the importance of incentive spirometry, as well as coughing and deep breathing. Patient achieved 1000 with the incentive spirometer x5 with RN in room. Patient encouraged to do 10 times every hour. Patient verbalized understanding. Patient also educated on the importance of coughing and deep breathing. Patient performed while RN in room and RN notified pt that RN would be by hourly to ensure pt is doing incentive spirometry and coughing and deep breathing. Pt verbalized understanding and agreeable. Pt tolerated well.

## 2021-12-22 NOTE — PLAN OF CARE
Problem: Airway Clearance - Ineffective  Goal: Achieve or maintain patent airway  Outcome: Ongoing     Problem: Gas Exchange - Impaired  Goal: Absence of hypoxia  Outcome: Ongoing  Goal: Promote optimal lung function  Outcome: Ongoing     Problem: Breathing Pattern - Ineffective  Goal: Ability to achieve and maintain a regular respiratory rate  Outcome: Ongoing     Problem:  Body Temperature -  Risk of, Imbalanced  Goal: Ability to maintain a body temperature within defined limits  Outcome: Ongoing  Goal: Will regain or maintain usual level of consciousness  Outcome: Ongoing  Goal: Complications related to the disease process, condition or treatment will be avoided or minimized  Outcome: Ongoing     Problem: Isolation Precautions - Risk of Spread of Infection  Goal: Prevent transmission of infection  Outcome: Ongoing     Problem: Nutrition Deficits  Goal: Optimize nutritional status  Outcome: Ongoing     Problem: Risk for Fluid Volume Deficit  Goal: Maintain normal heart rhythm  Outcome: Ongoing  Goal: Maintain absence of muscle cramping  Outcome: Ongoing  Goal: Maintain normal serum potassium, sodium, calcium, phosphorus, and pH  Outcome: Ongoing     Problem: Loneliness or Risk for Loneliness  Goal: Demonstrate positive use of time alone when socialization is not possible  Outcome: Ongoing     Problem: Fatigue  Goal: Verbalize increase energy and improved vitality  Outcome: Ongoing     Problem: Patient Education: Go to Patient Education Activity  Goal: Patient/Family Education  Outcome: Ongoing     Problem: Skin Integrity:  Goal: Will show no infection signs and symptoms  Description: Will show no infection signs and symptoms  Outcome: Ongoing  Goal: Absence of new skin breakdown  Description: Absence of new skin breakdown  Outcome: Ongoing     Problem: Falls - Risk of:  Goal: Will remain free from falls  Description: Will remain free from falls  Outcome: Ongoing  Goal: Absence of physical injury  Description:

## 2021-12-23 ENCOUNTER — APPOINTMENT (OUTPATIENT)
Dept: GENERAL RADIOLOGY | Age: 76
DRG: 177 | End: 2021-12-23
Payer: MEDICARE

## 2021-12-23 LAB
ABSOLUTE EOS #: 0.03 K/UL (ref 0–0.44)
ABSOLUTE IMMATURE GRANULOCYTE: 0.1 K/UL (ref 0–0.3)
ABSOLUTE LYMPH #: 0.98 K/UL (ref 1.1–3.7)
ABSOLUTE MONO #: 0.94 K/UL (ref 0.1–1.2)
ALBUMIN SERPL-MCNC: 2.7 G/DL (ref 3.5–5.2)
ALBUMIN/GLOBULIN RATIO: 0.9 (ref 1–2.5)
ALP BLD-CCNC: 65 U/L (ref 40–129)
ALT SERPL-CCNC: 16 U/L (ref 5–41)
ANION GAP SERPL CALCULATED.3IONS-SCNC: 8 MMOL/L (ref 9–17)
AST SERPL-CCNC: 15 U/L
BASOPHILS # BLD: 0 % (ref 0–2)
BASOPHILS ABSOLUTE: <0.03 K/UL (ref 0–0.2)
BILIRUB SERPL-MCNC: 0.56 MG/DL (ref 0.3–1.2)
BUN BLDV-MCNC: 30 MG/DL (ref 8–23)
BUN/CREAT BLD: 42 (ref 9–20)
C-REACTIVE PROTEIN: 54.4 MG/L (ref 0–5)
CALCIUM SERPL-MCNC: 8.6 MG/DL (ref 8.6–10.4)
CHLORIDE BLD-SCNC: 104 MMOL/L (ref 98–107)
CO2: 26 MMOL/L (ref 20–31)
CREAT SERPL-MCNC: 0.71 MG/DL (ref 0.7–1.2)
D-DIMER QUANTITATIVE: 1.97 MG/L FEU (ref 0–0.59)
DIFFERENTIAL TYPE: ABNORMAL
EOSINOPHILS RELATIVE PERCENT: 0 % (ref 1–4)
FERRITIN: 916 UG/L (ref 30–400)
GFR AFRICAN AMERICAN: >60 ML/MIN
GFR NON-AFRICAN AMERICAN: >60 ML/MIN
GFR SERPL CREATININE-BSD FRML MDRD: ABNORMAL ML/MIN/{1.73_M2}
GFR SERPL CREATININE-BSD FRML MDRD: ABNORMAL ML/MIN/{1.73_M2}
GLUCOSE BLD-MCNC: 87 MG/DL (ref 70–99)
HCT VFR BLD CALC: 36 % (ref 40.7–50.3)
HEMOGLOBIN: 11.3 G/DL (ref 13–17)
IMMATURE GRANULOCYTES: 1 %
LACTATE DEHYDROGENASE: 261 U/L (ref 135–225)
LYMPHOCYTES # BLD: 8 % (ref 24–43)
MCH RBC QN AUTO: 29.7 PG (ref 25.2–33.5)
MCHC RBC AUTO-ENTMCNC: 31.4 G/DL (ref 25.2–33.5)
MCV RBC AUTO: 94.7 FL (ref 82.6–102.9)
MONOCYTES # BLD: 8 % (ref 3–12)
NRBC AUTOMATED: 0 PER 100 WBC
PDW BLD-RTO: 14.4 % (ref 11.8–14.4)
PLATELET # BLD: 281 K/UL (ref 138–453)
PLATELET ESTIMATE: ABNORMAL
PMV BLD AUTO: 9 FL (ref 8.1–13.5)
POTASSIUM SERPL-SCNC: 4.5 MMOL/L (ref 3.7–5.3)
RBC # BLD: 3.8 M/UL (ref 4.21–5.77)
RBC # BLD: ABNORMAL 10*6/UL
SEG NEUTROPHILS: 83 % (ref 36–65)
SEGMENTED NEUTROPHILS ABSOLUTE COUNT: 9.58 K/UL (ref 1.5–8.1)
SODIUM BLD-SCNC: 138 MMOL/L (ref 135–144)
TOTAL PROTEIN: 5.6 G/DL (ref 6.4–8.3)
WBC # BLD: 11.7 K/UL (ref 3.5–11.3)
WBC # BLD: ABNORMAL 10*3/UL

## 2021-12-23 PROCEDURE — 80053 COMPREHEN METABOLIC PANEL: CPT

## 2021-12-23 PROCEDURE — 94640 AIRWAY INHALATION TREATMENT: CPT

## 2021-12-23 PROCEDURE — 71045 X-RAY EXAM CHEST 1 VIEW: CPT

## 2021-12-23 PROCEDURE — 6360000002 HC RX W HCPCS: Performed by: NURSE PRACTITIONER

## 2021-12-23 PROCEDURE — 6370000000 HC RX 637 (ALT 250 FOR IP): Performed by: NURSE PRACTITIONER

## 2021-12-23 PROCEDURE — 2580000003 HC RX 258: Performed by: NURSE PRACTITIONER

## 2021-12-23 PROCEDURE — 97530 THERAPEUTIC ACTIVITIES: CPT

## 2021-12-23 PROCEDURE — 6370000000 HC RX 637 (ALT 250 FOR IP): Performed by: INTERNAL MEDICINE

## 2021-12-23 PROCEDURE — 85025 COMPLETE CBC W/AUTO DIFF WBC: CPT

## 2021-12-23 PROCEDURE — 2060000000 HC ICU INTERMEDIATE R&B

## 2021-12-23 PROCEDURE — 99232 SBSQ HOSP IP/OBS MODERATE 35: CPT | Performed by: INTERNAL MEDICINE

## 2021-12-23 PROCEDURE — 6360000002 HC RX W HCPCS: Performed by: INTERNAL MEDICINE

## 2021-12-23 PROCEDURE — 2580000003 HC RX 258: Performed by: FAMILY MEDICINE

## 2021-12-23 PROCEDURE — 94660 CPAP INITIATION&MGMT: CPT

## 2021-12-23 PROCEDURE — 82728 ASSAY OF FERRITIN: CPT

## 2021-12-23 PROCEDURE — 83615 LACTATE (LD) (LDH) ENZYME: CPT

## 2021-12-23 PROCEDURE — 6370000000 HC RX 637 (ALT 250 FOR IP): Performed by: FAMILY MEDICINE

## 2021-12-23 PROCEDURE — 36592 COLLECT BLOOD FROM PICC: CPT

## 2021-12-23 PROCEDURE — 86140 C-REACTIVE PROTEIN: CPT

## 2021-12-23 PROCEDURE — 97161 PT EVAL LOW COMPLEX 20 MIN: CPT

## 2021-12-23 PROCEDURE — 85379 FIBRIN DEGRADATION QUANT: CPT

## 2021-12-23 PROCEDURE — 2700000000 HC OXYGEN THERAPY PER DAY

## 2021-12-23 RX ORDER — FUROSEMIDE 10 MG/ML
20 INJECTION INTRAMUSCULAR; INTRAVENOUS ONCE
Status: COMPLETED | OUTPATIENT
Start: 2021-12-23 | End: 2021-12-23

## 2021-12-23 RX ADMIN — ALBUTEROL SULFATE 2 PUFF: 90 AEROSOL, METERED RESPIRATORY (INHALATION) at 19:46

## 2021-12-23 RX ADMIN — ALBUTEROL SULFATE 2 PUFF: 90 AEROSOL, METERED RESPIRATORY (INHALATION) at 15:50

## 2021-12-23 RX ADMIN — ALBUTEROL SULFATE 2 PUFF: 90 AEROSOL, METERED RESPIRATORY (INHALATION) at 07:34

## 2021-12-23 RX ADMIN — FUROSEMIDE 20 MG: 10 INJECTION, SOLUTION INTRAMUSCULAR; INTRAVENOUS at 11:51

## 2021-12-23 RX ADMIN — BUPROPION HYDROCHLORIDE 75 MG: 75 TABLET ORAL at 08:23

## 2021-12-23 RX ADMIN — SODIUM CHLORIDE, PRESERVATIVE FREE 10 ML: 5 INJECTION INTRAVENOUS at 08:23

## 2021-12-23 RX ADMIN — METOPROLOL TARTRATE 50 MG: 50 TABLET, FILM COATED ORAL at 08:22

## 2021-12-23 RX ADMIN — FUROSEMIDE 20 MG: 10 INJECTION, SOLUTION INTRAMUSCULAR; INTRAVENOUS at 08:23

## 2021-12-23 RX ADMIN — ENOXAPARIN SODIUM 40 MG: 100 INJECTION SUBCUTANEOUS at 08:22

## 2021-12-23 RX ADMIN — ZOLPIDEM TARTRATE 5 MG: 5 TABLET ORAL at 21:32

## 2021-12-23 RX ADMIN — DEXAMETHASONE SODIUM PHOSPHATE 10 MG: 10 INJECTION INTRAMUSCULAR; INTRAVENOUS at 08:23

## 2021-12-23 RX ADMIN — OXYBUTYNIN CHLORIDE 15 MG: 5 TABLET, EXTENDED RELEASE ORAL at 08:22

## 2021-12-23 RX ADMIN — ATORVASTATIN CALCIUM 40 MG: 40 TABLET, FILM COATED ORAL at 08:22

## 2021-12-23 RX ADMIN — SODIUM CHLORIDE, PRESERVATIVE FREE 10 ML: 5 INJECTION INTRAVENOUS at 21:36

## 2021-12-23 RX ADMIN — SODIUM CHLORIDE: 9 INJECTION, SOLUTION INTRAVENOUS at 08:26

## 2021-12-23 RX ADMIN — ALBUTEROL SULFATE 2 PUFF: 90 AEROSOL, METERED RESPIRATORY (INHALATION) at 11:31

## 2021-12-23 RX ADMIN — SODIUM CHLORIDE 3 G: 1 TABLET ORAL at 08:22

## 2021-12-23 RX ADMIN — ENOXAPARIN SODIUM 40 MG: 100 INJECTION SUBCUTANEOUS at 21:33

## 2021-12-23 RX ADMIN — ASPIRIN 81 MG: 81 TABLET, COATED ORAL at 08:22

## 2021-12-23 RX ADMIN — CLOPIDOGREL BISULFATE 75 MG: 75 TABLET ORAL at 08:22

## 2021-12-23 RX ADMIN — LEVOTHYROXINE SODIUM 75 MCG: 0.07 TABLET ORAL at 05:38

## 2021-12-23 RX ADMIN — METOPROLOL TARTRATE 50 MG: 50 TABLET, FILM COATED ORAL at 21:32

## 2021-12-23 RX ADMIN — FINASTERIDE 5 MG: 5 TABLET, FILM COATED ORAL at 08:22

## 2021-12-23 ASSESSMENT — PAIN SCALES - GENERAL
PAINLEVEL_OUTOF10: 0

## 2021-12-23 NOTE — PROGRESS NOTES
Physical Therapy    Facility/Department: [facility]  PROGRESSIVE CARE  Initial Assessment    NAME: Ashli Guzman  : 1945  MRN: [id number]    Date of Service: 2021    Discharge Recommendations:  Continue to assess pending progress,ECF with PT        Assessment   Body structures, Functions, Activity limitations: Decreased functional mobility ; Decreased balance;Decreased safe awareness;Decreased ADL status; Decreased endurance;Decreased strength;Decreased posture  Prognosis: Fair  Decision Making: Low Complexity  REQUIRES PT FOLLOW UP: Yes  Activity Tolerance  Activity Tolerance: Patient limited by endurance;Treatment limited secondary to medical complications (free text)       Patient Diagnosis(es): The primary encounter diagnosis was Pneumonia due to COVID-19 virus. A diagnosis of Hypoxia was also pertinent to this visit. has a past medical history of Adenomatous polyp, Cholecystitis, Chronic venous insufficiency, DVT (deep venous thrombosis) ([facility] [address] Edema, Gout, Hypertension, Onychomycosis, Osteoarthritis, Plantar fasciitis, and Tobacco abuse.   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     Restrictions  Restrictions/Precautions  Restrictions/Precautions: Isolation  Vision/Hearing  Hearing: Exceptions to [facility]  Hearing Exceptions: Hard of hearing/hearing concerns     Subjective  General  Chart Reviewed: Yes  Patient assessed for rehabilitation services?: Yes  Pain Screening  Patient Currently in Pain: Denies  Vital Signs  Patient Currently in Pain: Denies       Orientation  Orientation  Overall Orientation Status: Within Functional Limits  Social/Functional History  Social/Functional History  Lives With: Spouse  Type of Home: House  Additional Comments: unable to attain home information  Cognition        Objective     Observation/Palpation  Posture: Fair    AROM RLE (degrees)  RLE AROM: WFL  AROM LLE (degrees)  LLE AROM : WFL  Strength RLE  Strength RLE: Exception  Comment: grossly 4/5  Strength LLE  Comment: grossly 4/5        Bed mobility  Rolling to Left: Minimal assistance;2 Person assistance  Rolling to Right: Minimal assistance;2 Person assistance  Supine to Sit: Minimal assistance  Sit to Supine: Minimal assistance  Comment: Sitting EOB x 2-3min patient sat down to 77%, in supine after rolling and assisting with pericare O2 sats 85% at worst.  High Flow                    Plan   Plan  Times per day: Daily  Current Treatment Recommendations: Harsha Kumar Re-education,Patient/Caregiver Education & Training,ROM,Balance Training,Gait Training,Home Exercise Program,Safety Education & Training,Functional Mobility Training  Safety Devices  Type of devices: Bed alarm in place,Nurse notified,Call light within reach,Left in bed    G-Code       OutComes Score                                                  AM-PAC Score             Goals  Short term goals  Time Frame for Short term goals: LOS  Short term goal 1: Bed mobilty with CGA  Short term goal 2: Transfers with CGA x 1 maintaining O2 Sats in 85%  Short term goal 3: Sitting at EOB x 5 min maintaining O2 stats  Patient Goals   Patient goals : Unable to [facility]       Therapy Time   Individual Concurrent Group Co-treatment   Time In 0930         Time Out 0950         Minutes 20         Timed Code Treatment Minutes: 8 Minutes       Carline Steve, PT

## 2021-12-23 NOTE — PLAN OF CARE
Problem: Airway Clearance - Ineffective  Goal: Achieve or maintain patent airway  12/23/2021 0844 by Fahad Ledesma RN  Outcome: Ongoing  12/23/2021 0844 by Fahad Ledesma RN  Outcome: Ongoing  12/22/2021 2244 by Roxy Bingham RN  Outcome: Ongoing     Problem: Gas Exchange - Impaired  Goal: Absence of hypoxia  12/23/2021 0844 by Fahad Ledesma RN  Outcome: Ongoing  12/23/2021 0844 by Fahad Ledesma RN  Outcome: Ongoing  12/22/2021 2244 by Roxy Bingham RN  Outcome: Ongoing  Goal: Promote optimal lung function  12/23/2021 0844 by Fahad Ledesma RN  Outcome: Ongoing  12/23/2021 0844 by Fahad Ledesma RN  Outcome: Ongoing  12/22/2021 2244 by Roxy Bingham RN  Outcome: Ongoing     Problem: Breathing Pattern - Ineffective  Goal: Ability to achieve and maintain a regular respiratory rate  12/23/2021 0844 by Fahad Ledesma RN  Outcome: Ongoing  12/23/2021 0844 by Fahad Ledesma RN  Outcome: Ongoing  12/22/2021 2244 by Roxy Bingham RN  Outcome: Ongoing     Problem:  Body Temperature -  Risk of, Imbalanced  Goal: Ability to maintain a body temperature within defined limits  12/23/2021 0844 by Fahad Ledesma RN  Outcome: Ongoing  12/23/2021 0844 by Fahad Ledesma RN  Outcome: Ongoing  12/22/2021 2244 by Roxy Bingham RN  Outcome: Ongoing  Goal: Will regain or maintain usual level of consciousness  12/23/2021 0844 by Fahad Ledesma RN  Outcome: Ongoing  12/23/2021 0844 by Fahad Ledesma RN  Outcome: Ongoing  12/22/2021 2244 by Roxy Bingham RN  Outcome: Ongoing  Goal: Complications related to the disease process, condition or treatment will be avoided or minimized  12/23/2021 0844 by Fahad Ledesma RN  Outcome: Ongoing  12/23/2021 0844 by Fahad Ledesma RN  Outcome: Ongoing  12/22/2021 2244 by Roxy Bingham RN  Outcome: Ongoing     Problem: Isolation Precautions - Risk of Spread of Infection  Goal: Prevent transmission of infection  12/23/2021 0844 by Fahad Ledesma, RN  Outcome: Ongoing  12/23/2021 0844 by Haider More RN  Outcome: Ongoing  12/22/2021 2244 by Candelaria Martinez RN  Outcome: Ongoing     Problem: Nutrition Deficits  Goal: Optimize nutritional status  12/23/2021 0844 by Haider More RN  Outcome: Ongoing  12/23/2021 0844 by Haider More RN  Outcome: Ongoing  12/22/2021 2244 by Candealria Martinez RN  Outcome: Ongoing     Problem: Risk for Fluid Volume Deficit  Goal: Maintain normal heart rhythm  12/23/2021 0844 by Haider More RN  Outcome: Ongoing  12/23/2021 0844 by Haider More RN  Outcome: Ongoing  12/22/2021 2244 by Candelaria Martinez RN  Outcome: Ongoing  Goal: Maintain absence of muscle cramping  12/23/2021 0844 by Haider More RN  Outcome: Ongoing  12/23/2021 0844 by Haider More RN  Outcome: Ongoing  12/22/2021 2244 by Candelaria Martinez RN  Outcome: Ongoing  Goal: Maintain normal serum potassium, sodium, calcium, phosphorus, and pH  12/23/2021 0844 by Haider More RN  Outcome: Ongoing  12/23/2021 0844 by Haider More RN  Outcome: Ongoing  12/22/2021 2244 by Canedlaria Martinez RN  Outcome: Ongoing     Problem: Loneliness or Risk for Loneliness  Goal: Demonstrate positive use of time alone when socialization is not possible  12/23/2021 0844 by Haider More RN  Outcome: Ongoing  12/23/2021 0844 by Haider More RN  Outcome: Ongoing  12/22/2021 2244 by Candelaria Martinez RN  Outcome: Ongoing     Problem: Fatigue  Goal: Verbalize increase energy and improved vitality  12/23/2021 0844 by Haider More RN  Outcome: Ongoing  12/23/2021 0844 by Haider More RN  Outcome: Ongoing  12/22/2021 2244 by Candelaria Martinez RN  Outcome: Ongoing     Problem: Patient Education: Go to Patient Education Activity  Goal: Patient/Family Education  12/23/2021 0016 by Haider More RN  Outcome: Ongoing  12/23/2021 0844 by Haider More RN  Outcome: Ongoing  12/22/2021 2244 by Candelaria Martinez RN  Outcome: Ongoing     Problem: Skin Integrity:  Goal: Will show no infection signs and symptoms  Description: Will show no infection signs and symptoms  12/23/2021 0844 by Tao Montano RN  Outcome: Ongoing  12/23/2021 0844 by Tao Montano RN  Outcome: Ongoing  12/22/2021 2244 by Génesis Fink RN  Outcome: Ongoing  Goal: Absence of new skin breakdown  Description: Absence of new skin breakdown  12/23/2021 0844 by Tao Montano RN  Outcome: Ongoing  12/23/2021 0844 by Tao Montano RN  Outcome: Ongoing  12/22/2021 2244 by Génesis Fink RN  Outcome: Ongoing     Problem: Falls - Risk of:  Goal: Will remain free from falls  Description: Will remain free from falls  12/23/2021 0844 by Tao Montano RN  Outcome: Ongoing  12/23/2021 0844 by Tao Montano RN  Outcome: Ongoing  12/22/2021 2244 by Génesis Fink RN  Outcome: Ongoing  Goal: Absence of physical injury  Description: Absence of physical injury  12/23/2021 0844 by aTo Montano RN  Outcome: Ongoing  12/23/2021 0844 by Tao Montano RN  Outcome: Ongoing  12/22/2021 2244 by Génesis Fink RN  Outcome: Ongoing     Problem: Nutrition  Goal: Optimal nutrition therapy  12/23/2021 0844 by Tao Montano RN  Outcome: Ongoing  12/23/2021 0844 by Tao Montano RN  Outcome: Ongoing  12/22/2021 2244 by Génesis Fink RN  Outcome: Ongoing     Problem: Pain:  Goal: Pain level will decrease  Description: Pain level will decrease  12/23/2021 0844 by Tao Montano RN  Outcome: Ongoing  12/23/2021 0844 by Tao Montano RN  Outcome: Ongoing  12/22/2021 2244 by Génesis Fink RN  Outcome: Ongoing  Goal: Control of acute pain  Description: Control of acute pain  12/23/2021 0844 by Tao Montano RN  Outcome: Ongoing  12/23/2021 0844 by Tao Montano RN  Outcome: Ongoing  12/22/2021 2244 by Génesis Fink RN  Outcome: Ongoing  Goal: Control of chronic pain  Description: Control of chronic pain  12/23/2021 0844 by Tao Montano RN  Outcome: Ongoing  12/23/2021 0844 by Tao Montano RN  Outcome: Ongoing  12/22/2021 2244 by Devonte Harvey RN  Outcome: Ongoing     Problem: Discharge Planning:  Goal: Discharged to appropriate level of care  Description: Discharged to appropriate level of care  12/23/2021 0844 by Aneta Parsons RN  Outcome: Ongoing  12/23/2021 0844 by Aneta Parsons RN  Outcome: Ongoing  12/22/2021 2244 by Devonte Harvey RN  Outcome: Ongoing

## 2021-12-23 NOTE — PROGRESS NOTES
Hospitalist Progress Note    Patient:  Darlin Springer     YOB: 1945    MRN: [id number]   Admit date: 12/8/2021     Acct: [de-identified]     PCP: Samantha Moeller MD    CC--Interval History: Covid---19--PNA---\"sputtering\" course---was on HHF only last night by report---back on BiPAP this AM    Some extra coarse crackles this AM--mild BLE edema --> additional IV Lasix    COPD underlying Covdi-19 PNA    CHF--see above    CKD---Stage 2---stable    See note below     All other ROS negative except noted in HPI    Diet:  ADULT DIET;  Regular; 4 carb choices (60 gm/meal); 1800 ml    Medications:  Scheduled Meds:   sodium chloride  3 g Oral TID WC    dexamethasone  10 mg IntraVENous Daily    furosemide  20 mg IntraVENous Daily    enoxaparin  40 mg SubCUTAneous BID    albuterol sulfate HFA  2 puff Inhalation 4x daily    aspirin  81 mg Oral Daily    atorvastatin  40 mg Oral Daily    buPROPion  75 mg Oral Daily    clopidogrel  75 mg Oral Daily    levothyroxine  75 mcg Oral Daily    finasteride  5 mg Oral Daily    oxybutynin  15 mg Oral Daily    metoprolol tartrate  50 mg Oral BID    [Held by provider] lisinopril  20 mg Oral Daily    sodium chloride flush  5-40 mL IntraVENous 2 times per day     Continuous Infusions:   sodium chloride 50 mL/hr at 12/23/21 0826    sodium chloride Stopped (12/09/21 1026)     PRN Meds:ondansetron, zolpidem, benzonatate, guaiFENesin-dextromethorphan, albuterol sulfate HFA, sodium chloride flush, sodium chloride, polyethylene glycol, acetaminophen **OR** acetaminophen    Objective:  Labs:  CBC with Differential:    Lab Results   Component Value Date    WBC 11.7 12/23/2021    RBC 3.80 12/23/2021    HGB 11.3 12/23/2021    HCT 36.0 12/23/2021     12/23/2021    MCV 94.7 12/23/2021    MCH 29.7 12/23/2021    MCHC 31.4 12/23/2021    RDW 14.4 12/23/2021    LYMPHOPCT 8 12/23/2021    MONOPCT 8 12/23/2021    BASOPCT 0 12/23/2021    MONOSABS 0.94 12/23/2021    LYMPHSABS 0.98 12/23/2021    EOSABS 0.03 12/23/2021    BASOSABS <0.03 12/23/2021    DIFFTYPE NOT REPORTED 12/23/2021     BMP:    Lab Results   Component Value Date     12/23/2021    K 4.5 12/23/2021     12/23/2021    CO2 26 12/23/2021    BUN 30 12/23/2021    LABALBU 2.7 12/23/2021    CREATININE 0.71 12/23/2021    CALCIUM 8.6 12/23/2021    GFRAA >60 12/23/2021    LABGLOM >60 12/23/2021    GLUCOSE 87 12/23/2021           Physical Exam:  Vitals: /64   Pulse 68   Temp 96.1 °F (35.6 °C) (Tympanic)   Resp 28   Ht 6' (1.829 m)   Wt (!) 311 lb (141.1 kg)   SpO2 90%   BMI 42.18 kg/m²   24 hour intake/output:    Intake/Output Summary (Last 24 hours) at 12/23/2021 1302  Last data filed at 12/23/2021 0843  Gross per 24 hour   Intake 2148.07 ml   Output 900 ml   Net 1248.07 ml     Last 3 weights: Wt Readings from Last 3 Encounters:   12/23/21 (!) 311 lb (141.1 kg)   11/11/21 (!) 319 lb 12.8 oz (145.1 kg)   09/15/21 (!) 321 lb (145.6 kg)     HEENT: BiPAP----, Normocephalic and Atraumatic  Neck: Supple, No Masses, Tenderness, Nodularity and No Lymphadenopathy  Chest/Lungs: coarse crackles----- and Distant Breath Sounds  Cardiac: Regular Rate and Rhythm  GI/Abdomen: Bowel Sounds Present and Soft, Non-tender, without Guarding or Rebound Tenderness  : mullins---clear hunter  EXT/Skin: No Cyanosis, No Clubbing and Edema Present----mild  Neuro: alert---generalized weakness---very [place]---- and No Localizing Signs/Symptoms      Assessment:    Principal Problem:    Pneumonia due to COVID-19 virus  Active Problems:    Ischemic cardiomyopathy    Coronary artery disease involving native coronary artery of native heart    Acute respiratory failure with hypoxia (HCC)    COPD with acute exacerbation (HCC)  Resolved Problems:    * No resolved hospital problems.  *    Cristofer Williamson.  67 WM  [sp DUSTIN Martinez]  DNR-CCA    LOVENOX---PLAVIX    SUPPLEMENTAL OXYGEN----BiPAP  à  HHF and BiPAP nights--rest  COVID-19--POSITIVE---12.8.2021----Moderna---1.25.2021---2.22.2021---no booster  MIDLINE---12.17.2021   MAY    Anti-infectives:  [Rocephin IV, Zithromax IV]---dc'd, Baricitinib, [Decadron]    Covid-19 PNA----12.8.2021---NACs--bilateral airspace disease             CXR----12.23.2021---bilateral airspace disease---NACs            CXR--12.22.2021---NACs            CXR---12.21.2021---NACs            CXR----12.18.2021--NACs--bilateral airspace disease            CXR---12.17.2021---contd bilateral airspace disease            CXR---12.16.2021--diffuse pulmonary infiltrates            CXR----12.15.2021----bilateral infiltrates----NACs    Acute respiratory failure with hypoxia----12.8.2021  COPD exacerbation---due to Covid-19 PNA----12.8.2021  Hyponatremia  COPD----emphysema   ASCVD          CABG--10.28.2014---SVG-OM1---SVG-OM2-----SVG-PDA          Cardiac catheterization---2014  Ischemic cardiomyopathy           2D ECHO---1.28.2021---WMA---LVEF ~ 40%    CHF----chronic combined systolic---diastolic  Chronic PE and BLE DVT  CKD--Stage 2  PVD        Bilateral carotid stenosis  Hypertension  Hyperlipidemia  Hypothyroidism  LBBB  BLE venous insufficiency  Gait-balance instability  Insomnia   Tobacco abuse---quit---10.27.2014  HEARING IMPAIRMENT  PMH:    BLE edema, OA, onychomycosis,   PSH:   see above,  colonoscopy--2012--2009---2008---2003--adenomatous polyp, TURP,              dental extractions, left TKA---2018, right TKA---2015     Allergies----NKDA           Plan:  1. Covid-19 PNA---cont'd Baricitinib--Decadron  2. HHF and BiPAP---wean as tolerated  3. Lasix 20 mg IV----12.23.2021 in addition to daily 20 mg PO  4. Consider LTAC  5.     See orders     Electronically signed by Amado Olivier on 12/23/2021 at 1:02 PM    Hospitalist

## 2021-12-23 NOTE — PLAN OF CARE
Problem: Airway Clearance - Ineffective  Goal: Achieve or maintain patent airway  Outcome: Ongoing     Problem: Gas Exchange - Impaired  Goal: Absence of hypoxia  Outcome: Ongoing  Goal: Promote optimal lung function  Outcome: Ongoing     Problem: Breathing Pattern - Ineffective  Goal: Ability to achieve and maintain a regular respiratory rate  Outcome: Ongoing     Problem:  Body Temperature -  Risk of, Imbalanced  Goal: Ability to maintain a body temperature within defined limits  Outcome: Ongoing  Goal: Will regain or maintain usual level of consciousness  Outcome: Ongoing  Goal: Complications related to the disease process, condition or treatment will be avoided or minimized  Outcome: Ongoing     Problem: Isolation Precautions - Risk of Spread of Infection  Goal: Prevent transmission of infection  Outcome: Ongoing     Problem: Nutrition Deficits  Goal: Optimize nutritional status  Outcome: Ongoing     Problem: Risk for Fluid Volume Deficit  Goal: Maintain normal heart rhythm  Outcome: Ongoing  Goal: Maintain absence of muscle cramping  Outcome: Ongoing  Goal: Maintain normal serum potassium, sodium, calcium, phosphorus, and pH  Outcome: Ongoing     Problem: Loneliness or Risk for Loneliness  Goal: Demonstrate positive use of time alone when socialization is not possible  Outcome: Ongoing     Problem: Fatigue  Goal: Verbalize increase energy and improved vitality  Outcome: Ongoing     Problem: Patient Education: Go to Patient Education Activity  Goal: Patient/Family Education  Outcome: Ongoing     Problem: Skin Integrity:  Goal: Will show no infection signs and symptoms  Description: Will show no infection signs and symptoms  Outcome: Ongoing  Goal: Absence of new skin breakdown  Description: Absence of new skin breakdown  Outcome: Ongoing     Problem: Falls - Risk of:  Goal: Will remain free from falls  Description: Will remain free from falls  Outcome: Ongoing  Goal: Absence of physical injury  Description: Absence of physical injury  Outcome: Ongoing     Problem: Nutrition  Goal: Optimal nutrition therapy  Outcome: Ongoing     Problem: Pain:  Description: Pain management should include both nonpharmacologic and pharmacologic interventions.   Goal: Pain level will decrease  Description: Pain level will decrease  Outcome: Ongoing  Goal: Control of acute pain  Description: Control of acute pain  Outcome: Ongoing  Goal: Control of chronic pain  Description: Control of chronic pain  Outcome: Ongoing     Problem: Discharge Planning:  Goal: Discharged to appropriate level of care  Description: Discharged to appropriate level of care  Outcome: Ongoing

## 2021-12-24 ENCOUNTER — APPOINTMENT (OUTPATIENT)
Dept: GENERAL RADIOLOGY | Age: 76
DRG: 177 | End: 2021-12-24
Payer: MEDICARE

## 2021-12-24 ENCOUNTER — APPOINTMENT (OUTPATIENT)
Dept: CT IMAGING | Age: 76
DRG: 177 | End: 2021-12-24
Payer: MEDICARE

## 2021-12-24 LAB
ABSOLUTE EOS #: 0.06 K/UL (ref 0–0.44)
ABSOLUTE IMMATURE GRANULOCYTE: 0.12 K/UL (ref 0–0.3)
ABSOLUTE LYMPH #: 0.97 K/UL (ref 1.1–3.7)
ABSOLUTE MONO #: 0.82 K/UL (ref 0.1–1.2)
ALBUMIN SERPL-MCNC: 2.7 G/DL (ref 3.5–5.2)
ALBUMIN/GLOBULIN RATIO: 0.9 (ref 1–2.5)
ALP BLD-CCNC: 62 U/L (ref 40–129)
ALT SERPL-CCNC: 15 U/L (ref 5–41)
ANION GAP SERPL CALCULATED.3IONS-SCNC: 10 MMOL/L (ref 9–17)
AST SERPL-CCNC: 13 U/L
BASOPHILS # BLD: 0 % (ref 0–2)
BASOPHILS ABSOLUTE: <0.03 K/UL (ref 0–0.2)
BILIRUB SERPL-MCNC: 0.62 MG/DL (ref 0.3–1.2)
BUN BLDV-MCNC: 28 MG/DL (ref 8–23)
BUN/CREAT BLD: 41 (ref 9–20)
C-REACTIVE PROTEIN: 82.1 MG/L (ref 0–5)
CALCIUM SERPL-MCNC: 8.1 MG/DL (ref 8.6–10.4)
CHLORIDE BLD-SCNC: 102 MMOL/L (ref 98–107)
CO2: 25 MMOL/L (ref 20–31)
CREAT SERPL-MCNC: 0.68 MG/DL (ref 0.7–1.2)
D-DIMER QUANTITATIVE: 1.56 MG/L FEU (ref 0–0.59)
DIFFERENTIAL TYPE: ABNORMAL
EOSINOPHILS RELATIVE PERCENT: 1 % (ref 1–4)
FERRITIN: 954 UG/L (ref 30–400)
GFR AFRICAN AMERICAN: >60 ML/MIN
GFR NON-AFRICAN AMERICAN: >60 ML/MIN
GFR SERPL CREATININE-BSD FRML MDRD: ABNORMAL ML/MIN/{1.73_M2}
GFR SERPL CREATININE-BSD FRML MDRD: ABNORMAL ML/MIN/{1.73_M2}
GLUCOSE BLD-MCNC: 81 MG/DL (ref 70–99)
HCT VFR BLD CALC: 35.9 % (ref 40.7–50.3)
HEMOGLOBIN: 11.2 G/DL (ref 13–17)
IMMATURE GRANULOCYTES: 1 %
LACTATE DEHYDROGENASE: 263 U/L (ref 135–225)
LYMPHOCYTES # BLD: 9 % (ref 24–43)
MCH RBC QN AUTO: 29.9 PG (ref 25.2–33.5)
MCHC RBC AUTO-ENTMCNC: 31.2 G/DL (ref 25.2–33.5)
MCV RBC AUTO: 95.7 FL (ref 82.6–102.9)
MONOCYTES # BLD: 8 % (ref 3–12)
NRBC AUTOMATED: 0 PER 100 WBC
PDW BLD-RTO: 14.6 % (ref 11.8–14.4)
PLATELET # BLD: 278 K/UL (ref 138–453)
PLATELET ESTIMATE: ABNORMAL
PMV BLD AUTO: 9.4 FL (ref 8.1–13.5)
POTASSIUM SERPL-SCNC: 3.9 MMOL/L (ref 3.7–5.3)
RBC # BLD: 3.75 M/UL (ref 4.21–5.77)
RBC # BLD: ABNORMAL 10*6/UL
SEG NEUTROPHILS: 81 % (ref 36–65)
SEGMENTED NEUTROPHILS ABSOLUTE COUNT: 8.93 K/UL (ref 1.5–8.1)
SODIUM BLD-SCNC: 137 MMOL/L (ref 135–144)
TOTAL PROTEIN: 5.6 G/DL (ref 6.4–8.3)
WBC # BLD: 10.9 K/UL (ref 3.5–11.3)
WBC # BLD: ABNORMAL 10*3/UL

## 2021-12-24 PROCEDURE — 94660 CPAP INITIATION&MGMT: CPT

## 2021-12-24 PROCEDURE — 71045 X-RAY EXAM CHEST 1 VIEW: CPT

## 2021-12-24 PROCEDURE — 85379 FIBRIN DEGRADATION QUANT: CPT

## 2021-12-24 PROCEDURE — 2700000000 HC OXYGEN THERAPY PER DAY

## 2021-12-24 PROCEDURE — 82728 ASSAY OF FERRITIN: CPT

## 2021-12-24 PROCEDURE — 94640 AIRWAY INHALATION TREATMENT: CPT

## 2021-12-24 PROCEDURE — 6370000000 HC RX 637 (ALT 250 FOR IP): Performed by: INTERNAL MEDICINE

## 2021-12-24 PROCEDURE — 86140 C-REACTIVE PROTEIN: CPT

## 2021-12-24 PROCEDURE — 2060000000 HC ICU INTERMEDIATE R&B

## 2021-12-24 PROCEDURE — 2580000003 HC RX 258: Performed by: FAMILY MEDICINE

## 2021-12-24 PROCEDURE — 99232 SBSQ HOSP IP/OBS MODERATE 35: CPT | Performed by: FAMILY MEDICINE

## 2021-12-24 PROCEDURE — 6360000002 HC RX W HCPCS: Performed by: NURSE PRACTITIONER

## 2021-12-24 PROCEDURE — 93005 ELECTROCARDIOGRAM TRACING: CPT | Performed by: INTERNAL MEDICINE

## 2021-12-24 PROCEDURE — 94760 N-INVAS EAR/PLS OXIMETRY 1: CPT

## 2021-12-24 PROCEDURE — 80053 COMPREHEN METABOLIC PANEL: CPT

## 2021-12-24 PROCEDURE — 83615 LACTATE (LD) (LDH) ENZYME: CPT

## 2021-12-24 PROCEDURE — 97530 THERAPEUTIC ACTIVITIES: CPT

## 2021-12-24 PROCEDURE — 36415 COLL VENOUS BLD VENIPUNCTURE: CPT

## 2021-12-24 PROCEDURE — 6360000002 HC RX W HCPCS: Performed by: INTERNAL MEDICINE

## 2021-12-24 PROCEDURE — 85025 COMPLETE CBC W/AUTO DIFF WBC: CPT

## 2021-12-24 PROCEDURE — 6370000000 HC RX 637 (ALT 250 FOR IP): Performed by: FAMILY MEDICINE

## 2021-12-24 RX ADMIN — DEXAMETHASONE SODIUM PHOSPHATE 10 MG: 10 INJECTION INTRAMUSCULAR; INTRAVENOUS at 08:29

## 2021-12-24 RX ADMIN — BUPROPION HYDROCHLORIDE 75 MG: 75 TABLET ORAL at 08:33

## 2021-12-24 RX ADMIN — LEVOTHYROXINE SODIUM 75 MCG: 0.07 TABLET ORAL at 06:37

## 2021-12-24 RX ADMIN — ALBUTEROL SULFATE 2 PUFF: 90 AEROSOL, METERED RESPIRATORY (INHALATION) at 19:57

## 2021-12-24 RX ADMIN — FUROSEMIDE 20 MG: 10 INJECTION, SOLUTION INTRAMUSCULAR; INTRAVENOUS at 08:28

## 2021-12-24 RX ADMIN — ALBUTEROL SULFATE 2 PUFF: 90 AEROSOL, METERED RESPIRATORY (INHALATION) at 16:53

## 2021-12-24 RX ADMIN — ALBUTEROL SULFATE 2 PUFF: 90 AEROSOL, METERED RESPIRATORY (INHALATION) at 13:47

## 2021-12-24 RX ADMIN — ATORVASTATIN CALCIUM 40 MG: 40 TABLET, FILM COATED ORAL at 08:27

## 2021-12-24 RX ADMIN — SODIUM CHLORIDE 3 G: 1 TABLET ORAL at 17:19

## 2021-12-24 RX ADMIN — METOPROLOL TARTRATE 50 MG: 50 TABLET, FILM COATED ORAL at 21:54

## 2021-12-24 RX ADMIN — ENOXAPARIN SODIUM 40 MG: 100 INJECTION SUBCUTANEOUS at 21:54

## 2021-12-24 RX ADMIN — SODIUM CHLORIDE 3 G: 1 TABLET ORAL at 08:26

## 2021-12-24 RX ADMIN — ENOXAPARIN SODIUM 40 MG: 100 INJECTION SUBCUTANEOUS at 08:42

## 2021-12-24 RX ADMIN — SODIUM CHLORIDE 3 G: 1 TABLET ORAL at 12:18

## 2021-12-24 RX ADMIN — ASPIRIN 81 MG: 81 TABLET, COATED ORAL at 08:27

## 2021-12-24 RX ADMIN — CLOPIDOGREL BISULFATE 75 MG: 75 TABLET ORAL at 08:28

## 2021-12-24 RX ADMIN — ALBUTEROL SULFATE 2 PUFF: 90 AEROSOL, METERED RESPIRATORY (INHALATION) at 08:44

## 2021-12-24 RX ADMIN — SODIUM CHLORIDE, PRESERVATIVE FREE 10 ML: 5 INJECTION INTRAVENOUS at 21:54

## 2021-12-24 RX ADMIN — SODIUM CHLORIDE, PRESERVATIVE FREE 10 ML: 5 INJECTION INTRAVENOUS at 08:31

## 2021-12-24 RX ADMIN — OXYBUTYNIN CHLORIDE 15 MG: 5 TABLET, EXTENDED RELEASE ORAL at 08:27

## 2021-12-24 RX ADMIN — FINASTERIDE 5 MG: 5 TABLET, FILM COATED ORAL at 08:27

## 2021-12-24 RX ADMIN — METOPROLOL TARTRATE 50 MG: 50 TABLET, FILM COATED ORAL at 08:28

## 2021-12-24 ASSESSMENT — PAIN SCALES - GENERAL
PAINLEVEL_OUTOF10: 0

## 2021-12-24 NOTE — PROGRESS NOTES
Physical Therapy  Facility/Department: [facility]  PROGRESSIVE CARE  Daily Treatment Note  NAME: Almetta Sacks  : 1945  MRN: [id number]    Date of Service: 2021    Discharge Recommendations:  Continue to assess pending progress,ECF with PT        Assessment   Body structures, Functions, Activity limitations: Decreased functional mobility ; Decreased balance;Decreased safe awareness;Decreased ADL status; Decreased endurance;Decreased strength;Decreased posture  Activity Tolerance  Activity Tolerance: Patient limited by endurance;Treatment limited secondary to medical complications (free text)     Patient Diagnosis(es): The primary encounter diagnosis was Pneumonia due to COVID-19 virus. A diagnosis of Hypoxia was also pertinent to this visit. has a past medical history of Adenomatous polyp, Cholecystitis, Chronic venous insufficiency, DVT (deep venous thrombosis) ([facility] [address] Edema, Gout, Hypertension, Onychomycosis, Osteoarthritis, Plantar fasciitis, and Tobacco abuse.   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 Restrictions  Restrictions/Precautions  Restrictions/Precautions: Isolation  Subjective   Subjective  Subjective: Pateint on high flow upon arriving to room. Patient requested to use restroom. Patient has been using bed pan therefore performed bed mobility for functional and hygiene tasks.   Pain Assessment  Pain Assessment: 0-10       Orientation  Orientation  Overall Orientation Status: Within Functional Limits  Cognition      Objective   Bed mobility  Rolling to Left: Minimal assistance;2 Person assistance  Rolling to Right: Minimal assistance;2 Person assistance  Scooting: Minimal assistance  Comment: Performed bed mobility tasks for

## 2021-12-24 NOTE — PLAN OF CARE
My Asthma Action Plan  Name: Carley Hoyt   YOB: 1983  Date: 9/25/2018   My doctor: Mary Kay Patino MD   My clinic: [facility]        My Control Medicine: budesonide-formoterol (SYMBICORT) 80-4.5 MCG/ACT Inhaler  My Rescue Medicine: albuterol (PROAIR HFA/PROVENTIL HFA/VENTOLIN HFA) 108 (90 Base) MCG/ACT inhaler   My Asthma Severity: moderate persistent  Avoid your asthma triggers: pollens and animal dander               GREEN ZONE   Good Control    I feel good    No cough or wheeze    Can work, sleep and play without asthma symptoms       Take your asthma control medicine every day.     1. If exercise triggers your asthma, take your rescue medication    15 minutes before exercise or sports, and    During exercise if you have asthma symptoms  2. Spacer to use with inhaler: If you have a spacer, make sure to use it with your inhaler             YELLOW ZONE Getting Worse  I have ANY of these:    I do not feel good    Cough or wheeze    Chest feels tight    Wake up at night   1. Keep taking your Green Zone medications  2. Start taking your rescue medicine:    every 20 minutes for up to 1 hour. Then every 4 hours for 24-48 hours.  3. If you stay in the Yellow Zone for more than 12-24 hours, contact your doctor.  4. If you do not return to the Green Zone in 12-24 hours or you get worse, start taking your oral steroid medicine if prescribed by your provider.           RED ZONE Medical Alert - Get Help  I have ANY of these:    I feel awful    Medicine is not helping    Breathing getting harder    Trouble walking or talking    Nose opens wide to breathe       1. Take your rescue medicine NOW  2. If your provider has prescribed an oral steroid medicine, start taking it NOW  3. Call your doctor NOW  4. If you are still in the Red Zone after 20 minutes and you have not reached your doctor:    Take your rescue medicine again and    Call 911 or go to the emergency room right away    See your regular  Problem: Airway Clearance - Ineffective  Goal: Achieve or maintain patent airway  12/23/2021 2045 by Jennifer Abreu RN  Outcome: Ongoing  12/23/2021 0844 by Maximo Bro RN  Outcome: Ongoing  12/23/2021 0844 by Maximo Bro RN  Outcome: Ongoing     Problem: Gas Exchange - Impaired  Goal: Absence of hypoxia  12/23/2021 2045 by Jeninfer Abreu RN  Outcome: Ongoing  12/23/2021 0844 by Maximo Bro RN  Outcome: Ongoing  12/23/2021 0844 by Maximo Bro RN  Outcome: Ongoing  Goal: Promote optimal lung function  12/23/2021 2045 by Jennifer Abreu RN  Outcome: Ongoing  12/23/2021 0844 by Maximo Bro RN  Outcome: Ongoing  12/23/2021 0844 by Maximo Bro RN  Outcome: Ongoing     Problem: Breathing Pattern - Ineffective  Goal: Ability to achieve and maintain a regular respiratory rate  12/23/2021 2045 by Jennifer Abreu RN  Outcome: Ongoing  12/23/2021 0844 by Maximo Bro RN  Outcome: Ongoing  12/23/2021 0844 by Maximo Bro RN  Outcome: Ongoing     Problem:  Body Temperature -  Risk of, Imbalanced  Goal: Ability to maintain a body temperature within defined limits  12/23/2021 2045 by Jennifer Abreu RN  Outcome: Ongoing  12/23/2021 0844 by Maximo Bro RN  Outcome: Ongoing  12/23/2021 0844 by Maximo Bro RN  Outcome: Ongoing  Goal: Will regain or maintain usual level of consciousness  12/23/2021 2045 by Jennifer Abreu RN  Outcome: Ongoing  12/23/2021 0844 by Maximo Bro RN  Outcome: Ongoing  12/23/2021 0844 by Maximo Bro RN  Outcome: Ongoing  Goal: Complications related to the disease process, condition or treatment will be avoided or minimized  12/23/2021 2045 by Jennifer Abreu RN  Outcome: Ongoing  12/23/2021 0844 by Maximo Bro RN  Outcome: Ongoing  12/23/2021 0844 by Maximo Bro RN  Outcome: Ongoing     Problem: Isolation Precautions - Risk of Spread of Infection  Goal: Prevent transmission of infection  12/23/2021 2045 by Jennifer Abreu RN  Outcome: Ongoing  12/23/2021 0844 by Maximo Bro, RN  Outcome: Ongoing  12/23/2021 0844 by Hetal Nichols RN  Outcome: Ongoing     Problem: Nutrition Deficits  Goal: Optimize nutritional status  12/23/2021 2045 by Chanel Fonseca RN  Outcome: Ongoing  12/23/2021 0844 by Hetal Nichols RN  Outcome: Ongoing  12/23/2021 0844 by Hetal Nichols RN  Outcome: Ongoing     Problem: Risk for Fluid Volume Deficit  Goal: Maintain normal heart rhythm  12/23/2021 2045 by Chanel Fonseca RN  Outcome: Ongoing  12/23/2021 0844 by Hetal Nichols RN  Outcome: Ongoing  12/23/2021 0844 by Hetal Nichols RN  Outcome: Ongoing  Goal: Maintain absence of muscle cramping  12/23/2021 2045 by Chanel Fonseca RN  Outcome: Ongoing  12/23/2021 0844 by Hetal Nichols RN  Outcome: Ongoing  12/23/2021 0844 by Hetal Nichols RN  Outcome: Ongoing  Goal: Maintain normal serum potassium, sodium, calcium, phosphorus, and pH  12/23/2021 2045 by Chanel Fonseca RN  Outcome: Ongoing  12/23/2021 0844 by Hetal Nichols RN  Outcome: Ongoing  12/23/2021 0844 by Hetal Nichols RN  Outcome: Ongoing     Problem: Loneliness or Risk for Loneliness  Goal: Demonstrate positive use of time alone when socialization is not possible  12/23/2021 2045 by Chanel Fonseca RN  Outcome: Ongoing  12/23/2021 0844 by Hetal Nihcols RN  Outcome: Ongoing  12/23/2021 0844 by Hetal Nichols RN  Outcome: Ongoing     Problem: Fatigue  Goal: Verbalize increase energy and improved vitality  12/23/2021 2045 by Chanel Fonseca RN  Outcome: Ongoing  12/23/2021 0844 by Hetal Nichols RN  Outcome: Ongoing  12/23/2021 0844 by Hetal Nichols RN  Outcome: Ongoing     Problem: Patient Education: Go to Patient Education Activity  Goal: Patient/Family Education  12/23/2021 2045 by Chanel Fonseca RN  Outcome: Ongoing  12/23/2021 0844 by Hetal Nichols RN  Outcome: Ongoing  12/23/2021 0844 by Hetal Nichols RN  Outcome: Ongoing     Problem: Skin Integrity:  Goal: Will show no infection signs and symptoms  Description: Will show no infection signs and doctor within 2 weeks of an Emergency Room or Urgent Care visit for follow-up treatment.          Annual Reminders:  Meet with Asthma Educator,  Flu Shot in the Fall, consider Pneumonia Vaccination for patients with asthma (aged 19 and older).    Pharmacy: [id number] - [address]  AT [facility] [place]                      Asthma Triggers  How To Control Things That Make Your Asthma Worse    Triggers are things that make your asthma worse.  Look at the list below to help you find your triggers and what you can do about them.  You can help prevent asthma flare-ups by staying away from your triggers.      Trigger                                                          What you can do   Cigarette Smoke  Tobacco smoke can make asthma worse. Do not allow smoking in your home, car or around you.  Be sure no one smokes at a child s day care or school.  If you smoke, ask your health care provider for ways to help you quit.  Ask family members to quit too.  Ask your health care provider for a referral to Quit Plan to help you quit smoking, or call 9-981-427-PLAN.     Colds, Flu, Bronchitis  These are common triggers of asthma. Wash your hands often.  Don t touch your eyes, nose or mouth.  Get a flu shot every year.     Dust Mites  These are tiny bugs that live in cloth or carpet. They are too small to see. Wash sheets and blankets in hot water every week.   Encase pillows and mattress in dust mite proof covers.  Avoid having carpet if you can. If you have carpet, vacuum weekly.   Use a dust mask and HEPA vacuum.   Pollen and Outdoor Mold  Some people are allergic to trees, grass, or weed pollen, or molds. Try to keep your windows closed.  Limit time out doors when pollen count is high.   Ask you health care provider about taking medicine during allergy season.     Animal Dander  Some people are allergic to skin flakes, urine or saliva from pets with fur or feathers. Keep pets  symptoms  12/23/2021 2045 by Telma Oropeza RN  Outcome: Ongoing  12/23/2021 0844 by Yari Navarro RN  Outcome: Ongoing  12/23/2021 0844 by Yari Navarro RN  Outcome: Ongoing  Goal: Absence of new skin breakdown  Description: Absence of new skin breakdown  12/23/2021 2045 by Telma Oropeza RN  Outcome: Ongoing  12/23/2021 0844 by Yari Navarro RN  Outcome: Ongoing  12/23/2021 0844 by Yari Navarro RN  Outcome: Ongoing     Problem: Falls - Risk of:  Goal: Will remain free from falls  Description: Will remain free from falls  12/23/2021 2045 by Telma Oropeza RN  Outcome: Ongoing  12/23/2021 0844 by Yari Navarro RN  Outcome: Ongoing  12/23/2021 0844 by Yari Navarro RN  Outcome: Ongoing  Goal: Absence of physical injury  Description: Absence of physical injury  12/23/2021 2045 by Telma Oropeza RN  Outcome: Ongoing  12/23/2021 0844 by Yari Navarro RN  Outcome: Ongoing  12/23/2021 0844 by Yari Navarro RN  Outcome: Ongoing     Problem: Nutrition  Goal: Optimal nutrition therapy  12/23/2021 2045 by Telma Oropeza RN  Outcome: Ongoing  12/23/2021 0844 by Yari Navarro RN  Outcome: Ongoing  12/23/2021 0844 by Yari Navarro RN  Outcome: Ongoing     Problem: Pain:  Description: Pain management should include both nonpharmacologic and pharmacologic interventions.   Goal: Pain level will decrease  Description: Pain level will decrease  12/23/2021 2045 by Telma Oropeza RN  Outcome: Ongoing  12/23/2021 0844 by Yari Navarro RN  Outcome: Ongoing  12/23/2021 0844 by Yari Navarro RN  Outcome: Ongoing  Goal: Control of acute pain  Description: Control of acute pain  12/23/2021 2045 by Telma Oropeza RN  Outcome: Ongoing  12/23/2021 0844 by Yari Navarro RN  Outcome: Ongoing  12/23/2021 0844 by Yari Navarro RN  Outcome: Ongoing  Goal: Control of chronic pain  Description: Control of chronic pain  12/23/2021 2045 by Telma Oropeza RN  Outcome: Ongoing  12/23/2021 0844 by Yari Navarro RN  Outcome: Ongoing  12/23/2021 [id number] with fur or feathers out of your home.    If you can t keep the pet outdoors, then keep the pet out of your bedroom.  Keep the bedroom door closed.  Keep pets off cloth furniture and away from stuffed toys.     Mice, Rats, and Cockroaches  Some people are allergic to the waste from these pests.   Cover food and garbage.  Clean up spills and food crumbs.  Store grease in the refrigerator.   Keep food out of the bedroom.   Indoor Mold  This can be a trigger if your home has high moisture. Fix leaking faucets, pipes, or other sources of water.   Clean moldy surfaces.  Dehumidify basement if it is damp and smelly.   Smoke, Strong Odors, and Sprays  These can reduce air quality. Stay away from strong odors and sprays, such as perfume, powder, hair spray, paints, smoke incense, paint, cleaning products, candles and new carpet.   Exercise or Sports  Some people with asthma have this trigger. Be active!  Ask your doctor about taking medicine before sports or exercise to prevent symptoms.    Warm up for 5-10 minutes before and after sports or exercise.     Other Triggers of Asthma  Cold air:  Cover your nose and mouth with a scarf.  Sometimes laughing or crying can be a trigger.  Some medicines and food can trigger asthma.      by Vee Horan RN  Outcome: Ongoing     Problem: Discharge Planning:  Goal: Discharged to appropriate level of care  Description: Discharged to appropriate level of care  12/23/2021 2045 by Chayo Meier RN  Outcome: Ongoing  12/23/2021 0844 by Vee Horan RN  Outcome: Ongoing  12/23/2021 0844 by Vee Horan, RN  Outcome: Ongoing

## 2021-12-24 NOTE — PROGRESS NOTES
Hospitalist Progress Note  12/24/2021 12:28 PM  Subjective:   Admit Date: 12/8/2021  PCP: Jose Luis Francois MD    Interval History: Patient with covid pneumonia remains on HHF at 61 L slow to progress. Is on Decadron     Diet: ADULT DIET; Regular; 4 carb choices (60 gm/meal); 1800 ml  Medications:   Scheduled Meds:   sodium chloride  3 g Oral TID WC    dexamethasone  10 mg IntraVENous Daily    furosemide  20 mg IntraVENous Daily    enoxaparin  40 mg SubCUTAneous BID    albuterol sulfate HFA  2 puff Inhalation 4x daily    aspirin  81 mg Oral Daily    atorvastatin  40 mg Oral Daily    buPROPion  75 mg Oral Daily    clopidogrel  75 mg Oral Daily    levothyroxine  75 mcg Oral Daily    finasteride  5 mg Oral Daily    oxybutynin  15 mg Oral Daily    metoprolol tartrate  50 mg Oral BID    [Held by provider] lisinopril  20 mg Oral Daily    sodium chloride flush  5-40 mL IntraVENous 2 times per day     Continuous Infusions:   sodium chloride Stopped (12/09/21 1026)     CBC:   Recent Labs     12/22/21  0550 12/23/21  0400 12/24/21  0536   WBC 13.0* 11.7* 10.9   HGB 11.6* 11.3* 11.2*    281 278     BMP:    Recent Labs     12/22/21  0550 12/23/21  0400 12/24/21  0536    138 137   K 4.4 4.5 3.9    104 102   CO2 26 26 25   BUN 30* 30* 28*   CREATININE 0.66* 0.71 0.68*   GLUCOSE 81 87 81     Hepatic:   Recent Labs     12/22/21  0550 12/23/21  0400 12/24/21  0536   AST 15 15 13   ALT 16 16 15   BILITOT 0.58 0.56 0.62   ALKPHOS 62 65 62     Troponin: No results for input(s): TROPONINI in the last 72 hours. BNP: No results for input(s): BNP in the last 72 hours. Lipids: No results for input(s): CHOL, HDL in the last 72 hours. Invalid input(s): LDLCALCU  INR: No results for input(s): INR in the last 72 hours.     Objective:   Vitals: /61   Pulse 70   Temp 98.4 °F (36.9 °C) (Tympanic)   Resp 24   Ht 6' (1.829 m)   Wt (!) 305 lb 4.8 oz (138.5 kg)   SpO2 90%   BMI 41.41 kg/m² General appearance: alert and cooperative with exam  HEENT: Eye: Normal external eye, conjunctiva, lids cornea, EBTTY. Neck: no adenopathy, no carotid bruit, no JVD, supple, symmetrical, trachea midline and thyroid not enlarged, symmetric, no tenderness/mass/nodules  Lungs: clear to auscultation bilaterally  Heart: regular rate and rhythm, S1, S2 normal, no murmur, click, rub or gallop  Abdomen: soft, non-tender; bowel sounds normal; no masses,  no organomegaly  Extremities: edema bilateral,no calf tenderness  Neurologic: Mental status: Alert, oriented, thought content appropriate    Assessment and Plan:   1. Covid 19 pneumonia. 2. HTN  3. Chronic CHF combined. 4. hypothyroidism    Plan:Try to wean down oxygen as possible. monitor daily labs continue supportive care.   Patient Active Problem List:     Hypertension     Osteoarthritis     Pneumonia due to COVID-19 virus     Chronic venous insufficiency     Acquired genu valgum     Degeneration of lumbar intervertebral disc     Lower urinary tract symptoms due to benign prostatic hyperplasia     Presence of right artificial knee joint     Bilateral carotid artery stenosis     Hypothyroidism     Ischemic cardiomyopathy     Ventral hernia     Left bundle branch block (LBBB)     Chronic embolism and thrombosis of deep vein of both distal legs (HCC)     Coronary artery disease involving native coronary artery of native heart     Hyperlipidemia     Class 3 severe obesity due to excess calories with serious comorbidity and body mass index (BMI) of 40.0 to 44.9 in adult [facility])     Chronic combined systolic and diastolic CHF (congestive heart failure) (HCC)     Impaired gait     Emphysema/COPD (HCC)     Acute respiratory failure with hypoxia (HCC)     COPD with acute exacerbation (HCC)      Shaq Brown MD, MD  [facility] Hospitalist

## 2021-12-24 NOTE — PROGRESS NOTES
RN and respiratory to the room, the patient is on BiPAP at max settings. Dr. Rica Peña aware of the patients BiPAP settings at this time, OK to hold CT at this time.

## 2021-12-24 NOTE — PLAN OF CARE
Problem: Airway Clearance - Ineffective  Goal: Achieve or maintain patent airway  12/24/2021 1446 by Alvarado Christian RN  Note: Patient on bipap 24/18 oxygen 100%, productive cough, patient able to clear airway when coughing. Will continue to monitor. Problem: Gas Exchange - Impaired  Goal: Promote optimal lung function  Outcome: Ongoing     Problem: Breathing Pattern - Ineffective  Goal: Ability to achieve and maintain a regular respiratory rate  12/24/2021 1446 by Alvarado Christian RN  Note: Respirations 20-30. Will continue to monitor. 12/24/2021 1302 by Alvarado Christian RN  Outcome: Ongoing  12/24/2021 1249 by Alvarado Christian RN  Note: Patient respirations non-labored, chest rise symmetrical, lung sounds diminished. Will continue to monitor. Problem: Body Temperature -  Risk of, Imbalanced  Goal: Ability to maintain a body temperature within defined limits  12/24/2021 1446 by Alvarado Christian RN  Note: Patient afebrile. Will continue to monitor. Problem: Body Temperature -  Risk of, Imbalanced  Goal: Will regain or maintain usual level of consciousness  Outcome: Ongoing     Problem: Body Temperature -  Risk of, Imbalanced  Goal: Complications related to the disease process, condition or treatment will be avoided or minimized  Outcome: Ongoing     Problem: Body Temperature -  Risk of, Imbalanced  Goal: Will regain or maintain usual level of consciousness  Outcome: Ongoing     Problem: Body Temperature -  Risk of, Imbalanced  Goal: Complications related to the disease process, condition or treatment will be avoided or minimized  Outcome: Ongoing     Problem: Isolation Precautions - Risk of Spread of Infection  Goal: Prevent transmission of infection  12/24/2021 1446 by Alvarado Christian RN  Note: Patient remains in droplet plus precautions.      Problem: Nutrition Deficits  Goal: Optimize nutritional status  12/24/2021 1446 by Alvarado Christian RN  Note: Patient able to feed self, consuming % meals, will continue to monitor. Problem: Loneliness or Risk for Loneliness  Goal: Demonstrate positive use of time alone when socialization is not possible  12/24/2021 1446 by Clemente Zamorano RN  Note: Patient able to visit with wife occasionally, enjoys watching television. Problem: Fatigue  Goal: Verbalize increase energy and improved vitality  12/24/2021 1446 by Clemente Zamorano RN  Note: Patient remains bedbound due to desating with activity. Problem: Skin Integrity:  Goal: Will show no infection signs and symptoms  Description: Will show no infection signs and symptoms  12/24/2021 1446 by Clemente Zamorano RN  Note: No new wounds. Will continue to monitor. Problem: Falls - Risk of:  Goal: Will remain free from falls  Description: Will remain free from falls  12/24/2021 1446 by Clemente Zamorano RN  Note:  Patient has remained free from falls. Bed locked, alarm on, low position, side rails x2. Problem: Pain:  Goal: Pain level will decrease  Description: Pain level will decrease  12/24/2021 1446 by Clemente Zamorano RN  Note: Patient denies pain. Will continue to monitor. Problem: Discharge Planning:  Goal: Discharged to appropriate level of care  Description: Discharged to appropriate level of care  12/24/2021 1446 by Clemente Zamorano RN  Note: Discharge planner and  on case.

## 2021-12-25 LAB
ABSOLUTE EOS #: 0.07 K/UL (ref 0–0.44)
ABSOLUTE IMMATURE GRANULOCYTE: 0.12 K/UL (ref 0–0.3)
ABSOLUTE LYMPH #: 0.91 K/UL (ref 1.1–3.7)
ABSOLUTE MONO #: 0.97 K/UL (ref 0.1–1.2)
ANION GAP SERPL CALCULATED.3IONS-SCNC: 12 MMOL/L (ref 9–17)
BASOPHILS # BLD: 0 % (ref 0–2)
BASOPHILS ABSOLUTE: <0.03 K/UL (ref 0–0.2)
BUN BLDV-MCNC: 30 MG/DL (ref 8–23)
BUN/CREAT BLD: 40 (ref 9–20)
C-REACTIVE PROTEIN: 86.7 MG/L (ref 0–5)
CALCIUM SERPL-MCNC: 9.1 MG/DL (ref 8.6–10.4)
CHLORIDE BLD-SCNC: 101 MMOL/L (ref 98–107)
CO2: 26 MMOL/L (ref 20–31)
CREAT SERPL-MCNC: 0.75 MG/DL (ref 0.7–1.2)
D-DIMER QUANTITATIVE: 1.37 MG/L FEU (ref 0–0.59)
DIFFERENTIAL TYPE: ABNORMAL
EOSINOPHILS RELATIVE PERCENT: 1 % (ref 1–4)
FERRITIN: 1112 UG/L (ref 30–400)
GFR AFRICAN AMERICAN: >60 ML/MIN
GFR NON-AFRICAN AMERICAN: >60 ML/MIN
GFR SERPL CREATININE-BSD FRML MDRD: ABNORMAL ML/MIN/{1.73_M2}
GFR SERPL CREATININE-BSD FRML MDRD: ABNORMAL ML/MIN/{1.73_M2}
GLUCOSE BLD-MCNC: 87 MG/DL (ref 70–99)
HCT VFR BLD CALC: 39.7 % (ref 40.7–50.3)
HEMOGLOBIN: 12.3 G/DL (ref 13–17)
IMMATURE GRANULOCYTES: 1 %
LACTATE DEHYDROGENASE: 304 U/L (ref 135–225)
LYMPHOCYTES # BLD: 6 % (ref 24–43)
MCH RBC QN AUTO: 29.7 PG (ref 25.2–33.5)
MCHC RBC AUTO-ENTMCNC: 31 G/DL (ref 25.2–33.5)
MCV RBC AUTO: 95.9 FL (ref 82.6–102.9)
MONOCYTES # BLD: 7 % (ref 3–12)
NRBC AUTOMATED: 0 PER 100 WBC
PDW BLD-RTO: 14.6 % (ref 11.8–14.4)
PLATELET # BLD: 281 K/UL (ref 138–453)
PLATELET ESTIMATE: ABNORMAL
PMV BLD AUTO: 9.7 FL (ref 8.1–13.5)
POTASSIUM SERPL-SCNC: 4.4 MMOL/L (ref 3.7–5.3)
RBC # BLD: 4.14 M/UL (ref 4.21–5.77)
RBC # BLD: ABNORMAL 10*6/UL
SEG NEUTROPHILS: 85 % (ref 36–65)
SEGMENTED NEUTROPHILS ABSOLUTE COUNT: 12.35 K/UL (ref 1.5–8.1)
SODIUM BLD-SCNC: 139 MMOL/L (ref 135–144)
WBC # BLD: 14.4 K/UL (ref 3.5–11.3)
WBC # BLD: ABNORMAL 10*3/UL

## 2021-12-25 PROCEDURE — 80048 BASIC METABOLIC PNL TOTAL CA: CPT

## 2021-12-25 PROCEDURE — 86140 C-REACTIVE PROTEIN: CPT

## 2021-12-25 PROCEDURE — 6370000000 HC RX 637 (ALT 250 FOR IP): Performed by: FAMILY MEDICINE

## 2021-12-25 PROCEDURE — 82728 ASSAY OF FERRITIN: CPT

## 2021-12-25 PROCEDURE — 99232 SBSQ HOSP IP/OBS MODERATE 35: CPT | Performed by: FAMILY MEDICINE

## 2021-12-25 PROCEDURE — 6360000002 HC RX W HCPCS: Performed by: INTERNAL MEDICINE

## 2021-12-25 PROCEDURE — 2060000000 HC ICU INTERMEDIATE R&B

## 2021-12-25 PROCEDURE — 36415 COLL VENOUS BLD VENIPUNCTURE: CPT

## 2021-12-25 PROCEDURE — 85025 COMPLETE CBC W/AUTO DIFF WBC: CPT

## 2021-12-25 PROCEDURE — 2580000003 HC RX 258: Performed by: FAMILY MEDICINE

## 2021-12-25 PROCEDURE — 94761 N-INVAS EAR/PLS OXIMETRY MLT: CPT

## 2021-12-25 PROCEDURE — 6370000000 HC RX 637 (ALT 250 FOR IP): Performed by: INTERNAL MEDICINE

## 2021-12-25 PROCEDURE — 83615 LACTATE (LD) (LDH) ENZYME: CPT

## 2021-12-25 PROCEDURE — 94640 AIRWAY INHALATION TREATMENT: CPT

## 2021-12-25 PROCEDURE — 85379 FIBRIN DEGRADATION QUANT: CPT

## 2021-12-25 PROCEDURE — 6360000002 HC RX W HCPCS: Performed by: NURSE PRACTITIONER

## 2021-12-25 PROCEDURE — 2700000000 HC OXYGEN THERAPY PER DAY

## 2021-12-25 PROCEDURE — 6370000000 HC RX 637 (ALT 250 FOR IP): Performed by: NURSE PRACTITIONER

## 2021-12-25 PROCEDURE — 94660 CPAP INITIATION&MGMT: CPT

## 2021-12-25 RX ADMIN — ALBUTEROL SULFATE 2 PUFF: 90 AEROSOL, METERED RESPIRATORY (INHALATION) at 07:47

## 2021-12-25 RX ADMIN — FUROSEMIDE 20 MG: 10 INJECTION, SOLUTION INTRAMUSCULAR; INTRAVENOUS at 09:32

## 2021-12-25 RX ADMIN — ASPIRIN 81 MG: 81 TABLET, COATED ORAL at 09:32

## 2021-12-25 RX ADMIN — SODIUM CHLORIDE, PRESERVATIVE FREE 10 ML: 5 INJECTION INTRAVENOUS at 09:33

## 2021-12-25 RX ADMIN — ZOLPIDEM TARTRATE 5 MG: 5 TABLET ORAL at 21:18

## 2021-12-25 RX ADMIN — SODIUM CHLORIDE 3 G: 1 TABLET ORAL at 17:56

## 2021-12-25 RX ADMIN — ENOXAPARIN SODIUM 40 MG: 100 INJECTION SUBCUTANEOUS at 09:33

## 2021-12-25 RX ADMIN — OXYBUTYNIN CHLORIDE 15 MG: 5 TABLET, EXTENDED RELEASE ORAL at 09:32

## 2021-12-25 RX ADMIN — FINASTERIDE 5 MG: 5 TABLET, FILM COATED ORAL at 09:32

## 2021-12-25 RX ADMIN — SODIUM CHLORIDE 3 G: 1 TABLET ORAL at 09:32

## 2021-12-25 RX ADMIN — CLOPIDOGREL BISULFATE 75 MG: 75 TABLET ORAL at 09:32

## 2021-12-25 RX ADMIN — ALBUTEROL SULFATE 2 PUFF: 90 AEROSOL, METERED RESPIRATORY (INHALATION) at 12:16

## 2021-12-25 RX ADMIN — ALBUTEROL SULFATE 2 PUFF: 90 AEROSOL, METERED RESPIRATORY (INHALATION) at 20:17

## 2021-12-25 RX ADMIN — ENOXAPARIN SODIUM 40 MG: 100 INJECTION SUBCUTANEOUS at 21:17

## 2021-12-25 RX ADMIN — METOPROLOL TARTRATE 50 MG: 50 TABLET, FILM COATED ORAL at 21:18

## 2021-12-25 RX ADMIN — DEXAMETHASONE SODIUM PHOSPHATE 10 MG: 10 INJECTION INTRAMUSCULAR; INTRAVENOUS at 09:32

## 2021-12-25 RX ADMIN — LEVOTHYROXINE SODIUM 75 MCG: 0.07 TABLET ORAL at 06:28

## 2021-12-25 RX ADMIN — ALBUTEROL SULFATE 2 PUFF: 90 AEROSOL, METERED RESPIRATORY (INHALATION) at 15:27

## 2021-12-25 RX ADMIN — ATORVASTATIN CALCIUM 40 MG: 40 TABLET, FILM COATED ORAL at 09:32

## 2021-12-25 RX ADMIN — METOPROLOL TARTRATE 50 MG: 50 TABLET, FILM COATED ORAL at 09:32

## 2021-12-25 RX ADMIN — SODIUM CHLORIDE, PRESERVATIVE FREE 10 ML: 5 INJECTION INTRAVENOUS at 21:18

## 2021-12-25 RX ADMIN — BUPROPION HYDROCHLORIDE 75 MG: 75 TABLET ORAL at 12:33

## 2021-12-25 RX ADMIN — SODIUM CHLORIDE 3 G: 1 TABLET ORAL at 12:32

## 2021-12-25 ASSESSMENT — PAIN SCALES - GENERAL
PAINLEVEL_OUTOF10: 0

## 2021-12-25 NOTE — PROGRESS NOTES
Physical Therapy    Patient is on high flow oxygen. Patient asleep upon arrival to room. Patient did not want to participate in therapy. Will re-visit room tomorrow 12/26/21.        Susi Nunez PTA

## 2021-12-25 NOTE — PLAN OF CARE
Problem: Airway Clearance - Ineffective  Goal: Achieve or maintain patent airway  Outcome: Ongoing     Problem: Gas Exchange - Impaired  Goal: Absence of hypoxia  Outcome: Ongoing  Note: bipap and Heated High Flow nasal cannula; plan to keep weaning oxygen as tolerates  Goal: Promote optimal lung function  Outcome: Ongoing     Problem: Breathing Pattern - Ineffective  Goal: Ability to achieve and maintain a regular respiratory rate  Outcome: Ongoing     Problem:  Body Temperature -  Risk of, Imbalanced  Goal: Ability to maintain a body temperature within defined limits  Outcome: Ongoing  Note: Afebrile at this time  Goal: Will regain or maintain usual level of consciousness  Outcome: Ongoing  Goal: Complications related to the disease process, condition or treatment will be avoided or minimized  Outcome: Ongoing     Problem: Isolation Precautions - Risk of Spread of Infection  Goal: Prevent transmission of infection  Outcome: Ongoing     Problem: Nutrition Deficits  Goal: Optimize nutritional status  Outcome: Ongoing  Note: Eating about 50% of meals     Problem: Risk for Fluid Volume Deficit  Goal: Maintain normal heart rhythm  Outcome: Ongoing  Goal: Maintain absence of muscle cramping  Outcome: Ongoing  Goal: Maintain normal serum potassium, sodium, calcium, phosphorus, and pH  Outcome: Ongoing  Note: IV fluids at this time     Problem: Loneliness or Risk for Loneliness  Goal: Demonstrate positive use of time alone when socialization is not possible  Outcome: Ongoing     Problem: Fatigue  Goal: Verbalize increase energy and improved vitality  Outcome: Ongoing  Note: Tires easily with minimal exertions     Problem: Patient Education: Go to Patient Education Activity  Goal: Patient/Family Education  Outcome: Ongoing     Problem: Skin Integrity:  Goal: Will show no infection signs and symptoms  Description: Will show no infection signs and symptoms  Outcome: Ongoing  Goal: Absence of new skin breakdown  Description: Absence of new skin breakdown  Outcome: Ongoing     Problem: Falls - Risk of:  Goal: Will remain free from falls  Description: Will remain free from falls  Outcome: Ongoing  Note: Remains free from falls at this time  Goal: Absence of physical injury  Description: Absence of physical injury  Outcome: Ongoing     Problem: Nutrition  Goal: Optimal nutrition therapy  Outcome: Ongoing     Problem: Pain:  Goal: Pain level will decrease  Description: Pain level will decrease  Outcome: Ongoing  Note: no c/o pain at this time  Goal: Control of acute pain  Description: Control of acute pain  Outcome: Ongoing  Goal: Control of chronic pain  Description: Control of chronic pain  Outcome: Ongoing     Problem: Discharge Planning:  Goal: Discharged to appropriate level of care  Description: Discharged to appropriate level of care  Outcome: Ongoing

## 2021-12-25 NOTE — PROGRESS NOTES
Hospitalist Progress Note  12/25/2021 12:03 PM  Subjective:   Admit Date: 12/8/2021  PCP: Jennifer Foster MD    Interval History: Patient with covid pneumonia slow progress is slighlty improved with HHF oxygen down to 55 L attempted repeat CT chest yesterday but patient was not stable for transfer down to procedure so was cancelle. ,D dimer is trending downs is on Decadron. wbc up a little today. Diet: ADULT DIET; Regular; 4 carb choices (60 gm/meal); 1800 ml  Medications:   Scheduled Meds:   sodium chloride  3 g Oral TID WC    dexamethasone  10 mg IntraVENous Daily    furosemide  20 mg IntraVENous Daily    enoxaparin  40 mg SubCUTAneous BID    albuterol sulfate HFA  2 puff Inhalation 4x daily    aspirin  81 mg Oral Daily    atorvastatin  40 mg Oral Daily    buPROPion  75 mg Oral Daily    clopidogrel  75 mg Oral Daily    levothyroxine  75 mcg Oral Daily    finasteride  5 mg Oral Daily    oxybutynin  15 mg Oral Daily    metoprolol tartrate  50 mg Oral BID    [Held by provider] lisinopril  20 mg Oral Daily    sodium chloride flush  5-40 mL IntraVENous 2 times per day     Continuous Infusions:   sodium chloride Stopped (12/09/21 1026)     CBC:   Recent Labs     12/23/21  0400 12/24/21  0536 12/25/21  0615   WBC 11.7* 10.9 14.4*   HGB 11.3* 11.2* 12.3*    278 281     BMP:    Recent Labs     12/23/21  0400 12/24/21  0536 12/25/21  0615    137 139   K 4.5 3.9 4.4    102 101   CO2 26 25 26   BUN 30* 28* 30*   CREATININE 0.71 0.68* 0.75   GLUCOSE 87 81 87     Hepatic:   Recent Labs     12/23/21  0400 12/24/21  0536   AST 15 13   ALT 16 15   BILITOT 0.56 0.62   ALKPHOS 65 62     Troponin: No results for input(s): TROPONINI in the last 72 hours. BNP: No results for input(s): BNP in the last 72 hours. Lipids: No results for input(s): CHOL, HDL in the last 72 hours. Invalid input(s): LDLCALCU  INR: No results for input(s): INR in the last 72 hours.     Objective:   Vitals: /61 Pulse 63   Temp 97.8 °F (36.6 °C) (Tympanic)   Resp (!) 38   Ht 6' (1.829 m)   Wt (!) 305 lb 4.8 oz (138.5 kg)   SpO2 96%   BMI 41.41 kg/m²   General appearance: alert and cooperative with exam  HEENT: Eye: Normal external eye, conjunctiva, lids cornea, BETTY. Neck: no adenopathy, no carotid bruit, no JVD, supple, symmetrical, trachea midline and thyroid not enlarged, symmetric, no tenderness/mass/nodules  Lungs: clear to auscultation bilaterally  Heart: regular rate and rhythm, S1, S2 normal, no murmur, click, rub or gallop  Abdomen: soft, non-tender; bowel sounds normal; no masses,  no organomegaly  Extremities: edema bilateral,no calf tenderness  Neurologic: Mental status: Alert, oriented, thought content appropriate    Assessment and Plan:   1. Covid 19 pneumonia. 2. HTN  3. Chronic CHF combined. 4. hypothyroidism    Plan:Try to wean down oxygen as possible. monitor daily labs continue supportive care. CXR in am.  Patient Active Problem List:     Hypertension     Osteoarthritis     Pneumonia due to COVID-19 virus     Chronic venous insufficiency     Acquired genu valgum     Degeneration of lumbar intervertebral disc     Lower urinary tract symptoms due to benign prostatic hyperplasia     Presence of right artificial knee joint     Bilateral carotid artery stenosis     Hypothyroidism     Ischemic cardiomyopathy     Ventral hernia     Left bundle branch block (LBBB)     Chronic embolism and thrombosis of deep vein of both distal legs (HCC)     Coronary artery disease involving native coronary artery of native heart     Hyperlipidemia     Class 3 severe obesity due to excess calories with serious comorbidity and body mass index (BMI) of 40.0 to 44.9 in adult [facility])     Chronic combined systolic and diastolic CHF (congestive heart failure) (HCC)     Impaired gait     Emphysema/COPD (HCC)     Acute respiratory failure with hypoxia (HCC)     COPD with acute exacerbation (HCC)      Stacy Lynn MD, MD  [facility]

## 2021-12-25 NOTE — PLAN OF CARE
Problem: Airway Clearance - Ineffective  Goal: Achieve or maintain patent airway  12/25/2021 1523 by Eilene Fothergill, RN  Outcome: Ongoing  12/25/2021 1136 by Eilene Fothergill, RN  Outcome: Ongoing     Problem: Gas Exchange - Impaired  Goal: Absence of hypoxia  12/25/2021 1523 by Eilene Fothergill, RN  Outcome: Ongoing  Note: Tolerating heated high flow at this time  12/25/2021 1136 by Eilene Fothergill, RN  Outcome: Ongoing  Note: Oxygen at 2L/min per home use  Goal: Promote optimal lung function  12/25/2021 1523 by Eilene Fothergill, RN  Outcome: Ongoing  12/25/2021 1136 by Eilene Fothergill, RN  Outcome: Ongoing     Problem: Breathing Pattern - Ineffective  Goal: Ability to achieve and maintain a regular respiratory rate  12/25/2021 1523 by Eilene Fothergill, RN  Outcome: Ongoing  12/25/2021 1136 by Eilene Fothergill, RN  Outcome: Ongoing     Problem:  Body Temperature -  Risk of, Imbalanced  Goal: Ability to maintain a body temperature within defined limits  12/25/2021 1523 by Eilene Fothergill, RN  Outcome: Ongoing  Note: Afebrile at this time  12/25/2021 1136 by Eilene Fothergill, RN  Outcome: Ongoing  Note: Afebrile at this time  Goal: Will regain or maintain usual level of consciousness  12/25/2021 1523 by Eilene Fothergill, RN  Outcome: Ongoing  12/25/2021 1136 by Eilene Fothergill, RN  Outcome: Ongoing  Goal: Complications related to the disease process, condition or treatment will be avoided or minimized  12/25/2021 1523 by Eilene Fothergill, RN  Outcome: Ongoing  12/25/2021 1136 by Eilene Fothergill, RN  Outcome: Ongoing     Problem: Isolation Precautions - Risk of Spread of Infection  Goal: Prevent transmission of infection  12/25/2021 1523 by Eilene Fothergill, RN  Outcome: Ongoing  12/25/2021 1136 by Eilene Fothergill, RN  Outcome: Ongoing     Problem: Nutrition Deficits  Goal: Optimize nutritional status  12/25/2021 1523 by Eilene Fothergill, RN  Outcome: Ongoing  12/25/2021 1136 by Marva Rainey Rochelle Cochran RN  Outcome: Ongoing  Note: Eating almost 100% of meals     Problem: Risk for Fluid Volume Deficit  Goal: Maintain normal heart rhythm  12/25/2021 1523 by Ti Heath RN  Outcome: Ongoing  12/25/2021 1136 by Ti Heath RN  Outcome: Ongoing  Goal: Maintain absence of muscle cramping  12/25/2021 1523 by Ti Heath, RN  Outcome: Ongoing  12/25/2021 1136 by Ti Heath RN  Outcome: Ongoing  Goal: Maintain normal serum potassium, sodium, calcium, phosphorus, and pH  12/25/2021 1523 by Ti Heath RN  Outcome: Ongoing  12/25/2021 1136 by Ti Heath RN  Outcome: Ongoing  Note: IV fluids at this time     Problem: Loneliness or Risk for Loneliness  Goal: Demonstrate positive use of time alone when socialization is not possible  12/25/2021 1523 by Ti Heath RN  Outcome: Ongoing  12/25/2021 1136 by Ti Heath RN  Outcome: Ongoing     Problem: Fatigue  Goal: Verbalize increase energy and improved vitality  12/25/2021 1523 by Ti Haeth RN  Outcome: Ongoing  12/25/2021 1136 by Ti Heath RN  Outcome: Ongoing  Note: Tires easily with minimal exertions     Problem: Patient Education: Go to Patient Education Activity  Goal: Patient/Family Education  12/25/2021 1523 by Ti Heath RN  Outcome: Ongoing  12/25/2021 1136 by Ti Heath RN  Outcome: Ongoing     Problem: Skin Integrity:  Goal: Will show no infection signs and symptoms  Description: Will show no infection signs and symptoms  12/25/2021 1523 by Ti Heath RN  Outcome: Ongoing  12/25/2021 1136 by Ti Heath RN  Outcome: Ongoing  Goal: Absence of new skin breakdown  Description: Absence of new skin breakdown  12/25/2021 1523 by Ti Heath RN  Outcome: Ongoing  12/25/2021 1136 by Ti Heath RN  Outcome: Ongoing     Problem: Falls - Risk of:  Goal: Will remain free from falls  Description: Will remain free from falls  12/25/2021 1523 by Johana Lind Ekaterina León RN  Outcome: Ongoing  Note: Remains free from falls at this time  12/25/2021 1136 by Joyce Ferrari RN  Outcome: Ongoing  Note: Remains free from falls at this time  Goal: Absence of physical injury  Description: Absence of physical injury  12/25/2021 1523 by Joyce Ferrari RN  Outcome: Ongoing  12/25/2021 1136 by Joyce Ferrari RN  Outcome: Ongoing     Problem: Nutrition  Goal: Optimal nutrition therapy  12/25/2021 1523 by Joyce Ferrari RN  Outcome: Ongoing  Note: Trying to give pt options of food in order to eat  12/25/2021 1136 by Joyce Ferrari RN  Outcome: Ongoing     Problem: Pain:  Goal: Pain level will decrease  Description: Pain level will decrease  12/25/2021 1523 by Joyce Ferrari RN  Outcome: Ongoing  Note: No c/o pain  12/25/2021 1136 by Joyce Ferrari RN  Outcome: Ongoing  Note: PRN tylenol given for c/o pain #6  Goal: Control of acute pain  Description: Control of acute pain  12/25/2021 1523 by Joyce Ferrari RN  Outcome: Ongoing  12/25/2021 1136 by Joyce Ferrari RN  Outcome: Ongoing  Goal: Control of chronic pain  Description: Control of chronic pain  12/25/2021 1523 by Joyce Ferrari RN  Outcome: Ongoing  12/25/2021 1136 by Joyce Ferrari RN  Outcome: Ongoing     Problem: Discharge Planning:  Goal: Discharged to appropriate level of care  Description: Discharged to appropriate level of care  12/25/2021 1523 by Joyce Ferrari RN  Outcome: Ongoing  12/25/2021 1136 by Joyce Ferrari RN  Outcome: Ongoing

## 2021-12-26 ENCOUNTER — APPOINTMENT (OUTPATIENT)
Dept: GENERAL RADIOLOGY | Age: 76
DRG: 177 | End: 2021-12-26
Payer: MEDICARE

## 2021-12-26 LAB
ABSOLUTE EOS #: 0.08 K/UL (ref 0–0.44)
ABSOLUTE IMMATURE GRANULOCYTE: 0.07 K/UL (ref 0–0.3)
ABSOLUTE LYMPH #: 0.92 K/UL (ref 1.1–3.7)
ABSOLUTE MONO #: 1 K/UL (ref 0.1–1.2)
ANION GAP SERPL CALCULATED.3IONS-SCNC: 10 MMOL/L (ref 9–17)
BASOPHILS # BLD: 0 % (ref 0–2)
BASOPHILS ABSOLUTE: <0.03 K/UL (ref 0–0.2)
BUN BLDV-MCNC: 33 MG/DL (ref 8–23)
BUN/CREAT BLD: 46 (ref 9–20)
C-REACTIVE PROTEIN: 90.3 MG/L (ref 0–5)
CALCIUM SERPL-MCNC: 9.4 MG/DL (ref 8.6–10.4)
CHLORIDE BLD-SCNC: 102 MMOL/L (ref 98–107)
CO2: 28 MMOL/L (ref 20–31)
CREAT SERPL-MCNC: 0.72 MG/DL (ref 0.7–1.2)
D-DIMER QUANTITATIVE: 1.54 MG/L FEU (ref 0–0.59)
DIFFERENTIAL TYPE: ABNORMAL
EKG ATRIAL RATE: 87 BPM
EKG P AXIS: 4 DEGREES
EKG P-R INTERVAL: 192 MS
EKG Q-T INTERVAL: 406 MS
EKG QRS DURATION: 150 MS
EKG QTC CALCULATION (BAZETT): 488 MS
EKG R AXIS: -32 DEGREES
EKG T AXIS: -164 DEGREES
EKG VENTRICULAR RATE: 87 BPM
EOSINOPHILS RELATIVE PERCENT: 1 % (ref 1–4)
FERRITIN: 1232 UG/L (ref 30–400)
GFR AFRICAN AMERICAN: >60 ML/MIN
GFR NON-AFRICAN AMERICAN: >60 ML/MIN
GFR SERPL CREATININE-BSD FRML MDRD: ABNORMAL ML/MIN/{1.73_M2}
GFR SERPL CREATININE-BSD FRML MDRD: ABNORMAL ML/MIN/{1.73_M2}
GLUCOSE BLD-MCNC: 90 MG/DL (ref 70–99)
HCT VFR BLD CALC: 39.5 % (ref 40.7–50.3)
HEMOGLOBIN: 12.3 G/DL (ref 13–17)
IMMATURE GRANULOCYTES: 1 %
LACTATE DEHYDROGENASE: 380 U/L (ref 135–225)
LYMPHOCYTES # BLD: 6 % (ref 24–43)
MCH RBC QN AUTO: 29.9 PG (ref 25.2–33.5)
MCHC RBC AUTO-ENTMCNC: 31.1 G/DL (ref 25.2–33.5)
MCV RBC AUTO: 96.1 FL (ref 82.6–102.9)
MONOCYTES # BLD: 7 % (ref 3–12)
NRBC AUTOMATED: 0 PER 100 WBC
PDW BLD-RTO: 14.6 % (ref 11.8–14.4)
PLATELET # BLD: 254 K/UL (ref 138–453)
PLATELET ESTIMATE: ABNORMAL
PMV BLD AUTO: 9.4 FL (ref 8.1–13.5)
POTASSIUM SERPL-SCNC: 4.8 MMOL/L (ref 3.7–5.3)
RBC # BLD: 4.11 M/UL (ref 4.21–5.77)
RBC # BLD: ABNORMAL 10*6/UL
SEG NEUTROPHILS: 85 % (ref 36–65)
SEGMENTED NEUTROPHILS ABSOLUTE COUNT: 13.03 K/UL (ref 1.5–8.1)
SODIUM BLD-SCNC: 140 MMOL/L (ref 135–144)
WBC # BLD: 15.1 K/UL (ref 3.5–11.3)
WBC # BLD: ABNORMAL 10*3/UL

## 2021-12-26 PROCEDURE — 94761 N-INVAS EAR/PLS OXIMETRY MLT: CPT

## 2021-12-26 PROCEDURE — 2060000000 HC ICU INTERMEDIATE R&B

## 2021-12-26 PROCEDURE — 94640 AIRWAY INHALATION TREATMENT: CPT

## 2021-12-26 PROCEDURE — 2700000000 HC OXYGEN THERAPY PER DAY

## 2021-12-26 PROCEDURE — 83615 LACTATE (LD) (LDH) ENZYME: CPT

## 2021-12-26 PROCEDURE — 6370000000 HC RX 637 (ALT 250 FOR IP): Performed by: INTERNAL MEDICINE

## 2021-12-26 PROCEDURE — 85025 COMPLETE CBC W/AUTO DIFF WBC: CPT

## 2021-12-26 PROCEDURE — 6360000002 HC RX W HCPCS: Performed by: NURSE PRACTITIONER

## 2021-12-26 PROCEDURE — 2580000003 HC RX 258: Performed by: FAMILY MEDICINE

## 2021-12-26 PROCEDURE — 99232 SBSQ HOSP IP/OBS MODERATE 35: CPT | Performed by: FAMILY MEDICINE

## 2021-12-26 PROCEDURE — 82728 ASSAY OF FERRITIN: CPT

## 2021-12-26 PROCEDURE — 6370000000 HC RX 637 (ALT 250 FOR IP): Performed by: FAMILY MEDICINE

## 2021-12-26 PROCEDURE — 80048 BASIC METABOLIC PNL TOTAL CA: CPT

## 2021-12-26 PROCEDURE — 6360000002 HC RX W HCPCS: Performed by: FAMILY MEDICINE

## 2021-12-26 PROCEDURE — 85379 FIBRIN DEGRADATION QUANT: CPT

## 2021-12-26 PROCEDURE — 97110 THERAPEUTIC EXERCISES: CPT

## 2021-12-26 PROCEDURE — 6360000002 HC RX W HCPCS: Performed by: INTERNAL MEDICINE

## 2021-12-26 PROCEDURE — 86140 C-REACTIVE PROTEIN: CPT

## 2021-12-26 PROCEDURE — 94660 CPAP INITIATION&MGMT: CPT

## 2021-12-26 PROCEDURE — 71045 X-RAY EXAM CHEST 1 VIEW: CPT

## 2021-12-26 RX ORDER — HYDROCORTISONE ACETATE 25 MG/1
25 SUPPOSITORY RECTAL 2 TIMES DAILY
Status: DISCONTINUED | OUTPATIENT
Start: 2021-12-26 | End: 2022-01-02

## 2021-12-26 RX ORDER — SODIUM CHLORIDE 1000 MG
3 TABLET, SOLUBLE MISCELLANEOUS 2 TIMES DAILY WITH MEALS
Status: DISCONTINUED | OUTPATIENT
Start: 2021-12-27 | End: 2022-01-08 | Stop reason: HOSPADM

## 2021-12-26 RX ADMIN — LEVOTHYROXINE SODIUM 75 MCG: 0.07 TABLET ORAL at 06:23

## 2021-12-26 RX ADMIN — PIPERACILLIN SODIUM AND TAZOBACTAM SODIUM 3375 MG: 3; .375 INJECTION, POWDER, LYOPHILIZED, FOR SOLUTION INTRAVENOUS at 20:54

## 2021-12-26 RX ADMIN — PIPERACILLIN SODIUM AND TAZOBACTAM SODIUM 3375 MG: 3; .375 INJECTION, POWDER, LYOPHILIZED, FOR SOLUTION INTRAVENOUS at 14:56

## 2021-12-26 RX ADMIN — SODIUM CHLORIDE, PRESERVATIVE FREE 10 ML: 5 INJECTION INTRAVENOUS at 09:32

## 2021-12-26 RX ADMIN — DEXAMETHASONE SODIUM PHOSPHATE 10 MG: 10 INJECTION INTRAMUSCULAR; INTRAVENOUS at 09:32

## 2021-12-26 RX ADMIN — ALBUTEROL SULFATE 2 PUFF: 90 AEROSOL, METERED RESPIRATORY (INHALATION) at 13:11

## 2021-12-26 RX ADMIN — METOPROLOL TARTRATE 50 MG: 50 TABLET, FILM COATED ORAL at 09:32

## 2021-12-26 RX ADMIN — ENOXAPARIN SODIUM 40 MG: 100 INJECTION SUBCUTANEOUS at 20:48

## 2021-12-26 RX ADMIN — OXYBUTYNIN CHLORIDE 15 MG: 5 TABLET, EXTENDED RELEASE ORAL at 09:32

## 2021-12-26 RX ADMIN — SODIUM CHLORIDE 3 G: 1 TABLET ORAL at 17:20

## 2021-12-26 RX ADMIN — ALBUTEROL SULFATE 2 PUFF: 90 AEROSOL, METERED RESPIRATORY (INHALATION) at 20:04

## 2021-12-26 RX ADMIN — ALBUTEROL SULFATE 2 PUFF: 90 AEROSOL, METERED RESPIRATORY (INHALATION) at 16:29

## 2021-12-26 RX ADMIN — ATORVASTATIN CALCIUM 40 MG: 40 TABLET, FILM COATED ORAL at 09:32

## 2021-12-26 RX ADMIN — BUPROPION HYDROCHLORIDE 75 MG: 75 TABLET ORAL at 09:33

## 2021-12-26 RX ADMIN — ENOXAPARIN SODIUM 40 MG: 100 INJECTION SUBCUTANEOUS at 09:32

## 2021-12-26 RX ADMIN — FUROSEMIDE 20 MG: 10 INJECTION, SOLUTION INTRAMUSCULAR; INTRAVENOUS at 09:32

## 2021-12-26 RX ADMIN — METOPROLOL TARTRATE 50 MG: 50 TABLET, FILM COATED ORAL at 20:48

## 2021-12-26 RX ADMIN — SODIUM CHLORIDE 3 G: 1 TABLET ORAL at 12:29

## 2021-12-26 RX ADMIN — FINASTERIDE 5 MG: 5 TABLET, FILM COATED ORAL at 09:32

## 2021-12-26 RX ADMIN — CLOPIDOGREL BISULFATE 75 MG: 75 TABLET ORAL at 09:32

## 2021-12-26 RX ADMIN — ASPIRIN 81 MG: 81 TABLET, COATED ORAL at 09:32

## 2021-12-26 RX ADMIN — SODIUM CHLORIDE 25 ML: 9 INJECTION, SOLUTION INTRAVENOUS at 14:51

## 2021-12-26 RX ADMIN — ALBUTEROL SULFATE 2 PUFF: 90 AEROSOL, METERED RESPIRATORY (INHALATION) at 08:29

## 2021-12-26 RX ADMIN — SODIUM CHLORIDE, PRESERVATIVE FREE 10 ML: 5 INJECTION INTRAVENOUS at 20:48

## 2021-12-26 ASSESSMENT — PAIN SCALES - GENERAL
PAINLEVEL_OUTOF10: 0
PAINLEVEL_OUTOF10: 3
PAINLEVEL_OUTOF10: 0
PAINLEVEL_OUTOF10: 0

## 2021-12-26 ASSESSMENT — PAIN DESCRIPTION - LOCATION: LOCATION: ABDOMEN

## 2021-12-26 ASSESSMENT — PAIN DESCRIPTION - DESCRIPTORS: DESCRIPTORS: CRAMPING

## 2021-12-26 ASSESSMENT — PAIN DESCRIPTION - ORIENTATION: ORIENTATION: LOWER

## 2021-12-26 ASSESSMENT — PAIN DESCRIPTION - PAIN TYPE: TYPE: ACUTE PAIN

## 2021-12-26 NOTE — PROGRESS NOTES
Patient coughed up a moderate amount of blood tinged sputum. Oral care given at this time. Nurse Practiotioner notified.

## 2021-12-26 NOTE — PROGRESS NOTES
Hospitalist Progress Note  12/26/2021 1:16 PM  Subjective:   Admit Date: 12/8/2021  PCP: Miri Queen MD    Interval History: Patient with covid pneumonia slow progress is intercating more today  HHF oxygen down to 55 L white blood cell count trending up. CXR is stable. Diet: ADULT DIET; Regular; 4 carb choices (60 gm/meal); 1800 ml  Medications:   Scheduled Meds:   sodium chloride  3 g Oral TID WC    dexamethasone  10 mg IntraVENous Daily    furosemide  20 mg IntraVENous Daily    enoxaparin  40 mg SubCUTAneous BID    albuterol sulfate HFA  2 puff Inhalation 4x daily    aspirin  81 mg Oral Daily    atorvastatin  40 mg Oral Daily    buPROPion  75 mg Oral Daily    clopidogrel  75 mg Oral Daily    levothyroxine  75 mcg Oral Daily    finasteride  5 mg Oral Daily    oxybutynin  15 mg Oral Daily    metoprolol tartrate  50 mg Oral BID    [Held by provider] lisinopril  20 mg Oral Daily    sodium chloride flush  5-40 mL IntraVENous 2 times per day     Continuous Infusions:   sodium chloride Stopped (12/09/21 1026)     CBC:   Recent Labs     12/24/21  0536 12/25/21  0615 12/26/21  0625   WBC 10.9 14.4* 15.1*   HGB 11.2* 12.3* 12.3*    281 254     BMP:    Recent Labs     12/24/21  0536 12/25/21  0615 12/26/21  0625    139 140   K 3.9 4.4 4.8    101 102   CO2 25 26 28   BUN 28* 30* 33*   CREATININE 0.68* 0.75 0.72   GLUCOSE 81 87 90     Hepatic:   Recent Labs     12/24/21  0536   AST 13   ALT 15   BILITOT 0.62   ALKPHOS 62     Troponin: No results for input(s): TROPONINI in the last 72 hours. BNP: No results for input(s): BNP in the last 72 hours. Lipids: No results for input(s): CHOL, HDL in the last 72 hours. Invalid input(s): LDLCALCU  INR: No results for input(s): INR in the last 72 hours.     Objective:   Vitals: /70   Pulse 77   Temp 99 °F (37.2 °C) (Tympanic)   Resp 30   Ht 6' (1.829 m)   Wt (!) 300 lb 4.8 oz (136.2 kg)   SpO2 93%   BMI 40.73 kg/m²   General appearance: alert and cooperative with exam  HEENT: Eye: Normal external eye, conjunctiva, lids cornea, BETTY. Neck: no adenopathy, no carotid bruit, no JVD, supple, symmetrical, trachea midline and thyroid not enlarged, symmetric, no tenderness/mass/nodules  Lungs: clear to auscultation bilaterally  Heart: regular rate and rhythm, S1, S2 normal, no murmur, click, rub or gallop  Abdomen: soft, non-tender; bowel sounds normal; no masses,  no organomegaly  Extremities: edema bilateral,no calf tenderness  Neurologic: Mental status: Alert, oriented, thought content appropriate    Assessment and Plan:   1. Covid 19 pneumonia. 2. HTN  3. Chronic CHF combined. 4. hypothyroidism    Plan: Will resume antibiotics for possible secondary pneumonia since WBC is trending up. Try to wean down oxygen as possible. monitor daily labs continue supportive care slow progress consider LTAC once more stable for transfer.   Patient Active Problem List:     Hypertension     Osteoarthritis     Pneumonia due to COVID-19 virus     Chronic venous insufficiency     Acquired genu valgum     Degeneration of lumbar intervertebral disc     Lower urinary tract symptoms due to benign prostatic hyperplasia     Presence of right artificial knee joint     Bilateral carotid artery stenosis     Hypothyroidism     Ischemic cardiomyopathy     Ventral hernia     Left bundle branch block (LBBB)     Chronic embolism and thrombosis of deep vein of both distal legs (HCC)     Coronary artery disease involving native coronary artery of native heart     Hyperlipidemia     Class 3 severe obesity due to excess calories with serious comorbidity and body mass index (BMI) of 40.0 to 44.9 in adult [facility])     Chronic combined systolic and diastolic CHF (congestive heart failure) (HCC)     Impaired gait     Emphysema/COPD (HCC)     Acute respiratory failure with hypoxia (HCC)     COPD with acute exacerbation (HCC)      Stacy Lynn MD, MD  [facility] Hospitalist

## 2021-12-26 NOTE — PLAN OF CARE
Problem: Airway Clearance - Ineffective  Goal: Achieve or maintain patent airway  12/25/2021 2204 by Ricky Nichols RN  Outcome: Ongoing  12/25/2021 1523 by Kelton Baeza RN  Outcome: Ongoing  12/25/2021 1136 by Kelton Baeza RN  Outcome: Ongoing     Problem: Gas Exchange - Impaired  Goal: Absence of hypoxia  12/25/2021 2204 by Ricky Nichols RN  Outcome: Ongoing  12/25/2021 1523 by Kelton Baeza RN  Outcome: Ongoing  Note: Tolerating heated high flow at this time  12/25/2021 1136 by Kelton Baeza RN  Outcome: Ongoing  Note: Oxygen at 2L/min per home use  Goal: Promote optimal lung function  12/25/2021 2204 by Ricky Nichols RN  Outcome: Ongoing  12/25/2021 1523 by Kelton Baeza RN  Outcome: Ongoing  12/25/2021 1136 by Kelton Baeza RN  Outcome: Ongoing     Problem: Breathing Pattern - Ineffective  Goal: Ability to achieve and maintain a regular respiratory rate  12/25/2021 2204 by Ricky Nichols RN  Outcome: Ongoing  12/25/2021 1523 by Kelton Baeza RN  Outcome: Ongoing  12/25/2021 1136 by Kelton Baeza RN  Outcome: Ongoing     Problem:  Body Temperature -  Risk of, Imbalanced  Goal: Ability to maintain a body temperature within defined limits  12/25/2021 2204 by Ricky Nichols RN  Outcome: Ongoing  12/25/2021 1523 by Kelton Baeza RN  Outcome: Ongoing  Note: Afebrile at this time  12/25/2021 1136 by Kelton Baeza RN  Outcome: Ongoing  Note: Afebrile at this time  Goal: Will regain or maintain usual level of consciousness  12/25/2021 2204 by Ricky Nichols RN  Outcome: Ongoing  12/25/2021 1523 by Kelton Baeza RN  Outcome: Ongoing  12/25/2021 1136 by Kelton Baeza RN  Outcome: Ongoing  Goal: Complications related to the disease process, condition or treatment will be avoided or minimized  12/25/2021 2204 by Ricky Nichols RN  Outcome: Ongoing  12/25/2021 1523 by Kelton Baeza RN  Outcome: Ongoing  12/25/2021 1136 by Melara Katayama, RN  Outcome: Ongoing     Problem: Isolation Precautions - Risk of Spread of Infection  Goal: Prevent transmission of infection  12/25/2021 2204 by Bobo Ramos RN  Outcome: Ongoing  12/25/2021 1523 by Alma Stringer RN  Outcome: Ongoing  12/25/2021 1136 by Alma Stringer RN  Outcome: Ongoing     Problem: Nutrition Deficits  Goal: Optimize nutritional status  12/25/2021 2204 by Bobo Ramos RN  Outcome: Ongoing  12/25/2021 1523 by Alma Stringer RN  Outcome: Ongoing  12/25/2021 1136 by Alma Stringer RN  Outcome: Ongoing  Note: Eating almost 100% of meals     Problem: Risk for Fluid Volume Deficit  Goal: Maintain normal heart rhythm  12/25/2021 2204 by Bobo Ramos RN  Outcome: Ongoing  12/25/2021 1523 by Alma Stringer RN  Outcome: Ongoing  12/25/2021 1136 by Alma Stringer RN  Outcome: Ongoing  Goal: Maintain absence of muscle cramping  12/25/2021 2204 by Bobo Ramos RN  Outcome: Ongoing  12/25/2021 1523 by Alma Stringer RN  Outcome: Ongoing  12/25/2021 1136 by Alma Stringer RN  Outcome: Ongoing  Goal: Maintain normal serum potassium, sodium, calcium, phosphorus, and pH  12/25/2021 2204 by Bobo Ramos RN  Outcome: Ongoing  12/25/2021 1523 by Alma Stringer RN  Outcome: Ongoing  12/25/2021 1136 by Alma Stringer RN  Outcome: Ongoing  Note: IV fluids at this time     Problem: Loneliness or Risk for Loneliness  Goal: Demonstrate positive use of time alone when socialization is not possible  12/25/2021 2204 by Bobo Ramos RN  Outcome: Ongoing  12/25/2021 1523 by Alma Stringer RN  Outcome: Ongoing  12/25/2021 1136 by Alma Stringer RN  Outcome: Ongoing     Problem: Fatigue  Goal: Verbalize increase energy and improved vitality  12/25/2021 2204 by Bobo Ramos RN  Outcome: Ongoing  12/25/2021 1523 by Alma Stringer RN  Outcome: Ongoing  12/25/2021 1136 by Amla Stringer RN  Outcome: Ongoing  Note: Tires easily with minimal exertions     Problem: Patient Education: Go to Patient Education Activity  Goal: Patient/Family Education  12/25/2021 2204 by Cheryl Pappas RN  Outcome: Ongoing  12/25/2021 1523 by Fahad Walsh RN  Outcome: Ongoing  12/25/2021 1136 by Fahad Walsh RN  Outcome: Ongoing     Problem: Skin Integrity:  Goal: Will show no infection signs and symptoms  Description: Will show no infection signs and symptoms  12/25/2021 2204 by Cheryl Pappas RN  Outcome: Ongoing  12/25/2021 1523 by Fahad Walsh RN  Outcome: Ongoing  12/25/2021 1136 by Fahad Walsh RN  Outcome: Ongoing  Goal: Absence of new skin breakdown  Description: Absence of new skin breakdown  12/25/2021 2204 by Cheryl Pappas RN  Outcome: Ongoing  12/25/2021 1523 by Fahad Walsh RN  Outcome: Ongoing  12/25/2021 1136 by Fahad Walsh RN  Outcome: Ongoing     Problem: Falls - Risk of:  Goal: Will remain free from falls  Description: Will remain free from falls  12/25/2021 2204 by Cheryl Pappas RN  Outcome: Ongoing  12/25/2021 1523 by Fahad Walsh RN  Outcome: Ongoing  Note: Remains free from falls at this time  12/25/2021 1136 by Fahad Walsh RN  Outcome: Ongoing  Note: Remains free from falls at this time  Goal: Absence of physical injury  Description: Absence of physical injury  12/25/2021 2204 by Cheryl Pappas RN  Outcome: Ongoing  12/25/2021 1523 by Fahad Walsh RN  Outcome: Ongoing  12/25/2021 1136 by Fahad Walsh RN  Outcome: Ongoing     Problem: Nutrition  Goal: Optimal nutrition therapy  12/25/2021 2204 by Cheryl Pappas RN  Outcome: Ongoing  12/25/2021 1523 by Fahad Walsh RN  Outcome: Ongoing  Note: Trying to give pt options of food in order to eat  12/25/2021 1136 by Fahad Walsh RN  Outcome: Ongoing     Problem: Pain:  Description: Pain management should include both nonpharmacologic and pharmacologic interventions.   Goal: Pain level will decrease  Description: Pain level will decrease  12/25/2021 2204 by Cheryl Pappas RN  Outcome: Ongoing  12/25/2021 1523 by Vinod Vaughan Malcom Palencia RN  Outcome: Ongoing  Note: No c/o pain  12/25/2021 1136 by Fahad Walsh RN  Outcome: Ongoing  Note: PRN tylenol given for c/o pain #6  Goal: Control of acute pain  Description: Control of acute pain  12/25/2021 2204 by Cheryl Pappas RN  Outcome: Ongoing  12/25/2021 1523 by Fahad Walsh RN  Outcome: Ongoing  12/25/2021 1136 by Fahad Walsh RN  Outcome: Ongoing  Goal: Control of chronic pain  Description: Control of chronic pain  12/25/2021 2204 by Cheryl Pappas RN  Outcome: Ongoing  12/25/2021 1523 by Fahad Walsh RN  Outcome: Ongoing  12/25/2021 1136 by Fahad Walsh RN  Outcome: Ongoing     Problem: Discharge Planning:  Goal: Discharged to appropriate level of care  Description: Discharged to appropriate level of care  12/25/2021 2204 by Cheryl Pappas RN  Outcome: Ongoing  12/25/2021 1523 by Fahad Walsh RN  Outcome: Ongoing  12/25/2021 1136 by Fahad Walsh RN  Outcome: Ongoing

## 2021-12-26 NOTE — PLAN OF CARE
Problem: Airway Clearance - Ineffective  Goal: Achieve or maintain patent airway  12/25/2021 2205 by Mickie Austin RN  Outcome: Ongoing  12/25/2021 2205 by Mickie Austin RN  Outcome: Ongoing  12/25/2021 2204 by Mickie Austin RN  Outcome: Ongoing  12/25/2021 1523 by Radha Hendrickson RN  Outcome: Ongoing  12/25/2021 1136 by Radha Hendrickson RN  Outcome: Ongoing     Problem: Gas Exchange - Impaired  Goal: Absence of hypoxia  12/25/2021 2205 by Mickie Austin RN  Outcome: Ongoing  12/25/2021 2205 by Mickie Austin RN  Outcome: Ongoing  12/25/2021 2204 by Mickie Austin RN  Outcome: Ongoing  12/25/2021 1523 by Radha Hendrickson RN  Outcome: Ongoing  Note: Tolerating heated high flow at this time  12/25/2021 1136 by Radha Hendrickson RN  Outcome: Ongoing  Note: Oxygen at 2L/min per home use  Goal: Promote optimal lung function  12/25/2021 2205 by Mickie Austin RN  Outcome: Ongoing  12/25/2021 2205 by Mickie Austin RN  Outcome: Ongoing  12/25/2021 2204 by Mickie Austin RN  Outcome: Ongoing  12/25/2021 1523 by aRdha Hendrickson RN  Outcome: Ongoing  12/25/2021 1136 by Radha Hendrickson RN  Outcome: Ongoing     Problem: Breathing Pattern - Ineffective  Goal: Ability to achieve and maintain a regular respiratory rate  12/25/2021 2205 by Mickie Austin RN  Outcome: Ongoing  12/25/2021 2205 by Mickie Austin RN  Outcome: Ongoing  12/25/2021 2204 by Mickie Austin RN  Outcome: Ongoing  12/25/2021 1523 by Radha Hendrickson RN  Outcome: Ongoing  12/25/2021 1136 by Radha Hendrickson RN  Outcome: Ongoing     Problem:  Body Temperature -  Risk of, Imbalanced  Goal: Ability to maintain a body temperature within defined limits  12/25/2021 2205 by Mickie Austin RN  Outcome: Ongoing  12/25/2021 2205 by Mickie Austin RN  Outcome: Ongoing  12/25/2021 2204 by Mickie Austin RN  Outcome: Ongoing  12/25/2021 1523 by Radha Hendrickson RN  Outcome: Ongoing  Note: Afebrile at this time  12/25/2021 1136 by Radha Hendrickson RN  Outcome: Ongoing  Note: Afebrile at this time  Goal: Will regain or maintain usual level of consciousness  12/25/2021 2205 by Bobo Ramos RN  Outcome: Ongoing  12/25/2021 2205 by Bobo Ramos RN  Outcome: Ongoing  12/25/2021 2204 by Bobo Ramos RN  Outcome: Ongoing  12/25/2021 1523 by Alma Stringer RN  Outcome: Ongoing  12/25/2021 1136 by Alma Stringer RN  Outcome: Ongoing  Goal: Complications related to the disease process, condition or treatment will be avoided or minimized  12/25/2021 2205 by Bobo Ramos RN  Outcome: Ongoing  12/25/2021 2205 by Bobo Ramos RN  Outcome: Ongoing  12/25/2021 2204 by Bobo Ramos RN  Outcome: Ongoing  12/25/2021 1523 by Alma Stringer RN  Outcome: Ongoing  12/25/2021 1136 by Alma Stringer RN  Outcome: Ongoing     Problem: Isolation Precautions - Risk of Spread of Infection  Goal: Prevent transmission of infection  12/25/2021 2205 by Bobo Ramos RN  Outcome: Ongoing  12/25/2021 2205 by Bobo Ramos RN  Outcome: Ongoing  12/25/2021 2204 by Bobo Ramos RN  Outcome: Ongoing  12/25/2021 1523 by Alma Stringer RN  Outcome: Ongoing  12/25/2021 1136 by Alma Stringer RN  Outcome: Ongoing     Problem: Nutrition Deficits  Goal: Optimize nutritional status  12/25/2021 2205 by Bobo Ramos RN  Outcome: Ongoing  12/25/2021 2205 by Bobo Ramos RN  Outcome: Ongoing  12/25/2021 2204 by Bobo Ramos RN  Outcome: Ongoing  12/25/2021 1523 by Alma Stringer RN  Outcome: Ongoing  12/25/2021 1136 by Alma Stringer RN  Outcome: Ongoing  Note: Eating almost 100% of meals     Problem: Risk for Fluid Volume Deficit  Goal: Maintain normal heart rhythm  12/25/2021 2205 by Bobo Ramos RN  Outcome: Ongoing  12/25/2021 2205 by Bobo Ramos RN  Outcome: Ongoing  12/25/2021 2204 by Bobo Ramos RN  Outcome: Ongoing  12/25/2021 1523 by Alma Stringer RN  Outcome: Ongoing  12/25/2021 1136 by Alma Stringer RN  Outcome: Ongoing  Goal: Maintain absence of muscle cramping  12/25/2021 2205 by Bobo Ramos RN  Outcome: Ongoing  12/25/2021 2205 by Deneen Vance RN  Outcome: Ongoing  12/25/2021 2204 by Deneen Vance RN  Outcome: Ongoing  12/25/2021 1523 by Omega Shelley RN  Outcome: Ongoing  12/25/2021 1136 by Omega Shelley RN  Outcome: Ongoing  Goal: Maintain normal serum potassium, sodium, calcium, phosphorus, and pH  12/25/2021 2205 by Deneen Vance RN  Outcome: Ongoing  12/25/2021 2205 by Deneen Vance RN  Outcome: Ongoing  12/25/2021 2204 by Deneen Vance RN  Outcome: Ongoing  12/25/2021 1523 by Omega Shelley RN  Outcome: Ongoing  12/25/2021 1136 by Omega Shelley RN  Outcome: Ongoing  Note: IV fluids at this time     Problem: Loneliness or Risk for Loneliness  Goal: Demonstrate positive use of time alone when socialization is not possible  12/25/2021 2205 by Deneen Vance RN  Outcome: Ongoing  12/25/2021 2205 by Deneen Vance RN  Outcome: Ongoing  12/25/2021 2204 by Deneen Vance RN  Outcome: Ongoing  12/25/2021 1523 by Omega Shelley RN  Outcome: Ongoing  12/25/2021 1136 by Omega Shelley RN  Outcome: Ongoing     Problem: Fatigue  Goal: Verbalize increase energy and improved vitality  12/25/2021 2205 by Deneen Vance RN  Outcome: Ongoing  12/25/2021 2205 by Deneen Vance RN  Outcome: Ongoing  12/25/2021 2204 by Deneen Vance RN  Outcome: Ongoing  12/25/2021 1523 by Omega Shelley RN  Outcome: Ongoing  12/25/2021 1136 by Omega Shelley RN  Outcome: Ongoing  Note: Tires easily with minimal exertions     Problem: Patient Education: Go to Patient Education Activity  Goal: Patient/Family Education  12/25/2021 2205 by Deneen Vance RN  Outcome: Ongoing  12/25/2021 2205 by Deneen Vance RN  Outcome: Ongoing  12/25/2021 2204 by Deneen Vance RN  Outcome: Ongoing  12/25/2021 1523 by Omega Shelley RN  Outcome: Ongoing  12/25/2021 1136 by Omega Shelley RN  Outcome: Ongoing     Problem: Skin Integrity:  Goal: Will show no infection signs and symptoms  Description: Will show no infection signs and symptoms  12/25/2021 2205 by Elicia Kovacs RN  Outcome: Ongoing  12/25/2021 2205 by Elicia Kovacs RN  Outcome: Ongoing  12/25/2021 2204 by Elicia Kovacs RN  Outcome: Ongoing  12/25/2021 1523 by Poonam Ray RN  Outcome: Ongoing  12/25/2021 1136 by Poonam Ray RN  Outcome: Ongoing  Goal: Absence of new skin breakdown  Description: Absence of new skin breakdown  12/25/2021 2205 by Elicia Kovacs RN  Outcome: Ongoing  12/25/2021 2205 by Elicia Kovacs RN  Outcome: Ongoing  12/25/2021 2204 by Elicia Kovacs RN  Outcome: Ongoing  12/25/2021 1523 by Poonam Ray RN  Outcome: Ongoing  12/25/2021 1136 by Poonam Ray RN  Outcome: Ongoing     Problem: Falls - Risk of:  Goal: Will remain free from falls  Description: Will remain free from falls  12/25/2021 2205 by Elicia Kovacs RN  Outcome: Ongoing  12/25/2021 2205 by Elicia Kovacs RN  Outcome: Ongoing  12/25/2021 2204 by Elicia Kovacs RN  Outcome: Ongoing  12/25/2021 1523 by Poonam Ray RN  Outcome: Ongoing  Note: Remains free from falls at this time  12/25/2021 1136 by Poonam Ray RN  Outcome: Ongoing  Note: Remains free from falls at this time  Goal: Absence of physical injury  Description: Absence of physical injury  12/25/2021 2205 by Elicia Kovacs RN  Outcome: Ongoing  12/25/2021 2205 by Elicia Kovacs RN  Outcome: Ongoing  12/25/2021 2204 by Elicia Kovacs RN  Outcome: Ongoing  12/25/2021 1523 by Poonam Ray RN  Outcome: Ongoing  12/25/2021 1136 by Poonam Ray RN  Outcome: Ongoing     Problem: Nutrition  Goal: Optimal nutrition therapy  12/25/2021 2205 by Elicia Kovacs RN  Outcome: Ongoing  12/25/2021 2205 by Elicia Kovacs RN  Outcome: Ongoing  12/25/2021 2204 by Elicia Kovacs RN  Outcome: Ongoing  12/25/2021 1523 by Jacqlyn Flor, RN  Outcome: Ongoing  Note: Trying to give pt options of food in order to eat  12/25/2021 1136 by Poonam Ray RN  Outcome: Ongoing     Problem: Pain:  Description: Pain management should include both nonpharmacologic and pharmacologic interventions.   Goal: Pain level will decrease  Description: Pain level will decrease  12/25/2021 2205 by Jaun Boudreaux RN  Outcome: Ongoing  12/25/2021 2205 by Jaun Boudreaux RN  Outcome: Ongoing  12/25/2021 2204 by Jaun Boudreaux RN  Outcome: Ongoing  12/25/2021 1523 by Aliza Delgado RN  Outcome: Ongoing  Note: No c/o pain  12/25/2021 1136 by Aliza Delgado RN  Outcome: Ongoing  Note: PRN tylenol given for c/o pain #6  Goal: Control of acute pain  Description: Control of acute pain  12/25/2021 2205 by Jaun Boudreaux RN  Outcome: Ongoing  12/25/2021 2205 by Jaun Boudreaux RN  Outcome: Ongoing  12/25/2021 2204 by Jaun Boudreaux RN  Outcome: Ongoing  12/25/2021 1523 by Aliza Delgado RN  Outcome: Ongoing  12/25/2021 1136 by Aliza Delgado RN  Outcome: Ongoing  Goal: Control of chronic pain  Description: Control of chronic pain  12/25/2021 2205 by Jaun Boudreaux RN  Outcome: Ongoing  12/25/2021 2205 by Jaun Boudreaux RN  Outcome: Ongoing  12/25/2021 2204 by Jaun Boudreaux RN  Outcome: Ongoing  12/25/2021 1523 by Aliza Delgado RN  Outcome: Ongoing  12/25/2021 1136 by Aliza Delgado RN  Outcome: Ongoing     Problem: Discharge Planning:  Goal: Discharged to appropriate level of care  Description: Discharged to appropriate level of care  12/25/2021 2205 by Jaun Boudreaux RN  Outcome: Ongoing  12/25/2021 2205 by Jaun Boudreaux RN  Outcome: Ongoing  12/25/2021 2204 by Jaun Boudreaux RN  Outcome: Ongoing  12/25/2021 1523 by Aliza Delgado RN  Outcome: Ongoing  12/25/2021 1136 by Aliza Delgado RN  Outcome: Ongoing

## 2021-12-26 NOTE — PLAN OF CARE
Problem: Airway Clearance - Ineffective  Goal: Achieve or maintain patent airway  12/25/2021 2205 by Chanel Fonseca RN  Outcome: Ongoing  12/25/2021 2204 by Chanel Fonseca RN  Outcome: Ongoing  12/25/2021 1523 by Eilene Fothergill, RN  Outcome: Ongoing  12/25/2021 1136 by Eilene Fothergill, RN  Outcome: Ongoing     Problem: Gas Exchange - Impaired  Goal: Absence of hypoxia  12/25/2021 2205 by Chanel Fonseca RN  Outcome: Ongoing  12/25/2021 2204 by Chanel Fonseca RN  Outcome: Ongoing  12/25/2021 1523 by Eilene Fothergill, RN  Outcome: Ongoing  Note: Tolerating heated high flow at this time  12/25/2021 1136 by Eilene Fothergill, RN  Outcome: Ongoing  Note: Oxygen at 2L/min per home use  Goal: Promote optimal lung function  12/25/2021 2205 by Chanel Fonseca RN  Outcome: Ongoing  12/25/2021 2204 by Chanel Fonseca RN  Outcome: Ongoing  12/25/2021 1523 by Eilene Fothergill, RN  Outcome: Ongoing  12/25/2021 1136 by Eilene Fothergill, RN  Outcome: Ongoing     Problem: Breathing Pattern - Ineffective  Goal: Ability to achieve and maintain a regular respiratory rate  12/25/2021 2205 by Chanel Fonseca RN  Outcome: Ongoing  12/25/2021 2204 by Chanel Fonseca RN  Outcome: Ongoing  12/25/2021 1523 by Eilene Fothergill, RN  Outcome: Ongoing  12/25/2021 1136 by Eilene Fothergill, RN  Outcome: Ongoing     Problem:  Body Temperature -  Risk of, Imbalanced  Goal: Ability to maintain a body temperature within defined limits  12/25/2021 2205 by Chanel Fonseca RN  Outcome: Ongoing  12/25/2021 2204 by Chanel Fonseca RN  Outcome: Ongoing  12/25/2021 1523 by Eilene Fothergill, RN  Outcome: Ongoing  Note: Afebrile at this time  12/25/2021 1136 by Eilene Fothergill, RN  Outcome: Ongoing  Note: Afebrile at this time  Goal: Will regain or maintain usual level of consciousness  12/25/2021 2205 by Chanel Fonseca RN  Outcome: Ongoing  12/25/2021 2204 by Chanel Fonseca RN  Outcome: Ongoing  12/25/2021 1523 by Eilene Fothergill, RN  Outcome: Ongoing  12/25/2021 1136 by Marva Rainey Rock Zahida RN  Outcome: Ongoing  Goal: Complications related to the disease process, condition or treatment will be avoided or minimized  12/25/2021 2205 by Chanel Fonseca RN  Outcome: Ongoing  12/25/2021 2204 by Chanel Fonseca RN  Outcome: Ongoing  12/25/2021 1523 by Eilene Fothergill, RN  Outcome: Ongoing  12/25/2021 1136 by Eilene Fothergill, RN  Outcome: Ongoing     Problem: Isolation Precautions - Risk of Spread of Infection  Goal: Prevent transmission of infection  12/25/2021 2205 by Chanel Fonseca RN  Outcome: Ongoing  12/25/2021 2204 by Chanel Fonseca RN  Outcome: Ongoing  12/25/2021 1523 by Eilene Fothergill, RN  Outcome: Ongoing  12/25/2021 1136 by Eilene Fothergill, RN  Outcome: Ongoing     Problem: Nutrition Deficits  Goal: Optimize nutritional status  12/25/2021 2205 by Chanel Fnoseca RN  Outcome: Ongoing  12/25/2021 2204 by Chanel Fonseca RN  Outcome: Ongoing  12/25/2021 1523 by Eilene Fothergill, RN  Outcome: Ongoing  12/25/2021 1136 by Eilene Fothergill, RN  Outcome: Ongoing  Note: Eating almost 100% of meals     Problem: Risk for Fluid Volume Deficit  Goal: Maintain normal heart rhythm  12/25/2021 2205 by Chanel Fonseca RN  Outcome: Ongoing  12/25/2021 2204 by Chanel Fonseca RN  Outcome: Ongoing  12/25/2021 1523 by Eilene Fothergill, RN  Outcome: Ongoing  12/25/2021 1136 by Eilene Fothergill, RN  Outcome: Ongoing  Goal: Maintain absence of muscle cramping  12/25/2021 2205 by Chanel Fonseca RN  Outcome: Ongoing  12/25/2021 2204 by Chanel Fonseca RN  Outcome: Ongoing  12/25/2021 1523 by Eilene Fothergill, RN  Outcome: Ongoing  12/25/2021 1136 by Eilene Fothergill, RN  Outcome: Ongoing  Goal: Maintain normal serum potassium, sodium, calcium, phosphorus, and pH  12/25/2021 2205 by Chanel Fonseca RN  Outcome: Ongoing  12/25/2021 2204 by Chanel Fonseca RN  Outcome: Ongoing  12/25/2021 1523 by Eilene Fothergill, RN  Outcome: Ongoing  12/25/2021 1136 by Eilene Fothergill, RN  Outcome: Ongoing  Note: IV fluids at this time     Problem: Loneliness or Risk for Loneliness  Goal: Demonstrate positive use of time alone when socialization is not possible  12/25/2021 2205 by Js Calloway RN  Outcome: Ongoing  12/25/2021 2204 by Js Calloway RN  Outcome: Ongoing  12/25/2021 1523 by Kenney Diaz RN  Outcome: Ongoing  12/25/2021 1136 by Kenney Diaz RN  Outcome: Ongoing     Problem: Fatigue  Goal: Verbalize increase energy and improved vitality  12/25/2021 2205 by Js Calloway RN  Outcome: Ongoing  12/25/2021 2204 by Js Calloway RN  Outcome: Ongoing  12/25/2021 1523 by Kenney Diaz RN  Outcome: Ongoing  12/25/2021 1136 by Kenney Diaz RN  Outcome: Ongoing  Note: Tires easily with minimal exertions     Problem: Patient Education: Go to Patient Education Activity  Goal: Patient/Family Education  12/25/2021 2205 by Js Calloway RN  Outcome: Ongoing  12/25/2021 2204 by Js Calloway RN  Outcome: Ongoing  12/25/2021 1523 by Kenney Diaz RN  Outcome: Ongoing  12/25/2021 1136 by Kenney Diaz RN  Outcome: Ongoing     Problem: Skin Integrity:  Goal: Will show no infection signs and symptoms  Description: Will show no infection signs and symptoms  12/25/2021 2205 by Js Calloway RN  Outcome: Ongoing  12/25/2021 2204 by Js Calloway RN  Outcome: Ongoing  12/25/2021 1523 by Kenney Diaz RN  Outcome: Ongoing  12/25/2021 1136 by Kenney Diaz RN  Outcome: Ongoing  Goal: Absence of new skin breakdown  Description: Absence of new skin breakdown  12/25/2021 2205 by Js Calloway RN  Outcome: Ongoing  12/25/2021 2204 by Js Calloway RN  Outcome: Ongoing  12/25/2021 1523 by Kenney Diaz RN  Outcome: Ongoing  12/25/2021 1136 by Kenney Diaz RN  Outcome: Ongoing     Problem: Falls - Risk of:  Goal: Will remain free from falls  Description: Will remain free from falls  12/25/2021 2205 by Js Calloway RN  Outcome: Ongoing  12/25/2021 2204 by Js Calloway RN  Outcome: Ongoing  12/25/2021 1523 by Kenney Diaz RN  Outcome: Ongoing  Note: Remains free from falls at this time  12/25/2021 1136 by Camila Duncan RN  Outcome: Ongoing  Note: Remains free from falls at this time  Goal: Absence of physical injury  Description: Absence of physical injury  12/25/2021 2205 by Anson Ocasio RN  Outcome: Ongoing  12/25/2021 2204 by Anson Ocasio RN  Outcome: Ongoing  12/25/2021 1523 by Camila Duncan RN  Outcome: Ongoing  12/25/2021 1136 by Camila Duncan RN  Outcome: Ongoing     Problem: Nutrition  Goal: Optimal nutrition therapy  12/25/2021 2205 by Anson Ocasio RN  Outcome: Ongoing  12/25/2021 2204 by Anson Ocasio RN  Outcome: Ongoing  12/25/2021 1523 by Camila Duncan RN  Outcome: Ongoing  Note: Trying to give pt options of food in order to eat  12/25/2021 1136 by Camila Duncan RN  Outcome: Ongoing     Problem: Pain:  Description: Pain management should include both nonpharmacologic and pharmacologic interventions.   Goal: Pain level will decrease  Description: Pain level will decrease  12/25/2021 2205 by Anson Ocasio RN  Outcome: Ongoing  12/25/2021 2204 by Anson Ocasio RN  Outcome: Ongoing  12/25/2021 1523 by Camila Duncan RN  Outcome: Ongoing  Note: No c/o pain  12/25/2021 1136 by Camila Duncan RN  Outcome: Ongoing  Note: PRN tylenol given for c/o pain #6  Goal: Control of acute pain  Description: Control of acute pain  12/25/2021 2205 by Anson Ocasio RN  Outcome: Ongoing  12/25/2021 2204 by Anson Ocasio RN  Outcome: Ongoing  12/25/2021 1523 by Camila Duncan RN  Outcome: Ongoing  12/25/2021 1136 by Camila Duncan RN  Outcome: Ongoing  Goal: Control of chronic pain  Description: Control of chronic pain  12/25/2021 2205 by Anson Ocasio RN  Outcome: Ongoing  12/25/2021 2204 by Anson Ocasio RN  Outcome: Ongoing  12/25/2021 1523 by Camila Duncan RN  Outcome: Ongoing  12/25/2021 1136 by Camila Dakota, RN  Outcome: Ongoing     Problem: Discharge Planning:  Goal: Discharged to appropriate level of care  Description: Discharged to appropriate level of care  12/25/2021 2205 by Elicia Kovacs RN  Outcome: Ongoing  12/25/2021 2204 by Elicia Kovacs RN  Outcome: Ongoing  12/25/2021 1523 by Poonam Ray RN  Outcome: Ongoing  12/25/2021 1136 by Poonam Ray RN  Outcome: Ongoing

## 2021-12-26 NOTE — PROGRESS NOTES
Physical Therapy  Facility/Department: [facility]  PROGRESSIVE CARE  Daily Treatment Note  NAME: Stefany Johnson  : 1945  MRN: [id number]    Date of Service: 2021    Discharge Recommendations:  Continue to assess pending progress,ECF with PT        Assessment   Body structures, Functions, Activity limitations: Decreased functional mobility ; Decreased balance;Decreased safe awareness;Decreased ADL status; Decreased endurance;Decreased strength;Decreased posture  Activity Tolerance  Activity Tolerance: Patient limited by endurance;Treatment limited secondary to medical complications (free text)     Patient Diagnosis(es): The primary encounter diagnosis was Pneumonia due to COVID-19 virus. A diagnosis of Hypoxia was also pertinent to this visit. has a past medical history of Adenomatous polyp, Cholecystitis, Chronic venous insufficiency, DVT (deep venous thrombosis) ([facility] [address] Edema, Gout, Hypertension, Onychomycosis, Osteoarthritis, Plantar fasciitis, and Tobacco abuse.   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 Restrictions  Restrictions/Precautions  Restrictions/Precautions: Isolation  Subjective   Subjective  Subjective: Patient on high flow. Agreeable to bed exercises.   Pain Assessment  Pain Assessment: 0-10  Pain Level: 0       Orientation  Orientation  Overall Orientation Status: Within Functional Limits  Cognition      Objective   Bed mobility  Rolling to Left: Contact guard assistance;Minimal assistance  Transfers  Sit to Stand: Unable to assess  Stand to sit: Unable to assess  Bed to Chair: Unable to assess  Stand Pivot Transfers: Unable to assess  Squat Pivot Transfers: Unable to assess  Lateral Transfers: Unable to assess  Car Transfer: Unable to assess  Comment: Did not perform due to patient being on high flow oxygen. Ambulation  Ambulation?: No        Exercises  Straight Leg Raise: x15  Heelslides: x15  Gluteal Sets: x15  Hip Abduction: x15  Ankle Pumps: x15  Comments: Educated patient to continue to perform ankle pumps throughout the day.                         G-Code     OutComes Score                                                     AM-PAC Score             Goals  Short term goals  Time Frame for Short term goals: LOS  Short term goal 1: Bed mobilty with CGA  Short term goal 2: Transfers with CGA x 1 maintaining O2 Sats in 85%  Short term goal 3: Sitting at EOB x 5 min maintaining O2 stats  Patient Goals   Patient goals : Unable to [address]  Times per day: Daily  Current Treatment Recommendations: Anupama Macias Re-education,Patient/Caregiver Education & Training,ROM,Balance Training,Gait Training,Home Exercise Program,Safety Education & Training,Functional Mobility Training  Safety Devices  Type of devices: Bed alarm in place,Nurse notified,Call light within reach,Left in bed     Therapy Time   Individual Concurrent Group Co-treatment   Time In 0858         Time Out 0912         Minutes [address] [place], [place]

## 2021-12-26 NOTE — PLAN OF CARE
Problem: Airway Clearance - Ineffective  Goal: Achieve or maintain patent airway  12/26/2021 1124 by Alvino Tracy RN  Outcome: Ongoing  Note: Tolerating heated high flow  12/25/2021 2205 by Gaynel Goodell, RN  Outcome: Ongoing  12/25/2021 2205 by Gaynel Goodell, RN  Outcome: Ongoing  12/25/2021 2204 by Gaynel Goodell, RN  Outcome: Ongoing     Problem: Gas Exchange - Impaired  Goal: Absence of hypoxia  12/26/2021 1124 by Alvino Tracy RN  Outcome: Ongoing  Note: Tolerating heated high flow  12/25/2021 2205 by Gaynel Goodell, RN  Outcome: Ongoing  12/25/2021 2205 by Gaynel Goodell, RN  Outcome: Ongoing  12/25/2021 2204 by Gaynel Goodell, RN  Outcome: Ongoing  Goal: Promote optimal lung function  12/26/2021 1124 by Alvino Tracy RN  Outcome: Ongoing  12/25/2021 2205 by Gaynel Goodell, RN  Outcome: Ongoing  12/25/2021 2205 by Gaynel Goodell, RN  Outcome: Ongoing  12/25/2021 2204 by Gaynel Goodell, RN  Outcome: Ongoing     Problem: Breathing Pattern - Ineffective  Goal: Ability to achieve and maintain a regular respiratory rate  12/26/2021 1124 by Alvino Tracy RN  Outcome: Ongoing  Note: Tachypnea with mild exertion  12/25/2021 2205 by Gaynel Goodell, RN  Outcome: Ongoing  12/25/2021 2205 by Gaynel Goodell, RN  Outcome: Ongoing  12/25/2021 2204 by Gaynel Goodell, RN  Outcome: Ongoing     Problem:  Body Temperature -  Risk of, Imbalanced  Goal: Ability to maintain a body temperature within defined limits  12/26/2021 1124 by Alvino Tracy RN  Outcome: Ongoing  Note: Afebrile at this time  12/25/2021 2205 by Gaynel Goodell, RN  Outcome: Ongoing  12/25/2021 2205 by Gaynel Goodell, RN  Outcome: Ongoing  12/25/2021 2204 by Gaynel Goodell, RN  Outcome: Ongoing  Goal: Will regain or maintain usual level of consciousness  12/26/2021 1124 by Alvino Tracy RN  Outcome: Ongoing  12/25/2021 2205 by Gaynel Goodell, RN  Outcome: Ongoing  12/25/2021 2205 by Gaynel Goodell, RN  Outcome: Ongoing  12/25/2021 2204 by Gaynel Goodell, RN  Outcome: Ongoing  Goal: Complications related to the disease process, condition or treatment will be avoided or minimized  12/26/2021 1124 by Kelly Howe RN  Outcome: Ongoing  12/25/2021 2205 by Daphne Hoover RN  Outcome: Ongoing  12/25/2021 2205 by Daphne Hoover RN  Outcome: Ongoing  12/25/2021 2204 by Daphne Hoover RN  Outcome: Ongoing     Problem: Isolation Precautions - Risk of Spread of Infection  Goal: Prevent transmission of infection  12/26/2021 1124 by Kelly Howe RN  Outcome: Ongoing  12/25/2021 2205 by Daphne Hoover RN  Outcome: Ongoing  12/25/2021 2205 by Daphne Hoover RN  Outcome: Ongoing  12/25/2021 2204 by Daphne Hoover RN  Outcome: Ongoing     Problem: Nutrition Deficits  Goal: Optimize nutritional status  12/26/2021 1124 by Kelly Howe RN  Outcome: Ongoing  Note: Wife called to bring in food for pt  12/25/2021 2205 by Daphne Hoover, RN  Outcome: Ongoing  12/25/2021 2205 by Daphne Hoover RN  Outcome: Ongoing  12/25/2021 2204 by Daphne Hoover RN  Outcome: Ongoing     Problem: Risk for Fluid Volume Deficit  Goal: Maintain normal heart rhythm  12/26/2021 1124 by Kelly Howe RN  Outcome: Ongoing  12/25/2021 2205 by Daphne Hoover RN  Outcome: Ongoing  12/25/2021 2205 by Daphne Hoover RN  Outcome: Ongoing  12/25/2021 2204 by Daphne Hoover RN  Outcome: Ongoing  Goal: Maintain absence of muscle cramping  12/26/2021 1124 by Kelly Howe, RN  Outcome: Ongoing  12/25/2021 2205 by Daphne Hoover RN  Outcome: Ongoing  12/25/2021 2205 by Daphne Hoover RN  Outcome: Ongoing  12/25/2021 2204 by Daphne Hoover RN  Outcome: Ongoing  Goal: Maintain normal serum potassium, sodium, calcium, phosphorus, and pH  12/26/2021 1124 by Kelly Howe, RN  Outcome: Ongoing  12/25/2021 2205 by Daphne Hoover RN  Outcome: Ongoing  12/25/2021 2205 by Daphne Hoover RN  Outcome: Ongoing  12/25/2021 2204 by Daphne Hoover RN  Outcome: Ongoing     Problem: Loneliness or Risk for Loneliness  Goal: Demonstrate positive use of time alone when socialization is not possible  12/26/2021 1124 by Alvino Tracy RN  Outcome: Ongoing  12/25/2021 2205 by Gaynel Goodell, RN  Outcome: Ongoing  12/25/2021 2205 by Gaynel Goodell, RN  Outcome: Ongoing  12/25/2021 2204 by Gaynel Goodell, RN  Outcome: Ongoing     Problem: Fatigue  Goal: Verbalize increase energy and improved vitality  12/26/2021 1124 by Alvino Tracy RN  Outcome: Ongoing  Note: Tires quickly with minimal exertion  12/25/2021 2205 by Gaynel Goodell, RN  Outcome: Ongoing  12/25/2021 2205 by Gaynel Goodell, RN  Outcome: Ongoing  12/25/2021 2204 by Gaynel Goodell, RN  Outcome: Ongoing     Problem: Patient Education: Go to Patient Education Activity  Goal: Patient/Family Education  12/26/2021 1124 by Alvino Tracy RN  Outcome: Ongoing  12/25/2021 2205 by Gaynel Goodell, RN  Outcome: Ongoing  12/25/2021 2205 by Gaynel Goodell, RN  Outcome: Ongoing  12/25/2021 2204 by Gaynel Goodell, RN  Outcome: Ongoing     Problem: Skin Integrity:  Goal: Will show no infection signs and symptoms  Description: Will show no infection signs and symptoms  12/26/2021 1124 by Alvino Tracy RN  Outcome: Ongoing  12/25/2021 2205 by Gaynel Goodell, RN  Outcome: Ongoing  12/25/2021 2205 by Gaynel Goodell, RN  Outcome: Ongoing  12/25/2021 2204 by Gaynel Goodell, RN  Outcome: Ongoing  Goal: Absence of new skin breakdown  Description: Absence of new skin breakdown  12/26/2021 1124 by Alvino Tracy RN  Outcome: Ongoing  12/25/2021 2205 by Gaynel Goodell, RN  Outcome: Ongoing  12/25/2021 2205 by Gaynel Goodell, RN  Outcome: Ongoing  12/25/2021 2204 by Gaynel Goodell, RN  Outcome: Ongoing     Problem: Falls - Risk of:  Goal: Will remain free from falls  Description: Will remain free from falls  12/26/2021 1124 by Alvino Tracy RN  Outcome: Ongoing  Note: Remains free from falls at this time  12/25/2021 2205 by Gaynel Goodell, RN  Outcome: Ongoing  12/25/2021 2205 by Gaynel Goodell, RN  Outcome: Ongoing  12/25/2021 2204 by Gaynel Goodell, RN  Outcome: Ongoing  Goal: Absence of physical injury  Description: Absence of physical injury  12/26/2021 1124 by Joyce Ferrari RN  Outcome: Ongoing  12/25/2021 2205 by Jennifer Abreu RN  Outcome: Ongoing  12/25/2021 2205 by Jennifer Abreu RN  Outcome: Ongoing  12/25/2021 2204 by Jennifer Abreu RN  Outcome: Ongoing     Problem: Nutrition  Goal: Optimal nutrition therapy  12/26/2021 1124 by Joyce Ferrari RN  Outcome: Ongoing  Note: Ate 100% of breakfast; wife called to bring in food for pt  12/25/2021 2205 by Jennifer Abreu RN  Outcome: Ongoing  12/25/2021 2205 by Jennifer Abreu RN  Outcome: Ongoing  12/25/2021 2204 by Jennifer Abreu RN  Outcome: Ongoing     Problem: Pain:  Goal: Pain level will decrease  Description: Pain level will decrease  12/26/2021 1124 by Joyce Ferrari RN  Outcome: Ongoing  Note: No c/o pain at this time  12/25/2021 2205 by Jennifer Abreu RN  Outcome: Ongoing  12/25/2021 2205 by Jennifer Abreu RN  Outcome: Ongoing  12/25/2021 2204 by Jennifer Abreu RN  Outcome: Ongoing  Goal: Control of acute pain  Description: Control of acute pain  12/26/2021 1124 by Joyce Ferrari RN  Outcome: Ongoing  12/25/2021 2205 by Jennifer Abreu RN  Outcome: Ongoing  12/25/2021 2205 by Jennifer Abreu RN  Outcome: Ongoing  12/25/2021 2204 by Jennifer Abreu RN  Outcome: Ongoing  Goal: Control of chronic pain  Description: Control of chronic pain  12/26/2021 1124 by Joyce Frerari RN  Outcome: Ongoing  12/25/2021 2205 by Jennifer Abreu RN  Outcome: Ongoing  12/25/2021 2205 by Jennifer Abreu RN  Outcome: Ongoing  12/25/2021 2204 by Jennifer Abreu RN  Outcome: Ongoing     Problem: Discharge Planning:  Goal: Discharged to appropriate level of care  Description: Discharged to appropriate level of care  12/26/2021 1124 by Joyce Ferrari RN  Outcome: Ongoing  12/25/2021 2205 by Jennifer Abreu RN  Outcome: Ongoing  12/25/2021 2205 by Jennifer Abreu, RN  Outcome: Ongoing  12/25/2021 2204 by Jennifer Abreu RN  Outcome: Ongoing

## 2021-12-26 NOTE — PLAN OF CARE
Problem: Airway Clearance - Ineffective  Goal: Achieve or maintain patent airway  12/26/2021 1836 by Ti Heath, RN  Outcome: Ongoing  12/26/2021 1124 by Ti Heath, RN  Outcome: Ongoing  Note: Tolerating heated high flow     Problem: Gas Exchange - Impaired  Goal: Absence of hypoxia  12/26/2021 1836 by Ti Heath, RN  Outcome: Ongoing  Note: Tolerating heated high flow  12/26/2021 1124 by Ti Heath, RN  Outcome: Ongoing  Note: Tolerating heated high flow  Goal: Promote optimal lung function  12/26/2021 1836 by Ti Heath, RN  Outcome: Ongoing  12/26/2021 1124 by Ti Heath RN  Outcome: Ongoing     Problem: Breathing Pattern - Ineffective  Goal: Ability to achieve and maintain a regular respiratory rate  12/26/2021 1836 by Ti Heath, RN  Outcome: Ongoing  12/26/2021 1124 by Ti Heath, RN  Outcome: Ongoing  Note: Tachypnea with mild exertion     Problem:  Body Temperature -  Risk of, Imbalanced  Goal: Ability to maintain a body temperature within defined limits  12/26/2021 1836 by Ti Heath RN  Outcome: Ongoing  Note: Afebrile at this time  12/26/2021 1124 by Ti Heath RN  Outcome: Ongoing  Note: Afebrile at this time  Goal: Will regain or maintain usual level of consciousness  12/26/2021 1836 by Ti Heath, RN  Outcome: Ongoing  12/26/2021 1124 by Ti Heath RN  Outcome: Ongoing  Goal: Complications related to the disease process, condition or treatment will be avoided or minimized  12/26/2021 1836 by Ti Heath, RN  Outcome: Ongoing  12/26/2021 1124 by Ti Heath RN  Outcome: Ongoing     Problem: Isolation Precautions - Risk of Spread of Infection  Goal: Prevent transmission of infection  12/26/2021 1836 by Ti Heath, RN  Outcome: Ongoing  12/26/2021 1124 by Ti Heath RN  Outcome: Ongoing     Problem: Nutrition Deficits  Goal: Optimize nutritional status  12/26/2021 1836 by Johana Lind Herve Herzog RN  Outcome: Ongoing  Note: Only taking small amounts of food  12/26/2021 1124 by Kelly Howe RN  Outcome: Ongoing  Note: Wife called to bring in food for pt     Problem: Risk for Fluid Volume Deficit  Goal: Maintain normal heart rhythm  12/26/2021 1836 by Kelly Howe RN  Outcome: Ongoing  12/26/2021 1124 by Kelly Howe RN  Outcome: Ongoing  Goal: Maintain absence of muscle cramping  12/26/2021 1836 by Kelly Howe RN  Outcome: Ongoing  12/26/2021 1124 by Kelly Howe RN  Outcome: Ongoing  Goal: Maintain normal serum potassium, sodium, calcium, phosphorus, and pH  12/26/2021 1836 by Kelly Howe RN  Outcome: Ongoing  12/26/2021 1124 by Kelly Howe RN  Outcome: Ongoing     Problem: Loneliness or Risk for Loneliness  Goal: Demonstrate positive use of time alone when socialization is not possible  12/26/2021 1836 by Kelly Howe RN  Outcome: Ongoing  12/26/2021 1124 by Kelly Howe RN  Outcome: Ongoing     Problem: Fatigue  Goal: Verbalize increase energy and improved vitality  12/26/2021 1836 by Kelly Howe RN  Outcome: Ongoing  12/26/2021 1124 by Kelly Howe RN  Outcome: Ongoing  Note: Tires quickly with minimal exertion     Problem: Patient Education: Go to Patient Education Activity  Goal: Patient/Family Education  12/26/2021 1836 by Kelly Howe RN  Outcome: Ongoing  12/26/2021 1124 by Kelly Howe RN  Outcome: Ongoing     Problem: Skin Integrity:  Goal: Will show no infection signs and symptoms  Description: Will show no infection signs and symptoms  12/26/2021 1836 by Kelly Howe RN  Outcome: Ongoing  12/26/2021 1124 by Kelly Howe RN  Outcome: Ongoing  Goal: Absence of new skin breakdown  Description: Absence of new skin breakdown  12/26/2021 1836 by Kelly Howe RN  Outcome: Ongoing  12/26/2021 1124 by Kelly Howe RN  Outcome: Ongoing     Problem: Falls - Risk of:  Goal: Will remain free from falls  Description: Will remain free from falls  12/26/2021 1836 by Nguyen Carpenter RN  Outcome: Ongoing  Note: Remains free from falls at this time  12/26/2021 1124 by Nguyen Carpenter RN  Outcome: Ongoing  Note: Remains free from falls at this time  Goal: Absence of physical injury  Description: Absence of physical injury  12/26/2021 1836 by Nguyen Carpenter RN  Outcome: Ongoing  12/26/2021 1124 by Nguyen Carpenter RN  Outcome: Ongoing     Problem: Nutrition  Goal: Optimal nutrition therapy  12/26/2021 1836 by Nguyen Carpenter RN  Outcome: Ongoing  12/26/2021 1124 by Nguyen Carpenter RN  Outcome: Ongoing  Note: Ate 100% of breakfast; wife called to bring in food for pt     Problem: Pain:  Goal: Pain level will decrease  Description: Pain level will decrease  12/26/2021 1836 by Nguyen Carpenter RN  Outcome: Ongoing  Note: No c/o pain at this time  12/26/2021 1124 by Nguyen Carpenter RN  Outcome: Ongoing  Note: No c/o pain at this time  Goal: Control of acute pain  Description: Control of acute pain  12/26/2021 1836 by Nguyen Carpenter RN  Outcome: Ongoing  12/26/2021 1124 by Nguyen Carpenter RN  Outcome: Ongoing  Goal: Control of chronic pain  Description: Control of chronic pain  12/26/2021 1836 by Nguyen Carpenter RN  Outcome: Ongoing  12/26/2021 1124 by Nguyen Carpenter RN  Outcome: Ongoing     Problem: Discharge Planning:  Goal: Discharged to appropriate level of care  Description: Discharged to appropriate level of care  12/26/2021 1836 by Nguyen Carpenter RN  Outcome: Ongoing  12/26/2021 1124 by Nguyen Carpenter RN  Outcome: Ongoing

## 2021-12-27 LAB
ABSOLUTE EOS #: 0.06 K/UL (ref 0–0.44)
ABSOLUTE IMMATURE GRANULOCYTE: 0.1 K/UL (ref 0–0.3)
ABSOLUTE LYMPH #: 0.88 K/UL (ref 1.1–3.7)
ABSOLUTE MONO #: 0.97 K/UL (ref 0.1–1.2)
ANION GAP SERPL CALCULATED.3IONS-SCNC: 11 MMOL/L (ref 9–17)
BASOPHILS # BLD: 0 % (ref 0–2)
BASOPHILS ABSOLUTE: <0.03 K/UL (ref 0–0.2)
BUN BLDV-MCNC: 33 MG/DL (ref 8–23)
BUN/CREAT BLD: 42 (ref 9–20)
C-REACTIVE PROTEIN: 67.6 MG/L (ref 0–5)
CALCIUM SERPL-MCNC: 9.6 MG/DL (ref 8.6–10.4)
CHLORIDE BLD-SCNC: 102 MMOL/L (ref 98–107)
CO2: 29 MMOL/L (ref 20–31)
CREAT SERPL-MCNC: 0.79 MG/DL (ref 0.7–1.2)
D-DIMER QUANTITATIVE: 1.3 MG/L FEU (ref 0–0.59)
DIFFERENTIAL TYPE: ABNORMAL
EOSINOPHILS RELATIVE PERCENT: 0 % (ref 1–4)
FERRITIN: 1288 UG/L (ref 30–400)
GFR AFRICAN AMERICAN: >60 ML/MIN
GFR NON-AFRICAN AMERICAN: >60 ML/MIN
GFR SERPL CREATININE-BSD FRML MDRD: ABNORMAL ML/MIN/{1.73_M2}
GFR SERPL CREATININE-BSD FRML MDRD: ABNORMAL ML/MIN/{1.73_M2}
GLUCOSE BLD-MCNC: 88 MG/DL (ref 70–99)
HCT VFR BLD CALC: 38.4 % (ref 40.7–50.3)
HEMOGLOBIN: 11.9 G/DL (ref 13–17)
IMMATURE GRANULOCYTES: 1 %
LACTATE DEHYDROGENASE: 362 U/L (ref 135–225)
LYMPHOCYTES # BLD: 6 % (ref 24–43)
MCH RBC QN AUTO: 29.8 PG (ref 25.2–33.5)
MCHC RBC AUTO-ENTMCNC: 31 G/DL (ref 25.2–33.5)
MCV RBC AUTO: 96.2 FL (ref 82.6–102.9)
MONOCYTES # BLD: 7 % (ref 3–12)
NRBC AUTOMATED: 0 PER 100 WBC
PDW BLD-RTO: 14.7 % (ref 11.8–14.4)
PLATELET # BLD: 240 K/UL (ref 138–453)
PLATELET ESTIMATE: ABNORMAL
PMV BLD AUTO: 9.5 FL (ref 8.1–13.5)
POTASSIUM SERPL-SCNC: 4.4 MMOL/L (ref 3.7–5.3)
RBC # BLD: 3.99 M/UL (ref 4.21–5.77)
RBC # BLD: ABNORMAL 10*6/UL
SEG NEUTROPHILS: 86 % (ref 36–65)
SEGMENTED NEUTROPHILS ABSOLUTE COUNT: 12.72 K/UL (ref 1.5–8.1)
SODIUM BLD-SCNC: 142 MMOL/L (ref 135–144)
WBC # BLD: 14.7 K/UL (ref 3.5–11.3)
WBC # BLD: ABNORMAL 10*3/UL

## 2021-12-27 PROCEDURE — 99232 SBSQ HOSP IP/OBS MODERATE 35: CPT | Performed by: FAMILY MEDICINE

## 2021-12-27 PROCEDURE — 6370000000 HC RX 637 (ALT 250 FOR IP): Performed by: NURSE PRACTITIONER

## 2021-12-27 PROCEDURE — 85025 COMPLETE CBC W/AUTO DIFF WBC: CPT

## 2021-12-27 PROCEDURE — 83615 LACTATE (LD) (LDH) ENZYME: CPT

## 2021-12-27 PROCEDURE — 6360000002 HC RX W HCPCS: Performed by: NURSE PRACTITIONER

## 2021-12-27 PROCEDURE — 94660 CPAP INITIATION&MGMT: CPT

## 2021-12-27 PROCEDURE — 2060000000 HC ICU INTERMEDIATE R&B

## 2021-12-27 PROCEDURE — 2700000000 HC OXYGEN THERAPY PER DAY

## 2021-12-27 PROCEDURE — 6360000002 HC RX W HCPCS: Performed by: INTERNAL MEDICINE

## 2021-12-27 PROCEDURE — 2580000003 HC RX 258: Performed by: FAMILY MEDICINE

## 2021-12-27 PROCEDURE — 6370000000 HC RX 637 (ALT 250 FOR IP): Performed by: FAMILY MEDICINE

## 2021-12-27 PROCEDURE — 94640 AIRWAY INHALATION TREATMENT: CPT

## 2021-12-27 PROCEDURE — 82728 ASSAY OF FERRITIN: CPT

## 2021-12-27 PROCEDURE — 80048 BASIC METABOLIC PNL TOTAL CA: CPT

## 2021-12-27 PROCEDURE — 85379 FIBRIN DEGRADATION QUANT: CPT

## 2021-12-27 PROCEDURE — 86140 C-REACTIVE PROTEIN: CPT

## 2021-12-27 PROCEDURE — 6360000002 HC RX W HCPCS: Performed by: FAMILY MEDICINE

## 2021-12-27 PROCEDURE — 97110 THERAPEUTIC EXERCISES: CPT

## 2021-12-27 PROCEDURE — 94761 N-INVAS EAR/PLS OXIMETRY MLT: CPT

## 2021-12-27 RX ADMIN — HYDROCORTISONE ACETATE 25 MG: 25 SUPPOSITORY RECTAL at 09:59

## 2021-12-27 RX ADMIN — METOPROLOL TARTRATE 50 MG: 50 TABLET, FILM COATED ORAL at 21:35

## 2021-12-27 RX ADMIN — HYDROCORTISONE ACETATE 25 MG: 25 SUPPOSITORY RECTAL at 21:30

## 2021-12-27 RX ADMIN — OXYBUTYNIN CHLORIDE 15 MG: 5 TABLET, EXTENDED RELEASE ORAL at 08:18

## 2021-12-27 RX ADMIN — SODIUM CHLORIDE, PRESERVATIVE FREE 10 ML: 5 INJECTION INTRAVENOUS at 08:18

## 2021-12-27 RX ADMIN — ENOXAPARIN SODIUM 40 MG: 100 INJECTION SUBCUTANEOUS at 08:18

## 2021-12-27 RX ADMIN — PIPERACILLIN SODIUM AND TAZOBACTAM SODIUM 3375 MG: 3; .375 INJECTION, POWDER, LYOPHILIZED, FOR SOLUTION INTRAVENOUS at 15:07

## 2021-12-27 RX ADMIN — ALBUTEROL SULFATE 2 PUFF: 90 AEROSOL, METERED RESPIRATORY (INHALATION) at 20:03

## 2021-12-27 RX ADMIN — ALBUTEROL SULFATE 2 PUFF: 90 AEROSOL, METERED RESPIRATORY (INHALATION) at 15:58

## 2021-12-27 RX ADMIN — SODIUM CHLORIDE 3 G: 1 TABLET ORAL at 17:02

## 2021-12-27 RX ADMIN — SODIUM CHLORIDE 3 G: 1 TABLET ORAL at 08:18

## 2021-12-27 RX ADMIN — LEVOTHYROXINE SODIUM 75 MCG: 0.07 TABLET ORAL at 05:01

## 2021-12-27 RX ADMIN — FINASTERIDE 5 MG: 5 TABLET, FILM COATED ORAL at 08:18

## 2021-12-27 RX ADMIN — ATORVASTATIN CALCIUM 40 MG: 40 TABLET, FILM COATED ORAL at 08:18

## 2021-12-27 RX ADMIN — ZOLPIDEM TARTRATE 5 MG: 5 TABLET ORAL at 01:17

## 2021-12-27 RX ADMIN — PIPERACILLIN SODIUM AND TAZOBACTAM SODIUM 3375 MG: 3; .375 INJECTION, POWDER, LYOPHILIZED, FOR SOLUTION INTRAVENOUS at 21:47

## 2021-12-27 RX ADMIN — PIPERACILLIN SODIUM AND TAZOBACTAM SODIUM 3375 MG: 3; .375 INJECTION, POWDER, LYOPHILIZED, FOR SOLUTION INTRAVENOUS at 05:08

## 2021-12-27 RX ADMIN — ZOLPIDEM TARTRATE 5 MG: 5 TABLET ORAL at 21:35

## 2021-12-27 RX ADMIN — FUROSEMIDE 20 MG: 10 INJECTION, SOLUTION INTRAMUSCULAR; INTRAVENOUS at 08:18

## 2021-12-27 RX ADMIN — ASPIRIN 81 MG: 81 TABLET, COATED ORAL at 08:18

## 2021-12-27 RX ADMIN — DEXAMETHASONE SODIUM PHOSPHATE 10 MG: 10 INJECTION INTRAMUSCULAR; INTRAVENOUS at 08:19

## 2021-12-27 RX ADMIN — ENOXAPARIN SODIUM 40 MG: 100 INJECTION SUBCUTANEOUS at 21:35

## 2021-12-27 RX ADMIN — CLOPIDOGREL BISULFATE 75 MG: 75 TABLET ORAL at 08:18

## 2021-12-27 RX ADMIN — METOPROLOL TARTRATE 50 MG: 50 TABLET, FILM COATED ORAL at 08:19

## 2021-12-27 RX ADMIN — POLYETHYLENE GLYCOL 3350 17 G: 17 POWDER, FOR SOLUTION ORAL at 17:02

## 2021-12-27 RX ADMIN — ALBUTEROL SULFATE 2 PUFF: 90 AEROSOL, METERED RESPIRATORY (INHALATION) at 12:04

## 2021-12-27 RX ADMIN — BUPROPION HYDROCHLORIDE 75 MG: 75 TABLET ORAL at 08:20

## 2021-12-27 RX ADMIN — ALBUTEROL SULFATE 2 PUFF: 90 AEROSOL, METERED RESPIRATORY (INHALATION) at 08:07

## 2021-12-27 ASSESSMENT — PAIN SCALES - GENERAL: PAINLEVEL_OUTOF10: 0

## 2021-12-27 NOTE — PLAN OF CARE
Problem: Airway Clearance - Ineffective  Goal: Achieve or maintain patent airway  12/27/2021 1042 by Reanna Beltran RN  Outcome: Ongoing  12/27/2021 0652 by Mickie Austin RN  Outcome: Ongoing     Problem: Gas Exchange - Impaired  Goal: Absence of hypoxia  12/27/2021 1042 by Reanna Beltran RN  Outcome: Ongoing  12/27/2021 0652 by Mickie Austin RN  Outcome: Ongoing  Goal: Promote optimal lung function  12/27/2021 1042 by Reanna Beltran RN  Outcome: Ongoing  12/27/2021 0652 by Mickie Austin RN  Outcome: Ongoing     Problem: Breathing Pattern - Ineffective  Goal: Ability to achieve and maintain a regular respiratory rate  12/27/2021 1042 by Reanna Beltran RN  Outcome: Ongoing  12/27/2021 0652 by Mickie Austin RN  Outcome: Ongoing     Problem:  Body Temperature -  Risk of, Imbalanced  Goal: Ability to maintain a body temperature within defined limits  12/27/2021 1042 by Reanna Beltran RN  Outcome: Ongoing  12/27/2021 0652 by Mickie Austin RN  Outcome: Ongoing  Goal: Will regain or maintain usual level of consciousness  12/27/2021 1042 by Reanna Beltran RN  Outcome: Ongoing  12/27/2021 0652 by Mickie Austin RN  Outcome: Ongoing  Goal: Complications related to the disease process, condition or treatment will be avoided or minimized  12/27/2021 1042 by Reanna Beltran RN  Outcome: Ongoing  12/27/2021 0652 by Mickie Austin RN  Outcome: Ongoing     Problem: Isolation Precautions - Risk of Spread of Infection  Goal: Prevent transmission of infection  12/27/2021 1042 by Reanna Beltran RN  Outcome: Ongoing  12/27/2021 0652 by Mickie Austin RN  Outcome: Ongoing     Problem: Risk for Fluid Volume Deficit  Goal: Maintain normal heart rhythm  12/27/2021 1042 by Reanna Beltran RN  Outcome: Ongoing  12/27/2021 0652 by Mickie Austin RN  Outcome: Ongoing  Goal: Maintain absence of muscle cramping  12/27/2021 1042 by Reanna Beltran RN  Outcome: Ongoing  12/27/2021 0652 by Mickie Austin RN  Outcome: Ongoing  Goal: Maintain normal serum potassium, sodium, calcium, phosphorus, and pH  12/27/2021 1042 by Lizandro Kilgore RN  Outcome: Ongoing  12/27/2021 0652 by Bobo Ramos RN  Outcome: Ongoing

## 2021-12-27 NOTE — PROGRESS NOTES
Hospitalist Progress Note  12/27/2021 11:43 AM  Subjective:   Admit Date: 12/8/2021  PCP: Danae Howe MD    Interval History: Patient with covid pneumonia slow progress remains on   HHF oxygen and Bipap at night. white blood cell down a little. c/o discomfort from hemorrhoids has had loose stools per nursing. D dimer trending down. Diet: ADULT DIET; Regular; 4 carb choices (60 gm/meal); 1800 ml  Medications:   Scheduled Meds:   piperacillin-tazobactam  3,375 mg IntraVENous Q8H    hydrocortisone  25 mg Rectal BID    sodium chloride  3 g Oral BID WC    dexamethasone  10 mg IntraVENous Daily    furosemide  20 mg IntraVENous Daily    enoxaparin  40 mg SubCUTAneous BID    albuterol sulfate HFA  2 puff Inhalation 4x daily    aspirin  81 mg Oral Daily    atorvastatin  40 mg Oral Daily    buPROPion  75 mg Oral Daily    clopidogrel  75 mg Oral Daily    levothyroxine  75 mcg Oral Daily    finasteride  5 mg Oral Daily    oxybutynin  15 mg Oral Daily    metoprolol tartrate  50 mg Oral BID    [Held by provider] lisinopril  20 mg Oral Daily    sodium chloride flush  5-40 mL IntraVENous 2 times per day     Continuous Infusions:   sodium chloride Stopped (12/26/21 1804)     CBC:   Recent Labs     12/25/21  0615 12/26/21  0625 12/27/21  0520   WBC 14.4* 15.1* 14.7*   HGB 12.3* 12.3* 11.9*    254 240     BMP:    Recent Labs     12/25/21  0615 12/26/21  0625 12/27/21  0520    140 142   K 4.4 4.8 4.4    102 102   CO2 26 28 29   BUN 30* 33* 33*   CREATININE 0.75 0.72 0.79   GLUCOSE 87 90 88     Hepatic:   No results for input(s): AST, ALT, ALB, BILITOT, ALKPHOS in the last 72 hours. Troponin: No results for input(s): TROPONINI in the last 72 hours. BNP: No results for input(s): BNP in the last 72 hours. Lipids: No results for input(s): CHOL, HDL in the last 72 hours. Invalid input(s): LDLCALCU  INR: No results for input(s): INR in the last 72 hours.     Objective:   Vitals: /69 Pulse 90   Temp 98.3 °F (36.8 °C) (Axillary)   Resp 30   Ht 6' (1.829 m)   Wt (!) 300 lb 4.8 oz (136.2 kg)   SpO2 92%   BMI 40.73 kg/m²   General appearance: alert and cooperative with exam  HEENT: Eye: Normal external eye, conjunctiva, lids cornea, BETTY. Neck: no adenopathy, no carotid bruit, no JVD, supple, symmetrical, trachea midline and thyroid not enlarged, symmetric, no tenderness/mass/nodules  Lungs: clear to auscultation bilaterally  Heart: regular rate and rhythm, S1, S2 normal, no murmur, click, rub or gallop  Abdomen: soft, non-tender; bowel sounds normal; no masses,  no organomegaly  Extremities: edema bilateral,no calf tenderness  Neurologic: Mental status: Alert, oriented, thought content appropriate    Assessment and Plan:   1. Covid 19 pneumonia. 2. HTN  3. Chronic CHF combined. 4. Hypothyroidism  5. H/O hemorrhoids    Plan:Continue Zosyn. Try to wean down oxygen as possible. Anusol hc for hemorrhoids. monitor daily labs continue supportive care slow progress consider LTAC once more stable for transfer.   Patient Active Problem List:     Hypertension     Osteoarthritis     Pneumonia due to COVID-19 virus     Chronic venous insufficiency     Acquired genu valgum     Degeneration of lumbar intervertebral disc     Lower urinary tract symptoms due to benign prostatic hyperplasia     Presence of right artificial knee joint     Bilateral carotid artery stenosis     Hypothyroidism     Ischemic cardiomyopathy     Ventral hernia     Left bundle branch block (LBBB)     Chronic embolism and thrombosis of deep vein of both distal legs (HCC)     Coronary artery disease involving native coronary artery of native heart     Hyperlipidemia     Class 3 severe obesity due to excess calories with serious comorbidity and body mass index (BMI) of 40.0 to 44.9 in adult [facility])     Chronic combined systolic and diastolic CHF (congestive heart failure) (HCC)     Impaired gait     Emphysema/COPD ([facility] [address]     Acute respiratory failure with hypoxia (HCC)     COPD with acute exacerbation (HCC)      Lolly Menendez MD, MD  Rounding Hospitalist

## 2021-12-27 NOTE — PLAN OF CARE
Problem: Airway Clearance - Ineffective  Goal: Achieve or maintain patent airway  12/27/2021 3170 by Chioma Guillermo RN  Outcome: Ongoing  12/26/2021 1836 by Krys Bosch RN  Outcome: Ongoing     Problem: Gas Exchange - Impaired  Goal: Absence of hypoxia  12/27/2021 0652 by Chioma Guillermo RN  Outcome: Ongoing  12/26/2021 1836 by Krys Bosch RN  Outcome: Ongoing  Note: Tolerating heated high flow  Goal: Promote optimal lung function  12/27/2021 0652 by Chioma Guillermo RN  Outcome: Ongoing  12/26/2021 1836 by Krys Bosch RN  Outcome: Ongoing     Problem: Breathing Pattern - Ineffective  Goal: Ability to achieve and maintain a regular respiratory rate  12/27/2021 0652 by Chioma Guillermo RN  Outcome: Ongoing  12/26/2021 1836 by Krys Bosch RN  Outcome: Ongoing     Problem:  Body Temperature -  Risk of, Imbalanced  Goal: Ability to maintain a body temperature within defined limits  12/27/2021 0652 by Chioma Guillermo RN  Outcome: Ongoing  12/26/2021 1836 by Krys Bosch RN  Outcome: Ongoing  Note: Afebrile at this time  Goal: Will regain or maintain usual level of consciousness  12/27/2021 0652 by Chioma Guillermo RN  Outcome: Ongoing  12/26/2021 1836 by Krys Bosch RN  Outcome: Ongoing  Goal: Complications related to the disease process, condition or treatment will be avoided or minimized  12/27/2021 0652 by Chioma Guillermo RN  Outcome: Ongoing  12/26/2021 1836 by Krys Bosch RN  Outcome: Ongoing     Problem: Isolation Precautions - Risk of Spread of Infection  Goal: Prevent transmission of infection  12/27/2021 0652 by Chioma Guillermo RN  Outcome: Ongoing  12/26/2021 1836 by Krys Bosch RN  Outcome: Ongoing     Problem: Nutrition Deficits  Goal: Optimize nutritional status  12/27/2021 0652 by Chioma Guillermo RN  Outcome: Ongoing  12/26/2021 1836 by Krys Bosch RN  Outcome: Ongoing  Note: Only taking small amounts of food     Problem: Risk for Fluid Volume Deficit  Goal: Maintain normal heart rhythm  12/27/2021 0652 by Vic Yen RN  Outcome: Ongoing  12/26/2021 1836 by Cherelle Bernal RN  Outcome: Ongoing  Goal: Maintain absence of muscle cramping  12/27/2021 0652 by Vic Yen RN  Outcome: Ongoing  12/26/2021 1836 by Cherelle Bernal RN  Outcome: Ongoing  Goal: Maintain normal serum potassium, sodium, calcium, phosphorus, and pH  12/27/2021 0652 by Vic Yen RN  Outcome: Ongoing  12/26/2021 1836 by Cherelle Bernal RN  Outcome: Ongoing     Problem: Loneliness or Risk for Loneliness  Goal: Demonstrate positive use of time alone when socialization is not possible  12/27/2021 3484 by Vic Yen RN  Outcome: Ongoing  12/26/2021 1836 by Cherelle Bernal RN  Outcome: Ongoing     Problem: Fatigue  Goal: Verbalize increase energy and improved vitality  12/27/2021 0652 by Vic Yen RN  Outcome: Ongoing  12/26/2021 1836 by Cherelle Bernal RN  Outcome: Ongoing     Problem: Patient Education: Go to Patient Education Activity  Goal: Patient/Family Education  12/27/2021 6544 by Vic Yen RN  Outcome: Ongoing  12/26/2021 1836 by Cherelle Bernal RN  Outcome: Ongoing     Problem: Skin Integrity:  Goal: Will show no infection signs and symptoms  Description: Will show no infection signs and symptoms  12/27/2021 0652 by Vic Yen RN  Outcome: Ongoing  12/26/2021 1836 by Cherelle Bernal RN  Outcome: Ongoing  Goal: Absence of new skin breakdown  Description: Absence of new skin breakdown  12/27/2021 0652 by Vic Yen RN  Outcome: Ongoing  12/26/2021 1836 by Cherelle Bernal RN  Outcome: Ongoing     Problem: Falls - Risk of:  Goal: Will remain free from falls  Description: Will remain free from falls  12/27/2021 2509 by Vic Yen RN  Outcome: Ongoing  12/26/2021 1836 by Cherelle Bernal RN  Outcome: Ongoing  Note: Remains free from falls at this time  Goal: Absence of physical injury  Description: Absence of physical injury  12/27/2021 4827 by Vic Yen RN  Outcome: Ongoing  12/26/2021 1836 by Delma Snowden RN  Outcome: Ongoing     Problem: Nutrition  Goal: Optimal nutrition therapy  12/27/2021 3761 by Jevon Amos RN  Outcome: Ongoing  12/26/2021 1836 by Delma Snowden RN  Outcome: Ongoing     Problem: Pain:  Description: Pain management should include both nonpharmacologic and pharmacologic interventions.   Goal: Pain level will decrease  Description: Pain level will decrease  12/27/2021 4111 by Jevon Amos RN  Outcome: Ongoing  12/26/2021 1836 by Delma Snowden RN  Outcome: Ongoing  Note: No c/o pain at this time  Goal: Control of acute pain  Description: Control of acute pain  12/27/2021 0652 by Jevon Amos RN  Outcome: Ongoing  12/26/2021 1836 by Delma Snowden RN  Outcome: Ongoing  Goal: Control of chronic pain  Description: Control of chronic pain  12/27/2021 0652 by Jevon Amos RN  Outcome: Ongoing  12/26/2021 1836 by Delma Snowden RN  Outcome: Ongoing     Problem: Discharge Planning:  Goal: Discharged to appropriate level of care  Description: Discharged to appropriate level of care  12/27/2021 0652 by Jevon Amos RN  Outcome: Ongoing  12/26/2021 1836 by Delma Snowden RN  Outcome: Ongoing

## 2021-12-27 NOTE — PROGRESS NOTES
Nutrition Assessment     Type and Reason for Visit: Reassess    Nutrition Recommendations/Plan: Continue current diet -  If po intake <75% at meals provide ONS. Monitor po intakes/ ONS need and labs. Nutrition Assessment:  COVID-19 PNA- continues on HHF and BiPAP. Unable to assess accuracy of wt changes. PO intake: %. Elevated WBC noted. Continue to encourage po intake. If po intake <75% at meals provide ONS. Monitor po intakes, ONS need, and labs. Malnutrition Assessment:  Malnutrition Status: Insufficient data    Estimated Daily Nutrient Needs:  Energy (kcal): 0004-3303 kcal (16-18 kcal/kg); Weight Used for Energy Requirements: Admission  Protein (g): 113-138 gm protein (1.4-1.7 g/kg); Weight Used for Protein Requirements:  Ideal        Fluid (ml/day): 1800 mL fluid restriction per physician    Nutrition Related Findings: COVID-19 virus. HHF + BIPAP. Labs reviewed- WBC 14.7 (H). Active bowel sounds. Edema: BLE +1 pitting. Current Nutrition Therapies:    ADULT DIET; Regular; 4 carb choices (60 gm/meal); 1800 ml    Anthropometric Measures:  · Height: 6' (182.9 cm)  · Current Body Wt: 300 lb 4.8 oz (136.2 kg) (Accuracy?)   · BMI: 40.7    Nutrition Diagnosis:   · Inadequate oral intake related to inadequate protein-energy intake,impaired respiratory function as evidenced by  (COVID-19 virus requiring HHF and BiPAP)    Nutrition Interventions:   Food and/or Nutrient Delivery:  Continue Current Diet  Nutrition Education/Counseling:  Education not indicated   Coordination of Nutrition Care:  Continue to monitor while inpatient    Goals:  PO intakes to provide >50% of estimated nutritional needs       Nutrition Monitoring and Evaluation:   Behavioral-Environmental Outcomes:  None Identified   Food/Nutrient Intake Outcomes:  Food and Nutrient Intake  Physical Signs/Symptoms Outcomes:  Biochemical Data,Chewing or Swallowing,Fluid Status or Edema,Weight     Discharge Planning:     Too soon to determine Staci Espinal RD, LD  Office Number: [phone]

## 2021-12-27 NOTE — PROGRESS NOTES
Physical Therapy  Facility/Department: [facility]  PROGRESSIVE CARE  Daily Treatment Note  NAME: Shakir Florez  : 1945  MRN: [id number]    Date of Service: 2021    Discharge Recommendations:  Continue to assess pending progress,ECF with PT        Assessment   Body structures, Functions, Activity limitations: Decreased functional mobility ; Decreased balance;Decreased safe awareness;Decreased ADL status; Decreased endurance;Decreased strength;Decreased posture  Assessment: Patient limited due to endurance and fatigue. Activity Tolerance  Activity Tolerance: Patient limited by endurance;Treatment limited secondary to medical complications (free text)     Patient Diagnosis(es): The primary encounter diagnosis was Pneumonia due to COVID-19 virus. A diagnosis of Hypoxia was also pertinent to this visit. has a past medical history of Adenomatous polyp, Cholecystitis, Chronic venous insufficiency, DVT (deep venous thrombosis) ([facility] [address] Edema, Gout, Hypertension, Onychomycosis, Osteoarthritis, Plantar fasciitis, and Tobacco abuse.   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 Restrictions  Restrictions/Precautions  Restrictions/Precautions: Isolation  Subjective   Subjective  Subjective: Patient supine in bed upon arrival. On high flow. Sleeping. Agreeable to therapy.           Orientation  Orientation  Overall Orientation Status: Within Normal Limits  Cognition      Objective   Bed mobility  Bridging: Unable to assess  Rolling to Left: Unable to assess  Rolling to Right: Unable to assess  Supine to Sit: Unable to assess  Sit to Supine: Unable to assess  Scooting: Unable to assess  Transfers  Sit to Stand: Unable to assess  Stand to sit: Unable to assess  Bed to Chair: Unable to assess  Stand Pivot Transfers: Unable to assess  Squat Pivot Transfers: Unable to assess  Lateral Transfers: Unable to assess  Car Transfer: Unable to assess  Ambulation  Ambulation?: No        Exercises  Straight Leg Raise: x10  Heelslides: x10  Ankle Pumps: x10  Comments: Increased time required to perform exercises. Patient requested to stop after SLR completed.                         G-Code     OutComes Score                                                     AM-PAC Score             Goals  Short term goals  Time Frame for Short term goals: LOS  Short term goal 1: Bed mobilty with CGA  Short term goal 2: Transfers with CGA x 1 maintaining O2 Sats in 85%  Short term goal 3: Sitting at EOB x 5 min maintaining O2 stats  Patient Goals   Patient goals : Unable to [address]  Times per day: Daily  Current Treatment Recommendations: Spencer Escalera Re-education,Patient/Caregiver Education & Training,ROM,Balance Training,Gait Training,Home Exercise Program,Safety Education & Training,Functional Mobility Training  Safety Devices  Type of devices: Bed alarm in place,Nurse notified,Call light within reach,Left in bed     Therapy Time   Individual Concurrent Group Co-treatment   Time In 0230         Time Out 0247         Minutes Neymar Paiz [address] [place] [address]

## 2021-12-28 ENCOUNTER — APPOINTMENT (OUTPATIENT)
Dept: GENERAL RADIOLOGY | Age: 76
DRG: 177 | End: 2021-12-28
Payer: MEDICARE

## 2021-12-28 LAB
ABSOLUTE EOS #: 0.06 K/UL (ref 0–0.44)
ABSOLUTE IMMATURE GRANULOCYTE: 0.09 K/UL (ref 0–0.3)
ABSOLUTE LYMPH #: 0.91 K/UL (ref 1.1–3.7)
ABSOLUTE MONO #: 0.84 K/UL (ref 0.1–1.2)
ANION GAP SERPL CALCULATED.3IONS-SCNC: 11 MMOL/L (ref 9–17)
BASOPHILS # BLD: 0 % (ref 0–2)
BASOPHILS ABSOLUTE: <0.03 K/UL (ref 0–0.2)
BUN BLDV-MCNC: 34 MG/DL (ref 8–23)
BUN/CREAT BLD: 43 (ref 9–20)
C-REACTIVE PROTEIN: 60.8 MG/L (ref 0–5)
CALCIUM SERPL-MCNC: 8.8 MG/DL (ref 8.6–10.4)
CHLORIDE BLD-SCNC: 103 MMOL/L (ref 98–107)
CO2: 26 MMOL/L (ref 20–31)
CREAT SERPL-MCNC: 0.79 MG/DL (ref 0.7–1.2)
D-DIMER QUANTITATIVE: 1.09 MG/L FEU (ref 0–0.59)
DIFFERENTIAL TYPE: ABNORMAL
EOSINOPHILS RELATIVE PERCENT: 1 % (ref 1–4)
FERRITIN: 1171 UG/L (ref 30–400)
GFR AFRICAN AMERICAN: >60 ML/MIN
GFR NON-AFRICAN AMERICAN: >60 ML/MIN
GFR SERPL CREATININE-BSD FRML MDRD: ABNORMAL ML/MIN/{1.73_M2}
GFR SERPL CREATININE-BSD FRML MDRD: ABNORMAL ML/MIN/{1.73_M2}
GLUCOSE BLD-MCNC: 90 MG/DL (ref 70–99)
HCT VFR BLD CALC: 35.2 % (ref 40.7–50.3)
HEMOGLOBIN: 11 G/DL (ref 13–17)
IMMATURE GRANULOCYTES: 1 %
LACTATE DEHYDROGENASE: 322 U/L (ref 135–225)
LYMPHOCYTES # BLD: 8 % (ref 24–43)
MCH RBC QN AUTO: 29.7 PG (ref 25.2–33.5)
MCHC RBC AUTO-ENTMCNC: 31.3 G/DL (ref 25.2–33.5)
MCV RBC AUTO: 95.1 FL (ref 82.6–102.9)
MONOCYTES # BLD: 8 % (ref 3–12)
NRBC AUTOMATED: 0 PER 100 WBC
PDW BLD-RTO: 14.6 % (ref 11.8–14.4)
PLATELET # BLD: 182 K/UL (ref 138–453)
PLATELET ESTIMATE: ABNORMAL
PMV BLD AUTO: 9.8 FL (ref 8.1–13.5)
POTASSIUM SERPL-SCNC: 4 MMOL/L (ref 3.7–5.3)
RBC # BLD: 3.7 M/UL (ref 4.21–5.77)
RBC # BLD: ABNORMAL 10*6/UL
SEG NEUTROPHILS: 82 % (ref 36–65)
SEGMENTED NEUTROPHILS ABSOLUTE COUNT: 9.24 K/UL (ref 1.5–8.1)
SODIUM BLD-SCNC: 140 MMOL/L (ref 135–144)
WBC # BLD: 11.2 K/UL (ref 3.5–11.3)
WBC # BLD: ABNORMAL 10*3/UL

## 2021-12-28 PROCEDURE — 2700000000 HC OXYGEN THERAPY PER DAY

## 2021-12-28 PROCEDURE — 6360000002 HC RX W HCPCS: Performed by: FAMILY MEDICINE

## 2021-12-28 PROCEDURE — 94640 AIRWAY INHALATION TREATMENT: CPT

## 2021-12-28 PROCEDURE — 83615 LACTATE (LD) (LDH) ENZYME: CPT

## 2021-12-28 PROCEDURE — 85025 COMPLETE CBC W/AUTO DIFF WBC: CPT

## 2021-12-28 PROCEDURE — 71045 X-RAY EXAM CHEST 1 VIEW: CPT

## 2021-12-28 PROCEDURE — 94761 N-INVAS EAR/PLS OXIMETRY MLT: CPT

## 2021-12-28 PROCEDURE — 6370000000 HC RX 637 (ALT 250 FOR IP): Performed by: NURSE PRACTITIONER

## 2021-12-28 PROCEDURE — 6370000000 HC RX 637 (ALT 250 FOR IP): Performed by: FAMILY MEDICINE

## 2021-12-28 PROCEDURE — 82728 ASSAY OF FERRITIN: CPT

## 2021-12-28 PROCEDURE — 2580000003 HC RX 258: Performed by: FAMILY MEDICINE

## 2021-12-28 PROCEDURE — 36415 COLL VENOUS BLD VENIPUNCTURE: CPT

## 2021-12-28 PROCEDURE — 6360000002 HC RX W HCPCS: Performed by: INTERNAL MEDICINE

## 2021-12-28 PROCEDURE — 6360000002 HC RX W HCPCS: Performed by: NURSE PRACTITIONER

## 2021-12-28 PROCEDURE — 85379 FIBRIN DEGRADATION QUANT: CPT

## 2021-12-28 PROCEDURE — 86140 C-REACTIVE PROTEIN: CPT

## 2021-12-28 PROCEDURE — 2060000000 HC ICU INTERMEDIATE R&B

## 2021-12-28 PROCEDURE — 99232 SBSQ HOSP IP/OBS MODERATE 35: CPT | Performed by: FAMILY MEDICINE

## 2021-12-28 PROCEDURE — 94660 CPAP INITIATION&MGMT: CPT

## 2021-12-28 PROCEDURE — 80048 BASIC METABOLIC PNL TOTAL CA: CPT

## 2021-12-28 RX ADMIN — ALBUTEROL SULFATE 2 PUFF: 90 AEROSOL, METERED RESPIRATORY (INHALATION) at 19:48

## 2021-12-28 RX ADMIN — ALBUTEROL SULFATE 2 PUFF: 90 AEROSOL, METERED RESPIRATORY (INHALATION) at 11:26

## 2021-12-28 RX ADMIN — SODIUM CHLORIDE 3 G: 1 TABLET ORAL at 08:25

## 2021-12-28 RX ADMIN — POLYETHYLENE GLYCOL 3350 17 G: 17 POWDER, FOR SOLUTION ORAL at 06:06

## 2021-12-28 RX ADMIN — ENOXAPARIN SODIUM 40 MG: 100 INJECTION SUBCUTANEOUS at 08:24

## 2021-12-28 RX ADMIN — FINASTERIDE 5 MG: 5 TABLET, FILM COATED ORAL at 08:25

## 2021-12-28 RX ADMIN — ATORVASTATIN CALCIUM 40 MG: 40 TABLET, FILM COATED ORAL at 08:25

## 2021-12-28 RX ADMIN — BUPROPION HYDROCHLORIDE 75 MG: 75 TABLET ORAL at 08:27

## 2021-12-28 RX ADMIN — PIPERACILLIN SODIUM AND TAZOBACTAM SODIUM 3375 MG: 3; .375 INJECTION, POWDER, LYOPHILIZED, FOR SOLUTION INTRAVENOUS at 14:19

## 2021-12-28 RX ADMIN — OXYBUTYNIN CHLORIDE 15 MG: 5 TABLET, EXTENDED RELEASE ORAL at 08:25

## 2021-12-28 RX ADMIN — BISACODYL 10 MG: 5 TABLET, COATED ORAL at 14:22

## 2021-12-28 RX ADMIN — ENOXAPARIN SODIUM 40 MG: 100 INJECTION SUBCUTANEOUS at 21:35

## 2021-12-28 RX ADMIN — ZOLPIDEM TARTRATE 5 MG: 5 TABLET ORAL at 21:35

## 2021-12-28 RX ADMIN — PIPERACILLIN SODIUM AND TAZOBACTAM SODIUM 3375 MG: 3; .375 INJECTION, POWDER, LYOPHILIZED, FOR SOLUTION INTRAVENOUS at 06:08

## 2021-12-28 RX ADMIN — ASPIRIN 81 MG: 81 TABLET, COATED ORAL at 08:25

## 2021-12-28 RX ADMIN — SODIUM CHLORIDE 3 G: 1 TABLET ORAL at 18:23

## 2021-12-28 RX ADMIN — FUROSEMIDE 20 MG: 10 INJECTION, SOLUTION INTRAMUSCULAR; INTRAVENOUS at 08:24

## 2021-12-28 RX ADMIN — CLOPIDOGREL BISULFATE 75 MG: 75 TABLET ORAL at 08:25

## 2021-12-28 RX ADMIN — METOPROLOL TARTRATE 50 MG: 50 TABLET, FILM COATED ORAL at 21:35

## 2021-12-28 RX ADMIN — LEVOTHYROXINE SODIUM 75 MCG: 0.07 TABLET ORAL at 06:06

## 2021-12-28 RX ADMIN — ALBUTEROL SULFATE 2 PUFF: 90 AEROSOL, METERED RESPIRATORY (INHALATION) at 07:40

## 2021-12-28 RX ADMIN — DEXAMETHASONE SODIUM PHOSPHATE 10 MG: 10 INJECTION INTRAMUSCULAR; INTRAVENOUS at 08:24

## 2021-12-28 RX ADMIN — ALBUTEROL SULFATE 2 PUFF: 90 AEROSOL, METERED RESPIRATORY (INHALATION) at 14:35

## 2021-12-28 RX ADMIN — METOPROLOL TARTRATE 50 MG: 50 TABLET, FILM COATED ORAL at 08:27

## 2021-12-28 RX ADMIN — HYDROCORTISONE ACETATE 25 MG: 25 SUPPOSITORY RECTAL at 08:27

## 2021-12-28 RX ADMIN — PIPERACILLIN SODIUM AND TAZOBACTAM SODIUM 3375 MG: 3; .375 INJECTION, POWDER, LYOPHILIZED, FOR SOLUTION INTRAVENOUS at 21:34

## 2021-12-28 RX ADMIN — SODIUM CHLORIDE, PRESERVATIVE FREE 10 ML: 5 INJECTION INTRAVENOUS at 08:26

## 2021-12-28 ASSESSMENT — PAIN SCALES - GENERAL
PAINLEVEL_OUTOF10: 0
PAINLEVEL_OUTOF10: 0

## 2021-12-28 NOTE — PLAN OF CARE
Problem: Airway Clearance - Ineffective  Goal: Achieve or maintain patent airway  12/28/2021 0429 by Elicia Kovacs RN  Outcome: Ongoing  12/27/2021 2200 by KISHOR Oliva CNP  Outcome: Ongoing     Problem: Gas Exchange - Impaired  Goal: Absence of hypoxia  12/28/2021 0429 by Elicia Kovacs RN  Outcome: Ongoing  12/27/2021 2200 by KISHOR Oliva CNP  Outcome: Ongoing  Goal: Promote optimal lung function  12/28/2021 0429 by Elicia Kovacs RN  Outcome: Ongoing  12/27/2021 2200 by KISHOR Oliva CNP  Outcome: Ongoing     Problem: Breathing Pattern - Ineffective  Goal: Ability to achieve and maintain a regular respiratory rate  12/28/2021 0429 by Elicia Kovacs RN  Outcome: Ongoing  12/27/2021 2200 by KISHOR Oliva CNP  Outcome: Ongoing     Problem:  Body Temperature -  Risk of, Imbalanced  Goal: Ability to maintain a body temperature within defined limits  12/28/2021 0429 by Elicia Kovacs RN  Outcome: Ongoing  12/27/2021 2200 by KISHOR Oliva CNP  Outcome: Ongoing  Goal: Will regain or maintain usual level of consciousness  12/28/2021 0429 by Elicia Kovacs RN  Outcome: Ongoing  12/27/2021 2200 by KISHOR Oliva CNP  Outcome: Ongoing  Goal: Complications related to the disease process, condition or treatment will be avoided or minimized  12/28/2021 0429 by Elicia Kovacs RN  Outcome: Ongoing  12/27/2021 2200 by KISHOR Oliva CNP  Outcome: Ongoing     Problem: Isolation Precautions - Risk of Spread of Infection  Goal: Prevent transmission of infection  12/28/2021 0429 by Elicia Kovacs RN  Outcome: Ongoing  12/27/2021 2200 by KISHOR Oliva CNP  Outcome: Ongoing     Problem: Nutrition Deficits  Goal: Optimize nutritional status  12/28/2021 0429 by Elicia Kovacs RN  Outcome: Ongoing  12/27/2021 2200 by KISHOR Oliva CNP  Outcome: Ongoing     Problem: Risk for Fluid Volume Deficit  Goal: Maintain normal heart rhythm  12/28/2021 0429 by Elicia Kovacs RN  Outcome: Ongoing  12/27/2021 2200 by KISHOR Worthington CNP  Outcome: Ongoing  Goal: Maintain absence of muscle cramping  12/28/2021 0429 by Jesus Torrez RN  Outcome: Ongoing  12/27/2021 2200 by KISHOR Worthington CNP  Outcome: Ongoing  Goal: Maintain normal serum potassium, sodium, calcium, phosphorus, and pH  12/28/2021 0429 by Jesus Torrez RN  Outcome: Ongoing  12/27/2021 2200 by KISHOR Worthington CNP  Outcome: Ongoing     Problem: Loneliness or Risk for Loneliness  Goal: Demonstrate positive use of time alone when socialization is not possible  12/28/2021 0429 by Jesus Torrez RN  Outcome: Ongoing  12/27/2021 2200 by KISHOR Worthington CNP  Outcome: Ongoing     Problem: Fatigue  Goal: Verbalize increase energy and improved vitality  12/28/2021 0429 by Jesus Torrez RN  Outcome: Ongoing  12/27/2021 2200 by KISHOR Worthington CNP  Outcome: Ongoing     Problem: Patient Education: Go to Patient Education Activity  Goal: Patient/Family Education  12/28/2021 0429 by Jesus Torrez RN  Outcome: Ongoing  12/27/2021 2200 by KISHOR Worthington CNP  Outcome: Ongoing     Problem: Skin Integrity:  Goal: Will show no infection signs and symptoms  Description: Will show no infection signs and symptoms  12/28/2021 0429 by Jesus Torrez RN  Outcome: Ongoing  12/27/2021 2200 by KISHOR Worthington CNP  Outcome: Ongoing  Goal: Absence of new skin breakdown  Description: Absence of new skin breakdown  12/28/2021 0429 by Jesus Torrez RN  Outcome: Ongoing  12/27/2021 2200 by KISHOR Worthington CNP  Outcome: Ongoing     Problem: Falls - Risk of:  Goal: Will remain free from falls  Description: Will remain free from falls  12/28/2021 0429 by Jesus Torrez RN  Outcome: Ongoing  12/27/2021 2200 by KISHOR Worthington CNP  Outcome: Ongoing  Goal: Absence of physical injury  Description: Absence of physical injury  12/28/2021 0429 by Jesus Torrez RN  Outcome: Ongoing  12/27/2021 2200 by KISHOR Worthington CNP  Outcome: Ongoing     Problem: Nutrition  Goal: Optimal nutrition therapy  12/28/2021 0429 by Cheryl Pappas RN  Outcome: Ongoing  12/27/2021 2200 by KISHOR Perez CNP  Outcome: Ongoing     Problem: Pain:  Description: Pain management should include both nonpharmacologic and pharmacologic interventions.   Goal: Pain level will decrease  Description: Pain level will decrease  12/28/2021 0429 by Cheryl Pappas RN  Outcome: Ongoing  12/27/2021 2200 by KISHOR Perez CNP  Outcome: Ongoing  Goal: Control of acute pain  Description: Control of acute pain  12/28/2021 0429 by Cheryl Pappas RN  Outcome: Ongoing  12/27/2021 2200 by KISHOR Perez CNP  Outcome: Ongoing  Goal: Control of chronic pain  Description: Control of chronic pain  12/28/2021 0429 by Cheryl Pappas RN  Outcome: Ongoing  12/27/2021 2200 by KISHOR Perez CNP  Outcome: Ongoing     Problem: Discharge Planning:  Goal: Discharged to appropriate level of care  Description: Discharged to appropriate level of care  12/28/2021 0429 by Cheryl Pappas RN  Outcome: Ongoing  12/27/2021 2200 by KISHOR Perez CNP  Outcome: Ongoing

## 2021-12-28 NOTE — PROGRESS NOTES
Hospitalist Progress Note  12/28/2021 12:02 PM  Subjective:   Admit Date: 12/8/2021  PCP: Matt Amanda MD    Interval History: Patient with covid pneumonia slow progress remains on   HHF oxygen at 55 L and Bipap at night. wbctrending down on Zosyn. c/o constipatiion. D dimer trending down. Diet: ADULT DIET; Regular; 4 carb choices (60 gm/meal); 1800 ml  Medications:   Scheduled Meds:   piperacillin-tazobactam  3,375 mg IntraVENous Q8H    hydrocortisone  25 mg Rectal BID    sodium chloride  3 g Oral BID WC    dexamethasone  10 mg IntraVENous Daily    furosemide  20 mg IntraVENous Daily    enoxaparin  40 mg SubCUTAneous BID    albuterol sulfate HFA  2 puff Inhalation 4x daily    aspirin  81 mg Oral Daily    atorvastatin  40 mg Oral Daily    buPROPion  75 mg Oral Daily    clopidogrel  75 mg Oral Daily    levothyroxine  75 mcg Oral Daily    finasteride  5 mg Oral Daily    oxybutynin  15 mg Oral Daily    metoprolol tartrate  50 mg Oral BID    [Held by provider] lisinopril  20 mg Oral Daily    sodium chloride flush  5-40 mL IntraVENous 2 times per day     Continuous Infusions:   sodium chloride Stopped (12/26/21 1804)     CBC:   Recent Labs     12/26/21  0625 12/27/21  0520 12/28/21  0504   WBC 15.1* 14.7* 11.2   HGB 12.3* 11.9* 11.0*    240 182     BMP:    Recent Labs     12/26/21  0625 12/27/21  0520 12/28/21  0504    142 140   K 4.8 4.4 4.0    102 103   CO2 28 29 26   BUN 33* 33* 34*   CREATININE 0.72 0.79 0.79   GLUCOSE 90 88 90     Hepatic:   No results for input(s): AST, ALT, ALB, BILITOT, ALKPHOS in the last 72 hours. Troponin: No results for input(s): TROPONINI in the last 72 hours. BNP: No results for input(s): BNP in the last 72 hours. Lipids: No results for input(s): CHOL, HDL in the last 72 hours. Invalid input(s): LDLCALCU  INR: No results for input(s): INR in the last 72 hours.     Objective:   Vitals: /67   Pulse 78   Temp 98.6 °F (37 °C) (Tympanic) Resp 26   Ht 6' (1.829 m)   Wt 297 lb 6.4 oz (134.9 kg)   SpO2 91%   BMI 40.33 kg/m²   General appearance: alert and cooperative with exam  HEENT: Eye: Normal external eye, conjunctiva, lids cornea, BETTY. Neck: no adenopathy, no carotid bruit, no JVD, supple, symmetrical, trachea midline and thyroid not enlarged, symmetric, no tenderness/mass/nodules  Lungs: clear to auscultation bilaterally  Heart: regular rate and rhythm, S1, S2 normal, no murmur, click, rub or gallop  Abdomen: soft, non-tender; bowel sounds normal; no masses,  no organomegaly  Extremities: edema bilateral,no calf tenderness  Neurologic: Mental status: Alert, oriented, thought content appropriate    Assessment and Plan:   1. Covid 19 pneumonia. 2. HTN  3. Chronic CHF combined. 4. Hypothyroidism  5. H/O hemorrhoids    Plan:Continue Zosyn. Try to wean down oxygen as possible. Anusol hc for hemorrhoids. Laxatives prnmonitor daily labs continue supportive care slow progress ,consider LTAC once more stable for transfer.   Patient Active Problem List:     Hypertension     Osteoarthritis     Pneumonia due to COVID-19 virus     Chronic venous insufficiency     Acquired genu valgum     Degeneration of lumbar intervertebral disc     Lower urinary tract symptoms due to benign prostatic hyperplasia     Presence of right artificial knee joint     Bilateral carotid artery stenosis     Hypothyroidism     Ischemic cardiomyopathy     Ventral hernia     Left bundle branch block (LBBB)     Chronic embolism and thrombosis of deep vein of both distal legs (HCC)     Coronary artery disease involving native coronary artery of native heart     Hyperlipidemia     Class 3 severe obesity due to excess calories with serious comorbidity and body mass index (BMI) of 40.0 to 44.9 in adult [facility])     Chronic combined systolic and diastolic CHF (congestive heart failure) (HCC)     Impaired gait     Emphysema/COPD ([address]     Acute respiratory failure with hypoxia ([address] COPD with acute exacerbation (HCC)      Stacy Lynn MD, MD  Rounding Hospitalist

## 2021-12-28 NOTE — PROGRESS NOTES
Physical Therapy      Pt declined therapy this date stating \"too tired\".  Will try back tomorrow 12/29/21    Laura Cary PTA

## 2021-12-28 NOTE — PLAN OF CARE
Problem: Airway Clearance - Ineffective  Goal: Achieve or maintain patent airway  12/27/2021 2200 by KISHOR Gautam CNP  Outcome: Ongoing  12/27/2021 1042 by Tanesha Anderson RN  Outcome: Ongoing     Problem: Gas Exchange - Impaired  Goal: Absence of hypoxia  12/27/2021 2200 by KISHOR Gautam CNP  Outcome: Ongoing  12/27/2021 1042 by Tanesha Anderson RN  Outcome: Ongoing  Goal: Promote optimal lung function  12/27/2021 2200 by KISHOR Gautam CNP  Outcome: Ongoing  12/27/2021 1042 by Tanesha Anderson RN  Outcome: Ongoing     Problem: Breathing Pattern - Ineffective  Goal: Ability to achieve and maintain a regular respiratory rate  12/27/2021 2200 by KISHOR Gautam CNP  Outcome: Ongoing  12/27/2021 1042 by Tanesha Anderson RN  Outcome: Ongoing     Problem:  Body Temperature -  Risk of, Imbalanced  Goal: Ability to maintain a body temperature within defined limits  12/27/2021 2200 by KIHSOR Gautam CNP  Outcome: Ongoing  12/27/2021 1042 by Tanesha Anderson RN  Outcome: Ongoing  Goal: Will regain or maintain usual level of consciousness  12/27/2021 2200 by KISHOR Gautam CNP  Outcome: Ongoing  12/27/2021 1042 by Tanesha Anderson RN  Outcome: Ongoing  Goal: Complications related to the disease process, condition or treatment will be avoided or minimized  12/27/2021 2200 by KISHOR Gautam CNP  Outcome: Ongoing  12/27/2021 1042 by Tanesha Anderson RN  Outcome: Ongoing     Problem: Isolation Precautions - Risk of Spread of Infection  Goal: Prevent transmission of infection  12/27/2021 2200 by KISHOR Gautam CNP  Outcome: Ongoing  12/27/2021 1042 by Tanesha Anderson RN  Outcome: Ongoing     Problem: Nutrition Deficits  Goal: Optimize nutritional status  12/27/2021 2200 by KISHOR Gautam CNP  Outcome: Ongoing  12/27/2021 1042 by Tanesha Anderson RN  Outcome: Ongoing     Problem: Risk for Fluid Volume Deficit  Goal: Maintain normal heart rhythm  12/27/2021 2200 by KISHOR Mccoy - KARLEE  Outcome: Ongoing  12/27/2021 1042 by Janel Chen RN  Outcome: Ongoing  Goal: Maintain absence of muscle cramping  12/27/2021 2200 by KISHOR Mccoy - [facility]  Outcome: Ongoing  12/27/2021 1042 by Janel Chen RN  Outcome: Ongoing  Goal: Maintain normal serum potassium, sodium, calcium, phosphorus, and pH  12/27/2021 2200 by KISHOR Mccoy - CNP  Outcome: Ongoing  12/27/2021 1042 by Janel Chen RN  Outcome: Ongoing     Problem: Loneliness or Risk for Loneliness  Goal: Demonstrate positive use of time alone when socialization is not possible  12/27/2021 2200 by KISHOR Mccoy - [facility]  Outcome: Ongoing  12/27/2021 1042 by Janel Chen RN  Outcome: Ongoing     Problem: Fatigue  Goal: Verbalize increase energy and improved vitality  12/27/2021 2200 by KISHOR Mccoy - [facility]  Outcome: Ongoing  12/27/2021 1042 by Janel Chen RN  Outcome: Ongoing     Problem: Patient Education: Go to Patient Education Activity  Goal: Patient/Family Education  12/27/2021 2200 by KISHOR Mccoy - [facility]  Outcome: Ongoing  12/27/2021 1042 by Janel Chen RN  Outcome: Ongoing     Problem: Skin Integrity:  Goal: Will show no infection signs and symptoms  Description: Will show no infection signs and symptoms  12/27/2021 2200 by KISHOR Mccoy - [facility]LEE  Outcome: Ongoing  12/27/2021 1042 by Janel Chen RN  Outcome: Ongoing  Goal: Absence of new skin breakdown  Description: Absence of new skin breakdown  12/27/2021 2200 by KISHOR Mccoy - [facility]  Outcome: Ongoing  12/27/2021 1042 by Janel Chen RN  Outcome: Ongoing     Problem: Falls - Risk of:  Goal: Will remain free from falls  Description: Will remain free from falls  12/27/2021 2200 by KISHOR Mccoy CNP  Outcome: Ongoing  12/27/2021 1042 by Janel Chen RN  Outcome: Ongoing  Goal: Absence of physical injury  Description: Absence of physical injury  12/27/2021 2200 by KISHOR Ruiz CNP  Outcome: Ongoing  12/27/2021 1042 by Kali Almanza RN  Outcome: Ongoing     Problem: Nutrition  Goal: Optimal nutrition therapy  12/27/2021 2200 by KISHOR Ruiz CNP  Outcome: Ongoing  12/27/2021 1042 by Kali Almanza RN  Outcome: Ongoing     Problem: Pain:  Goal: Pain level will decrease  Description: Pain level will decrease  12/27/2021 2200 by KISHOR Ruiz CNP  Outcome: Ongoing  12/27/2021 1042 by Kali Almanza RN  Outcome: Ongoing  Goal: Control of acute pain  Description: Control of acute pain  12/27/2021 2200 by KISHOR Ruiz CNP  Outcome: Ongoing  12/27/2021 1042 by Kali Almanza RN  Outcome: Ongoing  Goal: Control of chronic pain  Description: Control of chronic pain  12/27/2021 2200 by KISHOR Ruiz CNP  Outcome: Ongoing  12/27/2021 1042 by Kali Almanza RN  Outcome: Ongoing     Problem: Discharge Planning:  Goal: Discharged to appropriate level of care  Description: Discharged to appropriate level of care  12/27/2021 2200 by KISHOR Ruiz CNP  Outcome: Ongoing  12/27/2021 1042 by Kali Almanza RN  Outcome: Ongoing

## 2021-12-28 NOTE — PROGRESS NOTES
Consulted RT, Simon, to see if pt O2 needs could be met in order to transport to CT, and at this time she did not believe O2 sats could be supported well enough to transport pt to CT.

## 2021-12-29 ENCOUNTER — APPOINTMENT (OUTPATIENT)
Dept: GENERAL RADIOLOGY | Age: 76
DRG: 177 | End: 2021-12-29
Payer: MEDICARE

## 2021-12-29 LAB
ABSOLUTE EOS #: 0.08 K/UL (ref 0–0.44)
ABSOLUTE IMMATURE GRANULOCYTE: 0.09 K/UL (ref 0–0.3)
ABSOLUTE LYMPH #: 0.94 K/UL (ref 1.1–3.7)
ABSOLUTE MONO #: 0.77 K/UL (ref 0.1–1.2)
ALBUMIN SERPL-MCNC: 2.8 G/DL (ref 3.5–5.2)
ALBUMIN/GLOBULIN RATIO: 0.9 (ref 1–2.5)
ALP BLD-CCNC: 73 U/L (ref 40–129)
ALT SERPL-CCNC: 16 U/L (ref 5–41)
ANION GAP SERPL CALCULATED.3IONS-SCNC: 9 MMOL/L (ref 9–17)
AST SERPL-CCNC: 18 U/L
BASOPHILS # BLD: 0 % (ref 0–2)
BASOPHILS ABSOLUTE: <0.03 K/UL (ref 0–0.2)
BILIRUB SERPL-MCNC: 0.65 MG/DL (ref 0.3–1.2)
BILIRUBIN DIRECT: 0.24 MG/DL
BILIRUBIN, INDIRECT: 0.41 MG/DL (ref 0–1)
BUN BLDV-MCNC: 32 MG/DL (ref 8–23)
C-REACTIVE PROTEIN: 81 MG/L (ref 0–5)
CALCIUM SERPL-MCNC: 8.7 MG/DL (ref 8.6–10.4)
CHLORIDE BLD-SCNC: 103 MMOL/L (ref 98–107)
CO2: 26 MMOL/L (ref 20–31)
CREAT SERPL-MCNC: 0.74 MG/DL (ref 0.7–1.2)
D-DIMER QUANTITATIVE: 0.93 MG/L FEU (ref 0–0.59)
DIFFERENTIAL TYPE: ABNORMAL
EOSINOPHILS RELATIVE PERCENT: 1 % (ref 1–4)
FERRITIN: 1153 UG/L (ref 30–400)
GFR AFRICAN AMERICAN: >60 ML/MIN
GFR NON-AFRICAN AMERICAN: >60 ML/MIN
GFR SERPL CREATININE-BSD FRML MDRD: ABNORMAL ML/MIN/{1.73_M2}
GFR SERPL CREATININE-BSD FRML MDRD: ABNORMAL ML/MIN/{1.73_M2}
GLUCOSE BLD-MCNC: 84 MG/DL (ref 70–99)
HCT VFR BLD CALC: 34 % (ref 40.7–50.3)
HEMOGLOBIN: 10.7 G/DL (ref 13–17)
IMMATURE GRANULOCYTES: 1 %
LACTATE DEHYDROGENASE: 470 U/L (ref 135–225)
LYMPHOCYTES # BLD: 9 % (ref 24–43)
MCH RBC QN AUTO: 29.9 PG (ref 25.2–33.5)
MCHC RBC AUTO-ENTMCNC: 31.5 G/DL (ref 25.2–33.5)
MCV RBC AUTO: 95 FL (ref 82.6–102.9)
MONOCYTES # BLD: 7 % (ref 3–12)
NRBC AUTOMATED: 0 PER 100 WBC
PDW BLD-RTO: 14.7 % (ref 11.8–14.4)
PLATELET # BLD: 166 K/UL (ref 138–453)
PLATELET ESTIMATE: ABNORMAL
PMV BLD AUTO: 9.8 FL (ref 8.1–13.5)
POTASSIUM SERPL-SCNC: 4 MMOL/L (ref 3.7–5.3)
RBC # BLD: 3.58 M/UL (ref 4.21–5.77)
RBC # BLD: ABNORMAL 10*6/UL
SEG NEUTROPHILS: 82 % (ref 36–65)
SEGMENTED NEUTROPHILS ABSOLUTE COUNT: 9.12 K/UL (ref 1.5–8.1)
SODIUM BLD-SCNC: 138 MMOL/L (ref 135–144)
TOTAL PROTEIN: 5.9 G/DL (ref 6.4–8.3)
WBC # BLD: 11 K/UL (ref 3.5–11.3)
WBC # BLD: ABNORMAL 10*3/UL

## 2021-12-29 PROCEDURE — 36415 COLL VENOUS BLD VENIPUNCTURE: CPT

## 2021-12-29 PROCEDURE — 82248 BILIRUBIN DIRECT: CPT

## 2021-12-29 PROCEDURE — 94761 N-INVAS EAR/PLS OXIMETRY MLT: CPT

## 2021-12-29 PROCEDURE — 2700000000 HC OXYGEN THERAPY PER DAY

## 2021-12-29 PROCEDURE — 85379 FIBRIN DEGRADATION QUANT: CPT

## 2021-12-29 PROCEDURE — 82728 ASSAY OF FERRITIN: CPT

## 2021-12-29 PROCEDURE — 6360000002 HC RX W HCPCS: Performed by: NURSE PRACTITIONER

## 2021-12-29 PROCEDURE — 80053 COMPREHEN METABOLIC PANEL: CPT

## 2021-12-29 PROCEDURE — 97110 THERAPEUTIC EXERCISES: CPT | Performed by: PHYSICAL THERAPY ASSISTANT

## 2021-12-29 PROCEDURE — 6370000000 HC RX 637 (ALT 250 FOR IP): Performed by: FAMILY MEDICINE

## 2021-12-29 PROCEDURE — 6360000002 HC RX W HCPCS: Performed by: INTERNAL MEDICINE

## 2021-12-29 PROCEDURE — 6370000000 HC RX 637 (ALT 250 FOR IP): Performed by: NURSE PRACTITIONER

## 2021-12-29 PROCEDURE — 2580000003 HC RX 258: Performed by: FAMILY MEDICINE

## 2021-12-29 PROCEDURE — 83615 LACTATE (LD) (LDH) ENZYME: CPT

## 2021-12-29 PROCEDURE — 86140 C-REACTIVE PROTEIN: CPT

## 2021-12-29 PROCEDURE — 85025 COMPLETE CBC W/AUTO DIFF WBC: CPT

## 2021-12-29 PROCEDURE — 71045 X-RAY EXAM CHEST 1 VIEW: CPT

## 2021-12-29 PROCEDURE — 2060000000 HC ICU INTERMEDIATE R&B

## 2021-12-29 PROCEDURE — 94640 AIRWAY INHALATION TREATMENT: CPT

## 2021-12-29 PROCEDURE — 94660 CPAP INITIATION&MGMT: CPT

## 2021-12-29 PROCEDURE — 6360000002 HC RX W HCPCS: Performed by: FAMILY MEDICINE

## 2021-12-29 PROCEDURE — 99233 SBSQ HOSP IP/OBS HIGH 50: CPT | Performed by: FAMILY MEDICINE

## 2021-12-29 RX ADMIN — ENOXAPARIN SODIUM 40 MG: 100 INJECTION SUBCUTANEOUS at 20:18

## 2021-12-29 RX ADMIN — SODIUM CHLORIDE 3 G: 1 TABLET ORAL at 16:35

## 2021-12-29 RX ADMIN — BUPROPION HYDROCHLORIDE 75 MG: 75 TABLET ORAL at 09:01

## 2021-12-29 RX ADMIN — DEXAMETHASONE SODIUM PHOSPHATE 10 MG: 10 INJECTION INTRAMUSCULAR; INTRAVENOUS at 09:00

## 2021-12-29 RX ADMIN — ENOXAPARIN SODIUM 40 MG: 100 INJECTION SUBCUTANEOUS at 09:00

## 2021-12-29 RX ADMIN — LEVOTHYROXINE SODIUM 75 MCG: 0.07 TABLET ORAL at 05:58

## 2021-12-29 RX ADMIN — SODIUM CHLORIDE, PRESERVATIVE FREE 10 ML: 5 INJECTION INTRAVENOUS at 20:18

## 2021-12-29 RX ADMIN — SODIUM CHLORIDE 25 ML: 9 INJECTION, SOLUTION INTRAVENOUS at 14:11

## 2021-12-29 RX ADMIN — ATORVASTATIN CALCIUM 40 MG: 40 TABLET, FILM COATED ORAL at 08:59

## 2021-12-29 RX ADMIN — ASPIRIN 81 MG: 81 TABLET, COATED ORAL at 09:00

## 2021-12-29 RX ADMIN — CLOPIDOGREL BISULFATE 75 MG: 75 TABLET ORAL at 09:00

## 2021-12-29 RX ADMIN — ALBUTEROL SULFATE 2 PUFF: 90 AEROSOL, METERED RESPIRATORY (INHALATION) at 20:55

## 2021-12-29 RX ADMIN — METOPROLOL TARTRATE 50 MG: 50 TABLET, FILM COATED ORAL at 20:18

## 2021-12-29 RX ADMIN — FINASTERIDE 5 MG: 5 TABLET, FILM COATED ORAL at 08:59

## 2021-12-29 RX ADMIN — FUROSEMIDE 20 MG: 10 INJECTION, SOLUTION INTRAMUSCULAR; INTRAVENOUS at 09:00

## 2021-12-29 RX ADMIN — SODIUM CHLORIDE 3 G: 1 TABLET ORAL at 09:00

## 2021-12-29 RX ADMIN — OXYBUTYNIN CHLORIDE 15 MG: 5 TABLET, EXTENDED RELEASE ORAL at 09:00

## 2021-12-29 RX ADMIN — ALBUTEROL SULFATE 2 PUFF: 90 AEROSOL, METERED RESPIRATORY (INHALATION) at 07:30

## 2021-12-29 RX ADMIN — ALBUTEROL SULFATE 2 PUFF: 90 AEROSOL, METERED RESPIRATORY (INHALATION) at 15:12

## 2021-12-29 RX ADMIN — SODIUM CHLORIDE, PRESERVATIVE FREE 10 ML: 5 INJECTION INTRAVENOUS at 09:00

## 2021-12-29 RX ADMIN — PIPERACILLIN SODIUM AND TAZOBACTAM SODIUM 3375 MG: 3; .375 INJECTION, POWDER, LYOPHILIZED, FOR SOLUTION INTRAVENOUS at 14:08

## 2021-12-29 RX ADMIN — METOPROLOL TARTRATE 50 MG: 50 TABLET, FILM COATED ORAL at 09:00

## 2021-12-29 RX ADMIN — ALBUTEROL SULFATE 2 PUFF: 90 AEROSOL, METERED RESPIRATORY (INHALATION) at 12:45

## 2021-12-29 RX ADMIN — PIPERACILLIN SODIUM AND TAZOBACTAM SODIUM 3375 MG: 3; .375 INJECTION, POWDER, LYOPHILIZED, FOR SOLUTION INTRAVENOUS at 06:02

## 2021-12-29 RX ADMIN — PIPERACILLIN SODIUM AND TAZOBACTAM SODIUM 3375 MG: 3; .375 INJECTION, POWDER, LYOPHILIZED, FOR SOLUTION INTRAVENOUS at 23:15

## 2021-12-29 ASSESSMENT — PAIN SCALES - GENERAL
PAINLEVEL_OUTOF10: 0

## 2021-12-29 NOTE — PROGRESS NOTES
Hospitalist Progress Note  12/29/2021 2:26 PM  Subjective:   Admit Date: 12/8/2021  PCP: Carol Gautam MD     DNR-CCA      C/c:  Chief Complaint   Patient presents with    Respiratory Distress         Interval History: pt on HHF oxygen at 71 % at 50 /l , more alert, says he is doing slightly better    Diet: ADULT DIET; Regular; 4 carb choices (60 gm/meal); 1800 ml                                ip days:21  Medications:   Scheduled Meds:   piperacillin-tazobactam  3,375 mg IntraVENous Q8H    hydrocortisone  25 mg Rectal BID    sodium chloride  3 g Oral BID WC    dexamethasone  10 mg IntraVENous Daily    furosemide  20 mg IntraVENous Daily    enoxaparin  40 mg SubCUTAneous BID    albuterol sulfate HFA  2 puff Inhalation 4x daily    aspirin  81 mg Oral Daily    atorvastatin  40 mg Oral Daily    buPROPion  75 mg Oral Daily    clopidogrel  75 mg Oral Daily    levothyroxine  75 mcg Oral Daily    finasteride  5 mg Oral Daily    oxybutynin  15 mg Oral Daily    metoprolol tartrate  50 mg Oral BID    [Held by provider] lisinopril  20 mg Oral Daily    sodium chloride flush  5-40 mL IntraVENous 2 times per day     Continuous Infusions:   sodium chloride 25 mL (12/29/21 1411)     PRN Meds:.bisacodyl, iopamidol, ondansetron, zolpidem, benzonatate, guaiFENesin-dextromethorphan, albuterol sulfate HFA, sodium chloride flush, sodium chloride, polyethylene glycol, acetaminophen **OR** acetaminophen     CBC:   Recent Labs     12/27/21  0520 12/28/21  0504 12/29/21  0519   WBC 14.7* 11.2 11.0   HGB 11.9* 11.0* 10.7*    182 166     BMP:    Recent Labs     12/27/21  0520 12/28/21  0504 12/29/21  0519    140 138   K 4.4 4.0 4.0    103 103   CO2 29 26 26   BUN 33* 34* 32*   CREATININE 0.79 0.79 0.74   GLUCOSE 88 90 84     Hepatic:   Recent Labs     12/29/21  0519   AST 18   ALT 16   BILITOT 0.65   ALKPHOS 73     Troponin: No results for input(s): TROPONINI in the last 72 hours.   BNP: No results for input(s): BNP in the last 72 hours. Lipids: No results for input(s): CHOL, HDL in the last 72 hours. Invalid input(s): LDLCALCU  INR: No results for input(s): INR in the last 72 hours.     Objective:   Vitals: /74   Pulse 78   Temp 97.8 °F (36.6 °C) (Tympanic)   Resp (!) 44   Ht 6' (1.829 m)   Wt 298 lb (135.2 kg)   SpO2 91%   BMI 40.42 kg/m²   General appearance: alert, appears stated age and cooperative  Skin: Skin color, texture, turgor normal. No rashes or lesions  Lungs: clear to auscultation bilaterally  Heart: regular rate and rhythm, S1, S2 normal, no murmur, click, rub or gallop  Abdomen: soft, non-tender; bowel sounds normal; no masses,  no organomegaly  Extremities: extremities normal, atraumatic, no cyanosis or edema  Neurologic: Mental status: Alert, oriented, thought content appropriate    Prophylaxis:   DVT with  [x] lovenox        [] heparin        [] Scd        [] none:     Radiology:  ECHO Complete 2D W Doppler W Color    Result Date: 12/9/2021  Transthoracic Echocardiography Report (TTE)  Patient Name [facility]     Date of Study               12/09/2021               Nimisha    Date of      1945  Gender                      Male  Birth   Age          67 year(s)  Race                           Room Number  9613        Height:                     72 inch, 182.88 cm   Corporate ID [id number]    Weight:                     315 pounds, 142.9 kg  #   Patient Acct [de-identified]   BSA:          2.58 m^2      BMI:       42.72  #                                                               kg/m^2   MR #         [id number]     Sabina Sebastien [facility]   Accession #  [id number]  Interpreting Physician      Micheal Hwang   Fellow                   Referring Nurse                           Practitioner   Interpreting             Referring Physician         Jeremías Parker, CNP  Fellow  Additional Comments Technically difficult study due to body habitus and condition. Type of Study   TTE procedure:2D Echocardiogram, M-Mode, Doppler, Color Doppler. Procedure Date Date: 12/09/2021 Start: 10:29 AM Study Location: [facility] Technical Quality: Adequate visualization Indications:Shortness of breath, Hypoxia, Congestive heart failure, combined systolic and diastolic dysfunction and COVID 19. History / Tech. Comments: Procedure explained to patient. Patient Status: Inpatient Height: 72 inches Weight: 315.01 pounds BSA: 2.58 m^2 BMI: 42.72 kg/m^2 CONCLUSIONS Summary Technically difficult echo. LV systolic function is mildly reduced, estimated EF 40% Abnormal septal wall motion. Hypokinetic mid to basal inferolateral, inferior and anterolateral walls. Grade I left ventricular diastolic dysfunction. Right ventricular dilatation with reduced systolic function. Aortic valve sclerosis without stenosis. Mild tricuspid regurgitation. Estimated right ventricular systolic pressure is 55 mmHg. No pericardial effusion. Signature ----------------------------------------------------------------------------  Electronically signed by Oneyda Khan RDCS(Sonographer) on 12/09/2021  11:57 AM ---------------------------------------------------------------------------- ----------------------------------------------------------------------------  Electronically signed by Linda HwangInterpreting physician) on  12/09/2021 12:23 PM ---------------------------------------------------------------------------- FINDINGS Left Atrium Left atrium is mildly dilated. Left Ventricle Left ventricle is moderately dilated. Mildly hypertrophic interventricular septum. Abnormal septal wall motion. Hypokinetic inferoseptal and inferior walls. Left ventricular ejection fraction 40 %. Grade I left ventricular diastolic dysfunction. Right Atrium Right atrial dilatation. Right Ventricle Right ventricular dilatation with reduced systolic function.  Mitral Valve Normal mitral valve structure and function. Aortic Valve Aortic valve sclerosis without stenosis. Tricuspid Valve Normal tricuspid valve leaflets. Mild tricuspid regurgitation. Estimated right ventricular systolic pressure is 55 mmHg. Moderate pulmonary hypertension. Pulmonic Valve The pulmonic valve is normal in structure. Pericardial Effusion No significant pericardial effusion is seen. Miscellaneous Aortic root dimension is at upper limit of normal. IVC not visualized, unable to assess size and respiratory collapse. E/E' average = 8. M-mode / 2D Measurements & Calculations:   LVIDd:6.43 cm(3.7 - 5.6 cm)      Diastolic IALKCE:958.1 ml  LVIDs:6 cm(2.2 - 4.0 cm)         Systolic SQUZEQ:671.9 ml  IVSd:1.27 cm(0.6 - 1.1 cm)       LA Dimension: 4.5 cm(1.9 - 4.0 cm)  LVPWd:0.88 cm(0.6 - 1.1 cm)      LA volume/Index: 79 ml /31m^2  Fractional Shortenin.69 %     LVOT:3.7 cm  Calculated LVEF (%): 42.12 %   Mitral:                                  Aortic   Peak E-Wave: 0.50 m/s                    Peak Velocity: 1.10 m/s  Peak A-Wave: 0.72 m/s                    Peak Gradient: 4.84 mmHg  E/A Ratio: 0.7  Peak Gradient: 1 mmHg  Deceleration Time: 430 msec   Tricuspid:                               Pulmonic:   Peak TR Velocity: 3.44 m/s               Peak Velocity: 0.90 m/s  Peak TR Gradient: 47.3344 mmHg           Peak Gradient: 3.21 mmHg  Septal Wall E' velocity:0.04 m/s Lateral Wall E' velocity:0.10 m/s    XR CHEST PORTABLE    Result Date: 2021  EXAMINATION: ONE XRAY VIEW OF THE CHEST 2021 9:47 am COMPARISON: 2021 HISTORY: ORDERING SYSTEM PROVIDED HISTORY: covid TECHNOLOGIST PROVIDED HISTORY: covid Reason for Exam: Follow up due to inpatient status. FINDINGS: Sternal wires are in place. The heart and mediastinal structures are stable. Bilateral lung infiltrates are present similar to prior exam.  Findings are consistent with COVID-19 pneumonia.      Persistent bilateral lung infiltrates compatible with the patient's clinical diagnosis of COVID-19 pneumonia. XR CHEST PORTABLE    Result Date: 12/8/2021  EXAMINATION: ONE XRAY VIEW OF THE CHEST 12/8/2021 3:46 pm COMPARISON: None. HISTORY: ORDERING SYSTEM PROVIDED HISTORY: shortness of breath TECHNOLOGIST PROVIDED HISTORY: shortness of breath Reason for Exam: Respiratory Distress FINDINGS: Status post median sternotomy. Cardiomegaly. Bilateral ill-defined pulmonary opacities. No pneumothorax. No pleural effusion. Bilateral ill-defined pulmonary opacities could represent edema or infection     CT CHEST PULMONARY EMBOLISM W CONTRAST    Result Date: 12/8/2021  EXAMINATION: CTA OF THE CHEST 12/8/2021 11:45 am TECHNIQUE: CTA of the chest was performed after the administration of intravenous contrast.  Multiplanar reformatted images are provided for review. MIP images are provided for review. Dose modulation, iterative reconstruction, and/or weight based adjustment of the mA/kV was utilized to reduce the radiation dose to as low as reasonably achievable. COMPARISON: Chest x-ray dated December 8, 2021 HISTORY: ORDERING SYSTEM PROVIDED HISTORY: Patient here with positive Covid elevated D-dimer TECHNOLOGIST PROVIDED HISTORY: Patient here with positive Covid elevated D-dimer Decision Support Exception - unselect if not a suspected or confirmed emergency medical condition->Emergency Medical Condition (MA) Reason for Exam: r/o pe FINDINGS: Pulmonary Arteries: Pulmonary arteries are adequately opacified for evaluation. No evidence of intraluminal filling defect to suggest pulmonary embolism. Main pulmonary artery is slightly enlarged, measuring 32 mm, suggesting pulmonary arterial hypertension. . Mediastinum: Nonspecific mediastinal and hilar lymph nodes are present, likely reactive. Coronary arterial calcifications are present. Patient is status post prior CABG. Multiple calcified right hilar and subcarinal lymph nodes are present. .  The heart and pericardium demonstrate no acute abnormality.   There is no acute abnormality of the thoracic aorta. Lungs/pleura: Multiple ground-glass opacities are present throughout the lungs, highly suggestive of COVID-19 pulmonary disease given the clinical history. No pneumothorax or pleural effusion is present. Emphysematous changes are present bilaterally within the lungs, with an upper lobe predominance. Upper Abdomen: Limited images of the upper abdomen are unremarkable. Soft Tissues/Bones: No acute bone or soft tissue abnormality. 1. No evidence of pulmonary embolism 2. Extensive ground-glass opacities present throughout the lungs bilaterally, highly suggestive of COVID-19 pulmonary disease given the clinical history 3. Prior CABG       Assessment :   1. covid pneumonia/on lovenox / on HHF oxygen/decadron  2. Continue  Droplet isolation/     Plan:   1.   Continue antibiotics      Patient Active Problem List:     Hypertension     Osteoarthritis     Pneumonia due to COVID-19 virus     Chronic venous insufficiency     Acquired genu valgum     Degeneration of lumbar intervertebral disc     Lower urinary tract symptoms due to benign prostatic hyperplasia     Presence of right artificial knee joint     Bilateral carotid artery stenosis     Hypothyroidism     Ischemic cardiomyopathy     Ventral hernia     Left bundle branch block (LBBB)     Chronic embolism and thrombosis of deep vein of both distal legs (HCC)     Coronary artery disease involving native coronary artery of native heart     Hyperlipidemia     Class 3 severe obesity due to excess calories with serious comorbidity and body mass index (BMI) of 40.0 to 44.9 in adult [facility])     Chronic combined systolic and diastolic CHF (congestive heart failure) (HCC)     Impaired gait     Emphysema/COPD (HCC)     Acute respiratory failure with hypoxia (HCC)     COPD with acute exacerbation ([facility] [address]      Anticipated Disposition upon discharge: [] Home                                                                         [] Home with Home Health                                                                         [] [facility]                                                                         [] [address]      Patient is admitted as inpatient status because of co-morbidities listed above, severity of signs and symptoms as outlined, requirement for current medical therapies and most importantly because of direct risk to patient if care not provided in a hospital setting.           Jorge Roblero MD, MD  Rounding Hospitalist

## 2021-12-29 NOTE — PLAN OF CARE
Problem: Airway Clearance - Ineffective  Goal: Achieve or maintain patent airway  12/29/2021 0735 by Leigha Spaulding RN  Outcome: Ongoing  12/28/2021 1953 by Chayo Meier RN  Outcome: Ongoing     Problem: Gas Exchange - Impaired  Goal: Absence of hypoxia  12/29/2021 0735 by Leigha Spaulding RN  Outcome: Ongoing  12/28/2021 1953 by Chayo Meier RN  Outcome: Ongoing     Problem: Gas Exchange - Impaired  Goal: Promote optimal lung function  12/29/2021 0735 by Leigha Spaulding RN  Outcome: Ongoing  12/28/2021 1953 by Chayo Meier RN  Outcome: Ongoing     Problem: Breathing Pattern - Ineffective  Goal: Ability to achieve and maintain a regular respiratory rate  12/29/2021 0735 by Leigha Spaulding RN  Outcome: Ongoing  12/28/2021 1953 by Chayo Meier RN  Outcome: Ongoing     Problem: Nutrition Deficits  Goal: Optimize nutritional status  12/29/2021 0735 by Leigha Spaulding RN  Outcome: Ongoing  12/28/2021 1953 by Chayo Meier RN  Outcome: Ongoing

## 2021-12-29 NOTE — PROGRESS NOTES
[facility] will initiate precert on Monday 3/0/3943. Pt still on the waiting list still no beds at this time. Sandee Clayton requests clinical update through Monday.

## 2021-12-29 NOTE — PROGRESS NOTES
Physical Therapy  Facility/Department: [facility]  PROGRESSIVE CARE  Daily Treatment Note  NAME: Gladys Nunez  : 1945  MRN: [id number]    Date of Service: 2021    Discharge Recommendations:  Continue to assess pending progress,ECF with PT   PT Equipment Recommendations  Equipment Needed: No    Assessment   Body structures, Functions, Activity limitations: Decreased functional mobility ; Decreased balance;Decreased safe awareness;Decreased ADL status; Decreased endurance;Decreased strength;Decreased posture  Assessment: Patient limited due to endurance and fatigue. Prognosis: Fair  Decision Making: Low Complexity  REQUIRES PT FOLLOW UP: Yes  Activity Tolerance  Activity Tolerance: Patient limited by fatigue;Patient limited by endurance     Patient Diagnosis(es): The primary encounter diagnosis was Pneumonia due to COVID-19 virus. A diagnosis of Hypoxia was also pertinent to this visit. has a past medical history of Adenomatous polyp, Cholecystitis, Chronic venous insufficiency, DVT (deep venous thrombosis) ([facility] [address] Edema, Gout, Hypertension, Onychomycosis, Osteoarthritis, Plantar fasciitis, and Tobacco abuse.   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 Restrictions  Restrictions/Precautions  Restrictions/Precautions: Isolation  Subjective   General  Chart Reviewed: Yes  Subjective  Subjective: Patient supine in bed upon arrival. Agreeable to therapy. No c/o at this time.   Pain Screening  Patient Currently in Pain: No  Pain Assessment  Pain Assessment: 0-10  Vital Signs  Resp: (!) 47 (During exercises.)  Patient Currently in Pain: No  Oxygen Therapy  SpO2: 91 %  Pulse Oximeter Device Mode: Continuous  Pulse Oximeter Device Location: Other(comment); Right (ear)  O2 Device: Heated high flow cannula       Orientation  Orientation  Overall Orientation Status: Within Normal Limits  Cognition      Objective      Transfers  Sit to Stand: Unable to assess  Comment: Held transfers due to patient having increased respirations (47) with supine exercises.   Ambulation  Ambulation?: No        Exercises  Straight Leg Raise: x10  Heelslides: x10  Hip Abduction: 10x in supine  Ankle Pumps: 20x  Comments: Required frequent rest breaks between activities to allow O2 saturation and respirations to return to normal.                        G-Code     OutComes Score                                                     AM-PAC Score             Goals  Short term goals  Time Frame for Short term goals: LOS  Short term goal 1: Bed mobilty with CGA  Short term goal 2: Transfers with CGA x 1 maintaining O2 Sats in 85%  Short term goal 3: Sitting at EOB x 5 min maintaining O2 stats  Patient Goals   Patient goals : Unable to [address]  Times per day: Daily  Current Treatment Recommendations: Toni Gomez Re-education,Patient/Caregiver Education & Training,ROM,Balance Training,Gait Training,Home Exercise Program,Safety Education & Training,Functional Mobility Training  Safety Devices  Type of devices: Bed alarm in place,Nurse notified,Call light within reach,Left in bed     Therapy Time   Individual Concurrent Group Co-treatment   Time In 1315         Time Out 1330         Minutes [address] [place] [address]

## 2021-12-30 ENCOUNTER — APPOINTMENT (OUTPATIENT)
Dept: GENERAL RADIOLOGY | Age: 76
DRG: 177 | End: 2021-12-30
Payer: MEDICARE

## 2021-12-30 LAB
ABSOLUTE EOS #: 0.08 K/UL (ref 0–0.44)
ABSOLUTE IMMATURE GRANULOCYTE: 0.1 K/UL (ref 0–0.3)
ABSOLUTE LYMPH #: 0.97 K/UL (ref 1.1–3.7)
ABSOLUTE MONO #: 0.75 K/UL (ref 0.1–1.2)
ALBUMIN SERPL-MCNC: 2.7 G/DL (ref 3.5–5.2)
ALBUMIN/GLOBULIN RATIO: 0.8 (ref 1–2.5)
ALP BLD-CCNC: 67 U/L (ref 40–129)
ALT SERPL-CCNC: 15 U/L (ref 5–41)
ANION GAP SERPL CALCULATED.3IONS-SCNC: 11 MMOL/L (ref 9–17)
AST SERPL-CCNC: 14 U/L
BASOPHILS # BLD: 0 % (ref 0–2)
BASOPHILS ABSOLUTE: <0.03 K/UL (ref 0–0.2)
BILIRUB SERPL-MCNC: 0.62 MG/DL (ref 0.3–1.2)
BILIRUBIN DIRECT: 0.23 MG/DL
BILIRUBIN, INDIRECT: 0.39 MG/DL (ref 0–1)
BUN BLDV-MCNC: 30 MG/DL (ref 8–23)
C-REACTIVE PROTEIN: 109.2 MG/L (ref 0–5)
CALCIUM SERPL-MCNC: 8.9 MG/DL (ref 8.6–10.4)
CHLORIDE BLD-SCNC: 100 MMOL/L (ref 98–107)
CO2: 26 MMOL/L (ref 20–31)
CREAT SERPL-MCNC: 0.7 MG/DL (ref 0.7–1.2)
D-DIMER QUANTITATIVE: 0.74 MG/L FEU (ref 0–0.59)
DIFFERENTIAL TYPE: ABNORMAL
EOSINOPHILS RELATIVE PERCENT: 1 % (ref 1–4)
FERRITIN: 1154 UG/L (ref 30–400)
GFR AFRICAN AMERICAN: >60 ML/MIN
GFR NON-AFRICAN AMERICAN: >60 ML/MIN
GFR SERPL CREATININE-BSD FRML MDRD: ABNORMAL ML/MIN/{1.73_M2}
GFR SERPL CREATININE-BSD FRML MDRD: ABNORMAL ML/MIN/{1.73_M2}
GLUCOSE BLD-MCNC: 87 MG/DL (ref 70–99)
HCT VFR BLD CALC: 33.6 % (ref 40.7–50.3)
HEMOGLOBIN: 10.6 G/DL (ref 13–17)
IMMATURE GRANULOCYTES: 1 %
LACTATE DEHYDROGENASE: 338 U/L (ref 135–225)
LYMPHOCYTES # BLD: 9 % (ref 24–43)
MCH RBC QN AUTO: 29.8 PG (ref 25.2–33.5)
MCHC RBC AUTO-ENTMCNC: 31.5 G/DL (ref 25.2–33.5)
MCV RBC AUTO: 94.4 FL (ref 82.6–102.9)
MONOCYTES # BLD: 7 % (ref 3–12)
NRBC AUTOMATED: 0 PER 100 WBC
PDW BLD-RTO: 14.7 % (ref 11.8–14.4)
PLATELET # BLD: 152 K/UL (ref 138–453)
PLATELET ESTIMATE: ABNORMAL
PMV BLD AUTO: 10 FL (ref 8.1–13.5)
POTASSIUM SERPL-SCNC: 4 MMOL/L (ref 3.7–5.3)
RBC # BLD: 3.56 M/UL (ref 4.21–5.77)
RBC # BLD: ABNORMAL 10*6/UL
SEG NEUTROPHILS: 82 % (ref 36–65)
SEGMENTED NEUTROPHILS ABSOLUTE COUNT: 8.51 K/UL (ref 1.5–8.1)
SODIUM BLD-SCNC: 137 MMOL/L (ref 135–144)
TOTAL PROTEIN: 5.9 G/DL (ref 6.4–8.3)
WBC # BLD: 10.4 K/UL (ref 3.5–11.3)
WBC # BLD: ABNORMAL 10*3/UL

## 2021-12-30 PROCEDURE — 6370000000 HC RX 637 (ALT 250 FOR IP): Performed by: NURSE PRACTITIONER

## 2021-12-30 PROCEDURE — 71045 X-RAY EXAM CHEST 1 VIEW: CPT

## 2021-12-30 PROCEDURE — 97110 THERAPEUTIC EXERCISES: CPT

## 2021-12-30 PROCEDURE — 94761 N-INVAS EAR/PLS OXIMETRY MLT: CPT

## 2021-12-30 PROCEDURE — 2700000000 HC OXYGEN THERAPY PER DAY

## 2021-12-30 PROCEDURE — 83615 LACTATE (LD) (LDH) ENZYME: CPT

## 2021-12-30 PROCEDURE — 6360000002 HC RX W HCPCS: Performed by: NURSE PRACTITIONER

## 2021-12-30 PROCEDURE — 80053 COMPREHEN METABOLIC PANEL: CPT

## 2021-12-30 PROCEDURE — 82248 BILIRUBIN DIRECT: CPT

## 2021-12-30 PROCEDURE — 36415 COLL VENOUS BLD VENIPUNCTURE: CPT

## 2021-12-30 PROCEDURE — 94660 CPAP INITIATION&MGMT: CPT

## 2021-12-30 PROCEDURE — 99232 SBSQ HOSP IP/OBS MODERATE 35: CPT | Performed by: FAMILY MEDICINE

## 2021-12-30 PROCEDURE — 2060000000 HC ICU INTERMEDIATE R&B

## 2021-12-30 PROCEDURE — 85379 FIBRIN DEGRADATION QUANT: CPT

## 2021-12-30 PROCEDURE — 94640 AIRWAY INHALATION TREATMENT: CPT

## 2021-12-30 PROCEDURE — 2580000003 HC RX 258: Performed by: FAMILY MEDICINE

## 2021-12-30 PROCEDURE — 6360000002 HC RX W HCPCS: Performed by: INTERNAL MEDICINE

## 2021-12-30 PROCEDURE — 82728 ASSAY OF FERRITIN: CPT

## 2021-12-30 PROCEDURE — 6360000002 HC RX W HCPCS: Performed by: FAMILY MEDICINE

## 2021-12-30 PROCEDURE — 86140 C-REACTIVE PROTEIN: CPT

## 2021-12-30 PROCEDURE — 85025 COMPLETE CBC W/AUTO DIFF WBC: CPT

## 2021-12-30 PROCEDURE — 6370000000 HC RX 637 (ALT 250 FOR IP): Performed by: FAMILY MEDICINE

## 2021-12-30 PROCEDURE — 36592 COLLECT BLOOD FROM PICC: CPT

## 2021-12-30 RX ADMIN — ALBUTEROL SULFATE 2 PUFF: 90 AEROSOL, METERED RESPIRATORY (INHALATION) at 08:15

## 2021-12-30 RX ADMIN — ATORVASTATIN CALCIUM 40 MG: 40 TABLET, FILM COATED ORAL at 09:11

## 2021-12-30 RX ADMIN — PIPERACILLIN SODIUM AND TAZOBACTAM SODIUM 3375 MG: 3; .375 INJECTION, POWDER, LYOPHILIZED, FOR SOLUTION INTRAVENOUS at 14:50

## 2021-12-30 RX ADMIN — SODIUM CHLORIDE, PRESERVATIVE FREE 10 ML: 5 INJECTION INTRAVENOUS at 09:12

## 2021-12-30 RX ADMIN — METOPROLOL TARTRATE 50 MG: 50 TABLET, FILM COATED ORAL at 09:11

## 2021-12-30 RX ADMIN — ALBUTEROL SULFATE 2 PUFF: 90 AEROSOL, METERED RESPIRATORY (INHALATION) at 15:27

## 2021-12-30 RX ADMIN — FINASTERIDE 5 MG: 5 TABLET, FILM COATED ORAL at 09:11

## 2021-12-30 RX ADMIN — PIPERACILLIN SODIUM AND TAZOBACTAM SODIUM 3375 MG: 3; .375 INJECTION, POWDER, LYOPHILIZED, FOR SOLUTION INTRAVENOUS at 05:58

## 2021-12-30 RX ADMIN — BUPROPION HYDROCHLORIDE 75 MG: 75 TABLET ORAL at 09:12

## 2021-12-30 RX ADMIN — SODIUM CHLORIDE 3 G: 1 TABLET ORAL at 09:11

## 2021-12-30 RX ADMIN — LEVOTHYROXINE SODIUM 75 MCG: 0.07 TABLET ORAL at 05:56

## 2021-12-30 RX ADMIN — OXYBUTYNIN CHLORIDE 15 MG: 5 TABLET, EXTENDED RELEASE ORAL at 09:11

## 2021-12-30 RX ADMIN — ZOLPIDEM TARTRATE 5 MG: 5 TABLET ORAL at 22:34

## 2021-12-30 RX ADMIN — SODIUM CHLORIDE, PRESERVATIVE FREE 10 ML: 5 INJECTION INTRAVENOUS at 20:23

## 2021-12-30 RX ADMIN — CLOPIDOGREL BISULFATE 75 MG: 75 TABLET ORAL at 09:11

## 2021-12-30 RX ADMIN — PIPERACILLIN SODIUM AND TAZOBACTAM SODIUM 3375 MG: 3; .375 INJECTION, POWDER, LYOPHILIZED, FOR SOLUTION INTRAVENOUS at 22:32

## 2021-12-30 RX ADMIN — SODIUM CHLORIDE 3 G: 1 TABLET ORAL at 16:51

## 2021-12-30 RX ADMIN — ENOXAPARIN SODIUM 40 MG: 100 INJECTION SUBCUTANEOUS at 20:22

## 2021-12-30 RX ADMIN — ALBUTEROL SULFATE 2 PUFF: 90 AEROSOL, METERED RESPIRATORY (INHALATION) at 19:57

## 2021-12-30 RX ADMIN — ENOXAPARIN SODIUM 40 MG: 100 INJECTION SUBCUTANEOUS at 09:11

## 2021-12-30 RX ADMIN — DEXAMETHASONE SODIUM PHOSPHATE 10 MG: 10 INJECTION INTRAMUSCULAR; INTRAVENOUS at 09:11

## 2021-12-30 RX ADMIN — FUROSEMIDE 20 MG: 10 INJECTION, SOLUTION INTRAMUSCULAR; INTRAVENOUS at 09:11

## 2021-12-30 RX ADMIN — SODIUM CHLORIDE 1000 ML: 9 INJECTION, SOLUTION INTRAVENOUS at 14:50

## 2021-12-30 RX ADMIN — ASPIRIN 81 MG: 81 TABLET, COATED ORAL at 09:11

## 2021-12-30 RX ADMIN — ALBUTEROL SULFATE 2 PUFF: 90 AEROSOL, METERED RESPIRATORY (INHALATION) at 11:54

## 2021-12-30 RX ADMIN — ONDANSETRON 4 MG: 2 INJECTION INTRAMUSCULAR; INTRAVENOUS at 06:13

## 2021-12-30 ASSESSMENT — PAIN SCALES - GENERAL
PAINLEVEL_OUTOF10: 0

## 2021-12-30 NOTE — PLAN OF CARE
Problem: Airway Clearance - Ineffective  Goal: Achieve or maintain patent airway  12/30/2021 0741 by Daniel Haney RN  Outcome: Ongoing  12/30/2021 0049 by Rocael Downey RN  Outcome: Ongoing     Problem: Breathing Pattern - Ineffective  Goal: Ability to achieve and maintain a regular respiratory rate  12/30/2021 0741 by Daniel Haney RN  Outcome: Ongoing  12/30/2021 0049 by Rocael Downey RN  Outcome: Ongoing     Problem: Gas Exchange - Impaired  Goal: Promote optimal lung function  12/30/2021 0741 by Daniel Haney RN  Outcome: Ongoing  12/30/2021 0049 by Rocael Downey RN  Outcome: Ongoing     Problem: Isolation Precautions - Risk of Spread of Infection  Goal: Prevent transmission of infection  12/30/2021 0741 by Daniel Haney RN  Outcome: Ongoing  12/30/2021 0049 by Rocael Downey RN  Outcome: Ongoing

## 2021-12-30 NOTE — PLAN OF CARE
Problem: Gas Exchange - Impaired  Goal: Promote optimal lung function  12/30/2021 1609 by Daniel Haney RN  Outcome: Ongoing  12/30/2021 0741 by Daniel Haney RN  Outcome: Ongoing     Problem: Gas Exchange - Impaired  Goal: Absence of hypoxia  12/30/2021 1609 by Daniel Haney RN  Outcome: Ongoing  12/30/2021 0741 by Daniel Haney RN  Outcome: Ongoing     Problem:  Body Temperature -  Risk of, Imbalanced  Goal: Ability to maintain a body temperature within defined limits  12/30/2021 1609 by Daniel Haney RN  Outcome: Ongoing  12/30/2021 0741 by Daniel Haney RN  Outcome: Ongoing     Problem: Breathing Pattern - Ineffective  Goal: Ability to achieve and maintain a regular respiratory rate  12/30/2021 1609 by Daniel Haney RN  Outcome: Ongoing  12/30/2021 0741 by Daniel Haney RN  Outcome: Ongoing     Problem: Nutrition Deficits  Goal: Optimize nutritional status  12/30/2021 1609 by Daniel Haney RN  Outcome: Ongoing  12/30/2021 0741 by Daniel Haney RN  Outcome: Ongoing

## 2021-12-30 NOTE — PROGRESS NOTES
Hospitalist Progress Note  12/30/2021 1:36 PM  Subjective:   Admit Date: 12/8/2021  PCP: Jose Luis Francois MD     DNR-CCA      C/c:  Chief Complaint   Patient presents with    Respiratory Distress         Interval History: continue to be on HHF oxygen, pt says he is slightly better,    Diet: ADULT DIET; Regular; 4 carb choices (60 gm/meal); 1800 ml                                ip days:22  Medications:   Scheduled Meds:   piperacillin-tazobactam  3,375 mg IntraVENous Q8H    hydrocortisone  25 mg Rectal BID    sodium chloride  3 g Oral BID WC    dexamethasone  10 mg IntraVENous Daily    furosemide  20 mg IntraVENous Daily    enoxaparin  40 mg SubCUTAneous BID    albuterol sulfate HFA  2 puff Inhalation 4x daily    aspirin  81 mg Oral Daily    atorvastatin  40 mg Oral Daily    buPROPion  75 mg Oral Daily    clopidogrel  75 mg Oral Daily    levothyroxine  75 mcg Oral Daily    finasteride  5 mg Oral Daily    oxybutynin  15 mg Oral Daily    metoprolol tartrate  50 mg Oral BID    [Held by provider] lisinopril  20 mg Oral Daily    sodium chloride flush  5-40 mL IntraVENous 2 times per day     Continuous Infusions:   sodium chloride 25 mL (12/29/21 1411)     PRN Meds:.bisacodyl, iopamidol, ondansetron, zolpidem, benzonatate, guaiFENesin-dextromethorphan, albuterol sulfate HFA, sodium chloride flush, sodium chloride, polyethylene glycol, acetaminophen **OR** acetaminophen     CBC:   Recent Labs     12/28/21  0504 12/29/21  0519 12/30/21  0643   WBC 11.2 11.0 10.4   HGB 11.0* 10.7* 10.6*    166 152     BMP:    Recent Labs     12/28/21  0504 12/29/21  0519 12/30/21  0643    138 137   K 4.0 4.0 4.0    103 100   CO2 26 26 26   BUN 34* 32* 30*   CREATININE 0.79 0.74 0.70   GLUCOSE 90 84 87     Hepatic:   Recent Labs     12/29/21  0519 12/30/21  0643   AST 18 14   ALT 16 15   BILITOT 0.65 0.62   ALKPHOS 73 67     Troponin: No results for input(s): TROPONINI in the last 72 hours.   BNP: No results for input(s): BNP in the last 72 hours. Lipids: No results for input(s): CHOL, HDL in the last 72 hours. Invalid input(s): LDLCALCU  INR: No results for input(s): INR in the last 72 hours.     Objective:   Vitals: /61   Pulse 79   Temp 98.9 °F (37.2 °C) (Tympanic)   Resp 30   Ht 6' (1.829 m)   Wt 299 lb 1.6 oz (135.7 kg)   SpO2 93%   BMI 40.57 kg/m²   General appearance: alert, appears stated age and cooperative  Skin: Skin color, texture, turgor normal. No rashes or lesions  Lungs: diminished  to auscultation bilaterally  Heart: regular rate and rhythm, S1, S2 normal, no murmur, click, rub or gallop  Abdomen: soft, non-tender; bowel sounds normal; no masses,  no organomegaly  Extremities: extremities normal, atraumatic, no cyanosis or edema  Neurologic: Mental status: Alert, oriented, thought content appropriate    Prophylaxis:   DVT with  [x] lovenox        [] heparin        [] Scd        [] none:     Radiology:  ECHO Complete 2D W Doppler W Color    Result Date: 12/9/2021  Transthoracic Echocardiography Report (TTE)  Patient Name [facility]     Date of Study               12/09/2021               [facility]   Date of      1945  Gender                      Male  Birth   Age          67 year(s)  Race                           Room Number  7091        Height:                     72 inch, 182.88 cm   Corporate ID [id number]    Weight:                     315 pounds, 142.9 kg  #   Patient Acct [de-identified]   BSA:          2.58 m^2      BMI:       42.72  #                                                               kg/m^2   MR #         [id number]     [facility]   Accession #  [id number]  Interpreting Physician      Micheal Hwang   Fellow                   Referring Nurse                           Practitioner   Interpreting             Referring Physician         Tyron Lucero, CNP  Fellow  Additional Comments Technically difficult study due to body habitus and condition. Type of Study   TTE procedure:2D Echocardiogram, M-Mode, Doppler, Color Doppler. Procedure Date Date: 12/09/2021 Start: 10:29 AM Study Location: [facility] Technical Quality: Adequate visualization Indications:Shortness of breath, Hypoxia, Congestive heart failure, combined systolic and diastolic dysfunction and COVID 19. History / Tech. Comments: Procedure explained to patient. Patient Status: Inpatient Height: 72 inches Weight: 315.01 pounds BSA: 2.58 m^2 BMI: 42.72 kg/m^2 CONCLUSIONS Summary Technically difficult echo. LV systolic function is mildly reduced, estimated EF 40% Abnormal septal wall motion. Hypokinetic mid to basal inferolateral, inferior and anterolateral walls. Grade I left ventricular diastolic dysfunction. Right ventricular dilatation with reduced systolic function. Aortic valve sclerosis without stenosis. Mild tricuspid regurgitation. Estimated right ventricular systolic pressure is 55 mmHg. No pericardial effusion. Signature ----------------------------------------------------------------------------  Electronically signed by Grady Montanez RDCS(Sonographer) on 12/09/2021  11:57 AM ---------------------------------------------------------------------------- ----------------------------------------------------------------------------  Electronically signed by Linda HwangInterpreting physician) on  12/09/2021 12:23 PM ---------------------------------------------------------------------------- FINDINGS Left Atrium Left atrium is mildly dilated. Left Ventricle Left ventricle is moderately dilated. Mildly hypertrophic interventricular septum. Abnormal septal wall motion. Hypokinetic inferoseptal and inferior walls. Left ventricular ejection fraction 40 %. Grade I left ventricular diastolic dysfunction. Right Atrium Right atrial dilatation. Right Ventricle Right ventricular dilatation with reduced systolic function.  Mitral Valve Normal mitral valve structure and function. Aortic Valve Aortic valve sclerosis without stenosis. Tricuspid Valve Normal tricuspid valve leaflets. Mild tricuspid regurgitation. Estimated right ventricular systolic pressure is 55 mmHg. Moderate pulmonary hypertension. Pulmonic Valve The pulmonic valve is normal in structure. Pericardial Effusion No significant pericardial effusion is seen. Miscellaneous Aortic root dimension is at upper limit of normal. IVC not visualized, unable to assess size and respiratory collapse. E/E' average = 8. M-mode / 2D Measurements & Calculations:   LVIDd:6.43 cm(3.7 - 5.6 cm)      Diastolic ICJMNO:095.4 ml  LVIDs:6 cm(2.2 - 4.0 cm)         Systolic WGQUCA:222.6 ml  IVSd:1.27 cm(0.6 - 1.1 cm)       LA Dimension: 4.5 cm(1.9 - 4.0 cm)  LVPWd:0.88 cm(0.6 - 1.1 cm)      LA volume/Index: 79 ml /31m^2  Fractional Shortenin.69 %     LVOT:3.7 cm  Calculated LVEF (%): 42.12 %   Mitral:                                  Aortic   Peak E-Wave: 0.50 m/s                    Peak Velocity: 1.10 m/s  Peak A-Wave: 0.72 m/s                    Peak Gradient: 4.84 mmHg  E/A Ratio: 0.7  Peak Gradient: 1 mmHg  Deceleration Time: 430 msec   Tricuspid:                               Pulmonic:   Peak TR Velocity: 3.44 m/s               Peak Velocity: 0.90 m/s  Peak TR Gradient: 47.3344 mmHg           Peak Gradient: 3.21 mmHg  Septal Wall E' velocity:0.04 m/s Lateral Wall E' velocity:0.10 m/s    XR CHEST PORTABLE    Result Date: 2021  EXAMINATION: ONE XRAY VIEW OF THE CHEST 2021 9:47 am COMPARISON: 2021 HISTORY: ORDERING SYSTEM PROVIDED HISTORY: covid TECHNOLOGIST PROVIDED HISTORY: covid Reason for Exam: Follow up due to inpatient status. FINDINGS: Sternal wires are in place. The heart and mediastinal structures are stable. Bilateral lung infiltrates are present similar to prior exam.  Findings are consistent with COVID-19 pneumonia.      Persistent bilateral lung infiltrates compatible with the patient's clinical diagnosis of COVID-19 pneumonia. XR CHEST PORTABLE    Result Date: 12/8/2021  EXAMINATION: ONE XRAY VIEW OF THE CHEST 12/8/2021 3:46 pm COMPARISON: None. HISTORY: ORDERING SYSTEM PROVIDED HISTORY: shortness of breath TECHNOLOGIST PROVIDED HISTORY: shortness of breath Reason for Exam: Respiratory Distress FINDINGS: Status post median sternotomy. Cardiomegaly. Bilateral ill-defined pulmonary opacities. No pneumothorax. No pleural effusion. Bilateral ill-defined pulmonary opacities could represent edema or infection     CT CHEST PULMONARY EMBOLISM W CONTRAST    Result Date: 12/8/2021  EXAMINATION: CTA OF THE CHEST 12/8/2021 11:45 am TECHNIQUE: CTA of the chest was performed after the administration of intravenous contrast.  Multiplanar reformatted images are provided for review. MIP images are provided for review. Dose modulation, iterative reconstruction, and/or weight based adjustment of the mA/kV was utilized to reduce the radiation dose to as low as reasonably achievable. COMPARISON: Chest x-ray dated December 8, 2021 HISTORY: ORDERING SYSTEM PROVIDED HISTORY: Patient here with positive Covid elevated D-dimer TECHNOLOGIST PROVIDED HISTORY: Patient here with positive Covid elevated D-dimer Decision Support Exception - unselect if not a suspected or confirmed emergency medical condition->Emergency Medical Condition (MA) Reason for Exam: r/o pe FINDINGS: Pulmonary Arteries: Pulmonary arteries are adequately opacified for evaluation. No evidence of intraluminal filling defect to suggest pulmonary embolism. Main pulmonary artery is slightly enlarged, measuring 32 mm, suggesting pulmonary arterial hypertension. . Mediastinum: Nonspecific mediastinal and hilar lymph nodes are present, likely reactive. Coronary arterial calcifications are present. Patient is status post prior CABG. Multiple calcified right hilar and subcarinal lymph nodes are present. .  The heart and pericardium demonstrate no acute abnormality. There is no acute abnormality of the thoracic aorta. Lungs/pleura: Multiple ground-glass opacities are present throughout the lungs, highly suggestive of COVID-19 pulmonary disease given the clinical history. No pneumothorax or pleural effusion is present. Emphysematous changes are present bilaterally within the lungs, with an upper lobe predominance. Upper Abdomen: Limited images of the upper abdomen are unremarkable. Soft Tissues/Bones: No acute bone or soft tissue abnormality. 1. No evidence of pulmonary embolism 2. Extensive ground-glass opacities present throughout the lungs bilaterally, highly suggestive of COVID-19 pulmonary disease given the clinical history 3. Prior CABG       Assessment :   1. covid pneumonia/on decadron/supplemental oxygen  2. htn     Plan:   1. Continue present care  2.   See order    Patient Active Problem List:     Hypertension     Osteoarthritis     Pneumonia due to COVID-19 virus     Chronic venous insufficiency     Acquired genu valgum     Degeneration of lumbar intervertebral disc     Lower urinary tract symptoms due to benign prostatic hyperplasia     Presence of right artificial knee joint     Bilateral carotid artery stenosis     Hypothyroidism     Ischemic cardiomyopathy     Ventral hernia     Left bundle branch block (LBBB)     Chronic embolism and thrombosis of deep vein of both distal legs (HCC)     Coronary artery disease involving native coronary artery of native heart     Hyperlipidemia     Class 3 severe obesity due to excess calories with serious comorbidity and body mass index (BMI) of 40.0 to 44.9 in adult [facility])     Chronic combined systolic and diastolic CHF (congestive heart failure) (HCC)     Impaired gait     Emphysema/COPD (HCC)     Acute respiratory failure with hypoxia (HCC)     COPD with acute exacerbation ([facility] [address]      Anticipated Disposition upon discharge: [] Home                                                                         [] Home with Home Health                                                                         [] [facility]                                                                         [] [address] ,[address]      Patient is admitted as inpatient status because of co-morbidities listed above, severity of signs and symptoms as outlined, requirement for current medical therapies and most importantly because of direct risk to patient if care not provided in a hospital setting.           Marcell Lim MD, MD  Rounding Hospitalist

## 2021-12-30 NOTE — PROGRESS NOTES
Physical Therapy  Facility/Department: [facility]  PROGRESSIVE CARE  Daily Treatment Note  NAME: Chris Mclain  : 1945  MRN: [id number]    Date of Service: 2021    Discharge Recommendations:  Continue to assess pending progress,ECF with PT        Assessment   Body structures, Functions, Activity limitations: Decreased functional mobility ; Decreased balance;Decreased safe awareness;Decreased ADL status; Decreased endurance;Decreased strength;Decreased posture  Assessment: Patient limited due to endurance and fatigue. Activity Tolerance  Activity Tolerance: Patient limited by fatigue;Patient limited by endurance     Patient Diagnosis(es): The primary encounter diagnosis was Pneumonia due to COVID-19 virus. A diagnosis of Hypoxia was also pertinent to this visit. has a past medical history of Adenomatous polyp, Cholecystitis, Chronic venous insufficiency, DVT (deep venous thrombosis) ([facility] [address] Edema, Gout, Hypertension, Onychomycosis, Osteoarthritis, Plantar fasciitis, and Tobacco abuse.   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 Restrictions  Restrictions/Precautions  Restrictions/Precautions: Isolation  Subjective   Subjective  Subjective: Patient suine in bed agreeable to therapy. Nurse stated RR has been fluctuating.   Pain Assessment  Pain Assessment: 0-10  Pain Level: 0       Orientation  Orientation  Overall Orientation Status: Within Normal Limits  Cognition      Objective      Transfers  Sit to Stand: Unable to assess  Stand to sit: Unable to assess  Bed to Chair: Unable to assess  Stand Pivot Transfers: Unable to assess  Squat Pivot Transfers: Unable to assess  Lateral Transfers: Unable to assess  Car Transfer: Unable to assess  Comment: RR rates limiting session. Ambulation  Ambulation?: No        Exercises  Straight Leg Raise: x15  Heelslides: 15x  Gluteal Sets: x15  Ankle Pumps: 15x  Comments: Supine exercises performed. monitored RR requiring frequent breaks.                         G-Code     OutComes Score                                                     AM-PAC Score             Goals  Short term goals  Time Frame for Short term goals: LOS  Short term goal 1: Bed mobilty with CGA  Short term goal 2: Transfers with CGA x 1 maintaining O2 Sats in 85%  Short term goal 3: Sitting at EOB x 5 min maintaining O2 stats  Patient Goals   Patient goals : Unable to [address]  Times per day: Daily  Current Treatment Recommendations: Yoshi Holman Re-education,Patient/Caregiver Education & Training,ROM,Balance Training,Gait Training,Home Exercise Program,Safety Education & Training,Functional Mobility Training  Safety Devices  Type of devices: Bed alarm in place,Nurse notified,Call light within reach,Left in bed     Therapy Time   Individual Concurrent Group Co-treatment   Time In 0116         Time Out 0132         Minutes [address]

## 2021-12-31 ENCOUNTER — APPOINTMENT (OUTPATIENT)
Dept: GENERAL RADIOLOGY | Age: 76
DRG: 177 | End: 2021-12-31
Payer: MEDICARE

## 2021-12-31 LAB
ABSOLUTE EOS #: 0.06 K/UL (ref 0–0.44)
ABSOLUTE IMMATURE GRANULOCYTE: 0.06 K/UL (ref 0–0.3)
ABSOLUTE LYMPH #: 1.06 K/UL (ref 1.1–3.7)
ABSOLUTE MONO #: 0.56 K/UL (ref 0.1–1.2)
ALBUMIN SERPL-MCNC: 2.7 G/DL (ref 3.5–5.2)
ALBUMIN/GLOBULIN RATIO: 0.8 (ref 1–2.5)
ALP BLD-CCNC: 62 U/L (ref 40–129)
ALT SERPL-CCNC: 18 U/L (ref 5–41)
ANION GAP SERPL CALCULATED.3IONS-SCNC: 10 MMOL/L (ref 9–17)
AST SERPL-CCNC: 15 U/L
BASOPHILS # BLD: 0 % (ref 0–2)
BASOPHILS ABSOLUTE: <0.03 K/UL (ref 0–0.2)
BILIRUB SERPL-MCNC: 0.58 MG/DL (ref 0.3–1.2)
BILIRUBIN DIRECT: 0.25 MG/DL
BILIRUBIN, INDIRECT: 0.33 MG/DL (ref 0–1)
BUN BLDV-MCNC: 30 MG/DL (ref 8–23)
C-REACTIVE PROTEIN: 74.7 MG/L (ref 0–5)
CALCIUM SERPL-MCNC: 8.8 MG/DL (ref 8.6–10.4)
CHLORIDE BLD-SCNC: 101 MMOL/L (ref 98–107)
CO2: 27 MMOL/L (ref 20–31)
CREAT SERPL-MCNC: 0.67 MG/DL (ref 0.7–1.2)
D-DIMER QUANTITATIVE: 0.71 MG/L FEU (ref 0–0.59)
DIFFERENTIAL TYPE: ABNORMAL
EOSINOPHILS RELATIVE PERCENT: 1 % (ref 1–4)
FERRITIN: 1168 UG/L (ref 30–400)
GFR AFRICAN AMERICAN: >60 ML/MIN
GFR NON-AFRICAN AMERICAN: >60 ML/MIN
GFR SERPL CREATININE-BSD FRML MDRD: ABNORMAL ML/MIN/{1.73_M2}
GFR SERPL CREATININE-BSD FRML MDRD: ABNORMAL ML/MIN/{1.73_M2}
GLUCOSE BLD-MCNC: 90 MG/DL (ref 70–99)
HCT VFR BLD CALC: 33.5 % (ref 40.7–50.3)
HEMOGLOBIN: 10.3 G/DL (ref 13–17)
IMMATURE GRANULOCYTES: 1 %
LACTATE DEHYDROGENASE: 308 U/L (ref 135–225)
LYMPHOCYTES # BLD: 12 % (ref 24–43)
MCH RBC QN AUTO: 29.7 PG (ref 25.2–33.5)
MCHC RBC AUTO-ENTMCNC: 30.7 G/DL (ref 25.2–33.5)
MCV RBC AUTO: 96.5 FL (ref 82.6–102.9)
MONOCYTES # BLD: 6 % (ref 3–12)
NRBC AUTOMATED: 0 PER 100 WBC
PDW BLD-RTO: 15 % (ref 11.8–14.4)
PLATELET # BLD: 144 K/UL (ref 138–453)
PLATELET ESTIMATE: ABNORMAL
PMV BLD AUTO: 10.1 FL (ref 8.1–13.5)
POTASSIUM SERPL-SCNC: 4.1 MMOL/L (ref 3.7–5.3)
RBC # BLD: 3.47 M/UL (ref 4.21–5.77)
RBC # BLD: ABNORMAL 10*6/UL
SEG NEUTROPHILS: 80 % (ref 36–65)
SEGMENTED NEUTROPHILS ABSOLUTE COUNT: 6.96 K/UL (ref 1.5–8.1)
SODIUM BLD-SCNC: 138 MMOL/L (ref 135–144)
TOTAL PROTEIN: 6 G/DL (ref 6.4–8.3)
WBC # BLD: 8.7 K/UL (ref 3.5–11.3)
WBC # BLD: ABNORMAL 10*3/UL

## 2021-12-31 PROCEDURE — 36592 COLLECT BLOOD FROM PICC: CPT

## 2021-12-31 PROCEDURE — 2580000003 HC RX 258: Performed by: FAMILY MEDICINE

## 2021-12-31 PROCEDURE — 83615 LACTATE (LD) (LDH) ENZYME: CPT

## 2021-12-31 PROCEDURE — 82728 ASSAY OF FERRITIN: CPT

## 2021-12-31 PROCEDURE — 86140 C-REACTIVE PROTEIN: CPT

## 2021-12-31 PROCEDURE — 51701 INSERT BLADDER CATHETER: CPT

## 2021-12-31 PROCEDURE — 51798 US URINE CAPACITY MEASURE: CPT

## 2021-12-31 PROCEDURE — 85025 COMPLETE CBC W/AUTO DIFF WBC: CPT

## 2021-12-31 PROCEDURE — 6370000000 HC RX 637 (ALT 250 FOR IP)

## 2021-12-31 PROCEDURE — 2060000000 HC ICU INTERMEDIATE R&B

## 2021-12-31 PROCEDURE — 94660 CPAP INITIATION&MGMT: CPT

## 2021-12-31 PROCEDURE — 6360000002 HC RX W HCPCS: Performed by: NURSE PRACTITIONER

## 2021-12-31 PROCEDURE — 36415 COLL VENOUS BLD VENIPUNCTURE: CPT

## 2021-12-31 PROCEDURE — 94761 N-INVAS EAR/PLS OXIMETRY MLT: CPT

## 2021-12-31 PROCEDURE — 85379 FIBRIN DEGRADATION QUANT: CPT

## 2021-12-31 PROCEDURE — 80053 COMPREHEN METABOLIC PANEL: CPT

## 2021-12-31 PROCEDURE — 82248 BILIRUBIN DIRECT: CPT

## 2021-12-31 PROCEDURE — 6360000002 HC RX W HCPCS: Performed by: FAMILY MEDICINE

## 2021-12-31 PROCEDURE — 6370000000 HC RX 637 (ALT 250 FOR IP): Performed by: FAMILY MEDICINE

## 2021-12-31 PROCEDURE — 97530 THERAPEUTIC ACTIVITIES: CPT | Performed by: PHYSICAL THERAPY ASSISTANT

## 2021-12-31 PROCEDURE — 2700000000 HC OXYGEN THERAPY PER DAY

## 2021-12-31 PROCEDURE — 6370000000 HC RX 637 (ALT 250 FOR IP): Performed by: NURSE PRACTITIONER

## 2021-12-31 PROCEDURE — 97110 THERAPEUTIC EXERCISES: CPT | Performed by: PHYSICAL THERAPY ASSISTANT

## 2021-12-31 PROCEDURE — 94640 AIRWAY INHALATION TREATMENT: CPT

## 2021-12-31 PROCEDURE — 71045 X-RAY EXAM CHEST 1 VIEW: CPT

## 2021-12-31 PROCEDURE — 6360000002 HC RX W HCPCS: Performed by: INTERNAL MEDICINE

## 2021-12-31 PROCEDURE — 99232 SBSQ HOSP IP/OBS MODERATE 35: CPT | Performed by: FAMILY MEDICINE

## 2021-12-31 PROCEDURE — APPNB15 APP NON BILLABLE TIME 0-15 MINS

## 2021-12-31 RX ADMIN — ENOXAPARIN SODIUM 40 MG: 100 INJECTION SUBCUTANEOUS at 08:46

## 2021-12-31 RX ADMIN — ENOXAPARIN SODIUM 40 MG: 100 INJECTION SUBCUTANEOUS at 20:47

## 2021-12-31 RX ADMIN — PIPERACILLIN SODIUM AND TAZOBACTAM SODIUM 3375 MG: 3; .375 INJECTION, POWDER, LYOPHILIZED, FOR SOLUTION INTRAVENOUS at 13:20

## 2021-12-31 RX ADMIN — FUROSEMIDE 20 MG: 10 INJECTION, SOLUTION INTRAMUSCULAR; INTRAVENOUS at 08:45

## 2021-12-31 RX ADMIN — ATORVASTATIN CALCIUM 40 MG: 40 TABLET, FILM COATED ORAL at 08:46

## 2021-12-31 RX ADMIN — FINASTERIDE 5 MG: 5 TABLET, FILM COATED ORAL at 08:46

## 2021-12-31 RX ADMIN — ALBUTEROL SULFATE 2 PUFF: 90 AEROSOL, METERED RESPIRATORY (INHALATION) at 15:26

## 2021-12-31 RX ADMIN — PIPERACILLIN SODIUM AND TAZOBACTAM SODIUM 3375 MG: 3; .375 INJECTION, POWDER, LYOPHILIZED, FOR SOLUTION INTRAVENOUS at 06:05

## 2021-12-31 RX ADMIN — ASPIRIN 81 MG: 81 TABLET, COATED ORAL at 08:46

## 2021-12-31 RX ADMIN — BUPROPION HYDROCHLORIDE 75 MG: 75 TABLET ORAL at 08:47

## 2021-12-31 RX ADMIN — HYDROCORTISONE ACETATE 25 MG: 25 SUPPOSITORY RECTAL at 20:47

## 2021-12-31 RX ADMIN — METOPROLOL TARTRATE 50 MG: 50 TABLET, FILM COATED ORAL at 20:47

## 2021-12-31 RX ADMIN — SODIUM CHLORIDE, PRESERVATIVE FREE 10 ML: 5 INJECTION INTRAVENOUS at 20:47

## 2021-12-31 RX ADMIN — CLOPIDOGREL BISULFATE 75 MG: 75 TABLET ORAL at 08:46

## 2021-12-31 RX ADMIN — LEVOTHYROXINE SODIUM 75 MCG: 0.07 TABLET ORAL at 06:04

## 2021-12-31 RX ADMIN — GUAIFENESIN AND DEXTROMETHORPHAN 5 ML: 100; 10 SYRUP ORAL at 20:47

## 2021-12-31 RX ADMIN — ALBUTEROL SULFATE 2 PUFF: 90 AEROSOL, METERED RESPIRATORY (INHALATION) at 12:59

## 2021-12-31 RX ADMIN — SODIUM CHLORIDE 1000 ML: 9 INJECTION, SOLUTION INTRAVENOUS at 09:05

## 2021-12-31 RX ADMIN — ALBUTEROL SULFATE 2 PUFF: 90 AEROSOL, METERED RESPIRATORY (INHALATION) at 07:50

## 2021-12-31 RX ADMIN — OXYBUTYNIN CHLORIDE 15 MG: 5 TABLET, EXTENDED RELEASE ORAL at 08:46

## 2021-12-31 RX ADMIN — METOPROLOL TARTRATE 50 MG: 50 TABLET, FILM COATED ORAL at 08:46

## 2021-12-31 RX ADMIN — DEXAMETHASONE SODIUM PHOSPHATE 10 MG: 10 INJECTION INTRAMUSCULAR; INTRAVENOUS at 08:45

## 2021-12-31 RX ADMIN — PIPERACILLIN SODIUM AND TAZOBACTAM SODIUM 3375 MG: 3; .375 INJECTION, POWDER, LYOPHILIZED, FOR SOLUTION INTRAVENOUS at 22:32

## 2021-12-31 RX ADMIN — SODIUM CHLORIDE, PRESERVATIVE FREE 10 ML: 5 INJECTION INTRAVENOUS at 08:45

## 2021-12-31 RX ADMIN — ALBUTEROL SULFATE 2 PUFF: 90 AEROSOL, METERED RESPIRATORY (INHALATION) at 20:06

## 2021-12-31 RX ADMIN — SODIUM CHLORIDE 3 G: 1 TABLET ORAL at 08:46

## 2021-12-31 RX ADMIN — SODIUM CHLORIDE 25 ML: 9 INJECTION, SOLUTION INTRAVENOUS at 13:20

## 2021-12-31 RX ADMIN — SODIUM CHLORIDE 3 G: 1 TABLET ORAL at 17:10

## 2021-12-31 RX ADMIN — ZOLPIDEM TARTRATE 5 MG: 5 TABLET ORAL at 20:47

## 2021-12-31 ASSESSMENT — PAIN SCALES - GENERAL
PAINLEVEL_OUTOF10: 0

## 2021-12-31 NOTE — PLAN OF CARE
Problem: Gas Exchange - Impaired  Goal: Promote optimal lung function  12/31/2021 0748 by Tone Petersen RN  Outcome: Ongoing  12/31/2021 0431 by Shaun Hernandez RN  Outcome: Ongoing     Problem: Breathing Pattern - Ineffective  Goal: Ability to achieve and maintain a regular respiratory rate  12/31/2021 0748 by Tone Petersen RN  Outcome: Ongoing  12/31/2021 0431 by Shaun Hernandez RN  Outcome: Ongoing     Problem: Risk for Fluid Volume Deficit  Goal: Maintain absence of muscle cramping  12/31/2021 0748 by Tone Petersen RN  Outcome: Ongoing  12/31/2021 0431 by Shaun Hernandez RN  Outcome: Ongoing     Problem: Risk for Fluid Volume Deficit  Goal: Maintain normal heart rhythm  12/31/2021 0748 by Tone Petersen RN  Outcome: Ongoing  12/31/2021 0431 by Shaun Hernandez RN  Outcome: Ongoing     Problem: Skin Integrity:  Goal: Will show no infection signs and symptoms  Description: Will show no infection signs and symptoms  12/31/2021 0748 by Tone Petersen RN  Outcome: Ongoing  12/31/2021 0431 by Shaun Hernandez RN  Outcome: Ongoing

## 2021-12-31 NOTE — PROGRESS NOTES
Physical Therapy  Facility/Department: [facility]  PROGRESSIVE CARE  Daily Treatment Note  NAME: Freeman Balbuena  : 1945  MRN: [id number]    Date of Service: 2021    Discharge Recommendations:  Continue to assess pending progress,ECF with PT        Assessment   Body structures, Functions, Activity limitations: Decreased functional mobility ; Decreased balance;Decreased safe awareness;Decreased ADL status; Decreased endurance;Decreased strength;Decreased posture  Prognosis: Fair  Decision Making: Low Complexity  REQUIRES PT FOLLOW UP: Yes  Activity Tolerance  Activity Tolerance: Patient limited by fatigue;Patient limited by endurance     Patient Diagnosis(es): The primary encounter diagnosis was Pneumonia due to COVID-19 virus. A diagnosis of Hypoxia was also pertinent to this visit. has a past medical history of Adenomatous polyp, Cholecystitis, Chronic venous insufficiency, DVT (deep venous thrombosis) ([facility] [address] Edema, Gout, Hypertension, Onychomycosis, Osteoarthritis, Plantar fasciitis, and Tobacco abuse.   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 Restrictions  Restrictions/Precautions  Restrictions/Precautions: Isolation  Subjective   General  Chart Reviewed: Yes  Subjective  Subjective: Patient supine in bed agreeable to therapy. No new c/o noted at this time.   Pain Screening  Patient Currently in Pain: No  Pain Assessment  Pain Assessment: 0-10  Pain Level: 0  Vital Signs  Resp: (!) 32 (Raised to 43 with activity.)  Patient Currently in Pain: No  Oxygen Therapy  SpO2: 91 %  Pulse Oximeter Device Mode: Continuous  Pulse Oximeter Device Location: Left;Finger       Orientation  Orientation  Overall Orientation Status: Within Normal Limits  Cognition      Objective   Bed mobility  Rolling to Left: Stand by assistance  Rolling to Right: Stand by assistance  Comment: Bed mobility performed with minimal changes in O2 SAT or Respirations. Transfers  Comment: Held additional transfers due to respiration rate  Ambulation  Ambulation?: No        Exercises  Straight Leg Raise: 10x  Heelslides: 10x  Gluteal Sets: 20x  Hip Abduction: 10x in supine  Ankle Pumps: 20x  Comments: Supine exercises performed. monitored RR requiring frequent breaks.                         G-Code     OutComes Score                                                     AM-PAC Score             Goals  Short term goals  Time Frame for Short term goals: LOS  Short term goal 1: Bed mobilty with CGA  Short term goal 2: Transfers with CGA x 1 maintaining O2 Sats in 85%  Short term goal 3: Sitting at EOB x 5 min maintaining O2 stats  Patient Goals   Patient goals : Unable to [address]  Times per day: Daily  Current Treatment Recommendations: Hernandez Spence Re-education,Patient/Caregiver Education & Training,ROM,Balance Training,Gait Training,Home Exercise Program,Safety Education & Training,Functional Mobility Training  Safety Devices  Type of devices: Bed alarm in place,Nurse notified,Call light within reach,Left in bed     Therapy Time   Individual Concurrent Group Co-treatment   Time In 0825         Time Out 0850         Minutes [address]

## 2021-12-31 NOTE — FLOWSHEET NOTE
Pt straight cathed and got 275 ml urine output. Pt tolerated well. Pt in bed call light in reach. No further needs noted at this time.

## 2021-12-31 NOTE — PLAN OF CARE
Problem: Airway Clearance - Ineffective  Goal: Achieve or maintain patent airway  12/31/2021 0431 by Champ Paulino RN  Outcome: Ongoing  12/30/2021 1609 by Tamiko Michele RN  Outcome: Ongoing     Problem: Gas Exchange - Impaired  Goal: Absence of hypoxia  12/31/2021 0431 by Champ Paulino RN  Outcome: Ongoing  12/30/2021 1609 by Tamiko Michele RN  Outcome: Ongoing  Goal: Promote optimal lung function  12/31/2021 0431 by Champ Paulino RN  Outcome: Ongoing  12/30/2021 1609 by Tamiko Michele RN  Outcome: Ongoing     Problem: Breathing Pattern - Ineffective  Goal: Ability to achieve and maintain a regular respiratory rate  12/31/2021 0431 by Champ Paulino RN  Outcome: Ongoing  12/30/2021 1609 by Tamiko Michele RN  Outcome: Ongoing     Problem:  Body Temperature -  Risk of, Imbalanced  Goal: Ability to maintain a body temperature within defined limits  12/31/2021 0431 by Champ Paulino RN  Outcome: Ongoing  12/30/2021 1609 by Tamiko Michele RN  Outcome: Ongoing  Goal: Will regain or maintain usual level of consciousness  12/31/2021 0431 by Champ Paulino RN  Outcome: Ongoing  12/30/2021 1609 by Tamiko Michele RN  Outcome: Ongoing  Goal: Complications related to the disease process, condition or treatment will be avoided or minimized  12/31/2021 0431 by Champ Paulino RN  Outcome: Ongoing  12/30/2021 1609 by Tamiko Michele RN  Outcome: Ongoing     Problem: Isolation Precautions - Risk of Spread of Infection  Goal: Prevent transmission of infection  12/31/2021 0431 by Champ Paulino RN  Outcome: Ongoing  12/30/2021 1609 by Tamiko Michele RN  Outcome: Ongoing     Problem: Nutrition Deficits  Goal: Optimize nutritional status  12/31/2021 0431 by Champ Paulino RN  Outcome: Ongoing  12/30/2021 1609 by Tamiko Michele RN  Outcome: Ongoing     Problem: Risk for Fluid Volume Deficit  Goal: Maintain normal heart rhythm  12/31/2021 0431 by Champ Paulino RN  Outcome: Ongoing  12/30/2021 1609 by Bianka Medeiros RN  Outcome: Ongoing  Goal: Maintain absence of muscle cramping  12/31/2021 0431 by Ivan Pope RN  Outcome: Ongoing  12/30/2021 1609 by Bianka Medeiros RN  Outcome: Ongoing  Goal: Maintain normal serum potassium, sodium, calcium, phosphorus, and pH  12/31/2021 0431 by Ivan Pope RN  Outcome: Ongoing  12/30/2021 1609 by Bianka Medeiros RN  Outcome: Ongoing     Problem: Loneliness or Risk for Loneliness  Goal: Demonstrate positive use of time alone when socialization is not possible  12/31/2021 0431 by Ivan Pope RN  Outcome: Ongoing  12/30/2021 1609 by Bianka Medeiros RN  Outcome: Ongoing     Problem: Fatigue  Goal: Verbalize increase energy and improved vitality  12/31/2021 0431 by Ivan Pope RN  Outcome: Ongoing  12/30/2021 1609 by Bianka Medeiros RN  Outcome: Ongoing     Problem: Patient Education: Go to Patient Education Activity  Goal: Patient/Family Education  12/31/2021 0431 by Ivan Pope RN  Outcome: Ongoing  12/30/2021 1609 by Bianka Medeiros RN  Outcome: Ongoing     Problem: Skin Integrity:  Goal: Will show no infection signs and symptoms  Description: Will show no infection signs and symptoms  12/31/2021 0431 by Ivan Pope RN  Outcome: Ongoing  12/30/2021 1609 by Bianka Medeiros RN  Outcome: Ongoing  Goal: Absence of new skin breakdown  Description: Absence of new skin breakdown  12/31/2021 0431 by Ivan Pope RN  Outcome: Ongoing  12/30/2021 1609 by Bianka Medeiros RN  Outcome: Ongoing     Problem: Falls - Risk of:  Goal: Will remain free from falls  Description: Will remain free from falls  12/31/2021 0431 by Ivan Pope RN  Outcome: Ongoing  12/30/2021 1609 by Bianka Medeiros RN  Outcome: Ongoing  Goal: Absence of physical injury  Description: Absence of physical injury  12/31/2021 0431 by Ivan Pope RN  Outcome: Ongoing  12/30/2021 1609 by Bianka Medeiros RN  Outcome: Ongoing     Problem: Nutrition  Goal: Optimal nutrition therapy  12/31/2021 0431 by Fred Vargas RN  Outcome: Ongoing  12/30/2021 1609 by Cara Camilo RN  Outcome: Ongoing     Problem: Pain:  Goal: Pain level will decrease  Description: Pain level will decrease  12/31/2021 0431 by Fred Vargas RN  Outcome: Ongoing  12/30/2021 1609 by Cara Camilo RN  Outcome: Ongoing  Goal: Control of acute pain  Description: Control of acute pain  12/31/2021 0431 by Fred Vargas RN  Outcome: Ongoing  12/30/2021 1609 by Cara Camilo RN  Outcome: Ongoing  Goal: Control of chronic pain  Description: Control of chronic pain  12/31/2021 0431 by Fred Vargas RN  Outcome: Ongoing  12/30/2021 1609 by Cara Camilo RN  Outcome: Ongoing     Problem: Discharge Planning:  Goal: Discharged to appropriate level of care  Description: Discharged to appropriate level of care  12/31/2021 0431 by Fred Vargas RN  Outcome: Ongoing  12/30/2021 1609 by Cara Camilo RN  Outcome: Ongoing

## 2021-12-31 NOTE — FLOWSHEET NOTE
rounding in PCU. Assessment - Patient had severe hearing loss and did not have hearing aids. Conversation was very limited and so was the assessment. Intervention -  was able to get patient to accept prayer though how much of the prayer was heard is questionable. Outcome - Patient did express appreciation. Plan: Chaplains are available on site or on call 24/7 for spiritual and emotional support.        12/31/21 1150   Encounter Summary   Services provided to: Patient   Referral/Consult From: [facility] Family members   Continue Visiting   (12/31/21)   Complexity of Encounter Moderate   Length of Encounter 15 minutes   Spiritual/Congregation   Type Spiritual support   Assessment Approachable;Passive;Calm   Intervention Prayer   Outcome Expressed gratitude     Electronically signed by Janeen Milton on 12/31/2021 at 11:55 AM

## 2021-12-31 NOTE — PROGRESS NOTES
Writer & PCA utilized mark lift to place pt up in the chair for the first time in weeks. Pt tolerating well and comfortable. Current vitals:  HR = 78  SP02 = 93%  RR= 32  No Pain    PCA bathed pt prior to doing this and noted that pt had indeed urinated in the bed. Unmeasured amount. Writer called wife, Yanira Kendell, to notify her of this progression and wife was pleased with this.

## 2021-12-31 NOTE — PROGRESS NOTES
Patient had mullins catheter removed yesterday at [phone], currently patient has not voided yet, denies urge to urinate at this time. Bladder scan completed per primary nurse with 308 ml noted. Order placed for straight cath, if greater than 400 ml anchor mullins to gravity.

## 2021-12-31 NOTE — PROGRESS NOTES
Hospitalist Progress Note  12/31/2021 1:23 PM  Subjective:   Admit Date: 12/8/2021  PCP: Isaias Gallagher MD     DNR-CCA      C/c:  Chief Complaint   Patient presents with    Respiratory Distress         Interval History: pt doing slightlly better, on HHF oxygen    Diet: ADULT DIET;  Regular; 4 carb choices (60 gm/meal); 1800 ml                                ip days:23  Medications:   Scheduled Meds:   piperacillin-tazobactam  3,375 mg IntraVENous Q8H    hydrocortisone  25 mg Rectal BID    sodium chloride  3 g Oral BID WC    dexamethasone  10 mg IntraVENous Daily    furosemide  20 mg IntraVENous Daily    enoxaparin  40 mg SubCUTAneous BID    albuterol sulfate HFA  2 puff Inhalation 4x daily    aspirin  81 mg Oral Daily    atorvastatin  40 mg Oral Daily    buPROPion  75 mg Oral Daily    clopidogrel  75 mg Oral Daily    levothyroxine  75 mcg Oral Daily    finasteride  5 mg Oral Daily    oxybutynin  15 mg Oral Daily    metoprolol tartrate  50 mg Oral BID    [Held by provider] lisinopril  20 mg Oral Daily    sodium chloride flush  5-40 mL IntraVENous 2 times per day     Continuous Infusions:   sodium chloride 25 mL (12/31/21 1320)     PRN Meds:.bisacodyl, iopamidol, ondansetron, zolpidem, benzonatate, guaiFENesin-dextromethorphan, albuterol sulfate HFA, sodium chloride flush, sodium chloride, polyethylene glycol, acetaminophen **OR** acetaminophen     CBC:   Recent Labs     12/29/21  0519 12/30/21  0643 12/31/21  0523   WBC 11.0 10.4 8.7   HGB 10.7* 10.6* 10.3*    152 144     BMP:    Recent Labs     12/29/21  0519 12/30/21  0643 12/31/21  0523    137 138   K 4.0 4.0 4.1    100 101   CO2 26 26 27   BUN 32* 30* 30*   CREATININE 0.74 0.70 0.67*   GLUCOSE 84 87 90     Hepatic:   Recent Labs     12/29/21  0519 12/30/21  0643 12/31/21  0523   AST 18 14 15   ALT 16 15 18   BILITOT 0.65 0.62 0.58   ALKPHOS 73 67 62     Troponin: No results for input(s): TROPONINI in the last 72 hours.  BNP: No results for input(s): BNP in the last 72 hours. Lipids: No results for input(s): CHOL, HDL in the last 72 hours. Invalid input(s): LDLCALCU  INR: No results for input(s): INR in the last 72 hours.     Objective:   Vitals: /72   Pulse 72   Temp 97.7 °F (36.5 °C) (Tympanic)   Resp 24   Ht 6' (1.829 m)   Wt 289 lb 12.8 oz (131.5 kg)   SpO2 95%   BMI 39.30 kg/m²   General appearance: alert, appears stated age and cooperative  Skin: Skin color, texture, turgor normal. No rashes or lesions  Lungs: clear to auscultation bilaterally  Heart: regular rate and rhythm, S1, S2 normal, no murmur, click, rub or gallop  Abdomen: soft, non-tender; bowel sounds normal; no masses,  no organomegaly  Extremities: extremities normal, atraumatic, no cyanosis or edema  Neurologic: Mental status: Alert, oriented, thought content appropriate    Prophylaxis:   DVT with  [x] lovenox        [] heparin        [] Scd        [] none:     Radiology:  ECHO Complete 2D W Doppler W Color    Result Date: 12/9/2021  Transthoracic Echocardiography Report (TTE)  Patient Name [facility]     Date of Study               12/09/2021               Karina Ron   Date of      1945  Gender                      Male  Birth   Age          67 year(s)  Race                           Room Number  7016        Height:                     72 inch, 182.88 cm   Corporate ID [id number]    Weight:                     315 pounds, 142.9 kg  #   Patient Acct [de-identified]   BSA:          2.58 m^2      BMI:       42.72  #                                                               kg/m^2   MR #         [id number]     Sonographer                 Claude Coaster [facility]   Accession #  [id number]  Interpreting Physician      Micheal Hwang   Fellow                   Referring Nurse                           Practitioner   Interpreting             Referring Physician         María Elena Hendricks CNP  Fellow  Additional Comments Technically difficult study due to body habitus and condition. Type of Study   TTE procedure:2D Echocardiogram, M-Mode, Doppler, Color Doppler. Procedure Date Date: 12/09/2021 Start: 10:29 AM Study Location: [facility] Technical Quality: Adequate visualization Indications:Shortness of breath, Hypoxia, Congestive heart failure, combined systolic and diastolic dysfunction and COVID 19. History / Tech. Comments: Procedure explained to patient. Patient Status: Inpatient Height: 72 inches Weight: 315.01 pounds BSA: 2.58 m^2 BMI: 42.72 kg/m^2 CONCLUSIONS Summary Technically difficult echo. LV systolic function is mildly reduced, estimated EF 40% Abnormal septal wall motion. Hypokinetic mid to basal inferolateral, inferior and anterolateral walls. Grade I left ventricular diastolic dysfunction. Right ventricular dilatation with reduced systolic function. Aortic valve sclerosis without stenosis. Mild tricuspid regurgitation. Estimated right ventricular systolic pressure is 55 mmHg. No pericardial effusion. Signature ----------------------------------------------------------------------------  Electronically signed by Viviana Anthony RDCS(Sonographer) on 12/09/2021  11:57 AM ---------------------------------------------------------------------------- ----------------------------------------------------------------------------  Electronically signed by Linda HwangInterpreting physician) on  12/09/2021 12:23 PM ---------------------------------------------------------------------------- FINDINGS Left Atrium Left atrium is mildly dilated. Left Ventricle Left ventricle is moderately dilated. Mildly hypertrophic interventricular septum. Abnormal septal wall motion. Hypokinetic inferoseptal and inferior walls. Left ventricular ejection fraction 40 %. Grade I left ventricular diastolic dysfunction. Right Atrium Right atrial dilatation. Right Ventricle Right ventricular dilatation with reduced systolic function.  Mitral Valve Normal mitral valve structure and function. Aortic Valve Aortic valve sclerosis without stenosis. Tricuspid Valve Normal tricuspid valve leaflets. Mild tricuspid regurgitation. Estimated right ventricular systolic pressure is 55 mmHg. Moderate pulmonary hypertension. Pulmonic Valve The pulmonic valve is normal in structure. Pericardial Effusion No significant pericardial effusion is seen. Miscellaneous Aortic root dimension is at upper limit of normal. IVC not visualized, unable to assess size and respiratory collapse. E/E' average = 8. M-mode / 2D Measurements & Calculations:   LVIDd:6.43 cm(3.7 - 5.6 cm)      Diastolic PRXBPH:831.8 ml  LVIDs:6 cm(2.2 - 4.0 cm)         Systolic GMTRYT:901.1 ml  IVSd:1.27 cm(0.6 - 1.1 cm)       LA Dimension: 4.5 cm(1.9 - 4.0 cm)  LVPWd:0.88 cm(0.6 - 1.1 cm)      LA volume/Index: 79 ml /31m^2  Fractional Shortenin.69 %     LVOT:3.7 cm  Calculated LVEF (%): 42.12 %   Mitral:                                  Aortic   Peak E-Wave: 0.50 m/s                    Peak Velocity: 1.10 m/s  Peak A-Wave: 0.72 m/s                    Peak Gradient: 4.84 mmHg  E/A Ratio: 0.7  Peak Gradient: 1 mmHg  Deceleration Time: 430 msec   Tricuspid:                               Pulmonic:   Peak TR Velocity: 3.44 m/s               Peak Velocity: 0.90 m/s  Peak TR Gradient: 47.3344 mmHg           Peak Gradient: 3.21 mmHg  Septal Wall E' velocity:0.04 m/s Lateral Wall E' velocity:0.10 m/s    XR CHEST PORTABLE    Result Date: 2021  EXAMINATION: ONE XRAY VIEW OF THE CHEST 2021 9:47 am COMPARISON: 2021 HISTORY: ORDERING SYSTEM PROVIDED HISTORY: covid TECHNOLOGIST PROVIDED HISTORY: covid Reason for Exam: Follow up due to inpatient status. FINDINGS: Sternal wires are in place. The heart and mediastinal structures are stable. Bilateral lung infiltrates are present similar to prior exam.  Findings are consistent with COVID-19 pneumonia.      Persistent bilateral lung infiltrates compatible with the patient's clinical diagnosis of COVID-19 pneumonia. XR CHEST PORTABLE    Result Date: 12/8/2021  EXAMINATION: ONE XRAY VIEW OF THE CHEST 12/8/2021 3:46 pm COMPARISON: None. HISTORY: ORDERING SYSTEM PROVIDED HISTORY: shortness of breath TECHNOLOGIST PROVIDED HISTORY: shortness of breath Reason for Exam: Respiratory Distress FINDINGS: Status post median sternotomy. Cardiomegaly. Bilateral ill-defined pulmonary opacities. No pneumothorax. No pleural effusion. Bilateral ill-defined pulmonary opacities could represent edema or infection     CT CHEST PULMONARY EMBOLISM W CONTRAST    Result Date: 12/8/2021  EXAMINATION: CTA OF THE CHEST 12/8/2021 11:45 am TECHNIQUE: CTA of the chest was performed after the administration of intravenous contrast.  Multiplanar reformatted images are provided for review. MIP images are provided for review. Dose modulation, iterative reconstruction, and/or weight based adjustment of the mA/kV was utilized to reduce the radiation dose to as low as reasonably achievable. COMPARISON: Chest x-ray dated December 8, 2021 HISTORY: ORDERING SYSTEM PROVIDED HISTORY: Patient here with positive Covid elevated D-dimer TECHNOLOGIST PROVIDED HISTORY: Patient here with positive Covid elevated D-dimer Decision Support Exception - unselect if not a suspected or confirmed emergency medical condition->Emergency Medical Condition (MA) Reason for Exam: r/o pe FINDINGS: Pulmonary Arteries: Pulmonary arteries are adequately opacified for evaluation. No evidence of intraluminal filling defect to suggest pulmonary embolism. Main pulmonary artery is slightly enlarged, measuring 32 mm, suggesting pulmonary arterial hypertension. . Mediastinum: Nonspecific mediastinal and hilar lymph nodes are present, likely reactive. Coronary arterial calcifications are present. Patient is status post prior CABG. Multiple calcified right hilar and subcarinal lymph nodes are present. .  The heart and pericardium demonstrate no acute abnormality. There is no acute abnormality of the thoracic aorta. Lungs/pleura: Multiple ground-glass opacities are present throughout the lungs, highly suggestive of COVID-19 pulmonary disease given the clinical history. No pneumothorax or pleural effusion is present. Emphysematous changes are present bilaterally within the lungs, with an upper lobe predominance. Upper Abdomen: Limited images of the upper abdomen are unremarkable. Soft Tissues/Bones: No acute bone or soft tissue abnormality. 1. No evidence of pulmonary embolism 2. Extensive ground-glass opacities present throughout the lungs bilaterally, highly suggestive of COVID-19 pulmonary disease given the clinical history 3. Prior CABG       Assessment :   1. covid pneumonia/decaron/ zosyn  2. htn     Plan:   1. Continue present care  2.   See order    Patient Active Problem List:     Hypertension     Osteoarthritis     Pneumonia due to COVID-19 virus     Chronic venous insufficiency     Acquired genu valgum     Degeneration of lumbar intervertebral disc     Lower urinary tract symptoms due to benign prostatic hyperplasia     Presence of right artificial knee joint     Bilateral carotid artery stenosis     Hypothyroidism     Ischemic cardiomyopathy     Ventral hernia     Left bundle branch block (LBBB)     Chronic embolism and thrombosis of deep vein of both distal legs (HCC)     Coronary artery disease involving native coronary artery of native heart     Hyperlipidemia     Class 3 severe obesity due to excess calories with serious comorbidity and body mass index (BMI) of 40.0 to 44.9 in adult [facility])     Chronic combined systolic and diastolic CHF (congestive heart failure) (HCC)     Impaired gait     Emphysema/COPD (HCC)     Acute respiratory failure with hypoxia (HCC)     COPD with acute exacerbation ([facility] [address]      Anticipated Disposition upon discharge: [] Home                                                                         [] Home with [facility]                                                                         [] [facility]                                                                         [] [address]      Patient is admitted as inpatient status because of co-morbidities listed above, severity of signs and symptoms as outlined, requirement for current medical therapies and most importantly because of direct risk to patient if care not provided in a hospital setting.           Neal Cabrera MD, MD  Rounding Hospitalist

## 2022-01-01 ENCOUNTER — APPOINTMENT (OUTPATIENT)
Dept: GENERAL RADIOLOGY | Age: 77
DRG: 177 | End: 2022-01-01
Payer: MEDICARE

## 2022-01-01 LAB
ABSOLUTE EOS #: 0.11 K/UL (ref 0–0.44)
ABSOLUTE IMMATURE GRANULOCYTE: 0.17 K/UL (ref 0–0.3)
ABSOLUTE LYMPH #: 1.19 K/UL (ref 1.1–3.7)
ABSOLUTE MONO #: 0.59 K/UL (ref 0.1–1.2)
ANION GAP SERPL CALCULATED.3IONS-SCNC: 8 MMOL/L (ref 9–17)
BASOPHILS # BLD: 0 % (ref 0–2)
BASOPHILS ABSOLUTE: <0.03 K/UL (ref 0–0.2)
BUN BLDV-MCNC: 29 MG/DL (ref 8–23)
BUN/CREAT BLD: 41 (ref 9–20)
CALCIUM SERPL-MCNC: 8.7 MG/DL (ref 8.6–10.4)
CHLORIDE BLD-SCNC: 104 MMOL/L (ref 98–107)
CO2: 26 MMOL/L (ref 20–31)
CREAT SERPL-MCNC: 0.71 MG/DL (ref 0.7–1.2)
DIFFERENTIAL TYPE: ABNORMAL
EOSINOPHILS RELATIVE PERCENT: 1 % (ref 1–4)
GFR AFRICAN AMERICAN: >60 ML/MIN
GFR NON-AFRICAN AMERICAN: >60 ML/MIN
GFR SERPL CREATININE-BSD FRML MDRD: ABNORMAL ML/MIN/{1.73_M2}
GFR SERPL CREATININE-BSD FRML MDRD: ABNORMAL ML/MIN/{1.73_M2}
GLUCOSE BLD-MCNC: 84 MG/DL (ref 70–99)
HCT VFR BLD CALC: 34.6 % (ref 40.7–50.3)
HEMOGLOBIN: 10.5 G/DL (ref 13–17)
IMMATURE GRANULOCYTES: 2 %
LYMPHOCYTES # BLD: 13 % (ref 24–43)
MCH RBC QN AUTO: 29.3 PG (ref 25.2–33.5)
MCHC RBC AUTO-ENTMCNC: 30.3 G/DL (ref 25.2–33.5)
MCV RBC AUTO: 96.6 FL (ref 82.6–102.9)
MONOCYTES # BLD: 7 % (ref 3–12)
NRBC AUTOMATED: 0 PER 100 WBC
PDW BLD-RTO: 14.9 % (ref 11.8–14.4)
PLATELET # BLD: 157 K/UL (ref 138–453)
PLATELET ESTIMATE: ABNORMAL
PMV BLD AUTO: 10 FL (ref 8.1–13.5)
POTASSIUM SERPL-SCNC: 4.1 MMOL/L (ref 3.7–5.3)
RBC # BLD: 3.58 M/UL (ref 4.21–5.77)
RBC # BLD: ABNORMAL 10*6/UL
SEG NEUTROPHILS: 77 % (ref 36–65)
SEGMENTED NEUTROPHILS ABSOLUTE COUNT: 6.87 K/UL (ref 1.5–8.1)
SODIUM BLD-SCNC: 138 MMOL/L (ref 135–144)
WBC # BLD: 8.9 K/UL (ref 3.5–11.3)
WBC # BLD: ABNORMAL 10*3/UL

## 2022-01-01 PROCEDURE — 2060000000 HC ICU INTERMEDIATE R&B

## 2022-01-01 PROCEDURE — 2700000000 HC OXYGEN THERAPY PER DAY

## 2022-01-01 PROCEDURE — 97110 THERAPEUTIC EXERCISES: CPT | Performed by: PHYSICAL THERAPY ASSISTANT

## 2022-01-01 PROCEDURE — 85025 COMPLETE CBC W/AUTO DIFF WBC: CPT

## 2022-01-01 PROCEDURE — 6360000002 HC RX W HCPCS: Performed by: FAMILY MEDICINE

## 2022-01-01 PROCEDURE — 36415 COLL VENOUS BLD VENIPUNCTURE: CPT

## 2022-01-01 PROCEDURE — 94640 AIRWAY INHALATION TREATMENT: CPT

## 2022-01-01 PROCEDURE — 80048 BASIC METABOLIC PNL TOTAL CA: CPT

## 2022-01-01 PROCEDURE — 94761 N-INVAS EAR/PLS OXIMETRY MLT: CPT

## 2022-01-01 PROCEDURE — 6360000002 HC RX W HCPCS: Performed by: INTERNAL MEDICINE

## 2022-01-01 PROCEDURE — 6370000000 HC RX 637 (ALT 250 FOR IP): Performed by: FAMILY MEDICINE

## 2022-01-01 PROCEDURE — 6360000002 HC RX W HCPCS: Performed by: NURSE PRACTITIONER

## 2022-01-01 PROCEDURE — 94660 CPAP INITIATION&MGMT: CPT

## 2022-01-01 PROCEDURE — 71045 X-RAY EXAM CHEST 1 VIEW: CPT

## 2022-01-01 PROCEDURE — 6370000000 HC RX 637 (ALT 250 FOR IP): Performed by: NURSE PRACTITIONER

## 2022-01-01 PROCEDURE — 99232 SBSQ HOSP IP/OBS MODERATE 35: CPT | Performed by: FAMILY MEDICINE

## 2022-01-01 PROCEDURE — 2580000003 HC RX 258: Performed by: FAMILY MEDICINE

## 2022-01-01 PROCEDURE — 6370000000 HC RX 637 (ALT 250 FOR IP)

## 2022-01-01 RX ADMIN — ENOXAPARIN SODIUM 40 MG: 100 INJECTION SUBCUTANEOUS at 21:08

## 2022-01-01 RX ADMIN — ALBUTEROL SULFATE 2 PUFF: 90 AEROSOL, METERED RESPIRATORY (INHALATION) at 08:54

## 2022-01-01 RX ADMIN — ATORVASTATIN CALCIUM 40 MG: 40 TABLET, FILM COATED ORAL at 09:55

## 2022-01-01 RX ADMIN — DEXAMETHASONE SODIUM PHOSPHATE 10 MG: 10 INJECTION INTRAMUSCULAR; INTRAVENOUS at 09:55

## 2022-01-01 RX ADMIN — BUPROPION HYDROCHLORIDE 75 MG: 75 TABLET ORAL at 09:56

## 2022-01-01 RX ADMIN — SODIUM CHLORIDE 3 G: 1 TABLET ORAL at 09:55

## 2022-01-01 RX ADMIN — ASPIRIN 81 MG: 81 TABLET, COATED ORAL at 09:55

## 2022-01-01 RX ADMIN — METOPROLOL TARTRATE 50 MG: 50 TABLET, FILM COATED ORAL at 21:06

## 2022-01-01 RX ADMIN — ENOXAPARIN SODIUM 40 MG: 100 INJECTION SUBCUTANEOUS at 09:55

## 2022-01-01 RX ADMIN — BENZONATATE 200 MG: 100 CAPSULE ORAL at 21:06

## 2022-01-01 RX ADMIN — FUROSEMIDE 20 MG: 10 INJECTION, SOLUTION INTRAMUSCULAR; INTRAVENOUS at 09:55

## 2022-01-01 RX ADMIN — SODIUM CHLORIDE, PRESERVATIVE FREE 10 ML: 5 INJECTION INTRAVENOUS at 21:07

## 2022-01-01 RX ADMIN — ALBUTEROL SULFATE 2 PUFF: 90 AEROSOL, METERED RESPIRATORY (INHALATION) at 15:33

## 2022-01-01 RX ADMIN — PIPERACILLIN SODIUM AND TAZOBACTAM SODIUM 3375 MG: 3; .375 INJECTION, POWDER, LYOPHILIZED, FOR SOLUTION INTRAVENOUS at 05:51

## 2022-01-01 RX ADMIN — ALBUTEROL SULFATE 2 PUFF: 90 AEROSOL, METERED RESPIRATORY (INHALATION) at 19:41

## 2022-01-01 RX ADMIN — SODIUM CHLORIDE, PRESERVATIVE FREE 10 ML: 5 INJECTION INTRAVENOUS at 09:55

## 2022-01-01 RX ADMIN — FINASTERIDE 5 MG: 5 TABLET, FILM COATED ORAL at 09:55

## 2022-01-01 RX ADMIN — OXYBUTYNIN CHLORIDE 15 MG: 5 TABLET, EXTENDED RELEASE ORAL at 09:55

## 2022-01-01 RX ADMIN — HYDROCORTISONE ACETATE 25 MG: 25 SUPPOSITORY RECTAL at 21:06

## 2022-01-01 RX ADMIN — ALBUTEROL SULFATE 2 PUFF: 90 AEROSOL, METERED RESPIRATORY (INHALATION) at 11:49

## 2022-01-01 RX ADMIN — METOPROLOL TARTRATE 50 MG: 50 TABLET, FILM COATED ORAL at 09:55

## 2022-01-01 RX ADMIN — SODIUM CHLORIDE 3 G: 1 TABLET ORAL at 17:00

## 2022-01-01 RX ADMIN — PIPERACILLIN SODIUM AND TAZOBACTAM SODIUM 3375 MG: 3; .375 INJECTION, POWDER, LYOPHILIZED, FOR SOLUTION INTRAVENOUS at 13:26

## 2022-01-01 RX ADMIN — ZOLPIDEM TARTRATE 5 MG: 5 TABLET ORAL at 21:06

## 2022-01-01 RX ADMIN — CLOPIDOGREL BISULFATE 75 MG: 75 TABLET ORAL at 09:55

## 2022-01-01 RX ADMIN — PIPERACILLIN SODIUM AND TAZOBACTAM SODIUM 3375 MG: 3; .375 INJECTION, POWDER, LYOPHILIZED, FOR SOLUTION INTRAVENOUS at 21:54

## 2022-01-01 RX ADMIN — LEVOTHYROXINE SODIUM 75 MCG: 0.07 TABLET ORAL at 06:01

## 2022-01-01 ASSESSMENT — PAIN SCALES - GENERAL
PAINLEVEL_OUTOF10: 0

## 2022-01-01 NOTE — PROGRESS NOTES
Nutrition Assessment     Type and Reason for Visit: Reassess    Nutrition Recommendations/Plan: Continue current diet and Continue to encourage po intakes. If po intakes <75% at meals suggest adding ONS to meal tray. Monitor po intakes and labs. Nutrition Assessment:  Patient with improvement since last assessment. PO intakes remain % of meals. He continues on HHF oxygen. Unable to assess weight changes/ accuracy of weights for wt loss. Labs reviewed. Continue to encourage po intakes. If po intakes <75% at meals suggest adding ONS to meal tray. Monitor po intakes and labs. Malnutrition Assessment:  Malnutrition Status: Insufficient data    Estimated Daily Nutrient Needs:  Energy (kcal): 2998-4076 kcal (16-18 kcal/kg); Weight Used for Energy Requirements: Admission  Protein (g): 113-138 gm protein (1.4-1.7 g/kg); Weight Used for Protein Requirements:  Ideal          Nutrition Related Findings: [id number]. HHF oxygen. Labs reviewed. Active bowel sounds. Edema: BLE +1 pitting. Current Nutrition Therapies:    ADULT DIET;  Regular; 4 carb choices (60 gm/meal); 1800 ml    Anthropometric Measures:  · Height: 6' (182.9 cm)  · Current Body Wt: 297 lb (134.7 kg) (Accuracy?)   · BMI: 40.3    Nutrition Diagnosis:   · Inadequate oral intake related to inadequate protein-energy intake,impaired respiratory function as evidenced by poor intake prior to admission,weight loss    Nutrition Interventions:   Food and/or Nutrient Delivery:  Continue Current Diet  Nutrition Education/Counseling:  Education not indicated   Coordination of Nutrition Care:  Continue to monitor while inpatient    Goals:  PO intakes >75% at meals       Nutrition Monitoring and Evaluation:   Behavioral-Environmental Outcomes:  None Identified   Food/Nutrient Intake Outcomes:  Food and Nutrient Intake  Physical Signs/Symptoms Outcomes:  Biochemical Data,Chewing or Swallowing,Fluid Status or Edema,Weight     Discharge Planning:    Continue current diet     Bam Espinoza RD, LD  Office Number: [phone]

## 2022-01-01 NOTE — PROGRESS NOTES
Assessment completed. Patient sitting up in bed. Requests something to help him sleep tonight.      Denies further complaints or needs

## 2022-01-01 NOTE — PLAN OF CARE
Problem:  Body Temperature -  Risk of, Imbalanced  Goal: Ability to maintain a body temperature within defined limits  Outcome: Met This Shift  Goal: Will regain or maintain usual level of consciousness  Outcome: Met This Shift  Goal: Complications related to the disease process, condition or treatment will be avoided or minimized  Outcome: Met This Shift     Problem: Fatigue  Goal: Verbalize increase energy and improved vitality  Outcome: Met This Shift     Problem: Airway Clearance - Ineffective  Goal: Achieve or maintain patent airway  Outcome: Ongoing     Problem: Gas Exchange - Impaired  Goal: Absence of hypoxia  Outcome: Ongoing  Goal: Promote optimal lung function  Outcome: Ongoing     Problem: Breathing Pattern - Ineffective  Goal: Ability to achieve and maintain a regular respiratory rate  Outcome: Ongoing     Problem: Isolation Precautions - Risk of Spread of Infection  Goal: Prevent transmission of infection  Outcome: Ongoing     Problem: Nutrition Deficits  Goal: Optimize nutritional status  Outcome: Ongoing     Problem: Risk for Fluid Volume Deficit  Goal: Maintain normal heart rhythm  Outcome: Ongoing  Goal: Maintain absence of muscle cramping  Outcome: Ongoing  Goal: Maintain normal serum potassium, sodium, calcium, phosphorus, and pH  Outcome: Ongoing     Problem: Loneliness or Risk for Loneliness  Goal: Demonstrate positive use of time alone when socialization is not possible  Outcome: Ongoing     Problem: Patient Education: Go to Patient Education Activity  Goal: Patient/Family Education  Outcome: Ongoing     Problem: Skin Integrity:  Goal: Will show no infection signs and symptoms  Description: Will show no infection signs and symptoms  Outcome: Ongoing  Goal: Absence of new skin breakdown  Description: Absence of new skin breakdown  Outcome: Ongoing     Problem: Falls - Risk of:  Goal: Will remain free from falls  Description: Will remain free from falls  Outcome: Ongoing  Goal: Absence of physical injury  Description: Absence of physical injury  Outcome: Ongoing     Problem: Nutrition  Goal: Optimal nutrition therapy  Outcome: Ongoing     Problem: Pain:  Goal: Pain level will decrease  Description: Pain level will decrease  Outcome: Ongoing  Goal: Control of acute pain  Description: Control of acute pain  Outcome: Ongoing  Goal: Control of chronic pain  Description: Control of chronic pain  Outcome: Ongoing     Problem: Discharge Planning:  Goal: Discharged to appropriate level of care  Description: Discharged to appropriate level of care  Outcome: Ongoing

## 2022-01-01 NOTE — PLAN OF CARE
Problem: Airway Clearance - Ineffective  Goal: Achieve or maintain patent airway  1/1/2022 1242 by Ligia Mari RN  Outcome: Ongoing  1/1/2022 0427 by Malena Khan RN  Outcome: Ongoing     Problem: Gas Exchange - Impaired  Goal: Absence of hypoxia  1/1/2022 1242 by Ligia Mari RN  Outcome: Ongoing  1/1/2022 0427 by Malena Khan RN  Outcome: Ongoing  Goal: Promote optimal lung function  1/1/2022 1242 by Ligia Mari RN  Outcome: Ongoing  1/1/2022 0427 by Malena Khan RN  Outcome: Ongoing     Problem: Breathing Pattern - Ineffective  Goal: Ability to achieve and maintain a regular respiratory rate  1/1/2022 1242 by Ligia Mari RN  Outcome: Ongoing  1/1/2022 0427 by Malena Khan RN  Outcome: Ongoing     Problem:  Body Temperature -  Risk of, Imbalanced  Goal: Ability to maintain a body temperature within defined limits  1/1/2022 1242 by Ligia Mari RN  Outcome: Ongoing  1/1/2022 0427 by Malena Khan RN  Outcome: Met This Shift  Goal: Will regain or maintain usual level of consciousness  1/1/2022 1242 by Ligia Mari RN  Outcome: Ongoing  1/1/2022 0427 by Malena Khan RN  Outcome: Met This Shift  Goal: Complications related to the disease process, condition or treatment will be avoided or minimized  1/1/2022 1242 by Ligia Mari RN  Outcome: Ongoing  1/1/2022 0427 by Malena Khan RN  Outcome: Met This Shift     Problem: Isolation Precautions - Risk of Spread of Infection  Goal: Prevent transmission of infection  1/1/2022 1242 by Ligia Mari RN  Outcome: Ongoing  1/1/2022 0427 by Malena Khan RN  Outcome: Ongoing     Problem: Nutrition Deficits  Goal: Optimize nutritional status  1/1/2022 1242 by Ligia Mari RN  Outcome: Ongoing  1/1/2022 0427 by Malena Khan RN  Outcome: Ongoing     Problem: Risk for Fluid Volume Deficit  Goal: Maintain normal heart rhythm  1/1/2022 1242 by Ligia Mari RN  Outcome: Ongoing  1/1/2022 0427 by [employer] Nilo Brunner RN  Outcome: Ongoing  Goal: Maintain absence of muscle cramping  1/1/2022 1242 by Marce Matt RN  Outcome: Ongoing  1/1/2022 0427 by Sheyla Alatorre RN  Outcome: Ongoing  Goal: Maintain normal serum potassium, sodium, calcium, phosphorus, and pH  1/1/2022 1242 by Marce Matt RN  Outcome: Ongoing  1/1/2022 0427 by Sheyla Alatorre RN  Outcome: Ongoing     Problem: Loneliness or Risk for Loneliness  Goal: Demonstrate positive use of time alone when socialization is not possible  1/1/2022 1242 by Marce Matt RN  Outcome: Ongoing  1/1/2022 0427 by Sheyla Alatorre RN  Outcome: Ongoing     Problem: Fatigue  Goal: Verbalize increase energy and improved vitality  1/1/2022 1242 by Marce Matt RN  Outcome: Ongoing  1/1/2022 0427 by Sheyla Alatorre RN  Outcome: Met This Shift     Problem: Patient Education: Go to Patient Education Activity  Goal: Patient/Family Education  1/1/2022 1242 by Marce Matt RN  Outcome: Ongoing  1/1/2022 0427 by Sheyla Alatorre RN  Outcome: Ongoing     Problem: Skin Integrity:  Goal: Will show no infection signs and symptoms  Description: Will show no infection signs and symptoms  1/1/2022 1242 by Marce Matt RN  Outcome: Ongoing  1/1/2022 0427 by Sheyla Alatorre RN  Outcome: Ongoing  Goal: Absence of new skin breakdown  Description: Absence of new skin breakdown  1/1/2022 1242 by Marce Matt RN  Outcome: Ongoing  1/1/2022 0427 by Sheyla Alatorre RN  Outcome: Ongoing     Problem: Falls - Risk of:  Goal: Will remain free from falls  Description: Will remain free from falls  1/1/2022 1242 by Marce Matt RN  Outcome: Ongoing  1/1/2022 0427 by Sheyla Alatorre RN  Outcome: Ongoing  Goal: Absence of physical injury  Description: Absence of physical injury  1/1/2022 1242 by Marce Matt RN  Outcome: Ongoing  1/1/2022 0427 by Sheyla Alatorre RN  Outcome: Ongoing     Problem: Nutrition  Goal: Optimal nutrition therapy  1/1/2022 1242 by Amos Espinosa KEVAN Schafefr  Outcome: Ongoing  1/1/2022 0427 by Manisha Ramirez RN  Outcome: Ongoing     Problem: Pain:  Description: Pain management should include both nonpharmacologic and pharmacologic interventions.   Goal: Pain level will decrease  Description: Pain level will decrease  1/1/2022 1242 by Jabier Bernal RN  Outcome: Ongoing  1/1/2022 0427 by Manisha Ramirez RN  Outcome: Ongoing  Goal: Control of acute pain  Description: Control of acute pain  1/1/2022 1242 by Jabier Bernal RN  Outcome: Ongoing  1/1/2022 0427 by Manisha Ramirez RN  Outcome: Ongoing  Goal: Control of chronic pain  Description: Control of chronic pain  1/1/2022 1242 by Jabier Bernal RN  Outcome: Ongoing  1/1/2022 0427 by Manisha Ramirez RN  Outcome: Ongoing     Problem: Discharge Planning:  Goal: Discharged to appropriate level of care  Description: Discharged to appropriate level of care  1/1/2022 1242 by Jabier Bernal RN  Outcome: Ongoing  1/1/2022 0427 by Manisha Ramirez RN  Outcome: Ongoing

## 2022-01-01 NOTE — PLAN OF CARE
Nutrition Problem #1: Inadequate oral intake  Intervention: Food and/or Nutrient Delivery: Continue Current Diet  Nutritional Goals: PO intakes >75% at meals

## 2022-01-01 NOTE — PROGRESS NOTES
Physical Therapy  Facility/Department: [facility]  PROGRESSIVE CARE  Daily Treatment Note  NAME: Nilo López  : 1945  MRN: [id number]    Date of Service: 2022    Discharge Recommendations:  Continue to assess pending progress,ECF with PT        Assessment   Body structures, Functions, Activity limitations: Decreased functional mobility ; Decreased balance;Decreased safe awareness;Decreased ADL status; Decreased endurance;Decreased strength;Decreased posture  Prognosis: Fair  Decision Making: Low Complexity  REQUIRES PT FOLLOW UP: Yes  Activity Tolerance  Activity Tolerance: Patient limited by fatigue;Patient limited by endurance     Patient Diagnosis(es): The primary encounter diagnosis was Pneumonia due to COVID-19 virus. A diagnosis of Hypoxia was also pertinent to this visit. has a past medical history of Adenomatous polyp, Cholecystitis, Chronic venous insufficiency, DVT (deep venous thrombosis) ([facility] [address] Edema, Gout, Hypertension, Onychomycosis, Osteoarthritis, Plantar fasciitis, and Tobacco abuse.   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 Restrictions  Restrictions/Precautions  Restrictions/Precautions: Isolation  Subjective   General  Chart Reviewed: Yes  Subjective  Subjective: Patient supine in bed agreeable to therapy. No new c/o noted at this time.   Pain Screening  Patient Currently in Pain: No  Pain Assessment  Pain Assessment: 0-10  Vital Signs  Resp: (!) 55 (At highest with exercises.)  Patient Currently in Pain: No  Oxygen Therapy  O2 Device: Heated high flow cannula       Orientation  Orientation  Overall Orientation Status: Within Normal Limits  Cognition      Objective   Bed mobility  Supine to Sit: Minimal assistance  Sit to Supine: Minimal assistance  Comment: Able to sit EOB for ~1 min before returning to supine. O2 dereased to 82% during this time. Returned to 90% prior to writer exiting room  Transfers  Sit to Stand: Unable to assess  Stand to sit: Unable to assess  Ambulation  Ambulation?: No  Neuromuscular Education  NDT Treatment: Sitting  Balance  Posture: Fair  Sitting - Static: Fair  Sitting - Dynamic: Fair  Exercises  Straight Leg Raise: 10x with AAROM  Quad Sets: 10x  Heelslides: 10x  Hip Abduction: 10x in supine  Ankle Pumps: 20x  Comments: Supine exercises performed. monitored RR requiring frequent breaks.                         G-Code     OutComes Score                                                     AM-PAC Score             Goals  Short term goals  Time Frame for Short term goals: LOS  Short term goal 1: Bed mobilty with CGA  Short term goal 2: Transfers with CGA x 1 maintaining O2 Sats in 85%  Short term goal 3: Sitting at EOB x 5 min maintaining O2 stats  Patient Goals   Patient goals : Unable to [address]  Times per day: Daily  Current Treatment Recommendations: Twan Gunn Re-education,Patient/Caregiver Education & Training,ROM,Balance Training,Gait Training,Home Exercise Program,Safety Education & Training,Functional Mobility Training  Safety Devices  Type of devices: Nurse notified,Call light within reach,Left in bed,Gait belt     Therapy Time   Individual Concurrent Group Co-treatment   Time In 0828         Time Out 8005         Minutes  [address], [place]

## 2022-01-01 NOTE — PROGRESS NOTES
Hospitalist Progress Note  1/1/2022 2:23 PM  Subjective:   Admit Date: 12/8/2021  PCP: Melita Katz MD     DNR-CCA      C/c:  Chief Complaint   Patient presents with    Respiratory Distress         Interval History: pt doing slightly better, still on HHF oxygen, sat at 90%,alert    Diet: ADULT DIET;  Regular; 4 carb choices (60 gm/meal); 1800 ml                                ip days:24  Medications:   Scheduled Meds:   piperacillin-tazobactam  3,375 mg IntraVENous Q8H    hydrocortisone  25 mg Rectal BID    sodium chloride  3 g Oral BID WC    dexamethasone  10 mg IntraVENous Daily    furosemide  20 mg IntraVENous Daily    enoxaparin  40 mg SubCUTAneous BID    albuterol sulfate HFA  2 puff Inhalation 4x daily    aspirin  81 mg Oral Daily    atorvastatin  40 mg Oral Daily    buPROPion  75 mg Oral Daily    clopidogrel  75 mg Oral Daily    levothyroxine  75 mcg Oral Daily    finasteride  5 mg Oral Daily    oxybutynin  15 mg Oral Daily    metoprolol tartrate  50 mg Oral BID    [Held by provider] lisinopril  20 mg Oral Daily    sodium chloride flush  5-40 mL IntraVENous 2 times per day     Continuous Infusions:   sodium chloride 100 mL/hr at 01/01/22 0618     PRN Meds:.bisacodyl, iopamidol, ondansetron, zolpidem, benzonatate, guaiFENesin-dextromethorphan, albuterol sulfate HFA, sodium chloride flush, sodium chloride, polyethylene glycol, acetaminophen **OR** acetaminophen     CBC:   Recent Labs     12/30/21  0643 12/31/21  0523 01/01/22  0535   WBC 10.4 8.7 8.9   HGB 10.6* 10.3* 10.5*    144 157     BMP:    Recent Labs     12/30/21  0643 12/31/21  0523 01/01/22  0535    138 138   K 4.0 4.1 4.1    101 104   CO2 26 27 26   BUN 30* 30* 29*   CREATININE 0.70 0.67* 0.71   GLUCOSE 87 90 84     Hepatic:   Recent Labs     12/30/21  0643 12/31/21  0523   AST 14 15   ALT 15 18   BILITOT 0.62 0.58   ALKPHOS 67 62     Troponin: No results for input(s): TROPONINI in the last 72 hours.  BNP: No results for input(s): BNP in the last 72 hours. Lipids: No results for input(s): CHOL, HDL in the last 72 hours. Invalid input(s): LDLCALCU  INR: No results for input(s): INR in the last 72 hours.     Objective:   Vitals: BP (!) 142/74   Pulse 78   Temp 98.8 °F (37.1 °C) (Tympanic)   Resp 30   Ht 6' (1.829 m)   Wt 297 lb (134.7 kg)   SpO2 91%   BMI 40.28 kg/m²   General appearance: alert, appears stated age and cooperative  Skin: Skin color, texture, turgor normal. No rashes or lesions  Lungs: clear to auscultation bilaterally  Heart: regular rate and rhythm, S1, S2 normal, no murmur, click, rub or gallop  Abdomen: soft, non-tender; bowel sounds normal; no masses,  no organomegaly  Extremities: extremities normal, atraumatic, no cyanosis or edema  Neurologic: Mental status: Alert, oriented, thought content appropriate    Prophylaxis:   DVT with  [x] lovenox        [] heparin        [] Scd        [] none:     Radiology:  ECHO Complete 2D W Doppler W Color    Result Date: 12/9/2021  Transthoracic Echocardiography Report (TTE)  Patient Name Counts include 234 beds at the [facility]     Date of Study               12/09/2021               [facility]   Date of      1945  Gender                      Male  Birth   Age          67 year(s)  Race                           Room Number  6327        Height:                     72 inch, 182.88 cm   Corporate ID [id number]    Weight:                     315 pounds, 142.9 kg  #   Patient Acct [de-identified]   BSA:          2.58 m^2      BMI:       42.72  #                                                               kg/m^2   MR #         [id number]     Timmy Betancourt [facility]   Accession #  [id number]  Interpreting Physician      Micheal Hwang   Fellow                   Referring Nurse                           Practitioner   Interpreting             Referring Physician         Nikita Rainey, CNP  Fellow  Additional Comments Technically difficult study due to body habitus and condition. Type of Study   TTE procedure:2D Echocardiogram, M-Mode, Doppler, Color Doppler. Procedure Date Date: 12/09/2021 Start: 10:29 AM Study Location: [facility] Technical Quality: Adequate visualization Indications:Shortness of breath, Hypoxia, Congestive heart failure, combined systolic and diastolic dysfunction and COVID 19. History / Tech. Comments: Procedure explained to patient. Patient Status: Inpatient Height: 72 inches Weight: 315.01 pounds BSA: 2.58 m^2 BMI: 42.72 kg/m^2 CONCLUSIONS Summary Technically difficult echo. LV systolic function is mildly reduced, estimated EF 40% Abnormal septal wall motion. Hypokinetic mid to basal inferolateral, inferior and anterolateral walls. Grade I left ventricular diastolic dysfunction. Right ventricular dilatation with reduced systolic function. Aortic valve sclerosis without stenosis. Mild tricuspid regurgitation. Estimated right ventricular systolic pressure is 55 mmHg. No pericardial effusion. Signature ----------------------------------------------------------------------------  Electronically signed by Gregory EDDYSonographer) on 12/09/2021  11:57 AM ---------------------------------------------------------------------------- ----------------------------------------------------------------------------  Electronically signed by Linda HwangInterpreting physician) on  12/09/2021 12:23 PM ---------------------------------------------------------------------------- FINDINGS Left Atrium Left atrium is mildly dilated. Left Ventricle Left ventricle is moderately dilated. Mildly hypertrophic interventricular septum. Abnormal septal wall motion. Hypokinetic inferoseptal and inferior walls. Left ventricular ejection fraction 40 %. Grade I left ventricular diastolic dysfunction. Right Atrium Right atrial dilatation. Right Ventricle Right ventricular dilatation with reduced systolic function.  Mitral Valve Normal mitral valve structure and function. Aortic Valve Aortic valve sclerosis without stenosis. Tricuspid Valve Normal tricuspid valve leaflets. Mild tricuspid regurgitation. Estimated right ventricular systolic pressure is 55 mmHg. Moderate pulmonary hypertension. Pulmonic Valve The pulmonic valve is normal in structure. Pericardial Effusion No significant pericardial effusion is seen. Miscellaneous Aortic root dimension is at upper limit of normal. IVC not visualized, unable to assess size and respiratory collapse. E/E' average = 8. M-mode / 2D Measurements & Calculations:   LVIDd:6.43 cm(3.7 - 5.6 cm)      Diastolic SYQKMP:310.2 ml  LVIDs:6 cm(2.2 - 4.0 cm)         Systolic XSJRLO:813.0 ml  IVSd:1.27 cm(0.6 - 1.1 cm)       LA Dimension: 4.5 cm(1.9 - 4.0 cm)  LVPWd:0.88 cm(0.6 - 1.1 cm)      LA volume/Index: 79 ml /31m^2  Fractional Shortenin.69 %     LVOT:3.7 cm  Calculated LVEF (%): 42.12 %   Mitral:                                  Aortic   Peak E-Wave: 0.50 m/s                    Peak Velocity: 1.10 m/s  Peak A-Wave: 0.72 m/s                    Peak Gradient: 4.84 mmHg  E/A Ratio: 0.7  Peak Gradient: 1 mmHg  Deceleration Time: 430 msec   Tricuspid:                               Pulmonic:   Peak TR Velocity: 3.44 m/s               Peak Velocity: 0.90 m/s  Peak TR Gradient: 47.3344 mmHg           Peak Gradient: 3.21 mmHg  Septal Wall E' velocity:0.04 m/s Lateral Wall E' velocity:0.10 m/s    XR CHEST PORTABLE    Result Date: 2021  EXAMINATION: ONE XRAY VIEW OF THE CHEST 2021 9:47 am COMPARISON: 2021 HISTORY: ORDERING SYSTEM PROVIDED HISTORY: covid TECHNOLOGIST PROVIDED HISTORY: covid Reason for Exam: Follow up due to inpatient status. FINDINGS: Sternal wires are in place. The heart and mediastinal structures are stable. Bilateral lung infiltrates are present similar to prior exam.  Findings are consistent with COVID-19 pneumonia.      Persistent bilateral lung infiltrates compatible with the patient's clinical diagnosis of COVID-19 pneumonia. XR CHEST PORTABLE    Result Date: 12/8/2021  EXAMINATION: ONE XRAY VIEW OF THE CHEST 12/8/2021 3:46 pm COMPARISON: None. HISTORY: ORDERING SYSTEM PROVIDED HISTORY: shortness of breath TECHNOLOGIST PROVIDED HISTORY: shortness of breath Reason for Exam: Respiratory Distress FINDINGS: Status post median sternotomy. Cardiomegaly. Bilateral ill-defined pulmonary opacities. No pneumothorax. No pleural effusion. Bilateral ill-defined pulmonary opacities could represent edema or infection     CT CHEST PULMONARY EMBOLISM W CONTRAST    Result Date: 12/8/2021  EXAMINATION: CTA OF THE CHEST 12/8/2021 11:45 am TECHNIQUE: CTA of the chest was performed after the administration of intravenous contrast.  Multiplanar reformatted images are provided for review. MIP images are provided for review. Dose modulation, iterative reconstruction, and/or weight based adjustment of the mA/kV was utilized to reduce the radiation dose to as low as reasonably achievable. COMPARISON: Chest x-ray dated December 8, 2021 HISTORY: ORDERING SYSTEM PROVIDED HISTORY: Patient here with positive Covid elevated D-dimer TECHNOLOGIST PROVIDED HISTORY: Patient here with positive Covid elevated D-dimer Decision Support Exception - unselect if not a suspected or confirmed emergency medical condition->Emergency Medical Condition (MA) Reason for Exam: r/o pe FINDINGS: Pulmonary Arteries: Pulmonary arteries are adequately opacified for evaluation. No evidence of intraluminal filling defect to suggest pulmonary embolism. Main pulmonary artery is slightly enlarged, measuring 32 mm, suggesting pulmonary arterial hypertension. . Mediastinum: Nonspecific mediastinal and hilar lymph nodes are present, likely reactive. Coronary arterial calcifications are present. Patient is status post prior CABG. Multiple calcified right hilar and subcarinal lymph nodes are present. .  The heart and pericardium demonstrate no acute Home with Home Health                                                                         [] [facility]                                                                         [] [address] ,[address]      Patient is admitted as inpatient status because of co-morbidities listed above, severity of signs and symptoms as outlined, requirement for current medical therapies and most importantly because of direct risk to patient if care not provided in a hospital setting.           Thien Chaves MD, MD  [facility]ist

## 2022-01-02 ENCOUNTER — APPOINTMENT (OUTPATIENT)
Dept: GENERAL RADIOLOGY | Age: 77
DRG: 177 | End: 2022-01-02
Payer: MEDICARE

## 2022-01-02 LAB
ABSOLUTE EOS #: 0 K/UL (ref 0–0.4)
ABSOLUTE EOS #: 0.08 K/UL (ref 0–0.44)
ABSOLUTE IMMATURE GRANULOCYTE: 0.1 K/UL (ref 0–0.3)
ABSOLUTE IMMATURE GRANULOCYTE: 0.18 K/UL (ref 0–0.3)
ABSOLUTE LYMPH #: 0.4 K/UL (ref 1–4.8)
ABSOLUTE LYMPH #: 1.29 K/UL (ref 1.1–3.7)
ABSOLUTE MONO #: 0.4 K/UL (ref 0.1–1.2)
ABSOLUTE MONO #: 0.65 K/UL (ref 0.1–1.2)
ANION GAP SERPL CALCULATED.3IONS-SCNC: 10 MMOL/L (ref 9–17)
ANION GAP SERPL CALCULATED.3IONS-SCNC: 11 MMOL/L (ref 9–17)
BASOPHILS # BLD: 0 % (ref 0–2)
BASOPHILS # BLD: 0 % (ref 0–2)
BASOPHILS ABSOLUTE: 0 K/UL (ref 0–0.2)
BASOPHILS ABSOLUTE: <0.03 K/UL (ref 0–0.2)
BUN BLDV-MCNC: 25 MG/DL (ref 8–23)
BUN BLDV-MCNC: 28 MG/DL (ref 8–23)
BUN/CREAT BLD: 32 (ref 9–20)
BUN/CREAT BLD: 35 (ref 9–20)
CALCIUM SERPL-MCNC: 9.1 MG/DL (ref 8.6–10.4)
CALCIUM SERPL-MCNC: 9.2 MG/DL (ref 8.6–10.4)
CHLORIDE BLD-SCNC: 102 MMOL/L (ref 98–107)
CHLORIDE BLD-SCNC: 104 MMOL/L (ref 98–107)
CO2: 24 MMOL/L (ref 20–31)
CO2: 25 MMOL/L (ref 20–31)
CREAT SERPL-MCNC: 0.79 MG/DL (ref 0.7–1.2)
CREAT SERPL-MCNC: 0.81 MG/DL (ref 0.7–1.2)
DIFFERENTIAL TYPE: ABNORMAL
DIFFERENTIAL TYPE: ABNORMAL
EOSINOPHILS RELATIVE PERCENT: 0 % (ref 1–8)
EOSINOPHILS RELATIVE PERCENT: 1 % (ref 1–4)
GFR AFRICAN AMERICAN: >60 ML/MIN
GFR AFRICAN AMERICAN: >60 ML/MIN
GFR NON-AFRICAN AMERICAN: >60 ML/MIN
GFR NON-AFRICAN AMERICAN: >60 ML/MIN
GFR SERPL CREATININE-BSD FRML MDRD: ABNORMAL ML/MIN/{1.73_M2}
GLUCOSE BLD-MCNC: 132 MG/DL (ref 70–99)
GLUCOSE BLD-MCNC: 84 MG/DL (ref 70–99)
HCT VFR BLD CALC: 37.2 % (ref 40.7–50.3)
HCT VFR BLD CALC: 37.6 % (ref 40.7–50.3)
HEMOGLOBIN: 11.5 G/DL (ref 13–17)
HEMOGLOBIN: 11.7 G/DL (ref 13–17)
IMMATURE GRANULOCYTES: 1 %
IMMATURE GRANULOCYTES: 2 %
LYMPHOCYTES # BLD: 13 % (ref 24–43)
LYMPHOCYTES # BLD: 4 % (ref 15–43)
MCH RBC QN AUTO: 29.6 PG (ref 25.2–33.5)
MCH RBC QN AUTO: 29.8 PG (ref 25.2–33.5)
MCHC RBC AUTO-ENTMCNC: 30.6 G/DL (ref 25.2–33.5)
MCHC RBC AUTO-ENTMCNC: 31.5 G/DL (ref 25.2–33.5)
MCV RBC AUTO: 94.9 FL (ref 82.6–102.9)
MCV RBC AUTO: 96.7 FL (ref 82.6–102.9)
MONOCYTES # BLD: 4 % (ref 6–14)
MONOCYTES # BLD: 7 % (ref 3–12)
MORPHOLOGY: ABNORMAL
MORPHOLOGY: ABNORMAL
NRBC AUTOMATED: 0.2 PER 100 WBC
NRBC AUTOMATED: 0.2 PER 100 WBC
PDW BLD-RTO: 15.1 % (ref 11.8–14.4)
PDW BLD-RTO: 15.1 % (ref 11.8–14.4)
PLATELET # BLD: 180 K/UL (ref 138–453)
PLATELET # BLD: ABNORMAL K/UL (ref 138–453)
PLATELET ESTIMATE: ABNORMAL
PLATELET ESTIMATE: ABNORMAL
PLATELET, FLUORESCENCE: 181 K/UL (ref 138–453)
PLATELET, IMMATURE FRACTION: 3.2 % (ref 1.1–10.3)
PMV BLD AUTO: 9.1 FL (ref 8.1–13.5)
PMV BLD AUTO: ABNORMAL FL (ref 8.1–13.5)
POTASSIUM SERPL-SCNC: 3.9 MMOL/L (ref 3.7–5.3)
POTASSIUM SERPL-SCNC: 4 MMOL/L (ref 3.7–5.3)
RBC # BLD: 3.89 M/UL (ref 4.21–5.77)
RBC # BLD: 3.92 M/UL (ref 4.21–5.77)
RBC # BLD: ABNORMAL 10*6/UL
RBC # BLD: ABNORMAL 10*6/UL
SEG NEUTROPHILS: 78 % (ref 36–65)
SEG NEUTROPHILS: 91 % (ref 44–74)
SEGMENTED NEUTROPHILS ABSOLUTE COUNT: 7.84 K/UL (ref 1.5–8.1)
SEGMENTED NEUTROPHILS ABSOLUTE COUNT: 9.2 K/UL (ref 1.5–8.1)
SODIUM BLD-SCNC: 137 MMOL/L (ref 135–144)
SODIUM BLD-SCNC: 139 MMOL/L (ref 135–144)
WBC # BLD: 10.1 K/UL (ref 3.5–11.3)
WBC # BLD: 10.1 K/UL (ref 3.5–11.3)
WBC # BLD: ABNORMAL 10*3/UL
WBC # BLD: ABNORMAL 10*3/UL

## 2022-01-02 PROCEDURE — 6360000002 HC RX W HCPCS: Performed by: INTERNAL MEDICINE

## 2022-01-02 PROCEDURE — 94761 N-INVAS EAR/PLS OXIMETRY MLT: CPT

## 2022-01-02 PROCEDURE — 6360000002 HC RX W HCPCS: Performed by: NURSE PRACTITIONER

## 2022-01-02 PROCEDURE — 2580000003 HC RX 258: Performed by: FAMILY MEDICINE

## 2022-01-02 PROCEDURE — 6360000002 HC RX W HCPCS: Performed by: FAMILY MEDICINE

## 2022-01-02 PROCEDURE — 2060000000 HC ICU INTERMEDIATE R&B

## 2022-01-02 PROCEDURE — 99232 SBSQ HOSP IP/OBS MODERATE 35: CPT | Performed by: FAMILY MEDICINE

## 2022-01-02 PROCEDURE — 97110 THERAPEUTIC EXERCISES: CPT | Performed by: PHYSICAL THERAPY ASSISTANT

## 2022-01-02 PROCEDURE — 94660 CPAP INITIATION&MGMT: CPT

## 2022-01-02 PROCEDURE — 71045 X-RAY EXAM CHEST 1 VIEW: CPT

## 2022-01-02 PROCEDURE — 36415 COLL VENOUS BLD VENIPUNCTURE: CPT

## 2022-01-02 PROCEDURE — 6370000000 HC RX 637 (ALT 250 FOR IP): Performed by: NURSE PRACTITIONER

## 2022-01-02 PROCEDURE — 85055 RETICULATED PLATELET ASSAY: CPT

## 2022-01-02 PROCEDURE — 94640 AIRWAY INHALATION TREATMENT: CPT

## 2022-01-02 PROCEDURE — 2700000000 HC OXYGEN THERAPY PER DAY

## 2022-01-02 PROCEDURE — 85025 COMPLETE CBC W/AUTO DIFF WBC: CPT

## 2022-01-02 PROCEDURE — 6370000000 HC RX 637 (ALT 250 FOR IP): Performed by: FAMILY MEDICINE

## 2022-01-02 PROCEDURE — 6370000000 HC RX 637 (ALT 250 FOR IP)

## 2022-01-02 PROCEDURE — 80048 BASIC METABOLIC PNL TOTAL CA: CPT

## 2022-01-02 RX ORDER — HYDROCORTISONE ACETATE 25 MG/1
25 SUPPOSITORY RECTAL 2 TIMES DAILY PRN
Status: DISCONTINUED | OUTPATIENT
Start: 2022-01-02 | End: 2022-01-08 | Stop reason: HOSPADM

## 2022-01-02 RX ADMIN — DEXAMETHASONE SODIUM PHOSPHATE 10 MG: 10 INJECTION INTRAMUSCULAR; INTRAVENOUS at 10:05

## 2022-01-02 RX ADMIN — FINASTERIDE 5 MG: 5 TABLET, FILM COATED ORAL at 10:02

## 2022-01-02 RX ADMIN — METOPROLOL TARTRATE 50 MG: 50 TABLET, FILM COATED ORAL at 10:02

## 2022-01-02 RX ADMIN — SODIUM CHLORIDE, PRESERVATIVE FREE 10 ML: 5 INJECTION INTRAVENOUS at 22:05

## 2022-01-02 RX ADMIN — SODIUM CHLORIDE 3 G: 1 TABLET ORAL at 10:02

## 2022-01-02 RX ADMIN — BUPROPION HYDROCHLORIDE 75 MG: 75 TABLET ORAL at 10:04

## 2022-01-02 RX ADMIN — CLOPIDOGREL BISULFATE 75 MG: 75 TABLET ORAL at 10:03

## 2022-01-02 RX ADMIN — PIPERACILLIN SODIUM AND TAZOBACTAM SODIUM 3375 MG: 3; .375 INJECTION, POWDER, LYOPHILIZED, FOR SOLUTION INTRAVENOUS at 22:06

## 2022-01-02 RX ADMIN — ASPIRIN 81 MG: 81 TABLET, COATED ORAL at 10:02

## 2022-01-02 RX ADMIN — OXYBUTYNIN CHLORIDE 15 MG: 5 TABLET, EXTENDED RELEASE ORAL at 10:03

## 2022-01-02 RX ADMIN — ALBUTEROL SULFATE 2 PUFF: 90 AEROSOL, METERED RESPIRATORY (INHALATION) at 07:41

## 2022-01-02 RX ADMIN — ATORVASTATIN CALCIUM 40 MG: 40 TABLET, FILM COATED ORAL at 10:02

## 2022-01-02 RX ADMIN — ALBUTEROL SULFATE 2 PUFF: 90 AEROSOL, METERED RESPIRATORY (INHALATION) at 15:56

## 2022-01-02 RX ADMIN — SODIUM CHLORIDE 3 G: 1 TABLET ORAL at 18:42

## 2022-01-02 RX ADMIN — FUROSEMIDE 20 MG: 10 INJECTION, SOLUTION INTRAMUSCULAR; INTRAVENOUS at 10:03

## 2022-01-02 RX ADMIN — ALBUTEROL SULFATE 2 PUFF: 90 AEROSOL, METERED RESPIRATORY (INHALATION) at 19:34

## 2022-01-02 RX ADMIN — ENOXAPARIN SODIUM 40 MG: 100 INJECTION SUBCUTANEOUS at 22:04

## 2022-01-02 RX ADMIN — METOPROLOL TARTRATE 50 MG: 50 TABLET, FILM COATED ORAL at 22:04

## 2022-01-02 RX ADMIN — LEVOTHYROXINE SODIUM 75 MCG: 0.07 TABLET ORAL at 06:40

## 2022-01-02 RX ADMIN — SODIUM CHLORIDE, PRESERVATIVE FREE 10 ML: 5 INJECTION INTRAVENOUS at 10:05

## 2022-01-02 RX ADMIN — ENOXAPARIN SODIUM 40 MG: 100 INJECTION SUBCUTANEOUS at 10:05

## 2022-01-02 RX ADMIN — PIPERACILLIN SODIUM AND TAZOBACTAM SODIUM 3375 MG: 3; .375 INJECTION, POWDER, LYOPHILIZED, FOR SOLUTION INTRAVENOUS at 05:36

## 2022-01-02 RX ADMIN — SODIUM CHLORIDE, PRESERVATIVE FREE 10 ML: 5 INJECTION INTRAVENOUS at 10:03

## 2022-01-02 RX ADMIN — PIPERACILLIN SODIUM AND TAZOBACTAM SODIUM 3375 MG: 3; .375 INJECTION, POWDER, LYOPHILIZED, FOR SOLUTION INTRAVENOUS at 16:28

## 2022-01-02 ASSESSMENT — PAIN SCALES - GENERAL
PAINLEVEL_OUTOF10: 0

## 2022-01-02 NOTE — PLAN OF CARE
Problem: Airway Clearance - Ineffective  Goal: Achieve or maintain patent airway  1/2/2022 1024 by Alfonso Silva RN  Outcome: Ongoing  1/2/2022 0209 by KISHOR Hancock CNP  Outcome: Ongoing     Problem: Gas Exchange - Impaired  Goal: Absence of hypoxia  1/2/2022 1024 by Alfonso Silva RN  Outcome: Ongoing  1/2/2022 0209 by KISHOR Hancock CNP  Outcome: Ongoing  Goal: Promote optimal lung function  1/2/2022 1024 by Alfonso Silva RN  Outcome: Ongoing  1/2/2022 0209 by KISHOR Hancock CNP  Outcome: Ongoing     Problem: Breathing Pattern - Ineffective  Goal: Ability to achieve and maintain a regular respiratory rate  1/2/2022 1024 by Alfonso Silva RN  Outcome: Ongoing  1/2/2022 0209 by KISHOR Hancock CNP  Outcome: Ongoing     Problem:  Body Temperature -  Risk of, Imbalanced  Goal: Ability to maintain a body temperature within defined limits  1/2/2022 1024 by Alfonso Silva RN  Outcome: Ongoing  1/2/2022 0209 by KISHOR Hancock CNP  Outcome: Ongoing  Goal: Will regain or maintain usual level of consciousness  1/2/2022 1024 by Alfonso Silva RN  Outcome: Ongoing  1/2/2022 0209 by KISHOR Hancock CNP  Outcome: Ongoing  Goal: Complications related to the disease process, condition or treatment will be avoided or minimized  1/2/2022 1024 by Alfonso Silva RN  Outcome: Ongoing  1/2/2022 0209 by KISHOR Hancock CNP  Outcome: Ongoing     Problem: Isolation Precautions - Risk of Spread of Infection  Goal: Prevent transmission of infection  1/2/2022 1024 by Alfonso Silva RN  Outcome: Ongoing  1/2/2022 0209 by KISHOR Hancock CNP  Outcome: Ongoing     Problem: Nutrition Deficits  Goal: Optimize nutritional status  1/2/2022 1024 by Alfonso Silva RN  Outcome: Ongoing  1/2/2022 0209 by KISHOR Hancock CNP  Outcome: Ongoing     Problem: Risk for Fluid Volume Deficit  Goal: Maintain normal heart rhythm  1/2/2022 1024 by Alfonso Silva RN  Outcome: Ongoing  1/2/2022 0209 by Eloina Filter, APRN - CNP  Outcome: Ongoing  Goal: Maintain absence of muscle cramping  1/2/2022 1024 by Allan Shah RN  Outcome: Ongoing  1/2/2022 0209 by Eloina Valle APRN - CNP  Outcome: Ongoing  Goal: Maintain normal serum potassium, sodium, calcium, phosphorus, and pH  1/2/2022 1024 by Allan Shah RN  Outcome: Ongoing  1/2/2022 0209 by KISHOR Powell - CNP  Outcome: Ongoing     Problem: Loneliness or Risk for Loneliness  Goal: Demonstrate positive use of time alone when socialization is not possible  1/2/2022 1024 by Allan Shah RN  Outcome: Ongoing  1/2/2022 0209 by Eloina Valle APRN - CNP  Outcome: Ongoing     Problem: Fatigue  Goal: Verbalize increase energy and improved vitality  1/2/2022 1024 by Allan Shah RN  Outcome: Ongoing  1/2/2022 0209 by Eloina Valle APRN - CNP  Outcome: Ongoing     Problem: Patient Education: Go to Patient Education Activity  Goal: Patient/Family Education  1/2/2022 1024 by Allan Shah RN  Outcome: Ongoing  1/2/2022 0209 by Eloina Valle APRN - CNP  Outcome: Ongoing     Problem: Skin Integrity:  Goal: Will show no infection signs and symptoms  Description: Will show no infection signs and symptoms  1/2/2022 1024 by Allan Shah RN  Outcome: Ongoing  1/2/2022 0209 by Eloina Valle APRN - CNP  Outcome: Ongoing  Goal: Absence of new skin breakdown  Description: Absence of new skin breakdown  1/2/2022 1024 by Allan Shah RN  Outcome: Ongoing  1/2/2022 0209 by Eloinasuzanne Valle APRN - CNP  Outcome: Ongoing     Problem: Falls - Risk of:  Goal: Will remain free from falls  Description: Will remain free from falls  1/2/2022 1024 by Allan Shah RN  Outcome: Ongoing  1/2/2022 0209 by Eloina Valle APRN - CNP  Outcome: Ongoing  Goal: Absence of physical injury  Description: Absence of physical injury  1/2/2022 1024 by Allan Shah RN  Outcome: Ongoing  1/2/2022 0209 by Eloina Valle APRN - CNP  Outcome: Ongoing     Problem: Nutrition  Goal: Optimal nutrition therapy  1/2/2022 1024 by Lilibeth Corrigan RN  Outcome: Ongoing  1/2/2022 0209 by KISHOR Cagle CNP  Outcome: Ongoing     Problem: Pain:  Goal: Pain level will decrease  Description: Pain level will decrease  1/2/2022 1024 by Lilibeth Corrigan RN  Outcome: Ongoing  1/2/2022 0209 by KISHOR Cagle CNP  Outcome: Ongoing  Goal: Control of acute pain  Description: Control of acute pain  1/2/2022 1024 by Lilibeth Corrigan RN  Outcome: Ongoing  1/2/2022 0209 by KISHOR Cagle CNP  Outcome: Ongoing  Goal: Control of chronic pain  Description: Control of chronic pain  1/2/2022 1024 by Lilibeth Corrigan RN  Outcome: Ongoing  1/2/2022 0209 by KISHOR Cagle CNP  Outcome: Ongoing     Problem: Discharge Planning:  Goal: Discharged to appropriate level of care  Description: Discharged to appropriate level of care  1/2/2022 1024 by Lilibeth Corrigan RN  Outcome: Ongoing  1/2/2022 0209 by KISHOR Cagle CNP  Outcome: Ongoing

## 2022-01-02 NOTE — PLAN OF CARE
Problem: Airway Clearance - Ineffective  Goal: Achieve or maintain patent airway  1/2/2022 0209 by KISHOR Pineda CNP  Outcome: Ongoing  1/1/2022 1242 by Ligia Mari RN  Outcome: Ongoing     Problem: Gas Exchange - Impaired  Goal: Absence of hypoxia  1/2/2022 0209 by KISHOR Pineda CNP  Outcome: Ongoing  1/1/2022 1242 by Ligia Mari RN  Outcome: Ongoing  Goal: Promote optimal lung function  1/2/2022 0209 by KISHOR Pineda CNP  Outcome: Ongoing  1/1/2022 1242 by Ligia Mari RN  Outcome: Ongoing     Problem: Breathing Pattern - Ineffective  Goal: Ability to achieve and maintain a regular respiratory rate  1/2/2022 0209 by KISHOR Pineda CNP  Outcome: Ongoing  1/1/2022 1242 by Ligia Mari RN  Outcome: Ongoing     Problem:  Body Temperature -  Risk of, Imbalanced  Goal: Ability to maintain a body temperature within defined limits  1/2/2022 0209 by KISHOR Pineda CNP  Outcome: Ongoing  1/1/2022 1242 by Ligia Mari RN  Outcome: Ongoing  Goal: Will regain or maintain usual level of consciousness  1/2/2022 0209 by KISHOR Pineda CNP  Outcome: Ongoing  1/1/2022 1242 by Ligia Mari RN  Outcome: Ongoing  Goal: Complications related to the disease process, condition or treatment will be avoided or minimized  1/2/2022 0209 by KISHOR Pineda CNP  Outcome: Ongoing  1/1/2022 1242 by Ligia Mari RN  Outcome: Ongoing     Problem: Isolation Precautions - Risk of Spread of Infection  Goal: Prevent transmission of infection  1/2/2022 0209 by KISHOR Pineda CNP  Outcome: Ongoing  1/1/2022 1242 by Ligia Mari RN  Outcome: Ongoing     Problem: Nutrition Deficits  Goal: Optimize nutritional status  1/2/2022 0209 by KISHOR Pineda CNP  Outcome: Ongoing  1/1/2022 1242 by Ligia Mari RN  Outcome: Ongoing     Problem: Risk for Fluid Volume Deficit  Goal: Maintain normal heart rhythm  1/2/2022 0209 by Mili Nash KISHOR Hamilton CNP  Outcome: Ongoing  1/1/2022 1242 by Deri Kanner, RN  Outcome: Ongoing  Goal: Maintain absence of muscle cramping  1/2/2022 0209 by KISHOR Mcmillan CNP  Outcome: Ongoing  1/1/2022 1242 by Deri Kanner, RN  Outcome: Ongoing  Goal: Maintain normal serum potassium, sodium, calcium, phosphorus, and pH  1/2/2022 0209 by KISHOR Mcmillan CNP  Outcome: Ongoing  1/1/2022 1242 by Deri Kanner, RN  Outcome: Ongoing     Problem: Loneliness or Risk for Loneliness  Goal: Demonstrate positive use of time alone when socialization is not possible  1/2/2022 0209 by KISHOR Mcmillan CNP  Outcome: Ongoing  1/1/2022 1242 by Deri Kanner, RN  Outcome: Ongoing     Problem: Fatigue  Goal: Verbalize increase energy and improved vitality  1/2/2022 0209 by KISHOR Mcmillan CNP  Outcome: Ongoing  1/1/2022 1242 by Deri Kanner, RN  Outcome: Ongoing     Problem: Patient Education: Go to Patient Education Activity  Goal: Patient/Family Education  1/2/2022 0209 by KISHOR Mcmillan CNP  Outcome: Ongoing  1/1/2022 1242 by Deri Kanner, RN  Outcome: Ongoing     Problem: Skin Integrity:  Goal: Will show no infection signs and symptoms  Description: Will show no infection signs and symptoms  1/2/2022 0209 by KISHOR Mcmillan CNP  Outcome: Ongoing  1/1/2022 1242 by Deri Kanner, RN  Outcome: Ongoing  Goal: Absence of new skin breakdown  Description: Absence of new skin breakdown  1/2/2022 0209 by KISHOR Mcmillan CNP  Outcome: Ongoing  1/1/2022 1242 by Deri Kanner, RN  Outcome: Ongoing     Problem: Falls - Risk of:  Goal: Will remain free from falls  Description: Will remain free from falls  1/2/2022 0209 by KISHOR Mcmillan CNP  Outcome: Ongoing  1/1/2022 1242 by Deri Kanner, RN  Outcome: Ongoing  Goal: Absence of physical injury  Description: Absence of physical injury  1/2/2022 0209 by KISHOR Mcmillan - CNP  Outcome: Ongoing  1/1/2022 1242 by Rock Rajesh RN  Outcome: Ongoing     Problem: Nutrition  Goal: Optimal nutrition therapy  1/2/2022 0209 by KISHOR Tapia CNP  Outcome: Ongoing  1/1/2022 1242 by Rock Rajesh RN  Outcome: Ongoing     Problem: Pain:  Goal: Pain level will decrease  Description: Pain level will decrease  1/2/2022 0209 by KISHOR Tapia CNP  Outcome: Ongoing  1/1/2022 1242 by Rock Mena RN  Outcome: Ongoing  Goal: Control of acute pain  Description: Control of acute pain  1/2/2022 0209 by KISHOR Tapia CNP  Outcome: Ongoing  1/1/2022 1242 by Rock Mena RN  Outcome: Ongoing  Goal: Control of chronic pain  Description: Control of chronic pain  1/2/2022 0209 by KISHOR Tapia CNP  Outcome: Ongoing  1/1/2022 1242 by Rock Mena RN  Outcome: Ongoing     Problem: Discharge Planning:  Goal: Discharged to appropriate level of care  Description: Discharged to appropriate level of care  1/2/2022 0209 by KISHOR Tapia CNP  Outcome: Ongoing  1/1/2022 1242 by Rock Mena RN  Outcome: Ongoing

## 2022-01-02 NOTE — PROGRESS NOTES
Hospitalist Progress Note  1/2/2022 1:05 PM  Subjective:   Admit Date: 12/8/2021  PCP: Matt Amanda MD     DNR-CCA      C/c:  Chief Complaint   Patient presents with    Respiratory Distress         Interval History: doing same still on HHF oxygen at 55/l 95 % sat    Diet: ADULT DIET; Regular; 4 carb choices (60 gm/meal); 1800 ml                                ip days:25  Medications:   Scheduled Meds:   piperacillin-tazobactam  3,375 mg IntraVENous Q8H    sodium chloride  3 g Oral BID WC    dexamethasone  10 mg IntraVENous Daily    furosemide  20 mg IntraVENous Daily    enoxaparin  40 mg SubCUTAneous BID    albuterol sulfate HFA  2 puff Inhalation 4x daily    aspirin  81 mg Oral Daily    atorvastatin  40 mg Oral Daily    buPROPion  75 mg Oral Daily    clopidogrel  75 mg Oral Daily    levothyroxine  75 mcg Oral Daily    finasteride  5 mg Oral Daily    oxybutynin  15 mg Oral Daily    metoprolol tartrate  50 mg Oral BID    [Held by provider] lisinopril  20 mg Oral Daily    sodium chloride flush  5-40 mL IntraVENous 2 times per day     Continuous Infusions:   sodium chloride Stopped (01/01/22 1722)     PRN Meds:.hydrocortisone, bisacodyl, iopamidol, ondansetron, zolpidem, benzonatate, guaiFENesin-dextromethorphan, albuterol sulfate HFA, sodium chloride flush, sodium chloride, polyethylene glycol, acetaminophen **OR** acetaminophen     CBC:   Recent Labs     12/31/21  0523 01/01/22  0535 01/02/22  0525   WBC 8.7 8.9 10.1   HGB 10.3* 10.5* 11.5*    157 See Reflexed IPF Result     BMP:    Recent Labs     12/31/21  0523 01/01/22  0535 01/02/22  0525    138 139   K 4.1 4.1 4.0    104 104   CO2 27 26 24   BUN 30* 29* 28*   CREATININE 0.67* 0.71 0.81   GLUCOSE 90 84 84     Hepatic:   Recent Labs     12/31/21  0523   AST 15   ALT 18   BILITOT 0.58   ALKPHOS 62     Troponin: No results for input(s): TROPONINI in the last 72 hours.   BNP: No results for input(s): BNP in the last 72 hours. Lipids: No results for input(s): CHOL, HDL in the last 72 hours. Invalid input(s): LDLCALCU  INR: No results for input(s): INR in the last 72 hours. Objective:   Vitals: /70   Pulse 77   Temp 97.6 °F (36.4 °C) (Tympanic)   Resp (!) 36   Ht 6' (1.829 m)   Wt 296 lb 6.4 oz (134.4 kg)   SpO2 91%   BMI 40.20 kg/m²   General appearance: alert, appears stated age and cooperative  Skin: Skin color, texture, turgor normal. No rashes or lesions  Lungs: clear to auscultation bilaterally  Heart: regular rate and rhythm, S1, S2 normal, no murmur, click, rub or gallop  Abdomen: soft, non-tender; bowel sounds normal; no masses,  no organomegaly  Extremities: extremities normal, atraumatic, no cyanosis or edema  Neurologic: Mental status: Alert, oriented, thought content appropriate    Prophylaxis:   DVT with  [x] lovenox        [] heparin        [] Scd        [] none:     Radiology:  ECHO Complete 2D W Doppler W Color    Result Date: 12/9/2021  Transthoracic Echocardiography Report (TTE)  Patient Name [facility]     Date of Study               12/09/2021               [facility]   Date of      1945  Gender                      Male  Birth   Age          67 year(s)  Race                           Room Number  9287        Height:                     72 inch, 182.88 cm   Corporate ID [id number]    Weight:                     315 pounds, 142.9 kg  #   Patient Acct [de-identified]   BSA:          2.58 m^2      BMI:       42.72  #                                                               kg/m^2   MR #         [id number]     Hannasuzanne Medel [facility]   Accession #  [id number]  Interpreting Physician      Micheal Hwang   Fellow                   Referring Nurse                           Practitioner   Interpreting             Referring Physician         Honey Davenport, CNP  Fellow  Additional Comments Technically difficult study due to body habitus and condition.  Type of Study   TTE procedure:2D Echocardiogram, M-Mode, Doppler, Color Doppler. Procedure Date Date: 12/09/2021 Start: 10:29 AM Study Location: [facility] Technical Quality: Adequate visualization Indications:Shortness of breath, Hypoxia, Congestive heart failure, combined systolic and diastolic dysfunction and COVID 19. History / Tech. Comments: Procedure explained to patient. Patient Status: Inpatient Height: 72 inches Weight: 315.01 pounds BSA: 2.58 m^2 BMI: 42.72 kg/m^2 CONCLUSIONS Summary Technically difficult echo. LV systolic function is mildly reduced, estimated EF 40% Abnormal septal wall motion. Hypokinetic mid to basal inferolateral, inferior and anterolateral walls. Grade I left ventricular diastolic dysfunction. Right ventricular dilatation with reduced systolic function. Aortic valve sclerosis without stenosis. Mild tricuspid regurgitation. Estimated right ventricular systolic pressure is 55 mmHg. No pericardial effusion. Signature ----------------------------------------------------------------------------  Electronically signed by Oneyda Khan RDCS(Sonographer) on 12/09/2021  11:57 AM ---------------------------------------------------------------------------- ----------------------------------------------------------------------------  Electronically signed by Micheal Hwang(Interpreting physician) on  12/09/2021 12:23 PM ---------------------------------------------------------------------------- FINDINGS Left Atrium Left atrium is mildly dilated. Left Ventricle Left ventricle is moderately dilated. Mildly hypertrophic interventricular septum. Abnormal septal wall motion. Hypokinetic inferoseptal and inferior walls. Left ventricular ejection fraction 40 %. Grade I left ventricular diastolic dysfunction. Right Atrium Right atrial dilatation. Right Ventricle Right ventricular dilatation with reduced systolic function. Mitral Valve Normal mitral valve structure and function.  Aortic Valve Aortic valve sclerosis without stenosis. Tricuspid Valve Normal tricuspid valve leaflets. Mild tricuspid regurgitation. Estimated right ventricular systolic pressure is 55 mmHg. Moderate pulmonary hypertension. Pulmonic Valve The pulmonic valve is normal in structure. Pericardial Effusion No significant pericardial effusion is seen. Miscellaneous Aortic root dimension is at upper limit of normal. IVC not visualized, unable to assess size and respiratory collapse. E/E' average = 8. M-mode / 2D Measurements & Calculations:   LVIDd:6.43 cm(3.7 - 5.6 cm)      Diastolic PMJFBD:274.6 ml  LVIDs:6 cm(2.2 - 4.0 cm)         Systolic ZXEFX.6 ml  IVSd:1.27 cm(0.6 - 1.1 cm)       LA Dimension: 4.5 cm(1.9 - 4.0 cm)  LVPWd:0.88 cm(0.6 - 1.1 cm)      LA volume/Index: 79 ml /31m^2  Fractional Shortenin.69 %     LVOT:3.7 cm  Calculated LVEF (%): 42.12 %   Mitral:                                  Aortic   Peak E-Wave: 0.50 m/s                    Peak Velocity: 1.10 m/s  Peak A-Wave: 0.72 m/s                    Peak Gradient: 4.84 mmHg  E/A Ratio: 0.7  Peak Gradient: 1 mmHg  Deceleration Time: 430 msec   Tricuspid:                               Pulmonic:   Peak TR Velocity: 3.44 m/s               Peak Velocity: 0.90 m/s  Peak TR Gradient: 47.3344 mmHg           Peak Gradient: 3.21 mmHg  Septal Wall E' velocity:0.04 m/s Lateral Wall E' velocity:0.10 m/s    XR CHEST PORTABLE    Result Date: 2021  EXAMINATION: ONE XRAY VIEW OF THE CHEST 2021 9:47 am COMPARISON: 2021 HISTORY: ORDERING SYSTEM PROVIDED HISTORY: covid TECHNOLOGIST PROVIDED HISTORY: covid Reason for Exam: Follow up due to inpatient status. FINDINGS: Sternal wires are in place. The heart and mediastinal structures are stable. Bilateral lung infiltrates are present similar to prior exam.  Findings are consistent with COVID-19 pneumonia. Persistent bilateral lung infiltrates compatible with the patient's clinical diagnosis of COVID-19 pneumonia.      XR CHEST PORTABLE    Result Date: 12/8/2021  EXAMINATION: ONE XRAY VIEW OF THE CHEST 12/8/2021 3:46 pm COMPARISON: None. HISTORY: ORDERING SYSTEM PROVIDED HISTORY: shortness of breath TECHNOLOGIST PROVIDED HISTORY: shortness of breath Reason for Exam: Respiratory Distress FINDINGS: Status post median sternotomy. Cardiomegaly. Bilateral ill-defined pulmonary opacities. No pneumothorax. No pleural effusion. Bilateral ill-defined pulmonary opacities could represent edema or infection     CT CHEST PULMONARY EMBOLISM W CONTRAST    Result Date: 12/8/2021  EXAMINATION: CTA OF THE CHEST 12/8/2021 11:45 am TECHNIQUE: CTA of the chest was performed after the administration of intravenous contrast.  Multiplanar reformatted images are provided for review. MIP images are provided for review. Dose modulation, iterative reconstruction, and/or weight based adjustment of the mA/kV was utilized to reduce the radiation dose to as low as reasonably achievable. COMPARISON: Chest x-ray dated December 8, 2021 HISTORY: ORDERING SYSTEM PROVIDED HISTORY: Patient here with positive Covid elevated D-dimer TECHNOLOGIST PROVIDED HISTORY: Patient here with positive Covid elevated D-dimer Decision Support Exception - unselect if not a suspected or confirmed emergency medical condition->Emergency Medical Condition (MA) Reason for Exam: r/o pe FINDINGS: Pulmonary Arteries: Pulmonary arteries are adequately opacified for evaluation. No evidence of intraluminal filling defect to suggest pulmonary embolism. Main pulmonary artery is slightly enlarged, measuring 32 mm, suggesting pulmonary arterial hypertension. . Mediastinum: Nonspecific mediastinal and hilar lymph nodes are present, likely reactive. Coronary arterial calcifications are present. Patient is status post prior CABG. Multiple calcified right hilar and subcarinal lymph nodes are present. .  The heart and pericardium demonstrate no acute abnormality.   There is no acute abnormality of the thoracic aorta. Lungs/pleura: Multiple ground-glass opacities are present throughout the lungs, highly suggestive of COVID-19 pulmonary disease given the clinical history. No pneumothorax or pleural effusion is present. Emphysematous changes are present bilaterally within the lungs, with an upper lobe predominance. Upper Abdomen: Limited images of the upper abdomen are unremarkable. Soft Tissues/Bones: No acute bone or soft tissue abnormality. 1. No evidence of pulmonary embolism 2. Extensive ground-glass opacities present throughout the lungs bilaterally, highly suggestive of COVID-19 pulmonary disease given the clinical history 3. Prior CABG       Assessment :   1. covid pneumonia/ on zosyn/still on HHF oxygen  2. Out of contact isolation     Plan:   1. Continue present care  2.   See order    Patient Active Problem List:     Hypertension     Osteoarthritis     Pneumonia due to COVID-19 virus     Chronic venous insufficiency     Acquired genu valgum     Degeneration of lumbar intervertebral disc     Lower urinary tract symptoms due to benign prostatic hyperplasia     Presence of right artificial knee joint     Bilateral carotid artery stenosis     Hypothyroidism     Ischemic cardiomyopathy     Ventral hernia     Left bundle branch block (LBBB)     Chronic embolism and thrombosis of deep vein of both distal legs (HCC)     Coronary artery disease involving native coronary artery of native heart     Hyperlipidemia     Class 3 severe obesity due to excess calories with serious comorbidity and body mass index (BMI) of 40.0 to 44.9 in adult [facility])     Chronic combined systolic and diastolic CHF (congestive heart failure) (HCC)     Impaired gait     Emphysema/COPD (HCC)     Acute respiratory failure with hypoxia (HCC)     COPD with acute exacerbation ([facility] [address]      Anticipated Disposition upon discharge: [] Home                                                                         [] Home with Home Health [] [facility]                                                                         [] [address] ,[address]      Patient is admitted as inpatient status because of co-morbidities listed above, severity of signs and symptoms as outlined, requirement for current medical therapies and most importantly because of direct risk to patient if care not provided in a hospital setting.           Evelyn Alvarez MD, MD  Rounding Hospitalist

## 2022-01-02 NOTE — PROGRESS NOTES
Physical Therapy  Facility/Department: [facility]  PROGRESSIVE CARE  Daily Treatment Note  NAME: Maryam Molina  : 1945  MRN: [id number]    Date of Service: 2022    Discharge Recommendations:  Continue to assess pending progress,ECF with PT        Assessment   Body structures, Functions, Activity limitations: Decreased functional mobility ; Decreased safe awareness;Decreased ADL status; Decreased endurance;Decreased strength  Prognosis: Fair  Decision Making: Low Complexity  REQUIRES PT FOLLOW UP: Yes  Activity Tolerance  Activity Tolerance: Patient limited by fatigue;Patient limited by endurance     Patient Diagnosis(es): The primary encounter diagnosis was Pneumonia due to COVID-19 virus. A diagnosis of Hypoxia was also pertinent to this visit. has a past medical history of Adenomatous polyp, Cholecystitis, Chronic venous insufficiency, DVT (deep venous thrombosis) ([facility] [address] Edema, Gout, Hypertension, Onychomycosis, Osteoarthritis, Plantar fasciitis, and Tobacco abuse.   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 Restrictions  Restrictions/Precautions  Restrictions/Precautions: Isolation  Subjective   General  Chart Reviewed: Yes  Subjective  Subjective: Patioent in bed at initiation of session and agreeable to therapy at this time. Patient notes fatigue and states he did not sleep well previous night.   Pain Screening  Patient Currently in Pain: No  Pain Assessment  Pain Assessment: 0-10  Pain Level: 0  Vital Signs  Resp: 18 (Increased to 44 with exertion.)  Patient Currently in Pain: No  Oxygen Therapy  SpO2: 92 % (85 at lowest with exertion.)  Pulse Oximeter Device Location: Left;Finger  O2 Device: Heated high flow cannula Orientation  Orientation  Overall Orientation Status: Within Normal Limits  Cognition      Objective   Bed mobility  Rolling to Left: Stand by assistance  Rolling to Right: Stand by assistance  Comment: Patient able to roll left and right with SBA multiple times for hygene care with increased respirations and decreased O2 SAT noted.      Ambulation  Ambulation?: No        Exercises  Quad Sets: 10x  Heelslides: 10x  Hip Abduction: 10x in supine with AAROM on right  Ankle Pumps: 20x                        G-Code     OutComes Score                                                     AM-PAC Score             Goals  Short term goals  Time Frame for Short term goals: LOS  Short term goal 1: Bed mobilty with CGA  Short term goal 2: Transfers with CGA x 1 maintaining O2 Sats in 85%  Short term goal 3: Sitting at EOB x 5 min maintaining O2 stats  Patient Goals   Patient goals : Unable to [address]  Times per day: Daily  Current Treatment Recommendations: Padmini Carbajal Re-education,Patient/Caregiver Education & Training,ROM,Balance Training,Gait Training,Home Exercise Program,Safety Education & Training,Functional Mobility Training  Safety Devices  Type of devices: Nurse notified,Call light within reach,Left in bed     Therapy Time   Individual Concurrent Group Co-treatment   Time In 0756         Time Out 0815         Minutes [address] [place], [place]

## 2022-01-03 ENCOUNTER — APPOINTMENT (OUTPATIENT)
Dept: GENERAL RADIOLOGY | Age: 77
DRG: 177 | End: 2022-01-03
Payer: MEDICARE

## 2022-01-03 LAB
ABSOLUTE EOS #: 0.08 K/UL (ref 0–0.44)
ABSOLUTE IMMATURE GRANULOCYTE: 0.18 K/UL (ref 0–0.3)
ABSOLUTE LYMPH #: 1.19 K/UL (ref 1.1–3.7)
ABSOLUTE MONO #: 0.62 K/UL (ref 0.1–1.2)
ANION GAP SERPL CALCULATED.3IONS-SCNC: 10 MMOL/L (ref 9–17)
BASOPHILS # BLD: 0 % (ref 0–2)
BASOPHILS ABSOLUTE: <0.03 K/UL (ref 0–0.2)
BUN BLDV-MCNC: 26 MG/DL (ref 8–23)
BUN/CREAT BLD: 32 (ref 9–20)
CALCIUM SERPL-MCNC: 9.3 MG/DL (ref 8.6–10.4)
CHLORIDE BLD-SCNC: 104 MMOL/L (ref 98–107)
CO2: 27 MMOL/L (ref 20–31)
CREAT SERPL-MCNC: 0.82 MG/DL (ref 0.7–1.2)
DIFFERENTIAL TYPE: ABNORMAL
EOSINOPHILS RELATIVE PERCENT: 1 % (ref 1–4)
GFR AFRICAN AMERICAN: >60 ML/MIN
GFR NON-AFRICAN AMERICAN: >60 ML/MIN
GFR SERPL CREATININE-BSD FRML MDRD: ABNORMAL ML/MIN/{1.73_M2}
GFR SERPL CREATININE-BSD FRML MDRD: ABNORMAL ML/MIN/{1.73_M2}
GLUCOSE BLD-MCNC: 104 MG/DL (ref 70–99)
HCT VFR BLD CALC: 36.1 % (ref 40.7–50.3)
HEMOGLOBIN: 11.1 G/DL (ref 13–17)
IMMATURE GRANULOCYTES: 2 %
LYMPHOCYTES # BLD: 13 % (ref 24–43)
MCH RBC QN AUTO: 29.9 PG (ref 25.2–33.5)
MCHC RBC AUTO-ENTMCNC: 30.7 G/DL (ref 25.2–33.5)
MCV RBC AUTO: 97.3 FL (ref 82.6–102.9)
MONOCYTES # BLD: 7 % (ref 3–12)
NRBC AUTOMATED: 0 PER 100 WBC
PDW BLD-RTO: 15.3 % (ref 11.8–14.4)
PLATELET # BLD: 187 K/UL (ref 138–453)
PLATELET ESTIMATE: ABNORMAL
PMV BLD AUTO: 9.9 FL (ref 8.1–13.5)
POTASSIUM SERPL-SCNC: 3.8 MMOL/L (ref 3.7–5.3)
RBC # BLD: 3.71 M/UL (ref 4.21–5.77)
RBC # BLD: ABNORMAL 10*6/UL
SEG NEUTROPHILS: 77 % (ref 36–65)
SEGMENTED NEUTROPHILS ABSOLUTE COUNT: 6.93 K/UL (ref 1.5–8.1)
SODIUM BLD-SCNC: 141 MMOL/L (ref 135–144)
WBC # BLD: 9 K/UL (ref 3.5–11.3)
WBC # BLD: ABNORMAL 10*3/UL

## 2022-01-03 PROCEDURE — 6360000002 HC RX W HCPCS: Performed by: NURSE PRACTITIONER

## 2022-01-03 PROCEDURE — 6370000000 HC RX 637 (ALT 250 FOR IP): Performed by: FAMILY MEDICINE

## 2022-01-03 PROCEDURE — 94660 CPAP INITIATION&MGMT: CPT

## 2022-01-03 PROCEDURE — 2580000003 HC RX 258: Performed by: FAMILY MEDICINE

## 2022-01-03 PROCEDURE — 6370000000 HC RX 637 (ALT 250 FOR IP): Performed by: NURSE PRACTITIONER

## 2022-01-03 PROCEDURE — 94761 N-INVAS EAR/PLS OXIMETRY MLT: CPT

## 2022-01-03 PROCEDURE — 85025 COMPLETE CBC W/AUTO DIFF WBC: CPT

## 2022-01-03 PROCEDURE — 6360000002 HC RX W HCPCS: Performed by: FAMILY MEDICINE

## 2022-01-03 PROCEDURE — 71045 X-RAY EXAM CHEST 1 VIEW: CPT

## 2022-01-03 PROCEDURE — 97110 THERAPEUTIC EXERCISES: CPT

## 2022-01-03 PROCEDURE — 2700000000 HC OXYGEN THERAPY PER DAY

## 2022-01-03 PROCEDURE — 6360000002 HC RX W HCPCS: Performed by: INTERNAL MEDICINE

## 2022-01-03 PROCEDURE — 80048 BASIC METABOLIC PNL TOTAL CA: CPT

## 2022-01-03 PROCEDURE — 36415 COLL VENOUS BLD VENIPUNCTURE: CPT

## 2022-01-03 PROCEDURE — 2060000000 HC ICU INTERMEDIATE R&B

## 2022-01-03 PROCEDURE — 99232 SBSQ HOSP IP/OBS MODERATE 35: CPT | Performed by: INTERNAL MEDICINE

## 2022-01-03 PROCEDURE — 94640 AIRWAY INHALATION TREATMENT: CPT

## 2022-01-03 PROCEDURE — 6370000000 HC RX 637 (ALT 250 FOR IP)

## 2022-01-03 RX ADMIN — ENOXAPARIN SODIUM 40 MG: 100 INJECTION SUBCUTANEOUS at 21:21

## 2022-01-03 RX ADMIN — CLOPIDOGREL BISULFATE 75 MG: 75 TABLET ORAL at 11:19

## 2022-01-03 RX ADMIN — LEVOTHYROXINE SODIUM 75 MCG: 0.07 TABLET ORAL at 06:32

## 2022-01-03 RX ADMIN — FINASTERIDE 5 MG: 5 TABLET, FILM COATED ORAL at 11:19

## 2022-01-03 RX ADMIN — PIPERACILLIN SODIUM AND TAZOBACTAM SODIUM 3375 MG: 3; .375 INJECTION, POWDER, LYOPHILIZED, FOR SOLUTION INTRAVENOUS at 06:32

## 2022-01-03 RX ADMIN — DEXAMETHASONE SODIUM PHOSPHATE 10 MG: 10 INJECTION INTRAMUSCULAR; INTRAVENOUS at 11:20

## 2022-01-03 RX ADMIN — SODIUM CHLORIDE, PRESERVATIVE FREE 10 ML: 5 INJECTION INTRAVENOUS at 11:20

## 2022-01-03 RX ADMIN — SODIUM CHLORIDE, PRESERVATIVE FREE 10 ML: 5 INJECTION INTRAVENOUS at 21:20

## 2022-01-03 RX ADMIN — FUROSEMIDE 20 MG: 10 INJECTION, SOLUTION INTRAMUSCULAR; INTRAVENOUS at 11:20

## 2022-01-03 RX ADMIN — ALBUTEROL SULFATE 2 PUFF: 90 AEROSOL, METERED RESPIRATORY (INHALATION) at 11:51

## 2022-01-03 RX ADMIN — ATORVASTATIN CALCIUM 40 MG: 40 TABLET, FILM COATED ORAL at 11:19

## 2022-01-03 RX ADMIN — METOPROLOL TARTRATE 50 MG: 50 TABLET, FILM COATED ORAL at 21:19

## 2022-01-03 RX ADMIN — SODIUM CHLORIDE 3 G: 1 TABLET ORAL at 17:56

## 2022-01-03 RX ADMIN — ASPIRIN 81 MG: 81 TABLET, COATED ORAL at 11:19

## 2022-01-03 RX ADMIN — SODIUM CHLORIDE 3 G: 1 TABLET ORAL at 08:00

## 2022-01-03 RX ADMIN — BUPROPION HYDROCHLORIDE 75 MG: 75 TABLET ORAL at 11:21

## 2022-01-03 RX ADMIN — ZOLPIDEM TARTRATE 5 MG: 5 TABLET ORAL at 21:19

## 2022-01-03 RX ADMIN — ALBUTEROL SULFATE 2 PUFF: 90 AEROSOL, METERED RESPIRATORY (INHALATION) at 07:19

## 2022-01-03 RX ADMIN — ALBUTEROL SULFATE 2 PUFF: 90 AEROSOL, METERED RESPIRATORY (INHALATION) at 20:34

## 2022-01-03 RX ADMIN — METOPROLOL TARTRATE 50 MG: 50 TABLET, FILM COATED ORAL at 11:19

## 2022-01-03 RX ADMIN — OXYBUTYNIN CHLORIDE 15 MG: 5 TABLET, EXTENDED RELEASE ORAL at 11:20

## 2022-01-03 RX ADMIN — ENOXAPARIN SODIUM 40 MG: 100 INJECTION SUBCUTANEOUS at 11:19

## 2022-01-03 ASSESSMENT — PAIN SCALES - GENERAL
PAINLEVEL_OUTOF10: 0
PAINLEVEL_OUTOF10: 0

## 2022-01-03 NOTE — PROGRESS NOTES
Physical Therapy  Facility/Department: [facility]  PROGRESSIVE CARE  Daily Treatment Note  NAME: Hermelindo Warner  : 1945  MRN: [id number]    Date of Service: 1/3/2022    Discharge Recommendations:  Continue to assess pending progress,ECF with PT        Assessment   Body structures, Functions, Activity limitations: Decreased functional mobility ; Decreased safe awareness;Decreased ADL status; Decreased endurance;Decreased strength  Assessment: Patient limited due to endurance and fatigue. Prognosis: Fair  Decision Making: Low Complexity  REQUIRES PT FOLLOW UP: Yes  Activity Tolerance  Activity Tolerance: Patient limited by fatigue;Patient limited by endurance  Activity Tolerance: Pt limited by respiraion rates     Patient Diagnosis(es): The primary encounter diagnosis was Pneumonia due to COVID-19 virus. A diagnosis of Hypoxia was also pertinent to this visit. has a past medical history of Adenomatous polyp, Cholecystitis, Chronic venous insufficiency, DVT (deep venous thrombosis) ([facility] [address] Edema, Gout, Hypertension, Onychomycosis, Osteoarthritis, Plantar fasciitis, and Tobacco abuse.   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 Restrictions  Restrictions/Precautions  Restrictions/Precautions: Isolation  Subjective   General  Chart Reviewed: Yes  Family / Caregiver Present: No  Subjective  Subjective: RN approved session. Pt in bed upon arrival and agreeable to session.  Pt states fatigued and feels weak  Pain Screening  Patient Currently in Pain: Denies  Pain Assessment  Pain Level: 0  Vital Signs  Resp: (!) 45 (with exercise and rolling)  Patient Currently in Pain: Denies  Oxygen Therapy  SpO2: 90 % (85 with exertion)  Pulse Oximeter Device Mode: Continuous  Pulse Oximeter Device Location: Left;Finger  O2 Device: High flow nasal cannula       Orientation  Orientation  Overall Orientation Status: Within Functional Limits  Cognition      Objective   Bed mobility  Bridging: Unable to assess  Rolling to Left: Stand by assistance  Rolling to Right: Unable to assess  Supine to Sit: Unable to assess  Sit to Supine: Unable to assess  Comment: Pt rolling left however demos increased respirations to 45 and decrease in O2 sats  Transfers  Sit to Stand: Unable to assess  Stand to sit: Unable to assess  Bed to Chair: Unable to assess  Stand Pivot Transfers: Unable to assess  Squat Pivot Transfers: Unable to assess  Lateral Transfers: Unable to assess  Car Transfer: Unable to assess  Comment: Held transfers due to high respiration rates  Ambulation  Ambulation?: No        Exercises  Straight Leg Raise: 10x AAROM on Right  Quad Sets: 15x  Heelslides: 15x  Gluteal Sets: 20x  Hip Abduction: 10x  Ankle Pumps: 20x  Comments: Supine exercises performed. monitored RR requiring frequent breaks.                         G-Code     OutComes Score                                                     AM-PAC Score             Goals  Short term goals  Time Frame for Short term goals: LOS  Short term goal 1: Bed mobilty with CGA  Short term goal 2: Transfers with CGA x 1 maintaining O2 Sats in 85%  Short term goal 3: Sitting at EOB x 5 min maintaining O2 stats  Patient Goals   Patient goals : Unable to [address]  Times per day: Daily  Current Treatment Recommendations: [facility] Re-education,Patient/Caregiver Education & Training,ROM,Balance Training,Gait Training,Home Exercise Program,Safety Education & Training,Functional Mobility Training  Safety Devices  Type of devices: Nurse notified,Call light within reach,Left in bed     Therapy Time   Individual Concurrent Group Co-treatment   Time In 0920         Time Out 5126 Minutes [address], \[facility] [place] [facility]\""

## 2022-01-03 NOTE — CARE COORDINATION
[facility] states they are currently unable to accommodate patient.

## 2022-01-03 NOTE — PLAN OF CARE
Problem: Airway Clearance - Ineffective  Goal: Achieve or maintain patent airway  1/2/2022 2346 by Johana Ram RN  Outcome: Ongoing  1/2/2022 1024 by Vasu Duvall RN  Outcome: Ongoing     Problem: Gas Exchange - Impaired  Goal: Absence of hypoxia  1/2/2022 2346 by Johana Ram RN  Outcome: Ongoing  1/2/2022 1024 by Vasu Duvall RN  Outcome: Ongoing  Goal: Promote optimal lung function  1/2/2022 2346 by Johana Ram RN  Outcome: Ongoing  1/2/2022 1024 by Vasu Duvall RN  Outcome: Ongoing     Problem: Breathing Pattern - Ineffective  Goal: Ability to achieve and maintain a regular respiratory rate  1/2/2022 2346 by Johana Ram RN  Outcome: Ongoing  1/2/2022 1024 by Vasu Duvall RN  Outcome: Ongoing     Problem:  Body Temperature -  Risk of, Imbalanced  Goal: Ability to maintain a body temperature within defined limits  1/2/2022 2346 by Johana Ram RN  Outcome: Ongoing  1/2/2022 1024 by Vasu Duvall RN  Outcome: Ongoing  Goal: Will regain or maintain usual level of consciousness  1/2/2022 2346 by Johana Ram RN  Outcome: Ongoing  1/2/2022 1024 by Vasu Duvall RN  Outcome: Ongoing  Goal: Complications related to the disease process, condition or treatment will be avoided or minimized  1/2/2022 2346 by Johana Ram RN  Outcome: Ongoing  1/2/2022 1024 by Vasu Duvall RN  Outcome: Ongoing     Problem: Isolation Precautions - Risk of Spread of Infection  Goal: Prevent transmission of infection  1/2/2022 2346 by Johana Ram RN  Outcome: Ongoing  1/2/2022 1024 by Vasu Duvall RN  Outcome: Ongoing     Problem: Nutrition Deficits  Goal: Optimize nutritional status  1/2/2022 2346 by Johana Ram RN  Outcome: Ongoing  1/2/2022 1024 by Vasu Duvall RN  Outcome: Ongoing     Problem: Risk for Fluid Volume Deficit  Goal: Maintain normal heart rhythm  1/2/2022 2346 by Johana Ram RN  Outcome: Ongoing  1/2/2022 1024 by Vasu Duvall RN  Outcome: Ongoing  Goal: Maintain absence of muscle cramping  1/2/2022 2346 by Jerry Lim RN  Outcome: Ongoing  1/2/2022 1024 by Lindsey William RN  Outcome: Ongoing  Goal: Maintain normal serum potassium, sodium, calcium, phosphorus, and pH  1/2/2022 2346 by Jerry Lim RN  Outcome: Ongoing  1/2/2022 1024 by Lindsey William RN  Outcome: Ongoing     Problem: Loneliness or Risk for Loneliness  Goal: Demonstrate positive use of time alone when socialization is not possible  1/2/2022 2346 by Jerry Lim RN  Outcome: Ongoing  1/2/2022 1024 by Lindsey William RN  Outcome: Ongoing     Problem: Patient Education: Go to Patient Education Activity  Goal: Patient/Family Education  1/2/2022 2346 by Jerry Lim RN  Outcome: Ongoing  1/2/2022 1024 by Lindsey William RN  Outcome: Ongoing     Problem: Skin Integrity:  Goal: Will show no infection signs and symptoms  Description: Will show no infection signs and symptoms  1/2/2022 2346 by Jerry Lim RN  Outcome: Ongoing  1/2/2022 1024 by Lindsey William RN  Outcome: Ongoing  Goal: Absence of new skin breakdown  Description: Absence of new skin breakdown  1/2/2022 2346 by Jerry Lim RN  Outcome: Ongoing  1/2/2022 1024 by Lindsey William RN  Outcome: Ongoing     Problem: Falls - Risk of:  Goal: Will remain free from falls  Description: Will remain free from falls  1/2/2022 2346 by Jerry Lim RN  Outcome: Ongoing  1/2/2022 1024 by Lindsey William RN  Outcome: Ongoing  Goal: Absence of physical injury  Description: Absence of physical injury  1/2/2022 2346 by Jerry Lim RN  Outcome: Ongoing  1/2/2022 1024 by Lindsey William RN  Outcome: Ongoing     Problem: Pain:  Goal: Pain level will decrease  Description: Pain level will decrease  1/2/2022 2346 by Jerry Lim RN  Outcome: Ongoing  1/2/2022 1024 by Lindsey William RN  Outcome: Ongoing  Goal: Control of acute pain  Description: Control of acute pain  1/2/2022 2346 by Jerry Lim RN  Outcome: Ongoing  1/2/2022 1024 by Susi Parker RN  Outcome: Ongoing  Goal: Control of chronic pain  Description: Control of chronic pain  1/2/2022 2346 by Jesica Garcia RN  Outcome: Ongoing  1/2/2022 1024 by Susi Parker RN  Outcome: Ongoing     Problem: Discharge Planning:  Goal: Discharged to appropriate level of care  Description: Discharged to appropriate level of care  1/2/2022 2346 by Jesica Garcia RN  Outcome: Ongoing  1/2/2022 1024 by Susi Parker RN  Outcome: Ongoing

## 2022-01-04 LAB
ABSOLUTE EOS #: 0.06 K/UL (ref 0–0.44)
ABSOLUTE IMMATURE GRANULOCYTE: 0.19 K/UL (ref 0–0.3)
ABSOLUTE LYMPH #: 1.24 K/UL (ref 1.1–3.7)
ABSOLUTE MONO #: 0.59 K/UL (ref 0.1–1.2)
ANION GAP SERPL CALCULATED.3IONS-SCNC: 9 MMOL/L (ref 9–17)
BASOPHILS # BLD: 0 % (ref 0–2)
BASOPHILS ABSOLUTE: 0.03 K/UL (ref 0–0.2)
BUN BLDV-MCNC: 27 MG/DL (ref 8–23)
BUN/CREAT BLD: 33 (ref 9–20)
CALCIUM SERPL-MCNC: 9.8 MG/DL (ref 8.6–10.4)
CHLORIDE BLD-SCNC: 104 MMOL/L (ref 98–107)
CO2: 29 MMOL/L (ref 20–31)
CREAT SERPL-MCNC: 0.83 MG/DL (ref 0.7–1.2)
DIFFERENTIAL TYPE: ABNORMAL
EOSINOPHILS RELATIVE PERCENT: 1 % (ref 1–4)
GFR AFRICAN AMERICAN: >60 ML/MIN
GFR NON-AFRICAN AMERICAN: >60 ML/MIN
GFR SERPL CREATININE-BSD FRML MDRD: ABNORMAL ML/MIN/{1.73_M2}
GFR SERPL CREATININE-BSD FRML MDRD: ABNORMAL ML/MIN/{1.73_M2}
GLUCOSE BLD-MCNC: 87 MG/DL (ref 70–99)
HCT VFR BLD CALC: 36 % (ref 40.7–50.3)
HEMOGLOBIN: 10.9 G/DL (ref 13–17)
IMMATURE GRANULOCYTES: 2 %
LYMPHOCYTES # BLD: 13 % (ref 24–43)
MCH RBC QN AUTO: 29.9 PG (ref 25.2–33.5)
MCHC RBC AUTO-ENTMCNC: 30.3 G/DL (ref 25.2–33.5)
MCV RBC AUTO: 98.6 FL (ref 82.6–102.9)
MONOCYTES # BLD: 6 % (ref 3–12)
NRBC AUTOMATED: 0 PER 100 WBC
PDW BLD-RTO: 15.6 % (ref 11.8–14.4)
PLATELET # BLD: 206 K/UL (ref 138–453)
PLATELET ESTIMATE: ABNORMAL
PMV BLD AUTO: 10 FL (ref 8.1–13.5)
POTASSIUM SERPL-SCNC: 4.3 MMOL/L (ref 3.7–5.3)
RBC # BLD: 3.65 M/UL (ref 4.21–5.77)
RBC # BLD: ABNORMAL 10*6/UL
SEG NEUTROPHILS: 78 % (ref 36–65)
SEGMENTED NEUTROPHILS ABSOLUTE COUNT: 7.16 K/UL (ref 1.5–8.1)
SODIUM BLD-SCNC: 142 MMOL/L (ref 135–144)
WBC # BLD: 9.3 K/UL (ref 3.5–11.3)
WBC # BLD: ABNORMAL 10*3/UL

## 2022-01-04 PROCEDURE — 97530 THERAPEUTIC ACTIVITIES: CPT

## 2022-01-04 PROCEDURE — 97165 OT EVAL LOW COMPLEX 30 MIN: CPT | Performed by: OCCUPATIONAL THERAPIST

## 2022-01-04 PROCEDURE — 6370000000 HC RX 637 (ALT 250 FOR IP): Performed by: NURSE PRACTITIONER

## 2022-01-04 PROCEDURE — 99232 SBSQ HOSP IP/OBS MODERATE 35: CPT | Performed by: INTERNAL MEDICINE

## 2022-01-04 PROCEDURE — 6370000000 HC RX 637 (ALT 250 FOR IP): Performed by: FAMILY MEDICINE

## 2022-01-04 PROCEDURE — 6370000000 HC RX 637 (ALT 250 FOR IP)

## 2022-01-04 PROCEDURE — 6360000002 HC RX W HCPCS: Performed by: INTERNAL MEDICINE

## 2022-01-04 PROCEDURE — 2700000000 HC OXYGEN THERAPY PER DAY

## 2022-01-04 PROCEDURE — 85025 COMPLETE CBC W/AUTO DIFF WBC: CPT

## 2022-01-04 PROCEDURE — 80048 BASIC METABOLIC PNL TOTAL CA: CPT

## 2022-01-04 PROCEDURE — 36415 COLL VENOUS BLD VENIPUNCTURE: CPT

## 2022-01-04 PROCEDURE — 2060000000 HC ICU INTERMEDIATE R&B

## 2022-01-04 PROCEDURE — 2580000003 HC RX 258: Performed by: FAMILY MEDICINE

## 2022-01-04 PROCEDURE — 6360000002 HC RX W HCPCS: Performed by: NURSE PRACTITIONER

## 2022-01-04 PROCEDURE — 94640 AIRWAY INHALATION TREATMENT: CPT

## 2022-01-04 PROCEDURE — 94761 N-INVAS EAR/PLS OXIMETRY MLT: CPT

## 2022-01-04 RX ADMIN — ATORVASTATIN CALCIUM 40 MG: 40 TABLET, FILM COATED ORAL at 08:45

## 2022-01-04 RX ADMIN — ALBUTEROL SULFATE 2 PUFF: 90 AEROSOL, METERED RESPIRATORY (INHALATION) at 11:42

## 2022-01-04 RX ADMIN — SODIUM CHLORIDE 3 G: 1 TABLET ORAL at 08:44

## 2022-01-04 RX ADMIN — ALBUTEROL SULFATE 2 PUFF: 90 AEROSOL, METERED RESPIRATORY (INHALATION) at 16:18

## 2022-01-04 RX ADMIN — OXYBUTYNIN CHLORIDE 15 MG: 5 TABLET, EXTENDED RELEASE ORAL at 08:44

## 2022-01-04 RX ADMIN — SODIUM CHLORIDE, PRESERVATIVE FREE 10 ML: 5 INJECTION INTRAVENOUS at 08:45

## 2022-01-04 RX ADMIN — ALBUTEROL SULFATE 2 PUFF: 90 AEROSOL, METERED RESPIRATORY (INHALATION) at 19:37

## 2022-01-04 RX ADMIN — ASPIRIN 81 MG: 81 TABLET, COATED ORAL at 08:44

## 2022-01-04 RX ADMIN — FINASTERIDE 5 MG: 5 TABLET, FILM COATED ORAL at 08:44

## 2022-01-04 RX ADMIN — BUPROPION HYDROCHLORIDE 75 MG: 75 TABLET ORAL at 08:46

## 2022-01-04 RX ADMIN — CLOPIDOGREL BISULFATE 75 MG: 75 TABLET ORAL at 08:44

## 2022-01-04 RX ADMIN — ENOXAPARIN SODIUM 40 MG: 100 INJECTION SUBCUTANEOUS at 08:45

## 2022-01-04 RX ADMIN — ENOXAPARIN SODIUM 40 MG: 100 INJECTION SUBCUTANEOUS at 20:43

## 2022-01-04 RX ADMIN — METOPROLOL TARTRATE 50 MG: 50 TABLET, FILM COATED ORAL at 20:42

## 2022-01-04 RX ADMIN — SODIUM CHLORIDE, PRESERVATIVE FREE 10 ML: 5 INJECTION INTRAVENOUS at 20:43

## 2022-01-04 RX ADMIN — LEVOTHYROXINE SODIUM 75 MCG: 0.07 TABLET ORAL at 06:14

## 2022-01-04 RX ADMIN — ZOLPIDEM TARTRATE 5 MG: 5 TABLET ORAL at 20:43

## 2022-01-04 RX ADMIN — ALBUTEROL SULFATE 2 PUFF: 90 AEROSOL, METERED RESPIRATORY (INHALATION) at 08:30

## 2022-01-04 RX ADMIN — METOPROLOL TARTRATE 50 MG: 50 TABLET, FILM COATED ORAL at 08:45

## 2022-01-04 RX ADMIN — DEXAMETHASONE SODIUM PHOSPHATE 10 MG: 10 INJECTION INTRAMUSCULAR; INTRAVENOUS at 10:12

## 2022-01-04 RX ADMIN — FUROSEMIDE 20 MG: 10 INJECTION, SOLUTION INTRAMUSCULAR; INTRAVENOUS at 08:45

## 2022-01-04 RX ADMIN — SODIUM CHLORIDE 3 G: 1 TABLET ORAL at 17:26

## 2022-01-04 RX ADMIN — SODIUM CHLORIDE, PRESERVATIVE FREE 10 ML: 5 INJECTION INTRAVENOUS at 10:12

## 2022-01-04 ASSESSMENT — PAIN SCALES - GENERAL
PAINLEVEL_OUTOF10: 0

## 2022-01-04 NOTE — PROGRESS NOTES
Hospitalist Progress Note    Patient:  Luis Medina     YOB: 1945    MRN: [id number]   Admit date: 12/8/2021     Acct: [de-identified]     PCP: Erin Jacobo MD    CC--Interval History:   Covid-19 PNA---plateau---on HHF O2    Was on Zosyn 12.26.2021 due increased WBCs----normalized---dc Zosyn    See note below     All other ROS negative except noted in HPI    Diet:  ADULT DIET;  Regular; 4 carb choices (60 gm/meal); 1800 ml    Medications:  Scheduled Meds:   sodium chloride  3 g Oral BID WC    dexamethasone  10 mg IntraVENous Daily    furosemide  20 mg IntraVENous Daily    enoxaparin  40 mg SubCUTAneous BID    albuterol sulfate HFA  2 puff Inhalation 4x daily    aspirin  81 mg Oral Daily    atorvastatin  40 mg Oral Daily    buPROPion  75 mg Oral Daily    clopidogrel  75 mg Oral Daily    levothyroxine  75 mcg Oral Daily    finasteride  5 mg Oral Daily    oxybutynin  15 mg Oral Daily    metoprolol tartrate  50 mg Oral BID    [Held by provider] lisinopril  20 mg Oral Daily    sodium chloride flush  5-40 mL IntraVENous 2 times per day     Continuous Infusions:   sodium chloride Stopped (01/03/22 0242)     PRN Meds:hydrocortisone, bisacodyl, iopamidol, ondansetron, zolpidem, benzonatate, guaiFENesin-dextromethorphan, albuterol sulfate HFA, sodium chloride flush, sodium chloride, polyethylene glycol, acetaminophen **OR** acetaminophen    Objective:  Labs:  CBC with Differential:    Lab Results   Component Value Date    WBC 9.0 01/03/2022    RBC 3.71 01/03/2022    HGB 11.1 01/03/2022    HCT 36.1 01/03/2022     01/03/2022    MCV 97.3 01/03/2022    MCH 29.9 01/03/2022    MCHC 30.7 01/03/2022    RDW 15.3 01/03/2022    LYMPHOPCT 13 01/03/2022    MONOPCT 7 01/03/2022    BASOPCT 0 01/03/2022    MONOSABS 0.62 01/03/2022    LYMPHSABS 1.19 01/03/2022    EOSABS 0.08 01/03/2022    BASOSABS <0.03 01/03/2022    DIFFTYPE NOT REPORTED 01/03/2022     BMP:    Lab Results   Component Value Date  01/03/2022    K 3.8 01/03/2022     01/03/2022    CO2 27 01/03/2022    BUN 26 01/03/2022    LABALBU 2.7 12/31/2021    CREATININE 0.82 01/03/2022    CALCIUM 9.3 01/03/2022    GFRAA >60 01/03/2022    LABGLOM >60 01/03/2022    GLUCOSE 104 01/03/2022           Physical Exam:  Vitals: /64   Pulse 75   Temp 94 °F (34.4 °C) (Tympanic)   Resp 25   Ht 6' (1.829 m)   Wt 294 lb 9.6 oz (133.6 kg)   SpO2 95%   BMI 39.95 kg/m²   24 hour intake/output:    Intake/Output Summary (Last 24 hours) at 1/3/2022 2031  Last data filed at 1/3/2022 1800  Gross per 24 hour   Intake 1591.28 ml   Output --   Net 1591.28 ml     Last 3 weights: Wt Readings from Last 3 Encounters:   01/03/22 294 lb 9.6 oz (133.6 kg)   11/11/21 (!) 319 lb 12.8 oz (145.1 kg)   09/15/21 (!) 321 lb (145.6 kg)     HEENT: HHF----, Normocephalic and Atraumatic  Neck: Supple, No Masses, Tenderness, Nodularity and No Lymphadenopathy  Chest/Lungs: coarse breath sounds----- and Distant Breath Sounds ---no changeCardiac: Regular Rate and Rhythm  GI/Abdomen: Bowel Sounds Present and Soft, Non-tender, without Guarding or Rebound Tenderness  : mullins  EXT/Skin: No Cyanosis, No Clubbing and Edema Present----minimal  Neuro: alert---very hard of hearing---generalzied weakness      Assessment:    Principal Problem:    Pneumonia due to COVID-19 virus  Active Problems:    Ischemic cardiomyopathy    Coronary artery disease involving native coronary artery of native heart    Acute respiratory failure with hypoxia (HCC)    COPD with acute exacerbation (HCC)  Resolved Problems:    * No resolved hospital problems.  *    Irais Kin  76 WM  [sp Michelle, DUSTIN]  DNR-CCA    LOVENOX---PLAVIX    SUPPLEMENTAL OXYGEN----BiPAP  à  HHF and BiPAP nights--rest  à HHF 50 liters 95%  COVID-19--POSITIVE---12.8.2021----Moderna---1.25.2021---2.22.2021---no booster  MIDLINE---12.17.2021   LALO    Anti-infectives:  [Rocephin IV, Zithromax IV]---dcd, Zosyn--12.26.2021--dcd--1.3.2022     Baricitinib, [Decadron]    Covid-19 PNA----12.8.2021---NACs--bilateral airspace disease             CXR---1.3.2022--cardiomegaly--diffuse pulmonary infiltrated            CXR----12.15.2021---12.23.2021---bilateral airspace disease---NACs  Acute respiratory failure with hypoxia----12.8.2021  COPD exacerbation---due to Covid-19 PNA----12.8.2021  Hyponatremia  COPD----emphysema   ASCVD          CABG--10.28.2014---SVG-OM1---SVG-OM2-----SVG-PDA          Cardiac catheterization---2014  Ischemic cardiomyopathy           2D ECHO---1.28.2021---WMA---LVEF ~ 40%    CHF----chronic combined systolic---diastolic  Chronic PE and BLE DVT  CKD--Stage 2  PVD        Bilateral carotid stenosis  Hypertension  Hyperlipidemia  Hypothyroidism  LBBB  BLE venous insufficiency  Gait-balance instability  Insomnia   Tobacco abuse---quit---10.27.2014  HEARING IMPAIRMENT  PMH:    BLE edema, OA, onychomycosis,   PSH:   see above,  colonoscopy--2012--2009---2008---2003--adenomatous polyp, TURP,              dental extractions, left TKA---2018, right TKA---2015     Allergies----NKDA     Plan:  1. Covid--19--PNA----on HHF O2---plateau-----attempting find LTAC placement  2.   dc'd Zosyn  3.    See orders     Electronically signed by Enrrique Hobbs on 1/3/2022 at 8:31 PM    Hospitalist

## 2022-01-04 NOTE — PROGRESS NOTES
Physical Therapy  Initial Assessment  Date: 2022  Patient Name: Dorian Murcia  MRN: [id number]  : 1945          Restrictions  Restrictions/Precautions  Restrictions/Precautions: Isolation    Subjective   General  Chart Reviewed: Yes  Patient assessed for rehabilitation services?: Yes  Family / Caregiver Present: No  Subjective  Subjective: RN approved session. Patient agreeable to session. Pain Screening  Patient Currently in Pain: Denies  Pain Assessment  Pain Assessment: 0-10  Pain Level: 0    Vision/Hearing       Orientation       Social/Functional History       Objective                           Bed mobility  Rolling to Left: Contact guard assistance;Minimal assistance  Supine to Sit: Contact guard assistance;Minimal assistance  Sit to Supine: Contact guard assistance;Minimal assistance  Comment: Performed bed mobility to sit EOB. Patient able to sit EOB just shy of 1 minute before RR increased to 55 and oxygen dropped into 70%. Quickly returned patient to supine positioning and monitored alongside respiratory therapist.          Ambulation  Ambulation?: No                            Assessment   Conditions Requiring Skilled Therapeutic Intervention  Body structures, Functions, Activity limitations: Decreased functional mobility ; Decreased safe awareness;Decreased ADL status; Decreased endurance;Decreased strength  Discharge Recommendations: Continue to assess pending progress;ECF with PT  Activity Tolerance  Activity Tolerance: Patient limited by fatigue;Patient limited by endurance         Plan   Plan  Times per day: Daily  Current Treatment Recommendations: [facility] Re-education,Patient/Caregiver Education & Training,ROM,Balance Training,Gait Training,Home Exercise Program,Safety Education & Training,Functional Mobility Training  Safety Devices  Type of devices: Nurse notified,Call light within reach,Left in bed    G-Code       OutComes Score                                                  AM-PAC Score             Goals  Short term goals  Time Frame for Short term goals: LOS  Short term goal 1: Bed mobilty with CGA  Short term goal 2: Transfers with CGA x 1 maintaining O2 Sats in 85%  Short term goal 3: Sitting at EOB x 5 min maintaining O2 stats  Patient Goals   Patient goals : Unable to State       Therapy Time   Individual Concurrent Group Co-treatment   Time In 4105         Time Out 1015         Minutes [address] [place] [address]

## 2022-01-04 NOTE — PLAN OF CARE
Problem: Airway Clearance - Ineffective  Goal: Achieve or maintain patent airway  1/4/2022 0111 by Jesus Torrez RN  Outcome: Ongoing  1/3/2022 1604 by Padmini Franklin RN  Outcome: Ongoing     Problem: Gas Exchange - Impaired  Goal: Absence of hypoxia  1/4/2022 0111 by Jesus Torrez RN  Outcome: Ongoing  1/3/2022 1604 by Padmini Franklin RN  Outcome: Ongoing  Goal: Promote optimal lung function  1/4/2022 0111 by Jesus Torrez RN  Outcome: Ongoing  1/3/2022 1604 by Padmini Franklin RN  Outcome: Ongoing     Problem: Breathing Pattern - Ineffective  Goal: Ability to achieve and maintain a regular respiratory rate  1/4/2022 0111 by Jesus Torrez RN  Outcome: Ongoing  1/3/2022 1604 by Padmini Franklin RN  Outcome: Ongoing     Problem:  Body Temperature -  Risk of, Imbalanced  Goal: Ability to maintain a body temperature within defined limits  1/4/2022 0111 by Jesus Torrez RN  Outcome: Ongoing  1/3/2022 1604 by Padmini Franklin RN  Outcome: Ongoing  Goal: Will regain or maintain usual level of consciousness  1/4/2022 0111 by Jesus Torrez RN  Outcome: Ongoing  1/3/2022 1604 by Padmini Franklin RN  Outcome: Ongoing  Goal: Complications related to the disease process, condition or treatment will be avoided or minimized  1/4/2022 0111 by Jesus Torrez RN  Outcome: Ongoing  1/3/2022 1604 by Padmini Franklin RN  Outcome: Ongoing     Problem: Isolation Precautions - Risk of Spread of Infection  Goal: Prevent transmission of infection  1/4/2022 0111 by Jesus Torrez RN  Outcome: Ongoing  1/3/2022 1604 by Padmini Franklin RN  Outcome: Ongoing     Problem: Nutrition Deficits  Goal: Optimize nutritional status  1/4/2022 0111 by Jesus Torrez RN  Outcome: Ongoing  1/3/2022 1604 by Padmini Franklin RN  Outcome: Ongoing     Problem: Risk for Fluid Volume Deficit  Goal: Maintain normal heart rhythm  1/4/2022 0111 by Jesus Torrez RN  Outcome: Ongoing  1/3/2022 1604 by Padmini Franklin RN  Outcome: Ongoing  Goal: Maintain absence of muscle cramping  1/4/2022 0111 by Jennifer Abreu RN  Outcome: Ongoing  1/3/2022 1604 by Mandeep Ortiz RN  Outcome: Ongoing  Goal: Maintain normal serum potassium, sodium, calcium, phosphorus, and pH  1/4/2022 0111 by Jennifer Abreu RN  Outcome: Ongoing  1/3/2022 1604 by Mandeep Ortiz RN  Outcome: Ongoing     Problem: Loneliness or Risk for Loneliness  Goal: Demonstrate positive use of time alone when socialization is not possible  1/4/2022 0111 by Jennifer Abreu RN  Outcome: Ongoing  1/3/2022 1604 by Mandeep Ortiz RN  Outcome: Ongoing     Problem: Fatigue  Goal: Verbalize increase energy and improved vitality  1/4/2022 0111 by Jennifer Abreu RN  Outcome: Ongoing  1/3/2022 1604 by Mandeep Ortiz RN  Outcome: Ongoing     Problem: Patient Education: Go to Patient Education Activity  Goal: Patient/Family Education  1/4/2022 0111 by Jennifer Abreu RN  Outcome: Ongoing  1/3/2022 1604 by Mandeep Ortiz RN  Outcome: Ongoing     Problem: Skin Integrity:  Goal: Will show no infection signs and symptoms  Description: Will show no infection signs and symptoms  1/4/2022 0111 by Jennifer Abreu RN  Outcome: Ongoing  1/3/2022 1604 by Mandeep Ortiz RN  Outcome: Ongoing  Goal: Absence of new skin breakdown  Description: Absence of new skin breakdown  1/4/2022 0111 by Jennifer Abreu RN  Outcome: Ongoing  1/3/2022 1604 by Mandeep Ortiz RN  Outcome: Ongoing     Problem: Falls - Risk of:  Goal: Will remain free from falls  Description: Will remain free from falls  1/4/2022 0111 by Jennifer Abreu RN  Outcome: Ongoing  1/3/2022 1604 by Mandeep Ortiz RN  Outcome: Ongoing  Goal: Absence of physical injury  Description: Absence of physical injury  1/4/2022 0111 by Jennifer Abreu RN  Outcome: Ongoing  1/3/2022 1604 by Mandeep Ortiz RN  Outcome: Ongoing     Problem: Nutrition  Goal: Optimal nutrition therapy  1/4/2022 0111 by Jennifer Abreu RN  Outcome: Ongoing  1/3/2022 1604 by Mandeep Ortiz RN  Outcome: Ongoing     Problem: Pain:  Description: Pain management should include both nonpharmacologic and pharmacologic interventions.   Goal: Pain level will decrease  Description: Pain level will decrease  1/4/2022 0111 by Jennifer Valentin RN  Outcome: Ongoing  1/3/2022 1604 by Avery Hoover RN  Outcome: Ongoing  Goal: Control of acute pain  Description: Control of acute pain  1/4/2022 0111 by Jennifer Valentin RN  Outcome: Ongoing  1/3/2022 1604 by Avery Hoover RN  Outcome: Ongoing  Goal: Control of chronic pain  Description: Control of chronic pain  1/4/2022 0111 by Jennifer Valentin RN  Outcome: Ongoing  1/3/2022 1604 by Avery Hoover RN  Outcome: Ongoing     Problem: Discharge Planning:  Goal: Discharged to appropriate level of care  Description: Discharged to appropriate level of care  1/4/2022 0111 by Jennifer Valentin RN  Outcome: Ongoing  1/3/2022 1604 by Avery Hoover RN  Outcome: Ongoing

## 2022-01-04 NOTE — PROGRESS NOTES
Occupational Therapy   Occupational Therapy Initial Assessment  Date: 2022   Patient Name: Shakir Florez  MRN: [id number]     : 1945    Date of Service: 2022    Discharge Recommendations:  ECF with OT       Assessment   Performance deficits / Impairments: Decreased functional mobility ; Decreased ADL status; Decreased strength;Decreased endurance;Decreased balance;Decreased high-level IADLs;Decreased coordination  Treatment Diagnosis: general weakness  Prognosis: Guarded  Decision Making: Low Complexity  REQUIRES OT FOLLOW UP: Yes  Safety Devices  Safety Devices in place: Yes  Type of devices: Left in bed;Call light within reach;Nurse notified           Patient Diagnosis(es): The primary encounter diagnosis was Pneumonia due to COVID-19 virus. A diagnosis of Hypoxia was also pertinent to this visit. has a past medical history of Adenomatous polyp, Cholecystitis, Chronic venous insufficiency, DVT (deep venous thrombosis) ([facility] [address] Edema, Gout, Hypertension, Onychomycosis, Osteoarthritis, Plantar fasciitis, and Tobacco abuse.   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     Treatment Diagnosis: general weakness      Restrictions  Restrictions/Precautions  Restrictions/Precautions: Isolation    Subjective   General  Chart Reviewed: Yes  Patient assessed for rehabilitation services?: Yes  Family / Caregiver Present: No  Referring Practitioner: [facility] Al  Diagnosis: pnuemonia due to covid  Subjective  Subjective: Patient rec'd in bed, 67 yr old male with respiratory distress  Patient Currently in Pain: Denies  Vital Signs  Patient Currently in Pain: Denies  Oxygen Therapy  Pulse Oximeter Device Mode: Continuous  O2 Device: Heated high flow cannula  Skin Assessment: Clean, dry, & intact  FiO2 : 75 %  O2 Flow Rate (L/min): 50 L/min     Social/Functional History  Social/Functional History  Lives With: Spouse  Type of Home: House  Additional Comments: unable to attain home information       Objective   Hearing: Exceptions to [facility]  Hearing Exceptions: Hard of hearing/hearing concerns             ADL  Grooming: Maximum assistance  UE Bathing: Dependent/Total  LE Bathing: Dependent/Total  Toileting: Dependent/Total        Bed mobility  Rolling to Left: Contact guard assistance;Minimal assistance  Rolling to Right: Contact guard assistance;Minimal assistance  Transfers  Sit to stand: Unable to assess  Stand to sit: Unable to assess     Cognition  Overall Cognitive Status:  (CHANTE)       LUE AROM (degrees)  LUE AROM : WFL  Left Hand AROM (degrees)  Left Hand AROM: WFL  RUE AROM (degrees)  RUE AROM : WFL  Right Hand AROM (degrees)  Right Hand AROM: WFL  LUE Strength  Gross LUE Strength: WFL  RUE Strength  Gross RUE Strength: WFL        Plan   Plan  Times per week: 1-3  Current Treatment Recommendations: Patient/Caregiver Education & Training,Self-Care / ADL,Functional Mobility Training,Safety Education & Training,Equipment Evaluation, Education, & procurement    Goals  Short term goals  Time Frame for Short term goals: duration of hospital stay  Short term goal 1: Patient to complete simple ADL task sitting EOB with mod A using a/e as needed  Short term goal 2: Patient to complete BSC transfer with max a x2 using a/e as needed       Therapy Time   Individual Concurrent Group Co-treatment   Time In 1330         Time Out 1350         Minutes 20         Timed Code Treatment Minutes: 0 Minutes       ALFREDO NEGRO OTR, OT

## 2022-01-04 NOTE — PROGRESS NOTES
assistance  Comment: Performed bed mobility to sit EOB. Patient able to sit EOB just shy of 1 minute before RR increased to 55 and oxygen dropped into 70%.  Quickly returned patient to supine positioning and monitored alongside respiratory therapist.     Ambulation  Ambulation?: No                                 G-Code     OutComes Score                                                     AM-PAC Score             Goals  Short term goals  Time Frame for Short term goals: LOS  Short term goal 1: Bed mobilty with CGA  Short term goal 2: Transfers with CGA x 1 maintaining O2 Sats in 85%  Short term goal 3: Sitting at EOB x 5 min maintaining O2 stats  Patient Goals   Patient goals : Unable to [address]  Times per day: Daily  Current Treatment Recommendations: Rudlj Nunez Re-education,Patient/Caregiver Education & Training,ROM,Balance Training,Gait Training,Home Exercise Program,Safety Education & Training,Functional Mobility Training  Safety Devices  Type of devices: Nurse notified,Call light within reach,Left in bed     Therapy Time   Individual Concurrent Group Co-treatment   Time In 9075         Time Out 1015         Minutes [address] [place] [address]

## 2022-01-05 LAB
ABSOLUTE EOS #: 0.06 K/UL (ref 0–0.44)
ABSOLUTE IMMATURE GRANULOCYTE: 0.17 K/UL (ref 0–0.3)
ABSOLUTE LYMPH #: 1.19 K/UL (ref 1.1–3.7)
ABSOLUTE MONO #: 0.67 K/UL (ref 0.1–1.2)
ANION GAP SERPL CALCULATED.3IONS-SCNC: 10 MMOL/L (ref 9–17)
BASOPHILS # BLD: 0 % (ref 0–2)
BASOPHILS ABSOLUTE: 0.03 K/UL (ref 0–0.2)
BUN BLDV-MCNC: 29 MG/DL (ref 8–23)
BUN/CREAT BLD: 35 (ref 9–20)
CALCIUM SERPL-MCNC: 9.5 MG/DL (ref 8.6–10.4)
CHLORIDE BLD-SCNC: 102 MMOL/L (ref 98–107)
CO2: 28 MMOL/L (ref 20–31)
CREAT SERPL-MCNC: 0.83 MG/DL (ref 0.7–1.2)
DIFFERENTIAL TYPE: ABNORMAL
EOSINOPHILS RELATIVE PERCENT: 1 % (ref 1–4)
GFR AFRICAN AMERICAN: >60 ML/MIN
GFR NON-AFRICAN AMERICAN: >60 ML/MIN
GFR SERPL CREATININE-BSD FRML MDRD: ABNORMAL ML/MIN/{1.73_M2}
GFR SERPL CREATININE-BSD FRML MDRD: ABNORMAL ML/MIN/{1.73_M2}
GLUCOSE BLD-MCNC: 112 MG/DL (ref 75–110)
GLUCOSE BLD-MCNC: 139 MG/DL (ref 75–110)
GLUCOSE BLD-MCNC: 82 MG/DL (ref 70–99)
HCT VFR BLD CALC: 35.8 % (ref 40.7–50.3)
HEMOGLOBIN: 11 G/DL (ref 13–17)
IMMATURE GRANULOCYTES: 2 %
LYMPHOCYTES # BLD: 13 % (ref 24–43)
MCH RBC QN AUTO: 30 PG (ref 25.2–33.5)
MCHC RBC AUTO-ENTMCNC: 30.7 G/DL (ref 25.2–33.5)
MCV RBC AUTO: 97.5 FL (ref 82.6–102.9)
MONOCYTES # BLD: 7 % (ref 3–12)
NRBC AUTOMATED: 0 PER 100 WBC
PDW BLD-RTO: 15.5 % (ref 11.8–14.4)
PLATELET # BLD: 230 K/UL (ref 138–453)
PLATELET ESTIMATE: ABNORMAL
PMV BLD AUTO: 9.9 FL (ref 8.1–13.5)
POTASSIUM SERPL-SCNC: 3.8 MMOL/L (ref 3.7–5.3)
RBC # BLD: 3.67 M/UL (ref 4.21–5.77)
RBC # BLD: ABNORMAL 10*6/UL
SEG NEUTROPHILS: 77 % (ref 36–65)
SEGMENTED NEUTROPHILS ABSOLUTE COUNT: 7.03 K/UL (ref 1.5–8.1)
SODIUM BLD-SCNC: 140 MMOL/L (ref 135–144)
WBC # BLD: 9.2 K/UL (ref 3.5–11.3)
WBC # BLD: ABNORMAL 10*3/UL

## 2022-01-05 PROCEDURE — 99232 SBSQ HOSP IP/OBS MODERATE 35: CPT | Performed by: INTERNAL MEDICINE

## 2022-01-05 PROCEDURE — 6370000000 HC RX 637 (ALT 250 FOR IP)

## 2022-01-05 PROCEDURE — 94760 N-INVAS EAR/PLS OXIMETRY 1: CPT

## 2022-01-05 PROCEDURE — 2580000003 HC RX 258: Performed by: FAMILY MEDICINE

## 2022-01-05 PROCEDURE — 94640 AIRWAY INHALATION TREATMENT: CPT

## 2022-01-05 PROCEDURE — 80048 BASIC METABOLIC PNL TOTAL CA: CPT

## 2022-01-05 PROCEDURE — 6370000000 HC RX 637 (ALT 250 FOR IP): Performed by: FAMILY MEDICINE

## 2022-01-05 PROCEDURE — 36415 COLL VENOUS BLD VENIPUNCTURE: CPT

## 2022-01-05 PROCEDURE — 6360000002 HC RX W HCPCS: Performed by: INTERNAL MEDICINE

## 2022-01-05 PROCEDURE — 6370000000 HC RX 637 (ALT 250 FOR IP): Performed by: NURSE PRACTITIONER

## 2022-01-05 PROCEDURE — 82947 ASSAY GLUCOSE BLOOD QUANT: CPT

## 2022-01-05 PROCEDURE — 2060000000 HC ICU INTERMEDIATE R&B

## 2022-01-05 PROCEDURE — 2700000000 HC OXYGEN THERAPY PER DAY

## 2022-01-05 PROCEDURE — 97110 THERAPEUTIC EXERCISES: CPT

## 2022-01-05 PROCEDURE — 6360000002 HC RX W HCPCS: Performed by: NURSE PRACTITIONER

## 2022-01-05 PROCEDURE — 85025 COMPLETE CBC W/AUTO DIFF WBC: CPT

## 2022-01-05 RX ADMIN — ALBUTEROL SULFATE 2 PUFF: 90 AEROSOL, METERED RESPIRATORY (INHALATION) at 20:20

## 2022-01-05 RX ADMIN — CLOPIDOGREL BISULFATE 75 MG: 75 TABLET ORAL at 09:02

## 2022-01-05 RX ADMIN — SODIUM CHLORIDE, PRESERVATIVE FREE 10 ML: 5 INJECTION INTRAVENOUS at 09:03

## 2022-01-05 RX ADMIN — SODIUM CHLORIDE, PRESERVATIVE FREE 10 ML: 5 INJECTION INTRAVENOUS at 20:13

## 2022-01-05 RX ADMIN — SODIUM CHLORIDE 3 G: 1 TABLET ORAL at 16:46

## 2022-01-05 RX ADMIN — METOPROLOL TARTRATE 50 MG: 50 TABLET, FILM COATED ORAL at 20:13

## 2022-01-05 RX ADMIN — FUROSEMIDE 20 MG: 10 INJECTION, SOLUTION INTRAMUSCULAR; INTRAVENOUS at 09:02

## 2022-01-05 RX ADMIN — FINASTERIDE 5 MG: 5 TABLET, FILM COATED ORAL at 09:03

## 2022-01-05 RX ADMIN — SODIUM CHLORIDE 3 G: 1 TABLET ORAL at 09:02

## 2022-01-05 RX ADMIN — ATORVASTATIN CALCIUM 40 MG: 40 TABLET, FILM COATED ORAL at 09:02

## 2022-01-05 RX ADMIN — METOPROLOL TARTRATE 50 MG: 50 TABLET, FILM COATED ORAL at 09:02

## 2022-01-05 RX ADMIN — DEXAMETHASONE SODIUM PHOSPHATE 10 MG: 10 INJECTION INTRAMUSCULAR; INTRAVENOUS at 09:02

## 2022-01-05 RX ADMIN — ENOXAPARIN SODIUM 40 MG: 100 INJECTION SUBCUTANEOUS at 20:13

## 2022-01-05 RX ADMIN — LEVOTHYROXINE SODIUM 75 MCG: 0.07 TABLET ORAL at 06:18

## 2022-01-05 RX ADMIN — ALBUTEROL SULFATE 2 PUFF: 90 AEROSOL, METERED RESPIRATORY (INHALATION) at 16:30

## 2022-01-05 RX ADMIN — ALBUTEROL SULFATE 2 PUFF: 90 AEROSOL, METERED RESPIRATORY (INHALATION) at 12:22

## 2022-01-05 RX ADMIN — ASPIRIN 81 MG: 81 TABLET, COATED ORAL at 09:02

## 2022-01-05 RX ADMIN — ALBUTEROL SULFATE 2 PUFF: 90 AEROSOL, METERED RESPIRATORY (INHALATION) at 08:57

## 2022-01-05 RX ADMIN — ENOXAPARIN SODIUM 40 MG: 100 INJECTION SUBCUTANEOUS at 09:03

## 2022-01-05 RX ADMIN — ZOLPIDEM TARTRATE 5 MG: 5 TABLET ORAL at 20:13

## 2022-01-05 RX ADMIN — OXYBUTYNIN CHLORIDE 15 MG: 5 TABLET, EXTENDED RELEASE ORAL at 09:02

## 2022-01-05 ASSESSMENT — PAIN SCALES - GENERAL
PAINLEVEL_OUTOF10: 0

## 2022-01-05 NOTE — PROGRESS NOTES
Hospitalist Progress Note    Patient:  Shakir Florez     YOB: 1945    MRN: [id number]   Admit date: 12/8/2021     Acct: [de-identified]     PCP: Elva Bonilla MD    CC--Interval History:   Covid-19 long hauler----still requires HHF currently 45 liters 70%----status quo---awaiting LTAC placement    See note below     All other ROS negative except noted in HPI    Diet:  ADULT DIET;  Regular; 4 carb choices (60 gm/meal); 1800 ml    Medications:  Scheduled Meds:   sodium chloride  3 g Oral BID WC    dexamethasone  10 mg IntraVENous Daily    furosemide  20 mg IntraVENous Daily    enoxaparin  40 mg SubCUTAneous BID    albuterol sulfate HFA  2 puff Inhalation 4x daily    aspirin  81 mg Oral Daily    atorvastatin  40 mg Oral Daily    buPROPion  75 mg Oral Daily    clopidogrel  75 mg Oral Daily    levothyroxine  75 mcg Oral Daily    finasteride  5 mg Oral Daily    oxybutynin  15 mg Oral Daily    metoprolol tartrate  50 mg Oral BID    [Held by provider] lisinopril  20 mg Oral Daily    sodium chloride flush  5-40 mL IntraVENous 2 times per day     Continuous Infusions:   sodium chloride Stopped (01/03/22 0242)     PRN Meds:hydrocortisone, bisacodyl, iopamidol, ondansetron, zolpidem, benzonatate, guaiFENesin-dextromethorphan, albuterol sulfate HFA, sodium chloride flush, sodium chloride, polyethylene glycol, acetaminophen **OR** acetaminophen    Objective:  Labs:  CBC with Differential:    Lab Results   Component Value Date    WBC 9.2 01/05/2022    RBC 3.67 01/05/2022    HGB 11.0 01/05/2022    HCT 35.8 01/05/2022     01/05/2022    MCV 97.5 01/05/2022    MCH 30.0 01/05/2022    MCHC 30.7 01/05/2022    RDW 15.5 01/05/2022    LYMPHOPCT 13 01/05/2022    MONOPCT 7 01/05/2022    BASOPCT 0 01/05/2022    MONOSABS 0.67 01/05/2022    LYMPHSABS 1.19 01/05/2022    EOSABS 0.06 01/05/2022    BASOSABS 0.03 01/05/2022    DIFFTYPE NOT REPORTED 01/05/2022     BMP:    Lab Results   Component Value Date  01/05/2022    K 3.8 01/05/2022     01/05/2022    CO2 28 01/05/2022    BUN 29 01/05/2022    LABALBU 2.7 12/31/2021    CREATININE 0.83 01/05/2022    CALCIUM 9.5 01/05/2022    GFRAA >60 01/05/2022    LABGLOM >60 01/05/2022    GLUCOSE 82 01/05/2022           Physical Exam:  Vitals: /67   Pulse 72   Temp 97.3 °F (36.3 °C) (Tympanic)   Resp (!) 36   Ht 6' (1.829 m)   Wt 297 lb 11.2 oz (135 kg)   SpO2 95%   BMI 40.38 kg/m²   24 hour intake/output:    Intake/Output Summary (Last 24 hours) at 1/5/2022 0734  Last data filed at 1/4/2022 1732  Gross per 24 hour   Intake 360 ml   Output --   Net 360 ml     Last 3 weights: Wt Readings from Last 3 Encounters:   01/05/22 297 lb 11.2 oz (135 kg)   11/11/21 (!) 319 lb 12.8 oz (145.1 kg)   09/15/21 (!) 321 lb (145.6 kg)     HEENT: HHF----, Normocephalic and Atraumatic  Neck: Supple, No Masses, Tenderness, Nodularity and No Lymphadenopathy  Chest/Lungs: coarse breath sounds----no change-- and Distant Breath Sounds  Cardiac: Regular Rate and Rhythm  GI/Abdomen: Bowel Sounds Present and Soft, Non-tender, without Guarding or Rebound Tenderness  : Not examined  EXT/Skin: No Edema, No Cyanosis and No Clubbing  Neuro: alert---generalzied weakness---very [place]      Assessment:    Principal Problem:    Pneumonia due to COVID-19 virus  Active Problems:    Ischemic cardiomyopathy    Coronary artery disease involving native coronary artery of native heart    Acute respiratory failure with hypoxia (HCC)    COPD with acute exacerbation (HCC)  Resolved Problems:    * No resolved hospital problems.  *    Mayda Arenas  76 WM  [sp Michelle, FP]  DNR-CCA    LOVENOX---PLAVIX    SUPPLEMENTAL OXYGEN----BiPAP  à  HHF and BiPAP nights--rest  à HHF 50 liters 95%  COVID-19--POSITIVE---12.8.2021----Moderna---1.25.2021---2.22.2021---no booster  MIDLINE---12.17.2021   MAY    Anti-infectives:  [Rocephin IV, Zithromax IV]---dcd, Zosyn--12.26.2021--dcd--1.3.2022     Baricitinib, [Decadron]    Covid-19 PNA----12.8.2021---NACs--bilateral airspace disease             CXR---1.3.2022--cardiomegaly--diffuse pulmonary infiltrated            CXR----12.15.2021---12.23.2021---bilateral airspace disease---NACs  Acute respiratory failure with hypoxia----12.8.2021  COPD exacerbation---due to Covid-19 PNA----12.8.2021  Hyponatremia  COPD----emphysema   ASCVD          CABG--10.28.2014---SVG-OM1---SVG-OM2-----SVG-PDA          Cardiac catheterization---2014  Ischemic cardiomyopathy           2D ECHO---1.28.2021---WMA---LVEF ~ 40%    CHF----chronic combined systolic---diastolic  Chronic PE and BLE DVT  CKD--Stage 2  PVD        Bilateral carotid stenosis  Hypertension  Hyperlipidemia  Hypothyroidism  LBBB  BLE venous insufficiency  Gait-balance instability  Insomnia   Tobacco abuse---quit---10.27.2014  HEARING IMPAIRMENT  PMH:    BLE edema, OA, onychomycosis,   PSH:   see above,  colonoscopy--2012--2009---2008---2003--adenomatous polyp, TURP,              dental extractions, left TKA---2018, right TKA---2015     Allergies----NKDA       Plan:  1. Cont'd HHF and wean as tolerated  [place]. [email or web address] [id number]  3. Has reached plateau for PT-OT--to be re-evaluated  4.     See orders    Electronically signed by Shanell Palafox on 1/5/2022 at 7:34 AM    Hospitalist

## 2022-01-05 NOTE — PLAN OF CARE
Problem: Airway Clearance - Ineffective  Goal: Achieve or maintain patent airway  1/5/2022 1151 by Gino Jarrett RN  Outcome: Ongoing  1/4/2022 2309 by Anabell Cobos RN  Outcome: Ongoing     Problem: Gas Exchange - Impaired  Goal: Absence of hypoxia  1/5/2022 1151 by Gino Jarrett RN  Outcome: Ongoing  1/4/2022 2309 by Anabell Cobos RN  Outcome: Ongoing  Goal: Promote optimal lung function  1/5/2022 1151 by Gino Jarrett RN  Outcome: Ongoing  1/4/2022 2309 by Anabell Cobos RN  Outcome: Ongoing     Problem: Breathing Pattern - Ineffective  Goal: Ability to achieve and maintain a regular respiratory rate  1/5/2022 1151 by Gino Jarrett RN  Outcome: Ongoing  1/4/2022 2309 by Anabell Cobos RN  Outcome: Ongoing     Problem:  Body Temperature -  Risk of, Imbalanced  Goal: Ability to maintain a body temperature within defined limits  1/5/2022 1151 by Gino Jarrett RN  Outcome: Ongoing  1/4/2022 2309 by Anabell Cobos RN  Outcome: Ongoing  Goal: Will regain or maintain usual level of consciousness  1/5/2022 1151 by Gino Jarrett RN  Outcome: Ongoing  1/4/2022 2309 by Anabell Cobos RN  Outcome: Ongoing  Goal: Complications related to the disease process, condition or treatment will be avoided or minimized  1/5/2022 1151 by Gino Jarrett RN  Outcome: Ongoing  1/4/2022 2309 by Anabell Cobos RN  Outcome: Ongoing     Problem: Isolation Precautions - Risk of Spread of Infection  Goal: Prevent transmission of infection  1/5/2022 1151 by Gino Jarrett RN  Outcome: Ongoing  1/4/2022 2309 by Anabell Cobos RN  Outcome: Ongoing     Problem: Nutrition Deficits  Goal: Optimize nutritional status  1/5/2022 1151 by Gino Jarrett RN  Outcome: Ongoing  1/4/2022 2309 by Anabell Cobos RN  Outcome: Ongoing     Problem: Risk for Fluid Volume Deficit  Goal: Maintain normal heart rhythm  1/5/2022 1151 by Gino Jarrett RN  Outcome: Ongoing  1/4/2022 2309 by Anabell Hodges, RN  Outcome: Ongoing  Goal: Maintain absence of muscle cramping  1/5/2022 1151 by Rosa Vasquez RN  Outcome: Ongoing  1/4/2022 2309 by Deborah La RN  Outcome: Ongoing  Goal: Maintain normal serum potassium, sodium, calcium, phosphorus, and pH  1/5/2022 1151 by Rosa Vasquez RN  Outcome: Ongoing  1/4/2022 2309 by Deborah La RN  Outcome: Ongoing     Problem: Loneliness or Risk for Loneliness  Goal: Demonstrate positive use of time alone when socialization is not possible  1/5/2022 1151 by Rosa Vasquez RN  Outcome: Ongoing  1/4/2022 2309 by Deborah La RN  Outcome: Ongoing     Problem: Fatigue  Goal: Verbalize increase energy and improved vitality  1/5/2022 1151 by Rosa Vasquez RN  Outcome: Ongoing  1/4/2022 2309 by Deborah La RN  Outcome: Ongoing     Problem: Patient Education: Go to Patient Education Activity  Goal: Patient/Family Education  1/5/2022 1151 by Rosa Vasquez RN  Outcome: Ongoing  1/4/2022 2309 by Deborah La RN  Outcome: Ongoing     Problem: Skin Integrity:  Goal: Will show no infection signs and symptoms  Description: Will show no infection signs and symptoms  1/5/2022 1151 by Rosa Vasquez RN  Outcome: Ongoing  1/4/2022 2309 by Deborah La RN  Outcome: Ongoing  Goal: Absence of new skin breakdown  Description: Absence of new skin breakdown  1/5/2022 1151 by Rosa Vasquez RN  Outcome: Ongoing  1/4/2022 2309 by Deborah La RN  Outcome: Ongoing     Problem: Falls - Risk of:  Goal: Will remain free from falls  Description: Will remain free from falls  1/5/2022 1151 by Rosa Vasquez RN  Outcome: Ongoing  1/4/2022 2309 by Deborah La RN  Outcome: Ongoing  Goal: Absence of physical injury  Description: Absence of physical injury  1/5/2022 1151 by Rosa Vasquez RN  Outcome: Ongoing  1/4/2022 2309 by Deborah La RN  Outcome: Ongoing

## 2022-01-05 NOTE — FLOWSHEET NOTE
Praveena traore on PCU. Assessment:  Patient sleeping sounding in hospital bed. Intervention:    Outcome:    Plan:  Chaplains will remain available to offer spiritual and emotional support as needed.

## 2022-01-05 NOTE — PROGRESS NOTES
Nutrition Assessment     Type and Reason for Visit: Reassess    Nutrition Recommendations/Plan: Add ONS (Magic Cup or Ensure shake) 1-2x/d as tolerated     Nutrition Assessment:  Patient remains the same- decreased po intakes: 26-50% and 51-75%. Add Magic Cup/ Ensure shake as needed. Pt remains on HHF oxygen -- out of droplet plus precautions. No change in wt since last f/u. Malnutrition Assessment:  Malnutrition Status: Insufficient data    Estimated Daily Nutrient Needs:  Energy (kcal): 6066-9058 kcal (16-18 kcal/kg); Weight Used for Energy Requirements: Admission  Protein (g): 113-138 gm protein (1.4-1.7 g/kg); Weight Used for Protein Requirements:  Ideal          Nutrition Related Findings: GHMVR-01. HHF oxygen. Edema: BLE trace      Current Nutrition Therapies:    ADULT DIET;  Regular; 4 carb choices (60 gm/meal); 1800 ml    Anthropometric Measures:  · Height: 6' (182.9 cm)  · Current Body Wt: 297 lb (134.7 kg) (Accuracy?)   · BMI: 40.3    Nutrition Diagnosis:   · Inadequate oral intake related to inadequate protein-energy intake,impaired respiratory function as evidenced by poor intake prior to admission,weight loss      Nutrition Interventions:   Food and/or Nutrient Delivery:  Continue Current Diet  Nutrition Education/Counseling:  Education not indicated   Coordination of Nutrition Care:  Continue to monitor while inpatient    Goals:  PO intakes >75% at meals       Nutrition Monitoring and Evaluation:   Behavioral-Environmental Outcomes:  None Identified   Food/Nutrient Intake Outcomes:  Food and Nutrient Intake  Physical Signs/Symptoms Outcomes:  Biochemical Data,Chewing or Swallowing,Fluid Status or Edema,Weight     Discharge Planning:    Continue current diet,Continue Oral Nutrition Supplement     Mark Golden RD, LD  Office Number: [phone]

## 2022-01-05 NOTE — PROGRESS NOTES
Physical Therapy  Facility/Department: [facility]  PROGRESSIVE CARE  Daily Treatment Note  NAME: Stefany Johnson  : 1945  MRN: [id number]    Date of Service: 2022    Discharge Recommendations:  Continue to assess pending progress,ECF with PT        Assessment   Body structures, Functions, Activity limitations: Decreased functional mobility ; Decreased safe awareness;Decreased ADL status; Decreased endurance;Decreased strength  Activity Tolerance  Activity Tolerance: Patient limited by fatigue;Patient limited by endurance     Patient Diagnosis(es): The primary encounter diagnosis was Pneumonia due to COVID-19 virus. A diagnosis of Hypoxia was also pertinent to this visit. has a past medical history of Adenomatous polyp, Cholecystitis, Chronic venous insufficiency, DVT (deep venous thrombosis) ([facility] [address] Edema, Gout, Hypertension, Onychomycosis, Osteoarthritis, Plantar fasciitis, and Tobacco abuse.   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 Restrictions  Restrictions/Precautions  Restrictions/Precautions: Isolation  Subjective   Subjective  Subjective: Patient states he is tired on this date. Persuaded patient to participate in bed exercises. Patient agreed. Pain Assessment  Pain Assessment: 0-10  Pain Level: 0  Vital Signs  Resp: 28  Level of Consciousness: Alert (0)  Oxygen Therapy  SpO2: 92 %  O2 Device: Nasal cannula  O2 Flow Rate (L/min): 15 L/min  Patient Observation  Observations: Patient placed on nasal canula at 15L before therapy session.        Orientation  Orientation  Overall Orientation Status: Within Normal Limits  Cognition      Objective   Bed mobility  Bridging: Unable to assess  Rolling to Left: Unable to assess  Rolling to Right: Unable to assess  Supine to Sit: Unable to assess  Sit to Supine: Unable to assess  Scooting: Unable to assess  Transfers  Sit to Stand: Unable to assess  Stand to sit: Unable to assess  Bed to Chair: Unable to assess  Stand Pivot Transfers: Unable to assess  Squat Pivot Transfers: Unable to assess  Lateral Transfers: Unable to assess  Car Transfer: Unable to assess  Ambulation  Ambulation?: No        Exercises  Heelslides: 20x  Gluteal Sets: 10x  Hip Abduction: 20x  Ankle Pumps: 20x  Comments: Patient oxygen at 92% upon arrival while patient on nasal canula 15L. With first rep of ankle pumps oxygen dropped to 82%. increased recivery time before continuing with oxygen remaining in 80% range and RR at 32. Ended session with oxygen at 88%.                         G-Code     OutComes Score                                                     AM-PAC Score             Goals  Short term goals  Time Frame for Short term goals: LOS  Short term goal 1: Bed mobilty with CGA  Short term goal 2: Transfers with CGA x 1 maintaining O2 Sats in 85%  Short term goal 3: Sitting at EOB x 5 min maintaining O2 stats  Patient Goals   Patient goals : Unable to [address]  Times per day: Daily  Current Treatment Recommendations: [place] Pulse Re-education,Patient/Caregiver Education & Training,ROM,Balance Training,Gait Training,Home Exercise Program,Safety Education & Training,Functional Mobility Training  Safety Devices  Type of devices: Nurse notified,Call light within reach,Left in bed     Therapy Time   Individual Concurrent Group Co-treatment   Time In 9618         Time Out 1258         Minutes [address] [place] [address]

## 2022-01-05 NOTE — PLAN OF CARE
Absence of physical injury  Outcome: Ongoing     Problem: Nutrition  Goal: Optimal nutrition therapy  Outcome: Ongoing     Problem: Pain:  Description: Pain management should include both nonpharmacologic and pharmacologic interventions.   Goal: Pain level will decrease  Description: Pain level will decrease  Outcome: Ongoing  Goal: Control of acute pain  Description: Control of acute pain  Outcome: Ongoing  Goal: Control of chronic pain  Description: Control of chronic pain  Outcome: Ongoing     Problem: Discharge Planning:  Goal: Discharged to appropriate level of care  Description: Discharged to appropriate level of care  Outcome: Ongoing

## 2022-01-05 NOTE — PROGRESS NOTES
Occupational Therapy          Per MD, patient is medically unable to fully participate with OT, patients medical stability needs improve prior to resuming therapy services. Recommend LTACH. D/C OT services.

## 2022-01-05 NOTE — PROGRESS NOTES
Hospitalist Progress Note    Patient:  Antonio Li     YOB: 1945    MRN: [id number]   Admit date: 12/8/2021     Acct: [de-identified]     PCP: Julia Soto MD    CC--Interval History: Covid-19---long hauler----remains on HHF currently 50 liters---66%---auscultation --> more fibrotic velcro crackles than previously heard--not a good indicator---superposed on COPD---emphysema    ASCVD---CHF---stable    HTN   /75    Severe hearing impairment----have discussed LTAC type placement with the patient, but not certain how much he is able hear and understand    See note below     All other ROS negative except noted in HPI    Diet:  ADULT DIET;  Regular; 4 carb choices (60 gm/meal); 1800 ml    Medications:  Scheduled Meds:   sodium chloride  3 g Oral BID WC    dexamethasone  10 mg IntraVENous Daily    furosemide  20 mg IntraVENous Daily    enoxaparin  40 mg SubCUTAneous BID    albuterol sulfate HFA  2 puff Inhalation 4x daily    aspirin  81 mg Oral Daily    atorvastatin  40 mg Oral Daily    buPROPion  75 mg Oral Daily    clopidogrel  75 mg Oral Daily    levothyroxine  75 mcg Oral Daily    finasteride  5 mg Oral Daily    oxybutynin  15 mg Oral Daily    metoprolol tartrate  50 mg Oral BID    [Held by provider] lisinopril  20 mg Oral Daily    sodium chloride flush  5-40 mL IntraVENous 2 times per day     Continuous Infusions:   sodium chloride Stopped (01/03/22 0242)     PRN Meds:hydrocortisone, bisacodyl, iopamidol, ondansetron, zolpidem, benzonatate, guaiFENesin-dextromethorphan, albuterol sulfate HFA, sodium chloride flush, sodium chloride, polyethylene glycol, acetaminophen **OR** acetaminophen    Objective:  Labs:  CBC with Differential:    Lab Results   Component Value Date    WBC 9.3 01/04/2022    RBC 3.65 01/04/2022    HGB 10.9 01/04/2022    HCT 36.0 01/04/2022     01/04/2022    MCV 98.6 01/04/2022    MCH 29.9 01/04/2022    MCHC 30.3 01/04/2022    RDW 15.6 01/04/2022 LYMPHOPCT 13 01/04/2022    MONOPCT 6 01/04/2022    BASOPCT 0 01/04/2022    MONOSABS 0.59 01/04/2022    LYMPHSABS 1.24 01/04/2022    EOSABS 0.06 01/04/2022    BASOSABS 0.03 01/04/2022    DIFFTYPE NOT REPORTED 01/04/2022     BMP:    Lab Results   Component Value Date     01/04/2022    K 4.3 01/04/2022     01/04/2022    CO2 29 01/04/2022    BUN 27 01/04/2022    LABALBU 2.7 12/31/2021    CREATININE 0.83 01/04/2022    CALCIUM 9.8 01/04/2022    GFRAA >60 01/04/2022    LABGLOM >60 01/04/2022    GLUCOSE 87 01/04/2022           Physical Exam:  Vitals: /62   Pulse 89   Temp 97.9 °F (36.6 °C) (Tympanic)   Resp 24   Ht 6' (1.829 m)   Wt 295 lb 14.4 oz (134.2 kg)   SpO2 96%   BMI 40.13 kg/m²   24 hour intake/output:    Intake/Output Summary (Last 24 hours) at 1/4/2022 2200  Last data filed at 1/4/2022 1732  Gross per 24 hour   Intake 360 ml   Output --   Net 360 ml     Last 3 weights: Wt Readings from Last 3 Encounters:   01/04/22 295 lb 14.4 oz (134.2 kg)   11/11/21 (!) 319 lb 12.8 oz (145.1 kg)   09/15/21 (!) 321 lb (145.6 kg)     HEENT: HHF---, Normocephalic and Atraumatic  Neck: Supple, No Masses, Tenderness, Nodularity and No Lymphadenopathy  Chest/Lungs: velcro crackles diffusely---- and Distant Breath Sounds  Cardiac: Regular Rate and Rhythm  GI/Abdomen: Bowel Sounds Present and Soft, Non-tender, without Guarding or Rebound Tenderness  : Not examined  EXT/Skin: No Cyanosis, No Clubbing and Edema Present---trivial  Neuro: alert---generalized weakness--- and No Localizing Signs/Symptoms---severe hearing impairment      Assessment:    Principal Problem:    Pneumonia due to COVID-19 virus  Active Problems:    Ischemic cardiomyopathy    Coronary artery disease involving native coronary artery of native heart    Acute respiratory failure with hypoxia (HCC)    COPD with acute exacerbation (HCC)  Resolved Problems:    * No resolved hospital problems.  *    Minh Diego WM  Lakshmi Mad[place], FP]  DNR-CCA    LOVENOX---PLAVIX    SUPPLEMENTAL OXYGEN----BiPAP ->  HHF and BiPAP nights--rest --> HHF 50 liters 95%  COVID-19--POSITIVE---12.8.2021----Moderna---1.25.2021---2.22.2021---no booster  MIDLINE---12.17.2021   MAY    Anti-infectives:  [Rocephin IV, Zithromax IV]---dcd, Zosyn--12.26.2021--dcd--1.3.2022     Baricitinib, [Decadron]    Covid-19 PNA----12.8.2021---NACs--bilateral airspace disease             CXR---1.3.2022--cardiomegaly--diffuse pulmonary infiltrated            CXR----12.15.2021---12.23.2021---bilateral airspace disease---NACs  Acute respiratory failure with hypoxia----12.8.2021  COPD exacerbation---due to Covid-19 PNA----12.8.2021  Hyponatremia  COPD----emphysema   ASCVD          CABG--10.28.2014---SVG-OM1---SVG-OM2-----SVG-PDA          Cardiac catheterization---2014  Ischemic cardiomyopathy           2D ECHO---1.28.2021---[facility]---LVEF ~ 40%    CHF----chronic combined systolic---diastolic  Chronic PE and BLE DVT  CKD--Stage 2  PVD        Bilateral carotid stenosis  Hypertension  Hyperlipidemia  Hypothyroidism  LBBB  BLE venous insufficiency  Gait-balance instability  Insomnia   Tobacco abuse---quit---10.27.2014  HEARING IMPAIRMENT  PMH:    BLE edema, OA, onychomycosis,   PSH:   see above,  colonoscopy--2012--2009---2008---2003--adenomatous polyp, TURP,              dental extractions, left TKA---2018, right TKA---2015     Allergies----NKDA           Plan:  1. Covid-19 long hauler----HHF----wean as tolerated  2. Pursuing LTAC placement, but no beds at this time  3.     See orders     Electronically signed by Nataliia Pride on 1/4/2022 at 10:00 PM    Hospitalist

## 2022-01-05 NOTE — CARE COORDINATION
[facility] in [place] called at states that pt meets criteria for their facility. Precert started as patient has [employer].

## 2022-01-06 ENCOUNTER — APPOINTMENT (OUTPATIENT)
Dept: GENERAL RADIOLOGY | Age: 77
DRG: 177 | End: 2022-01-06
Payer: MEDICARE

## 2022-01-06 LAB
ABSOLUTE EOS #: 0.04 K/UL (ref 0–0.44)
ABSOLUTE IMMATURE GRANULOCYTE: 0.14 K/UL (ref 0–0.3)
ABSOLUTE LYMPH #: 1.06 K/UL (ref 1.1–3.7)
ABSOLUTE MONO #: 0.67 K/UL (ref 0.1–1.2)
ANION GAP SERPL CALCULATED.3IONS-SCNC: 8 MMOL/L (ref 9–17)
BASOPHILS # BLD: 0 % (ref 0–2)
BASOPHILS ABSOLUTE: 0.03 K/UL (ref 0–0.2)
BUN BLDV-MCNC: 28 MG/DL (ref 8–23)
BUN/CREAT BLD: 39 (ref 9–20)
CALCIUM SERPL-MCNC: 9.6 MG/DL (ref 8.6–10.4)
CHLORIDE BLD-SCNC: 104 MMOL/L (ref 98–107)
CO2: 30 MMOL/L (ref 20–31)
CREAT SERPL-MCNC: 0.72 MG/DL (ref 0.7–1.2)
DIFFERENTIAL TYPE: ABNORMAL
EOSINOPHILS RELATIVE PERCENT: 1 % (ref 1–4)
GFR AFRICAN AMERICAN: >60 ML/MIN
GFR NON-AFRICAN AMERICAN: >60 ML/MIN
GFR SERPL CREATININE-BSD FRML MDRD: ABNORMAL ML/MIN/{1.73_M2}
GFR SERPL CREATININE-BSD FRML MDRD: ABNORMAL ML/MIN/{1.73_M2}
GLUCOSE BLD-MCNC: 93 MG/DL (ref 70–99)
HCT VFR BLD CALC: 36.4 % (ref 40.7–50.3)
HEMOGLOBIN: 11.1 G/DL (ref 13–17)
IMMATURE GRANULOCYTES: 2 %
LYMPHOCYTES # BLD: 12 % (ref 24–43)
MCH RBC QN AUTO: 29.8 PG (ref 25.2–33.5)
MCHC RBC AUTO-ENTMCNC: 30.5 G/DL (ref 25.2–33.5)
MCV RBC AUTO: 97.6 FL (ref 82.6–102.9)
MONOCYTES # BLD: 8 % (ref 3–12)
NRBC AUTOMATED: 0 PER 100 WBC
PDW BLD-RTO: 15.6 % (ref 11.8–14.4)
PLATELET # BLD: 242 K/UL (ref 138–453)
PLATELET ESTIMATE: ABNORMAL
PMV BLD AUTO: 9.8 FL (ref 8.1–13.5)
POTASSIUM SERPL-SCNC: 4 MMOL/L (ref 3.7–5.3)
RBC # BLD: 3.73 M/UL (ref 4.21–5.77)
RBC # BLD: ABNORMAL 10*6/UL
SEG NEUTROPHILS: 77 % (ref 36–65)
SEGMENTED NEUTROPHILS ABSOLUTE COUNT: 6.58 K/UL (ref 1.5–8.1)
SODIUM BLD-SCNC: 142 MMOL/L (ref 135–144)
WBC # BLD: 8.5 K/UL (ref 3.5–11.3)
WBC # BLD: ABNORMAL 10*3/UL

## 2022-01-06 PROCEDURE — 94660 CPAP INITIATION&MGMT: CPT

## 2022-01-06 PROCEDURE — 2580000003 HC RX 258: Performed by: FAMILY MEDICINE

## 2022-01-06 PROCEDURE — 80048 BASIC METABOLIC PNL TOTAL CA: CPT

## 2022-01-06 PROCEDURE — 6360000002 HC RX W HCPCS: Performed by: INTERNAL MEDICINE

## 2022-01-06 PROCEDURE — 94640 AIRWAY INHALATION TREATMENT: CPT

## 2022-01-06 PROCEDURE — 99232 SBSQ HOSP IP/OBS MODERATE 35: CPT | Performed by: INTERNAL MEDICINE

## 2022-01-06 PROCEDURE — 2060000000 HC ICU INTERMEDIATE R&B

## 2022-01-06 PROCEDURE — 6370000000 HC RX 637 (ALT 250 FOR IP): Performed by: NURSE PRACTITIONER

## 2022-01-06 PROCEDURE — 85025 COMPLETE CBC W/AUTO DIFF WBC: CPT

## 2022-01-06 PROCEDURE — 36415 COLL VENOUS BLD VENIPUNCTURE: CPT

## 2022-01-06 PROCEDURE — 2700000000 HC OXYGEN THERAPY PER DAY

## 2022-01-06 PROCEDURE — 71045 X-RAY EXAM CHEST 1 VIEW: CPT

## 2022-01-06 PROCEDURE — 6370000000 HC RX 637 (ALT 250 FOR IP): Performed by: FAMILY MEDICINE

## 2022-01-06 PROCEDURE — 6370000000 HC RX 637 (ALT 250 FOR IP)

## 2022-01-06 PROCEDURE — 6360000002 HC RX W HCPCS: Performed by: NURSE PRACTITIONER

## 2022-01-06 PROCEDURE — 94761 N-INVAS EAR/PLS OXIMETRY MLT: CPT

## 2022-01-06 RX ADMIN — SODIUM CHLORIDE 3 G: 1 TABLET ORAL at 17:07

## 2022-01-06 RX ADMIN — ENOXAPARIN SODIUM 40 MG: 100 INJECTION SUBCUTANEOUS at 21:43

## 2022-01-06 RX ADMIN — ALBUTEROL SULFATE 2 PUFF: 90 AEROSOL, METERED RESPIRATORY (INHALATION) at 12:04

## 2022-01-06 RX ADMIN — FINASTERIDE 5 MG: 5 TABLET, FILM COATED ORAL at 09:15

## 2022-01-06 RX ADMIN — SODIUM CHLORIDE, PRESERVATIVE FREE 10 ML: 5 INJECTION INTRAVENOUS at 09:20

## 2022-01-06 RX ADMIN — ENOXAPARIN SODIUM 40 MG: 100 INJECTION SUBCUTANEOUS at 09:16

## 2022-01-06 RX ADMIN — METOPROLOL TARTRATE 50 MG: 50 TABLET, FILM COATED ORAL at 21:43

## 2022-01-06 RX ADMIN — SODIUM CHLORIDE 3 G: 1 TABLET ORAL at 09:20

## 2022-01-06 RX ADMIN — LEVOTHYROXINE SODIUM 75 MCG: 0.07 TABLET ORAL at 06:18

## 2022-01-06 RX ADMIN — CLOPIDOGREL BISULFATE 75 MG: 75 TABLET ORAL at 09:14

## 2022-01-06 RX ADMIN — ALBUTEROL SULFATE 2 PUFF: 90 AEROSOL, METERED RESPIRATORY (INHALATION) at 09:21

## 2022-01-06 RX ADMIN — ASPIRIN 81 MG: 81 TABLET, COATED ORAL at 09:14

## 2022-01-06 RX ADMIN — ZOLPIDEM TARTRATE 5 MG: 5 TABLET ORAL at 23:58

## 2022-01-06 RX ADMIN — METOPROLOL TARTRATE 50 MG: 50 TABLET, FILM COATED ORAL at 09:15

## 2022-01-06 RX ADMIN — ALBUTEROL SULFATE 2 PUFF: 90 AEROSOL, METERED RESPIRATORY (INHALATION) at 15:46

## 2022-01-06 RX ADMIN — DEXAMETHASONE SODIUM PHOSPHATE 10 MG: 10 INJECTION INTRAMUSCULAR; INTRAVENOUS at 09:15

## 2022-01-06 RX ADMIN — SODIUM CHLORIDE, PRESERVATIVE FREE 10 ML: 5 INJECTION INTRAVENOUS at 21:43

## 2022-01-06 RX ADMIN — FUROSEMIDE 20 MG: 10 INJECTION, SOLUTION INTRAMUSCULAR; INTRAVENOUS at 09:15

## 2022-01-06 RX ADMIN — OXYBUTYNIN CHLORIDE 15 MG: 5 TABLET, EXTENDED RELEASE ORAL at 09:20

## 2022-01-06 RX ADMIN — BUPROPION HYDROCHLORIDE 75 MG: 75 TABLET ORAL at 09:15

## 2022-01-06 RX ADMIN — ATORVASTATIN CALCIUM 40 MG: 40 TABLET, FILM COATED ORAL at 09:15

## 2022-01-06 RX ADMIN — ALBUTEROL SULFATE 2 PUFF: 90 AEROSOL, METERED RESPIRATORY (INHALATION) at 19:34

## 2022-01-06 ASSESSMENT — PAIN SCALES - GENERAL: PAINLEVEL_OUTOF10: 0

## 2022-01-06 NOTE — PROGRESS NOTES
Hospitalist Progress Note    Patient:  Minh Henriquez     YOB: 1945    MRN: [id number]   Admit date: 12/8/2021     Acct: [de-identified]     PCP: Dillon Gusman MD    CC--Interval History:  Covid-19 long hauler has been on HHF--was able to convert to HF 9-11 liters--tends to desaturate when eating    See note below       All other ROS negative except noted in HPI    Diet:  ADULT DIET;  Regular; 4 carb choices (60 gm/meal); 1800 ml    Medications:  Scheduled Meds:   sodium chloride  3 g Oral BID WC    dexamethasone  10 mg IntraVENous Daily    furosemide  20 mg IntraVENous Daily    enoxaparin  40 mg SubCUTAneous BID    albuterol sulfate HFA  2 puff Inhalation 4x daily    aspirin  81 mg Oral Daily    atorvastatin  40 mg Oral Daily    buPROPion  75 mg Oral Daily    clopidogrel  75 mg Oral Daily    levothyroxine  75 mcg Oral Daily    finasteride  5 mg Oral Daily    oxybutynin  15 mg Oral Daily    metoprolol tartrate  50 mg Oral BID    [Held by provider] lisinopril  20 mg Oral Daily    sodium chloride flush  5-40 mL IntraVENous 2 times per day     Continuous Infusions:   sodium chloride Stopped (01/03/22 0242)     PRN Meds:hydrocortisone, bisacodyl, iopamidol, ondansetron, zolpidem, benzonatate, guaiFENesin-dextromethorphan, albuterol sulfate HFA, sodium chloride flush, sodium chloride, polyethylene glycol, acetaminophen **OR** acetaminophen    Objective:  Labs:  CBC with Differential:    Lab Results   Component Value Date    WBC 8.5 01/06/2022    RBC 3.73 01/06/2022    HGB 11.1 01/06/2022    HCT 36.4 01/06/2022     01/06/2022    MCV 97.6 01/06/2022    MCH 29.8 01/06/2022    MCHC 30.5 01/06/2022    RDW 15.6 01/06/2022    LYMPHOPCT 12 01/06/2022    MONOPCT 8 01/06/2022    BASOPCT 0 01/06/2022    MONOSABS 0.67 01/06/2022    LYMPHSABS 1.06 01/06/2022    EOSABS 0.04 01/06/2022    BASOSABS 0.03 01/06/2022    DIFFTYPE NOT REPORTED 01/06/2022     BMP:    Lab Results   Component Value Date  01/06/2022    K 4.0 01/06/2022     01/06/2022    CO2 30 01/06/2022    BUN 28 01/06/2022    LABALBU 2.7 12/31/2021    CREATININE 0.72 01/06/2022    CALCIUM 9.6 01/06/2022    GFRAA >60 01/06/2022    LABGLOM >60 01/06/2022    GLUCOSE 93 01/06/2022           Physical Exam:  Vitals: /62   Pulse 60   Temp 96.3 °F (35.7 °C) (Tympanic)   Resp 20   Ht 6' (1.829 m)   Wt 295 lb 12.8 oz (134.2 kg)   SpO2 90%   BMI 40.12 kg/m²   24 hour intake/output:    Intake/Output Summary (Last 24 hours) at 1/6/2022 0919  Last data filed at 1/5/2022 1730  Gross per 24 hour   Intake 720 ml   Output --   Net 720 ml     Last 3 weights: Wt Readings from Last 3 Encounters:   01/06/22 295 lb 12.8 oz (134.2 kg)   11/11/21 (!) 319 lb 12.8 oz (145.1 kg)   09/15/21 (!) 321 lb (145.6 kg)     HEENT: HF O2----, Normocephalic and Atraumatic  Neck: Supple, No Masses, Tenderness, Nodularity and No Lymphadenopathy  Chest/Lungs: still coarse  and Distant Breath Sounds  Cardiac: Regular Rate and Rhythm  GI/Abdomen: Bowel Sounds Present and Soft, Non-tender, without Guarding or Rebound Tenderness  : mullins  EXT/Skin: No Cyanosis, No Clubbing and Edema Present----trivial  Neuro: alert---generalzied weakness---very [place]---- and No Localizing Signs/Symptoms      Assessment:    Principal Problem:    Pneumonia due to COVID-19 virus  Active Problems:    Ischemic cardiomyopathy    Coronary artery disease involving native coronary artery of native heart    Acute respiratory failure with hypoxia (HCC)    COPD with acute exacerbation (HCC)  Resolved Problems:    * No resolved hospital problems.  *    Orlie Flakes  76 WM  [sp Michelle, FP]  DNR-CCA    LOVENOX---PLAVIX    SUPPLEMENTAL OXYGEN----BiPAP  à  HHF and BiPAP nights--rest  à HHF 50 liters 95% à HF 11 liters  COVID-19--POSITIVE---12.8.2021----Moderna---1.25.2021---2.22.2021---no booster  MIDLINE---12.17.2021   LALO    Anti-infectives:  [Rocephin IV, Zithromax IV]---dcd, Zosyn--12.26.2021--dcd--1.3.2022     Baricitinib, [Decadron]    Covid-19 PNA----12.8.2021---NACs--bilateral airspace disease             CXR---1.3.2022--cardiomegaly--diffuse pulmonary infiltrated            CXR----12.15.2021---12.23.2021---bilateral airspace disease---NACs  Acute respiratory failure with hypoxia----12.8.2021  COPD exacerbation---due to Covid-19 PNA----12.8.2021  Hyponatremia  COPD----emphysema   ASCVD          CABG--10.28.2014---SVG-OM1---SVG-OM2-----SVG-PDA          Cardiac catheterization---2014  Ischemic cardiomyopathy           2D ECHO---1.28.2021---[facility]---LVEF ~ 40%    CHF----chronic combined systolic---diastolic  Chronic PE and BLE DVT  CKD--Stage 2  PVD        Bilateral carotid stenosis  Hypertension  Hyperlipidemia  Hypothyroidism  LBBB  BLE venous insufficiency  Gait-balance instability  Insomnia   Tobacco abuse---quit---10.27.2014  HEARING IMPAIRMENT  PMH:    BLE edema, OA, onychomycosis,   PSH:   see above,  colonoscopy--2012--2009---2008---2003--adenomatous polyp, TURP,              dental extractions, left TKA---2018, right TKA---2015     Allergies----NKDA           Plan:  1. Covid-19 long hauler---HF---wean as tolerated  2.   cont'd current regimen  3. Working on placement  4.    See orders     Electronically signed by Rita Bowie on 1/6/2022 at 9:19 AM    Hospitalist

## 2022-01-07 LAB
ABSOLUTE EOS #: 0.04 K/UL (ref 0–0.44)
ABSOLUTE IMMATURE GRANULOCYTE: 0.11 K/UL (ref 0–0.3)
ABSOLUTE LYMPH #: 1.06 K/UL (ref 1.1–3.7)
ABSOLUTE MONO #: 0.58 K/UL (ref 0.1–1.2)
ANION GAP SERPL CALCULATED.3IONS-SCNC: 9 MMOL/L (ref 9–17)
BASOPHILS # BLD: 0 % (ref 0–2)
BASOPHILS ABSOLUTE: <0.03 K/UL (ref 0–0.2)
BUN BLDV-MCNC: 25 MG/DL (ref 8–23)
BUN/CREAT BLD: 39 (ref 9–20)
CALCIUM SERPL-MCNC: 9.5 MG/DL (ref 8.6–10.4)
CHLORIDE BLD-SCNC: 104 MMOL/L (ref 98–107)
CO2: 30 MMOL/L (ref 20–31)
CREAT SERPL-MCNC: 0.64 MG/DL (ref 0.7–1.2)
DIFFERENTIAL TYPE: ABNORMAL
EOSINOPHILS RELATIVE PERCENT: 1 % (ref 1–4)
GFR AFRICAN AMERICAN: >60 ML/MIN
GFR NON-AFRICAN AMERICAN: >60 ML/MIN
GFR SERPL CREATININE-BSD FRML MDRD: ABNORMAL ML/MIN/{1.73_M2}
GFR SERPL CREATININE-BSD FRML MDRD: ABNORMAL ML/MIN/{1.73_M2}
GLUCOSE BLD-MCNC: 91 MG/DL (ref 70–99)
HCT VFR BLD CALC: 35.6 % (ref 40.7–50.3)
HEMOGLOBIN: 10.8 G/DL (ref 13–17)
IMMATURE GRANULOCYTES: 1 %
LYMPHOCYTES # BLD: 14 % (ref 24–43)
MCH RBC QN AUTO: 29.5 PG (ref 25.2–33.5)
MCHC RBC AUTO-ENTMCNC: 30.3 G/DL (ref 25.2–33.5)
MCV RBC AUTO: 97.3 FL (ref 82.6–102.9)
MONOCYTES # BLD: 8 % (ref 3–12)
NRBC AUTOMATED: 0 PER 100 WBC
PDW BLD-RTO: 15.5 % (ref 11.8–14.4)
PLATELET # BLD: 235 K/UL (ref 138–453)
PLATELET ESTIMATE: ABNORMAL
PMV BLD AUTO: 9.8 FL (ref 8.1–13.5)
POTASSIUM SERPL-SCNC: 4.1 MMOL/L (ref 3.7–5.3)
RBC # BLD: 3.66 M/UL (ref 4.21–5.77)
RBC # BLD: ABNORMAL 10*6/UL
SEG NEUTROPHILS: 76 % (ref 36–65)
SEGMENTED NEUTROPHILS ABSOLUTE COUNT: 5.8 K/UL (ref 1.5–8.1)
SODIUM BLD-SCNC: 143 MMOL/L (ref 135–144)
WBC # BLD: 7.6 K/UL (ref 3.5–11.3)
WBC # BLD: ABNORMAL 10*3/UL

## 2022-01-07 PROCEDURE — 99232 SBSQ HOSP IP/OBS MODERATE 35: CPT | Performed by: INTERNAL MEDICINE

## 2022-01-07 PROCEDURE — 6370000000 HC RX 637 (ALT 250 FOR IP)

## 2022-01-07 PROCEDURE — 94761 N-INVAS EAR/PLS OXIMETRY MLT: CPT

## 2022-01-07 PROCEDURE — 97165 OT EVAL LOW COMPLEX 30 MIN: CPT

## 2022-01-07 PROCEDURE — 94640 AIRWAY INHALATION TREATMENT: CPT

## 2022-01-07 PROCEDURE — 36415 COLL VENOUS BLD VENIPUNCTURE: CPT

## 2022-01-07 PROCEDURE — 2700000000 HC OXYGEN THERAPY PER DAY

## 2022-01-07 PROCEDURE — 6370000000 HC RX 637 (ALT 250 FOR IP): Performed by: NURSE PRACTITIONER

## 2022-01-07 PROCEDURE — 85025 COMPLETE CBC W/AUTO DIFF WBC: CPT

## 2022-01-07 PROCEDURE — 6370000000 HC RX 637 (ALT 250 FOR IP): Performed by: FAMILY MEDICINE

## 2022-01-07 PROCEDURE — 97161 PT EVAL LOW COMPLEX 20 MIN: CPT | Performed by: PHYSICAL THERAPIST

## 2022-01-07 PROCEDURE — 80048 BASIC METABOLIC PNL TOTAL CA: CPT

## 2022-01-07 PROCEDURE — 6360000002 HC RX W HCPCS: Performed by: INTERNAL MEDICINE

## 2022-01-07 PROCEDURE — 2060000000 HC ICU INTERMEDIATE R&B

## 2022-01-07 PROCEDURE — 2580000003 HC RX 258: Performed by: FAMILY MEDICINE

## 2022-01-07 PROCEDURE — 6360000002 HC RX W HCPCS: Performed by: NURSE PRACTITIONER

## 2022-01-07 RX ADMIN — OXYBUTYNIN CHLORIDE 15 MG: 5 TABLET, EXTENDED RELEASE ORAL at 08:59

## 2022-01-07 RX ADMIN — ENOXAPARIN SODIUM 40 MG: 100 INJECTION SUBCUTANEOUS at 08:59

## 2022-01-07 RX ADMIN — FUROSEMIDE 20 MG: 10 INJECTION, SOLUTION INTRAMUSCULAR; INTRAVENOUS at 08:59

## 2022-01-07 RX ADMIN — ENOXAPARIN SODIUM 40 MG: 100 INJECTION SUBCUTANEOUS at 21:09

## 2022-01-07 RX ADMIN — SODIUM CHLORIDE, PRESERVATIVE FREE 10 ML: 5 INJECTION INTRAVENOUS at 21:09

## 2022-01-07 RX ADMIN — ALBUTEROL SULFATE 2 PUFF: 90 AEROSOL, METERED RESPIRATORY (INHALATION) at 08:33

## 2022-01-07 RX ADMIN — SODIUM CHLORIDE 3 G: 1 TABLET ORAL at 08:55

## 2022-01-07 RX ADMIN — METOPROLOL TARTRATE 50 MG: 50 TABLET, FILM COATED ORAL at 21:09

## 2022-01-07 RX ADMIN — CLOPIDOGREL BISULFATE 75 MG: 75 TABLET ORAL at 08:59

## 2022-01-07 RX ADMIN — ATORVASTATIN CALCIUM 40 MG: 40 TABLET, FILM COATED ORAL at 08:59

## 2022-01-07 RX ADMIN — ASPIRIN 81 MG: 81 TABLET, COATED ORAL at 08:59

## 2022-01-07 RX ADMIN — FINASTERIDE 5 MG: 5 TABLET, FILM COATED ORAL at 08:59

## 2022-01-07 RX ADMIN — SODIUM CHLORIDE, PRESERVATIVE FREE 10 ML: 5 INJECTION INTRAVENOUS at 09:27

## 2022-01-07 RX ADMIN — SODIUM CHLORIDE 3 G: 1 TABLET ORAL at 17:40

## 2022-01-07 RX ADMIN — METOPROLOL TARTRATE 50 MG: 50 TABLET, FILM COATED ORAL at 08:59

## 2022-01-07 RX ADMIN — ZOLPIDEM TARTRATE 5 MG: 5 TABLET ORAL at 21:09

## 2022-01-07 RX ADMIN — ALBUTEROL SULFATE 2 PUFF: 90 AEROSOL, METERED RESPIRATORY (INHALATION) at 11:22

## 2022-01-07 RX ADMIN — LEVOTHYROXINE SODIUM 75 MCG: 0.07 TABLET ORAL at 06:39

## 2022-01-07 RX ADMIN — DEXAMETHASONE SODIUM PHOSPHATE 10 MG: 10 INJECTION INTRAMUSCULAR; INTRAVENOUS at 08:59

## 2022-01-07 RX ADMIN — ALBUTEROL SULFATE 2 PUFF: 90 AEROSOL, METERED RESPIRATORY (INHALATION) at 20:00

## 2022-01-07 ASSESSMENT — PAIN SCALES - GENERAL
PAINLEVEL_OUTOF10: 0

## 2022-01-07 NOTE — PROGRESS NOTES
Hospitalist Progress Note    Patient:  Dorian Murcia     YOB: 1945    MRN: [id number]   Admit date: 12/8/2021     Acct: [de-identified]     PCP: Rajni Monae MD    CC--Interval History: Covid-19 brady pagan---have been able to wean the patient down to 8 liters    Working on placement    Physical--OT therapies see below    See note below     All other ROS negative except noted in HPI    Diet:  ADULT DIET;  Regular; 4 carb choices (60 gm/meal); 1800 ml    Medications:  Scheduled Meds:   sodium chloride  3 g Oral BID WC    dexamethasone  10 mg IntraVENous Daily    furosemide  20 mg IntraVENous Daily    enoxaparin  40 mg SubCUTAneous BID    albuterol sulfate HFA  2 puff Inhalation 4x daily    aspirin  81 mg Oral Daily    atorvastatin  40 mg Oral Daily    buPROPion  75 mg Oral Daily    clopidogrel  75 mg Oral Daily    levothyroxine  75 mcg Oral Daily    finasteride  5 mg Oral Daily    oxybutynin  15 mg Oral Daily    metoprolol tartrate  50 mg Oral BID    [Held by provider] lisinopril  20 mg Oral Daily    sodium chloride flush  5-40 mL IntraVENous 2 times per day     Continuous Infusions:   sodium chloride Stopped (01/03/22 0242)     PRN Meds:hydrocortisone, bisacodyl, iopamidol, ondansetron, zolpidem, benzonatate, guaiFENesin-dextromethorphan, albuterol sulfate HFA, sodium chloride flush, sodium chloride, polyethylene glycol, acetaminophen **OR** acetaminophen    Objective:  Labs:  CBC with Differential:    Lab Results   Component Value Date    WBC 7.6 01/07/2022    RBC 3.66 01/07/2022    HGB 10.8 01/07/2022    HCT 35.6 01/07/2022     01/07/2022    MCV 97.3 01/07/2022    MCH 29.5 01/07/2022    MCHC 30.3 01/07/2022    RDW 15.5 01/07/2022    LYMPHOPCT 14 01/07/2022    MONOPCT 8 01/07/2022    BASOPCT 0 01/07/2022    MONOSABS 0.58 01/07/2022    LYMPHSABS 1.06 01/07/2022    EOSABS 0.04 01/07/2022    BASOSABS <0.03 01/07/2022    DIFFTYPE NOT REPORTED 01/07/2022     BMP:    Lab Results Component Value Date     01/07/2022    K 4.1 01/07/2022     01/07/2022    CO2 30 01/07/2022    BUN 25 01/07/2022    LABALBU 2.7 12/31/2021    CREATININE 0.64 01/07/2022    CALCIUM 9.5 01/07/2022    GFRAA >60 01/07/2022    LABGLOM >60 01/07/2022    GLUCOSE 91 01/07/2022           Physical Exam:  Vitals: BP (!) 126/58   Pulse 79   Temp 97.7 °F (36.5 °C) (Tympanic)   Resp 23   Ht 6' (1.829 m)   Wt 294 lb 11.2 oz (133.7 kg)   SpO2 97%   BMI 39.97 kg/m²   24 hour intake/output:    Intake/Output Summary (Last 24 hours) at 1/7/2022 1755  Last data filed at 1/7/2022 1340  Gross per 24 hour   Intake 200 ml   Output 1725 ml   Net -1525 ml     Last 3 weights: Wt Readings from Last 3 Encounters:   01/07/22 294 lb 11.2 oz (133.7 kg)   11/11/21 (!) 319 lb 12.8 oz (145.1 kg)   09/15/21 (!) 321 lb (145.6 kg)     HEENT: O2 NC---, Normocephalic and Atraumatic  Neck: Supple, No Masses, Tenderness, Nodularity and No Lymphadenopathy  Chest/Lungs: Prolonged Expiratory Phase and Distant Breath Sounds  Cardiac: Regular Rate and Rhythm  GI/Abdomen: Bowel Sounds Present and Soft, Non-tender, without Guarding or Rebound Tenderness  : Not examined  EXT/Skin: No Edema, No Cyanosis and No Clubbing  Neuro: Alert and Oriented and No Localizing Signs/Symptoms      Assessment:    Principal Problem:    Pneumonia due to COVID-19 virus  Active Problems:    Ischemic cardiomyopathy    Coronary artery disease involving native coronary artery of native heart    Acute respiratory failure with hypoxia (HCC)    COPD with acute exacerbation (HCC)  Resolved Problems:    * No resolved hospital problems.  *    Craige Odor  76 WM  [tal Martinez, FP]  DNR-CCA    LOVENOX---PLAVIX    SUPPLEMENTAL OXYGEN----BiPAP  à  HHF and BiPAP nights--rest  à HHF 50 liters 95% à HF 11 liters  COVID-19--POSITIVE---12.8.2021----Moderna---1.25.2021---2.22.2021---no booster  MIDLINE---12.17.2021   LALO    Anti-infectives:  [Rocephin IV, Zithromax

## 2022-01-07 NOTE — PROGRESS NOTES
Physical Therapy    Facility/Department: [facility]  PROGRESSIVE CARE  Initial Assessment    NAME: Ramona Herrera  : 1945  MRN: [id number]    Date of Service: 2022    Discharge Recommendations:  Continue to assess pending progress,ECF with PT        Assessment   Body structures, Functions, Activity limitations: Decreased functional mobility ; Decreased strength  Prognosis: Fair  Decision Making: High Complexity  REQUIRES PT FOLLOW UP: Yes  Activity Tolerance  Activity Tolerance: Patient limited by fatigue;Patient limited by endurance       Patient Diagnosis(es): The primary encounter diagnosis was Pneumonia due to COVID-19 virus. A diagnosis of Hypoxia was also pertinent to this visit. has a past medical history of Adenomatous polyp, Cholecystitis, Chronic venous insufficiency, DVT (deep venous thrombosis) ([facility] [address] Edema, Gout, Hypertension, Onychomycosis, Osteoarthritis, Plantar fasciitis, and Tobacco abuse.   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     Restrictions  Restrictions/Precautions  Restrictions/Precautions: Isolation  Vision/Hearing        Subjective  Pain Screening  Patient Currently in Pain: Denies          Orientation  Orientation  Overall Orientation Status: Within Functional Limits  Social/Functional History  Social/Functional History  Lives With: Spouse  Type of Home: House  Additional Comments: unable to attain home information  Cognition        Objective             Strength RLE  Comment: 2/5  Strength LLE  Comment: 3-/5  Strength RUE  Comment: 3/5  Strength LUE  Comment: 3/5        Bed mobility  Rolling to Left: Contact guard assistance  Rolling to Right: Contact guard assistance  Supine to Sit: Unable to assess  Sit to Supine: Unable to assess  Scooting: Unable to assess  Transfers  Sit to Stand: Unable to assess  Stand to sit: Unable to assess  Bed to Chair: Unable to assess  Stand Pivot Transfers: Unable to assess  Squat Pivot Transfers: Unable to assess  Lateral Transfers: Unable to assess  Car Transfer: Unable to assess  Comment: Overhead lift transfer to chair  Ambulation  Ambulation?: No              Plan   Plan  Times per day: Daily  Current Treatment Recommendations: ROM,Functional Mobility Training  Safety Devices  Type of devices: Left in chair,Call light within reach    G-Code       OutComes Score                                                  AM-PAC Score             Goals  Short term goals  Time Frame for Short term goals: 1 day  Short term goal 1: Assess functional status  Short term goal 2: Transfers with CGA x 1 maintaining O2 Sats in 85%  Short term goal 3: Sitting at EOB x 5 min maintaining O2 stats  Long term goals  Long term goal 1: Bed mobility, rolling SBA  Long term goal 2: Move legs to edge of bed  Long term goal 3: Able to participate in transfers  Patient Goals   Patient goals : Unable to [facility]       Therapy Time   Individual Concurrent Group Co-treatment   Time In 1050         Time Out 1115         Minutes 25                 Faiza Campbell, PT

## 2022-01-07 NOTE — PROGRESS NOTES
Occupational Therapy   Occupational Therapy Initial Assessment  Date: 2022   Patient Name: Krystal Pina  MRN: [id number]     : 1945    Date of Service: 2022    Discharge Recommendations:  ECF with OT     Assessment   Performance deficits / Impairments: Decreased functional mobility ; Decreased ADL status; Decreased strength;Decreased endurance;Decreased balance;Decreased high-level IADLs;Decreased coordination  Treatment Diagnosis: general weakness  Prognosis: Guarded  Decision Making: Low Complexity  OT Education: OT Role;Plan of Care  REQUIRES OT FOLLOW UP: Yes  Activity Tolerance  Activity Tolerance: Treatment limited secondary to medical complications (free text)  Safety Devices  Safety Devices in place: Yes  Type of devices: Left in bed;Call light within reach;Nurse notified; Bed alarm in place           Patient Diagnosis(es): The primary encounter diagnosis was Pneumonia due to COVID-19 virus. A diagnosis of Hypoxia was also pertinent to this visit. has a past medical history of Adenomatous polyp, Cholecystitis, Chronic venous insufficiency, DVT (deep venous thrombosis) ([facility] [address] Edema, Gout, Hypertension, Onychomycosis, Osteoarthritis, Plantar fasciitis, and Tobacco abuse.   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 Treatment Diagnosis: general weakness      Restrictions  Restrictions/Precautions  Restrictions/Precautions: Isolation    Subjective   General  Chart Reviewed: Yes  Patient assessed for rehabilitation services?: Yes  Family / Caregiver Present: No  Referring Practitioner: [facility] Fraga  Diagnosis: pnuemonia due to covid  Subjective  Subjective: Patient rec'd in bed, 67 yr old male with respiratory distress.   General Comment  Comments: Pt had difficulty answering therapist's questions secondary to being [facility], therefore limited PLOF/environmental living arrangements obtained. Patient Currently in Pain: Denies  Vital Signs  Temp: 96.9 °F (36.1 °C)  Temp Source: Tympanic  Pulse: 65  Resp: 30  BP: (!) 97/56  MAP (mmHg): 69  Patient Currently in Pain: Denies  Oxygen Therapy  SpO2: 94 %  Social/Functional History  Social/Functional History  Lives With: Spouse  Type of Home: House  Additional Comments: unable to attain home information     Objective   Hearing: Exceptions to [facility]  Hearing Exceptions: Hard of hearing/hearing concerns    Orientation  Overall Orientation Status:  (CHANTE)  Observation/Palpation  Observation: Pt side-lying in bed, on 8L O2  ADL  Grooming: Maximum assistance  UE Bathing: Dependent/Total  LE Bathing: Dependent/Total  Toileting: Dependent/Total  Bed mobility  Rolling to Left: Minimal assistance  Rolling to Right: Minimal assistance  Supine to Sit: Moderate assistance  Sit to Supine: Moderate assistance  Scooting: Maximal assistance  Comment: Performs bed mobility to sit EOB. Pt able to sit ~1 minute with SpO2 dropping to 71%. Pt assisted back into bed to side-lying position with SpO2 remaining in the 70-85 range for ~5 minutes. Pt's SpO2 at 87% at end of evaluation. OT reported to nurse, Margot St, who was unsure if the monitor was reading correctly. Nurse, Margot St, reported that he would look into it.   Transfers  Sit to stand: Unable to assess  Stand to sit: Unable to assess  Cognition  Overall Cognitive Status:  (CHANTE)  LUE AROM (degrees)  LUE AROM : WFL  Left Hand AROM (degrees)  Left Hand AROM: WFL  RUE AROM (degrees)  RUE AROM : WFL  Right Hand AROM (degrees)  Right Hand AROM: WFL  LUE Strength  L Hand General: NT  RUE Strength  R Hand General: NT     Plan   Plan  Times per week: 1-3  Current Treatment Recommendations: Patient/Caregiver Education & Training,Self-Care / ADL,Functional Mobility Training,Safety Education & Training,Equipment Evaluation, Education, & procurement    Goals  Short term goals  Time Frame for Short term goals: duration of hospital stay  Short term goal 1: Patient to complete simple ADL task sitting EOB with mod A using a/e as needed  Short term goal 2: Patient to complete BSC transfer with max a x2 using a/e as needed       Therapy Time   Individual Concurrent Group Co-treatment   Time In 1350         Time Out 1405         Minutes 15         Timed Code Treatment Minutes: 0 Minutes       SHANTI Qureshi, OTR/L

## 2022-01-08 ENCOUNTER — APPOINTMENT (OUTPATIENT)
Dept: GENERAL RADIOLOGY | Age: 77
DRG: 177 | End: 2022-01-08
Payer: MEDICARE

## 2022-01-08 VITALS
SYSTOLIC BLOOD PRESSURE: 101 MMHG | OXYGEN SATURATION: 100 % | BODY MASS INDEX: 39.39 KG/M2 | TEMPERATURE: 95.3 F | HEIGHT: 72 IN | WEIGHT: 290.8 LBS | HEART RATE: 78 BPM | RESPIRATION RATE: 22 BRPM | DIASTOLIC BLOOD PRESSURE: 66 MMHG

## 2022-01-08 LAB
ABSOLUTE EOS #: 0.03 K/UL (ref 0–0.44)
ABSOLUTE IMMATURE GRANULOCYTE: 0.15 K/UL (ref 0–0.3)
ABSOLUTE LYMPH #: 1.22 K/UL (ref 1.1–3.7)
ABSOLUTE MONO #: 0.58 K/UL (ref 0.1–1.2)
ANION GAP SERPL CALCULATED.3IONS-SCNC: 12 MMOL/L (ref 9–17)
BASOPHILS # BLD: 0 % (ref 0–2)
BASOPHILS ABSOLUTE: <0.03 K/UL (ref 0–0.2)
BUN BLDV-MCNC: 25 MG/DL (ref 8–23)
BUN/CREAT BLD: 44 (ref 9–20)
CALCIUM SERPL-MCNC: 9.4 MG/DL (ref 8.6–10.4)
CHLORIDE BLD-SCNC: 102 MMOL/L (ref 98–107)
CO2: 27 MMOL/L (ref 20–31)
CREAT SERPL-MCNC: 0.57 MG/DL (ref 0.7–1.2)
DIFFERENTIAL TYPE: ABNORMAL
EOSINOPHILS RELATIVE PERCENT: 0 % (ref 1–4)
GFR AFRICAN AMERICAN: >60 ML/MIN
GFR NON-AFRICAN AMERICAN: >60 ML/MIN
GFR SERPL CREATININE-BSD FRML MDRD: ABNORMAL ML/MIN/{1.73_M2}
GFR SERPL CREATININE-BSD FRML MDRD: ABNORMAL ML/MIN/{1.73_M2}
GLUCOSE BLD-MCNC: 81 MG/DL (ref 70–99)
HCT VFR BLD CALC: 37.3 % (ref 40.7–50.3)
HEMOGLOBIN: 11.6 G/DL (ref 13–17)
IMMATURE GRANULOCYTES: 2 %
LYMPHOCYTES # BLD: 15 % (ref 24–43)
MCH RBC QN AUTO: 29.7 PG (ref 25.2–33.5)
MCHC RBC AUTO-ENTMCNC: 31.1 G/DL (ref 25.2–33.5)
MCV RBC AUTO: 95.4 FL (ref 82.6–102.9)
MONOCYTES # BLD: 7 % (ref 3–12)
NRBC AUTOMATED: 0 PER 100 WBC
PDW BLD-RTO: 15.6 % (ref 11.8–14.4)
PLATELET # BLD: 247 K/UL (ref 138–453)
PLATELET ESTIMATE: ABNORMAL
PMV BLD AUTO: 9.8 FL (ref 8.1–13.5)
POTASSIUM SERPL-SCNC: 3.4 MMOL/L (ref 3.7–5.3)
RBC # BLD: 3.91 M/UL (ref 4.21–5.77)
RBC # BLD: ABNORMAL 10*6/UL
SEG NEUTROPHILS: 76 % (ref 36–65)
SEGMENTED NEUTROPHILS ABSOLUTE COUNT: 6.02 K/UL (ref 1.5–8.1)
SODIUM BLD-SCNC: 141 MMOL/L (ref 135–144)
WBC # BLD: 8 K/UL (ref 3.5–11.3)
WBC # BLD: ABNORMAL 10*3/UL

## 2022-01-08 PROCEDURE — 6370000000 HC RX 637 (ALT 250 FOR IP): Performed by: NURSE PRACTITIONER

## 2022-01-08 PROCEDURE — 6370000000 HC RX 637 (ALT 250 FOR IP): Performed by: FAMILY MEDICINE

## 2022-01-08 PROCEDURE — 71045 X-RAY EXAM CHEST 1 VIEW: CPT

## 2022-01-08 PROCEDURE — 6360000002 HC RX W HCPCS: Performed by: INTERNAL MEDICINE

## 2022-01-08 PROCEDURE — 36415 COLL VENOUS BLD VENIPUNCTURE: CPT

## 2022-01-08 PROCEDURE — 2580000003 HC RX 258: Performed by: FAMILY MEDICINE

## 2022-01-08 PROCEDURE — 85025 COMPLETE CBC W/AUTO DIFF WBC: CPT

## 2022-01-08 PROCEDURE — 94640 AIRWAY INHALATION TREATMENT: CPT

## 2022-01-08 PROCEDURE — 6370000000 HC RX 637 (ALT 250 FOR IP)

## 2022-01-08 PROCEDURE — 97110 THERAPEUTIC EXERCISES: CPT

## 2022-01-08 PROCEDURE — 2700000000 HC OXYGEN THERAPY PER DAY

## 2022-01-08 PROCEDURE — 6360000002 HC RX W HCPCS: Performed by: NURSE PRACTITIONER

## 2022-01-08 PROCEDURE — 80048 BASIC METABOLIC PNL TOTAL CA: CPT

## 2022-01-08 PROCEDURE — 99239 HOSP IP/OBS DSCHRG MGMT >30: CPT | Performed by: FAMILY MEDICINE

## 2022-01-08 RX ORDER — GUAIFENESIN/DEXTROMETHORPHAN 100-10MG/5
5 SYRUP ORAL EVERY 4 HOURS PRN
Qty: 120 ML | Refills: 0
Start: 2022-01-08 | End: 2022-01-18

## 2022-01-08 RX ORDER — POTASSIUM CHLORIDE 20 MEQ/1
20 TABLET, EXTENDED RELEASE ORAL ONCE
Status: COMPLETED | OUTPATIENT
Start: 2022-01-08 | End: 2022-01-08

## 2022-01-08 RX ORDER — HYDROCORTISONE ACETATE 25 MG/1
25 SUPPOSITORY RECTAL 2 TIMES DAILY PRN
Qty: 30 SUPPOSITORY | Refills: 0 | Status: SHIPPED | OUTPATIENT
Start: 2022-01-08

## 2022-01-08 RX ORDER — POLYETHYLENE GLYCOL 3350 17 G/17G
17 POWDER, FOR SOLUTION ORAL DAILY PRN
Qty: 527 G | Refills: 1 | Status: SHIPPED | OUTPATIENT
Start: 2022-01-08 | End: 2022-02-07

## 2022-01-08 RX ORDER — ALBUTEROL SULFATE 90 UG/1
2 AEROSOL, METERED RESPIRATORY (INHALATION) EVERY 4 HOURS PRN
Qty: 18 G | Refills: 3 | Status: SHIPPED | OUTPATIENT
Start: 2022-01-08 | End: 2022-05-25

## 2022-01-08 RX ORDER — POTASSIUM CHLORIDE 750 MG/1
10 TABLET, EXTENDED RELEASE ORAL DAILY
Qty: 30 TABLET | Refills: 0
Start: 2022-01-08 | End: 2022-08-09 | Stop reason: SDUPTHER

## 2022-01-08 RX ORDER — BENZONATATE 200 MG/1
200 CAPSULE ORAL 3 TIMES DAILY PRN
Qty: 30 CAPSULE | Refills: 0
Start: 2022-01-08 | End: 2022-01-15

## 2022-01-08 RX ORDER — LEVOTHYROXINE SODIUM 0.07 MG/1
75 TABLET ORAL DAILY
Qty: 30 TABLET | Refills: 1
Start: 2022-01-08 | End: 2022-08-09 | Stop reason: SDUPTHER

## 2022-01-08 RX ADMIN — SODIUM CHLORIDE 3 G: 1 TABLET ORAL at 08:43

## 2022-01-08 RX ADMIN — CLOPIDOGREL BISULFATE 75 MG: 75 TABLET ORAL at 08:43

## 2022-01-08 RX ADMIN — ENOXAPARIN SODIUM 40 MG: 100 INJECTION SUBCUTANEOUS at 08:42

## 2022-01-08 RX ADMIN — DEXAMETHASONE SODIUM PHOSPHATE 10 MG: 10 INJECTION INTRAMUSCULAR; INTRAVENOUS at 08:43

## 2022-01-08 RX ADMIN — OXYBUTYNIN CHLORIDE 15 MG: 5 TABLET, EXTENDED RELEASE ORAL at 08:43

## 2022-01-08 RX ADMIN — METOPROLOL TARTRATE 50 MG: 50 TABLET, FILM COATED ORAL at 08:43

## 2022-01-08 RX ADMIN — ALBUTEROL SULFATE 2 PUFF: 90 AEROSOL, METERED RESPIRATORY (INHALATION) at 12:35

## 2022-01-08 RX ADMIN — ALBUTEROL SULFATE 2 PUFF: 90 AEROSOL, METERED RESPIRATORY (INHALATION) at 09:48

## 2022-01-08 RX ADMIN — FUROSEMIDE 20 MG: 10 INJECTION, SOLUTION INTRAMUSCULAR; INTRAVENOUS at 08:43

## 2022-01-08 RX ADMIN — ATORVASTATIN CALCIUM 40 MG: 40 TABLET, FILM COATED ORAL at 08:43

## 2022-01-08 RX ADMIN — ASPIRIN 81 MG: 81 TABLET, COATED ORAL at 08:43

## 2022-01-08 RX ADMIN — LEVOTHYROXINE SODIUM 75 MCG: 0.07 TABLET ORAL at 06:06

## 2022-01-08 RX ADMIN — FINASTERIDE 5 MG: 5 TABLET, FILM COATED ORAL at 08:43

## 2022-01-08 RX ADMIN — POTASSIUM CHLORIDE 20 MEQ: 20 TABLET, EXTENDED RELEASE ORAL at 13:30

## 2022-01-08 RX ADMIN — SODIUM CHLORIDE, PRESERVATIVE FREE 10 ML: 5 INJECTION INTRAVENOUS at 08:42

## 2022-01-08 ASSESSMENT — PAIN SCALES - GENERAL
PAINLEVEL_OUTOF10: 0

## 2022-01-08 NOTE — PROGRESS NOTES
Physical Therapy  Facility/Department: [facility]  PROGRESSIVE CARE  Daily Treatment Note  NAME: Joel Tellez  : 1945  MRN: [id number]    Date of Service: 2022    Discharge Recommendations:  Subacute/Skilled Nursing Facility,Continue to assess pending progress        Assessment   Body structures, Functions, Activity limitations: Decreased functional mobility ; Decreased strength  Activity Tolerance  Activity Tolerance: Patient limited by fatigue;Patient limited by endurance     Patient Diagnosis(es): The primary encounter diagnosis was Pneumonia due to COVID-19 virus. A diagnosis of Hypoxia was also pertinent to this visit. has a past medical history of Adenomatous polyp, Cholecystitis, Chronic venous insufficiency, DVT (deep venous thrombosis) ([facility] [address] Edema, Gout, Hypertension, Onychomycosis, Osteoarthritis, Plantar fasciitis, and Tobacco abuse.   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 Restrictions  Restrictions/Precautions  Restrictions/Precautions: Isolation  Subjective   Subjective  Subjective: Patient supine in bed. Agreeable to therapy. Pain Assessment  Pain Assessment: 0-10  Pain Level: 0  Oxygen Therapy  SpO2: 93 % (upon arrival for therapy.)  O2 Device: Nasal cannula  O2 Flow Rate (L/min): 5 L/min       Orientation  Orientation  Overall Orientation Status: Within Functional Limits  Cognition      Objective   Bed mobility  Rolling to Right: Minimal assistance;Contact guard assistance  Supine to Sit: Minimal assistance; Moderate assistance  Sit to Supine: Minimal assistance; Moderate assistance  Comment: Performed bed mobility to sit EOB. Oxygen remained in 88% range.  Patient RR increased to 50 range but declined after approx 1 minute of sitting EOB. Assisted pateint back to supine positioning. Nurse entered room. Quick recivery with oxygen at 88% and RR in 30 range after 45 seconds of supine psoitioning. Transfers  Sit to Stand: Unable to assess  Stand to sit: Unable to assess  Bed to Chair: Unable to assess  Stand Pivot Transfers: Unable to assess  Squat Pivot Transfers: Unable to assess  Lateral Transfers: Unable to assess  Car Transfer: Unable to assess  Ambulation  Ambulation?: No        Exercises  Comments: Did not perform after performing bed mobility and EOB sitting.                         G-Code     OutComes Score                                                     AM-PAC Score             Goals  Short term goals  Time Frame for Short term goals: 1 day  Short term goal 1: Assess functional status  Short term goal 2: Transfers with CGA x 1 maintaining O2 Sats in 85%  Short term goal 3: Sitting at EOB x 5 min maintaining O2 stats  Long term goals  Long term goal 1: Bed mobility, rolling SBA  Long term goal 2: Move legs to edge of bed  Long term goal 3: Able to participate in transfers  Patient Goals   Patient goals : Unable to [address] [address] per day: Daily  Current Treatment Recommendations: ROM,Functional Mobility Training  Safety Devices  Type of devices: Left in chair,Call light within reach     Therapy Time   Individual Concurrent Group Co-treatment   Time In 0911         Time Out 0923         Minutes [address], [place]

## 2022-01-08 NOTE — DISCHARGE SUMMARY
Discharge Summary    Victor M Mccabe Patient Status:  Inpatient    1945 MRN [id number]   Location [facility] Attending [address] PCP Kin Franklin MD     Date of Admission: 2021    Date of Discharge: 2022    Admitting Diagnosis: Hypoxia [R09.02]  Pneumonia due to COVID-19 virus [U07.1, J12.82]  COVID-19 [U07.1]    Discharge Diagnosis: Principal Problem:    Pneumonia due to COVID-19 virus  Active Problems:    Ischemic cardiomyopathy    Coronary artery disease involving native coronary artery of native heart    Acute respiratory failure with hypoxia ([facility] [address]    COPD with acute exacerbation ([facility] [address]  Resolved Problems:    * No resolved hospital problems. 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 Recieved 14 days of Barcitinib also was on IV antibiotics for secondary pneumoniaRecieved PT/OT for generalized weakness. Respiartory symptms have improved is on 5 l oxygen currently will need continued rehab and supplemenal oxygen on discharge to [facility] today    Consultations: PT/OT    Procedures: None    Complications: Prolonged hospitalization due to COVID was slow to improve    Disposition: F    Discharge Condition: Stable    Discharge Medications:      Medication List      START taking these medications    albuterol sulfate  (90 Base) MCG/ACT inhaler  Inhale 2 puffs into the lungs every 4 hours as needed for Wheezing     benzonatate 200 MG capsule  Commonly known as: TESSALON  Take 1 capsule by mouth 3 times daily as needed for Cough     bisacodyl 5 MG EC tablet  Commonly known as: DULCOLAX  Take 2 tablets by mouth daily as needed for Constipation     guaiFENesin-dextromethorphan 100-10 MG/5ML syrup  Commonly known as: ROBITUSSIN DM  Take 5 mLs by mouth every 4 hours as needed for Cough     hydrocortisone 25 MG suppository  Commonly known as: ANUSOL-HC  Place 1 suppository rectally 2 times daily as needed for Hemorrhoids     polyethylene glycol 17 g packet  Commonly known as: GLYCOLAX  Take 17 g by mouth daily as needed for Constipation     potassium chloride 10 MEQ extended release tablet  Commonly known as: KLOR-CON M  Take 1 tablet by mouth daily        CONTINUE taking these medications    allopurinol 300 MG tablet  Commonly known as: ZYLOPRIM  Take 1 tablet by mouth once daily     aspirin 81 MG tablet     atorvastatin 40 MG tablet  Commonly known as: LIPITOR  TAKE 1 TABLET BY MOUTH ONCE DAILY     buPROPion 75 MG tablet  Commonly known as: WELLBUTRIN  Take 1 tablet by mouth twice daily     clopidogrel 75 MG tablet  Commonly known as: PLAVIX  Take 1 tablet by mouth once daily     finasteride 5 MG tablet  Commonly known as: PROSCAR     FISH OIL PO     furosemide 20 MG tablet  Commonly known as: LASIX  TAKE 1 TABLET BY MOUTH ONCE DAILY AS NEEDED FOR  PEDAL  EDEMA     Handicap Placard Misc  by Does not apply route Permanent     levothyroxine 75 MCG tablet  Commonly known as: SYNTHROID  Take 1 tablet by mouth daily     metoprolol tartrate 50 MG tablet  Commonly known as: LOPRESSOR  Take 1 tablet by mouth twice daily     oxybutynin 15 MG extended release tablet  Commonly known as: DITROPAN XL     TYLENOL PO        STOP taking these medications    lisinopril 20 MG tablet  Commonly known as: PRINIVIL;ZESTRIL     Shingrix 50 MCG/0.5ML Susr injection  Generic drug: zoster recombinant adjuvanted vaccine           Where to Get Your Medications      These medications were sent to [address] [place] [address]  [address], [address]    Phone: [phone]   · albuterol sulfate  (90 Base) MCG/ACT inhaler  · hydrocortisone 25 MG suppository  · polyethylene glycol 17 g packet     Information about where to get these medications is not yet available    Ask your nurse or doctor about these medications  · benzonatate 200 MG capsule  · bisacodyl 5 MG EC tablet  · guaiFENesin-dextromethorphan 100-10 MG/5ML syrup  · levothyroxine 75 MCG tablet  · potassium chloride 10 MEQ extended release tablet               Total Time spent with patient and coordinating care:  40 minutes    Samantha Moeller MD  1/8/2022  12:30 PM

## 2022-01-10 ENCOUNTER — TELEPHONE (OUTPATIENT)
Dept: INTERNAL MEDICINE | Age: 77
End: 2022-01-10

## 2022-01-13 ENCOUNTER — OUTSIDE SERVICES (OUTPATIENT)
Dept: INTERNAL MEDICINE | Age: 77
End: 2022-01-13
Payer: MEDICARE

## 2022-01-13 DIAGNOSIS — U07.1 PNEUMONIA DUE TO COVID-19 VIRUS: Primary | ICD-10-CM

## 2022-01-13 DIAGNOSIS — I10 PRIMARY HYPERTENSION: ICD-10-CM

## 2022-01-13 DIAGNOSIS — R53.1 GENERAL WEAKNESS: ICD-10-CM

## 2022-01-13 DIAGNOSIS — J12.82 PNEUMONIA DUE TO COVID-19 VIRUS: Primary | ICD-10-CM

## 2022-01-13 DIAGNOSIS — E78.5 HYPERLIPIDEMIA, UNSPECIFIED HYPERLIPIDEMIA TYPE: ICD-10-CM

## 2022-01-13 DIAGNOSIS — I25.10 CORONARY ARTERY DISEASE INVOLVING NATIVE CORONARY ARTERY OF NATIVE HEART WITHOUT ANGINA PECTORIS: ICD-10-CM

## 2022-01-13 DIAGNOSIS — I25.5 ISCHEMIC CARDIOMYOPATHY: ICD-10-CM

## 2022-01-13 PROCEDURE — 99308 SBSQ NF CARE LOW MDM 20: CPT | Performed by: NURSE PRACTITIONER

## 2022-01-13 NOTE — PROGRESS NOTES
01/13/22  uLis Medina  1945    Patient Resident of [facility]    Chief Complaint  1. Pneumonia due to COVID-19 virus    2. Primary hypertension    3. Ischemic cardiomyopathy    4. Coronary artery disease involving native coronary artery of native heart without angina pectoris    5. Hyperlipidemia, unspecified hyperlipidemia type    6. General weakness        HPI:  69-year-old patient with a history of DVT, hypertension, CAD, [facility]. Patient was admitted to [facility] 12/8/2021 through 1/8/2022 with hypoxia pneumonia due to COVID-19. Patient initially required high flow oxygen and BiPAP. Slow to improve. Received 14 days of olumiant. Received IV antibiotics and due to progressive weakness was sent to [facility] for ongoing rehabilitation. Patient states he has some excessive fatigue. Continues on supplemental oxygen. Oxygen levels stable. Is going to start working with therapy today. He denies any chest pain. No significant/worsening shortness of breath. Intermittent nonproductive cough.       No Known Allergies    Past Medical History:   Diagnosis Date    Adenomatous polyp     history of     Cholecystitis 3/19/2019    Chronic venous insufficiency     DVT (deep venous thrombosis) (HCC)     history of     Edema     related to venous stasis     Gout     history of     Hypertension     Onychomycosis     Osteoarthritis     Plantar fasciitis     Tobacco abuse        Past Surgical History:   Procedure Laterality Date    COLONOSCOPY  12/12/2012    polyp    COLONOSCOPY  2003    adenomatous polyps     COLONOSCOPY  08/2009    COLONOSCOPY  06/2008    with polypectomy    CORONARY ARTERY BYPASS GRAFT  10/28/2014    SVG-OM1, SVG-OM2, and SVG-PDA    OTHER SURGICAL HISTORY      teeth extracted     TOTAL KNEE ARTHROPLASTY Right 10/2015    TOTAL KNEE ARTHROPLASTY Left 03/2018    TURP      VASECTOMY  1980       [facility]     Social History     Socioeconomic History    Marital status: Single     Spouse name: Not on file    Number of children: Not on file    Years of education: Not on file    Highest education level: Not on file   Occupational History    Not on file   Tobacco Use    Smoking status: Former Smoker     Packs/day: 0.00     Years: 55.00     Pack years: 0.00     Types: Cigarettes     Quit date: 10/27/2014     Years since quittin.2    Smokeless tobacco: Never Used   Vaping Use    Vaping Use: Never used   Substance and Sexual Activity    Alcohol use: Yes     Alcohol/week: 0.0 standard drinks     Comment: occasionally    Drug use: No    Sexual activity: Yes     Partners: Female   Other Topics Concern    Not on file   Social History Narrative    Not on file     Social Determinants of Health     Financial Resource Strain: Low Risk     Difficulty of Paying Living Expenses: Not hard at all   Food Insecurity: No Food Insecurity    Worried About [address] in the Last Year: Never true    [address] in the Last Year: Never true   Transportation Needs:     Lack of Transportation (Medical): Not on file    Lack of Transportation (Non-Medical):  Not on file   Physical Activity:     Days of Exercise per Week: Not on file    Minutes of Exercise per Session: Not on file   Stress:     Feeling of Stress : Not on file   Social Connections:     Frequency of Communication with Friends and Family: Not on file    Frequency of Social Gatherings with Friends and Family: Not on file    Attends Spiritism Services: Not on file    Active Member of Clubs or Organizations: Not on file    Attends Club or Organization Meetings: Not on file    Marital Status: Not on file   Intimate Partner Violence:     Fear of Current or Ex-Partner: Not on file    Emotionally Abused: Not on file    Physically Abused: Not on file   [place] Sexually Abused: Not on file   Housing Stability:     Unable to Pay for Housing in the Last Year: Not on file    Number of Places Lived in the Last Year: Not on file    Unstable Housing in the Last Year: Not on file       Review of Systems   Constitutional: Positive for fatigue. Negative for activity change, appetite change, chills, fever and unexpected weight change. HENT: Negative for congestion, dental problem, ear discharge, ear pain, facial swelling, hearing loss, postnasal drip, rhinorrhea, sinus pressure, sore throat and trouble swallowing. Eyes: Negative for pain and visual disturbance. Respiratory: Positive for cough. Negative for chest tightness, shortness of breath and wheezing. Cardiovascular: Negative for chest pain, palpitations and leg swelling. Gastrointestinal: Negative for abdominal pain, blood in stool, constipation, diarrhea, nausea and vomiting. Endocrine: Negative for cold intolerance, heat intolerance and polyuria. Genitourinary: Negative for difficulty urinating. Musculoskeletal: Negative for arthralgias, gait problem, myalgias, neck pain and neck stiffness. Skin: Negative for color change, rash and wound. Neurological: Positive for weakness. Negative for dizziness, tremors, seizures, light-headedness, numbness and headaches. Psychiatric/Behavioral: Negative for confusion and hallucinations. The patient is not nervous/anxious. Physical Exam  Vitals and nursing note reviewed. Constitutional:       General: He is not in acute distress. Appearance: He is well-developed. He is not diaphoretic. HENT:      Head: Normocephalic and atraumatic. Right Ear: External ear normal.      Left Ear: External ear normal.   Eyes:      General: Lids are normal.         Right eye: No discharge. Left eye: No discharge. Conjunctiva/sclera: Conjunctivae normal.      Pupils: Pupils are equal, round, and reactive to light. Neck:      Trachea: No tracheal deviation. Cardiovascular:      Rate and Rhythm: Normal rate and regular rhythm. Heart sounds: Normal heart sounds. No murmur heard. No friction rub. No gallop. Pulmonary:      Effort: Pulmonary effort is normal. No accessory muscle usage or respiratory distress. Breath sounds: Normal breath sounds. No wheezing or rales. Abdominal:      General: Bowel sounds are normal. There is no distension. Palpations: Abdomen is soft. Abdomen is not rigid. Tenderness: There is no abdominal tenderness. Comments: Obese   Musculoskeletal:         General: Normal range of motion. Cervical back: Normal range of motion and neck supple. No edema or erythema. Right lower leg: Edema present. Left lower leg: Edema (+1 nonpitting bilateral lower extremity edema) present. Skin:     General: Skin is warm and dry. Capillary Refill: Capillary refill takes less than 2 seconds. Coloration: Skin is not pale. Findings: No erythema or rash. Neurological:      General: No focal deficit present. Mental Status: He is alert. Mental status is at baseline. Cranial Nerves: No cranial nerve deficit. Motor: No weakness. Coordination: Coordination normal.   Psychiatric:         Mood and Affect: Mood normal.         Behavior: Behavior normal.         Vital Signs: Temperature afebrile, vital signs stable, reviewed in point click care    Assessment:  1. Pneumonia due to COVID-19 virus  Completed course of antibiotics. Has Robitussin as needed in place. Will need follow-up chest x-ray in 4 to 6 weeks. 2. Primary hypertension  Stable, continues on metoprolol, Lasix    3. Ischemic cardiomyopathy  Stable. No signs of fluid overload. Continues on Lasix, metoprolol. Daily weight    4. Coronary artery disease involving native coronary artery of native heart without angina pectoris  Stable. Continues on aspirin, statin, Plavix, metoprolol.     5. Hyperlipidemia, unspecified hyperlipidemia type  Continues on atorvastatin    6. General weakness  Work with PT OT        Plan:  As noted above. Hospital records reviewed. CBC BMP in a.m. Will need follow-up chest x-ray 4 to 6 weeks. Continue work with PT OT goal of a short-term stay    Follow up for routine visit. Call sooner with concerns prior.     Electronically signed by KISHOR Pastrana CNP on 1/13/2022 at 2:42 PM

## 2022-01-16 ASSESSMENT — ENCOUNTER SYMPTOMS
TROUBLE SWALLOWING: 0
CONSTIPATION: 0
RHINORRHEA: 0
VOMITING: 0
COLOR CHANGE: 0
NAUSEA: 0
CHEST TIGHTNESS: 0
SORE THROAT: 0
COUGH: 1
EYE PAIN: 0
SINUS PRESSURE: 0
BLOOD IN STOOL: 0
DIARRHEA: 0
ABDOMINAL PAIN: 0
SHORTNESS OF BREATH: 0
FACIAL SWELLING: 0
WHEEZING: 0

## 2022-01-18 ENCOUNTER — TELEPHONE (OUTPATIENT)
Dept: CARDIOLOGY | Age: 77
End: 2022-01-18

## 2022-01-18 NOTE — TELEPHONE ENCOUNTER
[facility] called at family request to ask if the pt can have the 1800 ml restriction be lifted. Please advise.     [phone] [address]

## 2022-01-19 NOTE — TELEPHONE ENCOUNTER
Spoke to Saba at [facility]. Notified her fluid restrictions lifted. [facility] staff to monitor daily weight and watch for shortness of breath and edema. They will contact this office for any concerns.

## 2022-01-26 ENCOUNTER — TELEPHONE (OUTPATIENT)
Dept: INTERNAL MEDICINE | Age: 77
End: 2022-01-26

## 2022-01-30 PROCEDURE — 99305 1ST NF CARE MODERATE MDM 35: CPT | Performed by: INTERNAL MEDICINE

## 2022-01-31 ENCOUNTER — TELEPHONE (OUTPATIENT)
Dept: INTERNAL MEDICINE | Age: 77
End: 2022-01-31

## 2022-01-31 DIAGNOSIS — J44.9 CHRONIC OBSTRUCTIVE PULMONARY DISEASE, UNSPECIFIED COPD TYPE (HCC): Primary | ICD-10-CM

## 2022-01-31 DIAGNOSIS — Z86.16 HISTORY OF COVID-19: ICD-10-CM

## 2022-02-11 ENCOUNTER — HOSPITAL ENCOUNTER (OUTPATIENT)
Dept: GENERAL RADIOLOGY | Age: 77
Discharge: HOME | End: 2022-02-13
Payer: MEDICARE

## 2022-02-11 DIAGNOSIS — Z86.16 HISTORY OF COVID-19: ICD-10-CM

## 2022-02-11 DIAGNOSIS — J44.9 CHRONIC OBSTRUCTIVE PULMONARY DISEASE, UNSPECIFIED COPD TYPE (HCC): ICD-10-CM

## 2022-02-11 PROCEDURE — 71046 X-RAY EXAM CHEST 2 VIEWS: CPT

## 2022-02-11 NOTE — PROGRESS NOTES
30 tablet, Rfl: 6    buPROPion (WELLBUTRIN) 75 MG tablet, TAKE ONE TABLET BY MOUTH TWICE DAILY, Disp: 60 tablet, Rfl: 5    furosemide (LASIX) 20 MG tablet, TAKE ONE TABLET BY MOUTH ONCE DAILY AS NEEDED FOR  PEDAL  EDEMA, Disp: 90 tablet, Rfl: 3    metoprolol tartrate (LOPRESSOR) 50 MG tablet, TAKE ONE TABLET BY MOUTH TWICE DAILY, Disp: 180 tablet, Rfl: 3    atorvastatin (LIPITOR) 40 MG tablet, TAKE ONE TABLET BY MOUTH ONCE DAILY, Disp: 90 tablet, Rfl: 3    clopidogrel (PLAVIX) 75 MG tablet, Take 1 tablet by mouth daily, Disp: 90 tablet, Rfl: 3    Omega-3 Fatty Acids (FISH OIL PO), Take 1,200 mg by mouth daily, Disp: , Rfl:     Acetaminophen (TYLENOL PO), Take by mouth Per pt, takes 2 in am; 2 in pm, Disp: , Rfl:     aspirin 81 MG tablet, Take 81 mg by mouth daily. , Disp: , Rfl:     Handicap Placard MISC, by Does not apply route. 5 year renewal., Disp: 1 each, Rfl: 0    oxybutynin (DITROPAN-XL) 5 MG CR tablet, Take 5 mg by mouth daily , Disp: , Rfl:     Recent Labs:  No results for input(s): CHOL, HDL, TRIG in the last 72 hours. Invalid input(s): CHOLHDLR, LDL, LDLCALCU  No results for input(s): NA, K, CL, CO2, BUN, CREATININE in the last 72 hours. Invalid input(s): CA    Assessment and Plan:      -CAD/CABG SVG-OM1, SVG-OM2 and SVG-PDA on 10/28/2014.  TTE 02/2015 LVEF 30% improved to LVEF 50% on subsequent TTE. Continue antiplatelet therapy. -LBBB  -Carotid u/s 02/1132: JOHN 65-74%, LICA 9-39%. -HTN- change lisinopril to 20mg po BID for better BP control.  -Obesity - encouraged diet, exercise, and discussed weight loss extensively. -Hyperlipidemia- continue statin.  -DJD  -S/p L knee replacement 3/2018.   -RTC 6 months
2 seconds or less

## 2022-02-16 ENCOUNTER — OUTSIDE SERVICES (OUTPATIENT)
Dept: INTERNAL MEDICINE | Age: 77
End: 2022-02-16
Payer: MEDICARE

## 2022-02-16 DIAGNOSIS — J44.9 CHRONIC OBSTRUCTIVE PULMONARY DISEASE, UNSPECIFIED COPD TYPE (HCC): ICD-10-CM

## 2022-02-16 DIAGNOSIS — Z86.16 HISTORY OF COVID-19: ICD-10-CM

## 2022-02-16 DIAGNOSIS — R89.9 ABNORMAL LABORATORY TEST: ICD-10-CM

## 2022-02-16 DIAGNOSIS — I25.5 ISCHEMIC CARDIOMYOPATHY: Primary | ICD-10-CM

## 2022-02-16 PROCEDURE — 99308 SBSQ NF CARE LOW MDM 20: CPT | Performed by: NURSE PRACTITIONER

## 2022-02-16 ASSESSMENT — ENCOUNTER SYMPTOMS
EYE PAIN: 0
VOMITING: 0
SHORTNESS OF BREATH: 0
CHEST TIGHTNESS: 0
ABDOMINAL PAIN: 0
DIARRHEA: 0
BLOOD IN STOOL: 0
RHINORRHEA: 0
WHEEZING: 0
NAUSEA: 0
COLOR CHANGE: 0
FACIAL SWELLING: 0
SINUS PRESSURE: 0
SORE THROAT: 0
TROUBLE SWALLOWING: 0
CONSTIPATION: 0
COUGH: 0

## 2022-02-16 NOTE — PROGRESS NOTES
02/16/22  Cuauhtemoc Ramírez  1945  Patient is a resident at [address] [place] [address]    Chief Complaint:   1. Ischemic cardiomyopathy    2. History of COVID-19    3. Chronic obstructive pulmonary disease, unspecified COPD type ([address]    4. Abnormal laboratory test        HPI:  69-year-old patient with history of ischemic cardiomyopathy, COPD, recent hospitalization for Covid pneumonia being seen at the request of the nursing staff for abnormal BNP. Was noted to be 316.6. Patient's weight has been stable actually down 10 pounds. No significant swelling to lower extremities. Did have a chest x-ray that was reviewed here in the office with no significant signs of CHF. Patient's oxygen has actually been weaned down from 10 L down to 3-1/2 L. Oxygen saturations down into the upper 80s with activity. He has not been using his incentive spirometer as much as recommended. He will get better at doing this. He has been afebrile. He denies any orthopnea. Actually lying flat in bed on one pillow on examination. Does have follow-ups with cardiology in place. Only complaint is that he has had a chronic dry mouth. States also has a dry throat with the oxygen. Asking for a lozenger.       No Known Allergies    Past Medical History:   Diagnosis Date    Adenomatous polyp     history of     Cholecystitis 3/19/2019    Chronic venous insufficiency     DVT (deep venous thrombosis) (HCC)     history of     Edema     related to venous stasis     Gout     history of     Hypertension     Onychomycosis     Osteoarthritis     Plantar fasciitis     Tobacco abuse        Past Surgical History:   Procedure Laterality Date    COLONOSCOPY  12/12/2012    polyp    COLONOSCOPY  2003    adenomatous polyps     COLONOSCOPY  08/2009    COLONOSCOPY  06/2008    with polypectomy    CORONARY ARTERY BYPASS GRAFT  10/28/2014    SVG-OM1, SVG-OM2, and SVG-PDA    OTHER SURGICAL HISTORY      teeth extracted     TOTAL KNEE ARTHROPLASTY Right 10/2015    TOTAL KNEE ARTHROPLASTY Left 03/2018    TURP      VASECTOMY  1980       Current Outpatient Medications on File Prior to Visit   Medication Sig Dispense Refill    bisacodyl (DULCOLAX) 5 MG EC tablet Take 2 tablets by mouth daily as needed for Constipation 30 tablet 0    hydrocortisone (ANUSOL-HC) 25 MG suppository Place 1 suppository rectally 2 times daily as needed for Hemorrhoids 30 suppository 0    albuterol sulfate  (90 Base) MCG/ACT inhaler Inhale 2 puffs into the lungs every 4 hours as needed for Wheezing 18 g 3    potassium chloride (KLOR-CON M) 10 MEQ extended release tablet Take 1 tablet by mouth daily 30 tablet 0    levothyroxine (SYNTHROID) 75 MCG tablet Take 1 tablet by mouth daily 30 tablet 1    buPROPion (WELLBUTRIN) 75 MG tablet Take 1 tablet by mouth twice daily 60 tablet 3    metoprolol tartrate (LOPRESSOR) 50 MG tablet Take 1 tablet by mouth twice daily 180 tablet 3    furosemide (LASIX) 20 MG tablet TAKE 1 TABLET BY MOUTH ONCE DAILY AS NEEDED FOR  PEDAL  EDEMA 90 tablet 3    allopurinol (ZYLOPRIM) 300 MG tablet Take 1 tablet by mouth once daily 90 tablet 1    atorvastatin (LIPITOR) 40 MG tablet TAKE 1 TABLET BY MOUTH ONCE DAILY 90 tablet 3    clopidogrel (PLAVIX) 75 MG tablet Take 1 tablet by mouth once daily 90 tablet 3    finasteride (PROSCAR) 5 MG tablet TAKE 1 TABLET BY MOUTH ONCE DAILY      oxybutynin (DITROPAN XL) 15 MG extended release tablet TAKE 1 TABLET BY MOUTH ONCE DAILY      Handicap Placard MISC by Does not apply route Permanent 1 each 0    Omega-3 Fatty Acids (FISH OIL PO) Take 1,200 mg by mouth daily      Acetaminophen (TYLENOL PO) Take by mouth Per pt, takes 2 in am; 2 in pm      aspirin 81 MG tablet Take 81 mg by mouth daily. No current facility-administered medications on file prior to visit.        Social History     Socioeconomic History    Marital status: Single     Spouse name: Not on file    Number of children: Not on file  Years of education: Not on file    Highest education level: Not on file   Occupational History    Not on file   Tobacco Use    Smoking status: Former Smoker     Packs/day: 0.00     Years: 55.00     Pack years: 0.00     Types: Cigarettes     Quit date: 10/27/2014     Years since quittin.3    Smokeless tobacco: Never Used   Vaping Use    Vaping Use: Never used   Substance and Sexual Activity    Alcohol use: Yes     Alcohol/week: 0.0 standard drinks     Comment: occasionally    Drug use: No    Sexual activity: Yes     Partners: Female   Other Topics Concern    Not on file   Social History Narrative    Not on file     Social Determinants of Health     Financial Resource Strain: Low Risk     Difficulty of Paying Living Expenses: Not hard at all   Food Insecurity: No Food Insecurity    Worried About [employer] [employer] in the Last Year: Never true    [facility] [employer] in the Last Year: Never true   Transportation Needs:     Lack of Transportation (Medical): Not on file    Lack of Transportation (Non-Medical):  Not on file   Physical Activity:     Days of Exercise per Week: Not on file    Minutes of Exercise per Session: Not on file   Stress:     Feeling of Stress : Not on file   Social Connections:     Frequency of Communication with Friends and Family: Not on file    Frequency of Social Gatherings with Friends and Family: Not on file    Attends [employer] Services: Not on file    Active Member of [address] [place] [address] or Organizations: Not on file    Attends Club or Organization Meetings: Not on file    Marital Status: Not on file   Intimate Partner Violence:     Fear of Current or Ex-Partner: Not on file    Emotionally Abused: Not on file    Physically Abused: Not on file    Sexually Abused: Not on file   Housing Stability:     Unable to Pay for Housing in the Last Year: Not on file    Number of [place] in the Last Year: Not on file    Unstable Housing in the Last Year: Not on file       Review of Systems   Constitutional: Negative for activity change, appetite change, chills, fatigue, fever and unexpected weight change. HENT: Negative for congestion, dental problem, ear discharge, ear pain, facial swelling, hearing loss, postnasal drip, rhinorrhea, sinus pressure, sore throat and trouble swallowing. Dry mouth, dry throat   Eyes: Negative for pain and visual disturbance. Respiratory: Negative for cough, chest tightness, shortness of breath and wheezing. Cardiovascular: Negative for chest pain, palpitations and leg swelling. Gastrointestinal: Negative for abdominal pain, blood in stool, constipation, diarrhea, nausea and vomiting. Endocrine: Negative for cold intolerance, heat intolerance and polyuria. Genitourinary: Negative for difficulty urinating. Musculoskeletal: Negative for arthralgias, gait problem, myalgias, neck pain and neck stiffness. Skin: Negative for color change, rash and wound. Neurological: Positive for weakness (Improving). Negative for dizziness, tremors, seizures, light-headedness, numbness and headaches. Psychiatric/Behavioral: Negative for confusion and hallucinations. The patient is not nervous/anxious. Physical Exam  Vitals and nursing note reviewed. Constitutional:       General: He is not in acute distress. Appearance: He is well-developed. He is not diaphoretic. HENT:      Head: Normocephalic and atraumatic. Right Ear: External ear normal.      Left Ear: External ear normal.      Mouth/Throat:      Mouth: Mucous membranes are dry. Eyes:      General: Lids are normal.         Right eye: No discharge. Left eye: No discharge. Conjunctiva/sclera: Conjunctivae normal.      Pupils: Pupils are equal, round, and reactive to light. Neck:      Trachea: No tracheal deviation. Cardiovascular:      Rate and Rhythm: Normal rate and regular rhythm. Heart sounds: Normal heart sounds. No murmur heard. No friction rub.  No gallop. Pulmonary:      Effort: Pulmonary effort is normal. No accessory muscle usage or respiratory distress. Breath sounds: Normal breath sounds. No wheezing or rales. Abdominal:      General: Bowel sounds are normal. There is no distension. Palpations: Abdomen is soft. Abdomen is not rigid. Musculoskeletal:         General: Normal range of motion. Cervical back: Normal range of motion and neck supple. No edema or erythema. Right lower leg: Edema present. Left lower leg: Edema (Scant ankle edema) present. Skin:     General: Skin is warm and dry. Capillary Refill: Capillary refill takes less than 2 seconds. Coloration: Skin is not pale. Findings: No erythema or rash. Neurological:      Mental Status: He is alert and oriented to person, place, and time. Cranial Nerves: No cranial nerve deficit. Sensory: No sensory deficit. Coordination: Coordination normal.   Psychiatric:         Behavior: Behavior normal.         Thought Content: Thought content normal.         Judgment: Judgment normal.       There were no vitals filed for this visit. Vital signs 97.3 °F, blood pressure 114/73, pulse 77, respirations 18, SPO2 100% on 4 L. Weight has been stable, 238.6 pounds    Assessment:  1. Ischemic cardiomyopathy  Stable, no signs of fluid overload at present, continue on his metoprolol and his Lasix 20 mg daily, daily weight. 2. History of COVID-19  Continue to work with PT OT, continue to wean oxygen to keep sats greater than 90%    3. Chronic obstructive pulmonary disease, unspecified COPD type ([facility] [address]  Stable at present    4. Abnormal laboratory test  BNP improved from previous labs at hospital.  Continue on Lasix and metoprolol as directed. Obtain a CBC BMP for routine follow-up      Plan:  As noted above. Follow up for routine visit. Call sooner with concerns prior.     Electronically signed by KISHOR De Jesus CNP on 2/16/2022 at 1:43 PM

## 2022-02-23 ENCOUNTER — OFFICE VISIT (OUTPATIENT)
Dept: UROLOGY | Age: 77
End: 2022-02-23
Payer: MEDICARE

## 2022-02-23 VITALS
BODY MASS INDEX: 39.44 KG/M2 | HEART RATE: 73 BPM | OXYGEN SATURATION: 93 % | DIASTOLIC BLOOD PRESSURE: 70 MMHG | HEIGHT: 72 IN | SYSTOLIC BLOOD PRESSURE: 122 MMHG

## 2022-02-23 DIAGNOSIS — N32.81 OVERACTIVE BLADDER: ICD-10-CM

## 2022-02-23 DIAGNOSIS — N13.8 BPH WITH OBSTRUCTION/LOWER URINARY TRACT SYMPTOMS: Primary | ICD-10-CM

## 2022-02-23 DIAGNOSIS — N40.1 BPH WITH OBSTRUCTION/LOWER URINARY TRACT SYMPTOMS: Primary | ICD-10-CM

## 2022-02-23 DIAGNOSIS — R33.9 URINARY RETENTION: ICD-10-CM

## 2022-02-23 PROCEDURE — G8428 CUR MEDS NOT DOCUMENT: HCPCS | Performed by: UROLOGY

## 2022-02-23 PROCEDURE — 4040F PNEUMOC VAC/ADMIN/RCVD: CPT | Performed by: UROLOGY

## 2022-02-23 PROCEDURE — 99203 OFFICE O/P NEW LOW 30 MIN: CPT | Performed by: UROLOGY

## 2022-02-23 PROCEDURE — G8484 FLU IMMUNIZE NO ADMIN: HCPCS | Performed by: UROLOGY

## 2022-02-23 PROCEDURE — 1123F ACP DISCUSS/DSCN MKR DOCD: CPT | Performed by: UROLOGY

## 2022-02-23 PROCEDURE — G8417 CALC BMI ABV UP PARAM F/U: HCPCS | Performed by: UROLOGY

## 2022-02-23 PROCEDURE — 1036F TOBACCO NON-USER: CPT | Performed by: UROLOGY

## 2022-02-23 PROCEDURE — 99214 OFFICE O/P EST MOD 30 MIN: CPT | Performed by: UROLOGY

## 2022-02-23 RX ORDER — GUAIFENESIN/DEXTROMETHORPHAN 100-10MG/5
5 SYRUP ORAL 3 TIMES DAILY PRN
COMMUNITY
End: 2022-05-25

## 2022-02-23 RX ORDER — BENZONATATE 200 MG/1
200 CAPSULE ORAL PRN
COMMUNITY
End: 2022-05-25

## 2022-02-23 RX ORDER — TRAZODONE HYDROCHLORIDE 50 MG/1
TABLET ORAL
COMMUNITY
Start: 2022-02-07 | End: 2022-08-09 | Stop reason: SDUPTHER

## 2022-02-23 RX ORDER — SERTRALINE HYDROCHLORIDE 25 MG/1
TABLET, FILM COATED ORAL
Status: ON HOLD | COMMUNITY
Start: 2022-02-15 | End: 2022-04-16

## 2022-02-23 RX ORDER — NYSTATIN 100000 [USP'U]/G
POWDER TOPICAL
COMMUNITY
Start: 2022-02-17 | End: 2022-05-25

## 2022-02-23 NOTE — PROGRESS NOTES
Zabrina Kolb MD   Urology Clinic office Visit      Patient:  Bubba Escobar  YOB: 1945  Date: 2/23/2022    HISTORY OF PRESENT ILLNESS:   The patient is a 67 y.o. male who presents today for evaluation of the following problem(s):      1. BPH with obstruction/lower urinary tract symptoms    2. Urinary retention    3. Overactive bladder           Overall the problem(s) : are worsening. Associated Symptoms: No dysuria, gross hematuria. Pain Severity:      Summary of old records: sees Dr Javier Gracia for [facility]  (Patient's old records, notes and chart reviewed and summarized above.)      Hx of BPH, on medications  Recently in hospital with COVID  Had a catheter placed  Here for follow up  Previously had cysto, has been avoiding outlet surgery  On finasteride, Ditropan     I independently reviewed and verified the images and reports from:    XR CHEST (2 VW)    Result Date: 2/11/2022  EXAMINATION: TWO XRAY VIEWS OF THE CHEST 2/11/2022 8:20 am COMPARISON: Portable frontal views of the chest January 8, 2022 and January 6, 2022. HISTORY: ORDERING SYSTEM PROVIDED HISTORY: Chronic obstructive pulmonary disease, unspecified COPD type ([facility] [address] TECHNOLOGIST PROVIDED HISTORY: recent extended (31 day long) hospitalization for COVID-19. Insurance company wants chest x-ray to determine if he can have more nursing home stay FINDINGS: Considering hypoinflation, the heart is mildly enlarged with generalized configuration. Sternal sutures are apparent. No evidence of pneumothorax or pleural effusion. Decreased airspace consolidations, but still persistent in both lung fields. Some degenerative changes are present in the osseous structures. Decreased airspace consolidations with some residual extension weighted due to the hypoinflation. Findings are compatible with the patient's history of novel viral pneumonia.  In up to 1/3 to 1/2 of patients with severe COVID pneumonia, the lung findings may continue to evolve into more chronic fibrotic changes rather than completely resolve. Last several PSA's:  Lab Results   Component Value Date    PSA 2.80 06/22/2021    PSA 7.71 (H) 02/14/2020    PSA 3.64 12/10/2018       Last total testosterone:  No results found for: TESTOSTERONE    Urinalysis today:  No results found for this visit on 02/23/22.       Last BUN and creatinine:  Lab Results   Component Value Date    BUN 25 (H) 01/08/2022     Lab Results   Component Value Date    CREATININE 0.57 (L) 01/08/2022       Imaging Reviewed during this Office Visit:   (results were independently reviewed by physician and radiology report verified)    PAST MEDICAL, FAMILY AND SOCIAL HISTORY:  Past Medical History:   Diagnosis Date    Adenomatous polyp     history of     Cholecystitis 3/19/2019    Chronic venous insufficiency     DVT (deep venous thrombosis) ([facility] [address]     history of     Edema     related to venous stasis     Gout     history of     Hypertension     Onychomycosis     Osteoarthritis     Plantar fasciitis     Tobacco abuse      Past Surgical History:   Procedure Laterality Date    COLONOSCOPY  12/12/2012    polyp    COLONOSCOPY  2003    adenomatous polyps     COLONOSCOPY  08/2009    COLONOSCOPY  06/2008    with polypectomy    CORONARY ARTERY BYPASS GRAFT  10/28/2014    SVG-OM1, SVG-OM2, and SVG-PDA    OTHER SURGICAL HISTORY      teeth extracted     TOTAL KNEE ARTHROPLASTY Right 10/2015    TOTAL KNEE ARTHROPLASTY Left 03/2018    TURP      VASECTOMY  1980     Family History   Problem Relation Age of Onset    Other Mother 52        Rheumatic fever    Kidney Disease Brother     Heart Disease Brother     Sudden Death Father         auto accident     Heart Surgery Sister         bypass surgery    Other Brother         abdominal aortic aneursym    Hypertension Other         siblings     Outpatient Medications Marked as Taking for the 2/23/22 encounter (Office Visit) with Paula Underwood MD   Medication Sig Dispense Refill    benzonatate (TESSALON) 200 MG capsule Take 200 mg by mouth as needed for Cough      guaiFENesin-dextromethorphan (ROBITUSSIN DM) 100-10 MG/5ML syrup Take 5 mLs by mouth 3 times daily as needed for Cough         Patient has no known allergies. Social History     Tobacco Use   Smoking Status Former Smoker    Packs/day: 0.00    Years: 55.00    Pack years: 0.00    Types: Cigarettes    Quit date: 10/27/2014    Years since quittin.3   Smokeless Tobacco Never Used       Social History     Substance and Sexual Activity   Alcohol Use Yes    Alcohol/week: 0.0 standard drinks    Comment: occasionally       REVIEW OF SYSTEMS:  Constitutional: negative  Eyes: negative  Respiratory: negative  Cardiovascular: negative  Gastrointestinal: negative  Musculoskeletal: negative  Genitourinary: negative except for what is in HPI  Skin: negative   Neurological: negative  Hematological/Lymphatic: negative  Psychological: negative    Physical Exam:    This a 67 y.o. male   Vitals:    22 0945   BP: 122/70   Pulse: 73   SpO2: 93%     Constitutional: Patient in no acute distress; Neuro: alert and oriented to person place and time. Psych: Mood and affect normal.  Skin: Normal  Lungs: Respiratory effort normal  Cardiovascular:  Normal peripheral pulses  Abdomen: Soft, non-tender, non-distended   Bladder non-tender and not distended. Lymphatics: no palpable lymphadenopathy  Gait is within normal limits  Musculoskeletal: Normal range of motion       Assessment and Plan      1. BPH with obstruction/lower urinary tract symptoms    2. Urinary retention    3. Overactive bladder         Discussed removal of catheter  Discussed short term living with catheter with monthly changes  Continue meds (finasteride, Ditropan) for now  He would like to keep catheter for now    Nursing order: Change current catheter to new one.  Continue mullins catheter; exchange every 3-4 weeks; hand irrigate as needed for poor drainage    Follow up 3 months     Prescriptions Ordered:  No orders of the defined types were placed in this encounter. Orders Placed:  No orders of the defined types were placed in this encounter.            MD Jolynn Cohen M.D, MD  [facility] Urology

## 2022-02-25 ENCOUNTER — TELEPHONE (OUTPATIENT)
Dept: INTERNAL MEDICINE | Age: 77
End: 2022-02-25

## 2022-02-26 PROCEDURE — 99308 SBSQ NF CARE LOW MDM 20: CPT | Performed by: INTERNAL MEDICINE

## 2022-02-28 ENCOUNTER — OUTSIDE SERVICES (OUTPATIENT)
Dept: INTERNAL MEDICINE | Age: 77
End: 2022-02-28
Payer: MEDICARE

## 2022-02-28 DIAGNOSIS — E03.4 HYPOTHYROIDISM DUE TO ACQUIRED ATROPHY OF THYROID: ICD-10-CM

## 2022-02-28 DIAGNOSIS — Z86.16 HISTORY OF COVID-19: Primary | ICD-10-CM

## 2022-02-28 DIAGNOSIS — J43.9 PULMONARY EMPHYSEMA, UNSPECIFIED EMPHYSEMA TYPE (HCC): ICD-10-CM

## 2022-02-28 DIAGNOSIS — I25.10 CORONARY ARTERY DISEASE INVOLVING NATIVE CORONARY ARTERY OF NATIVE HEART WITHOUT ANGINA PECTORIS: ICD-10-CM

## 2022-02-28 DIAGNOSIS — I10 ESSENTIAL HYPERTENSION: ICD-10-CM

## 2022-02-28 DIAGNOSIS — I50.42 CHRONIC COMBINED SYSTOLIC AND DIASTOLIC CHF (CONGESTIVE HEART FAILURE) (HCC): ICD-10-CM

## 2022-02-28 DIAGNOSIS — E66.01 CLASS 2 SEVERE OBESITY WITH SERIOUS COMORBIDITY AND BODY MASS INDEX (BMI) OF 39.0 TO 39.9 IN ADULT, UNSPECIFIED OBESITY TYPE (HCC): ICD-10-CM

## 2022-02-28 DIAGNOSIS — E78.2 MIXED HYPERLIPIDEMIA: ICD-10-CM

## 2022-02-28 DIAGNOSIS — I25.5 ISCHEMIC CARDIOMYOPATHY: ICD-10-CM

## 2022-02-28 PROBLEM — J12.82 PNEUMONIA DUE TO COVID-19 VIRUS: Status: RESOLVED | Noted: 2021-12-08 | Resolved: 2022-02-28

## 2022-02-28 PROBLEM — J44.1 COPD WITH ACUTE EXACERBATION (HCC): Status: RESOLVED | Noted: 2021-12-09 | Resolved: 2022-02-28

## 2022-02-28 PROBLEM — E66.812 CLASS 2 SEVERE OBESITY WITH SERIOUS COMORBIDITY AND BODY MASS INDEX (BMI) OF 39.0 TO 39.9 IN ADULT: Status: ACTIVE | Noted: 2022-02-28

## 2022-02-28 PROBLEM — U07.1 PNEUMONIA DUE TO COVID-19 VIRUS: Status: RESOLVED | Noted: 2021-12-08 | Resolved: 2022-02-28

## 2022-02-28 PROBLEM — J96.01 ACUTE RESPIRATORY FAILURE WITH HYPOXIA (HCC): Status: RESOLVED | Noted: 2021-12-09 | Resolved: 2022-02-28

## 2022-02-28 NOTE — PROGRESS NOTES
DR. Vanessa Waite - [facility] PATIENT HISTORY & PHYSICAL EXAM    DATE OF SERVICE: 1/30/22    NURSING HOME: [facility]    CHRONIC/ACTIVE PROBLEM LIST:     Patient Active Problem List   Diagnosis    Hypertension    Osteoarthritis    Chronic venous insufficiency    Acquired genu valgum    Degeneration of lumbar intervertebral disc    Lower urinary tract symptoms due to benign prostatic hyperplasia    Presence of right artificial knee joint    Bilateral carotid artery stenosis    Hypothyroidism    Ischemic cardiomyopathy    Ventral hernia    Left bundle branch block (LBBB)    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  male was admitted to the [address] [place] [address] [place] [address] [place] [address] for rehabilitation and physical therapy following a recent hospitalization at [facility] for COVID-19. He was hospitalized for 31 days. Since getting out of the hospital, he seems to be doing a lot better. His breathing has improved, and he is progressing fairly well with physical therapy. He does have a history of chronic obstructive pulmonary disease, and uses breathing medications for that. He also has a history of congestive heart failure and ischemic cardiomyopathy. He does have hypertension, hyperlipidemia, and hypothyroidism. There are no other complaints. ALLERGIES: No Known Allergies    MEDICATIONS: As noted on the [facility] MAR, referenced and incorporated herein.     PAST MEDICAL HISTORY:   Past Medical History:   Diagnosis Date    Acute respiratory failure with hypoxia ([facility] [address] 2021    Adenomatous polyp     history of     Cholecystitis 3/19/2019    Chronic venous insufficiency     COPD with acute exacerbation ([facility] [address] 2021    DVT (deep venous thrombosis) (HCC)     history of     Edema     related to venous stasis     Gout     history of     Hypertension     Onychomycosis     Osteoarthritis     Plantar fasciitis     Pneumonia due to COVID-19 virus 2021    Tobacco abuse        PAST SURGICAL HISTORY:   Past Surgical History:   Procedure Laterality Date    COLONOSCOPY  2012    polyp    COLONOSCOPY      adenomatous polyps     COLONOSCOPY  2009    COLONOSCOPY  2008    with polypectomy    CORONARY ARTERY BYPASS GRAFT  10/28/2014    SVG-OM1, SVG-OM2, and SVG-PDA    OTHER SURGICAL HISTORY      teeth extracted     TOTAL KNEE ARTHROPLASTY Right 10/2015    TOTAL KNEE ARTHROPLASTY Left 2018    TURP      VASECTOMY  1980       SOCIAL HISTORY:     Tobacco:   Social History     Tobacco Use   Smoking Status Former Smoker    Packs/day: 0.00    Years: 55.00    Pack years: 0.00    Types: Cigarettes    Quit date: 10/27/2014    Years since quittin.3   Smokeless Tobacco Never Used     Alcohol:   Social History     Substance and Sexual Activity   Alcohol Use Yes    Alcohol/week: 0.0 standard drinks    Comment: occasionally     Drugs:   Social History     Substance and Sexual Activity   Drug Use No       FAMILY HISTORY: family history includes Heart Disease in his brother; Heart Surgery in his sister; Hypertension in an other family member; Kidney Disease in his brother; Other in his brother; Other (age of onset: 52) in his mother; Sudden Death in his father.     REVIEW OF SYSTEMS:     Please see history of chief complaint above; otherwise no new problems with respect to General, HEENT, Cardiovascular, Respiratory, Gastrointestinal, Genitourinary, Endocrinologic, Musculoskeletal, or Neuropsychiatric complaints. PHYSICAL EXAMINATION:    Vitals: Temp: 96.9 deg F. Pulse: 65. Resp: 16. BP: 129/82. General: This is a 67 y.o.  male who is alert and oriented to person, place and time. He appears to be his stated age and does not appear to be in any acute distress. Skin: Skin color, texture, turgor normal. No rashes or lesions. HEENT/Neck: Head: Normal, normocephalic, atraumatic. Eye: Normal external eye, conjunctiva, lids cornea, BETTY. Ears: Normal TM's bilaterally. Normal auditory canals and external ears. Non-tender. Nose: Normal external nose, mucus membranes and septum. Pharynx: Dental Hygiene adequate. Normal buccal mucosa. Normal pharynx. Neck / Thyroid: Supple, no masses, nodes, nodules or enlargement. Chest: Rises and falls symmetrically. No use of accessory muscles. No retractions. Lungs: Normal - CTA without rales, rhonchi, or wheezing. Heart: regular rate and rhythm, S1, S2 normal, no murmur, click, rub or gallop No S3 or S4. Abdomen: Obese soft, non-distended, non-tender, normal active bowel sounds, no masses palpated and no hepatosplenomegaly  Extremities: Strength is 4/5 bilaterally. Radial pulses are +2/4 bilaterally. Dorsalis pedis pulses are +2/4 bilaterally. There is no clubbing, cyanosis, or edema in any of the extremities. Neurologic: Deep tendon reflexes are 2+/4+ bilaterally. cranial nerves II-XII are grossly intact    ASSESSMENT/PLAN:    1. History of COVID-19  - He is doing better since getting out of the hospital  - He will continue to receive physical therapy    2. Essential hypertension, controlled  - He will continue to take his antihypertensive medication     3. Mixed hyperlipidemia, controlled  - He will continue to take Lipitor    4. Hypothyroidism due to acquired atrophy of thyroid, stable  - He will continue to take Synthroid    5.  Coronary artery disease involving native coronary artery of native heart without angina pectoris, stable  6. Chronic combined systolic and diastolic CHF (congestive heart failure) ([address] stable  7. Ischemic cardiomyopathy, stable  - He will continue to follow with the cardiologists here    8. Pulmonary emphysema, unspecified emphysema type ([address]  - He will continue to use his breathing medicines    9. Class 2 severe obesity with serious comorbidity and body mass index (BMI) of 39.0 to 39.9 in adult, unspecified obesity type ([facility] [address] improved  - We discussed weight loss  - He will continue to watch his diet and exercise        Patient's hospital record reviewed and medication list reconciled. Patient's electronic medical record reviewed and updated with hospital information. We reviewed all hospital progress notes, radiology reports, and laboratory reports. Continue current treatment. Nursing home record reviewed and updates summarized and entered into electronic record. See nursing home orders and MAR.         Electronically signed by Sony Melissa DO on 2/28/2022 at 1:14 AM  Internal Medicine

## 2022-03-22 ENCOUNTER — OUTSIDE SERVICES (OUTPATIENT)
Dept: INTERNAL MEDICINE | Age: 77
End: 2022-03-22
Payer: MEDICARE

## 2022-03-22 DIAGNOSIS — I50.42 CHRONIC COMBINED SYSTOLIC AND DIASTOLIC CHF (CONGESTIVE HEART FAILURE) (HCC): ICD-10-CM

## 2022-03-22 DIAGNOSIS — Z86.16 HISTORY OF COVID-19: Primary | ICD-10-CM

## 2022-03-22 DIAGNOSIS — I25.5 ISCHEMIC CARDIOMYOPATHY: ICD-10-CM

## 2022-03-22 DIAGNOSIS — I10 ESSENTIAL HYPERTENSION: ICD-10-CM

## 2022-03-22 DIAGNOSIS — J43.9 PULMONARY EMPHYSEMA, UNSPECIFIED EMPHYSEMA TYPE (HCC): ICD-10-CM

## 2022-03-22 DIAGNOSIS — E03.4 HYPOTHYROIDISM DUE TO ACQUIRED ATROPHY OF THYROID: ICD-10-CM

## 2022-03-22 DIAGNOSIS — E78.2 MIXED HYPERLIPIDEMIA: ICD-10-CM

## 2022-03-22 DIAGNOSIS — I25.10 CORONARY ARTERY DISEASE INVOLVING NATIVE CORONARY ARTERY OF NATIVE HEART WITHOUT ANGINA PECTORIS: ICD-10-CM

## 2022-03-22 DIAGNOSIS — E66.01 CLASS 2 SEVERE OBESITY WITH SERIOUS COMORBIDITY AND BODY MASS INDEX (BMI) OF 39.0 TO 39.9 IN ADULT, UNSPECIFIED OBESITY TYPE (HCC): ICD-10-CM

## 2022-03-22 NOTE — PROGRESS NOTES
DR. Liliane Hadley - [facility] VISIT    DATE OF SERVICE: 2/26/22    NURSING HOME: [facility]    CHIEF COMPLAINT/HISTORY OF CHIEF COMPLAINT: This patient is being seen for ongoing evaluation and management of his history of COVID-19, hypertension, hyperlipidemia, hypothyroidism, coronary artery disease, congestive heart failure, ischemic cardiomyopathy, chronic obstructive pulmonary disease, and obesity. He seems to be doing fairly well with physical therapy. He would like to get a trapeze that he can use to pull himself up. There are no other complaints. ALLERGIES: No Known Allergies    MEDICATIONS: As noted on the [facility] MAR, referenced and incorporated herein.     PAST MEDICAL HISTORY:   Past Medical History:   Diagnosis Date    Acute respiratory failure with hypoxia ([address] 12/9/2021    Adenomatous polyp     history of     Cholecystitis 3/19/2019    Chronic venous insufficiency     COPD with acute exacerbation ([facility] 12/9/2021    DVT (deep venous thrombosis) (HCC)     history of     Edema     related to venous stasis     Gout     history of     Hypertension     Onychomycosis     Osteoarthritis     Plantar fasciitis     Pneumonia due to COVID-19 virus 12/8/2021    Tobacco abuse        PAST SURGICAL HISTORY:   Past Surgical History:   Procedure Laterality Date    COLONOSCOPY  12/12/2012    polyp    COLONOSCOPY  2003    adenomatous polyps     COLONOSCOPY  08/2009    COLONOSCOPY  06/2008    with polypectomy    CORONARY ARTERY BYPASS GRAFT  10/28/2014    SVG-OM1, SVG-OM2, and SVG-PDA    OTHER SURGICAL HISTORY      teeth extracted     TOTAL KNEE ARTHROPLASTY Right 10/2015    TOTAL KNEE ARTHROPLASTY Left 03/2018    TURP      VASECTOMY  1980       SOCIAL HISTORY:     Tobacco:   Social History     Tobacco Use   Smoking Status Former Smoker    Packs/day: 0.00    Years: 55.00    Pack years: 0.00    Types: Cigarettes    Quit date: 10/27/2014    Years since quitting: 7.4   Smokeless Tobacco Never Used     Alcohol:   Social History     Substance and Sexual Activity   Alcohol Use Yes    Alcohol/week: 0.0 standard drinks    Comment: occasionally     Drugs:   Social History     Substance and Sexual Activity   Drug Use No       FAMILY HISTORY: family history includes Heart Disease in his brother; Heart Surgery in his sister; Hypertension in an other family member; Kidney Disease in his brother; Other in his brother; Other (age of onset: 52) in his mother; Sudden Death in his father. REVIEW OF SYSTEMS:     Please see history of chief complaint above; otherwise no new problems with respect to General, HEENT, Cardiovascular, Respiratory, Gastrointestinal, Genitourinary, Endocrinologic, Musculoskeletal, or Neuropsychiatric complaints. PHYSICAL EXAMINATION:    Vitals: Temp: 97.3 deg F. Pulse: 72. Resp: 18. BP: 113/73. General: A 67 y.o.  male. Alert and oriented to person, place and time. He  does not appear to be in any acute distress. Skin: Skin color, texture, turgor normal. No rashes or lesions. HEENT/Neck: Essentially unremarkable  Lungs: Normal - CTA without rales, rhonchi, or wheezing. Heart: regular rate and rhythm, S1, S2 normal, no murmur, click, rub or gallop No S3 or S4. Abdomen: Obese soft, non-distended, non-tender, normal active bowel sounds, no masses palpated and no hepatosplenomegaly  Extremities: No clubbing, cyanosis, or edema in any of the extremities. Neurologic: cranial nerves II-XII are grossly intact    ASSESSMENT:     Diagnosis Orders   1. History of COVID-19     2. Essential hypertension     3. Mixed hyperlipidemia     4. Hypothyroidism due to acquired atrophy of thyroid     5. Coronary artery disease involving native coronary artery of native heart without angina pectoris     6. Chronic combined systolic and diastolic CHF (congestive heart failure) ([address]     7. Ischemic cardiomyopathy     8.  Pulmonary emphysema, unspecified emphysema type ([facility]     9. Class 2 severe obesity with serious comorbidity and body mass index (BMI) of 39.0 to 39.9 in adult, unspecified obesity type ([facility]           PLAN:    1. Continue current treatment  2. Nursing home record reviewed and updates summarized and entered into electronic record  3. Physical therapy to evaluate him for a trapeze  4. See nursing home orders and MAR.         Electronically signed by Wisam Fernández DO on 3/22/2022 at 4:36 AM  Internal Medicine

## 2022-03-25 ENCOUNTER — TELEPHONE (OUTPATIENT)
Dept: INTERNAL MEDICINE | Age: 77
End: 2022-03-25

## 2022-03-27 PROCEDURE — 99307 SBSQ NF CARE SF MDM 10: CPT | Performed by: INTERNAL MEDICINE

## 2022-03-29 ENCOUNTER — TELEPHONE (OUTPATIENT)
Dept: INTERNAL MEDICINE | Age: 77
End: 2022-03-29

## 2022-03-30 ENCOUNTER — OUTSIDE SERVICES (OUTPATIENT)
Dept: INTERNAL MEDICINE | Age: 77
End: 2022-03-30
Payer: MEDICARE

## 2022-03-30 DIAGNOSIS — I25.5 ISCHEMIC CARDIOMYOPATHY: ICD-10-CM

## 2022-03-30 DIAGNOSIS — I50.42 CHRONIC COMBINED SYSTOLIC AND DIASTOLIC CHF (CONGESTIVE HEART FAILURE) (HCC): ICD-10-CM

## 2022-03-30 DIAGNOSIS — R63.5 WEIGHT GAIN: Primary | ICD-10-CM

## 2022-03-30 DIAGNOSIS — E66.01 CLASS 2 SEVERE OBESITY WITH SERIOUS COMORBIDITY AND BODY MASS INDEX (BMI) OF 39.0 TO 39.9 IN ADULT, UNSPECIFIED OBESITY TYPE (HCC): ICD-10-CM

## 2022-03-30 DIAGNOSIS — J43.9 PULMONARY EMPHYSEMA, UNSPECIFIED EMPHYSEMA TYPE (HCC): ICD-10-CM

## 2022-03-30 PROCEDURE — 99308 SBSQ NF CARE LOW MDM 20: CPT | Performed by: NURSE PRACTITIONER

## 2022-03-30 NOTE — PROGRESS NOTES
03/30/22  Rafia Macedo  1945    Patient Resident of [facility]    Chief Complaint  1. Weight gain    2. Chronic combined systolic and diastolic CHF (congestive heart failure) ([address]    3. Ischemic cardiomyopathy    4. Class 2 severe obesity with serious comorbidity and body mass index (BMI) of 39.0 to 39.9 in adult, unspecified obesity type ([address]    5. Pulmonary emphysema, unspecified emphysema type ([address]        HPI:  58-year-old male being seen at the request of the nursing staff as his weight is up 3 pounds. Stated his lungs have been clear diminished in the bases he denies any dyspnea. No edema noted. Patient states he is feeling well denies any cough. No orthopnea. Vital signs have remained stable. No recent change in medication.       No Known Allergies    Past Medical History:   Diagnosis Date    Acute respiratory failure with hypoxia ([address] 12/9/2021    Adenomatous polyp     history of     Cholecystitis 3/19/2019    Chronic venous insufficiency     COPD with acute exacerbation ([address] 12/9/2021    DVT (deep venous thrombosis) (HCC)     history of     Edema     related to venous stasis     Gout     history of     Hypertension     Onychomycosis     Osteoarthritis     Plantar fasciitis     Pneumonia due to COVID-19 virus 12/8/2021    Tobacco abuse        Past Surgical History:   Procedure Laterality Date    COLONOSCOPY  12/12/2012    polyp    COLONOSCOPY  2003    adenomatous polyps     COLONOSCOPY  08/2009    COLONOSCOPY  06/2008    with polypectomy    CORONARY ARTERY BYPASS GRAFT  10/28/2014    SVG-OM1, SVG-OM2, and SVG-PDA    OTHER SURGICAL HISTORY      teeth extracted     TOTAL KNEE ARTHROPLASTY Right 10/2015    TOTAL KNEE ARTHROPLASTY Left 03/2018    TURP      VASECTOMY  1980       Medications as per [facility] Chart /reviewed     Social History     Socioeconomic History    Marital status: Single     Spouse name: Not on file    Number of children: Not on file  Years of education: Not on file    Highest education level: Not on file   Occupational History    Not on file   Tobacco Use    Smoking status: Former Smoker     Packs/day: 0.00     Years: 55.00     Pack years: 0.00     Types: Cigarettes     Quit date: 10/27/2014     Years since quittin.4    Smokeless tobacco: Never Used   Vaping Use    Vaping Use: Never used   Substance and Sexual Activity    Alcohol use: Yes     Alcohol/week: 0.0 standard drinks     Comment: occasionally    Drug use: No    Sexual activity: Yes     Partners: Female   Other Topics Concern    Not on file   Social History Narrative    Not on file     Social Determinants of Health     Financial Resource Strain: Low Risk     Difficulty of Paying Living Expenses: Not hard at all   Food Insecurity: No Food Insecurity    Worried About [address] in the Last Year: Never true    [address] in the Last Year: Never true   Transportation Needs:     Lack of Transportation (Medical): Not on file    Lack of Transportation (Non-Medical):  Not on file   Physical Activity:     Days of Exercise per Week: Not on file    Minutes of Exercise per Session: Not on file   Stress:     Feeling of Stress : Not on file   Social Connections:     Frequency of Communication with Friends and Family: Not on file    Frequency of Social Gatherings with Friends and Family: Not on file    Attends Oriental orthodox Services: Not on file    Active Member of [address] [place] [address] or Organizations: Not on file    Attends Club or Organization Meetings: Not on file    Marital Status: Not on file   Intimate Partner Violence:     Fear of Current or Ex-Partner: Not on file    Emotionally Abused: Not on file    Physically Abused: Not on file    Sexually Abused: Not on file   Housing Stability:     Unable to Pay for Housing in the Last Year: Not on file    Number of [place] in the Last Year: Not on file    Unstable Housing in the Last Year: Not on file       Review of Systems   Constitutional: Negative for activity change, appetite change, chills, fatigue, fever and unexpected weight change. HENT: Negative for congestion, dental problem, ear discharge, ear pain, facial swelling, hearing loss, postnasal drip, rhinorrhea, sinus pressure, sore throat and trouble swallowing. Eyes: Negative for pain and visual disturbance. Respiratory: Negative for cough, chest tightness, shortness of breath and wheezing. Cardiovascular: Negative for chest pain, palpitations and leg swelling. Gastrointestinal: Negative for abdominal pain, blood in stool, constipation, diarrhea, nausea and vomiting. Endocrine: Negative for cold intolerance, heat intolerance and polyuria. Genitourinary: Negative for difficulty urinating. Musculoskeletal: Positive for arthralgias. Negative for gait problem, myalgias, neck pain and neck stiffness. Skin: Negative for color change, rash and wound. Neurological: Positive for weakness. Negative for dizziness, tremors, seizures, light-headedness, numbness and headaches. Psychiatric/Behavioral: Negative for confusion and hallucinations. The patient is not nervous/anxious. Physical Exam  Vitals and nursing note reviewed. Constitutional:       General: He is not in acute distress. Appearance: He is well-developed. He is not diaphoretic. HENT:      Head: Normocephalic and atraumatic. Right Ear: External ear normal.      Left Ear: External ear normal.   Eyes:      General: Lids are normal.         Right eye: No discharge. Left eye: No discharge. Conjunctiva/sclera: Conjunctivae normal.      Pupils: Pupils are equal, round, and reactive to light. Neck:      Trachea: No tracheal deviation. Cardiovascular:      Rate and Rhythm: Normal rate and regular rhythm. Heart sounds: Murmur heard. No friction rub. No gallop.     Pulmonary:      Effort: Pulmonary effort is normal. No accessory muscle usage or respiratory distress. Breath sounds: Normal breath sounds. No stridor. No wheezing, rhonchi or rales. Chest:      Chest wall: No tenderness. Abdominal:      General: Bowel sounds are normal. There is no distension. Palpations: Abdomen is soft. Abdomen is not rigid. Musculoskeletal:         General: Normal range of motion. Cervical back: Normal range of motion and neck supple. No edema or erythema. Right lower leg: No edema. Left lower leg: No edema. Skin:     General: Skin is warm and dry. Capillary Refill: Capillary refill takes less than 2 seconds. Coloration: Skin is not pale. Findings: No erythema or rash. Neurological:      Mental Status: He is alert and oriented to person, place, and time. Cranial Nerves: No cranial nerve deficit. Sensory: No sensory deficit. Coordination: Coordination normal.   Psychiatric:         Behavior: Behavior normal.         Thought Content: Thought content normal.         Judgment: Judgment normal.         Vital Signs: Temperature 98.3 °F, blood pressure 101/63, pulse 71, respirations 16, SPO2 98% on supplemental oxygen    Assessment:  1. Weight gain  Patient's weight was 237. Looks like averages between 234 and 238. Suggest continuing to monitor as he is asymptomatic. If persistent elevations to notify    2. Chronic combined systolic and diastolic CHF (congestive heart failure) (HCC)  Stable no signs of fluid overload    3. Ischemic cardiomyopathy  As noted above    4. Class 2 severe obesity with serious comorbidity and body mass index (BMI) of 39.0 to 39.9 in adult, unspecified obesity type ([address]  Review of weights over the last 2 weeks showed stability between 5 pounds    5. Pulmonary emphysema, unspecified emphysema type ([address]  Stable at present continues on supplemental oxygen        Plan:  As noted above. Follow up for routine visit. Call sooner with concerns prior.     Electronically signed by KISHOR Alford CNP on 3/30/2022 at 4:53 PM

## 2022-04-03 ASSESSMENT — ENCOUNTER SYMPTOMS
RHINORRHEA: 0
SORE THROAT: 0
EYE PAIN: 0
FACIAL SWELLING: 0
BLOOD IN STOOL: 0
DIARRHEA: 0
ABDOMINAL PAIN: 0
SINUS PRESSURE: 0
TROUBLE SWALLOWING: 0
SHORTNESS OF BREATH: 0
VOMITING: 0
NAUSEA: 0
COUGH: 0
WHEEZING: 0
CHEST TIGHTNESS: 0
CONSTIPATION: 0
COLOR CHANGE: 0

## 2022-04-04 ENCOUNTER — OUTSIDE SERVICES (OUTPATIENT)
Dept: INTERNAL MEDICINE | Age: 77
End: 2022-04-04
Payer: MEDICARE

## 2022-04-04 DIAGNOSIS — I95.1 ORTHOSTATIC HYPOTENSION: Primary | ICD-10-CM

## 2022-04-04 DIAGNOSIS — I25.5 ISCHEMIC CARDIOMYOPATHY: ICD-10-CM

## 2022-04-04 DIAGNOSIS — R42 DIZZINESS: ICD-10-CM

## 2022-04-04 DIAGNOSIS — I50.42 CHRONIC COMBINED SYSTOLIC AND DIASTOLIC CHF (CONGESTIVE HEART FAILURE) (HCC): ICD-10-CM

## 2022-04-04 DIAGNOSIS — I25.10 CORONARY ARTERY DISEASE INVOLVING NATIVE CORONARY ARTERY OF NATIVE HEART WITHOUT ANGINA PECTORIS: ICD-10-CM

## 2022-04-04 PROCEDURE — 99308 SBSQ NF CARE LOW MDM 20: CPT | Performed by: NURSE PRACTITIONER

## 2022-04-04 ASSESSMENT — ENCOUNTER SYMPTOMS
CONSTIPATION: 0
CHEST TIGHTNESS: 0
BLOOD IN STOOL: 0
FACIAL SWELLING: 0
SHORTNESS OF BREATH: 0
RHINORRHEA: 0
NAUSEA: 0
TROUBLE SWALLOWING: 0
SORE THROAT: 0
EYE PAIN: 0
VOMITING: 0
COLOR CHANGE: 0
COUGH: 0
ABDOMINAL PAIN: 0
WHEEZING: 0
DIARRHEA: 0
SINUS PRESSURE: 0

## 2022-04-04 NOTE — PROGRESS NOTES
04/04/22  Anamaria Veronica  1945    Patient Resident of [facility]    Chief Complaint  1. Orthostatic hypotension    2. Dizziness    3. Chronic combined systolic and diastolic CHF (congestive heart failure) ([address]    4. Ischemic cardiomyopathy    5. Coronary artery disease involving native coronary artery of native heart without angina pectoris        HPI:  65-year-old patient with above chronic health conditions being seen at the request of the nursing staff/physical therapy as he has been having some dizzy episodes when he gets up to stand. Heart rate has remained stable blood pressure 93/64, 108/66. States he knows he does not drink much fluid. Never was a real big drinker. He denies any lightheadedness or dizziness at present. Is sitting up in his wheelchair working with physical therapy. Physical therapist today states systolic blood pressure 557 over 60s sitting when he stood him it went down to 101 over 60s. He does have as needed Lasix available for swelling which he has not been utilizing.     No Known Allergies    Past Medical History:   Diagnosis Date    Acute respiratory failure with hypoxia ([address] 12/9/2021    Adenomatous polyp     history of     Cholecystitis 3/19/2019    Chronic venous insufficiency     COPD with acute exacerbation ([address] 12/9/2021    DVT (deep venous thrombosis) (HCC)     history of     Edema     related to venous stasis     Gout     history of     Hypertension     Onychomycosis     Osteoarthritis     Plantar fasciitis     Pneumonia due to COVID-19 virus 12/8/2021    Tobacco abuse        Past Surgical History:   Procedure Laterality Date    COLONOSCOPY  12/12/2012    polyp    COLONOSCOPY  2003    adenomatous polyps     COLONOSCOPY  08/2009    COLONOSCOPY  06/2008    with polypectomy    CORONARY ARTERY BYPASS GRAFT  10/28/2014    SVG-OM1, SVG-OM2, and SVG-PDA    OTHER SURGICAL HISTORY      teeth extracted     TOTAL KNEE ARTHROPLASTY Right 10/2015  TOTAL KNEE ARTHROPLASTY Left 2018    TURP      VASECTOMY  1980       Medications as per [facility] Chart /reviewed     Social History     Socioeconomic History    Marital status: Single     Spouse name: Not on file    Number of children: Not on file    Years of education: Not on file    Highest education level: Not on file   Occupational History    Not on file   Tobacco Use    Smoking status: Former Smoker     Packs/day: 0.00     Years: 55.00     Pack years: 0.00     Types: Cigarettes     Quit date: 10/27/2014     Years since quittin.4    Smokeless tobacco: Never Used   Vaping Use    Vaping Use: Never used   Substance and Sexual Activity    Alcohol use: Yes     Alcohol/week: 0.0 standard drinks     Comment: occasionally    Drug use: No    Sexual activity: Yes     Partners: Female   Other Topics Concern    Not on file   Social History Narrative    Not on file     Social Determinants of Health     Financial Resource Strain: Low Risk     Difficulty of Paying Living Expenses: Not hard at all   Food Insecurity: No Food Insecurity    Worried About [address] in the Last Year: Never true    [address] in the Last Year: Never true   Transportation Needs:     Lack of Transportation (Medical): Not on file    Lack of Transportation (Non-Medical):  Not on file   Physical Activity:     Days of Exercise per Week: Not on file    Minutes of Exercise per Session: Not on file   Stress:     Feeling of Stress : Not on file   Social Connections:     Frequency of Communication with Friends and Family: Not on file    Frequency of Social Gatherings with Friends and Family: Not on file    Attends Buddhist Services: Not on file    Active Member of Clubs or Organizations: Not on file    Attends Club or Organization Meetings: Not on file    Marital Status: Not on file   Intimate Partner Violence:     Fear of Current or Ex-Partner: Not on file    Emotionally Abused: Not on file   Johanna Physically Abused: Not on file    Sexually Abused: Not on file   Housing Stability:     Unable to Pay for Housing in the Last Year: Not on file    Number of Places Lived in the Last Year: Not on file    Unstable Housing in the Last Year: Not on file       Review of Systems   Constitutional: Positive for fatigue. Negative for activity change, appetite change, chills, fever and unexpected weight change. HENT: Negative for congestion, dental problem, ear discharge, ear pain, facial swelling, hearing loss, postnasal drip, rhinorrhea, sinus pressure, sore throat and trouble swallowing. Eyes: Negative for pain and visual disturbance. Respiratory: Negative for cough, chest tightness, shortness of breath and wheezing. Cardiovascular: Negative for chest pain, palpitations and leg swelling. Gastrointestinal: Negative for abdominal pain, blood in stool, constipation, diarrhea, nausea and vomiting. Endocrine: Negative for cold intolerance, heat intolerance and polyuria. Genitourinary: Negative for difficulty urinating. Musculoskeletal: Negative for arthralgias, gait problem, myalgias, neck pain and neck stiffness. Skin: Negative for color change, rash and wound. Neurological: Positive for dizziness and weakness. Negative for tremors, seizures, light-headedness, numbness and headaches. Psychiatric/Behavioral: Negative for confusion and hallucinations. The patient is not nervous/anxious. Physical Exam  Vitals and nursing note reviewed. Constitutional:       General: He is not in acute distress. Appearance: He is well-developed. He is not diaphoretic. HENT:      Head: Normocephalic and atraumatic. Right Ear: External ear normal.      Left Ear: External ear normal.   Eyes:      General: Lids are normal.         Right eye: No discharge. Left eye: No discharge. Conjunctiva/sclera: Conjunctivae normal.      Pupils: Pupils are equal, round, and reactive to light.    Neck: Trachea: No tracheal deviation. Cardiovascular:      Rate and Rhythm: Normal rate and regular rhythm. Heart sounds: Normal heart sounds. No murmur heard. No friction rub. No gallop. Pulmonary:      Effort: Pulmonary effort is normal. No accessory muscle usage or respiratory distress. Breath sounds: Normal breath sounds. No wheezing or rales. Abdominal:      General: Bowel sounds are normal. There is no distension. Palpations: Abdomen is soft. Abdomen is not rigid. Musculoskeletal:         General: Normal range of motion. Cervical back: Normal range of motion and neck supple. No edema or erythema. Right lower leg: No edema. Left lower leg: No edema. Skin:     General: Skin is warm and dry. Capillary Refill: Capillary refill takes less than 2 seconds. Coloration: Skin is not pale. Findings: No erythema or rash. Neurological:      Mental Status: He is alert and oriented to person, place, and time. Cranial Nerves: No cranial nerve deficit. Sensory: No sensory deficit. Coordination: Coordination normal.   Psychiatric:         Behavior: Behavior normal.         Thought Content: Thought content normal.         Judgment: Judgment normal.         Vital Signs: Temperature . 97.6 °F, blood pressure 103/57, pulse 68, respirations 16, SPO2 95% on 2 L    Assessment:  1. Orthostatic hypotension  Decrease his metoprolol to 25 mg p.o. twice daily, hold for systolic blood pressure less than 100 or heart rate less than 50, short-term increase of fluids. CBC BMP and UA ordered    2. Dizziness  As noted above    3. Chronic combined systolic and diastolic CHF (congestive heart failure) (HCC)  Stable no signs of fluid overload if anything may be a little dry. Await labs    4. Ischemic cardiomyopathy  Stable as noted above    5.  Coronary artery disease involving native coronary artery of native heart without angina pectoris  Stable at present denies any chest discomfort        Plan:  As noted above. Follow up for routine visit. Call sooner with concerns prior.     Electronically signed by KISHOR Mai CNP on 4/4/2022 at 1:08 PM

## 2022-04-06 ENCOUNTER — TELEPHONE (OUTPATIENT)
Dept: UROLOGY | Age: 77
End: 2022-04-06

## 2022-04-06 NOTE — TELEPHONE ENCOUNTER
Catarina from the [id number] called stating they have orders to change patient's mullins monthly. Caller wanted to check as the nursing home protocol was to change as needed.   Please advise  [phone]

## 2022-04-15 ENCOUNTER — TELEPHONE (OUTPATIENT)
Dept: INTERNAL MEDICINE | Age: 77
End: 2022-04-15

## 2022-04-15 ENCOUNTER — HOSPITAL ENCOUNTER (OUTPATIENT)
Age: 77
Setting detail: OBSERVATION
Discharge: OTHER INSTITUTION | End: 2022-04-17
Attending: EMERGENCY MEDICINE | Admitting: INTERNAL MEDICINE
Payer: MEDICARE

## 2022-04-15 ENCOUNTER — APPOINTMENT (OUTPATIENT)
Dept: GENERAL RADIOLOGY | Age: 77
End: 2022-04-15
Payer: MEDICARE

## 2022-04-15 DIAGNOSIS — R77.8 ELEVATED TROPONIN: ICD-10-CM

## 2022-04-15 DIAGNOSIS — R09.02 HYPOXIA: Primary | ICD-10-CM

## 2022-04-15 DIAGNOSIS — R55 NEAR SYNCOPE: ICD-10-CM

## 2022-04-15 DIAGNOSIS — I95.1 ORTHOSTATIC HYPOTENSION: ICD-10-CM

## 2022-04-15 PROBLEM — R79.89 ELEVATED TROPONIN: Status: ACTIVE | Noted: 2022-04-15

## 2022-04-15 LAB
-: ABNORMAL
ABSOLUTE EOS #: 0.09 K/UL (ref 0–0.44)
ABSOLUTE IMMATURE GRANULOCYTE: 0.04 K/UL (ref 0–0.3)
ABSOLUTE LYMPH #: 1.83 K/UL (ref 1.1–3.7)
ABSOLUTE MONO #: 0.71 K/UL (ref 0.1–1.2)
ALBUMIN SERPL-MCNC: 3.3 G/DL (ref 3.5–5.2)
ALBUMIN/GLOBULIN RATIO: 1.1 (ref 1–2.5)
ALP BLD-CCNC: 117 U/L (ref 40–129)
ALT SERPL-CCNC: 14 U/L (ref 5–41)
ANION GAP SERPL CALCULATED.3IONS-SCNC: 10 MMOL/L (ref 9–17)
AST SERPL-CCNC: 21 U/L
BACTERIA: ABNORMAL
BASOPHILS # BLD: 0 % (ref 0–2)
BASOPHILS ABSOLUTE: 0.03 K/UL (ref 0–0.2)
BILIRUB SERPL-MCNC: 0.34 MG/DL (ref 0.3–1.2)
BILIRUBIN URINE: NEGATIVE
BUN BLDV-MCNC: 15 MG/DL (ref 8–23)
BUN/CREAT BLD: 19 (ref 9–20)
CALCIUM SERPL-MCNC: 9.2 MG/DL (ref 8.6–10.4)
CHLORIDE BLD-SCNC: 98 MMOL/L (ref 98–107)
CO2: 29 MMOL/L (ref 20–31)
CREAT SERPL-MCNC: 0.81 MG/DL (ref 0.7–1.2)
D-DIMER QUANTITATIVE: 0.52 MG/L FEU (ref 0–0.59)
EOSINOPHILS RELATIVE PERCENT: 1 % (ref 1–4)
EPITHELIAL CELLS UA: ABNORMAL /HPF (ref 0–5)
GFR AFRICAN AMERICAN: >60 ML/MIN
GFR NON-AFRICAN AMERICAN: >60 ML/MIN
GFR SERPL CREATININE-BSD FRML MDRD: ABNORMAL ML/MIN/{1.73_M2}
GLUCOSE BLD-MCNC: 103 MG/DL (ref 70–99)
GLUCOSE URINE: NEGATIVE
HCT VFR BLD CALC: 40.9 % (ref 40.7–50.3)
HEMOGLOBIN: 12.6 G/DL (ref 13–17)
IMMATURE GRANULOCYTES: 1 %
KETONES, URINE: NEGATIVE
LEUKOCYTE ESTERASE, URINE: ABNORMAL
LYMPHOCYTES # BLD: 21 % (ref 24–43)
MCH RBC QN AUTO: 30 PG (ref 25.2–33.5)
MCHC RBC AUTO-ENTMCNC: 30.8 G/DL (ref 25.2–33.5)
MCV RBC AUTO: 97.4 FL (ref 82.6–102.9)
MONOCYTES # BLD: 8 % (ref 3–12)
NITRITE, URINE: NEGATIVE
NRBC AUTOMATED: 0 PER 100 WBC
OTHER OBSERVATIONS UA: ABNORMAL
PDW BLD-RTO: 15.7 % (ref 11.8–14.4)
PH UA: 6 (ref 5–6)
PLATELET # BLD: 214 K/UL (ref 138–453)
PMV BLD AUTO: 9.3 FL (ref 8.1–13.5)
POTASSIUM SERPL-SCNC: 4.5 MMOL/L (ref 3.7–5.3)
PROTEIN UA: ABNORMAL
RBC # BLD: 4.2 M/UL (ref 4.21–5.77)
RBC # BLD: ABNORMAL 10*6/UL
RBC UA: >50 /HPF (ref 0–4)
SARS-COV-2, RAPID: NOT DETECTED
SEG NEUTROPHILS: 69 % (ref 36–65)
SEGMENTED NEUTROPHILS ABSOLUTE COUNT: 6.18 K/UL (ref 1.5–8.1)
SODIUM BLD-SCNC: 137 MMOL/L (ref 135–144)
SPECIFIC GRAVITY UA: 1.02 (ref 1.01–1.02)
SPECIMEN DESCRIPTION: NORMAL
TOTAL PROTEIN: 6.2 G/DL (ref 6.4–8.3)
TROPONIN, HIGH SENSITIVITY: 125 NG/L (ref 0–22)
TROPONIN, HIGH SENSITIVITY: 139 NG/L (ref 0–22)
URINE HGB: ABNORMAL
UROBILINOGEN, URINE: NORMAL
WBC # BLD: 8.9 K/UL (ref 3.5–11.3)
WBC UA: >50 /HPF (ref 0–4)

## 2022-04-15 PROCEDURE — G0378 HOSPITAL OBSERVATION PER HR: HCPCS

## 2022-04-15 PROCEDURE — 99285 EMERGENCY DEPT VISIT HI MDM: CPT

## 2022-04-15 PROCEDURE — 84484 ASSAY OF TROPONIN QUANT: CPT

## 2022-04-15 PROCEDURE — 71045 X-RAY EXAM CHEST 1 VIEW: CPT

## 2022-04-15 PROCEDURE — 87086 URINE CULTURE/COLONY COUNT: CPT

## 2022-04-15 PROCEDURE — 85025 COMPLETE CBC W/AUTO DIFF WBC: CPT

## 2022-04-15 PROCEDURE — 86403 PARTICLE AGGLUT ANTBDY SCRN: CPT

## 2022-04-15 PROCEDURE — 93005 ELECTROCARDIOGRAM TRACING: CPT | Performed by: EMERGENCY MEDICINE

## 2022-04-15 PROCEDURE — 81001 URINALYSIS AUTO W/SCOPE: CPT

## 2022-04-15 PROCEDURE — 99219 PR INITIAL OBSERVATION CARE/DAY 50 MINUTES: CPT | Performed by: NURSE PRACTITIONER

## 2022-04-15 PROCEDURE — 84443 ASSAY THYROID STIM HORMONE: CPT

## 2022-04-15 PROCEDURE — 80053 COMPREHEN METABOLIC PANEL: CPT

## 2022-04-15 PROCEDURE — 85379 FIBRIN DEGRADATION QUANT: CPT

## 2022-04-15 PROCEDURE — 36415 COLL VENOUS BLD VENIPUNCTURE: CPT

## 2022-04-15 PROCEDURE — 87635 SARS-COV-2 COVID-19 AMP PRB: CPT

## 2022-04-15 RX ORDER — ALBUTEROL SULFATE 2.5 MG/3ML
2.5 SOLUTION RESPIRATORY (INHALATION)
Status: DISCONTINUED | OUTPATIENT
Start: 2022-04-15 | End: 2022-04-17 | Stop reason: HOSPADM

## 2022-04-15 RX ORDER — ONDANSETRON 4 MG/1
4 TABLET, ORALLY DISINTEGRATING ORAL EVERY 8 HOURS PRN
Status: DISCONTINUED | OUTPATIENT
Start: 2022-04-15 | End: 2022-04-17 | Stop reason: HOSPADM

## 2022-04-15 RX ORDER — ACETAMINOPHEN 325 MG/1
650 TABLET ORAL EVERY 6 HOURS PRN
Status: DISCONTINUED | OUTPATIENT
Start: 2022-04-15 | End: 2022-04-17 | Stop reason: HOSPADM

## 2022-04-15 RX ORDER — ACETAMINOPHEN 650 MG/1
650 SUPPOSITORY RECTAL EVERY 6 HOURS PRN
Status: DISCONTINUED | OUTPATIENT
Start: 2022-04-15 | End: 2022-04-17 | Stop reason: HOSPADM

## 2022-04-15 RX ORDER — ONDANSETRON 4 MG/1
TABLET, FILM COATED ORAL
COMMUNITY
Start: 2022-03-25 | End: 2022-05-25

## 2022-04-15 RX ORDER — ONDANSETRON 2 MG/ML
4 INJECTION INTRAMUSCULAR; INTRAVENOUS EVERY 6 HOURS PRN
Status: DISCONTINUED | OUTPATIENT
Start: 2022-04-15 | End: 2022-04-17 | Stop reason: HOSPADM

## 2022-04-15 RX ORDER — BUPROPION HYDROCHLORIDE 75 MG/1
TABLET ORAL
Status: ON HOLD | COMMUNITY
End: 2022-04-16

## 2022-04-15 RX ORDER — SODIUM CHLORIDE FOR INHALATION 0.9 %
3 VIAL, NEBULIZER (ML) INHALATION
Status: DISCONTINUED | OUTPATIENT
Start: 2022-04-15 | End: 2022-04-17 | Stop reason: HOSPADM

## 2022-04-15 RX ORDER — POLYETHYLENE GLYCOL 3350 17 G/17G
17 POWDER, FOR SOLUTION ORAL DAILY PRN
Status: DISCONTINUED | OUTPATIENT
Start: 2022-04-15 | End: 2022-04-17 | Stop reason: HOSPADM

## 2022-04-15 RX ORDER — SODIUM CHLORIDE 0.9 % (FLUSH) 0.9 %
5-40 SYRINGE (ML) INJECTION PRN
Status: DISCONTINUED | OUTPATIENT
Start: 2022-04-15 | End: 2022-04-17 | Stop reason: HOSPADM

## 2022-04-15 RX ORDER — SODIUM CHLORIDE 0.9 % (FLUSH) 0.9 %
5-40 SYRINGE (ML) INJECTION EVERY 12 HOURS SCHEDULED
Status: DISCONTINUED | OUTPATIENT
Start: 2022-04-15 | End: 2022-04-17 | Stop reason: HOSPADM

## 2022-04-15 RX ORDER — SODIUM CHLORIDE 9 MG/ML
25 INJECTION, SOLUTION INTRAVENOUS PRN
Status: DISCONTINUED | OUTPATIENT
Start: 2022-04-15 | End: 2022-04-17 | Stop reason: HOSPADM

## 2022-04-15 NOTE — ED PROVIDER NOTES
[address] 68      Pt Name: Malcolm Armando  MRN: [id number]  [place] 1945  Date of evaluation: 4/15/2022      CHIEF COMPLAINT       Chief Complaint   Patient presents with    Loss of Consciousness     therapy today had hypotensive episode, and helped him to a seated position, first day off o2 today, post long haul covid. HISTORY OF PRESENT ILLNESS      The patient presents with hypotension. The patient was going to physical therapy at the nursing home where he resides and had an episode where he dropped his blood pressure. The patient is a long-haul Covid patient. They just decided that he did not need oxygen anymore. However, when he arrives here, on room air he satting only 88%. This comes up nicely with 2 L/min of nasal cannula oxygen. The patient said he was not having any memory problems. He denies focal weakness or numbness. He denies headache. He denies chest pain or shortness of breath. REVIEW OF SYSTEMS       All systems reviewed and negative unless noted in HPI. The patient denies fever or constitutional symptoms. Denies vision change. Denies any sore throat or rhinorrhea. Denies any neck pain or stiffness. Denies chest pain or shortness of breath. History of hypoxia and long-haul Covid. No nausea,  vomiting or diarrhea. Denies any dysuria. Denies urinary frequency or hematuria. Denies musculoskeletal injury or pain. Denies any weakness, numbness or focal neurologic deficit. Denies any skin rash or edema. No recent psychiatric issues. Takes Plavix. Denies any polyuria, polydypsia or history of immunocompromise.        PAST MEDICAL HISTORY    has a past medical history of Acute respiratory failure with hypoxia ([address] Adenomatous polyp, Cholecystitis, Chronic venous insufficiency, COPD with acute exacerbation ([address] DVT (deep venous thrombosis) ([address] Edema, Gout, Hypertension, Onychomycosis, Osteoarthritis, Plantar fasciitis, Pneumonia due to COVID-19 virus, and Tobacco abuse. SURGICAL HISTORY      has a past surgical history that includes Colonoscopy (12/12/2012); Vasectomy (1980); Colonoscopy (2003); other surgical history; Colonoscopy (08/2009); Colonoscopy (06/2008); Coronary artery bypass graft (10/28/2014); Total knee arthroplasty (Right, 10/2015); Total knee arthroplasty (Left, 03/2018); and TURP. CURRENT MEDICATIONS       Previous Medications    ACETAMINOPHEN (TYLENOL PO)    Take by mouth Per pt, takes 2 in am; 2 in pm    ALBUTEROL SULFATE  (90 BASE) MCG/ACT INHALER    Inhale 2 puffs into the lungs every 4 hours as needed for Wheezing    ALLOPURINOL (ZYLOPRIM) 300 MG TABLET    Take 1 tablet by mouth once daily    ASPIRIN 81 MG TABLET    Take 81 mg by mouth daily.     ATORVASTATIN (LIPITOR) 40 MG TABLET    TAKE 1 TABLET BY MOUTH ONCE DAILY    BENZONATATE (TESSALON) 200 MG CAPSULE    Take 200 mg by mouth as needed for Cough    BISACODYL (DULCOLAX) 5 MG EC TABLET    Take 2 tablets by mouth daily as needed for Constipation    BUPROPION (WELLBUTRIN) 75 MG TABLET    Take 1 tablet by mouth twice daily    CLOPIDOGREL (PLAVIX) 75 MG TABLET    Take 1 tablet by mouth once daily    FINASTERIDE (PROSCAR) 5 MG TABLET    TAKE 1 TABLET BY MOUTH ONCE DAILY    FUROSEMIDE (LASIX) 20 MG TABLET    TAKE 1 TABLET BY MOUTH ONCE DAILY AS NEEDED FOR  PEDAL  EDEMA    GUAIFENESIN-DEXTROMETHORPHAN (ROBITUSSIN DM) 100-10 MG/5ML SYRUP    Take 5 mLs by mouth 3 times daily as needed for Cough    HANDICAP PLACARD MISC    by Does not apply route Permanent    HYDROCORTISONE (ANUSOL-HC) 25 MG SUPPOSITORY    Place 1 suppository rectally 2 times daily as needed for Hemorrhoids    LEVOTHYROXINE (SYNTHROID) 75 MCG TABLET    Take 1 tablet by mouth daily    METOPROLOL TARTRATE (LOPRESSOR) 50 MG TABLET    Take 1 tablet by mouth twice daily    NYSTATIN 784706 UNIT/GM POWDER        OMEGA-3 FATTY ACIDS (FISH OIL PO)    Take 1,200 legs.  Neurological exam reveals cranial nerves 2 through 12 grossly intact. Patient has equal  and normal deep tendon reflexes. No pronator drift. Psychiatric: no hallucinations or suicidal ideation. Lymphatics.:  No lymphadenopathy. DIFFERENTIAL DIAGNOSIS/ MDM:     Hypoxia, hypotension, AMI, ACS, PE    DIAGNOSTIC RESULTS     EKG: All EKG's are interpreted by the Emergency Department Physician who either signs or Co-signs this chart in the absence of a cardiologist.    Sinus 72 with left bundle branch block pattern. No morphologic change compared to 12/8/2021. Axis -40, , QRS 68, . RADIOLOGY:   I reviewed the radiologist interpretations:  XR CHEST PORTABLE   Final Result   The multifocal ill-defined airspace disease noted on multiple prior studies   over the past several months has resolved. There is now mild prominence of interstitial lung markings particularly at   the right lung base likely accentuated by low lung volumes on this portable   study. No new or acute airspace disease. XR CHEST PORTABLE (Final result)  Result time 04/15/22 17:53:38  Final result by Humphrey Valencia MD (04/15/22 17:53:38)                Impression: The multifocal ill-defined airspace disease noted on multiple prior studies   over the past several months has resolved. There is now mild prominence of interstitial lung markings particularly at   the right lung base likely accentuated by low lung volumes on this portable   study. No new or acute airspace disease.              Narrative:    EXAMINATION:   ONE XRAY VIEW OF THE CHEST     4/15/2022 5:42 pm     COMPARISON:   Multiple prior studies including the most recent of 02/11/2022     HISTORY:   ORDERING SYSTEM PROVIDED HISTORY: near syncope   TECHNOLOGIST PROVIDED HISTORY:   near syncope   Reason for Exam: syncope, hx of covid long haul     FINDINGS:   Portable study was obtained at low lung volumes.  Multifocal ill-defined   airspace disease has resolved in the interval.  There remains mild   interstitial prominence particularly in the right lower lung.  No   pneumothorax or pleural effusion.  Prior sternal splitting procedure.  Stable   mild cardiomegaly.  No acute bone finding.                       LABS:  Results for orders placed or performed during the hospital encounter of 04/15/22   CBC with Auto Differential   Result Value Ref Range    WBC 8.9 3.5 - 11.3 k/uL    RBC 4.20 (L) 4.21 - 5.77 m/uL    Hemoglobin 12.6 (L) 13.0 - 17.0 g/dL    Hematocrit 40.9 40.7 - 50.3 %    MCV 97.4 82.6 - 102.9 fL    MCH 30.0 25.2 - 33.5 pg    MCHC 30.8 25.2 - 33.5 g/dL    RDW 15.7 (H) 11.8 - 14.4 %    Platelets 934 897 - 925 k/uL    MPV 9.3 8.1 - 13.5 fL    NRBC Automated 0.0 0.0 per 100 WBC    Seg Neutrophils 69 (H) 36 - 65 %    Lymphocytes 21 (L) 24 - 43 %    Monocytes 8 3 - 12 %    Eosinophils % 1 1 - 4 %    Basophils 0 0 - 2 %    Immature Granulocytes 1 (H) 0 %    Segs Absolute 6.18 1.50 - 8.10 k/uL    Absolute Lymph # 1.83 1.10 - 3.70 k/uL    Absolute Mono # 0.71 0.10 - 1.20 k/uL    Absolute Eos # 0.09 0.00 - 0.44 k/uL    Basophils Absolute 0.03 0.00 - 0.20 k/uL    Absolute Immature Granulocyte 0.04 0.00 - 0.30 k/uL    RBC Morphology ANISOCYTOSIS PRESENT    Comprehensive Metabolic Panel   Result Value Ref Range    Glucose 103 (H) 70 - 99 mg/dL    BUN 15 8 - 23 mg/dL    CREATININE 0.81 0.70 - 1.20 mg/dL    Bun/Cre Ratio 19 9 - 20    Calcium 9.2 8.6 - 10.4 mg/dL    Sodium 137 135 - 144 mmol/L    Potassium 4.5 3.7 - 5.3 mmol/L    Chloride 98 98 - 107 mmol/L    CO2 29 20 - 31 mmol/L    Anion Gap 10 9 - 17 mmol/L    Alkaline Phosphatase 117 40 - 129 U/L    ALT 14 5 - 41 U/L    AST 21 <40 U/L    Total Bilirubin 0.34 0.3 - 1.2 mg/dL    Total Protein 6.2 (L) 6.4 - 8.3 g/dL    Albumin 3.3 (L) 3.5 - 5.2 g/dL    Albumin/Globulin Ratio 1.1 1.0 - 2.5    GFR Non-African American >60 >60 mL/min    GFR African American >60 >60 mL/min    GFR Comment Troponin   Result Value Ref Range    Troponin, High Sensitivity 139 (HH) 0 - 22 ng/L   D-Dimer, Quantitative   Result Value Ref Range    D-Dimer, Quant 0.52 0.00 - 0.59 mg/L FEU   EKG 12 Lead   Result Value Ref Range    Ventricular Rate 72 BPM    Atrial Rate 72 BPM    P-R Interval 184 ms    QRS Duration 168 ms    Q-T Interval 426 ms    QTc Calculation (Bazett) 466 ms    P Axis 16 degrees    R Axis 40 degrees    T Axis -81 degrees         EMERGENCY DEPARTMENT COURSE:   Vitals:    Vitals:    04/15/22 1714 04/15/22 1718 04/15/22 1745   BP: 115/67     Pulse: 73     Resp: 16     Temp:   98 °F (36.7 °C)   TempSrc:   Tympanic   SpO2: 91% (S) 97%    Weight: 290 lb (131.5 kg)       -------------------------  BP: 115/67, Temp: 98 °F (36.7 °C), Pulse: 73, Resp: 16      Re-evaluation Notes    With supplemental oxygen at 2 L, the patient sats 98%, but without oxygen he falls to 88%. I think he had a hypoxic insult today resulting in an elevated troponin. The patient will have a 2-hour troponin checked. The patient is endorsed to Dr. Manny Dent secondary to end of shift. Please refer to her documentation. CONSULTS:    [phone] paged. [phone]  Discussed with Dr. Meme Washington. Elevated troponin is likely due to hypoxic event. Recheck trop at 2 hours and reevaluate. FINAL IMPRESSION      1. Hypoxia          DISPOSITION/PLAN   DISPOSITION        Condition on Disposition    stable    PATIENT REFERRED TO:  No follow-up provider specified.     DISCHARGE MEDICATIONS:  New Prescriptions    No medications on file       (Please note that portions of this note were completed with a voice recognition program.  Efforts were made to edit the dictations but occasionally words are mis-transcribed.)    Kg Bartlett MD,, MD   Attending Emergency Physician         Enrique Reece MD  04/15/22 7360

## 2022-04-16 ENCOUNTER — TELEPHONE (OUTPATIENT)
Dept: INTERNAL MEDICINE CLINIC | Age: 77
End: 2022-04-16

## 2022-04-16 PROBLEM — I95.1 ORTHOSTATIC HYPOTENSION: Status: ACTIVE | Noted: 2022-04-16

## 2022-04-16 LAB
ABSOLUTE EOS #: 0.1 K/UL (ref 0–0.44)
ABSOLUTE IMMATURE GRANULOCYTE: 0.03 K/UL (ref 0–0.3)
ABSOLUTE LYMPH #: 1.9 K/UL (ref 1.1–3.7)
ABSOLUTE MONO #: 0.7 K/UL (ref 0.1–1.2)
ANION GAP SERPL CALCULATED.3IONS-SCNC: 7 MMOL/L (ref 9–17)
BASOPHILS # BLD: 1 % (ref 0–2)
BASOPHILS ABSOLUTE: 0.04 K/UL (ref 0–0.2)
BUN BLDV-MCNC: 13 MG/DL (ref 8–23)
BUN/CREAT BLD: 17 (ref 9–20)
CALCIUM SERPL-MCNC: 8.9 MG/DL (ref 8.6–10.4)
CHLORIDE BLD-SCNC: 100 MMOL/L (ref 98–107)
CO2: 29 MMOL/L (ref 20–31)
CREAT SERPL-MCNC: 0.76 MG/DL (ref 0.7–1.2)
EOSINOPHILS RELATIVE PERCENT: 1 % (ref 1–4)
GFR AFRICAN AMERICAN: >60 ML/MIN
GFR NON-AFRICAN AMERICAN: >60 ML/MIN
GFR SERPL CREATININE-BSD FRML MDRD: ABNORMAL ML/MIN/{1.73_M2}
GLUCOSE BLD-MCNC: 98 MG/DL (ref 70–99)
HCT VFR BLD CALC: 40.6 % (ref 40.7–50.3)
HEMOGLOBIN: 12.5 G/DL (ref 13–17)
IMMATURE GRANULOCYTES: 0 %
LYMPHOCYTES # BLD: 25 % (ref 24–43)
MCH RBC QN AUTO: 30 PG (ref 25.2–33.5)
MCHC RBC AUTO-ENTMCNC: 30.8 G/DL (ref 25.2–33.5)
MCV RBC AUTO: 97.6 FL (ref 82.6–102.9)
MONOCYTES # BLD: 9 % (ref 3–12)
NRBC AUTOMATED: 0 PER 100 WBC
PDW BLD-RTO: 15.7 % (ref 11.8–14.4)
PLATELET # BLD: 192 K/UL (ref 138–453)
PMV BLD AUTO: 8.9 FL (ref 8.1–13.5)
POTASSIUM SERPL-SCNC: 4.6 MMOL/L (ref 3.7–5.3)
RBC # BLD: 4.16 M/UL (ref 4.21–5.77)
RBC # BLD: ABNORMAL 10*6/UL
SEG NEUTROPHILS: 64 % (ref 36–65)
SEGMENTED NEUTROPHILS ABSOLUTE COUNT: 4.78 K/UL (ref 1.5–8.1)
SODIUM BLD-SCNC: 136 MMOL/L (ref 135–144)
TROPONIN, HIGH SENSITIVITY: 127 NG/L (ref 0–22)
TROPONIN, HIGH SENSITIVITY: 128 NG/L (ref 0–22)
TSH SERPL DL<=0.05 MIU/L-ACNC: 4.08 UIU/ML (ref 0.3–5)
WBC # BLD: 7.6 K/UL (ref 3.5–11.3)

## 2022-04-16 PROCEDURE — 2500000003 HC RX 250 WO HCPCS: Performed by: NURSE PRACTITIONER

## 2022-04-16 PROCEDURE — 80048 BASIC METABOLIC PNL TOTAL CA: CPT

## 2022-04-16 PROCEDURE — G0378 HOSPITAL OBSERVATION PER HR: HCPCS

## 2022-04-16 PROCEDURE — 97161 PT EVAL LOW COMPLEX 20 MIN: CPT | Performed by: PHYSICAL THERAPIST

## 2022-04-16 PROCEDURE — 85025 COMPLETE CBC W/AUTO DIFF WBC: CPT

## 2022-04-16 PROCEDURE — 6370000000 HC RX 637 (ALT 250 FOR IP): Performed by: NURSE PRACTITIONER

## 2022-04-16 PROCEDURE — 96372 THER/PROPH/DIAG INJ SC/IM: CPT

## 2022-04-16 PROCEDURE — 93005 ELECTROCARDIOGRAM TRACING: CPT | Performed by: NURSE PRACTITIONER

## 2022-04-16 PROCEDURE — 2580000003 HC RX 258: Performed by: NURSE PRACTITIONER

## 2022-04-16 PROCEDURE — 84484 ASSAY OF TROPONIN QUANT: CPT

## 2022-04-16 PROCEDURE — 6360000002 HC RX W HCPCS: Performed by: NURSE PRACTITIONER

## 2022-04-16 PROCEDURE — 36415 COLL VENOUS BLD VENIPUNCTURE: CPT

## 2022-04-16 RX ORDER — POTASSIUM CHLORIDE 20 MEQ/1
10 TABLET, EXTENDED RELEASE ORAL DAILY
Status: DISCONTINUED | OUTPATIENT
Start: 2022-04-16 | End: 2022-04-17 | Stop reason: HOSPADM

## 2022-04-16 RX ORDER — ALLOPURINOL 300 MG/1
300 TABLET ORAL DAILY
Status: DISCONTINUED | OUTPATIENT
Start: 2022-04-16 | End: 2022-04-17 | Stop reason: HOSPADM

## 2022-04-16 RX ORDER — LEVOTHYROXINE SODIUM 0.07 MG/1
75 TABLET ORAL DAILY
Status: DISCONTINUED | OUTPATIENT
Start: 2022-04-16 | End: 2022-04-17 | Stop reason: HOSPADM

## 2022-04-16 RX ORDER — CLOPIDOGREL BISULFATE 75 MG/1
75 TABLET ORAL DAILY
Status: DISCONTINUED | OUTPATIENT
Start: 2022-04-16 | End: 2022-04-17 | Stop reason: HOSPADM

## 2022-04-16 RX ORDER — OXYBUTYNIN CHLORIDE 5 MG/1
15 TABLET, EXTENDED RELEASE ORAL DAILY
Status: DISCONTINUED | OUTPATIENT
Start: 2022-04-16 | End: 2022-04-17 | Stop reason: HOSPADM

## 2022-04-16 RX ORDER — FINASTERIDE 5 MG/1
5 TABLET, FILM COATED ORAL DAILY
Status: DISCONTINUED | OUTPATIENT
Start: 2022-04-16 | End: 2022-04-16

## 2022-04-16 RX ORDER — SODIUM CHLORIDE 9 MG/ML
INJECTION, SOLUTION INTRAVENOUS CONTINUOUS
Status: DISCONTINUED | OUTPATIENT
Start: 2022-04-16 | End: 2022-04-17 | Stop reason: HOSPADM

## 2022-04-16 RX ORDER — TRAZODONE HYDROCHLORIDE 50 MG/1
50 TABLET ORAL NIGHTLY
Status: DISCONTINUED | OUTPATIENT
Start: 2022-04-16 | End: 2022-04-17 | Stop reason: HOSPADM

## 2022-04-16 RX ORDER — BUPROPION HYDROCHLORIDE 75 MG/1
75 TABLET ORAL 2 TIMES DAILY
Status: DISCONTINUED | OUTPATIENT
Start: 2022-04-16 | End: 2022-04-16

## 2022-04-16 RX ORDER — ATORVASTATIN CALCIUM 40 MG/1
40 TABLET, FILM COATED ORAL NIGHTLY
Status: DISCONTINUED | OUTPATIENT
Start: 2022-04-16 | End: 2022-04-17 | Stop reason: HOSPADM

## 2022-04-16 RX ORDER — ASPIRIN 81 MG/1
81 TABLET ORAL DAILY
Status: DISCONTINUED | OUTPATIENT
Start: 2022-04-16 | End: 2022-04-17 | Stop reason: HOSPADM

## 2022-04-16 RX ORDER — BUPROPION HYDROCHLORIDE 150 MG/1
150 TABLET ORAL NIGHTLY
Status: DISCONTINUED | OUTPATIENT
Start: 2022-04-16 | End: 2022-04-17 | Stop reason: HOSPADM

## 2022-04-16 RX ADMIN — CLOPIDOGREL BISULFATE 75 MG: 75 TABLET ORAL at 09:36

## 2022-04-16 RX ADMIN — POTASSIUM CHLORIDE 10 MEQ: 20 TABLET, EXTENDED RELEASE ORAL at 09:36

## 2022-04-16 RX ADMIN — BUPROPION HYDROCHLORIDE 150 MG: 150 TABLET, EXTENDED RELEASE ORAL at 21:10

## 2022-04-16 RX ADMIN — ATORVASTATIN CALCIUM 40 MG: 40 TABLET, FILM COATED ORAL at 21:10

## 2022-04-16 RX ADMIN — ASPIRIN 81 MG: 81 TABLET, COATED ORAL at 09:36

## 2022-04-16 RX ADMIN — ALLOPURINOL 300 MG: 300 TABLET ORAL at 09:37

## 2022-04-16 RX ADMIN — SODIUM CHLORIDE: 9 INJECTION, SOLUTION INTRAVENOUS at 21:08

## 2022-04-16 RX ADMIN — SERTRALINE 50 MG: 50 TABLET, FILM COATED ORAL at 09:37

## 2022-04-16 RX ADMIN — LEVOTHYROXINE SODIUM 75 MCG: 0.07 TABLET ORAL at 09:37

## 2022-04-16 RX ADMIN — FINASTERIDE 5 MG: 5 TABLET, FILM COATED ORAL at 09:36

## 2022-04-16 RX ADMIN — ENOXAPARIN SODIUM 30 MG: 100 INJECTION SUBCUTANEOUS at 09:37

## 2022-04-16 RX ADMIN — MICONAZOLE NITRATE: 20 POWDER TOPICAL at 21:10

## 2022-04-16 RX ADMIN — ENOXAPARIN SODIUM 30 MG: 100 INJECTION SUBCUTANEOUS at 21:10

## 2022-04-16 RX ADMIN — OXYBUTYNIN CHLORIDE 15 MG: 5 TABLET, EXTENDED RELEASE ORAL at 09:37

## 2022-04-16 RX ADMIN — SODIUM CHLORIDE, PRESERVATIVE FREE 10 ML: 5 INJECTION INTRAVENOUS at 21:07

## 2022-04-16 RX ADMIN — MICONAZOLE NITRATE: 20 POWDER TOPICAL at 00:41

## 2022-04-16 RX ADMIN — MICONAZOLE NITRATE: 20 POWDER TOPICAL at 09:39

## 2022-04-16 RX ADMIN — SODIUM CHLORIDE, PRESERVATIVE FREE 10 ML: 5 INJECTION INTRAVENOUS at 00:41

## 2022-04-16 RX ADMIN — TRAZODONE HYDROCHLORIDE 50 MG: 50 TABLET ORAL at 21:10

## 2022-04-16 RX ADMIN — POLYETHYLENE GLYCOL 3350 17 G: 17 POWDER, FOR SOLUTION ORAL at 14:11

## 2022-04-16 RX ADMIN — SODIUM CHLORIDE, PRESERVATIVE FREE 10 ML: 5 INJECTION INTRAVENOUS at 09:40

## 2022-04-16 ASSESSMENT — PAIN SCALES - WONG BAKER

## 2022-04-16 ASSESSMENT — PAIN SCALES - GENERAL
PAINLEVEL_OUTOF10: 0

## 2022-04-16 NOTE — PLAN OF CARE
Problem: Skin Integrity:  Goal: Will show no infection signs and symptoms  Description: Will show no infection signs and symptoms  4/16/2022 1105 by Ulices RN  Note: Will continue to monitor for any signs and symptoms of infection. Problem: Cardiac:  Goal: Ability to maintain vital signs within normal range will improve  Description: Ability to maintain vital signs within normal range will improve  4/16/2022 1105 by Ulices RN  Note: Will continue to monitor patients vital signs. Problem: Discharge Planning:  Goal: Discharged to appropriate level of care  Description: Discharged to appropriate level of care  Note:  and  are assigned case and are available upon patients request.     Problem: Falls - Risk of:  Goal: Will remain free from falls  Description: Will remain free from falls  Note: Patient will remain free of falls. Patient's bed is in low position and locked, call light is within reach as well as personal belongings, gripper socks are on patient. Problem: Pain:  Goal: Pain level will decrease  Description: Pain level will decrease  Note: Patient is able to use the 0-10 pain scale to rate their pain. PRN meds.

## 2022-04-16 NOTE — PLAN OF CARE
Problem: Skin Integrity:  Goal: Will show no infection signs and symptoms  Description: Will show no infection signs and symptoms  Outcome: Ongoing  Goal: Absence of new skin breakdown  Description: Absence of new skin breakdown  Outcome: Ongoing     Problem: Cardiac:  Goal: Ability to maintain vital signs within normal range will improve  Description: Ability to maintain vital signs within normal range will improve  Outcome: Ongoing  Goal: Cardiovascular alteration will improve  Description: Cardiovascular alteration will improve  Outcome: Ongoing     Problem: Health Behavior:  Goal: Identification of resources available to assist in meeting health care needs will improve  Description: Identification of resources available to assist in meeting health care needs will improve  Outcome: Ongoing     Problem: Physical Regulation:  Goal: Complications related to the disease process, condition or treatment will be avoided or minimized  Description: Complications related to the disease process, condition or treatment will be avoided or minimized  Outcome: Ongoing

## 2022-04-16 NOTE — H&P
HOSPITALIST ADMISSION H&P      REASON FOR ADMISSION:  Near syncopal event (due to orthostatic hypotension), elevated troponin  ESTIMATED LENGTH OF STAY:<2 midnights, 1-2 days    ATTENDING/ADMITTING PHYSICIAN: Nicol Garcia MD  PCP: Humberto Queen MD    HISTORY OF PRESENT ILLNESS:      The patient is a 67 y.o. male patient of Humberto Queen MD who presents from the ER. He resides at the [facility] where he in going through rehab after having long haul covid-19. He was improving, so today his supplemental oxygen was discontinued. However, during his physical therapy this afternoon, he became hypotensive (69/45) upon standing and had a near-syncopal event -- he was assisted to a chair and never lost consciousness. His blood pressure improved after rest and he did complete his therapy and was later sent to the ER for evaluation. Per EMR review, he had more mild orthostatic hypotension reported when evaluated at the [facility] on 04/04/2022 by Tabby Snell --> lasix held and metoprolol dose decreased. When he arrived to ER, his oxygen saturation was found to be 88% on room air. He denies any chest pain, shortness of breath, vomiting, palpitations, headache, dizziness, vision change, or unilateral weakness. When his blood pressure is low (just after he stands), he does feel a little lightheaded. In ER, his chest xray showed no acute process, D. Dimer wnl, EKG showed Sinus rhythm with sinus arrhythmia with PVCs and LBBB with T wave changes noted in inferior and anterior leads. A second EKG was completed in ER but is not available in EMR at this time. Troponin 139 --> 125. Per report, Dr. Estefany Stephens, cardiology, requested overnight observation for serial troponins and discharge back to the [facility] tomorrow as long as his troponins continue a downward trend. He has a long term indwelling mullins catheter and denies any dysuria or UTI symptoms.     COPD -- former smoker -- chronic post-covid lung changes appear to be gradually improving -- has been on supplemental oxygen up until this morning due to chronic hypoxia    CHF -- 2D echo 12/09/2021 showed EF 40%, grade 1 DD, mild TR, RVSP 55    CAD -- has declined cardiac cath in the past    PVD -- carotid US showing 0-50% stenosis 11/11/2021    Hypothyroidism -- TSH wnl in 12/2021 -- recheck pending    Present prior to hospital admission: ulcer on coccyx and bilateral buttocks (see media image), groin with bilateral excoriation     See below for additional PMH. Patient [id number] records reviewed, including, but not limited to ER records, imaging results, lab results, office records, personal records, and OARRS -- no signs of abuse or diversion. Past Medical History:   Diagnosis Date    Acute respiratory failure with hypoxia ([address] 12/9/2021    Adenomatous polyp     history of     Cholecystitis 3/19/2019    Chronic venous insufficiency     COPD with acute exacerbation ([address] 12/9/2021    DVT (deep venous thrombosis) ([facility])     history of     Edema     related to venous stasis     Gout     history of     Hypertension     Onychomycosis     Osteoarthritis     Plantar fasciitis     Pneumonia due to COVID-19 virus 12/8/2021    Tobacco abuse            Past Surgical History:   Procedure Laterality Date    COLONOSCOPY  12/12/2012    polyp    COLONOSCOPY  2003    adenomatous polyps     COLONOSCOPY  08/2009    COLONOSCOPY  06/2008    with polypectomy    CORONARY ARTERY BYPASS GRAFT  10/28/2014    SVG-OM1, SVG-OM2, and SVG-PDA    OTHER SURGICAL HISTORY      teeth extracted     TOTAL KNEE ARTHROPLASTY Right 10/2015    TOTAL KNEE ARTHROPLASTY Left 03/2018    TURP      VASECTOMY  1980       Allergies:    Patient has no known allergies. Social History:    reports that he quit smoking about 7 years ago. His smoking use included cigarettes. He smoked 0.00 packs per day for 55.00 years.  He has never used smokeless tobacco. He reports current alcohol use. He reports that he does not use drugs. Family History:   family history includes Heart Disease in his brother; Heart Surgery in his sister; Hypertension in an other family member; Kidney Disease in his brother; Other in his brother; Other (age of onset: 52) in his mother; Sudden Death in his father. REVIEW OF SYSTEMS:  See HPI and problem list; otherwise no other new complaints with respect to eyes, ENT, neck, pulmonary, coronary, chest, GI, , endocrine, musculoskeletal, hematologic, lymphatic, allergic/immunologic, neurologic, psychiatric, or skin. Code status: patient/family wishes for DNR-CCA at this time. PHYSICAL EXAM:  Vitals:  /72   Pulse 70   Temp 98 °F (36.7 °C) (Tympanic)   Resp 26   Wt 290 lb (131.5 kg)   SpO2 99%   BMI 39.33 kg/m²     General: awake, alert and cooperative  HEENT: nasal cannula oxygen, Mucosa Pink, Moist, EMOI, External nose normal, Normocephalic, Atraumatic and Neck with full ROM  Neck: Supple, Carotid Pulses Present, No Masses, Tenderness, Nodularity and No Lymphadenopathy  Chest/Lungs: Clear to Auscultation without Rales, Rhonchi, or Wheezes, Bases Diminished Bilaterally and Respirations even and unlabored  Cardiac: Regular Rate and Rhythm and Pedal Pulses Palpable Bilaterally  GI/Abdomen:  Bowel Sounds Present and Soft, Non-tender, without Guarding or Rebound Tenderness  : Not examined and mullins catheter present  Extremities/Musculoskeletal: All four extremities without edema and All four extremities with 5/5 strength  Skin: No Cyanosis and Skin warm and dry -- groin with excoriation bilaterally -- pressure ulcer of coccyx and bilateral buttocks present (see media image)  Neuro: Nikolai, Alert and Oriented, to Person, to Time, to Place, to Situation and No Localizing Signs/Symptoms, gait impairment at baseline  Psychiatric: Normal mood and affect      LABS:    CBC with Differential:    Lab Results   Component Value Date    WBC 8.9 04/15/2022    RBC 4.20 04/15/2022    HGB 12.6 04/15/2022    HCT 40.9 04/15/2022     04/15/2022    MCV 97.4 04/15/2022    MCH 30.0 04/15/2022    MCHC 30.8 04/15/2022    RDW 15.7 04/15/2022    LYMPHOPCT 21 04/15/2022    MONOPCT 8 04/15/2022    BASOPCT 0 04/15/2022    MONOSABS 0.71 04/15/2022    LYMPHSABS 1.83 04/15/2022    EOSABS 0.09 04/15/2022    BASOSABS 0.03 04/15/2022    DIFFTYPE NOT REPORTED 01/08/2022     CMP:    Lab Results   Component Value Date     04/15/2022    K 4.5 04/15/2022    CL 98 04/15/2022    CO2 29 04/15/2022    BUN 15 04/15/2022    CREATININE 0.81 04/15/2022    GFRAA >60 04/15/2022    LABGLOM >60 04/15/2022    GLUCOSE 103 04/15/2022    PROT 6.2 04/15/2022    LABALBU 3.3 04/15/2022    CALCIUM 9.2 04/15/2022    BILITOT 0.34 04/15/2022    ALKPHOS 117 04/15/2022    AST 21 04/15/2022    ALT 14 04/15/2022       ASSESSMENT:      Patient Active Problem List   Diagnosis    Hypertension    Osteoarthritis    Chronic venous insufficiency    Acquired genu valgum    Degeneration of lumbar intervertebral disc    Lower urinary tract symptoms due to benign prostatic hyperplasia    Presence of right artificial knee joint    Bilateral carotid artery stenosis    Hypothyroidism    Ischemic cardiomyopathy    Ventral hernia    Left bundle branch block (LBBB)    Chronic embolism and thrombosis of deep vein of both distal legs ([facility])    Coronary artery disease involving native coronary artery of native heart    Hyperlipidemia    Chronic combined systolic and diastolic CHF (congestive heart failure) ([facility])    Impaired gait    Emphysema/COPD ([facility])    History of COVID-19    Class 2 severe obesity with serious comorbidity and body mass index (BMI) of 39.0 to 39.9 in adult ([facility])    Near syncope    Hypoxia    Elevated troponin       PLAN:    1.  Near syncope -- due to orthostatic hypotension -- telemetry monitoring, serial troponins, AM EKG, orthostatic vital signs, hold home metoprolol and discontinue prn lasix, fall precautions, may consider midodrine if hypotension persists  2. Elevated troponin -- as above, per Dr. Franca Zhang, can discharge patient tomorrow as long as trend continues downward  3. Chronic hypoxia -- con't pulse oximetry, supplemental oxygen prn, RT protocols  4. CHF -- stable  5. Home medications reviewed  6. PT/OT eval and treat  7. DVT prophylaxis  8. See orders -- micotin powder for groin  9.  Will not treat urine at this time as he is asymptomatic, but will await urine culture    Note that over 50 minutes was spent in evaluation of the patient, review of the chart and pertinent records, discussion with family/staff, etc.    KISHOR Swenson - CNP, FNP-BC  10:23 PM  4/15/2022

## 2022-04-16 NOTE — FLOWSHEET NOTE
04/16/22 1206   Vitals   Orthostatic B/P and Pulse? Yes   Blood Pressure Lying 128/71   Pulse Lying 66 PER MINUTE   Blood Pressure Sitting 119/73   Pulse Sitting 82 PER MINUTE   Blood Pressure Standing 86/65   Pulse Standing 91 PER MINUTE   Patient denied dizziness and lightheadedness.

## 2022-04-16 NOTE — ED PROVIDER NOTES
ADDENDUM:      Care of this patient was assumed from Dr. Supriya Mauricio. The patient was seen for Loss of Consciousness (therapy today had hypotensive episode, and helped him to a seated position, first day off o2 today, post long haul covid. )  . The patient's initial evaluation and plan have been discussed with the prior provider who initially evaluated the patient. Nursing Notes, Past Medical Hx, Past Surgical Hx, Social Hx, Family Hx, Medications and Allergies, and  were all reviewed. No Known Allergies      Diagnostic Results     EKG   Initial twelve-lead EKG time 17: 24 interpreted by Dr. Supriya Mauricio please see her interpretation. Repeat twelve-lead EKG 20: 59 normal sinus rhythm with sinus arrhythmia ventricular rate of 68 bpm.  There is a left bundle branch block morphology with appropriate discordance. RONALDO 180 ms QRS duration 164 ms QTc 461 ms. Interpretation is left bundle branch block. RADIOLOGY:    XR CHEST PORTABLE    Result Date: 4/15/2022  EXAMINATION: ONE XRAY VIEW OF THE CHEST 4/15/2022 5:42 pm COMPARISON: Multiple prior studies including the most recent of 02/11/2022 HISTORY: ORDERING SYSTEM PROVIDED HISTORY: near syncope TECHNOLOGIST PROVIDED HISTORY: near syncope Reason for Exam: syncope, hx of covid long haul FINDINGS: Portable study was obtained at low lung volumes. Multifocal ill-defined airspace disease has resolved in the interval.  There remains mild interstitial prominence particularly in the right lower lung. No pneumothorax or pleural effusion. Prior sternal splitting procedure. Stable mild cardiomegaly. No acute bone finding. The multifocal ill-defined airspace disease noted on multiple prior studies over the past several months has resolved. There is now mild prominence of interstitial lung markings particularly at the right lung base likely accentuated by low lung volumes on this portable study. No new or acute airspace disease.        LABS:   Results for orders placed or performed during the hospital encounter of 04/15/22   COVID-19, Rapid    Specimen: Nasopharyngeal Swab   Result Value Ref Range    Specimen Description . NASOPHARYNGEAL SWAB     SARS-CoV-2, Rapid Not Detected Not Detected   CBC with Auto Differential   Result Value Ref Range    WBC 8.9 3.5 - 11.3 k/uL    RBC 4.20 (L) 4.21 - 5.77 m/uL    Hemoglobin 12.6 (L) 13.0 - 17.0 g/dL    Hematocrit 40.9 40.7 - 50.3 %    MCV 97.4 82.6 - 102.9 fL    MCH 30.0 25.2 - 33.5 pg    MCHC 30.8 25.2 - 33.5 g/dL    RDW 15.7 (H) 11.8 - 14.4 %    Platelets 809 748 - 075 k/uL    MPV 9.3 8.1 - 13.5 fL    NRBC Automated 0.0 0.0 per 100 WBC    Seg Neutrophils 69 (H) 36 - 65 %    Lymphocytes 21 (L) 24 - 43 %    Monocytes 8 3 - 12 %    Eosinophils % 1 1 - 4 %    Basophils 0 0 - 2 %    Immature Granulocytes 1 (H) 0 %    Segs Absolute 6.18 1.50 - 8.10 k/uL    Absolute Lymph # 1.83 1.10 - 3.70 k/uL    Absolute Mono # 0.71 0.10 - 1.20 k/uL    Absolute Eos # 0.09 0.00 - 0.44 k/uL    Basophils Absolute 0.03 0.00 - 0.20 k/uL    Absolute Immature Granulocyte 0.04 0.00 - 0.30 k/uL    RBC Morphology ANISOCYTOSIS PRESENT    Comprehensive Metabolic Panel   Result Value Ref Range    Glucose 103 (H) 70 - 99 mg/dL    BUN 15 8 - 23 mg/dL    CREATININE 0.81 0.70 - 1.20 mg/dL    Bun/Cre Ratio 19 9 - 20    Calcium 9.2 8.6 - 10.4 mg/dL    Sodium 137 135 - 144 mmol/L    Potassium 4.5 3.7 - 5.3 mmol/L    Chloride 98 98 - 107 mmol/L    CO2 29 20 - 31 mmol/L    Anion Gap 10 9 - 17 mmol/L    Alkaline Phosphatase 117 40 - 129 U/L    ALT 14 5 - 41 U/L    AST 21 <40 U/L    Total Bilirubin 0.34 0.3 - 1.2 mg/dL    Total Protein 6.2 (L) 6.4 - 8.3 g/dL    Albumin 3.3 (L) 3.5 - 5.2 g/dL    Albumin/Globulin Ratio 1.1 1.0 - 2.5    GFR Non-African American >60 >60 mL/min    GFR African American >60 >60 mL/min    GFR Comment         Troponin   Result Value Ref Range    Troponin, High Sensitivity 139 (HH) 0 - 22 ng/L   D-Dimer, Quantitative   Result Value Ref Range    D-Dimer, Quant 0. 52 0.00 - 0.59 mg/L FEU   Urinalysis with Reflex to Culture    Specimen: Urine, clean catch   Result Value Ref Range    Glucose, Ur NEGATIVE NEGATIVE    Bilirubin Urine NEGATIVE NEGATIVE    Ketones, Urine NEGATIVE NEGATIVE    Specific Gravity, UA 1.025 1.010 - 1.025    Urine Hgb 3+ (A) NEGATIVE    pH, UA 6.0 5.0 - 6.0    Protein, UA 1+ (A) NEGATIVE    Urobilinogen, Urine Normal Normal    Nitrite, Urine NEGATIVE NEGATIVE    Leukocyte Esterase, Urine 2+ (A) NEGATIVE   Troponin   Result Value Ref Range    Troponin, High Sensitivity 125 (HH) 0 - 22 ng/L   Microscopic Urinalysis   Result Value Ref Range    -          WBC, UA >50 0 - 4 /HPF    RBC, UA >50 0 - 4 /HPF    Epithelial Cells UA 0 TO 4 0 - 5 /HPF    Bacteria, UA 3+ (A) None    Other Observations UA Specimen Cultured (A) NOT REQ.    EKG 12 Lead   Result Value Ref Range    Ventricular Rate 72 BPM    Atrial Rate 72 BPM    P-R Interval 184 ms    QRS Duration 168 ms    Q-T Interval 426 ms    QTc Calculation (Bazett) 466 ms    P Axis 16 degrees    R Axis 40 degrees    T Axis -81 degrees       RECENT VITALS:  BP: 122/71, Temp: 97.3 °F (36.3 °C), Pulse: 66, Resp: 23     ED Course     The patient was given the following medications:  Orders Placed This Encounter   Medications    sodium chloride flush 0.9 % injection 5-40 mL    sodium chloride flush 0.9 % injection 5-40 mL    0.9 % sodium chloride infusion    enoxaparin (LOVENOX) injection 30 mg    OR Linked Order Group     ondansetron (ZOFRAN-ODT) disintegrating tablet 4 mg     ondansetron (ZOFRAN) injection 4 mg    polyethylene glycol (GLYCOLAX) packet 17 g    OR Linked Order Group     acetaminophen (TYLENOL) tablet 650 mg     acetaminophen (TYLENOL) suppository 650 mg    albuterol (PROVENTIL) nebulizer solution 2.5 mg     Order Specific Question:   Initiate RT Bronchodilator Protocol     Answer:   No    sodium chloride nebulizer 0.9 % solution 3 mL    albuterol (PROVENTIL) nebulizer solution 2.5 mg Order Specific Question:   Initiate RT Bronchodilator Protocol     Answer:   No    miconazole (MICOTIN) 2 % powder    allopurinol (ZYLOPRIM) tablet 300 mg    aspirin EC tablet 81 mg    atorvastatin (LIPITOR) tablet 40 mg    clopidogrel (PLAVIX) tablet 75 mg    levothyroxine (SYNTHROID) tablet 75 mcg    potassium chloride (KLOR-CON M) extended release tablet 10 mEq    sertraline (ZOLOFT) tablet 50 mg    traZODone (DESYREL) tablet 50 mg    finasteride (PROSCAR) tablet 5 mg    metoprolol tartrate (LOPRESSOR) tablet 25 mg    oxybutynin (DITROPAN-XL) extended release tablet 15 mg    buPROPion (WELLBUTRIN) tablet 75 mg       Medical Decision Making      Signed out to my Dr. Jake Westfall. Patient is undergoing rehab after long-haul Covid. Previously was requiring oxygen this was his first day off oxygen therapy. When he was doing his rehab he had a near syncopal episode and was lowered to the ground no associated fall. Patient has a history of coronary artery disease and bypass grafting. Patient was found to be hypoxic upon arrival to the emergency department was placed on oxygen therapy. Patient without dyspnea at this time. He denies chest pain to me. Initial troponin was found to be elevated at 139. Patient has normal renal function. Initial plan was if troponin was trending downward this may be secondary to hypoxia. Repeat troponin 125 EKG without acute findings. In repeat consult with cardiologist on-call Dr. Daniel Lino, suggested admission here at [facility] for more extended rule out given patient's cardiac history versus home. Patient and family agreeable to more extended rule out. Case discussed with the hospitalist service nurse practitioner Santiago Fraire who kindly accepted the patient. Also discussed d-dimer, slightly above cutoff for r/o PE/DVT. Disposition     FINAL IMPRESSION      1. Hypoxia    2. Near syncope    3.  Elevated troponin          DISPOSITION/PLAN   DISPOSITION Admitted

## 2022-04-16 NOTE — PROGRESS NOTES
Pharmacy Note     Enoxaparin Dose Adjustment    Hermila Mancia is a 67 y.o. male. Pharmacist assessment of enoxaparin dose for VTE prophylaxis. Recent Labs     04/15/22  1730   BUN 15       Recent Labs     04/15/22  1730   CREATININE 0.81       Estimated Creatinine Clearance: 99 mL/min (based on SCr of 0.81 mg/dL). Estimated CrCl using Ideal Body Weight: 80 mL/min (based on IBW 75 kg)    Height:   Ht Readings from Last 1 Encounters:   04/15/22 6' 2\" (1.88 m)     Weight:  Wt Readings from Last 1 Encounters:   04/15/22 230 lb 9.6 oz (104.6 kg)       The following enoxaparin dose has been adjusted based upon renal function and/or patient weight per P&T Guidelines:             Enoxaparin 40 mg subcutaneously once daily changed to Enoxaparin 30 mg subcutaneously twice daily.     Rocío Avila [address]  4/15/2022 11:31 PM

## 2022-04-16 NOTE — PROGRESS NOTES
Physical Therapy    Facility/Department: [facility] [place]  PROGRESSIVE CARE  Initial Assessment    NAME: Josephine Dsohi  : 1945  MRN: [id number]    Date of Service: 2022    Discharge Recommendations:  ECF with PT        Assessment   Body structures, Functions, Activity limitations: Decreased balance;Decreased endurance  Prognosis: Fair  Decision Making: Low Complexity  REQUIRES PT FOLLOW UP: Yes  Activity Tolerance  Activity Tolerance: Treatment limited secondary to medical complications (free text); Patient limited by fatigue       Patient Diagnosis(es): The primary encounter diagnosis was Hypoxia. Diagnoses of Near syncope and Elevated troponin were also pertinent to this visit. has a past medical history of Acute respiratory failure with hypoxia ([facility] [address] Adenomatous polyp, Cholecystitis, Chronic venous insufficiency, COPD with acute exacerbation ([facility] [address] DVT (deep venous thrombosis) ([facility] [address] Edema, Gout, Hypertension, Onychomycosis, Osteoarthritis, Plantar fasciitis, Pneumonia due to COVID-19 virus, and Tobacco abuse.   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     Restrictions     Vision/Hearing        Subjective  General  Chart Reviewed: Yes  Patient assessed for rehabilitation services?: Yes  Response To Previous Treatment: Not applicable  Family / Caregiver Present: No  Pain Screening  Patient Currently in Pain: No  Vital Signs  Patient Currently in Pain: No       Orientation  Orientation  Overall Orientation Status: Within Functional Limits  Social/Functional History  Social/Functional History  Type of Home: Facility  Home Layout: One level  Home Equipment: Rolling walker,Oxygen  ADL Assistance: Needs assistance  Ambulation Assistance: Needs assistance  Transfer Assistance: Needs assistance  Occupation: Retired  IADL Comments: At [facility] for rehab Covid long haul.  Standing tolerance about 2 min with O2  Cognition        Objective     Observation/Palpation  Observation: In bed, on O2, SpO2 96%       Strength RLE  Strength RLE: WFL  Strength LLE  Strength LLE: WFL        Bed mobility  Rolling to Left: Independent  Rolling to Right: Independent  Supine to Sit: Supervision  Sit to Supine: Supervision  Scooting: Supervision  Transfers  Sit to Stand: Contact guard assistance  Stand to sit: Contact guard assistance  Ambulation  Ambulation?: Yes  Ambulation 1  Surface: level tile  Device: Rolling Walker  Other Apparatus: O2  Assistance: Contact guard assistance  Distance: 10 ft  Comments: Stood 2 min total. SpO2 drop as low as 82%     Balance  Sitting - Static: Good  Sitting - Dynamic: Good  Standing - Static: Fair  Standing - Dynamic: [address]  Times per day: Daily  Current Treatment Recommendations: Gait Training,Transfer Training  Safety Devices  Type of devices: Left in bed,Call light within reach    G-Code       OutComes Score                                                  AM-PAC Score             Goals  Short term goals  Time Frame for Short term goals: 1 day  Short term goal 1: Assess functional status  Long term goals  Time Frame for Long term goals : 2-3 days  Long term goal 1: Transfer to stand SBA  Long term goal 2: Gait with RW 20-30 ft       Therapy Time   Individual Concurrent Group Co-treatment   Time In 0955         Time Out 1016         Minutes 21                 [facility], [place]

## 2022-04-16 NOTE — PROGRESS NOTES
D/C proscar- it frequently worsens orthostatic problems. He hasnt had lasix in April -- no edema to speak of. Hold Metop. Very symptomatic today when standing  falls to 86--- he mostly feels tired with that-- but appears to be near falling. He does tend to minimize sxs. PT seeing.   On SSRI

## 2022-04-17 VITALS
SYSTOLIC BLOOD PRESSURE: 114 MMHG | HEART RATE: 77 BPM | HEIGHT: 74 IN | TEMPERATURE: 96.4 F | WEIGHT: 233.5 LBS | RESPIRATION RATE: 20 BRPM | DIASTOLIC BLOOD PRESSURE: 67 MMHG | BODY MASS INDEX: 29.97 KG/M2 | OXYGEN SATURATION: 97 %

## 2022-04-17 LAB
ABSOLUTE EOS #: 0.11 K/UL (ref 0–0.44)
ABSOLUTE IMMATURE GRANULOCYTE: 0.05 K/UL (ref 0–0.3)
ABSOLUTE LYMPH #: 1.99 K/UL (ref 1.1–3.7)
ABSOLUTE MONO #: 0.77 K/UL (ref 0.1–1.2)
ANION GAP SERPL CALCULATED.3IONS-SCNC: 8 MMOL/L (ref 9–17)
BASOPHILS # BLD: 0 % (ref 0–2)
BASOPHILS ABSOLUTE: 0.03 K/UL (ref 0–0.2)
BUN BLDV-MCNC: 10 MG/DL (ref 8–23)
BUN/CREAT BLD: 14 (ref 9–20)
CALCIUM SERPL-MCNC: 8.9 MG/DL (ref 8.6–10.4)
CHLORIDE BLD-SCNC: 104 MMOL/L (ref 98–107)
CO2: 28 MMOL/L (ref 20–31)
CREAT SERPL-MCNC: 0.71 MG/DL (ref 0.7–1.2)
EOSINOPHILS RELATIVE PERCENT: 2 % (ref 1–4)
GFR AFRICAN AMERICAN: >60 ML/MIN
GFR NON-AFRICAN AMERICAN: >60 ML/MIN
GFR SERPL CREATININE-BSD FRML MDRD: ABNORMAL ML/MIN/{1.73_M2}
GLUCOSE BLD-MCNC: 91 MG/DL (ref 70–99)
HCT VFR BLD CALC: 39.9 % (ref 40.7–50.3)
HEMOGLOBIN: 12.3 G/DL (ref 13–17)
IMMATURE GRANULOCYTES: 1 %
LYMPHOCYTES # BLD: 28 % (ref 24–43)
MCH RBC QN AUTO: 30.4 PG (ref 25.2–33.5)
MCHC RBC AUTO-ENTMCNC: 30.8 G/DL (ref 25.2–33.5)
MCV RBC AUTO: 98.8 FL (ref 82.6–102.9)
MONOCYTES # BLD: 11 % (ref 3–12)
NRBC AUTOMATED: 0 PER 100 WBC
PDW BLD-RTO: 15.7 % (ref 11.8–14.4)
PLATELET # BLD: 184 K/UL (ref 138–453)
PMV BLD AUTO: 9.3 FL (ref 8.1–13.5)
POTASSIUM SERPL-SCNC: 4.4 MMOL/L (ref 3.7–5.3)
RBC # BLD: 4.04 M/UL (ref 4.21–5.77)
RBC # BLD: ABNORMAL 10*6/UL
SEG NEUTROPHILS: 58 % (ref 36–65)
SEGMENTED NEUTROPHILS ABSOLUTE COUNT: 4.29 K/UL (ref 1.5–8.1)
SODIUM BLD-SCNC: 140 MMOL/L (ref 135–144)
WBC # BLD: 7.2 K/UL (ref 3.5–11.3)

## 2022-04-17 PROCEDURE — 96372 THER/PROPH/DIAG INJ SC/IM: CPT

## 2022-04-17 PROCEDURE — 99217 PR OBSERVATION CARE DISCHARGE MANAGEMENT: CPT | Performed by: INTERNAL MEDICINE

## 2022-04-17 PROCEDURE — G0378 HOSPITAL OBSERVATION PER HR: HCPCS

## 2022-04-17 PROCEDURE — 80048 BASIC METABOLIC PNL TOTAL CA: CPT

## 2022-04-17 PROCEDURE — 36415 COLL VENOUS BLD VENIPUNCTURE: CPT

## 2022-04-17 PROCEDURE — 94761 N-INVAS EAR/PLS OXIMETRY MLT: CPT

## 2022-04-17 PROCEDURE — 97530 THERAPEUTIC ACTIVITIES: CPT

## 2022-04-17 PROCEDURE — 2700000000 HC OXYGEN THERAPY PER DAY

## 2022-04-17 PROCEDURE — 6360000002 HC RX W HCPCS: Performed by: NURSE PRACTITIONER

## 2022-04-17 PROCEDURE — 85025 COMPLETE CBC W/AUTO DIFF WBC: CPT

## 2022-04-17 PROCEDURE — 6370000000 HC RX 637 (ALT 250 FOR IP): Performed by: NURSE PRACTITIONER

## 2022-04-17 RX ORDER — POLYETHYLENE GLYCOL 3350 17 G/17G
17 POWDER, FOR SOLUTION ORAL DAILY PRN
Qty: 527 G | Refills: 0
Start: 2022-04-17 | End: 2022-05-17

## 2022-04-17 RX ADMIN — SERTRALINE 50 MG: 50 TABLET, FILM COATED ORAL at 09:33

## 2022-04-17 RX ADMIN — POTASSIUM CHLORIDE 10 MEQ: 20 TABLET, EXTENDED RELEASE ORAL at 09:33

## 2022-04-17 RX ADMIN — LEVOTHYROXINE SODIUM 75 MCG: 0.07 TABLET ORAL at 09:33

## 2022-04-17 RX ADMIN — OXYBUTYNIN CHLORIDE 15 MG: 5 TABLET, EXTENDED RELEASE ORAL at 09:32

## 2022-04-17 RX ADMIN — ALLOPURINOL 300 MG: 300 TABLET ORAL at 09:32

## 2022-04-17 RX ADMIN — ASPIRIN 81 MG: 81 TABLET, COATED ORAL at 09:32

## 2022-04-17 RX ADMIN — CLOPIDOGREL BISULFATE 75 MG: 75 TABLET ORAL at 09:33

## 2022-04-17 RX ADMIN — MICONAZOLE NITRATE: 20 POWDER TOPICAL at 09:46

## 2022-04-17 RX ADMIN — ENOXAPARIN SODIUM 30 MG: 100 INJECTION SUBCUTANEOUS at 09:32

## 2022-04-17 ASSESSMENT — PAIN SCALES - WONG BAKER
WONGBAKER_NUMERICALRESPONSE: 0

## 2022-04-17 ASSESSMENT — PAIN SCALES - GENERAL
PAINLEVEL_OUTOF10: 0

## 2022-04-17 NOTE — FLOWSHEET NOTE
04/17/22 0913   Vital Signs   Orthostatic B/P and Pulse?  Yes   Blood Pressure Lying 114/67   Pulse Lying 77 PER MINUTE   Blood Pressure Sitting 90/64   Pulse Sitting 89 PER MINUTE   Blood Pressure Standing 77/56   Pulse Standing 108 PER MINUTE   Pt denies dizziness, tolerated well

## 2022-04-17 NOTE — PLAN OF CARE
Problem: Skin Integrity:  Goal: Will show no infection signs and symptoms  Description: Will show no infection signs and symptoms  4/17/2022 0020 by Yohannes Ovalles RN  Outcome: Ongoing  4/16/2022 1105 by Rosa M Jha RN  Note: Will continue to monitor for any signs and symptoms of infection. Goal: Absence of new skin breakdown  Description: Absence of new skin breakdown  Outcome: Ongoing     Problem: Cardiac:  Goal: Ability to maintain vital signs within normal range will improve  Description: Ability to maintain vital signs within normal range will improve  4/17/2022 0020 by Yohannes Ovalles RN  Outcome: Ongoing  4/16/2022 1105 by Rosa M Jha RN  Note: Will continue to monitor patients vital signs. Goal: Cardiovascular alteration will improve  Description: Cardiovascular alteration will improve  Outcome: Ongoing     Problem: Health Behavior:  Goal: Identification of resources available to assist in meeting health care needs will improve  Description: Identification of resources available to assist in meeting health care needs will improve  Outcome: Ongoing     Problem: Physical Regulation:  Goal: Complications related to the disease process, condition or treatment will be avoided or minimized  Description: Complications related to the disease process, condition or treatment will be avoided or minimized  Outcome: Ongoing     Problem: Discharge Planning:  Goal: Discharged to appropriate level of care  Description: Discharged to appropriate level of care  4/17/2022 0020 by Yohannes Ovalles RN  Outcome: Ongoing  4/16/2022 1105 by Rosa M Jha RN  Note:  and  are assigned case and are available upon patients request.     Problem: Falls - Risk of:  Goal: Will remain free from falls  Description: Will remain free from falls  4/17/2022 0020 by Yohannes Ovalles RN  Outcome: Ongoing  4/16/2022 1105 by Rosa M Jha RN  Note: Patient will remain free of falls.  Patient's bed is in low position and locked, call light is within reach as well as personal belongings, gripper socks are on patient. Goal: Absence of physical injury  Description: Absence of physical injury  Outcome: Ongoing     Problem: Pain:  Goal: Pain level will decrease  Description: Pain level will decrease  4/17/2022 0020 by Eloisa Conley RN  Outcome: Ongoing  4/16/2022 1105 by Augustus Bloch, RN  Note: Patient is able to use the 0-10 pain scale to rate their pain. PRN meds.   Goal: Control of acute pain  Description: Control of acute pain  Outcome: Ongoing  Goal: Control of chronic pain  Description: Control of chronic pain  Outcome: Ongoing

## 2022-04-17 NOTE — PROGRESS NOTES
Physical Therapy  Facility/Department: [facility]  PROGRESSIVE CARE  Daily Treatment Note  NAME: Dae Snowden  : 1945  MRN: [id number]    Date of Service: 2022    Discharge Recommendations:  ECF with PT        Assessment   Body structures, Functions, Activity limitations: Decreased balance;Decreased endurance  Activity Tolerance  Activity Tolerance: Patient limited by endurance; Patient limited by fatigue;Treatment limited secondary to medical complications (free text)     Patient Diagnosis(es): The primary encounter diagnosis was Hypoxia. Diagnoses of Near syncope and Elevated troponin were also pertinent to this visit. has a past medical history of Acute respiratory failure with hypoxia ([facility] [address] Adenomatous polyp, Cholecystitis, Chronic venous insufficiency, COPD with acute exacerbation ([facility] [address] DVT (deep venous thrombosis) ([facility] [address] Edema, Gout, Hypertension, Onychomycosis, Osteoarthritis, Plantar fasciitis, Pneumonia due to COVID-19 virus, and Tobacco abuse.   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 Restrictions     Subjective   Subjective  Subjective: Patient supine in bed upon arrival. No complaints of pain. Finishing his breakfast.  Pain Assessment  Pain Assessment: 0-10  Pain Level: 0       Orientation  Orientation  Overall Orientation Status: Within Functional Limits  Cognition      Objective   Bed mobility  Bridging: Unable to assess  Rolling to Left: Independent  Rolling to Right: Independent  Supine to Sit: Supervision  Sit to Supine: Supervision  Transfers  Sit to Stand: Moderate Assistance  Stand to sit: Moderate Assistance  Comment: Patient required several attempts to perform sit to stand this date.  Oxygen as low as 76% while standing therefore returning to EOB sitting position and instructing in breathing techniques. Oxygen returned to 90% before performing sit to supine transfer. Oxygen returning to 96% after increased relaxation and instruction in breathing techniques. Ambulation  Ambulation?: No  Ambulation 1  Distance: Oxygen dropped as patient stood therefore no ambulation this date.                                  G-Code     OutComes Score                                                     AM-PAC Score             Goals  Short term goals  Time Frame for Short term goals: 1 day  Short term goal 1: Assess functional status  Long term goals  Time Frame for Long term goals : 2-3 days  Long term goal 1: Transfer to stand SBA  Long term goal 2: Gait with RW 20-30 ft    Plan    Plan  Times per day: Daily  Current Treatment Recommendations: Gait Training,Transfer Training  Safety Devices  Type of devices: Left in bed,Gait belt,Bed alarm in place,Call light within reach     Therapy Time   Individual Concurrent Group Co-treatment   Time In 0754         Time Out 0824         Minutes 2020 [address] [place], [place]

## 2022-04-17 NOTE — DISCHARGE SUMMARY
[address]                 [address] [place] [address], [address] [place] [address]                               DISCHARGE SUMMARY    PATIENT NAME: Gary Robles                   :        1945  MED REC NO:   [id number]                             ROOM:       0201  ACCOUNT NO:   [de-identified]                           ADMIT DATE: 04/15/2022  PROVIDER:     Mia Mina                  DISCHARGE DATE: 2022    DISCHARGE DIAGNOSIS:  Orthostatic hypotension. Please see full H and P dictated on 04/15, for HPI, past medical  history, family history, social history, and admission physical exam.    HOSPITAL COURSE:  The patient was admitted to [facility] on  04/15. He had significant orthostatic hypotension. He did not believe  he actually lost consciousness during the near syncopal spell. He was  noted to be, however, consistently orthostatic here in the hospital.  He  was not receiving significant p.r.n. Lasix at the nursing home. We held  his Proscar due to the association with orthostatic hypotension. We  decreased his metoprolol. The patient felt considerably better and  actually was beginning to request to be transferred back to the facility  on Saturday. We retained him until  and he had no recurrent  symptoms, although when stood up, his blood pressure continued to fall  to the high 70s. He was typically in the 110s or 120 when seated at  that time. We discussed that, with the discontinuation of the Proscar,  this may improve, but he may require further treatment with perhaps  Florinef or midodrine. The patient did not have other significant  problems during this hospitalization. His urine culture was still  pending at the time of hospitalization. He had an indwelling Rodriguez and  is actually following with Urology for that.   On the day of discharge,  his lungs were clear, cardiovascular exam regular, abdomen soft,  extremities no significant edema. He was felt to have reached maximum  benefit of his hospital stay and he was released home for further  management in the outpatient setting. He will follow up with his PCP  and the Medical Director at the [address] [place] [address]. I instructed the  nursing home staff to be monitoring his blood pressures with  orthostatics daily. If he is having persistent drops in the blood  pressure of significance, consideration could be given to starting  Florinef perhaps on Tuesday or Wednesday. He will continue with PT. He  is considered a fall risk, unable to take precautions.         Ky Nelson    D: 04/17/2022 14:22:53       T: 04/17/2022 14:45:40     [email or web address]  Job#: [id number]     Doc#: [id number]    CC:

## 2022-04-18 LAB
CULTURE: NORMAL
EKG ATRIAL RATE: 63 BPM
EKG ATRIAL RATE: 68 BPM
EKG ATRIAL RATE: 72 BPM
EKG P AXIS: 16 DEGREES
EKG P AXIS: 20 DEGREES
EKG P AXIS: 27 DEGREES
EKG P-R INTERVAL: 180 MS
EKG P-R INTERVAL: 184 MS
EKG P-R INTERVAL: 232 MS
EKG Q-T INTERVAL: 426 MS
EKG Q-T INTERVAL: 434 MS
EKG Q-T INTERVAL: 452 MS
EKG QRS DURATION: 138 MS
EKG QRS DURATION: 164 MS
EKG QRS DURATION: 168 MS
EKG QTC CALCULATION (BAZETT): 461 MS
EKG QTC CALCULATION (BAZETT): 462 MS
EKG QTC CALCULATION (BAZETT): 466 MS
EKG R AXIS: -45 DEGREES
EKG R AXIS: 33 DEGREES
EKG R AXIS: 40 DEGREES
EKG T AXIS: -178 DEGREES
EKG T AXIS: -81 DEGREES
EKG T AXIS: -85 DEGREES
EKG VENTRICULAR RATE: 63 BPM
EKG VENTRICULAR RATE: 68 BPM
EKG VENTRICULAR RATE: 72 BPM
SPECIMEN DESCRIPTION: NORMAL

## 2022-04-20 ENCOUNTER — OUTSIDE SERVICES (OUTPATIENT)
Dept: INTERNAL MEDICINE | Age: 77
End: 2022-04-20
Payer: MEDICARE

## 2022-04-20 DIAGNOSIS — R50.9 FEVER, UNSPECIFIED FEVER CAUSE: Primary | ICD-10-CM

## 2022-04-20 DIAGNOSIS — N40.0 BENIGN PROSTATIC HYPERPLASIA, UNSPECIFIED WHETHER LOWER URINARY TRACT SYMPTOMS PRESENT: ICD-10-CM

## 2022-04-20 DIAGNOSIS — I95.1 ORTHOSTATIC HYPOTENSION: ICD-10-CM

## 2022-04-20 DIAGNOSIS — I25.5 ISCHEMIC CARDIOMYOPATHY: ICD-10-CM

## 2022-04-20 PROCEDURE — 99309 SBSQ NF CARE MODERATE MDM 30: CPT | Performed by: NURSE PRACTITIONER

## 2022-04-20 ASSESSMENT — ENCOUNTER SYMPTOMS
VOMITING: 1
EYE PAIN: 0
TROUBLE SWALLOWING: 0
SINUS PRESSURE: 0
COUGH: 0
CHEST TIGHTNESS: 0
SHORTNESS OF BREATH: 0
CONSTIPATION: 0
NAUSEA: 0
BLOOD IN STOOL: 0
ABDOMINAL PAIN: 0
COLOR CHANGE: 0
WHEEZING: 0
SORE THROAT: 0
RHINORRHEA: 0
FACIAL SWELLING: 0
DIARRHEA: 0

## 2022-04-20 NOTE — PROGRESS NOTES
Communication with Friends and Family: Not on file    Frequency of Social Gatherings with Friends and Family: Not on file    Attends Zoroastrian Services: Not on file    Active Member of Clubs or Organizations: Not on file    Attends Club or Organization Meetings: Not on file    Marital Status: Not on file   Intimate Partner Violence:     Fear of Current or Ex-Partner: Not on file    Emotionally Abused: Not on file    Physically Abused: Not on file    Sexually Abused: Not on file   Housing Stability:     Unable to Pay for Housing in the Last Year: Not on file    Number of [place] in the Last Year: Not on file    Unstable Housing in the Last Year: Not on file       Review of Systems   Constitutional: Positive for fatigue. Negative for activity change, appetite change, chills, fever and unexpected weight change. HENT: Negative for congestion, dental problem, ear discharge, ear pain, facial swelling, hearing loss, postnasal drip, rhinorrhea, sinus pressure, sore throat and trouble swallowing. Eyes: Negative for pain and visual disturbance. Respiratory: Negative for cough, chest tightness, shortness of breath and wheezing. Cardiovascular: Negative for chest pain, palpitations and leg swelling. Gastrointestinal: Positive for vomiting (Types to yesterday when he sat up too long). Negative for abdominal pain, blood in stool, constipation, diarrhea and nausea. Endocrine: Negative for cold intolerance, heat intolerance and polyuria. Genitourinary: Negative for difficulty urinating. Rodriguez catheter in   Musculoskeletal: Negative for arthralgias, gait problem, myalgias, neck pain and neck stiffness. Skin: Negative for color change, rash and wound. Neurological: Positive for weakness. Negative for dizziness, tremors, seizures, light-headedness, numbness and headaches. Psychiatric/Behavioral: Negative for confusion and hallucinations. The patient is not nervous/anxious.         Physical Exam  Vitals and nursing note reviewed. Constitutional:       General: He is not in acute distress. Appearance: He is well-developed. He is not diaphoretic. Comments: Fatigued   HENT:      Head: Normocephalic and atraumatic. Right Ear: External ear normal.      Left Ear: External ear normal.   Eyes:      General: Lids are normal.         Right eye: No discharge. Left eye: No discharge. Conjunctiva/sclera: Conjunctivae normal.      Pupils: Pupils are equal, round, and reactive to light. Neck:      Trachea: No tracheal deviation. Cardiovascular:      Rate and Rhythm: Normal rate and regular rhythm. Heart sounds: Normal heart sounds. No murmur heard. No friction rub. No gallop. Pulmonary:      Effort: Pulmonary effort is normal. No accessory muscle usage or respiratory distress. Breath sounds: No wheezing or rales. Comments: Diminished in bilateral bases  Abdominal:      General: Bowel sounds are normal. There is no distension. Palpations: Abdomen is soft. Abdomen is not rigid. Tenderness: There is no abdominal tenderness. Genitourinary:     Comments: Rodriguez catheter draining dark yellow urine  Musculoskeletal:         General: Normal range of motion. Cervical back: Normal range of motion and neck supple. No edema or erythema. Right lower leg: Edema present. Left lower leg: Edema present. Skin:     General: Skin is warm and dry. Capillary Refill: Capillary refill takes less than 2 seconds. Coloration: Skin is not pale. Findings: No erythema or rash. Neurological:      General: No focal deficit present. Mental Status: He is alert. Mental status is at baseline. Cranial Nerves: No cranial nerve deficit. Motor: Weakness present.       Coordination: Coordination normal.   Psychiatric:         Mood and Affect: Mood normal.         Behavior: Behavior normal.       Vital Signs: Temperature 100.8, 99.1, blood pressure 106/63, pulse 102, respirations 16, SPO2 93% on 2 L    Assessment:  1. Fever, unspecified fever cause  Stat CBC, BMP, stat chest x-ray and stat UA. Frequent cardiopulmonary assessments, every 4 hours. Suggest care conference with spouse patient and family    2. Orthostatic hypotension  Slowly improving. Suggest reaching out to cardiology as patient has missed his last cardiology appointment. May need to haveMidodrine added    3. Ischemic cardiomyopathy  Stable at present, no signs of fluid overload    4. Benign prostatic hyperplasia, unspecified whether lower urinary tract symptoms present  Rodriguez catheter in place. Following with urology. Proscar was discontinued at hospital for probable cause of orthostatic hypotension        Plan:  As noted above. Follow up for routine visit. Call sooner with concerns prior.     Electronically signed by KISHOR Batista CNP on 4/20/2022 at 4:41 PM

## 2022-04-21 ENCOUNTER — TELEPHONE (OUTPATIENT)
Dept: CARDIOLOGY | Age: 77
End: 2022-04-21

## 2022-04-21 DIAGNOSIS — I95.1 ORTHOSTATIC HYPOTENSION: Primary | ICD-10-CM

## 2022-04-21 RX ORDER — MIDODRINE HYDROCHLORIDE 5 MG/1
5 TABLET ORAL 3 TIMES DAILY
Qty: 90 TABLET | Refills: 6 | Status: SHIPPED | OUTPATIENT
Start: 2022-04-21 | End: 2022-08-09 | Stop reason: SDUPTHER

## 2022-04-21 NOTE — TELEPHONE ENCOUNTER
Sheryle Dus called due to pt being taken to er because his HR bottomed out while at therapy. Pt was taken to the er and had med changes. Sheryle Dus is sending info/orthostatics on the pt to nurses fax.     Sheryle Dus [phone]    Last Appt:  Visit date not found  Next Appt:   5/18/2022  Med verified in [facility] [address]

## 2022-04-21 NOTE — TELEPHONE ENCOUNTER
Alcides Proctor at [address] Shaniqua [place] orders. The ER stopped metoprolol already. Zan De Leon accepted midodrine order. She will fax orthostatic BP readings.

## 2022-04-27 ENCOUNTER — TELEPHONE (OUTPATIENT)
Dept: INTERNAL MEDICINE | Age: 77
End: 2022-04-27

## 2022-04-28 ENCOUNTER — OUTSIDE SERVICES (OUTPATIENT)
Dept: INTERNAL MEDICINE | Age: 77
End: 2022-04-28
Payer: MEDICARE

## 2022-04-28 DIAGNOSIS — I25.5 ISCHEMIC CARDIOMYOPATHY: ICD-10-CM

## 2022-04-28 DIAGNOSIS — Z86.16 HISTORY OF COVID-19: Primary | ICD-10-CM

## 2022-04-28 DIAGNOSIS — J43.9 PULMONARY EMPHYSEMA, UNSPECIFIED EMPHYSEMA TYPE (HCC): ICD-10-CM

## 2022-04-28 DIAGNOSIS — I25.10 CORONARY ARTERY DISEASE INVOLVING NATIVE CORONARY ARTERY OF NATIVE HEART WITHOUT ANGINA PECTORIS: ICD-10-CM

## 2022-04-28 DIAGNOSIS — E03.4 HYPOTHYROIDISM DUE TO ACQUIRED ATROPHY OF THYROID: ICD-10-CM

## 2022-04-28 DIAGNOSIS — I10 ESSENTIAL HYPERTENSION: ICD-10-CM

## 2022-04-28 DIAGNOSIS — I50.42 CHRONIC COMBINED SYSTOLIC AND DIASTOLIC CHF (CONGESTIVE HEART FAILURE) (HCC): ICD-10-CM

## 2022-04-28 DIAGNOSIS — E66.3 OVERWEIGHT: ICD-10-CM

## 2022-04-28 DIAGNOSIS — E78.2 MIXED HYPERLIPIDEMIA: ICD-10-CM

## 2022-04-28 NOTE — PROGRESS NOTES
DR. Alba Beasley - [facility] VISIT    DATE OF SERVICE: 3/27/22    NURSING HOME: [facility]    CHIEF COMPLAINT/HISTORY OF CHIEF COMPLAINT: This patient is being seen for ongoing evaluation and management of his history of COVID-19, hypertension, hyperlipidemia, hypothyroidism, coronary artery disease, congestive heart failure, ischemic cardiomyopathy, chronic obstructive pulmonary disease, and obesity. He seems to be continuing to do fairly well with physical therapy. There are no new complaints at this time. ALLERGIES: No Known Allergies    MEDICATIONS: As noted on the [facility], referenced and incorporated herein.     PAST MEDICAL HISTORY:   Past Medical History:   Diagnosis Date    Acute respiratory failure with hypoxia ([facility] [address] 2021    Adenomatous polyp     history of     Cholecystitis 3/19/2019    Chronic venous insufficiency     COPD with acute exacerbation ([facility] [address] 2021    DVT (deep venous thrombosis) (HCC)     history of     Edema     related to venous stasis     Gout     history of     Hypertension     Onychomycosis     Osteoarthritis     Plantar fasciitis     Pneumonia due to COVID-19 virus 2021    Tobacco abuse        PAST SURGICAL HISTORY:   Past Surgical History:   Procedure Laterality Date    COLONOSCOPY  2012    polyp    COLONOSCOPY  2003    adenomatous polyps     COLONOSCOPY  2009    COLONOSCOPY  2008    with polypectomy    CORONARY ARTERY BYPASS GRAFT  10/28/2014    SVG-OM1, SVG-OM2, and SVG-PDA    OTHER SURGICAL HISTORY      teeth extracted     TOTAL KNEE ARTHROPLASTY Right 10/2015    TOTAL KNEE ARTHROPLASTY Left 2018    TURP      VASECTOMY  1980       SOCIAL HISTORY:     Tobacco:   Social History     Tobacco Use   Smoking Status Former Smoker    Packs/day: 0.00    Years: 55.00    Pack years: 0.00    Types: Cigarettes    Quit date: 10/27/2014    Years since quittin.5   Smokeless Tobacco Never Used Alcohol:   Social History     Substance and Sexual Activity   Alcohol Use Yes    Alcohol/week: 0.0 standard drinks    Comment: occasionally     Drugs:   Social History     Substance and Sexual Activity   Drug Use No       FAMILY HISTORY: family history includes Heart Disease in his brother; Heart Surgery in his sister; Hypertension in an other family member; Kidney Disease in his brother; Other in his brother; Other (age of onset: 52) in his mother; Sudden Death in his father. REVIEW OF SYSTEMS:     Please see history of chief complaint above; otherwise no new problems with respect to General, HEENT, Cardiovascular, Respiratory, Gastrointestinal, Genitourinary, Endocrinologic, Musculoskeletal, or Neuropsychiatric complaints. PHYSICAL EXAMINATION:    Vitals: Temp: 98.3 deg F. Pulse: 95. Resp: 16. BP: 105/56. General: A 67 y.o.  male. Alert and oriented to person, place and time. He  does not appear to be in any acute distress. Skin: Skin color, texture, turgor normal. No rashes or lesions. HEENT/Neck: Essentially unremarkable  Lungs: Normal - CTA without rales, rhonchi, or wheezing. Heart: regular rate and rhythm, S1, S2 normal, no murmur, click, rub or gallop No S3 or S4. Abdomen: Obese soft, non-distended, non-tender, normal active bowel sounds, no masses palpated and no hepatosplenomegaly  Extremities: No clubbing, cyanosis, or edema in any of the extremities. Neurologic: cranial nerves II-XII are grossly intact    ASSESSMENT:     Diagnosis Orders   1. History of COVID-19     2. Essential hypertension     3. Mixed hyperlipidemia     4. Hypothyroidism due to acquired atrophy of thyroid     5. Coronary artery disease involving native coronary artery of native heart without angina pectoris     6. Chronic combined systolic and diastolic CHF (congestive heart failure) ([address]     7. Ischemic cardiomyopathy     8. Pulmonary emphysema, unspecified emphysema type ([address]     9.  Overweight PLAN:    1. Continue current treatment  2. Nursing home record reviewed and updates summarized and entered into electronic record  3. See nursing home orders and MAR.         Electronically signed by Urszula Villa DO on 4/28/2022 at 1:47 AM  Internal Medicine

## 2022-04-29 ENCOUNTER — TELEPHONE (OUTPATIENT)
Dept: INTERNAL MEDICINE | Age: 77
End: 2022-04-29

## 2022-04-30 PROCEDURE — 99307 SBSQ NF CARE SF MDM 10: CPT | Performed by: INTERNAL MEDICINE

## 2022-05-05 DIAGNOSIS — J43.9 PULMONARY EMPHYSEMA, UNSPECIFIED EMPHYSEMA TYPE (HCC): Primary | ICD-10-CM

## 2022-05-11 ENCOUNTER — OFFICE VISIT (OUTPATIENT)
Dept: PULMONOLOGY | Age: 77
End: 2022-05-11
Payer: MEDICARE

## 2022-05-11 ENCOUNTER — HOSPITAL ENCOUNTER (OUTPATIENT)
Dept: PULMONOLOGY | Age: 77
Discharge: HOME | End: 2022-05-11
Payer: MEDICARE

## 2022-05-11 VITALS
WEIGHT: 233 LBS | HEIGHT: 74 IN | OXYGEN SATURATION: 95 % | BODY MASS INDEX: 29.9 KG/M2 | DIASTOLIC BLOOD PRESSURE: 72 MMHG | SYSTOLIC BLOOD PRESSURE: 124 MMHG | TEMPERATURE: 98.2 F | HEART RATE: 65 BPM

## 2022-05-11 DIAGNOSIS — J96.11 CHRONIC RESPIRATORY FAILURE WITH HYPOXIA, ON HOME O2 THERAPY (HCC): ICD-10-CM

## 2022-05-11 DIAGNOSIS — J12.82 PNEUMONIA DUE TO COVID-19 VIRUS: Primary | ICD-10-CM

## 2022-05-11 DIAGNOSIS — I51.89 COMBINED SYSTOLIC AND DIASTOLIC CARDIAC DYSFUNCTION: ICD-10-CM

## 2022-05-11 DIAGNOSIS — J43.9 PULMONARY EMPHYSEMA, UNSPECIFIED EMPHYSEMA TYPE (HCC): ICD-10-CM

## 2022-05-11 DIAGNOSIS — Z99.81 CHRONIC RESPIRATORY FAILURE WITH HYPOXIA, ON HOME O2 THERAPY (HCC): ICD-10-CM

## 2022-05-11 DIAGNOSIS — G62.81 CRITICAL ILLNESS POLYNEUROPATHY (HCC): ICD-10-CM

## 2022-05-11 DIAGNOSIS — U07.1 PNEUMONIA DUE TO COVID-19 VIRUS: Primary | ICD-10-CM

## 2022-05-11 LAB
DLCO %PRED: NORMAL
DLCO PRED: NORMAL
DLCO/VA %PRED: NORMAL
DLCO/VA PRED: NORMAL
DLCO/VA: NORMAL
DLCO: NORMAL
EXPIRATORY TIME-POST: NORMAL
EXPIRATORY TIME: NORMAL
FEF 25-75% %CHNG: NORMAL
FEF 25-75% %PRED-POST: NORMAL
FEF 25-75% %PRED-PRE: NORMAL
FEF 25-75% PRED: NORMAL
FEF 25-75%-POST: NORMAL
FEF 25-75%-PRE: NORMAL
FEV1 %PRED-POST: NORMAL
FEV1 %PRED-PRE: NORMAL
FEV1 PRED: NORMAL
FEV1-POST: NORMAL
FEV1-PRE: NORMAL
FEV1/FVC %PRED-POST: NORMAL
FEV1/FVC %PRED-PRE: NORMAL
FEV1/FVC PRED: NORMAL
FEV1/FVC-POST: NORMAL
FEV1/FVC-PRE: NORMAL
FVC %PRED-POST: NORMAL
FVC %PRED-PRE: NORMAL
FVC PRED: NORMAL
FVC-POST: NORMAL
FVC-PRE: NORMAL
GAW %PRED: NORMAL
GAW PRED: NORMAL
GAW: NORMAL
IC %PRED: NORMAL
IC PRED: NORMAL
IC: NORMAL
MEP: NORMAL
MIP: NORMAL
MVV %PRED-PRE: NORMAL
MVV PRED: NORMAL
MVV-PRE: NORMAL
PEF %PRED-POST: NORMAL
PEF %PRED-PRE: NORMAL
PEF PRED: NORMAL
PEF%CHNG: NORMAL
PEF-POST: NORMAL
PEF-PRE: NORMAL
RAW %PRED: NORMAL
RAW PRED: NORMAL
RAW: NORMAL
RV %PRED: NORMAL
RV PRED: NORMAL
RV: NORMAL
SVC %PRED: NORMAL
SVC PRED: NORMAL
SVC: NORMAL
TLC %PRED: NORMAL
TLC PRED: NORMAL
TLC: NORMAL
VA %PRED: NORMAL
VA PRED: NORMAL
VA: NORMAL
VTG %PRED: NORMAL
VTG PRED: NORMAL
VTG: NORMAL

## 2022-05-11 PROCEDURE — 94640 AIRWAY INHALATION TREATMENT: CPT

## 2022-05-11 PROCEDURE — 1123F ACP DISCUSS/DSCN MKR DOCD: CPT | Performed by: INTERNAL MEDICINE

## 2022-05-11 PROCEDURE — 99204 OFFICE O/P NEW MOD 45 MIN: CPT | Performed by: INTERNAL MEDICINE

## 2022-05-11 PROCEDURE — 4040F PNEUMOC VAC/ADMIN/RCVD: CPT | Performed by: INTERNAL MEDICINE

## 2022-05-11 PROCEDURE — G8417 CALC BMI ABV UP PARAM F/U: HCPCS | Performed by: INTERNAL MEDICINE

## 2022-05-11 PROCEDURE — 6370000000 HC RX 637 (ALT 250 FOR IP): Performed by: INTERNAL MEDICINE

## 2022-05-11 PROCEDURE — 1036F TOBACCO NON-USER: CPT | Performed by: INTERNAL MEDICINE

## 2022-05-11 PROCEDURE — 99213 OFFICE O/P EST LOW 20 MIN: CPT | Performed by: INTERNAL MEDICINE

## 2022-05-11 PROCEDURE — 94729 DIFFUSING CAPACITY: CPT

## 2022-05-11 PROCEDURE — G8427 DOCREV CUR MEDS BY ELIG CLIN: HCPCS | Performed by: INTERNAL MEDICINE

## 2022-05-11 PROCEDURE — 94060 EVALUATION OF WHEEZING: CPT

## 2022-05-11 RX ORDER — ALBUTEROL SULFATE 90 UG/1
4 AEROSOL, METERED RESPIRATORY (INHALATION) ONCE
Status: COMPLETED | OUTPATIENT
Start: 2022-05-11 | End: 2022-05-11

## 2022-05-11 RX ADMIN — ALBUTEROL SULFATE 4 PUFF: 90 AEROSOL, METERED RESPIRATORY (INHALATION) at 12:53

## 2022-05-11 ASSESSMENT — ENCOUNTER SYMPTOMS
SHORTNESS OF BREATH: 1
GASTROINTESTINAL NEGATIVE: 1
EYES NEGATIVE: 1

## 2022-05-11 NOTE — PROGRESS NOTES
Subjective:      Patient ID: Ginna Dunlap is a 67 y.o. male being seen in my clinic for   Chief Complaint   Patient presents with    New Patient     COPD       HPI  New patient visit, referred by Dr. Kajal Ferrara, for evaluation of prolonged hypoxemia. Patient is accompanied by his wife. He is in a wheelchair. Currently resides at nursing home. Patient developed COVID-pneumonia in mid December. CT angiogram of the chest is reviewed. Bilateral patchy groundglass densities involving all 5 lobes. Prolonged hospitalization supported with high-level oxygen and BiPAP. Discharged about a month later to nursing home. Desaturations down to 70% off oxygen but wife states that over the last couple of months is gotten considerably better. Now can be off oxygen at rest.  Less shortness of breath. Lost nearly 60 pounds. Generalized weakness. Currently undergoing aggressive PT/OT. No evidence for aspiration. Most recent chest x-ray done on 4/16/2022 reviewed. Mild interstitial changes. Overall chest x-ray much improved. Was hypoxemic. Elevation of troponin. Acute myocardial infarction excluded. Patient quit smoking nearly 20 years ago after 30-pack-year history. Worked as a . No history of asthma or allergies. Denies shortness of breath or exercise intolerance prior to hospitalization. Patient maintained on aspirin and Plavix. He has a history of combined systolic and diastolic heart failure. Follows with Dr. Sixto Shepherd. Uses albuterol as needed. Pulmonary function studies done today demonstrate a moderate restrictive ventilatory defect without significant bronchospastic component. Flow volume loop demonstrates suboptimal and variable effort.     Current Outpatient Medications   Medication Sig Dispense Refill    midodrine (PROAMATINE) 5 MG tablet Take 1 tablet by mouth 3 times daily 90 tablet 6    polyethylene glycol (GLYCOLAX) 17 g packet Take 17 g by mouth daily as needed for Constipation 527 g 0    sertraline (ZOLOFT) 50 MG tablet Take 50 mg by mouth nightly       traZODone (DESYREL) 50 MG tablet       bisacodyl (DULCOLAX) 5 MG EC tablet Take 2 tablets by mouth daily as needed for Constipation 30 tablet 0    hydrocortisone (ANUSOL-HC) 25 MG suppository Place 1 suppository rectally 2 times daily as needed for Hemorrhoids 30 suppository 0    potassium chloride (KLOR-CON M) 10 MEQ extended release tablet Take 1 tablet by mouth daily 30 tablet 0    levothyroxine (SYNTHROID) 75 MCG tablet Take 1 tablet by mouth daily 30 tablet 1    buPROPion (WELLBUTRIN) 75 MG tablet Take 1 tablet by mouth twice daily 60 tablet 3    allopurinol (ZYLOPRIM) 300 MG tablet Take 1 tablet by mouth once daily 90 tablet 1    atorvastatin (LIPITOR) 40 MG tablet TAKE 1 TABLET BY MOUTH ONCE DAILY 90 tablet 3    clopidogrel (PLAVIX) 75 MG tablet Take 1 tablet by mouth once daily 90 tablet 3    oxybutynin (DITROPAN XL) 15 MG extended release tablet TAKE 1 TABLET BY MOUTH ONCE DAILY      Handicap Placard MISC by Does not apply route Permanent 1 each 0    Omega-3 Fatty Acids (FISH OIL PO) Take 1,200 mg by mouth daily      Acetaminophen (TYLENOL PO) Take by mouth Per pt, takes 2 in am; 2 in pm      aspirin 81 MG tablet Take 81 mg by mouth daily.       metoprolol tartrate (LOPRESSOR) 25 MG tablet  (Patient not taking: Reported on 5/11/2022)      ondansetron (ZOFRAN) 4 MG tablet  (Patient not taking: Reported on 5/11/2022)      NYSTATIN 723309 UNIT/GM powder  (Patient not taking: Reported on 4/15/2022)      benzonatate (TESSALON) 200 MG capsule Take 200 mg by mouth as needed for Cough (Patient not taking: Reported on 5/11/2022)      guaiFENesin-dextromethorphan (ROBITUSSIN DM) 100-10 MG/5ML syrup Take 5 mLs by mouth 3 times daily as needed for Cough (Patient not taking: Reported on 5/11/2022)      albuterol sulfate  (90 Base) MCG/ACT inhaler Inhale 2 puffs into the lungs every 4 hours as needed for Wheezing (Patient not taking: Reported on 4/15/2022) 18 g 3     No current facility-administered medications for this visit. Family History   Problem Relation Age of Onset    Other Mother 52        Rheumatic fever    Kidney Disease Brother     Heart Disease Brother     Sudden Death Father         auto accident     Heart Surgery Sister         bypass surgery    Other Brother         abdominal aortic aneursym    Hypertension Other         siblings       Social History     Tobacco Use    Smoking status: Former Smoker     Packs/day: 0.00     Years: 55.00     Pack years: 0.00     Types: Cigarettes     Quit date: 10/27/2014     Years since quittin.5    Smokeless tobacco: Never Used   Vaping Use    Vaping Use: Never used   Substance Use Topics    Alcohol use: Yes     Alcohol/week: 0.0 standard drinks     Comment: occasionally    Drug use: No       Review of Systems   Constitutional: Positive for fatigue and unexpected weight change. HENT: Negative. Eyes: Negative. Respiratory: Positive for shortness of breath. Cardiovascular: Negative. Gastrointestinal: Negative. Genitourinary: Negative. Musculoskeletal: Positive for gait problem. Neurological: Positive for weakness. All other systems reviewed and are negative. Objective:     Vitals:    22 1352   BP: 124/72   Site: Right Upper Arm   Position: Sitting   Cuff Size: Medium Adult   Pulse: 65   Temp: 98.2 °F (36.8 °C)   SpO2: 95%   Weight: 233 lb (105.7 kg)   Height: 6' 2\" (1.88 m)     Physical Exam  Vitals and nursing note reviewed. Constitutional:       Appearance: He is well-developed. He is ill-appearing. HENT:      Mouth/Throat:      Mouth: Mucous membranes are moist.      Pharynx: Oropharynx is clear. No oropharyngeal exudate. Eyes:      General: No scleral icterus. Conjunctiva/sclera: Conjunctivae normal.   Neck:      Thyroid: No thyromegaly. Vascular: No JVD. Trachea: No tracheal deviation. Cardiovascular:      Rate and Rhythm: Normal rate and regular rhythm. Heart sounds: Normal heart sounds. No murmur heard. No gallop. Pulmonary:      Effort: Pulmonary effort is normal. No respiratory distress. Breath sounds: No wheezing or rales. Comments: Expansion diaphragmatic excursion diminished. Chest:      Chest wall: No tenderness. Abdominal:      Palpations: Abdomen is soft. Tenderness: There is no abdominal tenderness. Musculoskeletal:      Cervical back: Neck supple. Right lower leg: Edema present. Left lower leg: Edema present. Lymphadenopathy:      Cervical: No cervical adenopathy. Skin:     General: Skin is warm and dry. Findings: Bruising present. Comments: Widespread ecchymosis. Neurological:      Mental Status: He is alert and oriented to person, place, and time. Coordination: Coordination abnormal.     Need to reach  Wt Readings from Last 3 Encounters:   05/11/22 233 lb (105.7 kg)   04/17/22 233 lb 8 oz (105.9 kg)   01/08/22 290 lb 12.8 oz (131.9 kg)     Results for orders placed or performed during the hospital encounter of 04/15/22   Culture, Urine    Specimen: Urine, clean catch   Result Value Ref Range    Specimen Description . CLEAN CATCH URINE     Culture       Several types of bacteria were identified in this specimen. Further ID and susceptibility testing is generally not helpful in this circumstance and has not been performed. Consider recollection if clinically indicated. Please contact the [facility] lab ([phone]) if you feel the management ofthis particular situation would be helped by further testing. COVID-19, Rapid    Specimen: Nasopharyngeal Swab   Result Value Ref Range    Specimen Description . NASOPHARYNGEAL SWAB     SARS-CoV-2, Rapid Not Detected Not Detected   CBC with Auto Differential   Result Value Ref Range    WBC 8.9 3.5 - 11.3 k/uL    RBC 4.20 (L) 4.21 - 5.77 m/uL    Hemoglobin 12.6 (L) 13.0 - 17.0 g/dL Hematocrit 40.9 40.7 - 50.3 %    MCV 97.4 82.6 - 102.9 fL    MCH 30.0 25.2 - 33.5 pg    MCHC 30.8 25.2 - 33.5 g/dL    RDW 15.7 (H) 11.8 - 14.4 %    Platelets 858 260 - 468 k/uL    MPV 9.3 8.1 - 13.5 fL    NRBC Automated 0.0 0.0 per 100 WBC    Seg Neutrophils 69 (H) 36 - 65 %    Lymphocytes 21 (L) 24 - 43 %    Monocytes 8 3 - 12 %    Eosinophils % 1 1 - 4 %    Basophils 0 0 - 2 %    Immature Granulocytes 1 (H) 0 %    Segs Absolute 6.18 1.50 - 8.10 k/uL    Absolute Lymph # 1.83 1.10 - 3.70 k/uL    Absolute Mono # 0.71 0.10 - 1.20 k/uL    Absolute Eos # 0.09 0.00 - 0.44 k/uL    Basophils Absolute 0.03 0.00 - 0.20 k/uL    Absolute Immature Granulocyte 0.04 0.00 - 0.30 k/uL    RBC Morphology ANISOCYTOSIS PRESENT    Comprehensive Metabolic Panel   Result Value Ref Range    Glucose 103 (H) 70 - 99 mg/dL    BUN 15 8 - 23 mg/dL    CREATININE 0.81 0.70 - 1.20 mg/dL    Bun/Cre Ratio 19 9 - 20    Calcium 9.2 8.6 - 10.4 mg/dL    Sodium 137 135 - 144 mmol/L    Potassium 4.5 3.7 - 5.3 mmol/L    Chloride 98 98 - 107 mmol/L    CO2 29 20 - 31 mmol/L    Anion Gap 10 9 - 17 mmol/L    Alkaline Phosphatase 117 40 - 129 U/L    ALT 14 5 - 41 U/L    AST 21 <40 U/L    Total Bilirubin 0.34 0.3 - 1.2 mg/dL    Total Protein 6.2 (L) 6.4 - 8.3 g/dL    Albumin 3.3 (L) 3.5 - 5.2 g/dL    Albumin/Globulin Ratio 1.1 1.0 - 2.5    GFR Non-African American >60 >60 mL/min    GFR African American >60 >60 mL/min    GFR Comment         Troponin   Result Value Ref Range    Troponin, High Sensitivity 139 (HH) 0 - 22 ng/L   D-Dimer, Quantitative   Result Value Ref Range    D-Dimer, Quant 0.52 0.00 - 0.59 mg/L FEU   Urinalysis with Reflex to Culture    Specimen: Urine, clean catch   Result Value Ref Range    Glucose, Ur NEGATIVE NEGATIVE    Bilirubin Urine NEGATIVE NEGATIVE    Ketones, Urine NEGATIVE NEGATIVE    Specific Gravity, UA 1.025 1.010 - 1.025    Urine Hgb 3+ (A) NEGATIVE    pH, UA 6.0 5.0 - 6.0    Protein, UA 1+ (A) NEGATIVE    Urobilinogen, Urine Normal Normal    Nitrite, Urine NEGATIVE NEGATIVE    Leukocyte Esterase, Urine 2+ (A) NEGATIVE   Troponin   Result Value Ref Range    Troponin, High Sensitivity 125 (HH) 0 - 22 ng/L   Microscopic Urinalysis   Result Value Ref Range    -          WBC, UA >50 0 - 4 /HPF    RBC, UA >50 0 - 4 /HPF    Epithelial Cells UA 0 TO 4 0 - 5 /HPF    Bacteria, UA 3+ (A) None    Other Observations UA Specimen Cultured (A) NOT REQ.    Basic Metabolic Panel w/ Reflex to MG   Result Value Ref Range    Glucose 98 70 - 99 mg/dL    BUN 13 8 - 23 mg/dL    CREATININE 0.76 0.70 - 1.20 mg/dL    Bun/Cre Ratio 17 9 - 20    Calcium 8.9 8.6 - 10.4 mg/dL    Sodium 136 135 - 144 mmol/L    Potassium 4.6 3.7 - 5.3 mmol/L    Chloride 100 98 - 107 mmol/L    CO2 29 20 - 31 mmol/L    Anion Gap 7 (L) 9 - 17 mmol/L    GFR Non-African American >60 >60 mL/min    GFR African American >60 >60 mL/min    GFR Comment         CBC auto differential   Result Value Ref Range    WBC 7.6 3.5 - 11.3 k/uL    RBC 4.16 (L) 4.21 - 5.77 m/uL    Hemoglobin 12.5 (L) 13.0 - 17.0 g/dL    Hematocrit 40.6 (L) 40.7 - 50.3 %    MCV 97.6 82.6 - 102.9 fL    MCH 30.0 25.2 - 33.5 pg    MCHC 30.8 25.2 - 33.5 g/dL    RDW 15.7 (H) 11.8 - 14.4 %    Platelets 054 686 - 146 k/uL    MPV 8.9 8.1 - 13.5 fL    NRBC Automated 0.0 0.0 per 100 WBC    Seg Neutrophils 64 36 - 65 %    Lymphocytes 25 24 - 43 %    Monocytes 9 3 - 12 %    Eosinophils % 1 1 - 4 %    Basophils 1 0 - 2 %    Immature Granulocytes 0 0 %    Segs Absolute 4.78 1.50 - 8.10 k/uL    Absolute Lymph # 1.90 1.10 - 3.70 k/uL    Absolute Mono # 0.70 0.10 - 1.20 k/uL    Absolute Eos # 0.10 0.00 - 0.44 k/uL    Basophils Absolute 0.04 0.00 - 0.20 k/uL    Absolute Immature Granulocyte 0.03 0.00 - 0.30 k/uL    RBC Morphology ANISOCYTOSIS PRESENT    Troponin   Result Value Ref Range    Troponin, High Sensitivity 128 (HH) 0 - 22 ng/L   Troponin   Result Value Ref Range    Troponin, High Sensitivity 127 (HH) 0 - 22 ng/L   TSH WITH REFLEX Result Value Ref Range    TSH 4.08 0.30 - 5.00 uIU/mL   CBC with Auto Differential   Result Value Ref Range    WBC 7.2 3.5 - 11.3 k/uL    RBC 4.04 (L) 4.21 - 5.77 m/uL    Hemoglobin 12.3 (L) 13.0 - 17.0 g/dL    Hematocrit 39.9 (L) 40.7 - 50.3 %    MCV 98.8 82.6 - 102.9 fL    MCH 30.4 25.2 - 33.5 pg    MCHC 30.8 25.2 - 33.5 g/dL    RDW 15.7 (H) 11.8 - 14.4 %    Platelets 473 388 - 548 k/uL    MPV 9.3 8.1 - 13.5 fL    NRBC Automated 0.0 0.0 per 100 WBC    Seg Neutrophils 58 36 - 65 %    Lymphocytes 28 24 - 43 %    Monocytes 11 3 - 12 %    Eosinophils % 2 1 - 4 %    Basophils 0 0 - 2 %    Immature Granulocytes 1 (H) 0 %    Segs Absolute 4.29 1.50 - 8.10 k/uL    Absolute Lymph # 1.99 1.10 - 3.70 k/uL    Absolute Mono # 0.77 0.10 - 1.20 k/uL    Absolute Eos # 0.11 0.00 - 0.44 k/uL    Basophils Absolute 0.03 0.00 - 0.20 k/uL    Absolute Immature Granulocyte 0.05 0.00 - 0.30 k/uL    RBC Morphology ANISOCYTOSIS PRESENT    Basic Metabolic Panel   Result Value Ref Range    Glucose 91 70 - 99 mg/dL    BUN 10 8 - 23 mg/dL    CREATININE 0.71 0.70 - 1.20 mg/dL    Bun/Cre Ratio 14 9 - 20    Calcium 8.9 8.6 - 10.4 mg/dL    Sodium 140 135 - 144 mmol/L    Potassium 4.4 3.7 - 5.3 mmol/L    Chloride 104 98 - 107 mmol/L    CO2 28 20 - 31 mmol/L    Anion Gap 8 (L) 9 - 17 mmol/L    GFR Non-African American >60 >60 mL/min    GFR African American >60 >60 mL/min    GFR Comment         EKG 12 Lead   Result Value Ref Range    Ventricular Rate 72 BPM    Atrial Rate 72 BPM    P-R Interval 184 ms    QRS Duration 168 ms    Q-T Interval 426 ms    QTc Calculation (Bazett) 466 ms    P Axis 16 degrees    R Axis 40 degrees    T Axis -81 degrees   EKG 12 Lead   Result Value Ref Range    Ventricular Rate 68 BPM    Atrial Rate 68 BPM    P-R Interval 180 ms    QRS Duration 164 ms    Q-T Interval 434 ms    QTc Calculation (Bazett) 461 ms    P Axis 20 degrees    R Axis 33 degrees    T Axis -85 degrees   EKG 12 Lead   Result Value Ref Range    Ventricular Rate 63 BPM    Atrial Rate 63 BPM    P-R Interval 232 ms    QRS Duration 138 ms    Q-T Interval 452 ms    QTc Calculation (Bazett) 462 ms    P Axis 27 degrees    R Axis -45 degrees    T Axis -178 degrees       Assessment:      1. Pneumonia due to COVID-19 virus    2. Chronic respiratory failure with hypoxia, on home O2 therapy ([facility] [address]    3. Critical illness polyneuropathy ([facility] [address]    4. 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 Current signs and symptoms related to severe COVID-pneumonia complicated by critical illness polyneuropathy. Slowly improving. 2. Please check oxygen saturations twice daily. If oxygen saturation greater than 90% on room air okay to remain off oxygen. Alternatively, if needed for physical activity, can use as needed. 3. Push PT/OT/speech therapies. 4. Maintain nutrition. 5. No need for bronchodilators unless symptomatic relief. 6. Long discussion with patient and wife.   Recovery after critical illness may take up to a year and may be less than complete. In general,  7. COVID-19) pneumonia rarely causes pulmonary fibrosis although has been does described to accelerate pre-existing fibrosis. Based on exam and chest x-ray do not believe he has this complication. 8. From my standpoint does not require anticoagulation for COVID. Presumably on antiplatelet therapy for ischemic heart disease. 9. Return in 2 months. Sooner if new or advancing symptoms. No orders of the defined types were placed in this encounter. No orders of the defined types were placed in this encounter. Return in about 2 months (around 7/11/2022).      Electronically signed by Red Wren DO on 5/11/2022 at 2:22 PM

## 2022-05-12 NOTE — PROCEDURES
[address]                 [address] [place] [address]                               PULMONARY FUNCTION    PATIENT NAME: Nicki Thomas                   :        1945  MED REC NO:   [id number]                             ROOM:  ACCOUNT NO:   [de-identified]                           ADMIT DATE: 2022  PROVIDER:     Mackenzie Celaya    DATE OF PROCEDURE:  2022    INTERPRETATION:  Spirometry demonstrates a moderate restrictive  ventilatory defect. No significant bronchospastic component is  identified. FVC 2.34 liters (51%), FEV1 2.11 liters (63%), FEV1/FVC  90%. Static lung volumes and diffusion capacity could not be performed. Flow volume loop suggests suboptimal patient effort. IMPRESSION:  The study is most consistent with a restrictive defect,  possibly related to suboptimal effort. Clinical correlation is  required.         Abi Urbina    D: 2022 17:57:23       T: 2022 17:59:37     [email or web address]  Job#: [id number]     Doc#: [id number]    CC:  Javed Ellis

## 2022-05-15 PROBLEM — R79.89 ELEVATED TROPONIN: Status: RESOLVED | Noted: 2022-04-15 | Resolved: 2022-05-15

## 2022-05-15 PROBLEM — R77.8 ELEVATED TROPONIN: Status: RESOLVED | Noted: 2022-04-15 | Resolved: 2022-05-15

## 2022-05-18 ENCOUNTER — OFFICE VISIT (OUTPATIENT)
Dept: CARDIOLOGY | Age: 77
End: 2022-05-18
Payer: MEDICARE

## 2022-05-18 VITALS
BODY MASS INDEX: 29.77 KG/M2 | HEIGHT: 74 IN | DIASTOLIC BLOOD PRESSURE: 70 MMHG | SYSTOLIC BLOOD PRESSURE: 112 MMHG | HEART RATE: 92 BPM | WEIGHT: 232 LBS

## 2022-05-18 DIAGNOSIS — I25.10 CORONARY ARTERY DISEASE INVOLVING NATIVE CORONARY ARTERY OF NATIVE HEART WITHOUT ANGINA PECTORIS: Primary | ICD-10-CM

## 2022-05-18 DIAGNOSIS — I95.1 ORTHOSTATIC HYPOTENSION: ICD-10-CM

## 2022-05-18 DIAGNOSIS — E78.5 HYPERLIPIDEMIA, UNSPECIFIED HYPERLIPIDEMIA TYPE: ICD-10-CM

## 2022-05-18 DIAGNOSIS — I44.7 LBBB (LEFT BUNDLE BRANCH BLOCK): ICD-10-CM

## 2022-05-18 PROCEDURE — 1123F ACP DISCUSS/DSCN MKR DOCD: CPT | Performed by: INTERNAL MEDICINE

## 2022-05-18 PROCEDURE — G8417 CALC BMI ABV UP PARAM F/U: HCPCS | Performed by: INTERNAL MEDICINE

## 2022-05-18 PROCEDURE — 99214 OFFICE O/P EST MOD 30 MIN: CPT | Performed by: INTERNAL MEDICINE

## 2022-05-18 PROCEDURE — 93010 ELECTROCARDIOGRAM REPORT: CPT | Performed by: INTERNAL MEDICINE

## 2022-05-18 PROCEDURE — 1036F TOBACCO NON-USER: CPT | Performed by: INTERNAL MEDICINE

## 2022-05-18 PROCEDURE — 93005 ELECTROCARDIOGRAM TRACING: CPT | Performed by: INTERNAL MEDICINE

## 2022-05-18 PROCEDURE — G8427 DOCREV CUR MEDS BY ELIG CLIN: HCPCS | Performed by: INTERNAL MEDICINE

## 2022-05-18 PROCEDURE — 4040F PNEUMOC VAC/ADMIN/RCVD: CPT | Performed by: INTERNAL MEDICINE

## 2022-05-18 NOTE — PROGRESS NOTES
mouth as needed for Cough  Patient not taking: Reported on 5/11/2022    Historical Provider, MD   guaiFENesin-dextromethorphan (ROBITUSSIN DM) 100-10 MG/5ML syrup Take 5 mLs by mouth 3 times daily as needed for Cough  Patient not taking: Reported on 5/11/2022    Historical Provider, MD   bisacodyl (DULCOLAX) 5 MG EC tablet Take 2 tablets by mouth daily as needed for Constipation 1/8/22   Edith Couch MD   hydrocortisone (ANUSOL-HC) 25 MG suppository Place 1 suppository rectally 2 times daily as needed for Hemorrhoids 1/8/22   Edith Couch MD   albuterol sulfate  (90 Base) MCG/ACT inhaler Inhale 2 puffs into the lungs every 4 hours as needed for Wheezing  Patient not taking: Reported on 4/15/2022 1/8/22   Edith Couch MD   potassium chloride (KLOR-CON M) 10 MEQ extended release tablet Take 1 tablet by mouth daily 1/8/22   Edith Couch MD   levothyroxine (SYNTHROID) 75 MCG tablet Take 1 tablet by mouth daily 1/8/22   Edith Couch MD   buPROPion (WELLBUTRIN) 75 MG tablet Take 1 tablet by mouth twice daily 11/23/21   Edith Couch MD   allopurinol (ZYLOPRIM) 300 MG tablet Take 1 tablet by mouth once daily 3/24/21   Edith Couch MD   atorvastatin (LIPITOR) 40 MG tablet TAKE 1 TABLET BY MOUTH ONCE DAILY 12/28/20   Rob Evans DO   clopidogrel (PLAVIX) 75 MG tablet Take 1 tablet by mouth once daily 12/22/20   Jose Love MD   oxybutynin (DITROPAN XL) 15 MG extended release tablet TAKE 1 TABLET BY MOUTH ONCE DAILY 4/19/20   Historical Provider, MD   Handicap Placard MISC by Does not apply route Permanent 8/20/19   Ediht Couch MD   Omega-3 Fatty Acids (FISH OIL PO) Take 1,200 mg by mouth daily    Historical Provider, MD   Acetaminophen (TYLENOL PO) Take by mouth Per pt, takes 2 in am; 2 in pm    Historical Provider, MD   aspirin 81 MG tablet Take 81 mg by mouth daily.     Historical Provider, MD       Allergies:  Patient has no known allergies. Social History:   reports that he quit smoking about 7 years ago. His smoking use included cigarettes. He smoked 0.00 packs per day for 55.00 years. He has never used smokeless tobacco. He reports current alcohol use. He reports that he does not use drugs. Family History: family history includes Heart Disease in his brother; Heart Surgery in his sister; Hypertension in an other family member; Kidney Disease in his brother; Other in his brother; Other (age of onset: 52) in his mother; Sudden Death in his father. No h/o sudden cardiac death. No for premature CAD    REVIEW OF SYSTEMS:    · Constitutional: there has been no unanticipated weight loss. There's been No change in energy level, No change in activity level. · Eyes: No visual changes or diplopia. No scleral icterus. · ENT: No Headaches, hearing loss or vertigo. No mouth sores or sore throat. · Cardiovascular: see above  · Respiratory: see above  · Gastrointestinal: No abdominal pain, appetite loss, blood in stools. · Genitourinary: No dysuria, trouble voiding, or hematuria. · Musculoskeletal:  No gait disturbance, No weakness or joint complaints. · Integumentary: No rash or pruritis. · Neurological: No headache or diplopia. No tingling  · Psychiatric: No anxiety, or depression. · Endocrine: No temperature intolerance. · Hematologic/Lymphatic: No abnormal bruising or bleeding, blood clots or swollen lymph nodes. · Allergic/Immunologic: No nasal congestion or hives. PHYSICAL EXAM:      Vitals:    05/18/22 1102   BP: 112/70   Pulse: 92       Constitutional and General Appearance: alert, cooperative, no distress and appears stated age  HEENT: PERRL, no cervical lymphadenopathy. No masses palpable. Normal oral mucosa  Respiratory:  · Normal excursion and expansion without use of accessory muscles  · Resp Auscultation: Good respiratory effort. No for increased work of breathing.  On auscultation: clear to auscultation bilaterally  Cardiovascular:  · Heart tones are crisp and normal. regular S1 and S2.  · Jugular venous pulsation Normal  · The carotid upstroke is normal in amplitude and contour without delay or bruit   Abdomen:   · soft  · Bowel sounds present  Extremities:  ·  No edema  Neurological:  · Alert and oriented. Cardiac Data:  EKG: Sr, first degree AV block, LBBB    Labs:     CBC: No results for input(s): WBC, HGB, HCT, PLT in the last 72 hours. BMP: No results for input(s): NA, K, CO2, BUN, CREATININE, LABGLOM, GLUCOSE in the last 72 hours. PT/INR: No results for input(s): PROTIME, INR in the last 72 hours. FASTING LIPID PANEL:  Lab Results   Component Value Date    HDL 34 03/05/2021    TRIG 159 03/05/2021     LIVER PROFILE:No results for input(s): AST, ALT, LABALBU in the last 72 hours. 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 CAD/CABG SVG-OM1, SVG-OM2 and SVG-PDA on 10/28/2014.  TTE 02/2015 LVEF 30% improved to LVEF 50% on subsequent TTE. Continue ASA. Discontinue plavix.   2. Orthostatic hypotension. Continue midodrine 5mg po TID. Proscar and metoprolol discontinue. Stable. Monitor BP. He counseled to use walker. 3. LBBB- chronic and stable. 4. Carotid u/s 11/18 at [facility]- < 50% bilateral- Follows with Vascular surgery  5. HTN-Now has orthostatic hypotension- on midodrine. Monitor BP. 6. Hyperlipidemia- Stable. Continue statin. LDL 28 on 3/5/21. Obtain lipid panel. 7. DJD  8. S/p L knee replacement 3/2018.  9. RTC 6 months.       Eligio Esposito [id number]  [phone]

## 2022-05-25 ENCOUNTER — OFFICE VISIT (OUTPATIENT)
Dept: UROLOGY | Age: 77
End: 2022-05-25
Payer: MEDICARE

## 2022-05-25 VITALS
SYSTOLIC BLOOD PRESSURE: 90 MMHG | BODY MASS INDEX: 29.65 KG/M2 | HEART RATE: 104 BPM | HEIGHT: 74 IN | WEIGHT: 231 LBS | OXYGEN SATURATION: 93 % | DIASTOLIC BLOOD PRESSURE: 60 MMHG

## 2022-05-25 DIAGNOSIS — R33.9 URINARY RETENTION: ICD-10-CM

## 2022-05-25 DIAGNOSIS — N32.81 OVERACTIVE BLADDER: ICD-10-CM

## 2022-05-25 DIAGNOSIS — N40.1 BPH WITH OBSTRUCTION/LOWER URINARY TRACT SYMPTOMS: Primary | ICD-10-CM

## 2022-05-25 DIAGNOSIS — N13.8 BPH WITH OBSTRUCTION/LOWER URINARY TRACT SYMPTOMS: Primary | ICD-10-CM

## 2022-05-25 PROCEDURE — G8417 CALC BMI ABV UP PARAM F/U: HCPCS | Performed by: UROLOGY

## 2022-05-25 PROCEDURE — 99214 OFFICE O/P EST MOD 30 MIN: CPT | Performed by: UROLOGY

## 2022-05-25 PROCEDURE — 1036F TOBACCO NON-USER: CPT | Performed by: UROLOGY

## 2022-05-25 PROCEDURE — 1123F ACP DISCUSS/DSCN MKR DOCD: CPT | Performed by: UROLOGY

## 2022-05-25 PROCEDURE — G8427 DOCREV CUR MEDS BY ELIG CLIN: HCPCS | Performed by: UROLOGY

## 2022-05-25 RX ORDER — BENZONATATE 200 MG/1
200 CAPSULE ORAL 3 TIMES DAILY PRN
COMMUNITY
End: 2022-08-16

## 2022-05-25 RX ORDER — HYDROCORTISONE 25 MG/G
CREAM TOPICAL
COMMUNITY
Start: 2022-04-18 | End: 2022-08-16

## 2022-05-25 RX ORDER — ONDANSETRON 4 MG/1
4 TABLET, ORALLY DISINTEGRATING ORAL EVERY 8 HOURS PRN
COMMUNITY
End: 2022-08-16

## 2022-05-25 RX ORDER — ALFUZOSIN HYDROCHLORIDE 10 MG/1
10 TABLET, EXTENDED RELEASE ORAL DAILY
Qty: 90 TABLET | Refills: 1 | Status: SHIPPED | OUTPATIENT
Start: 2022-05-25 | End: 2022-07-27 | Stop reason: SDUPTHER

## 2022-05-25 NOTE — PROGRESS NOTES
Annika Weeks MD   Urology Clinic office Visit      Patient:  Bishnu Drummond  YOB: 1945  Date: 5/25/2022    HISTORY OF PRESENT ILLNESS:   The patient is a 67 y.o. male who presents today for evaluation of the following problem(s):      1. BPH with obstruction/lower urinary tract symptoms    2. Urinary retention    3. Overactive bladder           Overall the problem(s) : are persistent  Associated Symptoms: No dysuria, gross hematuria. Pain Severity:      Summary of old records: sees Dr Nidia Dooley for [facility]  (Patient's old records, notes and chart reviewed and summarized above.)      Hx of BPH, on medications  Recently in hospital with COVID  Had a catheter placed earlier in the year  Discussed void trial at that time, he wanted to keep mullins in place    Here for follow up  Previously had cysto, has been avoiding outlet surgery  On finasteride, Ditropan     I independently reviewed and verified the images and reports from:    No results found. Last several PSA's:  Lab Results   Component Value Date    PSA 2.80 06/22/2021    PSA 7.71 (H) 02/14/2020    PSA 3.64 12/10/2018       Last total testosterone:  No results found for: TESTOSTERONE    Urinalysis today:  No results found for this visit on 05/25/22.       Last BUN and creatinine:  Lab Results   Component Value Date    BUN 10 04/17/2022     Lab Results   Component Value Date    CREATININE 0.71 04/17/2022       Imaging Reviewed during this Office Visit:   (results were independently reviewed by physician and radiology report verified)    PAST MEDICAL, FAMILY AND SOCIAL HISTORY:  Past Medical History:   Diagnosis Date    Acute respiratory failure with hypoxia ([address] 12/9/2021    Adenomatous polyp     history of     Cholecystitis 3/19/2019    Chronic venous insufficiency     COPD with acute exacerbation ([address] 12/9/2021    DVT (deep venous thrombosis) (HCC)     history of     Edema     related to venous stasis     Gout     history of     Hypertension     Onychomycosis     Osteoarthritis     Plantar fasciitis     Pneumonia due to COVID-19 virus 12/8/2021    Tobacco abuse      Past Surgical History:   Procedure Laterality Date    COLONOSCOPY  12/12/2012    polyp    COLONOSCOPY  2003    adenomatous polyps     COLONOSCOPY  08/2009    COLONOSCOPY  06/2008    with polypectomy    CORONARY ARTERY BYPASS GRAFT  10/28/2014    SVG-OM1, SVG-OM2, and SVG-PDA    OTHER SURGICAL HISTORY      teeth extracted     TOTAL KNEE ARTHROPLASTY Right 10/2015    TOTAL KNEE ARTHROPLASTY Left 03/2018    TURP      VASECTOMY  1980     Family History   Problem Relation Age of Onset    Other Mother 52        Rheumatic fever    Kidney Disease Brother     Heart Disease Brother     Sudden Death Father         auto accident     Heart Surgery Sister         bypass surgery    Other Brother         abdominal aortic aneursym    Hypertension Other         siblings     Outpatient Medications Marked as Taking for the 5/25/22 encounter (Office Visit) with Abhay Burroughs MD   Medication Sig Dispense Refill    benzonatate (TESSALON) 200 MG capsule Take 200 mg by mouth 3 times daily as needed for Cough      bisacodyl (DULCOLAX) 5 MG EC tablet Take 5 mg by mouth daily as needed for Constipation      ondansetron (ZOFRAN-ODT) 4 MG disintegrating tablet Take 4 mg by mouth every 8 hours as needed for Nausea or Vomiting      ALBUTEROL IN Inhale into the lungs      alfuzosin (UROXATRAL) 10 MG extended release tablet Take 1 tablet by mouth daily 90 tablet 1    midodrine (PROAMATINE) 5 MG tablet Take 1 tablet by mouth 3 times daily 90 tablet 6    sertraline (ZOLOFT) 50 MG tablet Take 50 mg by mouth nightly       traZODone (DESYREL) 50 MG tablet       potassium chloride (KLOR-CON M) 10 MEQ extended release tablet Take 1 tablet by mouth daily 30 tablet 0    levothyroxine (SYNTHROID) 75 MCG tablet Take 1 tablet by mouth daily 30 tablet 1    buPROPion (WELLBUTRIN) 75 MG catheter; exchange every 3-4 weeks; hand irrigate as needed for poor drainage        Prescriptions Ordered:  Orders Placed This Encounter   Medications    alfuzosin (UROXATRAL) 10 MG extended release tablet     Sig: Take 1 tablet by mouth daily     Dispense:  90 tablet     Refill:  1      Orders Placed:  No orders of the defined types were placed in this encounter.            MD Giana Mtz M.D, MD [facility]

## 2022-05-29 ENCOUNTER — OUTSIDE SERVICES (OUTPATIENT)
Dept: INTERNAL MEDICINE | Age: 77
End: 2022-05-29
Payer: MEDICARE

## 2022-05-29 DIAGNOSIS — I10 ESSENTIAL HYPERTENSION: ICD-10-CM

## 2022-05-29 DIAGNOSIS — I25.5 ISCHEMIC CARDIOMYOPATHY: ICD-10-CM

## 2022-05-29 DIAGNOSIS — E66.3 OVERWEIGHT: ICD-10-CM

## 2022-05-29 DIAGNOSIS — E78.2 MIXED HYPERLIPIDEMIA: ICD-10-CM

## 2022-05-29 DIAGNOSIS — I25.10 CORONARY ARTERY DISEASE INVOLVING NATIVE CORONARY ARTERY OF NATIVE HEART WITHOUT ANGINA PECTORIS: ICD-10-CM

## 2022-05-29 DIAGNOSIS — E03.4 HYPOTHYROIDISM DUE TO ACQUIRED ATROPHY OF THYROID: ICD-10-CM

## 2022-05-29 DIAGNOSIS — Z86.16 HISTORY OF COVID-19: Primary | ICD-10-CM

## 2022-05-29 DIAGNOSIS — J43.9 PULMONARY EMPHYSEMA, UNSPECIFIED EMPHYSEMA TYPE (HCC): ICD-10-CM

## 2022-05-29 DIAGNOSIS — I50.42 CHRONIC COMBINED SYSTOLIC AND DIASTOLIC CHF (CONGESTIVE HEART FAILURE) (HCC): ICD-10-CM

## 2022-05-29 PROCEDURE — 99308 SBSQ NF CARE LOW MDM 20: CPT | Performed by: INTERNAL MEDICINE

## 2022-05-29 NOTE — PROGRESS NOTES
DR. Dee Sosa - [facility] VISIT    DATE OF SERVICE: 22    NURSING HOME: [facility]    CHIEF COMPLAINT/HISTORY OF CHIEF COMPLAINT: This patient is being seen for ongoing evaluation and management of his history of COVID-19, hypertension, hyperlipidemia, hypothyroidism, coronary artery disease, congestive heart failure, ischemic cardiomyopathy, chronic obstructive pulmonary disease, and obesity. He seems to be doing fairly well lately. There are no new complaints. ALLERGIES: No Known Allergies    MEDICATIONS: As noted on the [facility] MAR, referenced and incorporated herein.     PAST MEDICAL HISTORY:   Past Medical History:   Diagnosis Date    Acute respiratory failure with hypoxia [facility] 2021    Adenomatous polyp     history of     Cholecystitis 3/19/2019    Chronic venous insufficiency     COPD with acute exacerbation ([facility] 2021    DVT (deep venous thrombosis) (HCC)     history of     Edema     related to venous stasis     Gout     history of     Hypertension     Onychomycosis     Osteoarthritis     Plantar fasciitis     Pneumonia due to COVID-19 virus 2021    Tobacco abuse        PAST SURGICAL HISTORY:   Past Surgical History:   Procedure Laterality Date    COLONOSCOPY  2012    polyp    COLONOSCOPY  2003    adenomatous polyps     COLONOSCOPY  2009    COLONOSCOPY  2008    with polypectomy    CORONARY ARTERY BYPASS GRAFT  10/28/2014    SVG-OM1, SVG-OM2, and SVG-PDA    OTHER SURGICAL HISTORY      teeth extracted     TOTAL KNEE ARTHROPLASTY Right 10/2015    TOTAL KNEE ARTHROPLASTY Left 2018    TURP      VASECTOMY  1980       SOCIAL HISTORY:     Tobacco:   Social History     Tobacco Use   Smoking Status Former Smoker    Packs/day: 0.00    Years: 55.00    Pack years: 0.00    Types: Cigarettes    Quit date: 10/27/2014    Years since quittin.5   Smokeless Tobacco Never Used     Alcohol:   Social History     Substance and Sexual Activity   Alcohol Use Yes    Alcohol/week: 0.0 standard drinks    Comment: occasionally     Drugs:   Social History     Substance and Sexual Activity   Drug Use No       FAMILY HISTORY: family history includes Heart Disease in his brother; Heart Surgery in his sister; Hypertension in an other family member; Kidney Disease in his brother; Other in his brother; Other (age of onset: 52) in his mother; Sudden Death in his father. REVIEW OF SYSTEMS:     Please see history of chief complaint above; otherwise no new problems with respect to General, HEENT, Cardiovascular, Respiratory, Gastrointestinal, Genitourinary, Endocrinologic, Musculoskeletal, or Neuropsychiatric complaints. PHYSICAL EXAMINATION:    Vitals: Temp: 98.5 deg F. Pulse: 80. Resp: 18. BP: 147/73. General: A 67 y.o.  male. Alert and oriented to person, place and time. He does not appear to be in any acute distress. Skin: Skin color, texture, turgor normal. No rashes or lesions. HEENT/Neck: Essentially unremarkable  Lungs: Normal - CTA without rales, rhonchi, or wheezing. Heart: regular rate and rhythm, S1, S2 normal, no murmur, click, rub or gallop No S3 or S4. Abdomen: Obese soft, non-distended, non-tender, normal active bowel sounds, no masses palpated and no hepatosplenomegaly  Extremities: No clubbing, cyanosis, or edema in any of the extremities. Neurologic: cranial nerves II-XII are grossly intact    ASSESSMENT:     Diagnosis Orders   1. History of COVID-19     2. Essential hypertension     3. Mixed hyperlipidemia     4. Hypothyroidism due to acquired atrophy of thyroid     5. Coronary artery disease involving native coronary artery of native heart without angina pectoris     6. Chronic combined systolic and diastolic CHF (congestive heart failure) ([address]     7. Ischemic cardiomyopathy     8. Pulmonary emphysema, unspecified emphysema type ([address]     9. Overweight           PLAN:    1. Continue current treatment  2. Nursing home record reviewed and updates summarized and entered into electronic record  3. See nursing home orders and MAR.         Electronically signed by [facility] DO Trip on 5/29/2022 at 1:50 PM  Internal Medicine

## 2022-05-31 ENCOUNTER — TELEPHONE (OUTPATIENT)
Dept: INTERNAL MEDICINE | Age: 77
End: 2022-05-31

## 2022-06-17 ENCOUNTER — TELEPHONE (OUTPATIENT)
Dept: INTERNAL MEDICINE | Age: 77
End: 2022-06-17

## 2022-06-20 NOTE — PROGRESS NOTES
Subjective:      Patient ID: Trung Gonzalez is a 67 y.o. male. HPI  Patient is here for follow-up for COPD. Patient was last seen in the office in May 2022 per Dr. Faraz Jacob. Patient has a history of COVID 19 in December 2021. Symptoms are felt to be secondary to COVID-19 pneumonia with critical illness polyneuropathy with slow improvement. Monitoring oxygen levels twice daily and as long as SPO2 is greater than 90% on room air no objection to remaining off of oxygen, if needed for worse activity no objection to use. Patient is accompanied by his wife who reports that they were recently at home for a home visit and they had a family member who is a nurse in training to happen to check patient's oxygen level after he had gotten up in went down a flight of stairs (4 steps) and noted that he was winded and his oxygen levels reported to be in the mid 80s. 6-minute walk test completed in office today, patient noticeably short of breath with minimal activity, just standing and taking a few steps. His oxygen level did drop down to 85 thus qualifying him for supplemental oxygen with activity. He was not hypoxemic on room air at rest.  Patient does not need oxygen with activity though    Medications:   Albuterol  Oxygen 1 LPM at HS    PRIOR WORKUP:  PFT:  6-minute walk test 7/13/2022: Patient unable to walk a full 6 minutes, he stood and ambulated in the hallway approximately 2-3 steps and desatted down to the mid 80s lowest was 85%. On room air. He does qualify for supplemental oxygen with activity. Patient has orders for oxygen at 2 to 4 L/min around-the-clock with activity, and nocturnally. Okay to be off as long as his SPO2 is above 88% at rest    PFT 5/11/2022: Spirometry demonstrating moderate restrictive ventilatory defect with no significant bronchospastic component identified. FVC of 2.34 L (51%), FEV1 of 2.11 L (63%), FEV1/FVC ratio 90%.   Static lung volumes and diffusion capacity could not be performed. Final impression: Restrictive defect probably related to suboptimal effort. CT Imaging:  CTA chest 12/8/2021: Negative for pulmonary embolism. Nonspecific mediastinal and hilar lymph nodes are present likely reactive. Multiple calcified right hilar and subcarinal lymph nodes are present. Multiple groundglass opacities are present throughout the lungs highly suggestive of COVID-19. No pneumothorax or pleural effusion. Emphysematous changes are present bilaterally within the lungs with an upper lobe predominance. Sleep Study:    Laboratory Evaluation:    Immunizations:   Immunization History   Administered Date(s) Administered    COVID-19, MODERNA BLUE border, Primary or Immunocompromised, (age 12y+), IM, 100 mcg/0.5mL 01/25/2021, 02/22/2021    Influenza, High Dose (Fluzone 65 yrs and older) 12/01/2014, 09/21/2015, 10/06/2017, 10/29/2018    Influenza, Quadv, adjuvanted, 65 yrs +, IM, PF (Fluad) 10/21/2020, 09/15/2021    Influenza, Triv, inactivated, subunit, adjuvanted, IM (Fluad 65 yrs and older) 09/30/2019    Pneumococcal Conjugate 13-valent ([id number]) 11/25/2014    Pneumococcal Polysaccharide ([id number]) 11/01/2015, 03/02/2017    Tdap (Boostrix, Adacel) 03/19/2019        No flowsheet data found.     BP (!) 132/56   Pulse 91   Temp 97.5 °F (36.4 °C)   Ht 6' 2\" (1.88 m)   Wt 231 lb (104.8 kg)   SpO2 93%   BMI 29.66 kg/m²     Past Medical History:   Diagnosis Date    Acute respiratory failure with hypoxia (HCC) 12/9/2021    Adenomatous polyp     history of     Cholecystitis 3/19/2019    Chronic venous insufficiency     COPD with acute exacerbation ([facility] [address] [facility] 12/9/2021    DVT (deep venous thrombosis) (HCC)     history of     Edema     related to venous stasis     Gout     history of     Hypertension     Onychomycosis     Osteoarthritis     Plantar fasciitis     Pneumonia due to COVID-19 virus 12/8/2021    Tobacco abuse      Past Surgical History:   Procedure Laterality Date    COLONOSCOPY  2012    polyp    COLONOSCOPY      adenomatous polyps     COLONOSCOPY  2009    COLONOSCOPY  2008    with polypectomy    CORONARY ARTERY BYPASS GRAFT  10/28/2014    SVG-OM1, SVG-OM2, and SVG-PDA    OTHER SURGICAL HISTORY      teeth extracted     TOTAL KNEE ARTHROPLASTY Right 10/2015    TOTAL KNEE ARTHROPLASTY Left 2018    TURP      VASECTOMY  1980     Family History   Problem Relation Age of Onset    Other Mother 52        Rheumatic fever    Kidney Disease Brother     Heart Disease Brother     Sudden Death Father         auto accident     Heart Surgery Sister         bypass surgery    Other Brother         abdominal aortic aneursym    Hypertension Other         siblings       Social History     Socioeconomic History    Marital status: Single     Spouse name: Not on file    Number of children: Not on file    Years of education: Not on file    Highest education level: Not on file   Occupational History    Not on file   Tobacco Use    Smoking status: Former Smoker     Packs/day: 0.00     Years: 55.00     Pack years: 0.00     Types: Cigarettes     Quit date: 10/27/2014     Years since quittin.7    Smokeless tobacco: Never Used   Vaping Use    Vaping Use: Never used   Substance and Sexual Activity    Alcohol use: Yes     Alcohol/week: 0.0 standard drinks     Comment: occasionally    Drug use: No    Sexual activity: Yes     Partners: Female   Other Topics Concern    Not on file   Social History Narrative    Not on file     Social Determinants of Health     Financial Resource Strain:     Difficulty of Paying Living Expenses: Not on file   Food Insecurity:     Worried About Running Out of Food in the Last Year: Not on file    Yancy of Food in the Last Year: Not on file   Transportation Needs:     Lack of Transportation (Medical): Not on file    Lack of Transportation (Non-Medical):  Not on file   Physical Activity:     Days of Exercise per Week: Not on file    Minutes of Exercise per Session: Not on file   Stress:     Feeling of Stress : Not on file   Social Connections:     Frequency of Communication with Friends and Family: Not on file    Frequency of Social Gatherings with Friends and Family: Not on file    Attends Quaker Services: Not on file    Active Member of Clubs or Organizations: Not on file    Attends Club or Organization Meetings: Not on file    Marital Status: Not on file   Intimate Partner Violence:     Fear of Current or Ex-Partner: Not on file    Emotionally Abused: Not on file    Physically Abused: Not on file    Sexually Abused: Not on file   Housing Stability:     Unable to Pay for Housing in the Last Year: Not on file    Number of [place] in the Last Year: Not on file    Unstable Housing in the Last Year: Not on file       Review of Systems   Constitutional:        Decreased activity tolerance secondary to exertional dyspnea   HENT: Negative. Eyes: Negative. Respiratory:        Shortness of breath with minimal activity, he endorses a cough that is generally nonproductive, when he is able to expectorate sputum it is white or clear denies purulent secretions   Cardiovascular: Negative. Gastrointestinal: Negative. Endocrine: Negative. Genitourinary: Negative. Musculoskeletal: Negative. Skin: Negative. Allergic/Immunologic: Negative. Neurological: Negative. Hematological: Negative. Psychiatric/Behavioral: Negative.         Objective:       Physical Exam  General appearance - overweight, ill-appearing, chronically ill appearing and using a wheelchair for mobility  Mental status - normal mood, behavior, speech, dress, motor activity, and thought processes  Eyes - pupils equal and reactive, extraocular eye movements intact  Ears - not examined  Nose - normal and patent, no erythema, discharge or polyps  Mouth - mucous membranes moist, pharynx normal without lesions  Neck - supple, no significant adenopathy  Chest - clear to auscultation, no wheezes, rales or rhonchi, symmetric air entry  Heart -normal rate, regular rhythm, normal S1, S2, no murmurs, rubs, clicks or gallops  Abdomen - soft, nontender, nondistended, no masses or organomegaly  Neuro- alert, oriented, normal speech, no focal findings or movement disorder noted}  Extremities - peripheral pulses normal, no pedal edema, no clubbing or cyanosis  Skin - normal coloration and turgor, no rashes, no suspicious skin lesions noted     Wt Readings from Last 3 Encounters:   07/13/22 231 lb (104.8 kg)   05/25/22 231 lb (104.8 kg)   05/18/22 232 lb (105.2 kg)       Results for orders placed or performed during the hospital encounter of 05/11/22   Full PFT Study With Bronchodilator   Result Value Ref Range    FVC-Pre      FVC Pred      FVC %Pred-Pre      PVC-Post      FVC %Pred-Post      FEV1-Pre      FEV1 Pred      FEV1 %Pred-Pre      FEV1-Post      FEV1 %Pred-Post      FEV1/FVC-Pre      FEV1/FVC Pred      FEV1/FVC %Pred-Pre      FEV1/FVC-Post      FEV1/FVC %Pred-Post      FEF 25-75%-Pre      FEF 25-75% Pred      FEF 25-75% %Pred-Pre      FEF 25-75%-Post      FEF 25-75% %Pred-Post      FEF 25-75% %Change      Expiratory Time      Expiratory Time-Post      PEF-Pre      PEF Pred      PEF %Pred-Pre      PEF-Post      PEF %Pred-Post      PEF %Change      MVV-Pre      MVV Pred      MVV %Pred-Pre      DLCO      DLCO Pred      DLCO %Pred      DLCO/VA      DLCO/VA Pred      DLCO/VA %Pred      VA      VA Pred      VA %Pred      Raw      Raw Pred      Raw %Pred      Gaw      GAW PRED      Gaw %Pred      SVC      SVC Pred      SVC %Pred      TLC      TLC Pred      TLC %Pred      RV      RV Pred      RV %Pred      IC      IC Pred      IC %Pred      VTG      VTG Pred      VTG %Pred      MIP      MEP         No results found. Assessment:      1. Pneumonia due to COVID-19 virus    2. Chronic respiratory failure with hypoxia, on home O2 therapy ([address]    3. Combined systolic and diastolic cardiac dysfunction    4. Pulmonary emphysema, unspecified emphysema type ([facility]    5. Critical illness polyneuropathy ([facility] [address]          Plan:      1. Medications reviewed, patient is on as needed albuterol. He does require supplemental oxygen nocturnally and with activity to maintain an SPO2 greater than 88%. 2. Educated and clarified the medication use. 3. Recommend flu vaccination in the fall annually. 4. Patient is up-to-date with pneumococcal vaccinations from pulmonary perspective. 5. Patient has received Covid vaccination. Recommended booster when eligible. Discussed strategies to protect against Covid 19.   6. Maintain an active lifestyle. 7. Patient's questions were answered to his satisfaction. 8. 6-minute walk test completed in office today due to qualify patient for supplemental oxygen with activity, family reporting that he was desaturating with minimal activity at home  9. Pulmonary function tests were reviewed   10. CT scan of the chest was reviewed  11.  We'll see the patient back in 6 months          Electronically signed by KISHOR Spencer CNP on 7/13/2022 at 12:27 PM

## 2022-06-27 ENCOUNTER — OUTSIDE SERVICES (OUTPATIENT)
Dept: INTERNAL MEDICINE | Age: 77
End: 2022-06-27
Payer: MEDICARE

## 2022-06-27 DIAGNOSIS — Z86.16 HISTORY OF COVID-19: Primary | ICD-10-CM

## 2022-06-27 DIAGNOSIS — J43.9 PULMONARY EMPHYSEMA, UNSPECIFIED EMPHYSEMA TYPE (HCC): ICD-10-CM

## 2022-06-27 DIAGNOSIS — I25.10 CORONARY ARTERY DISEASE INVOLVING NATIVE CORONARY ARTERY OF NATIVE HEART WITHOUT ANGINA PECTORIS: ICD-10-CM

## 2022-06-27 DIAGNOSIS — E66.3 OVERWEIGHT: ICD-10-CM

## 2022-06-27 DIAGNOSIS — I50.42 CHRONIC COMBINED SYSTOLIC AND DIASTOLIC CHF (CONGESTIVE HEART FAILURE) (HCC): ICD-10-CM

## 2022-06-27 DIAGNOSIS — E03.4 HYPOTHYROIDISM DUE TO ACQUIRED ATROPHY OF THYROID: ICD-10-CM

## 2022-06-27 DIAGNOSIS — E78.2 MIXED HYPERLIPIDEMIA: ICD-10-CM

## 2022-06-27 DIAGNOSIS — N40.0 BENIGN PROSTATIC HYPERPLASIA, UNSPECIFIED WHETHER LOWER URINARY TRACT SYMPTOMS PRESENT: ICD-10-CM

## 2022-06-27 DIAGNOSIS — I25.5 ISCHEMIC CARDIOMYOPATHY: ICD-10-CM

## 2022-06-27 DIAGNOSIS — I10 ESSENTIAL HYPERTENSION: ICD-10-CM

## 2022-06-27 NOTE — PROGRESS NOTES
DR. Deborah Leung - [facility] VISIT    DATE OF SERVICE: 5/29/22    [facility]SING [facility] [place]    CHIEF COMPLAINT/HISTORY OF CHIEF COMPLAINT: This patient is being seen for ongoing evaluation and management of his history of COVID-19, hypertension, hyperlipidemia, hypothyroidism, coronary artery disease, congestive heart failure, ischemic cardiomyopathy, chronic obstructive pulmonary disease, and obesity. He saw Dr. Orlando on 5/11/22 as a new patient due to his recent history of COVID-19 infection. He saw the cardiologists on 5/18/22 for his coronary artery disease. He saw Dr. Marisol Tinoco on 5/25/22 for his benign prostatic hypertrophy. He seems to be doing fairly well lately. There are no new complaints at this time. ALLERGIES: No Known Allergies    MEDICATIONS: As noted on the [facility], referenced and incorporated herein.     PAST MEDICAL HISTORY:   Past Medical History:   Diagnosis Date    Acute respiratory failure with hypoxia ([address] 12/9/2021    Adenomatous polyp     history of     Cholecystitis 3/19/2019    Chronic venous insufficiency     COPD with acute exacerbation ([address] 12/9/2021    DVT (deep venous thrombosis) (HCC)     history of     Edema     related to venous stasis     Gout     history of     Hypertension     Onychomycosis     Osteoarthritis     Plantar fasciitis     Pneumonia due to COVID-19 virus 12/8/2021    Tobacco abuse        PAST SURGICAL HISTORY:   Past Surgical History:   Procedure Laterality Date    COLONOSCOPY  12/12/2012    polyp    COLONOSCOPY  2003    adenomatous polyps     COLONOSCOPY  08/2009    COLONOSCOPY  06/2008    with polypectomy    CORONARY ARTERY BYPASS GRAFT  10/28/2014    SVG-OM1, SVG-OM2, and SVG-PDA    OTHER SURGICAL HISTORY      teeth extracted     TOTAL KNEE ARTHROPLASTY Right 10/2015    TOTAL KNEE ARTHROPLASTY Left 03/2018    TRANSURETHRAL RESECTION OF PROSTATE      VASECTOMY  1980       SOCIAL HISTORY:     Tobacco: Social History     Tobacco Use   Smoking Status Former Smoker    Packs/day: 0.00    Years: 55.00    Pack years: 0.00    Types: Cigarettes    Quit date: 10/27/2014    Years since quittin.6   Smokeless Tobacco Never Used     Alcohol:   Social History     Substance and Sexual Activity   Alcohol Use Yes    Alcohol/week: 0.0 standard drinks    Comment: occasionally     Drugs:   Social History     Substance and Sexual Activity   Drug Use No       FAMILY HISTORY: family history includes Heart Disease in his brother; Heart Surgery in his sister; Hypertension in an other family member; Kidney Disease in his brother; Other in his brother; Other (age of onset: 52) in his mother; Sudden Death in his father. REVIEW OF SYSTEMS:     Please see history of chief complaint above; otherwise no new problems with respect to General, HEENT, Cardiovascular, Respiratory, Gastrointestinal, Genitourinary, Endocrinologic, Musculoskeletal, or Neuropsychiatric complaints. PHYSICAL EXAMINATION:    Vitals: Temp: 97.6 deg F. Pulse: 58. Resp: 16. BP: 102/70. General: A 67 y.o.  male. Alert and oriented to person, place and time. He does not appear to be in any acute distress. Skin: Skin color, texture, turgor normal. No rashes or lesions. HEENT/Neck: Essentially unremarkable  Lungs: Normal - CTA without rales, rhonchi, or wheezing. Heart: regular rate and rhythm, S1, S2 normal, no murmur, click, rub or gallop No S3 or S4. Abdomen: Obese soft, non-distended, non-tender, normal active bowel sounds, no masses palpated and no hepatosplenomegaly  Extremities: No clubbing, cyanosis, or edema in any of the extremities. Neurologic: cranial nerves II-XII are grossly intact    ASSESSMENT:     Diagnosis Orders   1. History of COVID-19     2. Essential hypertension     3. Mixed hyperlipidemia     4. Hypothyroidism due to acquired atrophy of thyroid     5.  Coronary artery disease involving native coronary artery of native heart without angina pectoris     6. Chronic combined systolic and diastolic CHF (congestive heart failure) ([facility]     7. Ischemic cardiomyopathy     8. Pulmonary emphysema, unspecified emphysema type ([address]     9. Benign prostatic hyperplasia, unspecified whether lower urinary tract symptoms present     10. Overweight           PLAN:    1. Continue current treatment  2. Nursing home record reviewed and updates summarized and entered into electronic record  3. We reviewed Dr. Krys stockton's most recent visit report  4. We reviewed the cardiologists' most recent visit report  5. We reviewed Dr. Brenda Martinez most recent visit report  6. See nursing home orders and MAR.         Electronically signed by Sana España DO on 6/27/2022 at 1:51 AM  Internal Medicine

## 2022-06-28 PROCEDURE — 99307 SBSQ NF CARE SF MDM 10: CPT | Performed by: INTERNAL MEDICINE

## 2022-06-30 ENCOUNTER — OUTSIDE SERVICES (OUTPATIENT)
Dept: INTERNAL MEDICINE | Age: 77
End: 2022-06-30
Payer: MEDICARE

## 2022-06-30 DIAGNOSIS — Z86.16 HISTORY OF COVID-19: Primary | ICD-10-CM

## 2022-06-30 DIAGNOSIS — E66.3 OVERWEIGHT: ICD-10-CM

## 2022-06-30 DIAGNOSIS — E78.2 MIXED HYPERLIPIDEMIA: ICD-10-CM

## 2022-06-30 DIAGNOSIS — I50.42 CHRONIC COMBINED SYSTOLIC AND DIASTOLIC CHF (CONGESTIVE HEART FAILURE) (HCC): ICD-10-CM

## 2022-06-30 DIAGNOSIS — I10 ESSENTIAL HYPERTENSION: ICD-10-CM

## 2022-06-30 DIAGNOSIS — I25.10 CORONARY ARTERY DISEASE INVOLVING NATIVE CORONARY ARTERY OF NATIVE HEART WITHOUT ANGINA PECTORIS: ICD-10-CM

## 2022-06-30 DIAGNOSIS — I25.5 ISCHEMIC CARDIOMYOPATHY: ICD-10-CM

## 2022-06-30 DIAGNOSIS — J43.9 PULMONARY EMPHYSEMA, UNSPECIFIED EMPHYSEMA TYPE (HCC): ICD-10-CM

## 2022-06-30 DIAGNOSIS — N40.0 BENIGN PROSTATIC HYPERPLASIA, UNSPECIFIED WHETHER LOWER URINARY TRACT SYMPTOMS PRESENT: ICD-10-CM

## 2022-06-30 DIAGNOSIS — E03.4 HYPOTHYROIDISM DUE TO ACQUIRED ATROPHY OF THYROID: ICD-10-CM

## 2022-06-30 NOTE — PROGRESS NOTES
DR. Priscilla Del Castillo - [facility] VISIT    DATE OF SERVICE: 22    [facility]    CHIEF COMPLAINT/HISTORY OF CHIEF COMPLAINT: This patient is being seen for ongoing evaluation and management of his history of COVID-19, hypertension, hyperlipidemia, hypothyroidism, coronary artery disease, congestive heart failure, ischemic cardiomyopathy, chronic obstructive pulmonary disease, and obesity. He seems to be doing fairly well lately. There are no new complaints. ALLERGIES: No Known Allergies    MEDICATIONS: As noted on the [facility], referenced and incorporated herein.     PAST MEDICAL HISTORY:   Past Medical History:   Diagnosis Date    Acute respiratory failure with hypoxia ([facility] 2021    Adenomatous polyp     history of     Cholecystitis 3/19/2019    Chronic venous insufficiency     COPD with acute exacerbation ([facility] 2021    DVT (deep venous thrombosis) (HCC)     history of     Edema     related to venous stasis     Gout     history of     Hypertension     Onychomycosis     Osteoarthritis     Plantar fasciitis     Pneumonia due to COVID-19 virus 2021    Tobacco abuse        PAST SURGICAL HISTORY:   Past Surgical History:   Procedure Laterality Date    COLONOSCOPY  2012    polyp    COLONOSCOPY  2003    adenomatous polyps     COLONOSCOPY  2009    COLONOSCOPY  2008    with polypectomy    CORONARY ARTERY BYPASS GRAFT  10/28/2014    SVG-OM1, SVG-OM2, and SVG-PDA    OTHER SURGICAL HISTORY      teeth extracted     TOTAL KNEE ARTHROPLASTY Right 10/2015    TOTAL KNEE ARTHROPLASTY Left 2018    TRANSURETHRAL RESECTION OF PROSTATE      VASECTOMY  1980       SOCIAL HISTORY:     Tobacco:   Social History     Tobacco Use   Smoking Status Former Smoker    Packs/day: 0.00    Years: 55.00    Pack years: 0.00    Types: Cigarettes    Quit date: 10/27/2014    Years since quittin.6   Smokeless Tobacco Never Used     Alcohol: Social History     Substance and Sexual Activity   Alcohol Use Yes    Alcohol/week: 0.0 standard drinks    Comment: occasionally     Drugs:   Social History     Substance and Sexual Activity   Drug Use No       FAMILY HISTORY: family history includes Heart Disease in his brother; Heart Surgery in his sister; Hypertension in an other family member; Kidney Disease in his brother; Other in his brother; Other (age of onset: 52) in his mother; Sudden Death in his father. REVIEW OF SYSTEMS:     Please see history of chief complaint above; otherwise no new problems with respect to General, HEENT, Cardiovascular, Respiratory, Gastrointestinal, Genitourinary, Endocrinologic, Musculoskeletal, or Neuropsychiatric complaints. PHYSICAL EXAMINATION:    Vitals: Temp: 97.6 deg F. Pulse: 89. Resp: 16. BP: 128/70. General: A 67 y.o.  male. Alert and oriented to person, place and time. He does not appear to be in any acute distress. Skin: Skin color, texture, turgor normal. No rashes or lesions. HEENT/Neck: Essentially unremarkable  Lungs: Normal - CTA without rales, rhonchi, or wheezing. Heart: regular rate and rhythm, S1, S2 normal, no murmur, click, rub or gallop No S3 or S4. Abdomen: Obese soft, non-distended, non-tender, normal active bowel sounds, no masses palpated and no hepatosplenomegaly  Extremities: No clubbing, cyanosis, or edema in any of the extremities. Neurologic: cranial nerves II-XII are grossly intact    ASSESSMENT:     Diagnosis Orders   1. History of COVID-19     2. Essential hypertension     3. Mixed hyperlipidemia     4. Hypothyroidism due to acquired atrophy of thyroid     5. Coronary artery disease involving native coronary artery of native heart without angina pectoris     6. Chronic combined systolic and diastolic CHF (congestive heart failure) ([address]     7. Ischemic cardiomyopathy     8. Pulmonary emphysema, unspecified emphysema type ([address]     9.  Benign prostatic hyperplasia, unspecified whether lower urinary tract symptoms present     10. Overweight           PLAN:    1. Continue current treatment  2. Nursing home record reviewed and updates summarized and entered into electronic record  3. See nursing home orders and MAR.         Electronically signed by Sonal Mathews DO on 6/30/2022 at 1:48 AM  Internal Medicine

## 2022-07-06 ENCOUNTER — TELEPHONE (OUTPATIENT)
Dept: INTERNAL MEDICINE | Age: 77
End: 2022-07-06

## 2022-07-13 ENCOUNTER — OFFICE VISIT (OUTPATIENT)
Dept: PULMONOLOGY | Age: 77
End: 2022-07-13
Payer: MEDICARE

## 2022-07-13 VITALS
TEMPERATURE: 97.5 F | BODY MASS INDEX: 29.65 KG/M2 | HEIGHT: 74 IN | SYSTOLIC BLOOD PRESSURE: 132 MMHG | DIASTOLIC BLOOD PRESSURE: 56 MMHG | HEART RATE: 91 BPM | OXYGEN SATURATION: 93 % | WEIGHT: 231 LBS

## 2022-07-13 DIAGNOSIS — U07.1 PNEUMONIA DUE TO COVID-19 VIRUS: Primary | ICD-10-CM

## 2022-07-13 DIAGNOSIS — I51.89 COMBINED SYSTOLIC AND DIASTOLIC CARDIAC DYSFUNCTION: ICD-10-CM

## 2022-07-13 DIAGNOSIS — J96.11 CHRONIC RESPIRATORY FAILURE WITH HYPOXIA, ON HOME O2 THERAPY (HCC): ICD-10-CM

## 2022-07-13 DIAGNOSIS — J12.82 PNEUMONIA DUE TO COVID-19 VIRUS: Primary | ICD-10-CM

## 2022-07-13 DIAGNOSIS — J43.9 PULMONARY EMPHYSEMA, UNSPECIFIED EMPHYSEMA TYPE (HCC): ICD-10-CM

## 2022-07-13 DIAGNOSIS — Z99.81 CHRONIC RESPIRATORY FAILURE WITH HYPOXIA, ON HOME O2 THERAPY (HCC): ICD-10-CM

## 2022-07-13 DIAGNOSIS — G62.81 CRITICAL ILLNESS POLYNEUROPATHY (HCC): ICD-10-CM

## 2022-07-13 PROCEDURE — 1036F TOBACCO NON-USER: CPT | Performed by: NURSE PRACTITIONER

## 2022-07-13 PROCEDURE — 1123F ACP DISCUSS/DSCN MKR DOCD: CPT | Performed by: NURSE PRACTITIONER

## 2022-07-13 PROCEDURE — 99213 OFFICE O/P EST LOW 20 MIN: CPT | Performed by: NURSE PRACTITIONER

## 2022-07-13 PROCEDURE — 3023F SPIROM DOC REV: CPT | Performed by: NURSE PRACTITIONER

## 2022-07-13 PROCEDURE — G8427 DOCREV CUR MEDS BY ELIG CLIN: HCPCS | Performed by: NURSE PRACTITIONER

## 2022-07-13 PROCEDURE — G8417 CALC BMI ABV UP PARAM F/U: HCPCS | Performed by: NURSE PRACTITIONER

## 2022-07-13 PROCEDURE — 99214 OFFICE O/P EST MOD 30 MIN: CPT | Performed by: NURSE PRACTITIONER

## 2022-07-13 ASSESSMENT — ENCOUNTER SYMPTOMS
EYES NEGATIVE: 1
GASTROINTESTINAL NEGATIVE: 1
ALLERGIC/IMMUNOLOGIC NEGATIVE: 1

## 2022-07-27 ENCOUNTER — OFFICE VISIT (OUTPATIENT)
Dept: UROLOGY | Age: 77
End: 2022-07-27
Payer: MEDICARE

## 2022-07-27 VITALS
HEIGHT: 74 IN | BODY MASS INDEX: 29.66 KG/M2 | SYSTOLIC BLOOD PRESSURE: 118 MMHG | HEART RATE: 98 BPM | DIASTOLIC BLOOD PRESSURE: 62 MMHG | OXYGEN SATURATION: 97 %

## 2022-07-27 DIAGNOSIS — N13.8 BPH WITH OBSTRUCTION/LOWER URINARY TRACT SYMPTOMS: Primary | ICD-10-CM

## 2022-07-27 DIAGNOSIS — N40.1 BPH WITH OBSTRUCTION/LOWER URINARY TRACT SYMPTOMS: Primary | ICD-10-CM

## 2022-07-27 DIAGNOSIS — N32.81 OVERACTIVE BLADDER: ICD-10-CM

## 2022-07-27 DIAGNOSIS — R33.9 URINARY RETENTION: ICD-10-CM

## 2022-07-27 PROCEDURE — 1036F TOBACCO NON-USER: CPT | Performed by: UROLOGY

## 2022-07-27 PROCEDURE — G8427 DOCREV CUR MEDS BY ELIG CLIN: HCPCS | Performed by: UROLOGY

## 2022-07-27 PROCEDURE — 99214 OFFICE O/P EST MOD 30 MIN: CPT | Performed by: UROLOGY

## 2022-07-27 PROCEDURE — 1123F ACP DISCUSS/DSCN MKR DOCD: CPT | Performed by: UROLOGY

## 2022-07-27 PROCEDURE — G8417 CALC BMI ABV UP PARAM F/U: HCPCS | Performed by: UROLOGY

## 2022-07-27 RX ORDER — ALFUZOSIN HYDROCHLORIDE 10 MG/1
10 TABLET, EXTENDED RELEASE ORAL DAILY
Qty: 90 TABLET | Refills: 1 | Status: SHIPPED | OUTPATIENT
Start: 2022-07-27 | End: 2022-08-09 | Stop reason: SDUPTHER

## 2022-07-27 RX ORDER — FINASTERIDE 5 MG/1
5 TABLET, FILM COATED ORAL DAILY
Qty: 90 TABLET | Refills: 1 | Status: SHIPPED | OUTPATIENT
Start: 2022-07-27 | End: 2022-09-14 | Stop reason: SDUPTHER

## 2022-07-27 NOTE — PROGRESS NOTES
Mounika Tong MD   Urology Clinic office Visit      Patient:  Alesha Higuera  YOB: 1945  Date: 7/27/2022    HISTORY OF PRESENT ILLNESS:   The patient is a 67 y.o. male who presents today for evaluation of the following problem(s):      1. BPH with obstruction/lower urinary tract symptoms    2. Urinary retention    3. Overactive bladder           Overall the problem(s) : are persistent, but improved  Associated Symptoms: No dysuria, gross hematuria. Pain Severity:      Summary of old records: sees Dr Woo Pena for [facility]  (Patient's old records, notes and chart reviewed and summarized above.)      Hx of BPH, on medications  Recently in hospital with COVID  Had a catheter placed earlier in the year  Discussed void trial at that time, he wanted to keep mullins in place    Here for follow up  Previously had cysto, has been avoiding outlet surgery  On finasteride, uroxatral; off of Ditropan     Improved physical condition, having bowel movements  Almost done with rehab            Last several PSA's:  Lab Results   Component Value Date    PSA 2.80 06/22/2021    PSA 7.71 (H) 02/14/2020    PSA 3.64 12/10/2018       Last total testosterone:  No results found for: TESTOSTERONE    Urinalysis today:  No results found for this visit on 07/27/22.       Last BUN and creatinine:  Lab Results   Component Value Date    BUN 10 04/17/2022     Lab Results   Component Value Date    CREATININE 0.71 04/17/2022       Imaging Reviewed during this Office Visit:   (results were independently reviewed by physician and radiology report verified)    PAST MEDICAL, FAMILY AND SOCIAL HISTORY:  Past Medical History:   Diagnosis Date    Acute respiratory failure with hypoxia ([address] 12/9/2021    Adenomatous polyp     history of     Cholecystitis 3/19/2019    Chronic venous insufficiency     COPD with acute exacerbation ([address] 12/9/2021    DVT (deep venous thrombosis) ([address]     history of     Edema     related to venous stasis     Gout history of     Hypertension     Onychomycosis     Osteoarthritis     Plantar fasciitis     Pneumonia due to COVID-19 virus 12/8/2021    Tobacco abuse      Past Surgical History:   Procedure Laterality Date    COLONOSCOPY  12/12/2012    polyp    COLONOSCOPY  2003    adenomatous polyps     COLONOSCOPY  08/2009    COLONOSCOPY  06/2008    with polypectomy    CORONARY ARTERY BYPASS GRAFT  10/28/2014    SVG-OM1, SVG-OM2, and SVG-PDA    OTHER SURGICAL HISTORY      teeth extracted     TOTAL KNEE ARTHROPLASTY Right 10/2015    TOTAL KNEE ARTHROPLASTY Left 03/2018    TURP      VASECTOMY  1980     Family History   Problem Relation Age of Onset    Other Mother 52        Rheumatic fever    Kidney Disease Brother     Heart Disease Brother     Sudden Death Father         auto accident     Heart Surgery Sister         bypass surgery    Other Brother         abdominal aortic aneursym    Hypertension Other         siblings     Outpatient Medications Marked as Taking for the 7/27/22 encounter (Office Visit) with Monica Horta MD   Medication Sig Dispense Refill    alfuzosin (UROXATRAL) 10 MG extended release tablet Take 1 tablet by mouth in the morning. 90 tablet 1    finasteride (PROSCAR) 5 MG tablet Take 1 tablet by mouth in the morning.  90 tablet 1    midodrine (PROAMATINE) 5 MG tablet Take 1 tablet by mouth 3 times daily 90 tablet 6    sertraline (ZOLOFT) 50 MG tablet Take 50 mg by mouth nightly       traZODone (DESYREL) 50 MG tablet       potassium chloride (KLOR-CON M) 10 MEQ extended release tablet Take 1 tablet by mouth daily 30 tablet 0    levothyroxine (SYNTHROID) 75 MCG tablet Take 1 tablet by mouth daily 30 tablet 1    buPROPion (WELLBUTRIN) 75 MG tablet Take 1 tablet by mouth twice daily 60 tablet 3    allopurinol (ZYLOPRIM) 300 MG tablet Take 1 tablet by mouth once daily 90 tablet 1    atorvastatin (LIPITOR) 40 MG tablet TAKE 1 TABLET BY MOUTH ONCE DAILY 90 tablet 3    Handicap Placard MISC by Does not apply route Permanent 1 each 0    Omega-3 Fatty Acids (FISH OIL PO) Take 1,200 mg by mouth daily      Acetaminophen (TYLENOL PO) Take by mouth Per pt, takes 2 in am; 2 in pm      aspirin 81 MG tablet Take 81 mg by mouth daily. Patient has no known allergies. Social History     Tobacco Use   Smoking Status Former    Packs/day: 0.00    Years: 55.00    Pack years: 0.00    Types: Cigarettes    Quit date: 10/27/2014    Years since quittin.7   Smokeless Tobacco Never       Social History     Substance and Sexual Activity   Alcohol Use Yes    Alcohol/week: 0.0 standard drinks    Comment: occasionally       REVIEW OF SYSTEMS:  Constitutional: negative  Eyes: negative  Respiratory: negative  Cardiovascular: negative  Gastrointestinal: negative  Musculoskeletal: negative  Genitourinary: negative except for what is in HPI  Skin: negative   Neurological: negative  Hematological/Lymphatic: negative  Psychological: negative    Physical Exam:    This a 67 y.o. male   Vitals:    22 1026   BP: 118/62   Pulse: 98   SpO2: 97%     Constitutional: Patient in no acute distress; Neuro: alert and oriented to person place and time. Psych: Mood and affect normal.        Assessment and Plan      1. BPH with obstruction/lower urinary tract symptoms    2. Urinary retention    3. Overactive bladder         Discussed removal of catheter today or wait 1 month to gain more strength  Discussed short term living with catheter with monthly changes  Continue meds Finasteride  Off Ditropan/Oxybutynin  Start Uroxatral  He would like to keep catheter for now      Nursing order: Change current catheter to new one. Continue mullins catheter; exchange every 3-4 weeks; hand irrigate as needed for poor drainage    Follow up early September for mullins removal        Prescriptions Ordered:  Orders Placed This Encounter   Medications    alfuzosin (UROXATRAL) 10 MG extended release tablet     Sig: Take 1 tablet by mouth in the morning.      Dispense:  90 tablet     Refill:  1    finasteride (PROSCAR) 5 MG tablet     Sig: Take 1 tablet by mouth in the morning. Dispense:  90 tablet     Refill:  1        Orders Placed:  No orders of the defined types were placed in this encounter.            MD Ximena Rodriguez M.D, MD [facility]

## 2022-07-31 DIAGNOSIS — J43.9 PULMONARY EMPHYSEMA, UNSPECIFIED EMPHYSEMA TYPE (HCC): Primary | ICD-10-CM

## 2022-07-31 PROCEDURE — 99310 SBSQ NF CARE HIGH MDM 45: CPT | Performed by: INTERNAL MEDICINE

## 2022-08-01 ENCOUNTER — TELEPHONE (OUTPATIENT)
Dept: INTERNAL MEDICINE | Age: 77
End: 2022-08-01

## 2022-08-01 DIAGNOSIS — N40.1 BENIGN PROSTATIC HYPERPLASIA WITH URINARY RETENTION: Primary | ICD-10-CM

## 2022-08-01 DIAGNOSIS — R33.8 BENIGN PROSTATIC HYPERPLASIA WITH URINARY RETENTION: Primary | ICD-10-CM

## 2022-08-02 ENCOUNTER — OUTSIDE SERVICES (OUTPATIENT)
Dept: INTERNAL MEDICINE | Age: 77
End: 2022-08-02
Payer: MEDICARE

## 2022-08-02 DIAGNOSIS — R09.02 HYPOXIA: ICD-10-CM

## 2022-08-02 DIAGNOSIS — M15.9 PRIMARY OSTEOARTHRITIS INVOLVING MULTIPLE JOINTS: ICD-10-CM

## 2022-08-02 DIAGNOSIS — N40.1 BENIGN PROSTATIC HYPERPLASIA WITH URINARY RETENTION: ICD-10-CM

## 2022-08-02 DIAGNOSIS — E03.4 HYPOTHYROIDISM DUE TO ACQUIRED ATROPHY OF THYROID: ICD-10-CM

## 2022-08-02 DIAGNOSIS — R26.9 GAIT ABNORMALITY: ICD-10-CM

## 2022-08-02 DIAGNOSIS — Z86.16 HISTORY OF COVID-19: Primary | ICD-10-CM

## 2022-08-02 DIAGNOSIS — R33.8 BENIGN PROSTATIC HYPERPLASIA WITH URINARY RETENTION: ICD-10-CM

## 2022-08-02 DIAGNOSIS — E78.2 MIXED HYPERLIPIDEMIA: ICD-10-CM

## 2022-08-02 DIAGNOSIS — J43.9 PULMONARY EMPHYSEMA, UNSPECIFIED EMPHYSEMA TYPE (HCC): ICD-10-CM

## 2022-08-02 DIAGNOSIS — I25.5 ISCHEMIC CARDIOMYOPATHY: ICD-10-CM

## 2022-08-02 DIAGNOSIS — I25.10 CORONARY ARTERY DISEASE INVOLVING NATIVE CORONARY ARTERY OF NATIVE HEART WITHOUT ANGINA PECTORIS: ICD-10-CM

## 2022-08-02 DIAGNOSIS — Z97.8 CHRONIC INDWELLING FOLEY CATHETER: ICD-10-CM

## 2022-08-02 DIAGNOSIS — E66.3 OVERWEIGHT: ICD-10-CM

## 2022-08-02 DIAGNOSIS — I50.42 CHRONIC COMBINED SYSTOLIC AND DIASTOLIC CHF (CONGESTIVE HEART FAILURE) (HCC): ICD-10-CM

## 2022-08-02 DIAGNOSIS — I10 ESSENTIAL HYPERTENSION: ICD-10-CM

## 2022-08-02 NOTE — PROGRESS NOTES
DR. Danny Sharp [facility] VISIT    DATE OF SERVICE: 7/31/22    [facility] [place]    CHIEF COMPLAINT/HISTORY OF CHIEF COMPLAINT: This patient is being seen for ongoing evaluation and management of his history of COVID-19, hypertension, hyperlipidemia, hypothyroidism, coronary artery disease, congestive heart failure, ischemic cardiomyopathy, chronic obstructive pulmonary disease, and obesity. He is much improved now, and in fact is going to be going home on 8/1/22. He needs several prescriptions in order to go home, however. He is currently on oxygen via nasal cannula continuously at 2 liters/minute and needs an order for the oxygen. He recently saw Dr. Lisandro stockton on 7/13/22 for a recheck on his recent COVID-19 infection. He will also need a wheelchair. He also has a chronic indwelling Rodriguez catheter due to benign prostatic hypertrophy with urinary retention. He has been seen by Dr. Bob Astudillo for this and his most recent visit with him was on 7/27/22. At that time Dr. Bob Astudillo discussed keeping the catheter in place for another month and then attempting to remove it after he has gained some more strength. The plan at this time is for him to go home with the catheter in place and then see Dr. Bob Astudillo again on 9/14/22 to attempt removal. He will need a prescription for catheters and supplies for him to go home with. There are no new complaints. ALLERGIES: No Known Allergies    MEDICATIONS: As noted on the [facility], referenced and incorporated herein.     PAST MEDICAL HISTORY:   Past Medical History:   Diagnosis Date    Acute respiratory failure with hypoxia ([address] 12/9/2021    Adenomatous polyp     history of     Cholecystitis 3/19/2019    Chronic venous insufficiency     COPD with acute exacerbation ([address] 12/9/2021    DVT (deep venous thrombosis) (HCC)     history of     Edema     related to venous stasis     Gout     history of     Hypertension     Onychomycosis Osteoarthritis     Plantar fasciitis     Pneumonia due to COVID-19 virus 2021    Tobacco abuse        PAST SURGICAL HISTORY:   Past Surgical History:   Procedure Laterality Date    COLONOSCOPY  2012    polyp    COLONOSCOPY      adenomatous polyps     COLONOSCOPY  2009    COLONOSCOPY  2008    with polypectomy    CORONARY ARTERY BYPASS GRAFT  10/28/2014    SVG-OM1, SVG-OM2, and SVG-PDA    OTHER SURGICAL HISTORY      teeth extracted     TOTAL KNEE ARTHROPLASTY Right 10/2015    TOTAL KNEE ARTHROPLASTY Left 2018    TURP      VASECTOMY         SOCIAL HISTORY:     Tobacco:   Social History     Tobacco Use   Smoking Status Former    Packs/day: 0.00    Years: 55.00    Pack years: 0.00    Types: Cigarettes    Quit date: 10/27/2014    Years since quittin.7   Smokeless Tobacco Never     Alcohol:   Social History     Substance and Sexual Activity   Alcohol Use Yes    Alcohol/week: 0.0 standard drinks    Comment: occasionally     Drugs:   Social History     Substance and Sexual Activity   Drug Use No       FAMILY HISTORY: family history includes Heart Disease in his brother; Heart Surgery in his sister; Hypertension in an other family member; Kidney Disease in his brother; Other in his brother; Other (age of onset: 52) in his mother; Sudden Death in his father. REVIEW OF SYSTEMS:     Please see history of chief complaint above; otherwise no new problems with respect to General, HEENT, Cardiovascular, Respiratory, Gastrointestinal, Genitourinary, Endocrinologic, Musculoskeletal, or Neuropsychiatric complaints. PHYSICAL EXAMINATION:    Vitals: Temp: 97.2 deg F. Pulse: 90. Resp: 18. BP: 124/68. General: A 67 y.o.  male. Alert and oriented to person, place and time. He does not appear to be in any acute distress. Skin: Skin color, texture, turgor normal. No rashes or lesions. HEENT/Neck: Essentially unremarkable  Lungs: Normal - CTA without rales, rhonchi, or wheezing.   Heart: regular rate and rhythm, S1, S2 normal, no murmur, click, rub or gallop No S3 or S4. Abdomen: Obese soft, non-distended, non-tender, normal active bowel sounds, no masses palpated and no hepatosplenomegaly  Extremities: No clubbing, cyanosis, or edema in any of the extremities. Neurologic: cranial nerves II-XII are grossly intact    ASSESSMENT:     Diagnosis Orders   1. History of COVID-19        2. Pulmonary emphysema, unspecified emphysema type ([facility] [address]        3. Hypoxia        4. Benign prostatic hyperplasia with urinary retention        5. Chronic indwelling Rodriguez catheter        6. Primary osteoarthritis involving multiple joints        7. Gait abnormality        8. Essential hypertension        9. Mixed hyperlipidemia        10. Hypothyroidism due to acquired atrophy of thyroid        11. Coronary artery disease involving native coronary artery of native heart without angina pectoris        12. Chronic combined systolic and diastolic CHF (congestive heart failure) ([facility] [address] [address]        13. Ischemic cardiomyopathy        14. Overweight                PLAN:    1. Continue current treatment  2. Nursing home record reviewed and updates summarized and entered into electronic record  3. We reviewed Dr. Klaudia stockton's most recent visit report  4. Will write prescription for oxygen via nasal cannula at 2 liters/minute continuously  5. Will write prescription for Rodriguez catheter supplies  6. We reviewed Dr. Allen Diez most recent visit report  7. Patient will keep indwelling Rodriguez for now and will be re-assessed on 9/14/22 as planned  8.  Will write prescription for wheelchair   - Patient has mobility limitation that significantly impairs his ability to participate in one or more   MRADLs in the home   - The limitation cannot be sufficiently resolved by the use of a cane or walker   -  His home provides adequate access between rooms, maneuvering space, and surfaces for   the use of a manual wheelchair   - The use of a manual wheelchair will significantly improve his ability to participate in MRADLs   and he will use it regularly in the home   - He has not expressed an unwillingness to use the manual wheelchair   - He has sufficient upper extremity function and strength and other physical and mental   capacity needed to safely self-propel the manual wheelchair in the home during a typical day   - His caregiver is available, willing, and able to provide him with assistance with the wheelchair  9. He may discharge home once all arrangements have been made  10. He will follow up with his primary provider, Dr. Scooter Mcgovern, in the outpatient setting  11. See nursing home orders and MAR.         Electronically signed by Inocente Matthews DO on 8/2/2022 at 4:28 PM  Internal Medicine

## 2022-08-03 ENCOUNTER — TELEPHONE (OUTPATIENT)
Dept: FAMILY MEDICINE CLINIC | Age: 77
End: 2022-08-03

## 2022-08-03 NOTE — TELEPHONE ENCOUNTER
[facility] Transitions Initial Follow Up Call    Outreach made within 2 business days of discharge: Yes    Patient: Everlene Saint   Patient : 1945   MRN: [id number]    Reason for Admission: History of Covid-19  Discharge Date: 22      Spoke with: Noah/Nataly    Discharge department/facility: [facility] Interactive Patient Contact:  Was patient able to fill all prescriptions: Yes  Was patient instructed to bring all medications to the follow-up visit: Yes  Is patient taking all medications as directed in the discharge summary? Yes  Does patient understand their discharge instructions: Yes  Does patient have questions or concerns that need addressed prior to 7-14 day follow up office visit: no    Patients and patients wife states they are waiting for a wheelchair for transportation and also a portable oxygen tank.  They stated Nereida has prescriptions for both    Scheduled appointment with PCP within 7-14 days    Follow Up  Future Appointments   Date Time Provider Rola Sood   2022 10:20 AM MD RAFIQ Cintron [facility]   2022  8:20 AM MD MIKAEL Dumont [facility]   2022 11:45 AM MD NAHID Garg [facility]   2023  1:15 PM DO GIAN Lomeli [facility]       Grzegorz Strauss LPN

## 2022-08-05 ENCOUNTER — TELEPHONE (OUTPATIENT)
Dept: FAMILY MEDICINE CLINIC | Age: 77
End: 2022-08-05

## 2022-08-05 NOTE — TELEPHONE ENCOUNTER
Nadege Awad, nurse with [facility] called stating pt c/o right eye drainage, itchy, and feeling gritty, eye is red. Started last week did matte shut last week but has not done that this week. Pt recently had nose surgery 5-6 weeks ago. Artifical Tears were prescribed by nursing home provider. Pt has been d/c from there.  Pt has appt with PCP on Tues 8/9/22  Please advise

## 2022-08-09 ENCOUNTER — OFFICE VISIT (OUTPATIENT)
Dept: FAMILY MEDICINE CLINIC | Age: 77
End: 2022-08-09
Payer: MEDICARE

## 2022-08-09 VITALS
SYSTOLIC BLOOD PRESSURE: 110 MMHG | HEIGHT: 74 IN | DIASTOLIC BLOOD PRESSURE: 62 MMHG | HEART RATE: 89 BPM | BODY MASS INDEX: 33.24 KG/M2 | OXYGEN SATURATION: 99 % | WEIGHT: 259 LBS

## 2022-08-09 DIAGNOSIS — M79.89 PAIN AND SWELLING OF TOE OF RIGHT FOOT: ICD-10-CM

## 2022-08-09 DIAGNOSIS — E78.5 HYPERLIPIDEMIA, UNSPECIFIED HYPERLIPIDEMIA TYPE: ICD-10-CM

## 2022-08-09 DIAGNOSIS — R09.02 HYPOXEMIA: ICD-10-CM

## 2022-08-09 DIAGNOSIS — I10 ESSENTIAL HYPERTENSION: Primary | ICD-10-CM

## 2022-08-09 DIAGNOSIS — R33.9 BLADDER RETENTION OF URINE: ICD-10-CM

## 2022-08-09 DIAGNOSIS — R53.1 GENERALIZED WEAKNESS: ICD-10-CM

## 2022-08-09 DIAGNOSIS — E03.9 HYPOTHYROIDISM, UNSPECIFIED TYPE: ICD-10-CM

## 2022-08-09 DIAGNOSIS — Z09 HOSPITAL DISCHARGE FOLLOW-UP: ICD-10-CM

## 2022-08-09 DIAGNOSIS — M79.674 PAIN AND SWELLING OF TOE OF RIGHT FOOT: ICD-10-CM

## 2022-08-09 DIAGNOSIS — I95.1 ORTHOSTATIC HYPOTENSION: ICD-10-CM

## 2022-08-09 PROCEDURE — G8417 CALC BMI ABV UP PARAM F/U: HCPCS | Performed by: FAMILY MEDICINE

## 2022-08-09 PROCEDURE — G8427 DOCREV CUR MEDS BY ELIG CLIN: HCPCS | Performed by: FAMILY MEDICINE

## 2022-08-09 PROCEDURE — 1111F DSCHRG MED/CURRENT MED MERGE: CPT | Performed by: FAMILY MEDICINE

## 2022-08-09 PROCEDURE — 1123F ACP DISCUSS/DSCN MKR DOCD: CPT | Performed by: FAMILY MEDICINE

## 2022-08-09 PROCEDURE — 99213 OFFICE O/P EST LOW 20 MIN: CPT

## 2022-08-09 PROCEDURE — 99214 OFFICE O/P EST MOD 30 MIN: CPT | Performed by: FAMILY MEDICINE

## 2022-08-09 PROCEDURE — 1036F TOBACCO NON-USER: CPT | Performed by: FAMILY MEDICINE

## 2022-08-09 RX ORDER — ALFUZOSIN HYDROCHLORIDE 10 MG/1
10 TABLET, EXTENDED RELEASE ORAL DAILY
Qty: 90 TABLET | Refills: 1 | Status: SHIPPED | OUTPATIENT
Start: 2022-08-09 | End: 2022-08-16 | Stop reason: SDUPTHER

## 2022-08-09 RX ORDER — BUPROPION HYDROCHLORIDE 75 MG/1
TABLET ORAL
Qty: 180 TABLET | Refills: 1 | Status: SHIPPED | OUTPATIENT
Start: 2022-08-09 | End: 2022-08-16 | Stop reason: SDUPTHER

## 2022-08-09 RX ORDER — POTASSIUM CHLORIDE 750 MG/1
10 TABLET, EXTENDED RELEASE ORAL DAILY
Qty: 90 TABLET | Refills: 1 | Status: SHIPPED | OUTPATIENT
Start: 2022-08-09

## 2022-08-09 RX ORDER — SULFACETAMIDE SODIUM 100 MG/ML
2 SOLUTION/ DROPS OPHTHALMIC 4 TIMES DAILY
Qty: 1 EACH | Refills: 1 | Status: SHIPPED | OUTPATIENT
Start: 2022-08-09 | End: 2022-08-19

## 2022-08-09 RX ORDER — FUROSEMIDE 20 MG/1
20 TABLET ORAL DAILY
COMMUNITY
End: 2022-08-09 | Stop reason: SDUPTHER

## 2022-08-09 RX ORDER — ATORVASTATIN CALCIUM 40 MG/1
TABLET, FILM COATED ORAL
Qty: 90 TABLET | Refills: 3 | Status: SHIPPED | OUTPATIENT
Start: 2022-08-09 | End: 2022-08-16 | Stop reason: SDUPTHER

## 2022-08-09 RX ORDER — MIDODRINE HYDROCHLORIDE 5 MG/1
5 TABLET ORAL 3 TIMES DAILY
Qty: 90 TABLET | Refills: 5 | Status: SHIPPED | OUTPATIENT
Start: 2022-08-09

## 2022-08-09 RX ORDER — TRAZODONE HYDROCHLORIDE 50 MG/1
50 TABLET ORAL NIGHTLY
Qty: 90 TABLET | Refills: 1 | Status: SHIPPED | OUTPATIENT
Start: 2022-08-09

## 2022-08-09 RX ORDER — ALLOPURINOL 300 MG/1
TABLET ORAL
Qty: 90 TABLET | Refills: 1 | Status: SHIPPED | OUTPATIENT
Start: 2022-08-09

## 2022-08-09 RX ORDER — LEVOTHYROXINE SODIUM 0.07 MG/1
75 TABLET ORAL DAILY
Qty: 90 TABLET | Refills: 1 | Status: SHIPPED | OUTPATIENT
Start: 2022-08-09

## 2022-08-09 RX ORDER — FUROSEMIDE 20 MG/1
20 TABLET ORAL DAILY
Qty: 90 TABLET | Refills: 1 | Status: SHIPPED | OUTPATIENT
Start: 2022-08-09

## 2022-08-09 SDOH — ECONOMIC STABILITY: FOOD INSECURITY: WITHIN THE PAST 12 MONTHS, THE FOOD YOU BOUGHT JUST DIDN'T LAST AND YOU DIDN'T HAVE MONEY TO GET MORE.: PATIENT DECLINED

## 2022-08-09 SDOH — ECONOMIC STABILITY: FOOD INSECURITY: WITHIN THE PAST 12 MONTHS, YOU WORRIED THAT YOUR FOOD WOULD RUN OUT BEFORE YOU GOT MONEY TO BUY MORE.: PATIENT DECLINED

## 2022-08-09 ASSESSMENT — PATIENT HEALTH QUESTIONNAIRE - PHQ9
SUM OF ALL RESPONSES TO PHQ QUESTIONS 1-9: 0
SUM OF ALL RESPONSES TO PHQ QUESTIONS 1-9: 0
2. FEELING DOWN, DEPRESSED OR HOPELESS: 0
1. LITTLE INTEREST OR PLEASURE IN DOING THINGS: 0
SUM OF ALL RESPONSES TO PHQ9 QUESTIONS 1 & 2: 0
SUM OF ALL RESPONSES TO PHQ QUESTIONS 1-9: 0
SUM OF ALL RESPONSES TO PHQ QUESTIONS 1-9: 0

## 2022-08-09 ASSESSMENT — SOCIAL DETERMINANTS OF HEALTH (SDOH): HOW HARD IS IT FOR YOU TO PAY FOR THE VERY BASICS LIKE FOOD, HOUSING, MEDICAL CARE, AND HEATING?: PATIENT DECLINED

## 2022-08-09 NOTE — PROGRESS NOTES
HPI:  Patient comes in today for   Chief Complaint   Patient presents with    Follow-up     D/C from [id number] [facility] on 08/01/2022   Patient here for  f/u  after  a prologed rehab stay for about 7 months since he has Covid in 12/2021 has had chronic hypoxia is on home oxygen is a former smoker seen by pulmonology in past.Currently has home health with nursing and PT/OT. Has  had right lower eyelid eversion since he has had skin cancer surgery at the inner corner of his left eye ,has had some drainage and  crusting in the eye on and off.H/o HTN,Hypothyroidism,CAD,s/p CABG and Gout. Is currently on midodrine for hypotension since his hopsitalization ,BP has been fluctuant at home h/o BPH s/p TURP and elevated PSA was seeing urology in [facility] has had mullins catheter since his hospitalization and is following with urology here now. H/o right carotid stenosis being monitored by vascular. Chronic low back is stable . Still has some generalized weakness  HISTORY:  Past Medical History:   Diagnosis Date    Acute respiratory failure with hypoxia ([address] 12/9/2021    Adenomatous polyp     history of     Cholecystitis 3/19/2019    Chronic venous insufficiency     COPD with acute exacerbation ([address] 12/9/2021    DVT (deep venous thrombosis) (HCC)     history of     Edema     related to venous stasis     Gout     history of     Hypertension     Onychomycosis     Osteoarthritis     Plantar fasciitis     Pneumonia due to COVID-19 virus 12/8/2021    Tobacco abuse        Family History   Problem Relation Age of Onset    Other Mother 52        Rheumatic fever    Kidney Disease Brother     Heart Disease Brother     Sudden Death Father         auto accident     Heart Surgery Sister         bypass surgery    Other Brother         abdominal aortic aneursym    Hypertension Other         siblings       Social History     Socioeconomic History    Marital status: Single     Spouse name: Not on file    Number of children: Not on file    Years of education: Not on file    Highest education level: Not on file   Occupational History    Not on file   Tobacco Use    Smoking status: Former     Packs/day: 0.00     Years: 55.00     Pack years: 0.00     Types: Cigarettes     Quit date: 10/27/2014     Years since quittin.7    Smokeless tobacco: Never   Vaping Use    Vaping Use: Never used   Substance and Sexual Activity    Alcohol use: Yes     Alcohol/week: 0.0 standard drinks     Comment: occasionally    Drug use: No    Sexual activity: Yes     Partners: Female   Other Topics Concern    Not on file   Social History Narrative    Not on file     Social Determinants of Health     Financial Resource Strain: Unknown    Difficulty of Paying Living Expenses: Patient refused   Food Insecurity: Unknown    Worried About Running Out of Food in the Last Year: Patient refused    Ran Out of Food in the Last Year: Patient refused   Transportation Needs: Not on file   Physical Activity: Not on file   Stress: Not on file   Social Connections: Not on file   Intimate Partner Violence: Not on file   Housing Stability: Not on file       Current Outpatient Medications   Medication Sig Dispense Refill    furosemide (LASIX) 20 MG tablet Take 20 mg by mouth in the morning. Misc. Devices MISC Oxygen via nasal cannula at 2 liters/minute continuously. Diagnosis: COPD 1 each 0    alfuzosin (UROXATRAL) 10 MG extended release tablet Take 1 tablet by mouth in the morning.  90 tablet 1    midodrine (PROAMATINE) 5 MG tablet Take 1 tablet by mouth 3 times daily 90 tablet 6    sertraline (ZOLOFT) 50 MG tablet Take 50 mg by mouth nightly       potassium chloride (KLOR-CON M) 10 MEQ extended release tablet Take 1 tablet by mouth daily 30 tablet 0    levothyroxine (SYNTHROID) 75 MCG tablet Take 1 tablet by mouth daily 30 tablet 1    buPROPion (WELLBUTRIN) 75 MG tablet Take 1 tablet by mouth twice daily 60 tablet 3    allopurinol (ZYLOPRIM) 300 MG tablet Take 1 tablet by mouth once daily 90 tablet 1 edema, PND  Pulmonary: No cough, hemoptysis, SOB  GI: No nausea, vomiting, dysphagia, odynophagia, diarrhea,mild constipation. : No dysuria, hematuria, urgency,has some incontinence. Has indweling mullins catheter sine had urinary retention while in [facility]  Musculoskeletal: Pain in  low back chronic no radiation is stable,knees are better since surgery. Neuro:No dizziness/lightheadedness, no seizures. no headaches  Skin:Has dry skin and keratotic lesions in arms. Sleep:Fair. Psychiatric:h/o depression o\is stable has chronic insomnia is on Trazadone is helping no anxiety. PHYSICAL EXAM:  VS:    /62   Pulse 89   Ht 6' 2\" (1.88 m)   Wt 259 lb (117.5 kg)   SpO2 99%   BMI 33.25 kg/m²   General:  Alert and oriented, NAD  HEENT:  Has eversion of inner corner of right eye lid. TMs, BETTY, EOMI,Throat currently clear. NECK:  Supple without adenopathy or thyromegaly, no carotid bruits  LUNGS:  CTA all fields  HEART:  RRR without M, R, or G  ABDOMEN:Obese,has a small hernia in the midline above umbulicus,no palpable abnormalities  BACK:Non tender. EXTREMITIES:  Has bilateral chronic leg edema which is improved . No calf tenderness . NEURO:No focal deficits. Skin :Has multiple seborrhoic keratotic ,bruising in hands and forearm ,dry skin. ASSESSMENT/PLAN:   Diagnosis Orders   1. Essential hypertension        2. Hypothyroidism, unspecified type        3. Generalized weakness        4. Hypoxemia                  Orders Placed This Encounter   Procedures    Basic Metabolic Panel     Standing Status:   Future     Standing Expiration Date:   8/9/2023    TSH     Standing Status:   Future     Standing Expiration Date:   8/9/2023       Requested Prescriptions     Signed Prescriptions Disp Refills    sulfacetamide (BLEPH-10) 10 % ophthalmic solution 1 each 1     Sig: Place 2 drops into the right eye in the morning and 2 drops at noon and 2 drops in the evening and 2 drops before bedtime. Do all this for 10 days. levothyroxine (SYNTHROID) 75 MCG tablet 90 tablet 1     Sig: Take 1 tablet by mouth in the morning. atorvastatin (LIPITOR) 40 MG tablet 90 tablet 3     Sig: TAKE 1 TABLET BY MOUTH ONCE DAILY    buPROPion (WELLBUTRIN) 75 MG tablet 180 tablet 1     Sig: Take 1 tablet by mouth twice daily    sertraline (ZOLOFT) 50 MG tablet 90 tablet 1     Sig: Take 1 tablet by mouth nightly Take 50 mg by mouth nightly    alfuzosin (UROXATRAL) 10 MG extended release tablet 90 tablet 1     Sig: Take 1 tablet by mouth in the morning. potassium chloride (KLOR-CON M) 10 MEQ extended release tablet 90 tablet 1     Sig: Take 1 tablet by mouth in the morning. furosemide (LASIX) 20 MG tablet 90 tablet 1     Sig: Take 1 tablet by mouth in the morning. Take 20 mg by mouth in the morning. Abdelrahman Cricket allopurinol (ZYLOPRIM) 300 MG tablet 90 tablet 1     Sig: Take 1 tablet by mouth once daily    traZODone (DESYREL) 50 MG tablet 90 tablet 1     Sig: Take 1 tablet by mouth nightly    midodrine (PROAMATINE) 5 MG tablet 90 tablet 5     Sig: Take 1 tablet by mouth in the morning and 1 tablet at noon and 1 tablet before bedtime. Continue with home health with PT/OT. Continue with Home oxygen as needed. Advised not to regain any more weight. Meds reviwed and refilled as above  Sodium sulamyd eye drops to use if had eye crusting. Rodriguez catheter care advised off risk of infection. BMP . TSH prior to next visit  F/u with urology here. F/u with cardiology. Return in about 3 months (around 11/9/2022).

## 2022-08-16 ENCOUNTER — TELEPHONE (OUTPATIENT)
Dept: FAMILY MEDICINE CLINIC | Age: 77
End: 2022-08-16

## 2022-08-16 DIAGNOSIS — E78.5 HYPERLIPIDEMIA, UNSPECIFIED HYPERLIPIDEMIA TYPE: ICD-10-CM

## 2022-08-16 RX ORDER — ATORVASTATIN CALCIUM 40 MG/1
TABLET, FILM COATED ORAL
Qty: 90 TABLET | Refills: 3 | Status: SHIPPED | OUTPATIENT
Start: 2022-08-16

## 2022-08-16 RX ORDER — BUPROPION HYDROCHLORIDE 75 MG/1
TABLET ORAL
Qty: 180 TABLET | Refills: 1 | Status: SHIPPED | OUTPATIENT
Start: 2022-08-16

## 2022-08-16 RX ORDER — ALFUZOSIN HYDROCHLORIDE 10 MG/1
10 TABLET, EXTENDED RELEASE ORAL DAILY
Qty: 90 TABLET | Refills: 1 | Status: SHIPPED | OUTPATIENT
Start: 2022-08-16 | End: 2022-09-14 | Stop reason: SDUPTHER

## 2022-08-16 RX ORDER — BUSPIRONE HYDROCHLORIDE 5 MG/1
5 TABLET ORAL 2 TIMES DAILY
Qty: 60 TABLET | Refills: 2 | Status: SHIPPED | OUTPATIENT
Start: 2022-08-16 | End: 2022-08-16 | Stop reason: CLARIF

## 2022-08-16 NOTE — TELEPHONE ENCOUNTER
Writer called and spoke with pt's wife. Informed her that Julia Son would need to continue the medications in question. Pt's wife voiced understanding and had no further questions.

## 2022-08-16 NOTE — TELEPHONE ENCOUNTER
Patient was seen on 8/9/22 for SNF follow up.  didn't refill all his medications and wife is wondering if they have been discharged from his medications or if hes suppose to continue them. The medications are Atorvastation, Alfuzosin, Bupropion. Patient still has supply left from SNF but running low. Will need refills if he continues medications.

## 2022-09-14 ENCOUNTER — OFFICE VISIT (OUTPATIENT)
Dept: UROLOGY | Age: 77
End: 2022-09-14
Payer: MEDICARE

## 2022-09-14 VITALS
SYSTOLIC BLOOD PRESSURE: 130 MMHG | BODY MASS INDEX: 33.24 KG/M2 | HEART RATE: 90 BPM | DIASTOLIC BLOOD PRESSURE: 62 MMHG | WEIGHT: 259 LBS | HEIGHT: 74 IN

## 2022-09-14 DIAGNOSIS — N32.81 OVERACTIVE BLADDER: ICD-10-CM

## 2022-09-14 DIAGNOSIS — N40.1 BPH WITH OBSTRUCTION/LOWER URINARY TRACT SYMPTOMS: Primary | ICD-10-CM

## 2022-09-14 DIAGNOSIS — R33.9 URINARY RETENTION: ICD-10-CM

## 2022-09-14 DIAGNOSIS — N13.8 BPH WITH OBSTRUCTION/LOWER URINARY TRACT SYMPTOMS: Primary | ICD-10-CM

## 2022-09-14 PROCEDURE — G8417 CALC BMI ABV UP PARAM F/U: HCPCS | Performed by: UROLOGY

## 2022-09-14 PROCEDURE — 99214 OFFICE O/P EST MOD 30 MIN: CPT | Performed by: UROLOGY

## 2022-09-14 PROCEDURE — 1036F TOBACCO NON-USER: CPT | Performed by: UROLOGY

## 2022-09-14 PROCEDURE — 1123F ACP DISCUSS/DSCN MKR DOCD: CPT | Performed by: UROLOGY

## 2022-09-14 PROCEDURE — G8427 DOCREV CUR MEDS BY ELIG CLIN: HCPCS | Performed by: UROLOGY

## 2022-09-14 PROCEDURE — 99213 OFFICE O/P EST LOW 20 MIN: CPT | Performed by: UROLOGY

## 2022-09-14 RX ORDER — ALFUZOSIN HYDROCHLORIDE 10 MG/1
10 TABLET, EXTENDED RELEASE ORAL DAILY
Qty: 90 TABLET | Refills: 1 | Status: SHIPPED | OUTPATIENT
Start: 2022-09-14

## 2022-09-14 RX ORDER — FINASTERIDE 5 MG/1
5 TABLET, FILM COATED ORAL DAILY
Qty: 90 TABLET | Refills: 1 | Status: SHIPPED | OUTPATIENT
Start: 2022-09-14

## 2022-09-14 NOTE — PROGRESS NOTES
Alysa Richardson MD   Urology Clinic office Visit      Patient:  Lana Phelps  YOB: 1945  Date: 9/14/2022    HISTORY OF PRESENT ILLNESS:   The patient is a 65 y.o. male who presents today for evaluation of the following problem(s):      1. BPH with obstruction/lower urinary tract symptoms    2. Urinary retention    3. Overactive bladder           Overall the problem(s) : are persistent, but improved  Associated Symptoms: No dysuria, gross hematuria. Pain Severity:      Summary of old records: sees Dr Riya Gutiérrez for [facility]  (Patient's old records, notes and chart reviewed and summarized above.)      Hx of BPH, on medications  Recently in hospital with COVID  Had a catheter placed earlier in the year  Discussed void trial at that time, he wanted to keep mullins in place    Here for follow up  Previously had cysto, has been avoiding outlet surgery  On finasteride, uroxatral; off of Ditropan     Improved physical condition, having bowel movements  done with rehab            Last several PSA's:  Lab Results   Component Value Date    PSA 2.80 06/22/2021    PSA 7.71 (H) 02/14/2020    PSA 3.64 12/10/2018       Last total testosterone:  No results found for: TESTOSTERONE    Urinalysis today:  No results found for this visit on 09/14/22.       Last BUN and creatinine:  Lab Results   Component Value Date    BUN 10 04/17/2022     Lab Results   Component Value Date    CREATININE 0.71 04/17/2022       Imaging Reviewed during this Office Visit:   (results were independently reviewed by physician and radiology report verified)    PAST MEDICAL, FAMILY AND SOCIAL HISTORY:  Past Medical History:   Diagnosis Date    Acute respiratory failure with hypoxia ([address] 12/9/2021    Adenomatous polyp     history of     Cholecystitis 3/19/2019    Chronic venous insufficiency     COPD with acute exacerbation ([address] 12/9/2021    DVT (deep venous thrombosis) (HCC)     history of     Edema     related to venous stasis     Gout     history of     Hypertension     Onychomycosis     Osteoarthritis     Plantar fasciitis     Pneumonia due to COVID-19 virus 2021    Tobacco abuse      Past Surgical History:   Procedure Laterality Date    COLONOSCOPY  2012    polyp    COLONOSCOPY      adenomatous polyps     COLONOSCOPY  2009    COLONOSCOPY  2008    with polypectomy    CORONARY ARTERY BYPASS GRAFT  10/28/2014    SVG-OM1, SVG-OM2, and SVG-PDA    OTHER SURGICAL HISTORY      teeth extracted     TOTAL KNEE ARTHROPLASTY Right 10/2015    TOTAL KNEE ARTHROPLASTY Left 2018    TURP      VASECTOMY  1980     Family History   Problem Relation Age of Onset    Other Mother 52        Rheumatic fever    Kidney Disease Brother     Heart Disease Brother     Sudden Death Father         auto accident     Heart Surgery Sister         bypass surgery    Other Brother         abdominal aortic aneursym    Hypertension Other         siblings     Outpatient Medications Marked as Taking for the 22 encounter (Office Visit) with Margi Lozano MD   Medication Sig Dispense Refill    finasteride (PROSCAR) 5 MG tablet Take 1 tablet by mouth daily 90 tablet 1    alfuzosin (UROXATRAL) 10 MG extended release tablet Take 1 tablet by mouth daily 90 tablet 1       Patient has no known allergies.   Social History     Tobacco Use   Smoking Status Former    Packs/day: 0.00    Years: 55.00    Pack years: 0.00    Types: Cigarettes    Quit date: 10/27/2014    Years since quittin.8   Smokeless Tobacco Never       Social History     Substance and Sexual Activity   Alcohol Use Yes    Alcohol/week: 0.0 standard drinks    Comment: occasionally       REVIEW OF SYSTEMS:  Constitutional: negative  Eyes: negative  Respiratory: negative  Cardiovascular: negative  Gastrointestinal: negative  Musculoskeletal: negative  Genitourinary: negative except for what is in HPI  Skin: negative   Neurological: negative  Hematological/Lymphatic: negative  Psychological: negative    Physical

## 2022-10-11 ENCOUNTER — TELEPHONE (OUTPATIENT)
Dept: PULMONOLOGY | Age: 77
End: 2022-10-11

## 2022-10-11 DIAGNOSIS — Z99.81 CHRONIC RESPIRATORY FAILURE WITH HYPOXIA, ON HOME O2 THERAPY (HCC): Primary | ICD-10-CM

## 2022-10-11 DIAGNOSIS — I51.89 COMBINED SYSTOLIC AND DIASTOLIC CARDIAC DYSFUNCTION: ICD-10-CM

## 2022-10-11 DIAGNOSIS — J96.11 CHRONIC RESPIRATORY FAILURE WITH HYPOXIA, ON HOME O2 THERAPY (HCC): Primary | ICD-10-CM

## 2022-10-11 DIAGNOSIS — J43.9 PULMONARY EMPHYSEMA, UNSPECIFIED EMPHYSEMA TYPE (HCC): ICD-10-CM

## 2022-10-11 NOTE — TELEPHONE ENCOUNTER
Patient is using family members inogen machine. Conserving device  Patient would like one for himself.     [id number]

## 2022-10-11 NOTE — TELEPHONE ENCOUNTER
Please place order for [employer] portable oxygen with conserving device. Thanks.      Last Appt:  7/13/2022  Next Appt:   1/11/2023  Med verified in Epic

## 2022-10-12 ENCOUNTER — OFFICE VISIT (OUTPATIENT)
Dept: UROLOGY | Age: 77
End: 2022-10-12
Payer: MEDICARE

## 2022-10-12 VITALS
HEART RATE: 63 BPM | TEMPERATURE: 98.3 F | OXYGEN SATURATION: 93 % | DIASTOLIC BLOOD PRESSURE: 70 MMHG | WEIGHT: 273.8 LBS | SYSTOLIC BLOOD PRESSURE: 128 MMHG | BODY MASS INDEX: 35.14 KG/M2 | HEIGHT: 74 IN

## 2022-10-12 DIAGNOSIS — R33.9 INCOMPLETE BLADDER EMPTYING: ICD-10-CM

## 2022-10-12 DIAGNOSIS — N32.81 OVERACTIVE BLADDER: ICD-10-CM

## 2022-10-12 DIAGNOSIS — N13.8 BPH WITH OBSTRUCTION/LOWER URINARY TRACT SYMPTOMS: Primary | ICD-10-CM

## 2022-10-12 DIAGNOSIS — N40.1 BPH WITH OBSTRUCTION/LOWER URINARY TRACT SYMPTOMS: Primary | ICD-10-CM

## 2022-10-12 DIAGNOSIS — R33.9 URINARY RETENTION: ICD-10-CM

## 2022-10-12 PROCEDURE — 99214 OFFICE O/P EST MOD 30 MIN: CPT | Performed by: UROLOGY

## 2022-10-12 PROCEDURE — PBSHW PR MEASUREMENT,POST-VOID RESIDUAL VOLUME BY US,NON-IMAGING: Performed by: UROLOGY

## 2022-10-12 PROCEDURE — G8484 FLU IMMUNIZE NO ADMIN: HCPCS | Performed by: UROLOGY

## 2022-10-12 PROCEDURE — G8428 CUR MEDS NOT DOCUMENT: HCPCS | Performed by: UROLOGY

## 2022-10-12 PROCEDURE — 51798 US URINE CAPACITY MEASURE: CPT | Performed by: UROLOGY

## 2022-10-12 PROCEDURE — 1123F ACP DISCUSS/DSCN MKR DOCD: CPT | Performed by: UROLOGY

## 2022-10-12 PROCEDURE — 1036F TOBACCO NON-USER: CPT | Performed by: UROLOGY

## 2022-10-12 PROCEDURE — PBSHW PBB SHADOW CHARGE: Performed by: UROLOGY

## 2022-10-12 PROCEDURE — G8417 CALC BMI ABV UP PARAM F/U: HCPCS | Performed by: UROLOGY

## 2022-10-12 RX ORDER — OXYBUTYNIN CHLORIDE 5 MG/1
5 TABLET, EXTENDED RELEASE ORAL DAILY
Qty: 60 TABLET | Refills: 1 | Status: SHIPPED | OUTPATIENT
Start: 2022-10-12 | End: 2022-12-11

## 2022-10-12 NOTE — PROGRESS NOTES
Amelie Espitia MD   Urology Clinic office Visit      Patient:  Janine Sylvester  YOB: 1945  Date: 10/12/2022    HISTORY OF PRESENT ILLNESS:   The patient is a 65 y.o. male who presents today for evaluation of the following problem(s):      1. BPH with obstruction/lower urinary tract symptoms    2. Urinary retention    3. Overactive bladder    4. Incomplete bladder emptying           Overall the problem(s) : are persistent, but improved  Associated Symptoms: No dysuria, gross hematuria. Pain Severity:      Summary of old records: sees Dr Morgan Story for [facility]  (Patient's old records, notes and chart reviewed and summarized above.)      Hx of BPH, on medications  Recently in hospital with COVID  Had a catheter placed earlier in the year  Discussed void trial at that time, he wanted to keep mullins in place    Here for follow up  Previously had cysto, has been avoiding outlet surgery  On finasteride, uroxatral; off of Ditropan     Improved physical condition, having bowel movements  done with rehab      PVR today 58cc            Last several PSA's:  Lab Results   Component Value Date    PSA 2.80 06/22/2021    PSA 7.71 (H) 02/14/2020    PSA 3.64 12/10/2018       Last total testosterone:  No results found for: TESTOSTERONE    Urinalysis today:  No results found for this visit on 10/12/22.       Last BUN and creatinine:  Lab Results   Component Value Date    BUN 10 04/17/2022     Lab Results   Component Value Date    CREATININE 0.71 04/17/2022       Imaging Reviewed during this Office Visit:   (results were independently reviewed by physician and radiology report verified)    PAST MEDICAL, FAMILY AND SOCIAL HISTORY:  Past Medical History:   Diagnosis Date    Acute respiratory failure with hypoxia ([address] 12/9/2021    Adenomatous polyp     history of     Cholecystitis 3/19/2019    Chronic venous insufficiency     COPD with acute exacerbation ([address] 12/9/2021    DVT (deep venous thrombosis) ([address]     history of Edema     related to venous stasis     Gout     history of     Hypertension     Onychomycosis     Osteoarthritis     Plantar fasciitis     Pneumonia due to COVID-19 virus 2021    Tobacco abuse      Past Surgical History:   Procedure Laterality Date    COLONOSCOPY  2012    polyp    COLONOSCOPY      adenomatous polyps     COLONOSCOPY  2009    COLONOSCOPY  2008    with polypectomy    CORONARY ARTERY BYPASS GRAFT  10/28/2014    SVG-OM1, SVG-OM2, and SVG-PDA    OTHER SURGICAL HISTORY      teeth extracted     TOTAL KNEE ARTHROPLASTY Right 10/2015    TOTAL KNEE ARTHROPLASTY Left 2018    TURP      VASECTOMY  1980     Family History   Problem Relation Age of Onset    Other Mother 52        Rheumatic fever    Kidney Disease Brother     Heart Disease Brother     Sudden Death Father         auto accident     Heart Surgery Sister         bypass surgery    Other Brother         abdominal aortic aneursym    Hypertension Other         siblings     Outpatient Medications Marked as Taking for the 10/12/22 encounter (Office Visit) with Linette Alex MD   Medication Sig Dispense Refill    oxybutynin (DITROPAN-XL) 5 MG extended release tablet Take 1 tablet by mouth daily 60 tablet 1    alfuzosin (UROXATRAL) 10 MG extended release tablet Take 1 tablet by mouth daily 90 tablet 1    Acetaminophen (TYLENOL PO) Take by mouth Per pt, takes 2 in am; 2 in pm         Patient has no known allergies.   Social History     Tobacco Use   Smoking Status Former    Packs/day: 0.00    Years: 55.00    Pack years: 0.00    Types: Cigarettes    Quit date: 10/27/2014    Years since quittin.9   Smokeless Tobacco Never       Social History     Substance and Sexual Activity   Alcohol Use Yes    Alcohol/week: 0.0 standard drinks    Comment: occasionally       REVIEW OF SYSTEMS:  Constitutional: negative  Eyes: negative  Respiratory: negative  Cardiovascular: negative  Gastrointestinal: negative  Musculoskeletal: negative  Genitourinary: negative except for what is in HPI  Skin: negative   Neurological: negative  Hematological/Lymphatic: negative  Psychological: negative    Physical Exam:    This a 65 y.o. male   Vitals:    10/12/22 0847   BP: 128/70   Pulse: 63   Temp: 98.3 °F (36.8 °C)   SpO2: 93%     Constitutional: Patient in no acute distress; Neuro: alert and oriented to person place and time. Psych: Mood and affect normal.        Assessment and Plan      1. BPH with obstruction/lower urinary tract symptoms    2. Urinary retention    3. Overactive bladder    4.  Incomplete bladder emptying         Catheter has been removed  Reports voiding well but some OAB and incontinence  Discussed short term living with catheter with monthly changes  Continue meds Finasteride and Continue Uroxatral  Off Ditropan/Oxybutynin; will try to resume and restart med but at lower dose; 5 mg  Increase fiber  Fu 6-8 weeks with PVR                      Prescriptions Ordered:  Orders Placed This Encounter   Medications    oxybutynin (DITROPAN-XL) 5 MG extended release tablet     Sig: Take 1 tablet by mouth daily     Dispense:  60 tablet     Refill:  1            Orders Placed:  Orders Placed This Encounter   Procedures    MD Grisel Trevizo M.D, MD [facility]

## 2022-11-04 ENCOUNTER — HOSPITAL ENCOUNTER (OUTPATIENT)
Dept: LAB | Age: 77
Discharge: HOME | End: 2022-11-04
Payer: MEDICARE

## 2022-11-04 DIAGNOSIS — E03.9 HYPOTHYROIDISM, UNSPECIFIED TYPE: ICD-10-CM

## 2022-11-04 DIAGNOSIS — I10 ESSENTIAL HYPERTENSION: ICD-10-CM

## 2022-11-04 LAB
ANION GAP SERPL CALCULATED.3IONS-SCNC: 11 MMOL/L (ref 9–17)
BUN BLDV-MCNC: 17 MG/DL (ref 8–23)
BUN/CREAT BLD: 17 (ref 9–20)
CALCIUM SERPL-MCNC: 9.8 MG/DL (ref 8.6–10.4)
CHLORIDE BLD-SCNC: 103 MMOL/L (ref 98–107)
CO2: 27 MMOL/L (ref 20–31)
CREAT SERPL-MCNC: 1.01 MG/DL (ref 0.7–1.2)
GFR SERPL CREATININE-BSD FRML MDRD: >60 ML/MIN/1.73M2
GLUCOSE BLD-MCNC: 93 MG/DL (ref 70–99)
POTASSIUM SERPL-SCNC: 4.4 MMOL/L (ref 3.7–5.3)
SODIUM BLD-SCNC: 141 MMOL/L (ref 135–144)
TSH SERPL DL<=0.05 MIU/L-ACNC: 7.66 UIU/ML (ref 0.3–5)

## 2022-11-04 PROCEDURE — 80048 BASIC METABOLIC PNL TOTAL CA: CPT

## 2022-11-04 PROCEDURE — 36415 COLL VENOUS BLD VENIPUNCTURE: CPT

## 2022-11-04 PROCEDURE — 84443 ASSAY THYROID STIM HORMONE: CPT

## 2022-11-09 ENCOUNTER — OFFICE VISIT (OUTPATIENT)
Dept: FAMILY MEDICINE CLINIC | Age: 77
End: 2022-11-09
Payer: MEDICARE

## 2022-11-09 ENCOUNTER — IMMUNIZATION (OUTPATIENT)
Dept: LAB | Age: 77
End: 2022-11-09
Payer: MEDICARE

## 2022-11-09 VITALS
DIASTOLIC BLOOD PRESSURE: 72 MMHG | HEIGHT: 74 IN | WEIGHT: 280 LBS | BODY MASS INDEX: 35.94 KG/M2 | HEART RATE: 83 BPM | SYSTOLIC BLOOD PRESSURE: 130 MMHG | OXYGEN SATURATION: 96 %

## 2022-11-09 DIAGNOSIS — E03.9 HYPOTHYROIDISM, UNSPECIFIED TYPE: ICD-10-CM

## 2022-11-09 DIAGNOSIS — I25.810 CORONARY ARTERY DISEASE INVOLVING CORONARY BYPASS GRAFT OF NATIVE HEART WITHOUT ANGINA PECTORIS: ICD-10-CM

## 2022-11-09 DIAGNOSIS — N40.1 BPH WITH URINARY OBSTRUCTION: ICD-10-CM

## 2022-11-09 DIAGNOSIS — E66.01 CLASS 3 SEVERE OBESITY DUE TO EXCESS CALORIES WITH BODY MASS INDEX (BMI) OF 40.0 TO 44.9 IN ADULT, UNSPECIFIED WHETHER SERIOUS COMORBIDITY PRESENT (HCC): ICD-10-CM

## 2022-11-09 DIAGNOSIS — R09.02 HYPOXEMIA: ICD-10-CM

## 2022-11-09 DIAGNOSIS — I10 ESSENTIAL HYPERTENSION: ICD-10-CM

## 2022-11-09 DIAGNOSIS — Z00.00 MEDICARE ANNUAL WELLNESS VISIT, SUBSEQUENT: Primary | ICD-10-CM

## 2022-11-09 DIAGNOSIS — N13.8 BPH WITH URINARY OBSTRUCTION: ICD-10-CM

## 2022-11-09 PROCEDURE — 1123F ACP DISCUSS/DSCN MKR DOCD: CPT | Performed by: FAMILY MEDICINE

## 2022-11-09 PROCEDURE — 99212 OFFICE O/P EST SF 10 MIN: CPT

## 2022-11-09 PROCEDURE — 99213 OFFICE O/P EST LOW 20 MIN: CPT | Performed by: FAMILY MEDICINE

## 2022-11-09 PROCEDURE — G8484 FLU IMMUNIZE NO ADMIN: HCPCS | Performed by: FAMILY MEDICINE

## 2022-11-09 PROCEDURE — 3078F DIAST BP <80 MM HG: CPT | Performed by: FAMILY MEDICINE

## 2022-11-09 PROCEDURE — G0439 PPPS, SUBSEQ VISIT: HCPCS | Performed by: FAMILY MEDICINE

## 2022-11-09 PROCEDURE — G8417 CALC BMI ABV UP PARAM F/U: HCPCS | Performed by: FAMILY MEDICINE

## 2022-11-09 PROCEDURE — 0134A COVID-19, MODERNA BIVALENT BOOSTER, (AGE 12Y+), IM, 50 MCG/0.5 ML: CPT | Performed by: FAMILY MEDICINE

## 2022-11-09 PROCEDURE — G8427 DOCREV CUR MEDS BY ELIG CLIN: HCPCS | Performed by: FAMILY MEDICINE

## 2022-11-09 PROCEDURE — 1036F TOBACCO NON-USER: CPT | Performed by: FAMILY MEDICINE

## 2022-11-09 PROCEDURE — 3074F SYST BP LT 130 MM HG: CPT | Performed by: FAMILY MEDICINE

## 2022-11-09 PROCEDURE — PBSHW COVID-19, MODERNA BIVALENT BOOSTER, (AGE 12Y+), IM, 50 MCG/0.5 ML: Performed by: FAMILY MEDICINE

## 2022-11-09 RX ORDER — LEVOTHYROXINE SODIUM 0.1 MG/1
100 TABLET ORAL DAILY
Qty: 90 TABLET | Refills: 1 | Status: SHIPPED | OUTPATIENT
Start: 2022-11-09 | End: 2022-11-11 | Stop reason: SDUPTHER

## 2022-11-09 RX ORDER — CARBOXYMETHYLCELLULOSE SODIUM 10 MG/ML
GEL OPHTHALMIC
COMMUNITY
Start: 2022-10-28

## 2022-11-09 ASSESSMENT — LIFESTYLE VARIABLES
HOW MANY STANDARD DRINKS CONTAINING ALCOHOL DO YOU HAVE ON A TYPICAL DAY: PATIENT DOES NOT DRINK
HOW OFTEN DO YOU HAVE A DRINK CONTAINING ALCOHOL: NEVER

## 2022-11-09 NOTE — PROGRESS NOTES
Medicare Annual Wellness Visit    Name: Ap Diaz Date: 2022   MRN: [id number] Sex: Male   Age: 65 y.o. Ethnicity: Non- / Non    : 1945 Race: White (non-)      Elsa Li is here for [facility] AWV  Patient with h/o CAD,s/p CABG,Hypothyroidism,HTN,and OA.s/p Bilatreral knee replacement no problems is doing ok ,no h/o falls. h/o BPH with urinary retention recently has seen urology is off mullins catheter. Had COVID infection with prolonged hospitalization in 2021 was in Rehab for about 7 months has required supplemental oxygen at 2L since then came home in 2022 had lost about 90 lbs while there has gained some weight since then. Vision is poor is seeing ophthalmology at [place] has eversion of left lower eyelid since his skin cancer surgery with frequent watering also has cataracts. Has hearing loss and is getting hearing aids through [place]. Denies any memory problems. Depression is stable on medication. No Known Allergies    Prior to Visit Medications    Medication Sig Taking? Authorizing Provider   carboxymethylcellulose (THERATEARS) 1 % ophthalmic gel APPLY 1 DROP TO RIGHT EYE EVERY 2 HOURS Yes Historical Provider, MD   oxybutynin (DITROPAN-XL) 5 MG extended release tablet Take 1 tablet by mouth daily Yes Unique Rolon MD   finasteride (PROSCAR) 5 MG tablet Take 1 tablet by mouth daily Yes Unique Rolon MD   alfuzosin (UROXATRAL) 10 MG extended release tablet Take 1 tablet by mouth daily Yes Unique Rolon MD   atorvastatin (LIPITOR) 40 MG tablet TAKE 1 TABLET BY MOUTH ONCE DAILY Yes Colletta Mins, MD   buPROPion (WELLBUTRIN) 75 MG tablet Take 1 tablet by mouth twice daily Yes Colletta Mins, MD   levothyroxine (SYNTHROID) 75 MCG tablet Take 1 tablet by mouth in the morning.  Yes Colletta Mins, MD   sertraline (ZOLOFT) 50 MG tablet Take 1 tablet by mouth nightly Take 50 mg by mouth nightly Yes Colletta Mins, MD   potassium chloride (KLOR-CON M) 10 MEQ extended release tablet Take 1 tablet by mouth in the morning. Yes Rima Michele MD   furosemide (LASIX) 20 MG tablet Take 1 tablet by mouth in the morning. Take 20 mg by mouth in the morning. Chidi Silverio MD   allopurinol (ZYLOPRIM) 300 MG tablet Take 1 tablet by mouth once daily Yes Rima Michele MD   traZODone (DESYREL) 50 MG tablet Take 1 tablet by mouth nightly Yes Rima Michele MD   midodrine (PROAMATINE) 5 MG tablet Take 1 tablet by mouth in the morning and 1 tablet at noon and 1 tablet before bedtime. Yes MD Anna Castellanos. Devices MISC Oxygen via nasal cannula at 2 liters/minute continuously. Diagnosis: COPD Yes Malik Sierra DO   Handicap Placard MISC by Does not apply route Permanent Yes Rima Michele MD   Acetaminophen (TYLENOL PO) Take by mouth Per pt, takes 2 in am; 2 in pm Yes Historical Provider, MD   Misc. Devices MISC Rodriguez catheter supplies. 18 F/30 mL Rodriguez catheters, leg bags, Rodriguez drainage bags, Rodriguez insertion packs. Dx: BPH with urinary retention  Patient not taking: No sig reported  Malik Sierra DO   hydrocortisone (ANUSOL-HC) 25 MG suppository Place 1 suppository rectally 2 times daily as needed for Hemorrhoids  Patient not taking: No sig reported  Arelis Johnson MD   Omega-3 Fatty Acids (FISH OIL PO) Take 1,200 mg by mouth daily  Patient not taking: No sig reported  Historical Provider, MD   aspirin 81 MG tablet Take 81 mg by mouth daily.   Patient not taking: No sig reported  Historical Provider, MD       Past Medical History:   Diagnosis Date    Acute respiratory failure with hypoxia ([address] 12/9/2021    Adenomatous polyp     history of     Cholecystitis 3/19/2019    Chronic venous insufficiency     COPD with acute exacerbation ([address] 12/9/2021    DVT (deep venous thrombosis) (HCC)     history of     Edema     related to venous stasis     Gout     history of     Hypertension Onychomycosis     Osteoarthritis     Plantar fasciitis     Pneumonia due to COVID-19 virus 12/8/2021    Tobacco abuse      Past Surgical History:   Procedure Laterality Date    COLONOSCOPY  12/12/2012    polyp    COLONOSCOPY  2003    adenomatous polyps     COLONOSCOPY  08/2009    COLONOSCOPY  06/2008    with polypectomy    CORONARY ARTERY BYPASS GRAFT  10/28/2014    SVG-OM1, SVG-OM2, and SVG-PDA    OTHER SURGICAL HISTORY      teeth extracted     TOTAL KNEE ARTHROPLASTY Right 10/2015    TOTAL KNEE ARTHROPLASTY Left 03/2018    TURP      VASECTOMY  1980       Family History   Problem Relation Age of Onset    Other Mother 52        Rheumatic fever    Kidney Disease Brother     Heart Disease Brother     Sudden Death Father         auto accident     Heart Surgery Sister         bypass surgery    Other Brother         abdominal aortic aneursym    Hypertension Other         siblings       CareTeam (Including outside providers/suppliers regularly involved in providing care):   Patient Care Team:  Nela Castañdea MD as PCP - General (Family Medicine)  Nela Castañeda MD as PCP - [facility] Empaneled Provider    Wt Readings from Last 3 Encounters:   10/12/22 273 lb 12.8 oz (124.2 kg)   09/14/22 259 lb (117.5 kg)   08/09/22 259 lb (117.5 kg)     There were no vitals filed for this visit. There is no height or weight on file to calculate BMI. Based upon direct observation of the patient, evaluation of cognition reveals recent and remote memory intact. REVIEW OF SYSTEMS:  General: No fevers, chills, change in weight  HEENT: No double vision, has blurry vision, runny nose, sore throat, tinnitus. Has hearing loss is getting hearing aids  Cardio: No chest pain, palpitations, QUILES, edema, PND  Pulmonary: No cough, hemoptysis, SOB  GI: No nausea, vomiting, dysphagia, odynophagia, diarrhea, constipation.   : No dysuria, hematuria, urgency,has urinary difficulty with incomplete emptying and incontinence  Musculoskeletal: Back pain on and off. No other  joint pain currently , no temperature intolerance  Skin: No lesions or itching  Sleep: Good  Has some problems with ADL s uses a cane or walker to ambulate. Psychiatric: No depression or anxiety    PHYSICAL EXAM:  VS:  /72   Pulse 83   Ht 6' 2\" (1.88 m)   Wt 280 lb (127 kg)   SpO2 96%   BMI 35.95 kg/m²   General:  Alert and oriented, NAD  HEENT:  TMs, BETTY, EOMI, Has eversion left lower eyelid,conjunctiva clear. NECK:  Supple without adenopathy or thyromegaly, no carotid bruits  LUNGS:  CTA all fields  HEART:  RRR without M, R, or G  ABDOMEN: Obese, soft and nontender without palpable abnormalities  EXTREMITIES:  Without clubbing, cyanosis, has mild ankle edema, no calf tenderness  NEURO:  No focal deficits. SKIN:  Dry skin with keratotic lesions in arms and hand,age related changes. No active lesions. Diagnosis Orders   1. Medicare annual wellness visit, subsequent        2. Hypothyroidism, unspecified type  levothyroxine (SYNTHROID) 100 MCG tablet    TSH      3. Essential hypertension        4. BPH with urinary obstruction        5. Coronary artery disease involving coronary bypass graft of native heart without angina pectoris        6. Class 3 severe obesity due to excess calories with body mass index (BMI) of 40.0 to 44.9 in adult, unspecified whether serious comorbidity present ([facility]        7. Hypoxemia          Plan:  Labs reviewed TSH is elevated will increase Levoxyl to 100 mcg. Recheck TSH in 3 months. Continue current meds. Home safety tips advised . Advised to weight down. Continue supplemental oxygen  F/U with cardiology. Recommend covid booster and shingrix vaccine. F/u with opthalmology at [address] [place] [address] Blvd and screening values have been reviewed and are found in Flowsheets. The following problems were reviewed today and where indicated follow up appointments were made and/or referrals ordered.     Positive Risk Factor Screenings with Interventions:     Health Habits/Nutrition:  Physical Activity: Insufficiently Active    Days of Exercise per Week: 1 day    Minutes of Exercise per Session: 20 min     Have you lost any weight without trying in the past 3 months?: No     Have you seen the dentist within the past year?: (!) No  Health Habits/Nutrition Interventions:  patient declines any change inhis current routine,knows he has to loose some weight and increase his activity level     Safety Interventions:  Home safety tips provided    Personalized Preventive Plan   Current Health Maintenance Status  Immunization History   Administered Date(s) Administered    COVID-19, MODERNA BLUE border, Primary or Immunocompromised, (age 12y+), IM, 100 mcg/0.5mL 01/25/2021, 02/22/2021    Influenza, FLUAD, (age 72 y+), Adjuvanted, 0.5mL 10/21/2020, 09/15/2021    Influenza, High Dose (Fluzone 65 yrs and older) 12/01/2014, 09/21/2015, 10/06/2017, 10/29/2018    Influenza, Triv, inactivated, subunit, adjuvanted, IM (Fluad 65 yrs and older) 09/30/2019    Pneumococcal Conjugate 13-valent ([id number]) 11/25/2014    Pneumococcal Polysaccharide ([id number]) 11/01/2015, 03/02/2017    Tdap (Boostrix, Adacel) 03/19/2019        Health Maintenance   Topic Date Due    Shingles vaccine (1 of 2) Never done    Annual Wellness Visit (AWV)  11/20/2020    COVID-19 Vaccine (3 - Booster for Moderna series) 04/19/2021    Lipids  03/05/2022    Depression Screen  08/09/2023    DTaP/Tdap/Td vaccine (2 - Td or Tdap) 03/19/2029    Flu vaccine  Completed    Pneumococcal 65+ years Vaccine  Completed    Hepatitis C screen  Completed    Hepatitis A vaccine  Aged Out    Hib vaccine  Aged Out    Meningococcal (ACWY) vaccine  Aged Out     Recommendations for [place] Due: see orders and patient instructions/AVS.    Recommended screening schedule for the next 5-10 years is provided to the patient in written form: see Patient Instructions/AVS.

## 2022-11-11 ENCOUNTER — TELEPHONE (OUTPATIENT)
Dept: FAMILY MEDICINE CLINIC | Age: 77
End: 2022-11-11

## 2022-11-11 DIAGNOSIS — E03.9 HYPOTHYROIDISM, UNSPECIFIED TYPE: ICD-10-CM

## 2022-11-11 RX ORDER — LEVOTHYROXINE SODIUM 0.1 MG/1
100 TABLET ORAL DAILY
Qty: 90 TABLET | Refills: 1 | Status: SHIPPED | OUTPATIENT
Start: 2022-11-11

## 2022-11-11 NOTE — TELEPHONE ENCOUNTER
Writer received call from pt's wife stating that [facility] informed her that they did not receive the script for levothyroxine that was sent on 11/09/2022. Writer resent rx.

## 2022-11-18 ENCOUNTER — APPOINTMENT (OUTPATIENT)
Dept: CT IMAGING | Age: 77
End: 2022-11-18
Payer: MEDICARE

## 2022-11-18 ENCOUNTER — HOSPITAL ENCOUNTER (EMERGENCY)
Age: 77
Discharge: HOME | End: 2022-11-18
Attending: EMERGENCY MEDICINE
Payer: MEDICARE

## 2022-11-18 ENCOUNTER — APPOINTMENT (OUTPATIENT)
Dept: GENERAL RADIOLOGY | Age: 77
End: 2022-11-18
Payer: MEDICARE

## 2022-11-18 VITALS
TEMPERATURE: 99 F | BODY MASS INDEX: 35.94 KG/M2 | HEART RATE: 78 BPM | HEIGHT: 74 IN | WEIGHT: 280 LBS | RESPIRATION RATE: 16 BRPM | DIASTOLIC BLOOD PRESSURE: 65 MMHG | SYSTOLIC BLOOD PRESSURE: 147 MMHG | OXYGEN SATURATION: 96 %

## 2022-11-18 DIAGNOSIS — S61.411A LACERATION OF RIGHT HAND WITHOUT FOREIGN BODY, INITIAL ENCOUNTER: Primary | ICD-10-CM

## 2022-11-18 PROCEDURE — 12001 RPR S/N/AX/GEN/TRNK 2.5CM/<: CPT

## 2022-11-18 PROCEDURE — 73130 X-RAY EXAM OF HAND: CPT

## 2022-11-18 PROCEDURE — 70450 CT HEAD/BRAIN W/O DYE: CPT

## 2022-11-18 PROCEDURE — 2500000003 HC RX 250 WO HCPCS: Performed by: EMERGENCY MEDICINE

## 2022-11-18 PROCEDURE — 99284 EMERGENCY DEPT VISIT MOD MDM: CPT

## 2022-11-18 PROCEDURE — 6370000000 HC RX 637 (ALT 250 FOR IP): Performed by: EMERGENCY MEDICINE

## 2022-11-18 RX ORDER — BACITRACIN ZINC 500 [USP'U]/G
OINTMENT TOPICAL ONCE
Status: COMPLETED | OUTPATIENT
Start: 2022-11-18 | End: 2022-11-18

## 2022-11-18 RX ORDER — LIDOCAINE HYDROCHLORIDE 10 MG/ML
20 INJECTION, SOLUTION INFILTRATION; PERINEURAL ONCE
Status: COMPLETED | OUTPATIENT
Start: 2022-11-18 | End: 2022-11-18

## 2022-11-18 RX ADMIN — BACITRACIN ZINC: 500 OINTMENT TOPICAL at 23:05

## 2022-11-18 RX ADMIN — LIDOCAINE HYDROCHLORIDE 20 ML: 10 INJECTION, SOLUTION INFILTRATION; PERINEURAL at 22:26

## 2022-11-18 ASSESSMENT — PAIN - FUNCTIONAL ASSESSMENT: PAIN_FUNCTIONAL_ASSESSMENT: NONE - DENIES PAIN

## 2022-11-19 NOTE — ED PROVIDER NOTES
eMERGENCY dEPARTMENT eNCOUnter      Pt Name: Maria Barr  MRN: [id number]  [place] 1945  Date of evaluation: 11/18/2022      CHIEF COMPLAINT       Chief Complaint   Patient presents with    Laceration     Right palm laceration after fall onto concrete after he states he lost his balance approx 40 min PTA. Denies any LOC and family helped him up. HISTORY OF PRESENT ILLNESS    Maria Barr is a 65 y.o. male who presents with laceration. Patient states shortly before arrival he states that he was trying to walk too fast lost his balance landing on his right outstretched hand sustaining a laceration to the palm states his last tetanus was about 2 years ago hit his head but no loss of consciousness        REVIEW OF SYSTEMS       Review of systems are all reviewed and negative except stated above in HPI    PAST MEDICAL HISTORY    has a past medical history of Acute respiratory failure with hypoxia ([address] Adenomatous polyp, Cholecystitis, Chronic venous insufficiency, COPD with acute exacerbation ([address] DVT (deep venous thrombosis) ([facility] Edema, Gout, Hypertension, Onychomycosis, Osteoarthritis, Plantar fasciitis, Pneumonia due to COVID-19 virus, and Tobacco abuse. SURGICAL HISTORY      has a past surgical history that includes Colonoscopy (12/12/2012); Vasectomy (1980); Colonoscopy (2003); other surgical history; Colonoscopy (08/2009); Colonoscopy (06/2008); Coronary artery bypass graft (10/28/2014); Total knee arthroplasty (Right, 10/2015); Total knee arthroplasty (Left, 03/2018); and TURP.     CURRENT MEDICATIONS       Discharge Medication List as of 11/18/2022 11:07 PM        CONTINUE these medications which have NOT CHANGED    Details   levothyroxine (SYNTHROID) 100 MCG tablet Take 1 tablet by mouth daily, Disp-90 tablet, R-1Normal      carboxymethylcellulose (THERATEARS) 1 % ophthalmic gel APPLY 1 DROP TO RIGHT EYE EVERY 2 HOURSHistorical Med      oxybutynin (DITROPAN-XL) 5 MG extended release tablet Take 1 tablet by mouth daily, Disp-60 tablet, R-1Normal      finasteride (PROSCAR) 5 MG tablet Take 1 tablet by mouth daily, Disp-90 tablet, R-1Normal      alfuzosin (UROXATRAL) 10 MG extended release tablet Take 1 tablet by mouth daily, Disp-90 tablet, R-1Normal      atorvastatin (LIPITOR) 40 MG tablet TAKE 1 TABLET BY MOUTH ONCE DAILY, Disp-90 tablet, R-3Normal      buPROPion (WELLBUTRIN) 75 MG tablet Take 1 tablet by mouth twice daily, Disp-180 tablet, R-1Normal      sertraline (ZOLOFT) 50 MG tablet Take 1 tablet by mouth nightly Take 50 mg by mouth nightly, Disp-90 tablet, R-1Normal      potassium chloride (KLOR-CON M) 10 MEQ extended release tablet Take 1 tablet by mouth in the morning., Disp-90 tablet, R-1Normal      furosemide (LASIX) 20 MG tablet Take 1 tablet by mouth in the morning. Take 20 mg by mouth in the morning. ., Disp-90 tablet, R-1Normal      allopurinol (ZYLOPRIM) 300 MG tablet Take 1 tablet by mouth once daily, Disp-90 tablet, R-1Normal      traZODone (DESYREL) 50 MG tablet Take 1 tablet by mouth nightly, Disp-90 tablet, R-1Normal      midodrine (PROAMATINE) 5 MG tablet Take 1 tablet by mouth in the morning and 1 tablet at noon and 1 tablet before bedtime. , Disp-90 tablet, R-5Normal      !! Misc. Devices MISC Disp-1 each, R-0, PrintFoley catheter supplies. 18 F/30 mL Rodriguez catheters, leg bags, Rodriguez drainage bags, Rodriguez insertion packs. Dx: BPH with urinary retention      !! Misc. Devices MISC Disp-1 each, R-0, PrintOxygen via nasal cannula at 2 liters/minute continuously.  Diagnosis: COPD      hydrocortisone (ANUSOL-HC) 25 MG suppository Place 1 suppository rectally 2 times daily as needed for Hemorrhoids, Disp-30 suppository, R-0Normal      Handicap Placard MISC Starting Tue 8/20/2019, Disp-1 each, R-0, PrintPermanent      Omega-3 Fatty Acids (FISH OIL PO) Take 1,200 mg by mouth daily      Acetaminophen (TYLENOL PO) Take by mouth Per pt, takes 2 in am; 2 in pmHistorical Med aspirin 81 MG tablet Take 81 mg by mouth daily. !! - Potential duplicate medications found. Please discuss with provider. ALLERGIES     has No Known Allergies. FAMILY HISTORY     He indicated that his mother is . He indicated that his father is . He indicated that all of his three sisters are alive. He indicated that only one of his three brothers is alive. He indicated that the status of his other is unknown.     family history includes Heart Disease in his brother; Heart Surgery in his sister; Hypertension in an other family member; Kidney Disease in his brother; Other in his brother; Other (age of onset: 52) in his mother; Sudden Death in his father. SOCIAL HISTORY      reports that he quit smoking about 8 years ago. His smoking use included cigarettes. He has never used smokeless tobacco. He reports current alcohol use. He reports that he does not use drugs. PHYSICAL EXAM     INITIAL VITALS:  height is 6' 2\" (1.88 m) and weight is 280 lb (127 kg). His tympanic temperature is 99 °F (37.2 °C). His blood pressure is 147/65 (abnormal) and his pulse is 78. His respiration is 16 and oxygen saturation is 96%.       General: Patient alert nontoxic-appearing male in no apparent distress  HEENT: Head shows a superficial laceration to right side of his eye pupils are equal and reactive he does have a droop to his lower eyelid which is secondary to old surgery  Neck: Supple no C-spine tenderness step-offs or deformities  Respiratory: Patient's lung sounds are clear bilateral  Cardiac: Heart is borderline tachycardic without murmur  Extremity: Examination of right upper extremity reveals marked swelling to the right hand with a large approximately centimeter laceration across the palm into the subcutaneous tissue no tendon structures are noted no infections no active bleeding at this time neurovascular intact distally with intact sensation good cap refill tendon function is tested in all digits found to be intact  Neuro: Patient has no gross focal neurological deficits at bedside exam    DIFFERENTIAL DIAGNOSIS/ MDM:     Laceration head injury fracture    DIAGNOSTIC RESULTS     EKG: All EKG's are interpreted by the Emergency Department Physician who either signs or Co-signs this chart in the absence of a cardiologist.        RADIOLOGY:   I directly visualized the following  images and reviewed the radiologist interpretations:  CT HEAD WO CONTRAST   Final Result   No acute intracranial findings. XR HAND RIGHT (MIN 3 VIEWS)   Final Result   No acute bony abnormalities are noted               LABS:  Labs Reviewed - No data to display      EMERGENCY DEPARTMENT COURSE:   Vitals:    Vitals:    11/18/22 2136 11/18/22 2200 11/18/22 2308   BP: (!) 147/89 (!) 147/65    Pulse: 74  78   Resp: 18  16   Temp: 99 °F (37.2 °C)     TempSrc: Tympanic     SpO2: 95% 97% 96%   Weight: 280 lb (127 kg)     Height: 6' 2\" (1.88 m)       -------------------------  BP: (!) 147/65, Temp: 99 °F (37.2 °C), Heart Rate: 78, Resp: 16    Orders Placed This Encounter   Medications    lidocaine 1 % injection 20 mL    bacitracin zinc ointment           Re-evaluation Notes    X-ray and CT per radiologist shows nothing acute patient was instructed watch for early signs of infection wound recheck 24 to 48 hours stitches out 2 weeks return if worse    CRITICAL CARE:   None      CONSULTS:      PROCEDURES:  Laceration note  The wound was first cleaned by nursing staff then cleaned and irrigated with sterile saline by me explored down to base no foreign bodies no signs of infection was anesthetized using 1% lidocaine without epinephrine a total of 5 cc wound was closed in simple fashion using 4-0 nylon a total of 10 interrupted sutures hemostasis and approximation were obtained    FINAL IMPRESSION      1.  Laceration of right hand without foreign body, initial encounter          DISPOSITION/PLAN   DISPOSITION Decision To Discharge 11/18/2022 10:48:57 PM      Condition on Disposition    Stable    PATIENT REFERRED TO:  Joel May MD  [address]  [phone]    In 2 days  For wound re-check      DISCHARGE MEDICATIONS:  Discharge Medication List as of 11/18/2022 11:07 PM          (Please note that portions of this note were completed with a voice recognition program.  Efforts were made to edit the dictations but occasionally words are mis-transcribed.)    Lizy Whyte MD,, MD, F.A.C.E.P.   Attending Emergency Physician        Lizy Whyte MD  11/18/22 [id number]

## 2022-11-19 NOTE — ED TRIAGE NOTES
Fell approx 40 min PTA and now with laceration to right palm 7 x 0.5cm. States lost balance and had cane in hand. Right hand dominant.

## 2022-11-21 ENCOUNTER — OFFICE VISIT (OUTPATIENT)
Dept: FAMILY MEDICINE CLINIC | Age: 77
End: 2022-11-21
Payer: MEDICARE

## 2022-11-21 VITALS
SYSTOLIC BLOOD PRESSURE: 118 MMHG | OXYGEN SATURATION: 97 % | BODY MASS INDEX: 35.94 KG/M2 | HEIGHT: 74 IN | HEART RATE: 54 BPM | WEIGHT: 280 LBS | DIASTOLIC BLOOD PRESSURE: 64 MMHG

## 2022-11-21 DIAGNOSIS — I95.1 ORTHOSTATIC HYPOTENSION: ICD-10-CM

## 2022-11-21 DIAGNOSIS — S60.221D: ICD-10-CM

## 2022-11-21 DIAGNOSIS — S65.311D LACERATION OF DEEP PALMAR ARCH OF RIGHT HAND, SUBSEQUENT ENCOUNTER: Primary | ICD-10-CM

## 2022-11-21 DIAGNOSIS — I10 ESSENTIAL HYPERTENSION: ICD-10-CM

## 2022-11-21 PROCEDURE — G8484 FLU IMMUNIZE NO ADMIN: HCPCS | Performed by: FAMILY MEDICINE

## 2022-11-21 PROCEDURE — 1123F ACP DISCUSS/DSCN MKR DOCD: CPT | Performed by: FAMILY MEDICINE

## 2022-11-21 PROCEDURE — 99214 OFFICE O/P EST MOD 30 MIN: CPT | Performed by: FAMILY MEDICINE

## 2022-11-21 PROCEDURE — 1036F TOBACCO NON-USER: CPT | Performed by: FAMILY MEDICINE

## 2022-11-21 PROCEDURE — G8417 CALC BMI ABV UP PARAM F/U: HCPCS | Performed by: FAMILY MEDICINE

## 2022-11-21 PROCEDURE — 3078F DIAST BP <80 MM HG: CPT | Performed by: FAMILY MEDICINE

## 2022-11-21 PROCEDURE — 3074F SYST BP LT 130 MM HG: CPT | Performed by: FAMILY MEDICINE

## 2022-11-21 PROCEDURE — G8427 DOCREV CUR MEDS BY ELIG CLIN: HCPCS | Performed by: FAMILY MEDICINE

## 2022-11-21 NOTE — PROGRESS NOTES
HPI:  Patient comes in today for   Chief Complaint   Patient presents with    Laceration     Right hand laceration Friday night. 10 stitches in ER. Patient here to f/u was seen I the ER 3 days ago after he had a fall outside when he was [employer] ball game states we was trying to walk faster with a cane and he fell forward had injury to the right hand hitting a metal bench and had a laceration requiring sutures,Xray was negative for fracature has swelling and bruising . Kelly Reece H/o HTN,Hypothyroidism,CAD,s/p CABG and Gout. Is currently on midodrine for hypotension since his last hospitalization ,BP has been fluctuant at home. Chronic low back is stable . Has bben on home oxygen since his Covid infection last year.   HISTORY:  Past Medical History:   Diagnosis Date    Acute respiratory failure with hypoxia ([address] 12/9/2021    Adenomatous polyp     history of     Cholecystitis 3/19/2019    Chronic venous insufficiency     COPD with acute exacerbation ([address] 12/9/2021    DVT (deep venous thrombosis) (HCC)     history of     Edema     related to venous stasis     Gout     history of     Hypertension     Onychomycosis     Osteoarthritis     Plantar fasciitis     Pneumonia due to COVID-19 virus 12/8/2021    Tobacco abuse        Family History   Problem Relation Age of Onset    Other Mother 52        Rheumatic fever    Kidney Disease Brother     Heart Disease Brother     Sudden Death Father         auto accident     Heart Surgery Sister         bypass surgery    Other Brother         abdominal aortic aneursym    Hypertension Other         siblings       Social History     Socioeconomic History    Marital status: Single     Spouse name: Not on file    Number of children: Not on file    Years of education: Not on file    Highest education level: Not on file   Occupational History    Not on file   Tobacco Use    Smoking status: Former     Packs/day: 0.00     Years: 55.00     Pack years: 0.00     Types: Cigarettes     Quit date: 10/27/2014 Years since quittin.0    Smokeless tobacco: Never   Vaping Use    Vaping Use: Never used   Substance and Sexual Activity    Alcohol use: Yes     Alcohol/week: 0.0 standard drinks     Comment: occasionally    Drug use: No    Sexual activity: Yes     Partners: Female   Other Topics Concern    Not on file   Social History Narrative    Not on file     Social Determinants of Health     Financial Resource Strain: Unknown    Difficulty of Paying Living Expenses: Patient refused   Food Insecurity: Unknown    Worried About Running Out of Food in the Last Year: Patient refused    Ran Out of Food in the Last Year: Patient refused   Transportation Needs: Not on file   Physical Activity: Insufficiently Active    Days of Exercise per Week: 1 day    Minutes of Exercise per Session: 20 min   Stress: Not on file   Social Connections: Not on file   Intimate Partner Violence: Not on file   Housing Stability: Not on file       Current Outpatient Medications   Medication Sig Dispense Refill    levothyroxine (SYNTHROID) 100 MCG tablet Take 1 tablet by mouth daily 90 tablet 1    carboxymethylcellulose (THERATEARS) 1 % ophthalmic gel APPLY 1 DROP TO RIGHT EYE EVERY 2 HOURS      oxybutynin (DITROPAN-XL) 5 MG extended release tablet Take 1 tablet by mouth daily 60 tablet 1    finasteride (PROSCAR) 5 MG tablet Take 1 tablet by mouth daily 90 tablet 1    alfuzosin (UROXATRAL) 10 MG extended release tablet Take 1 tablet by mouth daily 90 tablet 1    atorvastatin (LIPITOR) 40 MG tablet TAKE 1 TABLET BY MOUTH ONCE DAILY 90 tablet 3    buPROPion (WELLBUTRIN) 75 MG tablet Take 1 tablet by mouth twice daily 180 tablet 1    sertraline (ZOLOFT) 50 MG tablet Take 1 tablet by mouth nightly Take 50 mg by mouth nightly 90 tablet 1    potassium chloride (KLOR-CON M) 10 MEQ extended release tablet Take 1 tablet by mouth in the morning. 90 tablet 1    furosemide (LASIX) 20 MG tablet Take 1 tablet by mouth in the morning.  Take 20 mg by mouth in the morning. . 90 tablet 1    allopurinol (ZYLOPRIM) 300 MG tablet Take 1 tablet by mouth once daily 90 tablet 1    traZODone (DESYREL) 50 MG tablet Take 1 tablet by mouth nightly 90 tablet 1    midodrine (PROAMATINE) 5 MG tablet Take 1 tablet by mouth in the morning and 1 tablet at noon and 1 tablet before bedtime. 90 tablet 5    Misc. Devices MISC Rodriguez catheter supplies. 18 F/30 mL Rodriguez catheters, leg bags, Rodriugez drainage bags, Rodriguez insertion packs. Dx: BPH with urinary retention (Patient not taking: No sig reported) 1 each 0    Misc. Devices MISC Oxygen via nasal cannula at 2 liters/minute continuously. Diagnosis: COPD 1 each 0    hydrocortisone (ANUSOL-HC) 25 MG suppository Place 1 suppository rectally 2 times daily as needed for Hemorrhoids (Patient not taking: No sig reported) 30 suppository 0    Handicap Placard MISC by Does not apply route Permanent 1 each 0    Omega-3 Fatty Acids (FISH OIL PO) Take 1,200 mg by mouth daily (Patient not taking: No sig reported)      Acetaminophen (TYLENOL PO) Take by mouth Per pt, takes 2 in am; 2 in pm      aspirin 81 MG tablet Take 81 mg by mouth daily. (Patient not taking: No sig reported)       No current facility-administered medications for this visit. REVIEW OF SYSTEMS:  General: No fevers, chills, has lost about 90 lbs of weight since his Covid infection. HEENT: No double vision ,no runny nose, sore throat or tinnitus  Cardio: No chest pain, palpitations, QUILES, edema, PND  Pulmonary: No cough, hemoptysis, SOB  GI: No nausea, vomiting, dysphagia, odynophagia, diarrhea, constipation. : No dysuria, hematuria, urgency,has some incontinence. Musculoskeletal: Pain in  low back chronic no radiation is stable,knees are better since surgery. Neuro:No dizziness/lightheadedness, no seizures. no headaches  Skin:Has dry skin and keratotic lesions in arms. Sleep:Fair. Psychiatric:h/o depression is stable has chronic insomnia is on Trazadone is helping no anxiety.   PHYSICAL EXAM:  VS:    /64   Pulse 54   Ht 6' 2\" (1.88 m)   Wt 280 lb (127 kg)   SpO2 97%   BMI 35.95 kg/m²   General:  Alert and oriented, NAD  HEENT:  Has a small abrasion right corner of eye,Has eversion of inner corner of right eye lid. TMs, BETTY, EOMI,Throat currently clear. NECK:  Supple without adenopathy or thyromegaly, no carotid bruits  LUNGS:  CTA all fields  HEART:  RRR without M, R, or G  ABDOMEN:Obese,has a small hernia in the midline above umbulicus,no palpable abnormalities  BACK:Non tender. EXTREMITIES:  Has swelling with bruising in right hand  and fingers with a long sutured laceration across the mid palm area. Has bilateral chronic leg edema which is improved . No calf tenderness . NEURO:No focal deficits. Skin :Has multiple seborrhoic keratotic ,bruising in hands and forearm ,dry skin. ASSESSMENT/PLAN:   Diagnosis Orders   1. Laceration of deep palmar arch of right hand, subsequent encounter        2. Traumatic hematoma of hand, right, subsequent encounter        3. Essential hypertension        4. Orthostatic hypotension                    No orders of the defined types were placed in this encounter. Requested Prescriptions      No prescriptions requested or ordered in this encounter   Keep hand elevated. Ice packs to back of hand  Tylenol prn pain. Wound dressing done  Suture removal in 10 days. Fall precautions advised. Continue current meds  No follow-ups on file.

## 2022-11-28 ENCOUNTER — OFFICE VISIT (OUTPATIENT)
Dept: CARDIOLOGY | Age: 77
End: 2022-11-28
Payer: MEDICARE

## 2022-11-28 ENCOUNTER — OFFICE VISIT (OUTPATIENT)
Dept: FAMILY MEDICINE CLINIC | Age: 77
End: 2022-11-28

## 2022-11-28 VITALS
WEIGHT: 280 LBS | OXYGEN SATURATION: 95 % | DIASTOLIC BLOOD PRESSURE: 74 MMHG | HEART RATE: 82 BPM | BODY MASS INDEX: 35.94 KG/M2 | RESPIRATION RATE: 24 BRPM | SYSTOLIC BLOOD PRESSURE: 134 MMHG | HEIGHT: 74 IN

## 2022-11-28 VITALS — DIASTOLIC BLOOD PRESSURE: 78 MMHG | HEART RATE: 85 BPM | SYSTOLIC BLOOD PRESSURE: 138 MMHG

## 2022-11-28 DIAGNOSIS — I44.7 LBBB (LEFT BUNDLE BRANCH BLOCK): Primary | ICD-10-CM

## 2022-11-28 DIAGNOSIS — S65.311D LACERATION OF DEEP PALMAR ARCH OF RIGHT HAND, SUBSEQUENT ENCOUNTER: Primary | ICD-10-CM

## 2022-11-28 DIAGNOSIS — I25.10 CORONARY ARTERY DISEASE INVOLVING NATIVE CORONARY ARTERY OF NATIVE HEART WITHOUT ANGINA PECTORIS: ICD-10-CM

## 2022-11-28 PROCEDURE — 3078F DIAST BP <80 MM HG: CPT | Performed by: NURSE PRACTITIONER

## 2022-11-28 PROCEDURE — 99214 OFFICE O/P EST MOD 30 MIN: CPT | Performed by: NURSE PRACTITIONER

## 2022-11-28 PROCEDURE — 3074F SYST BP LT 130 MM HG: CPT | Performed by: NURSE PRACTITIONER

## 2022-11-28 PROCEDURE — 93005 ELECTROCARDIOGRAM TRACING: CPT | Performed by: NURSE PRACTITIONER

## 2022-11-28 PROCEDURE — G8484 FLU IMMUNIZE NO ADMIN: HCPCS | Performed by: NURSE PRACTITIONER

## 2022-11-28 PROCEDURE — 93010 ELECTROCARDIOGRAM REPORT: CPT | Performed by: NURSE PRACTITIONER

## 2022-11-28 PROCEDURE — 1036F TOBACCO NON-USER: CPT | Performed by: NURSE PRACTITIONER

## 2022-11-28 PROCEDURE — 1123F ACP DISCUSS/DSCN MKR DOCD: CPT | Performed by: NURSE PRACTITIONER

## 2022-11-28 PROCEDURE — G8417 CALC BMI ABV UP PARAM F/U: HCPCS | Performed by: NURSE PRACTITIONER

## 2022-11-28 PROCEDURE — 99999 PR OFFICE/OUTPT VISIT,PROCEDURE ONLY: CPT | Performed by: STUDENT IN AN ORGANIZED HEALTH CARE EDUCATION/TRAINING PROGRAM

## 2022-11-28 PROCEDURE — G8427 DOCREV CUR MEDS BY ELIG CLIN: HCPCS | Performed by: NURSE PRACTITIONER

## 2022-11-28 NOTE — PROGRESS NOTES
Today's Date: 11/28/2022  Patient's Name: Kia Vo  Patient's age: 65 y.o., 1945    Subjective:    Seen & examined in room for 6 month follow-up. He is here alone and in wheelchair. No chest pain, no dyspnea, no PND, no syncope or pre-syncope, no orthopnea. He did fall last week while attending his [employer] game. States he was walking and his mind moved fast than his feet and he tripped and fell on the concrete. Large laceration and bruising noted to his right hand. Denies hitting his head. States he did not have any dizziness/lightheadedness prior to falling. States compliance with his meds and checks his BP and pulose ox at home daily and \"its always under 140. \" States he has put back on the weight he lost last year at the nursing home and feels he is doing \"pretty well for an old man. \" On chronic NC oxygen. Past Medical History:   has a past medical history of Acute respiratory failure with hypoxia ([address] Adenomatous polyp, Cholecystitis, Chronic venous insufficiency, COPD with acute exacerbation ([address] DVT (deep venous thrombosis) ([address] Edema, Gout, Hypertension, Onychomycosis, Osteoarthritis, Plantar fasciitis, Pneumonia due to COVID-19 virus, and Tobacco abuse. Past Surgical History:   has a past surgical history that includes Colonoscopy (12/12/2012); Vasectomy (1980); Colonoscopy (2003); other surgical history; Colonoscopy (08/2009); Colonoscopy (06/2008); Coronary artery bypass graft (10/28/2014); Total knee arthroplasty (Right, 10/2015); Total knee arthroplasty (Left, 03/2018); and TURP. Home Medications:  Prior to Admission medications    Medication Sig Start Date End Date Taking?  Authorizing Provider   levothyroxine (SYNTHROID) 100 MCG tablet Take 1 tablet by mouth daily 11/11/22   Roni Mckeon MD   carboxymethylcellulose (THERATEARS) 1 % ophthalmic gel APPLY 1 DROP TO RIGHT EYE EVERY 2 HOURS 10/28/22   Historical Provider, MD   oxybutynin (DITROPAN-XL) 5 MG extended release tablet Take 1 tablet by mouth daily 10/12/22 12/11/22  Symone Huitron MD   finasteride (PROSCAR) 5 MG tablet Take 1 tablet by mouth daily 9/14/22   Symone Huitron MD   alfuzosin (UROXATRAL) 10 MG extended release tablet Take 1 tablet by mouth daily 9/14/22   Symone Huitron MD   atorvastatin (LIPITOR) 40 MG tablet TAKE 1 TABLET BY MOUTH ONCE DAILY 8/16/22   Rosemarie Dubin, MD   buPROPion (WELLBUTRIN) 75 MG tablet Take 1 tablet by mouth twice daily 8/16/22   Rosemarie Dubin, MD   sertraline (ZOLOFT) 50 MG tablet Take 1 tablet by mouth nightly Take 50 mg by mouth nightly 8/9/22   Rosemarie Dubin, MD   potassium chloride (KLOR-CON M) 10 MEQ extended release tablet Take 1 tablet by mouth in the morning. 8/9/22   Arelis Silva MD   furosemide (LASIX) 20 MG tablet Take 1 tablet by mouth in the morning. Take 20 mg by mouth in the morning. . 8/9/22   Rosemarie Dubin, MD   allopurinol (ZYLOPRIM) 300 MG tablet Take 1 tablet by mouth once daily 8/9/22   Rosemarie Dubin, MD   traZODone (DESYREL) 50 MG tablet Take 1 tablet by mouth nightly 8/9/22   Rosemarie Dubin, MD   midodrine (PROAMATINE) 5 MG tablet Take 1 tablet by mouth in the morning and 1 tablet at noon and 1 tablet before bedtime. 8/9/22   Rosemarie Dubin, MD   Misc. Devices MISC Rodriguez catheter supplies. 18 F/30 mL Rodriguez catheters, leg bags, Rodriguez drainage bags, Rodriguez insertion packs. Dx: BPH with urinary retention  Patient not taking: No sig reported 8/1/22   Maria Sierra DO   Misc. Devices MISC Oxygen via nasal cannula at 2 liters/minute continuously.  Diagnosis: COPD 7/31/22   Maria Sierra DO   hydrocortisone (ANUSOL-HC) 25 MG suppository Place 1 suppository rectally 2 times daily as needed for Hemorrhoids  Patient not taking: No sig reported 1/8/22   Rosemarie Dubin, MD   Handicap Placard MISC by Does not apply route Permanent 8/20/19   Rosemarie Dubin, MD   Omega-3 Fatty Acids (FISH OIL PO) Take 1,200 mg by mouth daily  Patient not taking: No sig reported    Historical Provider, MD   Acetaminophen (TYLENOL PO) Take by mouth Per pt, takes 2 in am; 2 in pm    Historical Provider, MD   aspirin 81 MG tablet Take 81 mg by mouth daily. Patient not taking: No sig reported    Historical Provider, MD       Allergies:  Patient has no known allergies. Social History:   reports that he quit smoking about 8 years ago. His smoking use included cigarettes. He has never used smokeless tobacco. He reports current alcohol use. He reports that he does not use drugs. Family History: family history includes Heart Disease in his brother; Heart Surgery in his sister; Hypertension in an other family member; Kidney Disease in his brother; Other in his brother; Other (age of onset: 52) in his mother; Sudden Death in his father. No h/o sudden cardiac death. No for premature CAD    REVIEW OF SYSTEMS:    Constitutional: there has been no unanticipated weight loss. There's been No change in energy level, No change in activity level. Eyes: No visual changes or diplopia. No scleral icterus. ENT: No Headaches, hearing loss or vertigo. No mouth sores or sore throat. Cardiovascular: see above  Respiratory: see above  Gastrointestinal: No abdominal pain, appetite loss, blood in stools. Genitourinary: No dysuria, trouble voiding, or hematuria. Musculoskeletal:  No gait disturbance, No weakness or joint complaints. Integumentary: No rash or pruritis. Neurological: No headache or diplopia. No tingling  Psychiatric: No anxiety, or depression. Endocrine: No temperature intolerance. Hematologic/Lymphatic: No abnormal bruising or bleeding, blood clots or swollen lymph nodes. Allergic/Immunologic: No nasal congestion or hives. PHYSICAL EXAM:      There were no vitals filed for this visit.       Constitutional and General Appearance: alert, cooperative, no distress and appears stated age  [de-identified]: PERRL, no cervical lymphadenopathy. No masses palpable. Normal oral mucosa. Right eye s/p skin CA removal site with erythema but no drainage or open areas. Respiratory:  Normal excursion and expansion without use of accessory muscles  Resp Auscultation: Good respiratory effort. No for increased work of breathing. On auscultation: diminished to auscultation bilaterally  On chronic NC oxygen  Cardiovascular:  Heart tones are crisp and normal. regular S1 and S2. S4 noted  Jugular venous pulsation Normal  The carotid upstroke is normal in amplitude and contour without delay or bruit   Abdomen:   soft  Bowel sounds present  Extremities:   Trace b/l LE ankle edema  Neurological:  Alert and oriented. Cardiac Data:  EKG 11/28/22:   SR, first degree AV block, LBBB  No change from prior    Labs:     CBC: No results for input(s): WBC, HGB, HCT, PLT in the last 72 hours. BMP: No results for input(s): NA, K, CO2, BUN, CREATININE, LABGLOM, GLUCOSE in the last 72 hours. PT/INR: No results for input(s): PROTIME, INR in the last 72 hours. FASTING LIPID PANEL:  Lab Results   Component Value Date/Time    HDL 34 03/05/2021 07:36 AM    TRIG 159 03/05/2021 07:36 AM     LIVER PROFILE:No results for input(s): AST, ALT, LABALBU in the last 72 hours.     Problem List:  Patient Active Problem List   Diagnosis    Hypertension    Osteoarthritis    Chronic venous insufficiency    Acquired genu valgum    Degeneration of lumbar intervertebral disc    Lower urinary tract symptoms due to benign prostatic hyperplasia    Presence of right artificial knee joint    Bilateral carotid artery stenosis    Hypothyroidism    Ischemic cardiomyopathy    Ventral hernia    Left bundle branch block (LBBB)    Chronic embolism and thrombosis of deep vein of both distal legs (HCC)    Coronary artery disease involving native coronary artery of native heart    Hyperlipidemia    Chronic combined systolic and diastolic CHF (congestive heart failure) ([address] Impaired gait    Emphysema/COPD (HCC)    History of COVID-19    Class 2 severe obesity with serious comorbidity and body mass index (BMI) of 39.0 to 39.9 in adult [facility])    Near syncope    Hypoxia    Orthostatic hypotension        Assessment and plan:      CAD/CABG SVG-OM1, SVG-OM2 and SVG-PDA on 10/28/2014. TTE 02/2015 LVEF 30% improved to LVEF 50% on subsequent TTE. Stable. Denies any present cardiac issues/concerns. Continue PO ASA, statin, & Lasix. No BB d/t AVB. No ACE/ARB/ARNI d/t #2. Discussed importance of diet, med complinace, & activity. Orthostatic hypotension- stable and has not had issues with reoccurance. Continue midodrine 5mg po TID. Proscar and metoprolol discontinue. LBBB- chronic and stable. Carotid u/s 11/18 at [facility]- < 50% bilateral- Follows with Vascular surgery  HTN-controlled with recent hs of orthostatic hypotension- on midodrine. Monitor BP. Continue walker for ambulatory support. Hyperlipidemia- Stable. Continue statin. LDL 28 on 3/5/21. Did not get labs rechecked. Advised to get one day this week and will f/u  DJD S/p L knee replacement 3/2018. - per PCP    F/U 6 months or sooner if neccessary      Anya Olson, APRN - [address] [place] [address] [facility]  [phone]

## 2022-12-01 ENCOUNTER — OFFICE VISIT (OUTPATIENT)
Dept: FAMILY MEDICINE CLINIC | Age: 77
End: 2022-12-01

## 2022-12-01 VITALS
HEIGHT: 74 IN | SYSTOLIC BLOOD PRESSURE: 122 MMHG | OXYGEN SATURATION: 97 % | HEART RATE: 78 BPM | RESPIRATION RATE: 18 BRPM | BODY MASS INDEX: 35.94 KG/M2 | DIASTOLIC BLOOD PRESSURE: 72 MMHG | WEIGHT: 280 LBS

## 2022-12-01 DIAGNOSIS — S65.311D LACERATION OF DEEP PALMAR ARCH OF RIGHT HAND, SUBSEQUENT ENCOUNTER: Primary | ICD-10-CM

## 2022-12-01 NOTE — PROGRESS NOTES
[address]  [address] [address]  Dept: [phone]  Dept Fax: [phone]  Loc: [phone]      Dong Parrish is a 65 y.o. male who presents today for:  Chief Complaint   Patient presents with    Suture / Staple Removal     Suture removal on right hand        Goals         Blood Pressure < 140/90 (pt-stated)       Patient Self-Management Goal for Health Maintenance  Goal: I will follow a healthy diet as discussed by my provider. Barriers: none  Plan for overcoming my barriers: N/A  Confidence: 5/10  Anticipated Goal Completion Date: 8/10/2017              HPI:     HPI  Patient is a 76-year-old male who presents to the office today for suture removal on the right palm. Per patient wound seems to be healing well without any significant drainage or bleeding. States that pain is under good control and denies any significant issues at this time. We will be setting up another appointment to discuss chronic conditions with myself.     Past Medical History:   Diagnosis Date    Acute respiratory failure with hypoxia ([facility] [address] 12/9/2021    Adenomatous polyp     history of     Cholecystitis 3/19/2019    Chronic venous insufficiency     COPD with acute exacerbation ([address] 12/9/2021    DVT (deep venous thrombosis) (HCC)     history of     Edema     related to venous stasis     Gout     history of     Hypertension     Onychomycosis     Osteoarthritis     Plantar fasciitis     Pneumonia due to COVID-19 virus 12/8/2021    Tobacco abuse       Past Surgical History:   Procedure Laterality Date    COLONOSCOPY  12/12/2012    polyp    COLONOSCOPY  2003    adenomatous polyps     COLONOSCOPY  08/2009    COLONOSCOPY  06/2008    with polypectomy    CORONARY ARTERY BYPASS GRAFT  10/28/2014    SVG-OM1, SVG-OM2, and SVG-PDA    OTHER SURGICAL HISTORY      teeth extracted     TOTAL KNEE ARTHROPLASTY Right 10/2015    TOTAL KNEE ARTHROPLASTY Left 2018    TURP      VASECTOMY       Family History   Problem Relation Age of Onset    Other Mother 52        Rheumatic fever    Kidney Disease Brother     Heart Disease Brother     Sudden Death Father         auto accident     Heart Surgery Sister         bypass surgery    Other Brother         abdominal aortic aneursym    Hypertension Other         siblings     Social History     Tobacco Use    Smoking status: Former     Packs/day: 0.00     Years: 55.00     Pack years: 0.00     Types: Cigarettes     Quit date: 10/27/2014     Years since quittin.1    Smokeless tobacco: Never   Substance Use Topics    Alcohol use: Yes     Alcohol/week: 0.0 standard drinks     Comment: occasionally      Current Outpatient Medications   Medication Sig Dispense Refill    levothyroxine (SYNTHROID) 100 MCG tablet Take 1 tablet by mouth daily 90 tablet 1    carboxymethylcellulose (THERATEARS) 1 % ophthalmic gel APPLY 1 DROP TO RIGHT EYE EVERY 2 HOURS      oxybutynin (DITROPAN-XL) 5 MG extended release tablet Take 1 tablet by mouth daily 60 tablet 1    finasteride (PROSCAR) 5 MG tablet Take 1 tablet by mouth daily 90 tablet 1    alfuzosin (UROXATRAL) 10 MG extended release tablet Take 1 tablet by mouth daily 90 tablet 1    atorvastatin (LIPITOR) 40 MG tablet TAKE 1 TABLET BY MOUTH ONCE DAILY 90 tablet 3    buPROPion (WELLBUTRIN) 75 MG tablet Take 1 tablet by mouth twice daily 180 tablet 1    sertraline (ZOLOFT) 50 MG tablet Take 1 tablet by mouth nightly Take 50 mg by mouth nightly 90 tablet 1    potassium chloride (KLOR-CON M) 10 MEQ extended release tablet Take 1 tablet by mouth in the morning. 90 tablet 1    furosemide (LASIX) 20 MG tablet Take 1 tablet by mouth in the morning. Take 20 mg by mouth in the morning. . 90 tablet 1    allopurinol (ZYLOPRIM) 300 MG tablet Take 1 tablet by mouth once daily 90 tablet 1    traZODone (DESYREL) 50 MG tablet Take 1 tablet by mouth nightly 90 tablet 1    midodrine (PROAMATINE) 5 MG tablet Take 1 tablet by mouth in the morning and 1 tablet at noon and 1 tablet before bedtime. 90 tablet 5    Misc. Devices MISC Rodriguez catheter supplies. 18 F/30 mL Rodriguez catheters, leg bags, Rodriguez drainage bags, Rodriguez insertion packs. Dx: BPH with urinary retention 1 each 0    Misc. Devices MISC Oxygen via nasal cannula at 2 liters/minute continuously. Diagnosis: COPD 1 each 0    hydrocortisone (ANUSOL-HC) 25 MG suppository Place 1 suppository rectally 2 times daily as needed for Hemorrhoids 30 suppository 0    Handicap Placard MISC by Does not apply route Permanent 1 each 0    Omega-3 Fatty Acids (FISH OIL PO) Take 1,200 mg by mouth daily      Acetaminophen (TYLENOL PO) Take by mouth Per pt, takes 2 in am; 2 in pm       No current facility-administered medications for this visit. No Known Allergies    Health Maintenance   Topic Date Due    Shingles vaccine (1 of 2) Never done    Lipids  03/05/2022    Depression Screen  11/09/2023    Annual Wellness Visit (AWV)  11/10/2023    DTaP/Tdap/Td vaccine (2 - Td or Tdap) 03/19/2029    Flu vaccine  Completed    Pneumococcal 65+ years Vaccine  Completed    COVID-19 Vaccine  Completed    Hepatitis C screen  Completed    Hepatitis A vaccine  Aged Out    Hib vaccine  Aged Out    Meningococcal (ACWY) vaccine  Aged Out       Subjective:      Review of Systems    Objective:     Vitals:    12/01/22 1450   BP: 122/72   Pulse: 78   Resp: 18   SpO2: 97%   Weight: 280 lb (127 kg)   Height: 6' 2\" (1.88 m)       Body mass index is 35.95 kg/m². Wt Readings from Last 3 Encounters:   12/01/22 280 lb (127 kg)   11/28/22 280 lb (127 kg)   11/21/22 280 lb (127 kg)     BP Readings from Last 3 Encounters:   12/01/22 122/72   11/28/22 138/78   11/28/22 134/74       Physical Exam  Vitals and nursing note reviewed. Constitutional:       General: He is not in acute distress. Appearance: He is well-developed. He is not diaphoretic.    HENT:      Head: Normocephalic and atraumatic. Right Ear: External ear normal.      Left Ear: External ear normal.      Nose: Nose normal.   Eyes:      General: No scleral icterus. Right eye: No discharge. Left eye: No discharge. Conjunctiva/sclera: Conjunctivae normal.   Cardiovascular:      Rate and Rhythm: Normal rate and regular rhythm. Heart sounds: Normal heart sounds. No murmur heard. Pulmonary:      Effort: Pulmonary effort is normal.      Breath sounds: Normal breath sounds. Musculoskeletal:      Cervical back: Normal range of motion. Skin:     General: Skin is warm and dry. Findings: No erythema or rash. Comments: Well-healing wound to the deep palmar arch with 8 stitches present. Neurological:      Mental Status: He is alert and oriented to person, place, and time. Cranial Nerves: No cranial nerve deficit. Psychiatric:         Behavior: Behavior normal.         Thought Content: Thought content normal.         Judgment: Judgment normal.       Lab Results   Component Value Date    WBC 7.2 04/17/2022    HGB 12.3 (L) 04/17/2022    HCT 39.9 (L) 04/17/2022     04/17/2022    CHOL 94 03/05/2021    TRIG 159 (H) 03/05/2021    HDL 34 (L) 03/05/2021    AST 21 04/15/2022     11/04/2022    K 4.4 11/04/2022     11/04/2022    CREATININE 1.01 11/04/2022    BUN 17 11/04/2022    CO2 27 11/04/2022    TSH 7.66 (H) 11/04/2022    PSA 2.80 06/22/2021    INR 1.3 12/09/2021    LABGLOM >60 11/04/2022    CALCIUM 9.8 11/04/2022       Imaging Results:    XR HAND RIGHT (MIN 3 VIEWS)    Result Date: 11/18/2022  EXAMINATION: THREE XRAY VIEWS OF THE RIGHT HAND 11/18/2022 6:57 pm COMPARISON: None. HISTORY: ORDERING SYSTEM PROVIDED HISTORY: Pain, fall TECHNOLOGIST PROVIDED HISTORY: Pain, fall Reason for Exam: fall onto concrete, right palmar laceration FINDINGS: The visualized bones are normal .   There is no evidence of fracture or dislocation. Degenerative changes. .  There is soft tissue swelling.   No soft tissue gas. Vascular calcifications are seen compatible with atherosclerotic disease. No acute bony abnormalities are noted     CT HEAD WO CONTRAST    Result Date: 11/18/2022  EXAMINATION: CT OF THE HEAD WITHOUT CONTRAST  11/18/2022 7:12 pm TECHNIQUE: CT of the head was performed without the administration of intravenous contrast. Automated exposure control, iterative reconstruction, and/or weight based adjustment of the mA/kV was utilized to reduce the radiation dose to as low as reasonably achievable. COMPARISON: 04/05/2017. HISTORY: ORDERING SYSTEM PROVIDED HISTORY: Trauma TECHNOLOGIST PROVIDED HISTORY: Trauma Decision Support Exception - unselect if not a suspected or confirmed emergency medical condition->Emergency Medical Condition (MA) Reason for Exam: fall FINDINGS: BRAIN/VENTRICLES: There is no acute intracranial hemorrhage, mass effect or midline shift. No abnormal extra-axial fluid collection. The gray-white differentiation is maintained without evidence of an acute infarct. There is no evidence of hydrocephalus. Areas of white matter hypoattenuation and lacunar infarcts in the thalamus on the right and left basal ganglia are unchanged. ORBITS: The visualized portion of the orbits demonstrate no acute abnormality. SINUSES: The visualized paranasal sinuses and mastoid air cells demonstrate no acute abnormality. SOFT TISSUES/SKULL:  No acute abnormality of the visualized skull or soft tissues. No acute intracranial findings. Assessment/Plan:     Jose Juan Drummond was seen today for suture / staple removal.    Diagnoses and all orders for this visit:    Laceration of deep palmar arch of right hand, subsequent encounter  -      - CO REMOVAL OF SUTURES      Patient presents for suture removal. The wound is well healed without signs of infection. The sutures are removed. Wound care and activity instructions given. Return prn. No follow-ups on file.       Medications Prescribed:  No orders of the defined types were placed in this encounter. Future Appointments   Date Time Provider Rola Sood   12/14/2022  9:00 AM [facility]   1/11/2023  1:15 PM DO NOE GonsalesULM [facility]   2/9/2023  1:40 PM Bonifacio Drake DO [facility]   6/5/2023  1:15 PM Rob Romero DO DCARDIO [facility]       Patient given educational materials - see patient instructions. Discussed use, benefit, and sideeffects of prescribed medications. All patient questions answered. Pt voiced understanding. Reviewed health maintenance. Instructed to continue current medications, diet and exercise. Patient agreed with treatment plan. Follow up as directed.      Electronically signed by Yesenia Tirado DO on 12/11/2022 at 10:18 PM

## 2022-12-05 NOTE — PROGRESS NOTES
Patient seen and evaluated however stitches were not ready to be removed at this time.   Patient rescheduled for 12/1/2022 for suture removal.

## 2022-12-14 ENCOUNTER — HOSPITAL ENCOUNTER (OUTPATIENT)
Age: 77
Discharge: HOME | End: 2022-12-14
Payer: MEDICARE

## 2022-12-14 ENCOUNTER — OFFICE VISIT (OUTPATIENT)
Dept: UROLOGY | Age: 77
End: 2022-12-14
Payer: MEDICARE

## 2022-12-14 VITALS
OXYGEN SATURATION: 95 % | HEART RATE: 99 BPM | SYSTOLIC BLOOD PRESSURE: 122 MMHG | BODY MASS INDEX: 35.95 KG/M2 | DIASTOLIC BLOOD PRESSURE: 82 MMHG | HEIGHT: 74 IN

## 2022-12-14 DIAGNOSIS — N13.8 BPH WITH OBSTRUCTION/LOWER URINARY TRACT SYMPTOMS: ICD-10-CM

## 2022-12-14 DIAGNOSIS — R33.9 INCOMPLETE BLADDER EMPTYING: ICD-10-CM

## 2022-12-14 DIAGNOSIS — N32.81 OVERACTIVE BLADDER: ICD-10-CM

## 2022-12-14 DIAGNOSIS — N40.1 BPH WITH OBSTRUCTION/LOWER URINARY TRACT SYMPTOMS: ICD-10-CM

## 2022-12-14 DIAGNOSIS — R33.9 URINARY RETENTION: Primary | ICD-10-CM

## 2022-12-14 DIAGNOSIS — E78.5 HYPERLIPIDEMIA, UNSPECIFIED HYPERLIPIDEMIA TYPE: ICD-10-CM

## 2022-12-14 LAB
CHOLESTEROL/HDL RATIO: 2.5
CHOLESTEROL: 101 MG/DL
HDLC SERPL-MCNC: 41 MG/DL
LDL CHOLESTEROL: 39 MG/DL (ref 0–130)
TRIGL SERPL-MCNC: 106 MG/DL

## 2022-12-14 PROCEDURE — 51798 US URINE CAPACITY MEASURE: CPT | Performed by: UROLOGY

## 2022-12-14 PROCEDURE — 99214 OFFICE O/P EST MOD 30 MIN: CPT | Performed by: UROLOGY

## 2022-12-14 PROCEDURE — PBSHW PBB SHADOW CHARGE: Performed by: UROLOGY

## 2022-12-14 PROCEDURE — G8484 FLU IMMUNIZE NO ADMIN: HCPCS | Performed by: UROLOGY

## 2022-12-14 PROCEDURE — PBSHW PR MEASUREMENT,POST-VOID RESIDUAL VOLUME BY US,NON-IMAGING: Performed by: UROLOGY

## 2022-12-14 PROCEDURE — 3074F SYST BP LT 130 MM HG: CPT | Performed by: UROLOGY

## 2022-12-14 PROCEDURE — G8427 DOCREV CUR MEDS BY ELIG CLIN: HCPCS | Performed by: UROLOGY

## 2022-12-14 PROCEDURE — 3078F DIAST BP <80 MM HG: CPT | Performed by: UROLOGY

## 2022-12-14 PROCEDURE — 1036F TOBACCO NON-USER: CPT | Performed by: UROLOGY

## 2022-12-14 PROCEDURE — 80061 LIPID PANEL: CPT

## 2022-12-14 PROCEDURE — 1123F ACP DISCUSS/DSCN MKR DOCD: CPT | Performed by: UROLOGY

## 2022-12-14 PROCEDURE — 36415 COLL VENOUS BLD VENIPUNCTURE: CPT

## 2022-12-14 PROCEDURE — G8417 CALC BMI ABV UP PARAM F/U: HCPCS | Performed by: UROLOGY

## 2022-12-14 RX ORDER — OXYBUTYNIN CHLORIDE 5 MG/1
5 TABLET, EXTENDED RELEASE ORAL DAILY
Qty: 60 TABLET | Refills: 1 | Status: SHIPPED | OUTPATIENT
Start: 2022-12-14 | End: 2023-02-12

## 2022-12-14 RX ORDER — ALFUZOSIN HYDROCHLORIDE 10 MG/1
10 TABLET, EXTENDED RELEASE ORAL DAILY
Qty: 90 TABLET | Refills: 1 | Status: SHIPPED | OUTPATIENT
Start: 2022-12-14

## 2022-12-14 RX ORDER — FINASTERIDE 5 MG/1
5 TABLET, FILM COATED ORAL DAILY
Qty: 90 TABLET | Refills: 1 | Status: SHIPPED | OUTPATIENT
Start: 2022-12-14

## 2022-12-14 NOTE — PROGRESS NOTES
Sajan Medeiros MD   Urology Clinic office Visit      Patient:  Ana María Ndiaye  YOB: 1945  Date: 12/14/2022    HISTORY OF PRESENT ILLNESS:   The patient is a 65 y.o. male who presents today for evaluation of the following problem(s):      1. Urinary retention    2. BPH with obstruction/lower urinary tract symptoms    3. Overactive bladder    4. Incomplete bladder emptying           Overall the problem(s) : improved  Associated Symptoms: No dysuria, gross hematuria. Pain Severity: Pain Score:   0 - No pain    Summary of old records: sees Dr Azeb Cruz for BPH  (Patient's old records, notes and chart reviewed and summarized above.)      Hx of BPH, on medications  Recently in hospital with COVID  Had a catheter placed earlier in the year    Previously had cysto, has been avoiding outlet surgery  On finasteride, uroxatral    Improved physical condition, having bowel movements  done with rehab      PVR today 21cc            Last several PSA's:  Lab Results   Component Value Date    PSA 2.80 06/22/2021    PSA 7.71 (H) 02/14/2020    PSA 3.64 12/10/2018       Last total testosterone:  No results found for: TESTOSTERONE    Urinalysis today:  No results found for this visit on 12/14/22.       Last BUN and creatinine:  Lab Results   Component Value Date    BUN 17 11/04/2022     Lab Results   Component Value Date    CREATININE 1.01 11/04/2022       Imaging Reviewed during this Office Visit:   (results were independently reviewed by physician and radiology report verified)    PAST MEDICAL, FAMILY AND SOCIAL HISTORY:  Past Medical History:   Diagnosis Date    Acute respiratory failure with hypoxia ([address] 12/9/2021    Adenomatous polyp     history of     Cholecystitis 3/19/2019    Chronic venous insufficiency     COPD with acute exacerbation ([address] 12/9/2021    DVT (deep venous thrombosis) (HCC)     history of     Edema     related to venous stasis     Gout     history of     Hypertension     Onychomycosis Osteoarthritis     Plantar fasciitis     Pneumonia due to COVID-19 virus 12/8/2021    Tobacco abuse      Past Surgical History:   Procedure Laterality Date    COLONOSCOPY  12/12/2012    polyp    COLONOSCOPY  2003    adenomatous polyps     COLONOSCOPY  08/2009    COLONOSCOPY  06/2008    with polypectomy    CORONARY ARTERY BYPASS GRAFT  10/28/2014    SVG-OM1, SVG-OM2, and SVG-PDA    OTHER SURGICAL HISTORY      teeth extracted     TOTAL KNEE ARTHROPLASTY Right 10/2015    TOTAL KNEE ARTHROPLASTY Left 03/2018    TURP      VASECTOMY  1980     Family History   Problem Relation Age of Onset    Other Mother 52        Rheumatic fever    Kidney Disease Brother     Heart Disease Brother     Sudden Death Father         auto accident     Heart Surgery Sister         bypass surgery    Other Brother         abdominal aortic aneursym    Hypertension Other         siblings     Outpatient Medications Marked as Taking for the 12/14/22 encounter (Office Visit) with Nadir Garcia MD   Medication Sig Dispense Refill    finasteride (PROSCAR) 5 MG tablet Take 1 tablet by mouth daily 90 tablet 1    oxybutynin (DITROPAN-XL) 5 MG extended release tablet Take 1 tablet by mouth daily 60 tablet 1    alfuzosin (UROXATRAL) 10 MG extended release tablet Take 1 tablet by mouth daily 90 tablet 1    levothyroxine (SYNTHROID) 100 MCG tablet Take 1 tablet by mouth daily 90 tablet 1    carboxymethylcellulose (THERATEARS) 1 % ophthalmic gel APPLY 1 DROP TO RIGHT EYE EVERY 2 HOURS      atorvastatin (LIPITOR) 40 MG tablet TAKE 1 TABLET BY MOUTH ONCE DAILY 90 tablet 3    buPROPion (WELLBUTRIN) 75 MG tablet Take 1 tablet by mouth twice daily 180 tablet 1    sertraline (ZOLOFT) 50 MG tablet Take 1 tablet by mouth nightly Take 50 mg by mouth nightly 90 tablet 1    potassium chloride (KLOR-CON M) 10 MEQ extended release tablet Take 1 tablet by mouth in the morning. 90 tablet 1    furosemide (LASIX) 20 MG tablet Take 1 tablet by mouth in the morning.  Take 20 mg by mouth in the morning. . 90 tablet 1    allopurinol (ZYLOPRIM) 300 MG tablet Take 1 tablet by mouth once daily 90 tablet 1    traZODone (DESYREL) 50 MG tablet Take 1 tablet by mouth nightly 90 tablet 1    midodrine (PROAMATINE) 5 MG tablet Take 1 tablet by mouth in the morning and 1 tablet at noon and 1 tablet before bedtime. 90 tablet 5    Misc. Devices MISC Rodriguez catheter supplies. 18 F/30 mL Rodriguez catheters, leg bags, Rodriguez drainage bags, Rodriguez insertion packs. Dx: BPH with urinary retention 1 each 0    Misc. Devices MISC Oxygen via nasal cannula at 2 liters/minute continuously. Diagnosis: COPD 1 each 0    hydrocortisone (ANUSOL-HC) 25 MG suppository Place 1 suppository rectally 2 times daily as needed for Hemorrhoids 30 suppository 0    Handicap Placard MISC by Does not apply route Permanent 1 each 0    Omega-3 Fatty Acids (FISH OIL PO) Take 1,200 mg by mouth daily      Acetaminophen (TYLENOL PO) Take by mouth Per pt, takes 2 in am; 2 in pm         Patient has no known allergies. Social History     Tobacco Use   Smoking Status Former    Packs/day: 0.00    Years: 55.00    Pack years: 0.00    Types: Cigarettes    Quit date: 10/27/2014    Years since quittin.1   Smokeless Tobacco Never       Social History     Substance and Sexual Activity   Alcohol Use Yes    Alcohol/week: 0.0 standard drinks    Comment: occasionally       REVIEW OF SYSTEMS:  Constitutional: negative  Eyes: negative  Respiratory: negative  Cardiovascular: negative  Gastrointestinal: negative  Musculoskeletal: negative  Genitourinary: negative except for what is in HPI  Skin: negative   Neurological: negative  Hematological/Lymphatic: negative  Psychological: negative    Physical Exam:    This a 65 y.o. male   Vitals:    22 0918   BP: 122/82   Pulse: 99   SpO2: 95%     Constitutional: Patient in no acute distress; Neuro: alert and oriented to person place and time. Psych: Mood and affect normal.        Assessment and Plan      1. Urinary retention    2. BPH with obstruction/lower urinary tract symptoms    3. Overactive bladder    4. Incomplete bladder emptying         Previous urinary retention; Catheter has been removed  Post void residual by bladder scanner 21cc.   Reports voiding well but some OAB and incontinence  Discussed short term living with catheter with monthly changes  Continue meds Finasteride and Continue Uroxatral  Ditropan/Oxybutynin; restarted at lower dose; 5 mg  Increase fiber      Follow up 6 months              Prescriptions Ordered:  Orders Placed This Encounter   Medications    finasteride (PROSCAR) 5 MG tablet     Sig: Take 1 tablet by mouth daily     Dispense:  90 tablet     Refill:  1    oxybutynin (DITROPAN-XL) 5 MG extended release tablet     Sig: Take 1 tablet by mouth daily     Dispense:  60 tablet     Refill:  1    alfuzosin (UROXATRAL) 10 MG extended release tablet     Sig: Take 1 tablet by mouth daily     Dispense:  90 tablet     Refill:  1              Orders Placed:  Orders Placed This Encounter   Procedures    MD Connie Abdi M.D, MD [facility]

## 2023-01-11 ENCOUNTER — OFFICE VISIT (OUTPATIENT)
Dept: PULMONOLOGY | Age: 78
End: 2023-01-11
Payer: MEDICARE

## 2023-01-11 VITALS
DIASTOLIC BLOOD PRESSURE: 84 MMHG | SYSTOLIC BLOOD PRESSURE: 138 MMHG | OXYGEN SATURATION: 96 % | HEIGHT: 74 IN | WEIGHT: 284 LBS | TEMPERATURE: 96.9 F | BODY MASS INDEX: 36.45 KG/M2 | HEART RATE: 87 BPM

## 2023-01-11 DIAGNOSIS — G62.81 CRITICAL ILLNESS POLYNEUROPATHY (HCC): ICD-10-CM

## 2023-01-11 DIAGNOSIS — E66.9 RESTRICTIVE LUNG DISEASE SECONDARY TO OBESITY: ICD-10-CM

## 2023-01-11 DIAGNOSIS — U09.9 POST-ACUTE COVID-19 SYNDROME: ICD-10-CM

## 2023-01-11 DIAGNOSIS — E66.01 CLASS 2 SEVERE OBESITY DUE TO EXCESS CALORIES WITH SERIOUS COMORBIDITY AND BODY MASS INDEX (BMI) OF 36.0 TO 36.9 IN ADULT (HCC): ICD-10-CM

## 2023-01-11 DIAGNOSIS — J98.4 RESTRICTIVE LUNG DISEASE SECONDARY TO OBESITY: ICD-10-CM

## 2023-01-11 DIAGNOSIS — Z99.81 CHRONIC RESPIRATORY FAILURE WITH HYPOXIA, ON HOME O2 THERAPY (HCC): Primary | ICD-10-CM

## 2023-01-11 DIAGNOSIS — J96.11 CHRONIC RESPIRATORY FAILURE WITH HYPOXIA, ON HOME O2 THERAPY (HCC): Primary | ICD-10-CM

## 2023-01-11 PROCEDURE — 1036F TOBACCO NON-USER: CPT | Performed by: INTERNAL MEDICINE

## 2023-01-11 PROCEDURE — 99213 OFFICE O/P EST LOW 20 MIN: CPT | Performed by: INTERNAL MEDICINE

## 2023-01-11 PROCEDURE — G8417 CALC BMI ABV UP PARAM F/U: HCPCS | Performed by: INTERNAL MEDICINE

## 2023-01-11 PROCEDURE — G8428 CUR MEDS NOT DOCUMENT: HCPCS | Performed by: INTERNAL MEDICINE

## 2023-01-11 PROCEDURE — 3079F DIAST BP 80-89 MM HG: CPT | Performed by: INTERNAL MEDICINE

## 2023-01-11 PROCEDURE — G8484 FLU IMMUNIZE NO ADMIN: HCPCS | Performed by: INTERNAL MEDICINE

## 2023-01-11 PROCEDURE — 1123F ACP DISCUSS/DSCN MKR DOCD: CPT | Performed by: INTERNAL MEDICINE

## 2023-01-11 PROCEDURE — 3075F SYST BP GE 130 - 139MM HG: CPT | Performed by: INTERNAL MEDICINE

## 2023-01-11 ASSESSMENT — ENCOUNTER SYMPTOMS: SHORTNESS OF BREATH: 1

## 2023-01-11 NOTE — PROGRESS NOTES
Subjective:      Patient ID: Patricia Perez is a 65 y.o. male being seen in my clinic for   Chief Complaint   Patient presents with    6 Month Follow-Up     Pneumonia due to Covid-19       HPI  Follow-up visit for COVID-pneumonia complicated by prolonged hypoxemia and critical illness polyneuropathy. Patient states that he finally was discharged from the skilled nursing facility. He is at home with his wife now. She continues to report that his strength and mobility are quite limited. He was not on oxygen prior to his illness. Currently remains on 2 L although apparently his oxygen saturations remain up most of the day. Reports muscle weakness. Uses a wheeled walker. Not currently receiving physical therapy. Follows with cardiology for congestive heart failure. Patient up-to-date with his COVID and flu vaccines. Review of Systems   Constitutional:  Positive for fatigue. Respiratory:  Positive for shortness of breath. Cardiovascular:  Positive for leg swelling. Musculoskeletal:  Positive for gait problem. Neurological:  Positive for weakness.      Objective:     Vitals:    01/11/23 1323   BP: 138/84   Pulse: 87   Temp: 96.9 °F (36.1 °C)   SpO2: 96%   Weight: 284 lb (128.8 kg)   Height: 6' 2\" (1.88 m)     Current Outpatient Medications   Medication Sig Dispense Refill    finasteride (PROSCAR) 5 MG tablet Take 1 tablet by mouth daily 90 tablet 1    oxybutynin (DITROPAN-XL) 5 MG extended release tablet Take 1 tablet by mouth daily 60 tablet 1    alfuzosin (UROXATRAL) 10 MG extended release tablet Take 1 tablet by mouth daily 90 tablet 1    levothyroxine (SYNTHROID) 100 MCG tablet Take 1 tablet by mouth daily 90 tablet 1    carboxymethylcellulose (THERATEARS) 1 % ophthalmic gel APPLY 1 DROP TO RIGHT EYE EVERY 2 HOURS      atorvastatin (LIPITOR) 40 MG tablet TAKE 1 TABLET BY MOUTH ONCE DAILY 90 tablet 3    buPROPion (WELLBUTRIN) 75 MG tablet Take 1 tablet by mouth twice daily 180 tablet 1 sertraline (ZOLOFT) 50 MG tablet Take 1 tablet by mouth nightly Take 50 mg by mouth nightly 90 tablet 1    potassium chloride (KLOR-CON M) 10 MEQ extended release tablet Take 1 tablet by mouth in the morning. 90 tablet 1    furosemide (LASIX) 20 MG tablet Take 1 tablet by mouth in the morning. Take 20 mg by mouth in the morning. . 90 tablet 1    allopurinol (ZYLOPRIM) 300 MG tablet Take 1 tablet by mouth once daily 90 tablet 1    traZODone (DESYREL) 50 MG tablet Take 1 tablet by mouth nightly 90 tablet 1    midodrine (PROAMATINE) 5 MG tablet Take 1 tablet by mouth in the morning and 1 tablet at noon and 1 tablet before bedtime. 90 tablet 5    Misc. Devices MISC Rodriguez catheter supplies. 18 F/30 mL Rodriguez catheters, leg bags, Rodriguez drainage bags, Rodriguez insertion packs. Dx: BPH with urinary retention 1 each 0    Misc. Devices MISC Oxygen via nasal cannula at 2 liters/minute continuously. Diagnosis: COPD 1 each 0    hydrocortisone (ANUSOL-HC) 25 MG suppository Place 1 suppository rectally 2 times daily as needed for Hemorrhoids 30 suppository 0    Handicap Placard MISC by Does not apply route Permanent 1 each 0    Omega-3 Fatty Acids (FISH OIL PO) Take 1,200 mg by mouth daily      Acetaminophen (TYLENOL PO) Take by mouth Per pt, takes 2 in am; 2 in pm       No current facility-administered medications for this visit. Physical Exam  Vitals and nursing note reviewed. Constitutional:       Appearance: He is well-developed. He is obese. HENT:      Nose: Nose normal. No congestion. Mouth/Throat:      Mouth: Mucous membranes are moist.      Pharynx: Oropharynx is clear. No oropharyngeal exudate. Eyes:      General: No scleral icterus. Conjunctiva/sclera: Conjunctivae normal.   Neck:      Thyroid: No thyromegaly. Vascular: No JVD. Trachea: No tracheal deviation. Cardiovascular:      Rate and Rhythm: Normal rate and regular rhythm. Heart sounds: Normal heart sounds. No murmur heard.     No gallop. Pulmonary:      Effort: Respiratory distress present. Breath sounds: No wheezing or rales. Comments: Thoracic expansion and diaphragmatic excursion diminished. No crackles. Chest:      Chest wall: No tenderness. Abdominal:      Palpations: Abdomen is soft. Tenderness: There is no abdominal tenderness. Musculoskeletal:      Cervical back: Neck supple. Right lower leg: Edema present. Left lower leg: Edema present. Comments: No clubbing   Lymphadenopathy:      Cervical: No cervical adenopathy. Skin:     General: Skin is warm and dry. Neurological:      Mental Status: He is alert and oriented to person, place, and time. Wt Readings from Last 3 Encounters:   01/11/23 284 lb (128.8 kg)   12/01/22 280 lb (127 kg)   11/28/22 280 lb (127 kg)     Results for orders placed or performed during the hospital encounter of 12/14/22   Lipid Panel   Result Value Ref Range    Cholesterol 101 <200 mg/dL    HDL 41 >40 mg/dL    LDL Cholesterol 39 0 - 130 mg/dL    Chol/HDL Ratio 2.5 <5    Triglycerides 106 <150 mg/dL       :      1. Chronic respiratory failure with hypoxia, on home O2 therapy ([address]    2. Critical illness polyneuropathy ([address]    3. 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 Class 2 severe obesity due to excess calories with serious comorbidity and body mass index (BMI) of 36.0 to 36.9 in adult ([address]    5.  Restrictive lung disease secondary to obesity      Patient Active Problem List   Diagnosis    Hypertension    Osteoarthritis    Chronic venous insufficiency    Acquired genu valgum    Degeneration of lumbar intervertebral disc    Lower urinary tract symptoms due to benign prostatic hyperplasia    Presence of right artificial knee joint    Bilateral carotid artery stenosis    Hypothyroidism    Ischemic cardiomyopathy    Ventral hernia    Left bundle branch block (LBBB)    Chronic embolism and thrombosis of deep vein of both distal legs (HCC)    Coronary artery disease involving native coronary artery of native heart    Hyperlipidemia    Chronic combined systolic and diastolic CHF (congestive heart failure) (HCC)    Impaired gait    Emphysema/COPD (HCC)    History of COVID-19    Class 2 severe obesity with serious comorbidity and body mass index (BMI) of 39.0 to 39.9 in adult [facility])    Near syncope    Hypoxia    Orthostatic hypotension         Plan:      Long discussion with patient and wife. Unfortunately lingering symptoms of post ICU syndrome. Recommend pulmonary rehab for dyspnea associated with long haul COVID. Weight loss. Continue treatment for congestive heart failure with cardiology. Return in 6 months. No orders of the defined types were placed in this encounter. Orders Placed This Encounter   Procedures    External Referral To Pulmonary Rehab     Referral Priority:   Routine     Referral Type:   Eval and Treat     Referral Reason:   Specialty Services Required     Requested Specialty:   Pulmonology     Number of Visits Requested:   1     Return in about 6 months (around 7/11/2023).        Electronically signed by Cierra Palm DO on 1/11/2023at 5:29 PM

## 2023-01-31 DIAGNOSIS — M79.674 PAIN AND SWELLING OF TOE OF RIGHT FOOT: ICD-10-CM

## 2023-01-31 DIAGNOSIS — M79.89 PAIN AND SWELLING OF TOE OF RIGHT FOOT: ICD-10-CM

## 2023-02-01 RX ORDER — ALLOPURINOL 300 MG/1
TABLET ORAL
Qty: 90 TABLET | Refills: 0 | Status: SHIPPED | OUTPATIENT
Start: 2023-02-01

## 2023-02-01 RX ORDER — POTASSIUM CHLORIDE 750 MG/1
TABLET, FILM COATED, EXTENDED RELEASE ORAL
Qty: 90 TABLET | Refills: 0 | Status: SHIPPED | OUTPATIENT
Start: 2023-02-01

## 2023-02-01 NOTE — TELEPHONE ENCOUNTER
Kevin Hobbs called requesting a refill of the below medication which has been pended for you:     Requested Prescriptions     Pending Prescriptions Disp Refills    allopurinol (ZYLOPRIM) 300 MG tablet [Pharmacy Med Name: Allopurinol 300 MG Oral Tablet] 90 tablet 0     Sig: Take 1 tablet by mouth once daily    potassium chloride (KLOR-CON) 10 MEQ extended release tablet [Pharmacy Med Name: Potassium Chloride ER 10 MEQ Oral Tablet Extended Release] 90 tablet 0     Sig: TAKE 1 TABLET BY MOUTH IN THE MORNING       Last Appointment Date: 11/21/2022  Next Appointment Date: Visit date not found    No Known Allergies    Dr Reyna Hwang filled in past

## 2023-02-07 ENCOUNTER — OFFICE VISIT (OUTPATIENT)
Dept: FAMILY MEDICINE CLINIC | Age: 78
End: 2023-02-07
Payer: MEDICARE

## 2023-02-07 VITALS
DIASTOLIC BLOOD PRESSURE: 62 MMHG | OXYGEN SATURATION: 95 % | BODY MASS INDEX: 36.37 KG/M2 | WEIGHT: 283.4 LBS | SYSTOLIC BLOOD PRESSURE: 124 MMHG | HEART RATE: 81 BPM | RESPIRATION RATE: 16 BRPM | HEIGHT: 74 IN

## 2023-02-07 DIAGNOSIS — F51.01 PRIMARY INSOMNIA: ICD-10-CM

## 2023-02-07 DIAGNOSIS — M79.89 PAIN AND SWELLING OF TOE OF RIGHT FOOT: ICD-10-CM

## 2023-02-07 DIAGNOSIS — I50.42 CHRONIC COMBINED SYSTOLIC AND DIASTOLIC CHF (CONGESTIVE HEART FAILURE) (HCC): Primary | ICD-10-CM

## 2023-02-07 DIAGNOSIS — E03.9 HYPOTHYROIDISM, UNSPECIFIED TYPE: ICD-10-CM

## 2023-02-07 DIAGNOSIS — M79.674 PAIN AND SWELLING OF TOE OF RIGHT FOOT: ICD-10-CM

## 2023-02-07 DIAGNOSIS — I95.1 ORTHOSTATIC HYPOTENSION: ICD-10-CM

## 2023-02-07 PROCEDURE — G8484 FLU IMMUNIZE NO ADMIN: HCPCS | Performed by: STUDENT IN AN ORGANIZED HEALTH CARE EDUCATION/TRAINING PROGRAM

## 2023-02-07 PROCEDURE — 1123F ACP DISCUSS/DSCN MKR DOCD: CPT | Performed by: STUDENT IN AN ORGANIZED HEALTH CARE EDUCATION/TRAINING PROGRAM

## 2023-02-07 PROCEDURE — 99214 OFFICE O/P EST MOD 30 MIN: CPT | Performed by: STUDENT IN AN ORGANIZED HEALTH CARE EDUCATION/TRAINING PROGRAM

## 2023-02-07 PROCEDURE — 3078F DIAST BP <80 MM HG: CPT | Performed by: STUDENT IN AN ORGANIZED HEALTH CARE EDUCATION/TRAINING PROGRAM

## 2023-02-07 PROCEDURE — 3074F SYST BP LT 130 MM HG: CPT | Performed by: STUDENT IN AN ORGANIZED HEALTH CARE EDUCATION/TRAINING PROGRAM

## 2023-02-07 PROCEDURE — 1036F TOBACCO NON-USER: CPT | Performed by: STUDENT IN AN ORGANIZED HEALTH CARE EDUCATION/TRAINING PROGRAM

## 2023-02-07 PROCEDURE — G8427 DOCREV CUR MEDS BY ELIG CLIN: HCPCS | Performed by: STUDENT IN AN ORGANIZED HEALTH CARE EDUCATION/TRAINING PROGRAM

## 2023-02-07 PROCEDURE — G8417 CALC BMI ABV UP PARAM F/U: HCPCS | Performed by: STUDENT IN AN ORGANIZED HEALTH CARE EDUCATION/TRAINING PROGRAM

## 2023-02-07 RX ORDER — ALLOPURINOL 300 MG/1
TABLET ORAL
Qty: 90 TABLET | Refills: 1 | Status: SHIPPED | OUTPATIENT
Start: 2023-02-07

## 2023-02-07 RX ORDER — FUROSEMIDE 20 MG/1
20 TABLET ORAL DAILY
Qty: 90 TABLET | Refills: 1 | Status: SHIPPED | OUTPATIENT
Start: 2023-02-07

## 2023-02-07 RX ORDER — TRAZODONE HYDROCHLORIDE 50 MG/1
50 TABLET ORAL NIGHTLY
Qty: 90 TABLET | Refills: 1 | Status: SHIPPED | OUTPATIENT
Start: 2023-02-07

## 2023-02-07 RX ORDER — LEVOTHYROXINE SODIUM 0.1 MG/1
100 TABLET ORAL DAILY
Qty: 90 TABLET | Refills: 1 | Status: SHIPPED | OUTPATIENT
Start: 2023-02-07

## 2023-02-07 RX ORDER — MIDODRINE HYDROCHLORIDE 5 MG/1
5 TABLET ORAL 3 TIMES DAILY
Qty: 270 TABLET | Refills: 1 | Status: SHIPPED | OUTPATIENT
Start: 2023-02-07

## 2023-02-07 RX ORDER — POLYVINYL ALCOHOL 14 MG/ML
SOLUTION/ DROPS OPHTHALMIC
COMMUNITY
Start: 2023-02-01

## 2023-02-07 RX ORDER — ERYTHROMYCIN 5 MG/G
OINTMENT OPHTHALMIC
COMMUNITY
Start: 2023-02-01

## 2023-02-07 ASSESSMENT — ENCOUNTER SYMPTOMS
DIARRHEA: 0
NAUSEA: 0
SHORTNESS OF BREATH: 1
CONSTIPATION: 0
COUGH: 0
EYE PAIN: 0
VOMITING: 0
TROUBLE SWALLOWING: 0
BLOOD IN STOOL: 0
ABDOMINAL PAIN: 0

## 2023-02-07 NOTE — TELEPHONE ENCOUNTER
Ubaldo Kat called requesting a refill of the below medication which has been pended for you:     Requested Prescriptions     Pending Prescriptions Disp Refills    sertraline (ZOLOFT) 50 MG tablet [Pharmacy Med Name: Sertraline HCl 50 MG Oral Tablet] 90 tablet 0     Sig: Take 1 tablet by mouth nightly       Last Appointment Date: 11/21/2022  Next Appointment Date: pt has appointment today    No Known Allergies

## 2023-02-07 NOTE — PROGRESS NOTES
[facility] [id number]  [address] [address]  Dept: [phone]  Dept Fax: [phone]  Loc: [phone]      Dino Rankin is a 65 y.o. male who presents today for:  Chief Complaint   Patient presents with    Hypertension        Goals         Blood Pressure < 140/90 (pt-stated)       Patient Self-Management Goal for Health Maintenance  Goal: I will follow a healthy diet as discussed by my provider. Barriers: none  Plan for overcoming my barriers: N/A  Confidence: 5/10  Anticipated Goal Completion Date: 8/10/2017              HPI:     HPI  Patient is a 74-year-old male who presents to the office today for follow-up for multiple chronic conditions. History of hypothyroidism currently taking Synthroid 100 mcg daily with significant benefit. Denies any issues with medications at this time. History of chronic diastolic diastolic congestive heart failure last echocardiogram completed in 2021 showing EF of 40% with grade 1 diastolic dysfunction. Seen by cardiology on 11/28/2022 no significant changes at that time. He does have a history of orthostatic hypotension and is taking midodrine 5 mg 3 times daily. He needs a refill on this medication today. History of chronic gout taking allopurinol 100 mg once daily. No recent gout flares or symptoms. Continue this medication today. History of BPH and urinary obstruction following with urology. Denies any current issues. Has been dealing with lingering shortness of breath and leg weakness post ICU stay from long-haul COVID-19. They recently had a discussion with pulmonology and they had recommended pulmonary rehab however patient's insurance would not cover this in [facility] and would have to pay out-of-pocket. Patient and wife unsure if there ready to pursue this or not.   They also discussed the possibility of getting regular physical therapy to evaluate his leg and low back weakness as he has been significantly weak since these chronic conditions were diagnosed. Patient did have a recent eye procedure and would like to hold off on any therapy until this is healed and he has followed up with his eye physician. Past Medical History:   Diagnosis Date    Acute respiratory failure with hypoxia ([address] 2021    Adenomatous polyp     history of     Cholecystitis 3/19/2019    Chronic venous insufficiency     COPD with acute exacerbation ([address] 2021    DVT (deep venous thrombosis) (HCC)     history of     Edema     related to venous stasis     Gout     history of     Hypertension     Onychomycosis     Osteoarthritis     Plantar fasciitis     Pneumonia due to COVID-19 virus 2021    Tobacco abuse       Past Surgical History:   Procedure Laterality Date    COLONOSCOPY  2012    polyp    COLONOSCOPY      adenomatous polyps     COLONOSCOPY  2009    COLONOSCOPY  2008    with polypectomy    CORONARY ARTERY BYPASS GRAFT  10/28/2014    SVG-OM1, SVG-OM2, and SVG-PDA    OTHER SURGICAL HISTORY      teeth extracted     TOTAL KNEE ARTHROPLASTY Right 10/2015    TOTAL KNEE ARTHROPLASTY Left 2018    TURP      VASECTOMY  1980     Family History   Problem Relation Age of Onset    Other Mother 52        Rheumatic fever    Kidney Disease Brother     Heart Disease Brother     Sudden Death Father         auto accident     Heart Surgery Sister         bypass surgery    Other Brother         abdominal aortic aneursym    Hypertension Other         siblings     Social History     Tobacco Use    Smoking status: Former     Packs/day: 0.00     Years: 55.00     Pack years: 0.00     Types: Cigarettes     Quit date: 10/27/2014     Years since quittin.2    Smokeless tobacco: Never   Substance Use Topics    Alcohol use:  Yes     Alcohol/week: 0.0 standard drinks     Comment: occasionally      Current Outpatient Medications   Medication Sig Dispense Refill    polyvinyl alcohol (LIQUIFILM TEARS) 1.4 % ophthalmic solution INSTILL 1 DROP IN RIGHT EYE FOUR TIMES DAILY FOR POSTOP CARE --      erythromycin (ROMYCIN) 5 MG/GM ophthalmic ointment APPLY TO INCISION -- TO RIGHT EYE TWICE A DAY FOR POSTOP CARE --      levothyroxine (SYNTHROID) 100 MCG tablet Take 1 tablet by mouth daily 90 tablet 1    midodrine (PROAMATINE) 5 MG tablet Take 1 tablet by mouth 3 times daily 270 tablet 1    allopurinol (ZYLOPRIM) 300 MG tablet Take 1 tablet by mouth once daily 90 tablet 1    traZODone (DESYREL) 50 MG tablet Take 1 tablet by mouth nightly 90 tablet 1    furosemide (LASIX) 20 MG tablet Take 1 tablet by mouth daily Take 20 mg by mouth in the morning. 90 tablet 1    potassium chloride (KLOR-CON) 10 MEQ extended release tablet TAKE 1 TABLET BY MOUTH IN THE MORNING 90 tablet 0    finasteride (PROSCAR) 5 MG tablet Take 1 tablet by mouth daily 90 tablet 1    oxybutynin (DITROPAN-XL) 5 MG extended release tablet Take 1 tablet by mouth daily 60 tablet 1    alfuzosin (UROXATRAL) 10 MG extended release tablet Take 1 tablet by mouth daily 90 tablet 1    carboxymethylcellulose (THERATEARS) 1 % ophthalmic gel APPLY 1 DROP TO RIGHT EYE EVERY 2 HOURS      atorvastatin (LIPITOR) 40 MG tablet TAKE 1 TABLET BY MOUTH ONCE DAILY 90 tablet 3    buPROPion (WELLBUTRIN) 75 MG tablet Take 1 tablet by mouth twice daily 180 tablet 1    sertraline (ZOLOFT) 50 MG tablet Take 1 tablet by mouth nightly Take 50 mg by mouth nightly 90 tablet 1    Misc. Devices MISC Rodriguez catheter supplies. 18 F/30 mL Rodriguez catheters, leg bags, Rodriguez drainage bags, Rodriguez insertion packs. Dx: BPH with urinary retention 1 each 0    Misc. Devices MISC Oxygen via nasal cannula at 2 liters/minute continuously.  Diagnosis: COPD 1 each 0    hydrocortisone (ANUSOL-HC) 25 MG suppository Place 1 suppository rectally 2 times daily as needed for Hemorrhoids 30 suppository 0    Handicap Placard MISC by Does not apply route Permanent 1 each 0    Omega-3 Fatty Acids (FISH OIL PO) Take 1,200 mg by mouth daily      Acetaminophen (TYLENOL PO) Take by mouth Per pt, takes 2 in am; 2 in pm       No current facility-administered medications for this visit. No Known Allergies    Health Maintenance   Topic Date Due    Shingles vaccine (1 of 2) Never done    Depression Screen  11/09/2023    Annual Wellness Visit (AWV)  11/10/2023    Lipids  12/14/2023    DTaP/Tdap/Td vaccine (2 - Td or Tdap) 03/19/2029    Flu vaccine  Completed    Pneumococcal 65+ years Vaccine  Completed    COVID-19 Vaccine  Completed    Hepatitis C screen  Completed    Hepatitis A vaccine  Aged Out    Hib vaccine  Aged Out    Meningococcal (ACWY) vaccine  Aged Out       Subjective:      Review of Systems   Constitutional:  Positive for fatigue. Negative for chills and fever. HENT:  Negative for ear pain, postnasal drip and trouble swallowing. Eyes:  Negative for pain and visual disturbance. Respiratory:  Positive for shortness of breath. Negative for cough. Cardiovascular:  Negative for chest pain and palpitations. Gastrointestinal:  Negative for abdominal pain, blood in stool, constipation, diarrhea, nausea and vomiting. Genitourinary:  Negative for dysuria and urgency. Skin:  Negative for rash and wound. Neurological:  Positive for weakness. Negative for dizziness and headaches. Psychiatric/Behavioral:  Negative for dysphoric mood. The patient is not nervous/anxious. Objective:     Vitals:    02/07/23 1402   BP: 124/62   Pulse: 81   Resp: 16   SpO2: 95%   Weight: 283 lb 6.4 oz (128.5 kg)   Height: 6' 2\" (1.88 m)       Body mass index is 36.39 kg/m². Wt Readings from Last 3 Encounters:   02/07/23 283 lb 6.4 oz (128.5 kg)   01/11/23 284 lb (128.8 kg)   12/01/22 280 lb (127 kg)     BP Readings from Last 3 Encounters:   02/07/23 124/62   01/11/23 138/84   12/14/22 122/82       Physical Exam  Vitals and nursing note reviewed. Constitutional:       General: He is not in acute distress. Appearance: He is well-developed. He is ill-appearing (chronically). He is not diaphoretic. HENT:      Head: Normocephalic and atraumatic. Right Ear: External ear normal.      Left Ear: External ear normal.      Nose: Nose normal.   Eyes:      General: No scleral icterus. Right eye: No discharge. Left eye: No discharge. Conjunctiva/sclera: Conjunctivae normal.   Cardiovascular:      Rate and Rhythm: Normal rate and regular rhythm. Heart sounds: Normal heart sounds. No murmur heard. Pulmonary:      Effort: Pulmonary effort is normal.      Breath sounds: Normal breath sounds. Musculoskeletal:      Cervical back: Normal range of motion. Skin:     General: Skin is warm and dry. Findings: No erythema or rash. Neurological:      Mental Status: He is alert and oriented to person, place, and time. Cranial Nerves: No cranial nerve deficit. Psychiatric:         Behavior: Behavior normal.         Thought Content: Thought content normal.         Judgment: Judgment normal.       Lab Results   Component Value Date    WBC 7.2 04/17/2022    HGB 12.3 (L) 04/17/2022    HCT 39.9 (L) 04/17/2022     04/17/2022    CHOL 101 12/14/2022    TRIG 106 12/14/2022    HDL 41 12/14/2022    AST 21 04/15/2022     11/04/2022    K 4.4 11/04/2022     11/04/2022    CREATININE 1.01 11/04/2022    BUN 17 11/04/2022    CO2 27 11/04/2022    TSH 7.66 (H) 11/04/2022    PSA 2.80 06/22/2021    INR 1.3 12/09/2021    LABGLOM >60 11/04/2022    CALCIUM 9.8 11/04/2022       Imaging Results:    No results found. Assessment/Plan:     Kandis Villeda was seen today for hypertension. Diagnoses and all orders for this visit:    Chronic combined systolic and diastolic CHF (congestive heart failure) (HCC)  -     furosemide (LASIX) 20 MG tablet; Take 1 tablet by mouth daily Take 20 mg by mouth in the morning. Hypothyroidism, unspecified type  -     levothyroxine (SYNTHROID) 100 MCG tablet;  Take 1 tablet by mouth daily    Orthostatic hypotension  -     midodrine (PROAMATINE) 5 MG tablet; Take 1 tablet by mouth 3 times daily    Pain and swelling of toe of right foot  -     allopurinol (ZYLOPRIM) 300 MG tablet; Take 1 tablet by mouth once daily    Primary insomnia  -     traZODone (DESYREL) 50 MG tablet; Take 1 tablet by mouth nightly    Refills on medications as above. Chronic conditions seemed well controlled at this time. Blood pressure 124/62 in the office today. Recommend either pulmonary rehab or physical therapy going forward to help with some of his chronic fatigue and weakness symptoms. Pulmonary rehab would definitely help with his chronic shortness of breath. Otherwise continue all medications and follow-up in 3 months. Patient will be due for 6-month lab work to be completed in 3 months. No follow-ups on file. Medications Prescribed:  Orders Placed This Encounter   Medications    levothyroxine (SYNTHROID) 100 MCG tablet     Sig: Take 1 tablet by mouth daily     Dispense:  90 tablet     Refill:  1    midodrine (PROAMATINE) 5 MG tablet     Sig: Take 1 tablet by mouth 3 times daily     Dispense:  270 tablet     Refill:  1    allopurinol (ZYLOPRIM) 300 MG tablet     Sig: Take 1 tablet by mouth once daily     Dispense:  90 tablet     Refill:  1    traZODone (DESYREL) 50 MG tablet     Sig: Take 1 tablet by mouth nightly     Dispense:  90 tablet     Refill:  1    furosemide (LASIX) 20 MG tablet     Sig: Take 1 tablet by mouth daily Take 20 mg by mouth in the morning. Dispense:  90 tablet     Refill:  1       Future Appointments   Date Time Provider Rola Sood   5/8/2023  1:20 PM DO [facility]   6/5/2023  1:15 PM DO NAHID Boyd [facility]   6/14/2023  9:40 AM MD Sarah Reed [facility]   7/12/2023  2:[facility]       Patient given educational materials - see patient instructions.   Discussed use, benefit, and side effects of prescribed medications. All patient questions answered. Pt voiced understanding. Reviewed health maintenance. Instructed to continue current medications, diet and exercise. Patient agreed with treatment plan. Follow up as directed.      Electronically signed by Trudi Ahumada DO on 2/7/2023 at 2:46 PM

## 2023-05-03 RX ORDER — POTASSIUM CHLORIDE 750 MG/1
TABLET, FILM COATED, EXTENDED RELEASE ORAL
Qty: 90 TABLET | Refills: 0 | Status: SHIPPED | OUTPATIENT
Start: 2023-05-03

## 2023-05-03 NOTE — TELEPHONE ENCOUNTER
Denisse Gómez called requesting a refill of the below medication which has been pended for you:     Requested Prescriptions     Pending Prescriptions Disp Refills    potassium chloride (KLOR-CON) 10 MEQ extended release tablet [Pharmacy Med Name: Potassium Chloride ER 10 MEQ Oral Tablet Extended Release] 90 tablet 0     Sig: TAKE 1 TABLET BY MOUTH IN THE MORNING       Last Appointment Date: 2/7/2023  Next Appointment Date: 5/8/2023    No Known Allergies

## 2023-05-08 ENCOUNTER — TELEPHONE (OUTPATIENT)
Dept: FAMILY MEDICINE CLINIC | Age: 78
End: 2023-05-08

## 2023-05-16 ENCOUNTER — OFFICE VISIT (OUTPATIENT)
Dept: FAMILY MEDICINE CLINIC | Age: 78
End: 2023-05-16
Payer: MEDICARE

## 2023-05-16 ENCOUNTER — HOSPITAL ENCOUNTER (OUTPATIENT)
Age: 78
Discharge: HOME | End: 2023-05-16
Payer: MEDICARE

## 2023-05-16 VITALS
BODY MASS INDEX: 37.35 KG/M2 | WEIGHT: 291 LBS | HEART RATE: 70 BPM | SYSTOLIC BLOOD PRESSURE: 130 MMHG | OXYGEN SATURATION: 94 % | HEIGHT: 74 IN | DIASTOLIC BLOOD PRESSURE: 80 MMHG | TEMPERATURE: 97.9 F

## 2023-05-16 DIAGNOSIS — H26.9 CATARACT OF BOTH EYES, UNSPECIFIED CATARACT TYPE: Primary | ICD-10-CM

## 2023-05-16 DIAGNOSIS — Z01.818 PRE-OP EXAM: ICD-10-CM

## 2023-05-16 DIAGNOSIS — I65.23 BILATERAL CAROTID ARTERY STENOSIS: ICD-10-CM

## 2023-05-16 DIAGNOSIS — I25.5 ISCHEMIC CARDIOMYOPATHY: ICD-10-CM

## 2023-05-16 LAB
ALBUMIN SERPL-MCNC: 4.5 G/DL (ref 3.5–5.2)
ALBUMIN/GLOB SERPL: 1.6 {RATIO} (ref 1–2.5)
ALP SERPL-CCNC: 99 U/L (ref 40–129)
ALT SERPL-CCNC: 11 U/L (ref 5–41)
ANION GAP SERPL CALCULATED.3IONS-SCNC: 13 MMOL/L (ref 9–17)
AST SERPL-CCNC: 16 U/L
BASOPHILS # BLD: 0.03 K/UL (ref 0–0.2)
BASOPHILS NFR BLD: 0 % (ref 0–2)
BILIRUB SERPL-MCNC: 0.5 MG/DL (ref 0.3–1.2)
BUN SERPL-MCNC: 14 MG/DL (ref 8–23)
BUN/CREAT SERPL: 14 (ref 9–20)
CALCIUM SERPL-MCNC: 9.9 MG/DL (ref 8.6–10.4)
CHLORIDE SERPL-SCNC: 103 MMOL/L (ref 98–107)
CO2 SERPL-SCNC: 27 MMOL/L (ref 20–31)
CREAT SERPL-MCNC: 1.01 MG/DL (ref 0.7–1.2)
EOSINOPHIL # BLD: 0.12 K/UL (ref 0–0.44)
EOSINOPHILS RELATIVE PERCENT: 1 % (ref 1–4)
ERYTHROCYTE [DISTWIDTH] IN BLOOD BY AUTOMATED COUNT: 14.6 % (ref 11.8–14.4)
GFR SERPL CREATININE-BSD FRML MDRD: >60 ML/MIN/1.73M2
GLUCOSE SERPL-MCNC: 93 MG/DL (ref 70–99)
HCT VFR BLD AUTO: 50.9 % (ref 40.7–50.3)
HGB BLD-MCNC: 15.9 G/DL (ref 13–17)
IMM GRANULOCYTES # BLD AUTO: 0.03 K/UL (ref 0–0.3)
IMM GRANULOCYTES NFR BLD: 0 %
LYMPHOCYTES # BLD: 29 % (ref 24–43)
LYMPHOCYTES NFR BLD: 2.6 K/UL (ref 1.1–3.7)
MCH RBC QN AUTO: 29.9 PG (ref 25.2–33.5)
MCHC RBC AUTO-ENTMCNC: 31.2 G/DL (ref 25.2–33.5)
MCV RBC AUTO: 95.9 FL (ref 82.6–102.9)
MONOCYTES NFR BLD: 1.06 K/UL (ref 0.1–1.2)
MONOCYTES NFR BLD: 12 % (ref 3–12)
NEUTROPHILS NFR BLD: 58 % (ref 36–65)
NEUTS SEG NFR BLD: 5.23 K/UL (ref 1.5–8.1)
NRBC AUTOMATED: 0 PER 100 WBC
PLATELET # BLD AUTO: 168 K/UL (ref 138–453)
PMV BLD AUTO: 9.7 FL (ref 8.1–13.5)
POTASSIUM SERPL-SCNC: 4.2 MMOL/L (ref 3.7–5.3)
PROT SERPL-MCNC: 7.3 G/DL (ref 6.4–8.3)
RBC # BLD AUTO: 5.31 M/UL (ref 4.21–5.77)
RBC # BLD: ABNORMAL 10*6/UL
SODIUM SERPL-SCNC: 143 MMOL/L (ref 135–144)
WBC OTHER # BLD: 9.1 K/UL (ref 3.5–11.3)

## 2023-05-16 PROCEDURE — 99213 OFFICE O/P EST LOW 20 MIN: CPT

## 2023-05-16 PROCEDURE — 85025 COMPLETE CBC W/AUTO DIFF WBC: CPT

## 2023-05-16 PROCEDURE — G8417 CALC BMI ABV UP PARAM F/U: HCPCS | Performed by: STUDENT IN AN ORGANIZED HEALTH CARE EDUCATION/TRAINING PROGRAM

## 2023-05-16 PROCEDURE — G8427 DOCREV CUR MEDS BY ELIG CLIN: HCPCS | Performed by: STUDENT IN AN ORGANIZED HEALTH CARE EDUCATION/TRAINING PROGRAM

## 2023-05-16 PROCEDURE — 3074F SYST BP LT 130 MM HG: CPT | Performed by: STUDENT IN AN ORGANIZED HEALTH CARE EDUCATION/TRAINING PROGRAM

## 2023-05-16 PROCEDURE — 36415 COLL VENOUS BLD VENIPUNCTURE: CPT

## 2023-05-16 PROCEDURE — 1036F TOBACCO NON-USER: CPT | Performed by: STUDENT IN AN ORGANIZED HEALTH CARE EDUCATION/TRAINING PROGRAM

## 2023-05-16 PROCEDURE — 1123F ACP DISCUSS/DSCN MKR DOCD: CPT | Performed by: STUDENT IN AN ORGANIZED HEALTH CARE EDUCATION/TRAINING PROGRAM

## 2023-05-16 PROCEDURE — 93010 ELECTROCARDIOGRAM REPORT: CPT | Performed by: STUDENT IN AN ORGANIZED HEALTH CARE EDUCATION/TRAINING PROGRAM

## 2023-05-16 PROCEDURE — 3078F DIAST BP <80 MM HG: CPT | Performed by: STUDENT IN AN ORGANIZED HEALTH CARE EDUCATION/TRAINING PROGRAM

## 2023-05-16 PROCEDURE — 93005 ELECTROCARDIOGRAM TRACING: CPT | Performed by: STUDENT IN AN ORGANIZED HEALTH CARE EDUCATION/TRAINING PROGRAM

## 2023-05-16 PROCEDURE — 99214 OFFICE O/P EST MOD 30 MIN: CPT | Performed by: STUDENT IN AN ORGANIZED HEALTH CARE EDUCATION/TRAINING PROGRAM

## 2023-05-16 PROCEDURE — 80053 COMPREHEN METABOLIC PANEL: CPT

## 2023-05-16 SDOH — ECONOMIC STABILITY: FOOD INSECURITY: WITHIN THE PAST 12 MONTHS, YOU WORRIED THAT YOUR FOOD WOULD RUN OUT BEFORE YOU GOT MONEY TO BUY MORE.: NEVER TRUE

## 2023-05-16 SDOH — ECONOMIC STABILITY: FOOD INSECURITY: WITHIN THE PAST 12 MONTHS, THE FOOD YOU BOUGHT JUST DIDN'T LAST AND YOU DIDN'T HAVE MONEY TO GET MORE.: NEVER TRUE

## 2023-05-16 SDOH — ECONOMIC STABILITY: INCOME INSECURITY: HOW HARD IS IT FOR YOU TO PAY FOR THE VERY BASICS LIKE FOOD, HOUSING, MEDICAL CARE, AND HEATING?: NOT HARD AT ALL

## 2023-05-16 SDOH — ECONOMIC STABILITY: HOUSING INSECURITY
IN THE LAST 12 MONTHS, WAS THERE A TIME WHEN YOU DID NOT HAVE A STEADY PLACE TO SLEEP OR SLEPT IN A SHELTER (INCLUDING NOW)?: NO

## 2023-05-22 ASSESSMENT — ENCOUNTER SYMPTOMS
BLOOD IN STOOL: 0
DIARRHEA: 0
SHORTNESS OF BREATH: 0
EYE PAIN: 0
TROUBLE SWALLOWING: 0
COUGH: 0
NAUSEA: 0
ABDOMINAL PAIN: 0
VOMITING: 0
CONSTIPATION: 0

## 2023-06-02 NOTE — PROGRESS NOTES
Today's Date: 8/25/2021  Patient's Name: Abbie Ascencio  Patient's age: 67 y.o., 1945    Subjective:  Abbie Ascencio is being seen in clinic today regarding CAD, HTN, HL    he is here for follow up. He denies any chest pain. He reports dyspnea only if he over exerts himself. He denies any falls. He denies any bleeding. No PND, no syncope or pre-syncope, no orthopnea. Last apt I recommended him TTE and lexiscan stress test which he declined. Past Medical History:   has a past medical history of Adenomatous polyp, Chronic venous insufficiency, DVT (deep venous thrombosis) ([facility] [address] Edema, Gout, Hypertension, Onychomycosis, Osteoarthritis, Plantar fasciitis, and Tobacco abuse. Past Surgical History:   has a past surgical history that includes Colonoscopy (12-12-12); Vasectomy (1980); Colonoscopy (2003); other surgical history; Colonoscopy (08/2009); Colonoscopy (06/2008); Coronary artery bypass graft; and Total knee arthroplasty (Right, 10/2015). Home Medications:  Prior to Admission medications    Medication Sig Start Date End Date Taking?  Authorizing Provider   buPROPion (WELLBUTRIN) 75 MG tablet Take 1 tablet by mouth twice daily 8/18/21   Waleska Tinsley MD   metoprolol tartrate (LOPRESSOR) 50 MG tablet Take 1 tablet by mouth twice daily 6/29/21   Maribel Spivey MD   levothyroxine (SYNTHROID) 50 MCG tablet Take 1 tablet by mouth daily 6/23/21   Waleska Tinsley MD   zoster recombinant adjuvanted vaccine [facility]) 50 MCG/0.5ML SUSR injection Inject 0.5 mLs into the muscle See Admin Instructions 1 dose now and repeat in 2-6 months 6/22/21 12/19/21  Waleska Tinsley MD   furosemide (LASIX) 20 MG tablet TAKE 1 TABLET BY MOUTH ONCE DAILY AS NEEDED FOR  PEDAL  EDEMA 5/12/21   Maribel Spivey MD   lisinopril (PRINIVIL;ZESTRIL) 20 MG tablet Take 1 tablet by mouth twice daily 3/30/21   Maribel Spivey MD   allopurinol (ZYLOPRIM) 300 MG tablet Take 1 tablet by mouth once daily 3/24/21   Arelis Castellanos MD   atorvastatin (LIPITOR) 40 MG tablet TAKE 1 TABLET BY MOUTH ONCE DAILY 12/28/20   Rob Evans DO   clopidogrel (PLAVIX) 75 MG tablet Take 1 tablet by mouth once daily 12/22/20   Casper Cash MD   finasteride (PROSCAR) 5 MG tablet TAKE 1 TABLET BY MOUTH ONCE DAILY 5/4/20   Historical Provider, MD   oxybutynin (DITROPAN XL) 15 MG extended release tablet TAKE 1 TABLET BY MOUTH ONCE DAILY 4/19/20   Historical Provider, MD   Handicap Placard MISC by Does not apply route Permanent 8/20/19   Christena Nissen, MD   Omega-3 Fatty Acids (FISH OIL PO) Take 1,200 mg by mouth daily    Historical Provider, MD   Acetaminophen (TYLENOL PO) Take by mouth Per pt, takes 2 in am; 2 in pm    Historical Provider, MD   aspirin 81 MG tablet Take 81 mg by mouth daily. Historical Provider, MD       Allergies:  Patient has no known allergies. Social History:   reports that he quit smoking about 6 years ago. His smoking use included cigarettes. He smoked 0.00 packs per day for 55.00 years. He has never used smokeless tobacco. He reports current alcohol use. He reports that he does not use drugs. Family History: family history includes Heart Disease in his brother; Heart Surgery in his sister; Hypertension in an other family member; Kidney Disease in his brother; Other in his brother; Other (age of onset: 52) in his mother; Sudden Death in his father. No h/o sudden cardiac death. No for premature CAD    REVIEW OF SYSTEMS:    · Constitutional: there has been no unanticipated weight loss. There's been No change in energy level, No change in activity level. · Eyes: No visual changes or diplopia. No scleral icterus. · ENT: No Headaches, hearing loss or vertigo. No mouth sores or sore throat. · Cardiovascular: see above  · Respiratory: see above  · Gastrointestinal: No abdominal pain, appetite loss, blood in stools.    · Genitourinary: No dysuria, trouble voiding, or hematuria. · Musculoskeletal:  DJD reported  · Integumentary: No rash or pruritis. · Neurological: No headache or diplopia. No tingling  · Psychiatric: No anxiety, or depression. · Endocrine: No temperature intolerance. · Hematologic/Lymphatic: No abnormal bruising or bleeding, blood clots or swollen lymph nodes. · Allergic/Immunologic: No nasal congestion or hives. PHYSICAL EXAM:      Vitals:    08/25/21 1024   BP: (!) 116/58   Pulse: 67       Constitutional and General Appearance: alert, cooperative, no distress and appears stated age  HEENT: PERRL, no cervical lymphadenopathy. No masses palpable. Normal oral mucosa  Respiratory:  · Normal excursion and expansion without use of accessory muscles  · Resp Auscultation: Good respiratory effort. No for increased work of breathing. On auscultation: clear to auscultation bilaterally  Cardiovascular:  · Heart tones are crisp and normal. regular S1 and S2.  · Jugular venous pulsation Normal  · The carotid upstroke is normal in amplitude and contour without delay or bruit   Abdomen:   · soft  · Bowel sounds present  Extremities:  ·  No edema  Neurological:  · Alert and oriented. Cardiac Data:  EKG: SR, First degree AV block PAC, LBBB    Labs:     CBC: No results for input(s): WBC, HGB, HCT, PLT in the last 72 hours. BMP: No results for input(s): NA, K, CO2, BUN, CREATININE, LABGLOM, GLUCOSE in the last 72 hours. PT/INR: No results for input(s): PROTIME, INR in the last 72 hours. FASTING LIPID PANEL:  Lab Results   Component Value Date    HDL 34 03/05/2021    TRIG 159 03/05/2021     LIVER PROFILE:No results for input(s): AST, ALT, LABALBU in the last 72 hours. Problem List:  Patient Active Problem List   Diagnosis    Hypertension    Osteoarthritis    Cholecystitis        Assessment and plan:    1. CAD/CABG SVG-OM1, SVG-OM2 and SVG-PDA on 10/28/2014.  TTE 02/2015 LVEF 30% improved to LVEF 50% on subsequent TTE.  Continue current meds. I recommended him TTE and lexiscan stress test. He does not want testing. 2. LBBB- chronic and stable. 3. Carotid u/s 11/18 at [facility]- < 50% bilateral- Follows with Vascular surgery  4. HTN-stable at goal, continue current meds   5. Obesity - encouraged diet, exercise, and discussed weight loss extensively. 6. Hyperlipidemia- Stable. Continue statin. LDL 28 on 3/5/21.   7. DJD  8. S/p L knee replacement 3/2018.  9. RTC 6 months.     Eligio Fletcher [facility]  [phone] Abbe Flap (Upper To Lower Lip) Text: The defect of the lower lip was assessed and measured.  Given the location and size of the defect, an Abbe flap was deemed most appropriate. Using a sterile surgical marker, an appropriate Abbe flap was measured and drawn on the upper lip. Local anesthesia was then infiltrated.  A scalpel was then used to incise the upper lip through and through the skin, vermilion, muscle and mucosa, leaving the flap pedicled on the opposite side.  The flap was then rotated and transferred to the lower lip defect.  The flap was then sutured into place with a three layer technique, closing the orbicularis oris muscle layer with subcutaneous buried sutures, followed by a mucosal layer and an epidermal layer.

## 2023-06-05 ENCOUNTER — OFFICE VISIT (OUTPATIENT)
Dept: CARDIOLOGY | Age: 78
End: 2023-06-05
Payer: MEDICARE

## 2023-06-05 VITALS
BODY MASS INDEX: 37.73 KG/M2 | HEIGHT: 74 IN | SYSTOLIC BLOOD PRESSURE: 138 MMHG | RESPIRATION RATE: 19 BRPM | HEART RATE: 85 BPM | OXYGEN SATURATION: 92 % | WEIGHT: 294 LBS | DIASTOLIC BLOOD PRESSURE: 68 MMHG

## 2023-06-05 DIAGNOSIS — I25.10 CORONARY ARTERY DISEASE INVOLVING NATIVE CORONARY ARTERY OF NATIVE HEART WITHOUT ANGINA PECTORIS: Primary | ICD-10-CM

## 2023-06-05 DIAGNOSIS — Z01.818 PRE-OP EVALUATION: ICD-10-CM

## 2023-06-05 DIAGNOSIS — Z95.1 S/P CABG (CORONARY ARTERY BYPASS GRAFT): ICD-10-CM

## 2023-06-05 PROCEDURE — G8417 CALC BMI ABV UP PARAM F/U: HCPCS | Performed by: INTERNAL MEDICINE

## 2023-06-05 PROCEDURE — 93010 ELECTROCARDIOGRAM REPORT: CPT | Performed by: INTERNAL MEDICINE

## 2023-06-05 PROCEDURE — 3075F SYST BP GE 130 - 139MM HG: CPT | Performed by: INTERNAL MEDICINE

## 2023-06-05 PROCEDURE — 99214 OFFICE O/P EST MOD 30 MIN: CPT | Performed by: INTERNAL MEDICINE

## 2023-06-05 PROCEDURE — 1123F ACP DISCUSS/DSCN MKR DOCD: CPT | Performed by: INTERNAL MEDICINE

## 2023-06-05 PROCEDURE — 1036F TOBACCO NON-USER: CPT | Performed by: INTERNAL MEDICINE

## 2023-06-05 PROCEDURE — G8427 DOCREV CUR MEDS BY ELIG CLIN: HCPCS | Performed by: INTERNAL MEDICINE

## 2023-06-05 PROCEDURE — 93005 ELECTROCARDIOGRAM TRACING: CPT | Performed by: INTERNAL MEDICINE

## 2023-06-05 PROCEDURE — 3078F DIAST BP <80 MM HG: CPT | Performed by: INTERNAL MEDICINE

## 2023-06-05 RX ORDER — LOTEPREDNOL ETABONATE 3.8 MG/G
1 GEL OPHTHALMIC 2 TIMES DAILY
COMMUNITY
Start: 2023-06-07 | End: 2023-07-05

## 2023-06-05 RX ORDER — ATROPINE SULFATE 10 MG/ML
1 SOLUTION/ DROPS OPHTHALMIC 4 TIMES DAILY
Qty: 0.6 ML | Refills: 0 | COMMUNITY
Start: 2023-06-04 | End: 2023-06-07

## 2023-06-05 RX ORDER — BROMFENAC SODIUM 0.7 MG/ML
1 SOLUTION/ DROPS OPHTHALMIC DAILY
Qty: 2 EACH | Refills: 1 | COMMUNITY
Start: 2023-06-04 | End: 2023-07-05

## 2023-06-05 NOTE — PROGRESS NOTES
of 39.0 to 39.9 in adult [facility])    Near syncope    Hypoxia    Orthostatic hypotension        Assessment and plan:      CAD/CABG SVG-OM1, SVG-OM2 and SVG-PDA on 10/28/2014. TTE 02/2015 LVEF 30% improved to LVEF 50% on subsequent TTE. Stable. Denies any present cardiac issues/concerns. Continue PO ASA, statin, & Lasix. No BB d/t AVB. No ACE/ARB/ARNI d/t #2. Discussed importance of diet, med complinace, & activity. Orthostatic hypotension- stable and has not had issues with reoccurance. Continue midodrine 5mg po TID. Proscar and metoprolol discontinue. LBBB- chronic and stable. Carotid u/s 11/18 at [facility]- < 50% bilateral- Follows with Vascular surgery  HTN-controlled with recent hs of orthostatic hypotension- on midodrine. Monitor BP. Continue walker for ambulatory support. Hyperlipidemia- Stable. Continue statin. LDL 28 on 3/5/21. Did not get labs rechecked. Advised to get one day this week and will f/u  DJD S/p L knee replacement 3/2018. - per PCP  Scheduled to undergo cataract surgery-clearance with low cardiac risk    F/U 6 months or sooner if neccessary      Electronically signed by Carleen Becker MD on 6/5/2023 at 1:33 PM      [facility]  [phone]

## 2023-07-12 ENCOUNTER — OFFICE VISIT (OUTPATIENT)
Dept: PULMONOLOGY | Age: 78
End: 2023-07-12
Payer: MEDICARE

## 2023-07-12 VITALS
DIASTOLIC BLOOD PRESSURE: 64 MMHG | HEART RATE: 51 BPM | WEIGHT: 288.4 LBS | OXYGEN SATURATION: 95 % | TEMPERATURE: 98.5 F | HEIGHT: 74 IN | SYSTOLIC BLOOD PRESSURE: 128 MMHG | BODY MASS INDEX: 37.01 KG/M2

## 2023-07-12 DIAGNOSIS — E66.9 RESTRICTIVE LUNG DISEASE SECONDARY TO OBESITY: ICD-10-CM

## 2023-07-12 DIAGNOSIS — I51.89 COMBINED SYSTOLIC AND DIASTOLIC CARDIAC DYSFUNCTION: ICD-10-CM

## 2023-07-12 DIAGNOSIS — J98.4 RESTRICTIVE LUNG DISEASE SECONDARY TO OBESITY: ICD-10-CM

## 2023-07-12 DIAGNOSIS — G62.81 CRITICAL ILLNESS POLYNEUROPATHY (HCC): ICD-10-CM

## 2023-07-12 DIAGNOSIS — J96.11 CHRONIC RESPIRATORY FAILURE WITH HYPOXIA, ON HOME O2 THERAPY (HCC): Primary | ICD-10-CM

## 2023-07-12 DIAGNOSIS — E66.01 CLASS 2 SEVERE OBESITY DUE TO EXCESS CALORIES WITH SERIOUS COMORBIDITY AND BODY MASS INDEX (BMI) OF 36.0 TO 36.9 IN ADULT (HCC): ICD-10-CM

## 2023-07-12 DIAGNOSIS — Z99.81 CHRONIC RESPIRATORY FAILURE WITH HYPOXIA, ON HOME O2 THERAPY (HCC): Primary | ICD-10-CM

## 2023-07-12 PROCEDURE — 1123F ACP DISCUSS/DSCN MKR DOCD: CPT | Performed by: NURSE PRACTITIONER

## 2023-07-12 PROCEDURE — 3074F SYST BP LT 130 MM HG: CPT | Performed by: NURSE PRACTITIONER

## 2023-07-12 PROCEDURE — 3078F DIAST BP <80 MM HG: CPT | Performed by: NURSE PRACTITIONER

## 2023-07-12 PROCEDURE — G8427 DOCREV CUR MEDS BY ELIG CLIN: HCPCS | Performed by: NURSE PRACTITIONER

## 2023-07-12 PROCEDURE — 99214 OFFICE O/P EST MOD 30 MIN: CPT | Performed by: NURSE PRACTITIONER

## 2023-07-12 PROCEDURE — G8417 CALC BMI ABV UP PARAM F/U: HCPCS | Performed by: NURSE PRACTITIONER

## 2023-07-12 PROCEDURE — 1036F TOBACCO NON-USER: CPT | Performed by: NURSE PRACTITIONER

## 2023-07-12 ASSESSMENT — ENCOUNTER SYMPTOMS
EYES NEGATIVE: 1
GASTROINTESTINAL NEGATIVE: 1
ALLERGIC/IMMUNOLOGIC NEGATIVE: 1

## 2023-07-13 NOTE — PROGRESS NOTES
6 minute walk:    Patient ambulated approximately 700 feet. Patient saturation was 96% on room air at rest. Patient then ambulated on room air and maintained saturation on 90%. Patient tolerated well.
Order faxed to [address] [facility] for overnight pulse oximetry.
limitations of voice recognition software. Read the chart carefully and recognize using context where substitutions may have occurred.   If any questions please do not hesitate to contact me for clarification

## 2023-07-31 NOTE — TELEPHONE ENCOUNTER
JS out of office all week.        Zain Gonzalez called requesting a refill of the below medication which has been pended for you:     Requested Prescriptions     Pending Prescriptions Disp Refills    potassium chloride (KLOR-CON) 10 MEQ extended release tablet [Pharmacy Med Name: Potassium Chloride ER 10 MEQ Oral Tablet Extended Release] 30 tablet 0     Sig: TAKE 1 TABLET BY MOUTH IN THE MORNING       Last Appointment Date: 5/16/2023  Next Appointment Date: 8/16/2023    No Known Allergies

## 2023-08-01 RX ORDER — POTASSIUM CHLORIDE 750 MG/1
10 TABLET, FILM COATED, EXTENDED RELEASE ORAL DAILY
Qty: 90 TABLET | Refills: 0 | Status: SHIPPED | OUTPATIENT
Start: 2023-08-01

## 2023-08-08 DIAGNOSIS — E78.5 HYPERLIPIDEMIA, UNSPECIFIED HYPERLIPIDEMIA TYPE: ICD-10-CM

## 2023-08-08 RX ORDER — BUPROPION HYDROCHLORIDE 75 MG/1
TABLET ORAL
Qty: 180 TABLET | Refills: 0 | OUTPATIENT
Start: 2023-08-08

## 2023-08-08 RX ORDER — ATORVASTATIN CALCIUM 40 MG/1
TABLET, FILM COATED ORAL
Qty: 90 TABLET | Refills: 0 | Status: SHIPPED | OUTPATIENT
Start: 2023-08-08

## 2023-08-08 RX ORDER — BUPROPION HYDROCHLORIDE 75 MG/1
TABLET ORAL
Qty: 180 TABLET | Refills: 1 | Status: SHIPPED | OUTPATIENT
Start: 2023-08-08

## 2023-08-08 NOTE — TELEPHONE ENCOUNTER
Russ Conner called requesting a refill of the below medication which has been pended for you:     Requested Prescriptions     Pending Prescriptions Disp Refills    atorvastatin (LIPITOR) 40 MG tablet [Pharmacy Med Name: Atorvastatin Calcium 40 MG Oral Tablet] 90 tablet 0     Sig: Take 1 tablet by mouth once daily       Last Appointment Date: 11/21/2022  Next Appointment Date: Visit date not found    No Known Allergies

## 2023-08-15 DIAGNOSIS — I95.1 ORTHOSTATIC HYPOTENSION: ICD-10-CM

## 2023-08-15 RX ORDER — MIDODRINE HYDROCHLORIDE 5 MG/1
TABLET ORAL
Qty: 270 TABLET | Refills: 0 | Status: SHIPPED | OUTPATIENT
Start: 2023-08-15

## 2023-08-15 NOTE — TELEPHONE ENCOUNTER
[facility] called requesting a refill of the below medication which has been pended for you:     Requested Prescriptions     Pending Prescriptions Disp Refills    midodrine (PROAMATINE) 5 MG tablet [Pharmacy Med Name: Midodrine HCl 5 MG Oral Tablet] 270 tablet 0     Sig: TAKE 1 TABLET BY MOUTH THREE TIMES DAILY       Last Appointment Date: 5/16/2023  Next Appointment Date: 8/16/2023    No Known Allergies

## 2023-08-16 ENCOUNTER — OFFICE VISIT (OUTPATIENT)
Dept: FAMILY MEDICINE CLINIC | Age: 78
End: 2023-08-16
Payer: MEDICARE

## 2023-08-16 VITALS
BODY MASS INDEX: 37.47 KG/M2 | WEIGHT: 292 LBS | HEIGHT: 74 IN | SYSTOLIC BLOOD PRESSURE: 132 MMHG | RESPIRATION RATE: 18 BRPM | HEART RATE: 98 BPM | DIASTOLIC BLOOD PRESSURE: 60 MMHG | OXYGEN SATURATION: 95 % | TEMPERATURE: 97.2 F

## 2023-08-16 DIAGNOSIS — I10 ESSENTIAL HYPERTENSION: ICD-10-CM

## 2023-08-16 DIAGNOSIS — F41.9 ANXIETY AND DEPRESSION: Primary | ICD-10-CM

## 2023-08-16 DIAGNOSIS — F32.A ANXIETY AND DEPRESSION: Primary | ICD-10-CM

## 2023-08-16 DIAGNOSIS — K43.9 HERNIA OF ABDOMINAL WALL: ICD-10-CM

## 2023-08-16 DIAGNOSIS — J43.9 PULMONARY EMPHYSEMA, UNSPECIFIED EMPHYSEMA TYPE (HCC): ICD-10-CM

## 2023-08-16 DIAGNOSIS — I77.9 CAROTID ARTERY DISEASE, UNSPECIFIED LATERALITY, UNSPECIFIED TYPE (HCC): ICD-10-CM

## 2023-08-16 PROCEDURE — 1123F ACP DISCUSS/DSCN MKR DOCD: CPT | Performed by: STUDENT IN AN ORGANIZED HEALTH CARE EDUCATION/TRAINING PROGRAM

## 2023-08-16 PROCEDURE — 3023F SPIROM DOC REV: CPT | Performed by: STUDENT IN AN ORGANIZED HEALTH CARE EDUCATION/TRAINING PROGRAM

## 2023-08-16 PROCEDURE — G8417 CALC BMI ABV UP PARAM F/U: HCPCS | Performed by: STUDENT IN AN ORGANIZED HEALTH CARE EDUCATION/TRAINING PROGRAM

## 2023-08-16 PROCEDURE — 99212 OFFICE O/P EST SF 10 MIN: CPT | Performed by: STUDENT IN AN ORGANIZED HEALTH CARE EDUCATION/TRAINING PROGRAM

## 2023-08-16 PROCEDURE — 3078F DIAST BP <80 MM HG: CPT | Performed by: STUDENT IN AN ORGANIZED HEALTH CARE EDUCATION/TRAINING PROGRAM

## 2023-08-16 PROCEDURE — 1036F TOBACCO NON-USER: CPT | Performed by: STUDENT IN AN ORGANIZED HEALTH CARE EDUCATION/TRAINING PROGRAM

## 2023-08-16 PROCEDURE — 3074F SYST BP LT 130 MM HG: CPT | Performed by: STUDENT IN AN ORGANIZED HEALTH CARE EDUCATION/TRAINING PROGRAM

## 2023-08-16 PROCEDURE — 99214 OFFICE O/P EST MOD 30 MIN: CPT | Performed by: STUDENT IN AN ORGANIZED HEALTH CARE EDUCATION/TRAINING PROGRAM

## 2023-08-16 PROCEDURE — G8427 DOCREV CUR MEDS BY ELIG CLIN: HCPCS | Performed by: STUDENT IN AN ORGANIZED HEALTH CARE EDUCATION/TRAINING PROGRAM

## 2023-08-16 RX ORDER — SERTRALINE HYDROCHLORIDE 100 MG/1
100 TABLET, FILM COATED ORAL DAILY
Qty: 30 TABLET | Refills: 2 | Status: SHIPPED | OUTPATIENT
Start: 2023-08-16

## 2023-08-16 ASSESSMENT — PATIENT HEALTH QUESTIONNAIRE - PHQ9
SUM OF ALL RESPONSES TO PHQ QUESTIONS 1-9: 0
SUM OF ALL RESPONSES TO PHQ QUESTIONS 1-9: 0
SUM OF ALL RESPONSES TO PHQ9 QUESTIONS 1 & 2: 0
2. FEELING DOWN, DEPRESSED OR HOPELESS: 0
SUM OF ALL RESPONSES TO PHQ QUESTIONS 1-9: 0
1. LITTLE INTEREST OR PLEASURE IN DOING THINGS: 0
SUM OF ALL RESPONSES TO PHQ QUESTIONS 1-9: 0

## 2023-08-16 NOTE — PROGRESS NOTES
hold off on surgery referral at this time. No follow-ups on file. Medications Prescribed:  Orders Placed This Encounter   Medications    sertraline (ZOLOFT) 100 MG tablet     Sig: Take 1 tablet by mouth daily     Dispense:  30 tablet     Refill:  2       Future Appointments   Date Time Provider [address]   11/16/2023  1:40 PM Ashwini Cortez DO DFAM [facility]   12/4/2023  2:45 PM DO NAHID Boyd [facility]   1/10/2024  2:30 PM DO NOE DobbsULM [facility]   6/14/2024  2:40 PM Nadege Chow, APRN - CNP [facility]       Patient given educational materials - see patient instructions. Discussed use, benefit, and side effects of prescribed medications. All patient questions answered. Pt voiced understanding. Reviewed health maintenance. Instructed to continue current medications, diet and exercise. Patient agreed with treatment plan. Follow up as directed.      Electronically signed by Ashwini Cortez DO on 8/26/2023 at 8:59 PM

## 2023-08-24 DIAGNOSIS — I50.42 CHRONIC COMBINED SYSTOLIC AND DIASTOLIC CHF (CONGESTIVE HEART FAILURE) (HCC): ICD-10-CM

## 2023-08-24 DIAGNOSIS — F51.01 PRIMARY INSOMNIA: ICD-10-CM

## 2023-08-24 RX ORDER — TRAZODONE HYDROCHLORIDE 50 MG/1
50 TABLET ORAL NIGHTLY
Qty: 90 TABLET | Refills: 1 | Status: SHIPPED | OUTPATIENT
Start: 2023-08-24

## 2023-08-24 RX ORDER — FUROSEMIDE 20 MG/1
TABLET ORAL
Qty: 90 TABLET | Refills: 1 | Status: SHIPPED | OUTPATIENT
Start: 2023-08-24

## 2023-08-24 NOTE — TELEPHONE ENCOUNTER
Luis Alberto Tsang called requesting a refill of the below medication which has been pended for you:     Requested Prescriptions     Pending Prescriptions Disp Refills    furosemide (LASIX) 20 MG tablet [Pharmacy Med Name: Furosemide 20 MG Oral Tablet] 90 tablet 0     Sig: TAKE 1 TABLET BY MOUTH ONCE DAILY IN THE MORNING    traZODone (DESYREL) 50 MG tablet [Pharmacy Med Name: traZODone HCl 50 MG Oral Tablet] 90 tablet 0     Sig: Take 1 tablet by mouth nightly       Last Appointment Date: 8/16/2023  Next Appointment Date: 11/16/2023    No Known Allergies

## 2023-08-26 ASSESSMENT — ENCOUNTER SYMPTOMS
TROUBLE SWALLOWING: 0
VOMITING: 0
COUGH: 0
CONSTIPATION: 0
BLOOD IN STOOL: 0
DIARRHEA: 0
ABDOMINAL PAIN: 0
NAUSEA: 0
EYE PAIN: 0
SHORTNESS OF BREATH: 0

## 2023-10-30 DIAGNOSIS — M79.89 PAIN AND SWELLING OF TOE OF RIGHT FOOT: ICD-10-CM

## 2023-10-30 DIAGNOSIS — M79.674 PAIN AND SWELLING OF TOE OF RIGHT FOOT: ICD-10-CM

## 2023-10-30 DIAGNOSIS — E78.5 HYPERLIPIDEMIA, UNSPECIFIED HYPERLIPIDEMIA TYPE: ICD-10-CM

## 2023-10-30 DIAGNOSIS — E03.9 HYPOTHYROIDISM, UNSPECIFIED TYPE: ICD-10-CM

## 2023-10-30 NOTE — TELEPHONE ENCOUNTER
Jono Devries called requesting a refill of the below medication which has been pended for you:     Requested Prescriptions     Pending Prescriptions Disp Refills    allopurinol (ZYLOPRIM) 300 MG tablet [Pharmacy Med Name: Allopurinol 300 MG Oral Tablet] 90 tablet 0     Sig: Take 1 tablet by mouth once daily    levothyroxine (SYNTHROID) 100 MCG tablet [Pharmacy Med Name: Levothyroxine Sodium 100 MCG Oral Tablet] 90 tablet 0     Sig: Take 1 tablet by mouth once daily    atorvastatin (LIPITOR) 40 MG tablet [Pharmacy Med Name: Atorvastatin Calcium 40 MG Oral Tablet] 90 tablet 0     Sig: Take 1 tablet by mouth once daily       Last Appointment Date: 8/16/2023  Next Appointment Date: 11/16/2023    No Known Allergies

## 2023-10-30 NOTE — TELEPHONE ENCOUNTER
Kiah Lucero called requesting a refill of the below medication which has been pended for you:     Requested Prescriptions     Pending Prescriptions Disp Refills    potassium chloride (KLOR-CON) 10 MEQ extended release tablet [Pharmacy Med Name: Potassium Chloride ER 10 MEQ Oral Tablet Extended Release] 90 tablet 0     Sig: Take 1 tablet by mouth once daily       Last Appointment Date: Visit date not found  Next Appointment Date: Visit date not found    No Known Allergies

## 2023-10-31 RX ORDER — ATORVASTATIN CALCIUM 40 MG/1
TABLET, FILM COATED ORAL
Qty: 90 TABLET | Refills: 0 | Status: SHIPPED | OUTPATIENT
Start: 2023-10-31

## 2023-10-31 RX ORDER — POTASSIUM CHLORIDE 750 MG/1
10 TABLET, FILM COATED, EXTENDED RELEASE ORAL DAILY
Qty: 90 TABLET | Refills: 0 | Status: SHIPPED | OUTPATIENT
Start: 2023-10-31

## 2023-10-31 RX ORDER — LEVOTHYROXINE SODIUM 0.1 MG/1
100 TABLET ORAL DAILY
Qty: 90 TABLET | Refills: 0 | Status: SHIPPED | OUTPATIENT
Start: 2023-10-31

## 2023-10-31 RX ORDER — ALLOPURINOL 300 MG/1
TABLET ORAL
Qty: 90 TABLET | Refills: 0 | Status: SHIPPED | OUTPATIENT
Start: 2023-10-31

## 2023-11-07 DIAGNOSIS — I95.1 ORTHOSTATIC HYPOTENSION: ICD-10-CM

## 2023-11-07 DIAGNOSIS — F41.9 ANXIETY AND DEPRESSION: ICD-10-CM

## 2023-11-07 DIAGNOSIS — F32.A ANXIETY AND DEPRESSION: ICD-10-CM

## 2023-11-07 RX ORDER — SERTRALINE HYDROCHLORIDE 100 MG/1
100 TABLET, FILM COATED ORAL DAILY
Qty: 30 TABLET | Refills: 0 | Status: SHIPPED | OUTPATIENT
Start: 2023-11-07

## 2023-11-07 RX ORDER — MIDODRINE HYDROCHLORIDE 5 MG/1
TABLET ORAL
Qty: 270 TABLET | Refills: 0 | Status: SHIPPED | OUTPATIENT
Start: 2023-11-07

## 2023-11-07 NOTE — TELEPHONE ENCOUNTER
Javan Tong called requesting a refill of the below medication which has been pended for you:     Requested Prescriptions     Pending Prescriptions Disp Refills    sertraline (ZOLOFT) 100 MG tablet [Pharmacy Med Name: Sertraline HCl 100 MG Oral Tablet] 30 tablet 0     Sig: Take 1 tablet by mouth once daily    midodrine (PROAMATINE) 5 MG tablet [Pharmacy Med Name: Midodrine HCl 5 MG Oral Tablet] 270 tablet 0     Sig: TAKE 1 TABLET BY MOUTH THREE TIMES DAILY       Last Appointment Date: 8/16/2023  Next Appointment Date: 11/16/2023    No Known Allergies

## 2023-11-16 ENCOUNTER — OFFICE VISIT (OUTPATIENT)
Dept: FAMILY MEDICINE CLINIC | Age: 78
End: 2023-11-16
Payer: MEDICARE

## 2023-11-16 VITALS
WEIGHT: 288 LBS | DIASTOLIC BLOOD PRESSURE: 68 MMHG | HEART RATE: 93 BPM | SYSTOLIC BLOOD PRESSURE: 136 MMHG | OXYGEN SATURATION: 95 % | BODY MASS INDEX: 36.96 KG/M2 | HEIGHT: 74 IN

## 2023-11-16 DIAGNOSIS — Z00.00 MEDICARE ANNUAL WELLNESS VISIT, SUBSEQUENT: Primary | ICD-10-CM

## 2023-11-16 DIAGNOSIS — S91.101A OPEN WOUND OF RIGHT GREAT TOE, INITIAL ENCOUNTER: ICD-10-CM

## 2023-11-16 PROBLEM — H90.3 SENSORINEURAL HEARING LOSS, BILATERAL: Status: ACTIVE | Noted: 2023-11-16

## 2023-11-16 PROBLEM — H91.8X9 OTHER SPECIFIED HEARING LOSS, UNSPECIFIED EAR: Status: ACTIVE | Noted: 2023-11-16

## 2023-11-16 PROBLEM — H25.813 COMBINED FORMS OF AGE-RELATED CATARACT OF BOTH EYES: Status: ACTIVE | Noted: 2023-05-11

## 2023-11-16 PROBLEM — H02.831 DERMATOCHALASIS OF RIGHT UPPER EYELID: Status: ACTIVE | Noted: 2023-11-16

## 2023-11-16 PROCEDURE — G0439 PPPS, SUBSEQ VISIT: HCPCS | Performed by: STUDENT IN AN ORGANIZED HEALTH CARE EDUCATION/TRAINING PROGRAM

## 2023-11-16 PROCEDURE — G8417 CALC BMI ABV UP PARAM F/U: HCPCS | Performed by: STUDENT IN AN ORGANIZED HEALTH CARE EDUCATION/TRAINING PROGRAM

## 2023-11-16 PROCEDURE — G8427 DOCREV CUR MEDS BY ELIG CLIN: HCPCS | Performed by: STUDENT IN AN ORGANIZED HEALTH CARE EDUCATION/TRAINING PROGRAM

## 2023-11-16 PROCEDURE — 1036F TOBACCO NON-USER: CPT | Performed by: STUDENT IN AN ORGANIZED HEALTH CARE EDUCATION/TRAINING PROGRAM

## 2023-11-16 PROCEDURE — 99213 OFFICE O/P EST LOW 20 MIN: CPT | Performed by: STUDENT IN AN ORGANIZED HEALTH CARE EDUCATION/TRAINING PROGRAM

## 2023-11-16 PROCEDURE — 1123F ACP DISCUSS/DSCN MKR DOCD: CPT | Performed by: STUDENT IN AN ORGANIZED HEALTH CARE EDUCATION/TRAINING PROGRAM

## 2023-11-16 PROCEDURE — 3074F SYST BP LT 130 MM HG: CPT | Performed by: STUDENT IN AN ORGANIZED HEALTH CARE EDUCATION/TRAINING PROGRAM

## 2023-11-16 PROCEDURE — G8484 FLU IMMUNIZE NO ADMIN: HCPCS | Performed by: STUDENT IN AN ORGANIZED HEALTH CARE EDUCATION/TRAINING PROGRAM

## 2023-11-16 PROCEDURE — 3078F DIAST BP <80 MM HG: CPT | Performed by: STUDENT IN AN ORGANIZED HEALTH CARE EDUCATION/TRAINING PROGRAM

## 2023-11-16 ASSESSMENT — PATIENT HEALTH QUESTIONNAIRE - PHQ9
SUM OF ALL RESPONSES TO PHQ9 QUESTIONS 1 & 2: 0
8. MOVING OR SPEAKING SO SLOWLY THAT OTHER PEOPLE COULD HAVE NOTICED. OR THE OPPOSITE, BEING SO FIGETY OR RESTLESS THAT YOU HAVE BEEN MOVING AROUND A LOT MORE THAN USUAL: 0
2. FEELING DOWN, DEPRESSED OR HOPELESS: 0
1. LITTLE INTEREST OR PLEASURE IN DOING THINGS: 0
SUM OF ALL RESPONSES TO PHQ QUESTIONS 1-9: 0
5. POOR APPETITE OR OVEREATING: 0
SUM OF ALL RESPONSES TO PHQ QUESTIONS 1-9: 0
SUM OF ALL RESPONSES TO PHQ QUESTIONS 1-9: 0
4. FEELING TIRED OR HAVING LITTLE ENERGY: 0
3. TROUBLE FALLING OR STAYING ASLEEP: 0
9. THOUGHTS THAT YOU WOULD BE BETTER OFF DEAD, OR OF HURTING YOURSELF: 0
6. FEELING BAD ABOUT YOURSELF - OR THAT YOU ARE A FAILURE OR HAVE LET YOURSELF OR YOUR FAMILY DOWN: 0
10. IF YOU CHECKED OFF ANY PROBLEMS, HOW DIFFICULT HAVE THESE PROBLEMS MADE IT FOR YOU TO DO YOUR WORK, TAKE CARE OF THINGS AT HOME, OR GET ALONG WITH OTHER PEOPLE: 0
7. TROUBLE CONCENTRATING ON THINGS, SUCH AS READING THE NEWSPAPER OR WATCHING TELEVISION: 0
SUM OF ALL RESPONSES TO PHQ QUESTIONS 1-9: 0

## 2023-11-16 ASSESSMENT — COLUMBIA-SUICIDE SEVERITY RATING SCALE - C-SSRS
4. HAVE YOU HAD THESE THOUGHTS AND HAD SOME INTENTION OF ACTING ON THEM?: NO
5. HAVE YOU STARTED TO WORK OUT OR WORKED OUT THE DETAILS OF HOW TO KILL YOURSELF? DO YOU INTEND TO CARRY OUT THIS PLAN?: NO
3. HAVE YOU BEEN THINKING ABOUT HOW YOU MIGHT KILL YOURSELF?: NO
7. DID THIS OCCUR IN THE LAST THREE MONTHS: NO

## 2023-11-16 NOTE — PATIENT INSTRUCTIONS
preventive dental visit is recommended every 6 months. Try to get at least 150 minutes of exercise per week or 10,000 steps per day on a pedometer . Order or download the FREE \"Exercise & Physical Activity: Your Everyday Guide\" from [employer]. Call 4-526.155.7159 or search [employer] online. You need 1400-8680 mg of calcium and 2235-6743 IU of vitamin D per day. It is possible to meet your calcium requirement with diet alone, but a vitamin D supplement is usually necessary to meet this goal.  When exposed to the sun, use a sunscreen that protects against both UVA and UVB radiation with an SPF of 30 or greater. Reapply every 2 to 3 hours or after sweating, drying off with a towel, or swimming. Always wear a seat belt when traveling in a car. Always wear a helmet when riding a bicycle or motorcycle.

## 2023-11-16 NOTE — PROGRESS NOTES
[address]  [address] [address]  Dept: [phone]  Dept Fax: [phone]  Loc: [phone]      Denisse Munson is a 77 y.o. male who presents today for:  Chief Complaint   Patient presents with    Medicare AWV    Toe Pain     Sore on the right big toe        Goals         Blood Pressure < 140/90 (pt-stated)       Patient Self-Management Goal for Health Maintenance  Goal: I will follow a healthy diet as discussed by my provider. Barriers: none  Plan for overcoming my barriers: N/A  Confidence: 5/10  Anticipated Goal Completion Date: 8/10/2017              HPI:     HPI  Patient is a 65-year-old male who presents to the office today for Medicare annual wellness visit as well as right big toe pain. For his right big toe has noticed a wound to the area for some time. Believes that it has been there for around 2 months. Denies any pain to the area.     Past Medical History:   Diagnosis Date    Acute respiratory failure with hypoxia ([address]) 12/9/2021    Adenomatous polyp     history of     Cholecystitis 3/19/2019    Chronic venous insufficiency     COPD with acute exacerbation ([address]) 12/9/2021    DVT (deep venous thrombosis) ([address])     history of     Edema     related to venous stasis     Gout     history of     Hypertension     Onychomycosis     Osteoarthritis     Plantar fasciitis     Pneumonia due to COVID-19 virus 12/8/2021    Tobacco abuse       Past Surgical History:   Procedure Laterality Date    CATARACT REMOVAL Right 06/07/2023    Emma Liner Dr. Lara Cargo  03/19/2019    COLONOSCOPY  12/12/2012    polyp    COLONOSCOPY  2003    adenomatous polyps     COLONOSCOPY  08/2009    COLONOSCOPY  06/2008    with polypectomy    CORONARY ARTERY BYPASS GRAFT  10/28/2014    SVG-OM1, SVG-OM2, and SVG-PDA    OTHER SURGICAL HISTORY      teeth extracted     TOTAL KNEE ARTHROPLASTY Right 10/2015    TOTAL

## 2023-11-20 ENCOUNTER — HOSPITAL ENCOUNTER (OUTPATIENT)
Dept: WOUND CARE | Age: 78
Discharge: HOME | End: 2023-11-21
Payer: MEDICARE

## 2023-11-20 VITALS
WEIGHT: 290.4 LBS | HEART RATE: 76 BPM | DIASTOLIC BLOOD PRESSURE: 72 MMHG | HEIGHT: 74 IN | RESPIRATION RATE: 20 BRPM | TEMPERATURE: 98.8 F | BODY MASS INDEX: 37.27 KG/M2 | SYSTOLIC BLOOD PRESSURE: 138 MMHG

## 2023-11-20 DIAGNOSIS — L97.511 SKIN ULCER OF RIGHT GREAT TOE, LIMITED TO BREAKDOWN OF SKIN (HCC): ICD-10-CM

## 2023-11-20 PROCEDURE — 97597 DBRDMT OPN WND 1ST 20 CM/<: CPT

## 2023-11-20 PROCEDURE — 99213 OFFICE O/P EST LOW 20 MIN: CPT | Performed by: PODIATRIST

## 2023-11-20 PROCEDURE — 97597 DBRDMT OPN WND 1ST 20 CM/<: CPT | Performed by: PODIATRIST

## 2023-11-20 PROCEDURE — 99213 OFFICE O/P EST LOW 20 MIN: CPT

## 2023-11-20 RX ORDER — LIDOCAINE HYDROCHLORIDE 40 MG/ML
SOLUTION TOPICAL ONCE
OUTPATIENT
Start: 2023-11-20 | End: 2023-11-20

## 2023-11-20 RX ORDER — LIDOCAINE 50 MG/G
OINTMENT TOPICAL ONCE
OUTPATIENT
Start: 2023-11-20 | End: 2023-11-20

## 2023-11-20 RX ORDER — LIDOCAINE HYDROCHLORIDE 20 MG/ML
JELLY TOPICAL ONCE
OUTPATIENT
Start: 2023-11-20 | End: 2023-11-20

## 2023-11-20 RX ORDER — BACITRACIN ZINC AND POLYMYXIN B SULFATE 500; 1000 [USP'U]/G; [USP'U]/G
OINTMENT TOPICAL ONCE
OUTPATIENT
Start: 2023-11-20 | End: 2023-11-20

## 2023-11-20 RX ORDER — SODIUM CHLOR/HYPOCHLOROUS ACID 0.033 %
SOLUTION, IRRIGATION IRRIGATION ONCE
OUTPATIENT
Start: 2023-11-20 | End: 2023-11-20

## 2023-11-20 RX ORDER — IBUPROFEN 200 MG
TABLET ORAL ONCE
OUTPATIENT
Start: 2023-11-20 | End: 2023-11-20

## 2023-11-20 RX ORDER — LIDOCAINE 40 MG/G
CREAM TOPICAL ONCE
OUTPATIENT
Start: 2023-11-20 | End: 2023-11-20

## 2023-11-20 RX ORDER — BETAMETHASONE DIPROPIONATE 0.5 MG/G
CREAM TOPICAL ONCE
OUTPATIENT
Start: 2023-11-20 | End: 2023-11-20

## 2023-11-20 RX ORDER — GINSENG 100 MG
CAPSULE ORAL ONCE
OUTPATIENT
Start: 2023-11-20 | End: 2023-11-20

## 2023-11-20 RX ORDER — TRIAMCINOLONE ACETONIDE 1 MG/G
OINTMENT TOPICAL ONCE
OUTPATIENT
Start: 2023-11-20 | End: 2023-11-20

## 2023-11-20 RX ORDER — CLOBETASOL PROPIONATE 0.5 MG/G
OINTMENT TOPICAL ONCE
OUTPATIENT
Start: 2023-11-20 | End: 2023-11-20

## 2023-11-20 RX ORDER — GENTAMICIN SULFATE 1 MG/G
OINTMENT TOPICAL ONCE
OUTPATIENT
Start: 2023-11-20 | End: 2023-11-20

## 2023-11-20 ASSESSMENT — PAIN SCALES - GENERAL: PAINLEVEL_OUTOF10: 0

## 2023-11-20 NOTE — PROGRESS NOTES
Subjective:    Arsenio Wyatt is a 77 y.o. male who presents for an ulceration of the foot/ankle/leg. Patient has a wound which is located on the  toes right, great toe. The wound has been present for2 month(s). Patient relates pain is Absent . Pain is rated 0 out of 10 and is described as none. Treatments prior to today's visit include: soaking. Currently denies F/C/N/V. No Known Allergies    Past Medical History:   Diagnosis Date    Acute respiratory failure with hypoxia ([address]) 12/9/2021    Adenomatous polyp     history of     Cholecystitis 3/19/2019    Chronic venous insufficiency     COPD with acute exacerbation ([address]) 12/9/2021    DVT (deep venous thrombosis) (HCC)     history of     Edema     related to venous stasis     Gout     history of     Hypertension     Onychomycosis     Osteoarthritis     Plantar fasciitis     Pneumonia due to COVID-19 virus 12/8/2021    Tobacco abuse        Prior to Admission medications    Medication Sig Start Date End Date Taking?  Authorizing Provider   sertraline (ZOLOFT) 100 MG tablet Take 1 tablet by mouth once daily 11/7/23   Bonifacio Drake,    midodrine (PROAMATINE) 5 MG tablet TAKE 1 TABLET BY MOUTH THREE TIMES DAILY 11/7/23   Bonifacio Drake, DO   potassium chloride (KLOR-CON) 10 MEQ extended release tablet Take 1 tablet by mouth once daily 10/31/23   Bonifacio Drake,    allopurinol (ZYLOPRIM) 300 MG tablet Take 1 tablet by mouth once daily 10/31/23   Bonifacio Drake,    levothyroxine (SYNTHROID) 100 MCG tablet Take 1 tablet by mouth once daily 10/31/23   Bonifacio Drake,    atorvastatin (LIPITOR) 40 MG tablet Take 1 tablet by mouth once daily 10/31/23   Bonifacio Drake,    furosemide (LASIX) 20 MG tablet TAKE 1 TABLET BY MOUTH ONCE DAILY IN THE MORNING 8/24/23   Bonifacio Drake,    traZODone (DESYREL) 50 MG tablet Take 1 tablet by mouth nightly 8/24/23   Bonifacio Drake, DO   buPROPion (WELLBUTRIN) 75 MG

## 2023-11-20 NOTE — DISCHARGE INSTRUCTIONS
Follow up as scheduled with Dr. Kinjal Peña in wound care. Place silvadene cream to right great toe wound. Cover with dry dressing. Change dressing daily. Wear surgical shoe when walking. SIGNS OF INFECTION  - Redness, swelling, skin hot  - Wound bed turns black or stringy yellow  - Foul odor  - Increased drainage or pus  - Increased pain  - Fever greater than 100F    CALL YOUR DOCTOR OR SEEK MEDICAL ATTENTION IF SIGNS OF INFECTION.   DO NOT WAIT UNTIL YOUR NEXT APPOINTMENT    Call the Wound Care Nurse with any other questions or concerns- [phone]

## 2023-11-25 ASSESSMENT — ENCOUNTER SYMPTOMS
BLOOD IN STOOL: 0
VOMITING: 0
NAUSEA: 0
TROUBLE SWALLOWING: 0
SHORTNESS OF BREATH: 0
COUGH: 0
EYE PAIN: 0
DIARRHEA: 0
ABDOMINAL PAIN: 0
CONSTIPATION: 0

## 2023-11-27 ENCOUNTER — HOSPITAL ENCOUNTER (OUTPATIENT)
Dept: WOUND CARE | Age: 78
Discharge: HOME | End: 2023-11-28
Payer: MEDICARE

## 2023-11-27 VITALS
RESPIRATION RATE: 18 BRPM | TEMPERATURE: 98.2 F | DIASTOLIC BLOOD PRESSURE: 84 MMHG | HEART RATE: 70 BPM | BODY MASS INDEX: 37.22 KG/M2 | SYSTOLIC BLOOD PRESSURE: 130 MMHG | HEIGHT: 74 IN | WEIGHT: 290 LBS

## 2023-11-27 DIAGNOSIS — L97.511 SKIN ULCER OF RIGHT GREAT TOE, LIMITED TO BREAKDOWN OF SKIN (HCC): Primary | ICD-10-CM

## 2023-11-27 PROCEDURE — 97597 DBRDMT OPN WND 1ST 20 CM/<: CPT

## 2023-11-27 PROCEDURE — 97597 DBRDMT OPN WND 1ST 20 CM/<: CPT | Performed by: PODIATRIST

## 2023-11-27 RX ORDER — GINSENG 100 MG
CAPSULE ORAL ONCE
OUTPATIENT
Start: 2023-11-27 | End: 2023-11-27

## 2023-11-27 RX ORDER — IBUPROFEN 200 MG
TABLET ORAL ONCE
OUTPATIENT
Start: 2023-11-27 | End: 2023-11-27

## 2023-11-27 RX ORDER — LIDOCAINE 50 MG/G
OINTMENT TOPICAL ONCE
OUTPATIENT
Start: 2023-11-27 | End: 2023-11-27

## 2023-11-27 RX ORDER — LIDOCAINE HYDROCHLORIDE 20 MG/ML
JELLY TOPICAL ONCE
OUTPATIENT
Start: 2023-11-27 | End: 2023-11-27

## 2023-11-27 RX ORDER — SODIUM CHLOR/HYPOCHLOROUS ACID 0.033 %
SOLUTION, IRRIGATION IRRIGATION ONCE
OUTPATIENT
Start: 2023-11-27 | End: 2023-11-27

## 2023-11-27 RX ORDER — BACITRACIN ZINC AND POLYMYXIN B SULFATE 500; 1000 [USP'U]/G; [USP'U]/G
OINTMENT TOPICAL ONCE
OUTPATIENT
Start: 2023-11-27 | End: 2023-11-27

## 2023-11-27 RX ORDER — BETAMETHASONE DIPROPIONATE 0.5 MG/G
CREAM TOPICAL ONCE
OUTPATIENT
Start: 2023-11-27 | End: 2023-11-27

## 2023-11-27 RX ORDER — LIDOCAINE HYDROCHLORIDE 40 MG/ML
SOLUTION TOPICAL ONCE
OUTPATIENT
Start: 2023-11-27 | End: 2023-11-27

## 2023-11-27 RX ORDER — GENTAMICIN SULFATE 1 MG/G
OINTMENT TOPICAL ONCE
OUTPATIENT
Start: 2023-11-27 | End: 2023-11-27

## 2023-11-27 RX ORDER — TRIAMCINOLONE ACETONIDE 1 MG/G
OINTMENT TOPICAL ONCE
OUTPATIENT
Start: 2023-11-27 | End: 2023-11-27

## 2023-11-27 RX ORDER — CLOBETASOL PROPIONATE 0.5 MG/G
OINTMENT TOPICAL ONCE
OUTPATIENT
Start: 2023-11-27 | End: 2023-11-27

## 2023-11-27 RX ORDER — LIDOCAINE 40 MG/G
CREAM TOPICAL ONCE
OUTPATIENT
Start: 2023-11-27 | End: 2023-11-27

## 2023-11-27 ASSESSMENT — PAIN SCALES - GENERAL: PAINLEVEL_OUTOF10: 0

## 2023-11-27 NOTE — PATIENT INSTRUCTIONS
Apply silvadene cream daily and cover. Wear surgical shoe. SIGNS OF INFECTION  - Redness, swelling, skin hot  - Wound bed turns black or stringy yellow  - Foul odor  - Increased drainage or pus  - Increased pain  - Fever greater than 100F    CALL YOUR DOCTOR OR SEEK MEDICAL ATTENTION IF SIGNS OF INFECTION.   DO NOT WAIT UNTIL YOUR NEXT APPOINTMENT    Call the Wound Care Nurse with any other questions or concerns- [phone]

## 2023-11-27 NOTE — PROGRESS NOTES
Subjective:    Tiara Arita is a 77 y.o. male who presents for an ulceration of the R hallux. Pt has offloaded. No issues with dressings at home. Currently denies F/C/N/V. No Known Allergies    Past Medical History:   Diagnosis Date    Acute respiratory failure with hypoxia ([address]) 12/9/2021    Adenomatous polyp     history of     Cholecystitis 3/19/2019    Chronic venous insufficiency     COPD with acute exacerbation ([address]) 12/9/2021    DVT (deep venous thrombosis) (HCC)     history of     Edema     related to venous stasis     Gout     history of     Hypertension     Onychomycosis     Osteoarthritis     Plantar fasciitis     Pneumonia due to COVID-19 virus 12/8/2021    Tobacco abuse        Prior to Admission medications    Medication Sig Start Date End Date Taking? Authorizing Provider   silver sulfADIAZINE (SILVADENE) 1 % cream Apply topically daily.  11/20/23   Terrie Barragan DPM   sertraline (ZOLOFT) 100 MG tablet Take 1 tablet by mouth once daily 11/7/23   Bonifacio Drake,    midodrine (PROAMATINE) 5 MG tablet TAKE 1 TABLET BY MOUTH THREE TIMES DAILY 11/7/23   Bonifacio Drake, DO   potassium chloride (KLOR-CON) 10 MEQ extended release tablet Take 1 tablet by mouth once daily 10/31/23   Bonifacio Drake,    allopurinol (ZYLOPRIM) 300 MG tablet Take 1 tablet by mouth once daily 10/31/23   Bonifacio Drake,    levothyroxine (SYNTHROID) 100 MCG tablet Take 1 tablet by mouth once daily 10/31/23   Bonifacio Drake,    atorvastatin (LIPITOR) 40 MG tablet Take 1 tablet by mouth once daily 10/31/23   Bonifacio Drake, DO   furosemide (LASIX) 20 MG tablet TAKE 1 TABLET BY MOUTH ONCE DAILY IN THE MORNING 8/24/23   Bonifacio Drake, DO   traZODone (DESYREL) 50 MG tablet Take 1 tablet by mouth nightly 8/24/23   Bonifacio Drake, DO   buPROPion (WELLBUTRIN) 75 MG tablet Take 1 tablet by mouth twice daily 8/8/23   Bonifacio Drake, DO   oxybutynin

## 2023-11-29 ENCOUNTER — OFFICE VISIT (OUTPATIENT)
Dept: PODIATRY | Age: 78
End: 2023-11-29
Payer: MEDICARE

## 2023-11-29 VITALS
BODY MASS INDEX: 36.96 KG/M2 | DIASTOLIC BLOOD PRESSURE: 80 MMHG | SYSTOLIC BLOOD PRESSURE: 136 MMHG | WEIGHT: 288 LBS | HEART RATE: 82 BPM | HEIGHT: 74 IN

## 2023-11-29 DIAGNOSIS — I82.5Z3: ICD-10-CM

## 2023-11-29 DIAGNOSIS — B35.1 DERMATOPHYTOSIS OF NAIL: Primary | ICD-10-CM

## 2023-11-29 PROCEDURE — 11721 DEBRIDE NAIL 6 OR MORE: CPT | Performed by: PODIATRIST

## 2023-11-29 PROCEDURE — 99999 PR OFFICE/OUTPT VISIT,PROCEDURE ONLY: CPT | Performed by: PODIATRIST

## 2023-11-29 NOTE — PROGRESS NOTES
Foot Care Worksheet  PCP: Jillian Thompson DO  Last visit: 11/16/23    Nail description: Thick , Yellow , Crumbly , Marked limitation of ambulation     Pain resulting from thickened and dystrophy of nail plate Yes    Nails involved  Right   1, 2, 3, 4, 5  (T5-T9)  Left     1, 2, 3, 4, 5  (TA-T4)    Q7 1 Class A Finding - Non traumatic amputation of foot No    Q8 2 Class B Findings - Absent DP pulse No, Absent PT pulse No, Advanced tropic changes (3 required) Yes    Decrease hair growth Yes, Nail changes/thickening Yes, Pigmented changes/discoloration Yes, Skin texture (thin, shiny) No, Skin color (rubor/redness) No    Q9 1 Class B and 2 Class C Findings  Claudication No, Temperature change Yes, Paresthesia No, Burning No, Edema Yes    Subjective:  Patient [facility] to [facility] today for routine foot care. Pain at the nails also. No Known Allergies    Past Medical History:   Diagnosis Date    Acute respiratory failure with hypoxia ([address]) 12/9/2021    Adenomatous polyp     history of     Cholecystitis 3/19/2019    Chronic venous insufficiency     COPD with acute exacerbation ([address]) 12/9/2021    DVT (deep venous thrombosis) (HCC)     history of     Edema     related to venous stasis     Gout     history of     Hypertension     Onychomycosis     Osteoarthritis     Plantar fasciitis     Pneumonia due to COVID-19 virus 12/8/2021    Tobacco abuse        Prior to Admission medications    Medication Sig Start Date End Date Taking? Authorizing Provider   silver sulfADIAZINE (SILVADENE) 1 % cream Apply topically daily.  11/20/23  Yes Adelaide Velasquez DPM   sertraline (ZOLOFT) 100 MG tablet Take 1 tablet by mouth once daily 11/7/23  Yes Bonifacio Drake DO   midodrine (PROAMATINE) 5 MG tablet TAKE 1 TABLET BY MOUTH THREE TIMES DAILY 11/7/23  Yes Bonifacio Drake DO   potassium chloride (KLOR-CON) 10 MEQ extended release tablet Take 1 tablet by mouth once daily 10/31/23  Yes Jillian Thompson

## 2023-12-04 ENCOUNTER — OFFICE VISIT (OUTPATIENT)
Dept: CARDIOLOGY | Age: 78
End: 2023-12-04
Payer: OTHER GOVERNMENT

## 2023-12-04 ENCOUNTER — HOSPITAL ENCOUNTER (OUTPATIENT)
Dept: WOUND CARE | Age: 78
Discharge: HOME | End: 2023-12-05
Payer: MEDICARE

## 2023-12-04 VITALS
BODY MASS INDEX: 36.73 KG/M2 | WEIGHT: 286.2 LBS | OXYGEN SATURATION: 95 % | HEIGHT: 74 IN | DIASTOLIC BLOOD PRESSURE: 84 MMHG | HEART RATE: 84 BPM | SYSTOLIC BLOOD PRESSURE: 134 MMHG

## 2023-12-04 VITALS
HEIGHT: 74 IN | RESPIRATION RATE: 18 BRPM | WEIGHT: 286 LBS | TEMPERATURE: 97.2 F | BODY MASS INDEX: 36.7 KG/M2 | HEART RATE: 84 BPM

## 2023-12-04 DIAGNOSIS — L97.511 SKIN ULCER OF RIGHT GREAT TOE, LIMITED TO BREAKDOWN OF SKIN (HCC): Primary | ICD-10-CM

## 2023-12-04 DIAGNOSIS — I25.10 ATHEROSCLEROSIS OF NATIVE CORONARY ARTERY OF NATIVE HEART WITHOUT ANGINA PECTORIS: ICD-10-CM

## 2023-12-04 DIAGNOSIS — I44.7 LEFT BUNDLE BRANCH BLOCK: ICD-10-CM

## 2023-12-04 DIAGNOSIS — I25.10 CORONARY ARTERY DISEASE INVOLVING NATIVE CORONARY ARTERY OF NATIVE HEART WITHOUT ANGINA PECTORIS: Primary | ICD-10-CM

## 2023-12-04 DIAGNOSIS — Z95.1 S/P CABG (CORONARY ARTERY BYPASS GRAFT): ICD-10-CM

## 2023-12-04 PROCEDURE — 97597 DBRDMT OPN WND 1ST 20 CM/<: CPT

## 2023-12-04 PROCEDURE — 3075F SYST BP GE 130 - 139MM HG: CPT | Performed by: INTERNAL MEDICINE

## 2023-12-04 PROCEDURE — 93010 ELECTROCARDIOGRAM REPORT: CPT | Performed by: INTERNAL MEDICINE

## 2023-12-04 PROCEDURE — 97597 DBRDMT OPN WND 1ST 20 CM/<: CPT | Performed by: PODIATRIST

## 2023-12-04 PROCEDURE — 99214 OFFICE O/P EST MOD 30 MIN: CPT | Performed by: INTERNAL MEDICINE

## 2023-12-04 PROCEDURE — 3079F DIAST BP 80-89 MM HG: CPT | Performed by: INTERNAL MEDICINE

## 2023-12-04 PROCEDURE — 1123F ACP DISCUSS/DSCN MKR DOCD: CPT | Performed by: INTERNAL MEDICINE

## 2023-12-04 PROCEDURE — 93005 ELECTROCARDIOGRAM TRACING: CPT | Performed by: INTERNAL MEDICINE

## 2023-12-04 RX ORDER — BETAMETHASONE DIPROPIONATE 0.5 MG/G
CREAM TOPICAL ONCE
OUTPATIENT
Start: 2023-12-04 | End: 2023-12-04

## 2023-12-04 RX ORDER — SODIUM CHLOR/HYPOCHLOROUS ACID 0.033 %
SOLUTION, IRRIGATION IRRIGATION ONCE
OUTPATIENT
Start: 2023-12-04 | End: 2023-12-04

## 2023-12-04 RX ORDER — TRIAMCINOLONE ACETONIDE 1 MG/G
OINTMENT TOPICAL ONCE
OUTPATIENT
Start: 2023-12-04 | End: 2023-12-04

## 2023-12-04 RX ORDER — LIDOCAINE 40 MG/G
CREAM TOPICAL ONCE
OUTPATIENT
Start: 2023-12-04 | End: 2023-12-04

## 2023-12-04 RX ORDER — IBUPROFEN 200 MG
TABLET ORAL ONCE
OUTPATIENT
Start: 2023-12-04 | End: 2023-12-04

## 2023-12-04 RX ORDER — GENTAMICIN SULFATE 1 MG/G
OINTMENT TOPICAL ONCE
OUTPATIENT
Start: 2023-12-04 | End: 2023-12-04

## 2023-12-04 RX ORDER — LIDOCAINE HYDROCHLORIDE 40 MG/ML
SOLUTION TOPICAL ONCE
OUTPATIENT
Start: 2023-12-04 | End: 2023-12-04

## 2023-12-04 RX ORDER — LIDOCAINE 50 MG/G
OINTMENT TOPICAL ONCE
OUTPATIENT
Start: 2023-12-04 | End: 2023-12-04

## 2023-12-04 RX ORDER — LIDOCAINE HYDROCHLORIDE 20 MG/ML
JELLY TOPICAL ONCE
OUTPATIENT
Start: 2023-12-04 | End: 2023-12-04

## 2023-12-04 RX ORDER — CLOBETASOL PROPIONATE 0.5 MG/G
OINTMENT TOPICAL ONCE
OUTPATIENT
Start: 2023-12-04 | End: 2023-12-04

## 2023-12-04 RX ORDER — GINSENG 100 MG
CAPSULE ORAL ONCE
OUTPATIENT
Start: 2023-12-04 | End: 2023-12-04

## 2023-12-04 RX ORDER — BACITRACIN ZINC AND POLYMYXIN B SULFATE 500; 1000 [USP'U]/G; [USP'U]/G
OINTMENT TOPICAL ONCE
OUTPATIENT
Start: 2023-12-04 | End: 2023-12-04

## 2023-12-04 ASSESSMENT — PAIN SCALES - GENERAL: PAINLEVEL_OUTOF10: 0

## 2023-12-04 NOTE — PLAN OF CARE
Problem: Chronic Conditions and Co-morbidities  Goal: Patient's chronic conditions and co-morbidity symptoms are monitored and maintained or improved  Outcome: Progressing     Problem: Cognitive:  Goal: Knowledge of wound care  Description: Knowledge of wound care  Outcome: Progressing  Goal: Understands risk factors for wounds  Description: Understands risk factors for wounds  Outcome: Progressing     Problem: Wound:  Goal: Will show signs of wound healing; wound closure and no evidence of infection  Description: Will show signs of wound healing; wound closure and no evidence of infection  Outcome: Progressing     Problem: Pressure Ulcer:  Goal: Signs of wound healing will improve  Description: Signs of wound healing will improve  Outcome: Progressing  Goal: Absence of new pressure ulcer  Description: Absence of new pressure ulcer  Outcome: Progressing  Goal: Will show no infection signs and symptoms  Description: Will show no infection signs and symptoms  Outcome: Progressing     Problem: Weight control:  Goal: Ability to maintain an optimal weight for height and age will be supported  Description: Ability to maintain an optimal weight for height and age will be supported  Outcome: Progressing     Problem: Falls - Risk of:  Goal: Will remain free from falls  Description: Will remain free from falls  Outcome: Progressing

## 2023-12-04 NOTE — PROGRESS NOTES
smokeless tobacco. He reports current alcohol use. He reports that he does not use drugs. Family History: family history includes Heart Disease in his brother; Heart Surgery in his sister; Hypertension in an other family member; Kidney Disease in his brother; Other in his brother; Other (age of onset: 52) in his mother; Sudden Death in his father. No h/o sudden cardiac death. No for premature CAD    REVIEW OF SYSTEMS:    Constitutional: there has been no unanticipated weight loss. There's been No change in energy level, No change in activity level. Eyes: No visual changes or diplopia. No scleral icterus. ENT: No Headaches, hearing loss or vertigo. No mouth sores or sore throat. Cardiovascular: see above  Respiratory: see above  Gastrointestinal: No abdominal pain, appetite loss, blood in stools. Genitourinary: No dysuria, trouble voiding, or hematuria. Musculoskeletal:  No gait disturbance, No weakness or joint complaints. Integumentary: No rash or pruritis. Neurological: No headache or diplopia. No tingling  Psychiatric: No anxiety, or depression. Endocrine: No temperature intolerance. Hematologic/Lymphatic: No abnormal bruising or bleeding, blood clots or swollen lymph nodes. Allergic/Immunologic: No nasal congestion or hives. PHYSICAL EXAM:      Vitals:    12/04/23 0934   BP: 134/84   Pulse: 84   SpO2: 95%     Constitutional and General Appearance: alert, cooperative, no distress and appears stated age  HEENT: PERRL, no cervical lymphadenopathy. No masses palpable. Normal oral mucosa. Right eye s/p skin CA removal site with erythema but no drainage or open areas. Respiratory:  Normal excursion and expansion without use of accessory muscles  Resp Auscultation: Good respiratory effort. No for increased work of breathing. On auscultation: diminished to auscultation bilaterally  On chronic NC oxygen  Cardiovascular:  Heart tones are crisp and normal. regular S1 and S2.  S4 noted  Jugular venous

## 2023-12-04 NOTE — DISCHARGE INSTRUCTIONS
Continue silvadene to right great toe wound. Covering with band aid. Change daily. Continue offloading shoe. Follow up with Dr. Tariq Anders in 1 week as scheduled. SIGNS OF INFECTION  - Redness, swelling, skin hot  - Wound bed turns black or stringy yellow  - Foul odor  - Increased drainage or pus  - Increased pain  - Fever greater than 100F    CALL YOUR DOCTOR OR SEEK MEDICAL ATTENTION IF SIGNS OF INFECTION.   DO NOT WAIT UNTIL YOUR NEXT APPOINTMENT    Call the Wound Care Nurse with any other questions or concerns- [phone]

## 2023-12-04 NOTE — PROGRESS NOTES
Subjective:    Erica Gao is a 77 y.o. male who presents for an ulceration of the R hallux. Pt has wore sx shoe. Dressings changed as advised. . no pain. Currently denies F/C/N/V. No Known Allergies    Past Medical History:   Diagnosis Date    Acute respiratory failure with hypoxia ([address]) 12/9/2021    Adenomatous polyp     history of     Cholecystitis 3/19/2019    Chronic venous insufficiency     COPD with acute exacerbation ([address]) 12/9/2021    DVT (deep venous thrombosis) (HCC)     history of     Edema     related to venous stasis     Gout     history of     Hypertension     Onychomycosis     Osteoarthritis     Plantar fasciitis     Pneumonia due to COVID-19 virus 12/8/2021    Tobacco abuse        Prior to Admission medications    Medication Sig Start Date End Date Taking? Authorizing Provider   silver sulfADIAZINE (SILVADENE) 1 % cream Apply topically daily.  11/20/23   Rafia Leal DPM   sertraline (ZOLOFT) 100 MG tablet Take 1 tablet by mouth once daily 11/7/23   Bonifacio Drake,    midodrine (PROAMATINE) 5 MG tablet TAKE 1 TABLET BY MOUTH THREE TIMES DAILY 11/7/23   Bonifacio Drake, DO   potassium chloride (KLOR-CON) 10 MEQ extended release tablet Take 1 tablet by mouth once daily 10/31/23   Bonifacio Drake, DO   allopurinol (ZYLOPRIM) 300 MG tablet Take 1 tablet by mouth once daily 10/31/23   Bonifacio Drake,    levothyroxine (SYNTHROID) 100 MCG tablet Take 1 tablet by mouth once daily 10/31/23   Bonifacio Drake, DO   atorvastatin (LIPITOR) 40 MG tablet Take 1 tablet by mouth once daily 10/31/23   Bonifacio Drake, DO   furosemide (LASIX) 20 MG tablet TAKE 1 TABLET BY MOUTH ONCE DAILY IN THE MORNING 8/24/23   Bonifacio Drake, DO   traZODone (DESYREL) 50 MG tablet Take 1 tablet by mouth nightly 8/24/23   Bonifacio Drake, DO   buPROPion (WELLBUTRIN) 75 MG tablet Take 1 tablet by mouth twice daily 8/8/23   Bonifacio Drake, DO   oxybutynin

## 2023-12-08 DIAGNOSIS — F41.9 ANXIETY AND DEPRESSION: ICD-10-CM

## 2023-12-08 DIAGNOSIS — F32.A ANXIETY AND DEPRESSION: ICD-10-CM

## 2023-12-08 RX ORDER — SERTRALINE HYDROCHLORIDE 100 MG/1
100 TABLET, FILM COATED ORAL DAILY
Qty: 90 TABLET | Refills: 1 | Status: SHIPPED | OUTPATIENT
Start: 2023-12-08

## 2023-12-08 NOTE — TELEPHONE ENCOUNTER
Yao Borden called requesting a refill of the below medication which has been pended for you:     Requested Prescriptions     Pending Prescriptions Disp Refills    sertraline (ZOLOFT) 100 MG tablet [Pharmacy Med Name: Sertraline HCl 100 MG Oral Tablet] 90 tablet 1     Sig: Take 1 tablet by mouth once daily       Last Appointment Date: 11/16/2023  Next Appointment Date: 5/16/2024    No Known Allergies

## 2023-12-11 ENCOUNTER — HOSPITAL ENCOUNTER (OUTPATIENT)
Dept: WOUND CARE | Age: 78
Discharge: HOME | End: 2023-12-12
Payer: MEDICARE

## 2023-12-11 VITALS
WEIGHT: 286 LBS | BODY MASS INDEX: 36.7 KG/M2 | HEIGHT: 74 IN | RESPIRATION RATE: 16 BRPM | HEART RATE: 78 BPM | SYSTOLIC BLOOD PRESSURE: 124 MMHG | DIASTOLIC BLOOD PRESSURE: 70 MMHG | TEMPERATURE: 97.3 F

## 2023-12-11 DIAGNOSIS — L97.511 SKIN ULCER OF RIGHT GREAT TOE, LIMITED TO BREAKDOWN OF SKIN (HCC): Primary | ICD-10-CM

## 2023-12-11 DIAGNOSIS — F51.01 PRIMARY INSOMNIA: ICD-10-CM

## 2023-12-11 PROCEDURE — 97597 DBRDMT OPN WND 1ST 20 CM/<: CPT

## 2023-12-11 PROCEDURE — 97597 DBRDMT OPN WND 1ST 20 CM/<: CPT | Performed by: PODIATRIST

## 2023-12-11 RX ORDER — SODIUM CHLOR/HYPOCHLOROUS ACID 0.033 %
SOLUTION, IRRIGATION IRRIGATION ONCE
OUTPATIENT
Start: 2023-12-11 | End: 2023-12-11

## 2023-12-11 RX ORDER — TRIAMCINOLONE ACETONIDE 1 MG/G
OINTMENT TOPICAL ONCE
OUTPATIENT
Start: 2023-12-11 | End: 2023-12-11

## 2023-12-11 RX ORDER — LIDOCAINE 40 MG/G
CREAM TOPICAL ONCE
OUTPATIENT
Start: 2023-12-11 | End: 2023-12-11

## 2023-12-11 RX ORDER — LIDOCAINE 50 MG/G
OINTMENT TOPICAL ONCE
OUTPATIENT
Start: 2023-12-11 | End: 2023-12-11

## 2023-12-11 RX ORDER — LIDOCAINE HYDROCHLORIDE 40 MG/ML
SOLUTION TOPICAL ONCE
OUTPATIENT
Start: 2023-12-11 | End: 2023-12-11

## 2023-12-11 RX ORDER — LIDOCAINE HYDROCHLORIDE 20 MG/ML
JELLY TOPICAL ONCE
OUTPATIENT
Start: 2023-12-11 | End: 2023-12-11

## 2023-12-11 RX ORDER — BETAMETHASONE DIPROPIONATE 0.5 MG/G
CREAM TOPICAL ONCE
OUTPATIENT
Start: 2023-12-11 | End: 2023-12-11

## 2023-12-11 RX ORDER — TRAZODONE HYDROCHLORIDE 50 MG/1
50 TABLET ORAL NIGHTLY
Qty: 90 TABLET | Refills: 0 | OUTPATIENT
Start: 2023-12-11

## 2023-12-11 RX ORDER — CLOBETASOL PROPIONATE 0.5 MG/G
OINTMENT TOPICAL ONCE
OUTPATIENT
Start: 2023-12-11 | End: 2023-12-11

## 2023-12-11 RX ORDER — GINSENG 100 MG
CAPSULE ORAL ONCE
OUTPATIENT
Start: 2023-12-11 | End: 2023-12-11

## 2023-12-11 RX ORDER — GENTAMICIN SULFATE 1 MG/G
OINTMENT TOPICAL ONCE
OUTPATIENT
Start: 2023-12-11 | End: 2023-12-11

## 2023-12-11 RX ORDER — BACITRACIN ZINC AND POLYMYXIN B SULFATE 500; 1000 [USP'U]/G; [USP'U]/G
OINTMENT TOPICAL ONCE
OUTPATIENT
Start: 2023-12-11 | End: 2023-12-11

## 2023-12-11 RX ORDER — IBUPROFEN 200 MG
TABLET ORAL ONCE
OUTPATIENT
Start: 2023-12-11 | End: 2023-12-11

## 2023-12-11 NOTE — PROGRESS NOTES
Subjective:    David Zavala is a 77 y.o. male who presents for an ulceration of the R hallux. Pt has offloaded as advised. Dressings changed as advised. Currently denies F/C/N/V. No Known Allergies    Past Medical History:   Diagnosis Date    Acute respiratory failure with hypoxia ([address]) 12/9/2021    Adenomatous polyp     history of     Cholecystitis 3/19/2019    Chronic venous insufficiency     COPD with acute exacerbation ([address]) 12/9/2021    DVT (deep venous thrombosis) (HCC)     history of     Edema     related to venous stasis     Gout     history of     Hypertension     Onychomycosis     Osteoarthritis     Plantar fasciitis     Pneumonia due to COVID-19 virus 12/8/2021    Tobacco abuse        Prior to Admission medications    Medication Sig Start Date End Date Taking? Authorizing Provider   sertraline (ZOLOFT) 100 MG tablet Take 1 tablet by mouth once daily 12/8/23   Bonifacio Drake,    silver sulfADIAZINE (SILVADENE) 1 % cream Apply topically daily.  11/20/23   Gabriellakerry Oilvia, DPM   midodrine (PROAMATINE) 5 MG tablet TAKE 1 TABLET BY MOUTH THREE TIMES DAILY 11/7/23   Bonifacio Drake,    potassium chloride (KLOR-CON) 10 MEQ extended release tablet Take 1 tablet by mouth once daily 10/31/23   Bonifacio Drake,    allopurinol (ZYLOPRIM) 300 MG tablet Take 1 tablet by mouth once daily 10/31/23   Bonifacio Drake,    levothyroxine (SYNTHROID) 100 MCG tablet Take 1 tablet by mouth once daily 10/31/23   Bonifacio Drake,    atorvastatin (LIPITOR) 40 MG tablet Take 1 tablet by mouth once daily 10/31/23   Bonifacio Drake,    furosemide (LASIX) 20 MG tablet TAKE 1 TABLET BY MOUTH ONCE DAILY IN THE MORNING 8/24/23   Bonifacio Drake,    traZODone (DESYREL) 50 MG tablet Take 1 tablet by mouth nightly 8/24/23   Bonifacio Drake,    buPROPion (WELLBUTRIN) 75 MG tablet Take 1 tablet by mouth twice daily 8/8/23   Bonifacio Drake, DO   oxybutynin

## 2023-12-13 ENCOUNTER — HOSPITAL ENCOUNTER (OUTPATIENT)
Age: 78
Discharge: HOME | End: 2023-12-15
Attending: INTERNAL MEDICINE
Payer: OTHER GOVERNMENT

## 2023-12-13 ENCOUNTER — TELEPHONE (OUTPATIENT)
Dept: CARDIOLOGY | Age: 78
End: 2023-12-13

## 2023-12-13 VITALS
DIASTOLIC BLOOD PRESSURE: 69 MMHG | BODY MASS INDEX: 36.7 KG/M2 | HEIGHT: 74 IN | WEIGHT: 286 LBS | HEART RATE: 87 BPM | SYSTOLIC BLOOD PRESSURE: 134 MMHG

## 2023-12-13 DIAGNOSIS — I25.10 CORONARY ARTERY DISEASE INVOLVING NATIVE CORONARY ARTERY OF NATIVE HEART WITHOUT ANGINA PECTORIS: ICD-10-CM

## 2023-12-13 DIAGNOSIS — I51.9 SEVERE LEFT VENTRICULAR SYSTOLIC DYSFUNCTION (LVSD): Primary | ICD-10-CM

## 2023-12-13 DIAGNOSIS — I25.10 ATHEROSCLEROSIS OF NATIVE CORONARY ARTERY OF NATIVE HEART WITHOUT ANGINA PECTORIS: ICD-10-CM

## 2023-12-13 DIAGNOSIS — I44.7 LEFT BUNDLE BRANCH BLOCK: ICD-10-CM

## 2023-12-13 DIAGNOSIS — Z95.1 S/P CABG (CORONARY ARTERY BYPASS GRAFT): ICD-10-CM

## 2023-12-13 LAB
ECHO AO ASC DIAM: 3.6 CM
ECHO AO ASCENDING AORTA INDEX: 1.42 CM/M2
ECHO AO ROOT DIAM: 3.6 CM
ECHO AO ROOT INDEX: 1.42 CM/M2
ECHO AV AREA PEAK VELOCITY: 2.3 CM2
ECHO AV AREA/BSA PEAK VELOCITY: 0.9 CM2/M2
ECHO AV PEAK GRADIENT: 4 MMHG
ECHO AV PEAK VELOCITY: 1 M/S
ECHO AV VELOCITY RATIO: 0.7
ECHO BSA: 2.6 M2
ECHO EST RA PRESSURE: 3 MMHG
ECHO LA AREA 4C: 23.7 CM2
ECHO LA DIAMETER INDEX: 2.06 CM/M2
ECHO LA DIAMETER: 5.2 CM
ECHO LA MAJOR AXIS: 5.9 CM
ECHO LA TO AORTIC ROOT RATIO: 1.44
ECHO LA VOL MOD A4C: 82 ML (ref 18–58)
ECHO LA VOLUME INDEX MOD A4C: 32 ML/M2 (ref 16–34)
ECHO LV EDV A2C: 153 ML
ECHO LV EDV A4C: 201 ML
ECHO LV EDV INDEX A4C: 79 ML/M2
ECHO LV EDV NDEX A2C: 60 ML/M2
ECHO LV EJECTION FRACTION A2C: 43 %
ECHO LV EJECTION FRACTION A4C: 38 %
ECHO LV ESV A2C: 87 ML
ECHO LV ESV A4C: 125 ML
ECHO LV ESV INDEX A2C: 34 ML/M2
ECHO LV ESV INDEX A4C: 49 ML/M2
ECHO LV FRACTIONAL SHORTENING: 9 % (ref 28–44)
ECHO LV INTERNAL DIMENSION DIASTOLE INDEX: 2.25 CM/M2
ECHO LV INTERNAL DIMENSION DIASTOLIC: 5.7 CM (ref 4.2–5.9)
ECHO LV INTERNAL DIMENSION SYSTOLIC INDEX: 2.06 CM/M2
ECHO LV INTERNAL DIMENSION SYSTOLIC: 5.2 CM
ECHO LV ISOVOLUMETRIC RELAXATION TIME (IVRT): 85 MS
ECHO LV IVSD: 1.1 CM (ref 0.6–1)
ECHO LV MASS 2D: 256.7 G (ref 88–224)
ECHO LV MASS INDEX 2D: 101.5 G/M2 (ref 49–115)
ECHO LV POSTERIOR WALL DIASTOLIC: 1.1 CM (ref 0.6–1)
ECHO LV RELATIVE WALL THICKNESS RATIO: 0.39
ECHO LVOT AREA: 3.5 CM2
ECHO LVOT DIAM: 2.1 CM
ECHO LVOT PEAK GRADIENT: 2 MMHG
ECHO LVOT PEAK VELOCITY: 0.7 M/S
ECHO MV MAX VELOCITY: 1.1 M/S
ECHO MV PEAK GRADIENT: 5 MMHG
ECHO PV MAX VELOCITY: 0.7 M/S
ECHO PV PEAK GRADIENT: 2 MMHG
ECHO RIGHT VENTRICULAR SYSTOLIC PRESSURE (RVSP): 18 MMHG
ECHO TV PEAK GRADIENT: 3 MMHG
ECHO TV REGURGITANT MAX VELOCITY: 1.95 M/S
ECHO TV REGURGITANT PEAK GRADIENT: 15 MMHG

## 2023-12-13 PROCEDURE — 93306 TTE W/DOPPLER COMPLETE: CPT | Performed by: INTERNAL MEDICINE

## 2023-12-13 PROCEDURE — 93306 TTE W/DOPPLER COMPLETE: CPT

## 2023-12-13 NOTE — PROGRESS NOTES
Subjective:      Patient ID: Noah Jauregui is a 78 y.o. male.     HPI  Patient here for history of COVID, critical illness polyneuropathy and restrictive lung disease secondary to obesity. Last seen in office per me in July 2023 and Dr. Celaya in January 2023    Since patient was last seen in the office he underwent a echocardiogram  12/13/2023 which noted a drop in LVEF to 20-25% from previous measurements of 40% in 2021.  Patient had severe hypokinesis globally with no significant valvular disease.  He was recommended for a left heart cath completed on 12/22/2023 which noted multivessel coronary artery disease with a patent SVG-OM1, patent SVG-OM 2 and patent SVG-RPDA grafts with mild nonobstructive disease in the LAD/diagonal with reduction in LV systolic function.  Recommendation is aggressive risk factor modification and optimal medical management.  Plan to repeat echocardiogram in 3 months.    He continues to work on riding his bike when the weather is amenable.  He has a 3 wheeled trike.  He is endorsing calf pain in his lower extremities which improves with rest.  Denies any significant changes in his breathing, endorses exertional dyspnea, no significant cough or sputum productivity.    Medications:   No pulmonary meds      Smoking status:  Former smoker quit in 2014 with a 55-pack-year smoking history.    PRIOR WORKUP:  PFT:  6-minute walk test completed 7/12/2023: Patient's SPO2 was 90% on room air.  Did not qualify for supplemental oxygen, patient ambulated 700 feet     6-minute walk test 7/13/2022: Patient unable to walk a full 6 minutes, he stood and ambulated in the hallway approximately 2-3 steps and desatted down to the mid 80s lowest was 85%.  On room air.  He does qualify for supplemental oxygen with activity.  Patient has orders for oxygen at 2 to 4 L/min around-the-clock with activity, and nocturnally.  Okay to be off as long as his SPO2 is above 88% at rest     PFT 5/11/2022: Spirometry

## 2023-12-13 NOTE — TELEPHONE ENCOUNTER
Message  Received: Today  Rob Evans DO P [facility]  Test reviewed,  Has severe LV dysfunction, prior LVEF 40%, now 25%. I recommend Cardiac cath, if not agreeable, then stress test.           Contains abnormal data Echo (TTE) complete (PRN contrast/bubble/strain/3D)  Order: [id number]  Status: Final result       Visible to patient: No (not released)       Dx: Left bundle branch block; S/P CABG (c...    1 Result Note  Details    Reading Physician Reading Date Result Priority   Pablo Montiel MD  [phone] 12/13/2023 Routine     Result Text       Left Ventricle: Severely reduced left ventricular systolic function with a visually estimated EF of 20 - 25%. Left ventricle is dilated. Mildly increased wall thickness. Severe global hypokinesis present. Left Atrium: Left atrium is dilated. Right Atrium: Right atrium is dilated. Right ventricle size is normal. Normal systolic function    No significant valve abnormalities.

## 2023-12-13 NOTE — TELEPHONE ENCOUNTER
The following orders will be implemented. Orders with a [] are choices and are NOT implemented unless the box is checked. Pre-Procedure Orders    Mert Session     1945     Diagnosis: Severe LV dysfunction      Procedure scheduled for:   Procedure ordered was discussed with the patient  including risks, benefits and alternative therapies. [] Left Heart Catheterization and coronary angiography    [x] Left Heart Catheterization and coronary angiography w/possible PCI/Coronary Stent PCI    [] Right Heart Catheterization  [] With Flolan  [] Aortic Root    [] SONI  [] Cardioversion    [] EPS  [] Tilt Table Study per guidelines  [] With Isuprel  [] With NTG    [] Without medication    [] ILR[]    ICD: [] Replacement  [] New implant  [] Lead Extraction    Permanent Pacemaker:  [] Replacement  [] New Implant    [] Lead Extraction    Irrigation:  [] Vancomycin 1 gram ( Check cara for PPM/ICD)    Ablation:   [] CRYO  [] SVT  [] A-Fib  [] A-Flutter  [] PVC  [] VT    [] With Anesthesia  [] W/O Anesthesia [] With SUKHWINDER  [] W/O SUKHWINDER     [] With CARTO  [] W/O CARTO    ORDERS    [x] EKG   [x] Point of care testing  [] Urine Pregnancy  [] PLT    [x] PT/INR if on Coumadin  [x] NA, K, Glucose, CL, Creat, Calc, HBG/HCT    IV:  [x] Start Peripheral IV: [x] N/S at  75   ml/hr. [] D5 1/2 N/S at     ml/hr. Patient has history of contrast reaction; give below meds as prophylaxis 30-60 min prior to procedure:    [] Benadryl (diphenhydramine) 25 mg IV x 1 dose    [] Benadryl (diphenhydramine) 50 mg IV x 1 dose    [] Solu-Medrol (methylprednisolone) 125 mg IV x 1 dose    [] Hydrocortisone (SOLU-CORTEF) 100 mg. IV x 1 dose    [] Lidocaine 1% 0.4 ml subq for IV start    Electronically signed by provider ____  Margy Sierra DO, [facility], [address] [facility]  [facility]. [facility]  [phone]

## 2023-12-21 NOTE — H&P
[facility]  Procedure History and Physical Update          Patient Name: Mert Garner  MRN:    [id number]  YOB: 1945  Date of evaluation:  12/21/2023    Procedure:    Cardiac cath +/- PCI    Indication for procedure:  Abnormal Echo with low EF 25 drop from previous 40      Please refer to the office note completed by Dr. Juanjo Hallman on 12/4 in the medical record and note that:    [x] I have examined the patient and reviewed the H&P/Consult and there are no changes to be made to the assessment or plan.     [] I have examined the patient and reviewed the H&P/Consult and have noted the following changes:    Past Medical History:   Diagnosis Date    Acute respiratory failure with hypoxia ([address]) 12/9/2021    Adenomatous polyp     history of     Cholecystitis 3/19/2019    Chronic venous insufficiency     COPD with acute exacerbation ([address]) 12/9/2021    DVT (deep venous thrombosis) ([address])     history of     Edema     related to venous stasis     Gout     history of     Hypertension     Onychomycosis     Osteoarthritis     Plantar fasciitis     Pneumonia due to COVID-19 virus 12/8/2021    Tobacco abuse        Past Surgical History:   Procedure Laterality Date    CATARACT REMOVAL Right 06/07/2023    [facility] Dr. Gonzales Thomas  03/19/2019    COLONOSCOPY  12/12/2012    polyp    COLONOSCOPY  2003    adenomatous polyps     COLONOSCOPY  08/2009    COLONOSCOPY  06/2008    with polypectomy    CORONARY ARTERY BYPASS GRAFT  10/28/2014    SVG-OM1, SVG-OM2, and SVG-PDA    OTHER SURGICAL HISTORY      teeth extracted     TOTAL KNEE ARTHROPLASTY Right 10/2015    TOTAL KNEE ARTHROPLASTY Left 03/2018    TURP      VASECTOMY  1980       Family History   Problem Relation Age of Onset    Other Mother 52        Rheumatic fever    Kidney Disease Brother     Heart Disease Brother     Sudden Death Father         auto accident     Heart Surgery Sister         bypass surgery    Other Brother         abdominal

## 2023-12-22 ENCOUNTER — HOSPITAL ENCOUNTER (OUTPATIENT)
Age: 78
Setting detail: OUTPATIENT SURGERY
Discharge: HOME | End: 2023-12-22
Attending: INTERNAL MEDICINE | Admitting: INTERNAL MEDICINE
Payer: MEDICARE

## 2023-12-22 VITALS
TEMPERATURE: 97.1 F | HEART RATE: 75 BPM | SYSTOLIC BLOOD PRESSURE: 141 MMHG | DIASTOLIC BLOOD PRESSURE: 76 MMHG | OXYGEN SATURATION: 92 % | RESPIRATION RATE: 22 BRPM

## 2023-12-22 DIAGNOSIS — R93.1 ABNORMAL ECHOCARDIOGRAM: ICD-10-CM

## 2023-12-22 LAB
BUN BLD-MCNC: 15 MG/DL (ref 8–26)
CHLORIDE BLD-SCNC: 106 MMOL/L (ref 98–107)
EGFR, POC: >60 ML/MIN/1.73M2
GLUCOSE BLD-MCNC: 94 MG/DL (ref 74–100)
HCT VFR BLD AUTO: 53 % (ref 41–53)
PLATELET # BLD AUTO: 166 K/UL (ref 138–453)
POC CREATININE: 1 MG/DL (ref 0.51–1.19)
POC HEMOGLOBIN (CALC): 18 G/DL (ref 13.5–17.5)
POTASSIUM BLD-SCNC: 4.4 MMOL/L (ref 3.5–4.5)
SODIUM BLD-SCNC: 143 MMOL/L (ref 138–146)

## 2023-12-22 PROCEDURE — 82565 ASSAY OF CREATININE: CPT

## 2023-12-22 PROCEDURE — 85049 AUTOMATED PLATELET COUNT: CPT

## 2023-12-22 PROCEDURE — 82947 ASSAY GLUCOSE BLOOD QUANT: CPT

## 2023-12-22 PROCEDURE — 6360000002 HC RX W HCPCS: Performed by: INTERNAL MEDICINE

## 2023-12-22 PROCEDURE — 84132 ASSAY OF SERUM POTASSIUM: CPT

## 2023-12-22 PROCEDURE — 82435 ASSAY OF BLOOD CHLORIDE: CPT

## 2023-12-22 PROCEDURE — 84295 ASSAY OF SERUM SODIUM: CPT

## 2023-12-22 PROCEDURE — 2709999900 HC NON-CHARGEABLE SUPPLY: Performed by: INTERNAL MEDICINE

## 2023-12-22 PROCEDURE — 2500000003 HC RX 250 WO HCPCS: Performed by: INTERNAL MEDICINE

## 2023-12-22 PROCEDURE — 93459 L HRT ART/GRFT ANGIO: CPT | Performed by: INTERNAL MEDICINE

## 2023-12-22 PROCEDURE — 7100000010 HC PHASE II RECOVERY - FIRST 15 MIN: Performed by: INTERNAL MEDICINE

## 2023-12-22 PROCEDURE — C1892 INTRO/SHEATH,FIXED,PEEL-AWAY: HCPCS | Performed by: INTERNAL MEDICINE

## 2023-12-22 PROCEDURE — 2580000003 HC RX 258: Performed by: INTERNAL MEDICINE

## 2023-12-22 PROCEDURE — 99152 MOD SED SAME PHYS/QHP 5/>YRS: CPT | Performed by: INTERNAL MEDICINE

## 2023-12-22 PROCEDURE — 85014 HEMATOCRIT: CPT

## 2023-12-22 PROCEDURE — 7100000011 HC PHASE II RECOVERY - ADDTL 15 MIN: Performed by: INTERNAL MEDICINE

## 2023-12-22 PROCEDURE — 84520 ASSAY OF UREA NITROGEN: CPT

## 2023-12-22 PROCEDURE — 6360000004 HC RX CONTRAST MEDICATION: Performed by: INTERNAL MEDICINE

## 2023-12-22 PROCEDURE — C1769 GUIDE WIRE: HCPCS | Performed by: INTERNAL MEDICINE

## 2023-12-22 PROCEDURE — C1894 INTRO/SHEATH, NON-LASER: HCPCS | Performed by: INTERNAL MEDICINE

## 2023-12-22 RX ORDER — MIDAZOLAM HYDROCHLORIDE 1 MG/ML
INJECTION INTRAMUSCULAR; INTRAVENOUS PRN
Status: DISCONTINUED | OUTPATIENT
Start: 2023-12-22 | End: 2023-12-22 | Stop reason: HOSPADM

## 2023-12-22 RX ORDER — ASPIRIN 81 MG/1
81 TABLET ORAL DAILY
Qty: 90 TABLET | Refills: 1 | Status: SHIPPED | OUTPATIENT
Start: 2023-12-22

## 2023-12-22 RX ORDER — SODIUM CHLORIDE 9 MG/ML
INJECTION, SOLUTION INTRAVENOUS CONTINUOUS
Status: DISCONTINUED | OUTPATIENT
Start: 2023-12-22 | End: 2023-12-22 | Stop reason: HOSPADM

## 2023-12-22 RX ORDER — FENTANYL CITRATE 50 UG/ML
INJECTION, SOLUTION INTRAMUSCULAR; INTRAVENOUS PRN
Status: DISCONTINUED | OUTPATIENT
Start: 2023-12-22 | End: 2023-12-22 | Stop reason: HOSPADM

## 2023-12-22 RX ORDER — MULTIVIT-MIN/IRON/FOLIC ACID/K 18-600-40
1000 CAPSULE ORAL DAILY
COMMUNITY

## 2023-12-22 RX ADMIN — SODIUM CHLORIDE: 9 INJECTION, SOLUTION INTRAVENOUS at 11:00

## 2023-12-22 NOTE — PROGRESS NOTES
Received post cardiac cath procedure to [facility] room 7. Assessment obtained. Restrictions reviewed with patient. Post procedure pathway initiated. Rt. groin site soft , dressing  dry and intact. No hematoma noted. Family at side. Patient without complaints. Head of bed flat with Rt. leg straight.

## 2023-12-22 NOTE — PROGRESS NOTES
Patient admitted, consent signed and questions answered. Patient ready for procedure. Call light to reach with side rails up 2 of 2. B/L Groin clipped with writer and Davida Ruelas present. Family at bedside with patient. History and physical completed.

## 2023-12-22 NOTE — PROGRESS NOTES
All questions answered. IV removed. Pt ambulatory and urinated. Writer will discharge pt via wheelchair with all belongings.

## 2023-12-22 NOTE — PROGRESS NOTES
Site clean dry and intact. No hematoma or bleeding. Pt remains flat until 1530. Will cont. To monitor. DC papers printed and given to patient. All questions answered. Will cont. To monitor.

## 2024-01-10 ENCOUNTER — OFFICE VISIT (OUTPATIENT)
Dept: PULMONOLOGY | Age: 79
End: 2024-01-10
Payer: OTHER GOVERNMENT

## 2024-01-10 VITALS
WEIGHT: 290 LBS | BODY MASS INDEX: 37.22 KG/M2 | TEMPERATURE: 97 F | HEIGHT: 74 IN | OXYGEN SATURATION: 93 % | RESPIRATION RATE: 18 BRPM | DIASTOLIC BLOOD PRESSURE: 68 MMHG | HEART RATE: 75 BPM | SYSTOLIC BLOOD PRESSURE: 118 MMHG

## 2024-01-10 DIAGNOSIS — I50.42 CHRONIC COMBINED SYSTOLIC AND DIASTOLIC CHF (CONGESTIVE HEART FAILURE) (HCC): ICD-10-CM

## 2024-01-10 DIAGNOSIS — E66.9 RESTRICTIVE LUNG DISEASE SECONDARY TO OBESITY: ICD-10-CM

## 2024-01-10 DIAGNOSIS — I51.89 COMBINED SYSTOLIC AND DIASTOLIC CARDIAC DYSFUNCTION: ICD-10-CM

## 2024-01-10 DIAGNOSIS — J43.9 PULMONARY EMPHYSEMA, UNSPECIFIED EMPHYSEMA TYPE (HCC): ICD-10-CM

## 2024-01-10 DIAGNOSIS — J98.4 RESTRICTIVE LUNG DISEASE SECONDARY TO OBESITY: ICD-10-CM

## 2024-01-10 DIAGNOSIS — J96.11 CHRONIC RESPIRATORY FAILURE WITH HYPOXIA, ON HOME O2 THERAPY (HCC): Primary | ICD-10-CM

## 2024-01-10 DIAGNOSIS — Z99.81 CHRONIC RESPIRATORY FAILURE WITH HYPOXIA, ON HOME O2 THERAPY (HCC): Primary | ICD-10-CM

## 2024-01-10 PROCEDURE — 3074F SYST BP LT 130 MM HG: CPT | Performed by: NURSE PRACTITIONER

## 2024-01-10 PROCEDURE — 99214 OFFICE O/P EST MOD 30 MIN: CPT | Performed by: NURSE PRACTITIONER

## 2024-01-10 PROCEDURE — 3078F DIAST BP <80 MM HG: CPT | Performed by: NURSE PRACTITIONER

## 2024-01-10 PROCEDURE — 99213 OFFICE O/P EST LOW 20 MIN: CPT

## 2024-01-10 PROCEDURE — 1123F ACP DISCUSS/DSCN MKR DOCD: CPT | Performed by: NURSE PRACTITIONER

## 2024-01-10 ASSESSMENT — ENCOUNTER SYMPTOMS
ALLERGIC/IMMUNOLOGIC NEGATIVE: 1
EYES NEGATIVE: 1
GASTROINTESTINAL NEGATIVE: 1

## 2024-01-15 ENCOUNTER — OFFICE VISIT (OUTPATIENT)
Dept: CARDIOLOGY | Age: 79
End: 2024-01-15
Payer: OTHER GOVERNMENT

## 2024-01-15 VITALS
DIASTOLIC BLOOD PRESSURE: 62 MMHG | BODY MASS INDEX: 37.09 KG/M2 | WEIGHT: 289 LBS | SYSTOLIC BLOOD PRESSURE: 114 MMHG | HEART RATE: 86 BPM

## 2024-01-15 DIAGNOSIS — I25.10 CORONARY ARTERY DISEASE INVOLVING NATIVE CORONARY ARTERY OF NATIVE HEART WITHOUT ANGINA PECTORIS: Primary | ICD-10-CM

## 2024-01-15 DIAGNOSIS — I25.10 ATHEROSCLEROSIS OF NATIVE CORONARY ARTERY OF NATIVE HEART WITHOUT ANGINA PECTORIS: ICD-10-CM

## 2024-01-15 PROCEDURE — 1123F ACP DISCUSS/DSCN MKR DOCD: CPT | Performed by: SURGERY

## 2024-01-15 PROCEDURE — 93005 ELECTROCARDIOGRAM TRACING: CPT | Performed by: SURGERY

## 2024-01-15 PROCEDURE — 99214 OFFICE O/P EST MOD 30 MIN: CPT | Performed by: SURGERY

## 2024-01-15 PROCEDURE — 3074F SYST BP LT 130 MM HG: CPT | Performed by: SURGERY

## 2024-01-15 PROCEDURE — 3078F DIAST BP <80 MM HG: CPT | Performed by: SURGERY

## 2024-01-15 PROCEDURE — 93010 ELECTROCARDIOGRAM REPORT: CPT | Performed by: SURGERY

## 2024-01-15 RX ORDER — LOSARTAN POTASSIUM 25 MG/1
12.5 TABLET ORAL DAILY
Qty: 30 TABLET | Refills: 5 | Status: SHIPPED | OUTPATIENT
Start: 2024-01-15

## 2024-01-15 RX ORDER — SPIRONOLACTONE 25 MG/1
12.5 TABLET ORAL DAILY
Qty: 15 TABLET | Refills: 5 | Status: SHIPPED | OUTPATIENT
Start: 2024-01-15

## 2024-01-15 NOTE — PROGRESS NOTES
Today's Date: 1/15/2024  Patient's Name: Noah Jauregui  Patient's age: 78 y.o., 1945    CC: Post cath follow-up    HPI:  Was in the rehab center now he is at home denies  any chest pain or discomfort. No orthopnea or PND. Denies  any palpitation, dizziness or syncope.       Past Medical History:   has a past medical history of Acute respiratory failure with hypoxia (HCC), Adenomatous polyp, Cholecystitis, Chronic venous insufficiency, COPD with acute exacerbation (HCC), DVT (deep venous thrombosis) (HCC), Edema, Gout, Hypertension, Onychomycosis, Osteoarthritis, Plantar fasciitis, Pneumonia due to COVID-19 virus, and Tobacco abuse.    Past Surgical History:   has a past surgical history that includes Colonoscopy (12/12/2012); Vasectomy (1980); Colonoscopy (2003); other surgical history; Colonoscopy (08/2009); Colonoscopy (06/2008); Coronary artery bypass graft (10/28/2014); Total knee arthroplasty (Right, 10/2015); Total knee arthroplasty (Left, 03/2018); TURP; Cataract removal (Right, 06/07/2023); Cholecystectomy (03/19/2019); Cardiac catheterization (N/A, 12/22/2023); and Cardiac procedure (N/A, 12/22/2023).    Home Medications:  Prior to Admission medications    Medication Sig Start Date End Date Taking? Authorizing Provider   Cholecalciferol (VITAMIN D) 50 MCG (2000 UT) CAPS capsule Take 1,000 Units by mouth daily   Yes ProviderDaksha MD   aspirin 81 MG EC tablet Take 1 tablet by mouth daily 12/22/23  Yes Gregory Rodriguez MD   sertraline (ZOLOFT) 100 MG tablet Take 1 tablet by mouth once daily 12/8/23  Yes Bonifacio Drake DO   midodrine (PROAMATINE) 5 MG tablet TAKE 1 TABLET BY MOUTH THREE TIMES DAILY 11/7/23  Yes Bonifacio Drake DO   potassium chloride (KLOR-CON) 10 MEQ extended release tablet Take 1 tablet by mouth once daily 10/31/23  Yes Bonifacio Drake DO   allopurinol (ZYLOPRIM) 300 MG tablet Take 1 tablet by mouth once daily 10/31/23  Yes Bonifacio Drake DO

## 2024-01-28 DIAGNOSIS — E78.5 HYPERLIPIDEMIA, UNSPECIFIED HYPERLIPIDEMIA TYPE: ICD-10-CM

## 2024-01-28 DIAGNOSIS — M79.89 PAIN AND SWELLING OF TOE OF RIGHT FOOT: ICD-10-CM

## 2024-01-28 DIAGNOSIS — M79.674 PAIN AND SWELLING OF TOE OF RIGHT FOOT: ICD-10-CM

## 2024-01-28 DIAGNOSIS — E03.9 HYPOTHYROIDISM, UNSPECIFIED TYPE: ICD-10-CM

## 2024-01-29 RX ORDER — LEVOTHYROXINE SODIUM 0.1 MG/1
100 TABLET ORAL DAILY
Qty: 90 TABLET | Refills: 0 | Status: SHIPPED | OUTPATIENT
Start: 2024-01-29

## 2024-01-29 RX ORDER — POTASSIUM CHLORIDE 750 MG/1
10 TABLET, FILM COATED, EXTENDED RELEASE ORAL DAILY
Qty: 90 TABLET | Refills: 0 | Status: SHIPPED | OUTPATIENT
Start: 2024-01-29

## 2024-01-29 RX ORDER — ALLOPURINOL 300 MG/1
TABLET ORAL
Qty: 90 TABLET | Refills: 0 | Status: SHIPPED | OUTPATIENT
Start: 2024-01-29

## 2024-01-29 RX ORDER — ATORVASTATIN CALCIUM 40 MG/1
TABLET, FILM COATED ORAL
Qty: 90 TABLET | Refills: 0 | Status: SHIPPED | OUTPATIENT
Start: 2024-01-29

## 2024-01-29 NOTE — TELEPHONE ENCOUNTER
Noah called requesting a refill of the below medication which has been pended for you:     Requested Prescriptions     Pending Prescriptions Disp Refills    allopurinol (ZYLOPRIM) 300 MG tablet [Pharmacy Med Name: Allopurinol 300 MG Oral Tablet] 90 tablet 0     Sig: Take 1 tablet by mouth once daily    potassium chloride (KLOR-CON) 10 MEQ extended release tablet [Pharmacy Med Name: Potassium Chloride ER 10 MEQ Oral Tablet Extended Release] 90 tablet 0     Sig: Take 1 tablet by mouth once daily    atorvastatin (LIPITOR) 40 MG tablet [Pharmacy Med Name: Atorvastatin Calcium 40 MG Oral Tablet] 90 tablet 0     Sig: Take 1 tablet by mouth once daily    levothyroxine (SYNTHROID) 100 MCG tablet [Pharmacy Med Name: Levothyroxine Sodium 100 MCG Oral Tablet] 90 tablet 0     Sig: Take 1 tablet by mouth once daily       Last Appointment Date: 11/16/2023  Next Appointment Date: 5/16/2024    No Known Allergies

## 2024-02-05 DIAGNOSIS — M79.674 PAIN AND SWELLING OF TOE OF RIGHT FOOT: ICD-10-CM

## 2024-02-05 DIAGNOSIS — M79.89 PAIN AND SWELLING OF TOE OF RIGHT FOOT: ICD-10-CM

## 2024-02-05 DIAGNOSIS — E03.9 HYPOTHYROIDISM, UNSPECIFIED TYPE: ICD-10-CM

## 2024-02-06 RX ORDER — LEVOTHYROXINE SODIUM 0.1 MG/1
100 TABLET ORAL DAILY
Qty: 90 TABLET | Refills: 0 | OUTPATIENT
Start: 2024-02-06

## 2024-02-06 RX ORDER — BUPROPION HYDROCHLORIDE 75 MG/1
TABLET ORAL
Qty: 180 TABLET | Refills: 0 | Status: SHIPPED | OUTPATIENT
Start: 2024-02-06

## 2024-02-06 RX ORDER — ALLOPURINOL 300 MG/1
TABLET ORAL
Qty: 90 TABLET | Refills: 0 | OUTPATIENT
Start: 2024-02-06

## 2024-02-06 NOTE — TELEPHONE ENCOUNTER
Noah called requesting a refill of the below medication which has been pended for you:     Requested Prescriptions     Pending Prescriptions Disp Refills    buPROPion (WELLBUTRIN) 75 MG tablet [Pharmacy Med Name: buPROPion HCl 75 MG Oral Tablet] 180 tablet 0     Sig: Take 1 tablet by mouth twice daily     Refused Prescriptions Disp Refills    levothyroxine (SYNTHROID) 100 MCG tablet [Pharmacy Med Name: Levothyroxine Sodium 100 MCG Oral Tablet] 90 tablet 0     Sig: Take 1 tablet by mouth once daily    allopurinol (ZYLOPRIM) 300 MG tablet [Pharmacy Med Name: Allopurinol 300 MG Oral Tablet] 90 tablet 0     Sig: Take 1 tablet by mouth once daily       Last Appointment Date: 11/16/2023  Next Appointment Date: 5/16/2024    No Known Allergies

## 2024-02-07 ENCOUNTER — OFFICE VISIT (OUTPATIENT)
Dept: PODIATRY | Age: 79
End: 2024-02-07
Payer: MEDICARE

## 2024-02-07 VITALS
WEIGHT: 286.2 LBS | SYSTOLIC BLOOD PRESSURE: 118 MMHG | BODY MASS INDEX: 36.73 KG/M2 | HEART RATE: 60 BPM | RESPIRATION RATE: 20 BRPM | DIASTOLIC BLOOD PRESSURE: 60 MMHG

## 2024-02-07 DIAGNOSIS — B35.1 DERMATOPHYTOSIS OF NAIL: Primary | ICD-10-CM

## 2024-02-07 DIAGNOSIS — I82.5Z3: ICD-10-CM

## 2024-02-07 DIAGNOSIS — L84 CORNS AND CALLOSITIES: ICD-10-CM

## 2024-02-07 PROCEDURE — 11056 PARNG/CUTG B9 HYPRKR LES 2-4: CPT | Performed by: PODIATRIST

## 2024-02-07 PROCEDURE — 11721 DEBRIDE NAIL 6 OR MORE: CPT | Performed by: PODIATRIST

## 2024-02-07 PROCEDURE — 99999 PR OFFICE/OUTPT VISIT,PROCEDURE ONLY: CPT | Performed by: PODIATRIST

## 2024-02-07 RX ORDER — POTASSIUM CHLORIDE 750 MG/1
10 TABLET, FILM COATED, EXTENDED RELEASE ORAL DAILY
Qty: 90 TABLET | Refills: 0 | OUTPATIENT
Start: 2024-02-07

## 2024-02-07 NOTE — PROGRESS NOTES
Foot Care Worksheet  PCP: Bonifacio Drake DO  Last visit: 11 / 16 / 2023    Nail description: Thick , Yellow , Crumbly , Marked limitation of ambulation     Pain resulting from thickened and dystrophy of nail plate Yes    Nails involved  Right   1, 2, 3, 4, 5  (T5-T9)  Left     1, 2, 3, 4, 5  (TA-T4)    Q7 1 Class A Finding - Non traumatic amputation of foot No    Q8 2 Class B Findings - Absent DP pulse No, Absent PT pulse No, Advanced tropic changes (3 required) Yes    Decrease hair growth Yes, Nail changes/thickening Yes, Pigmented changes/discoloration Yes, Skin texture (thin, shiny) No, Skin color (rubor/redness) No    Q9 1 Class B and 2 Class C Findings  Claudication No, Temperature change Yes, Paresthesia No, Burning No, Edema Yes    Subjective:  [facility] care.  Pain at the nails also.    No Known Allergies    Past Medical History:   Diagnosis Date    Acute respiratory failure with hypoxia ([facility]) 12/9/2021    Adenomatous polyp     history of     Cholecystitis 3/19/2019    Chronic venous insufficiency     COPD with acute exacerbation ([facility]) 12/9/2021    DVT (deep venous thrombosis) ([facility])     history of     Edema     related to venous stasis     Gout     history of     Hypertension     Onychomycosis     Osteoarthritis     Plantar fasciitis     Pneumonia due to COVID-19 virus 12/8/2021    Tobacco abuse        Prior to Admission medications    Medication Sig Start Date End Date Taking? Authorizing Provider   buPROPion (WELLBUTRIN) 75 MG tablet Take 1 tablet by mouth twice daily 2/6/24  Yes Bonifacio Drake DO   allopurinol (ZYLOPRIM) 300 MG tablet Take 1 tablet by mouth once daily 1/29/24  Yes Bonifacio Drake DO   potassium chloride (KLOR-CON) 10 MEQ extended release tablet Take 1 tablet by mouth once daily 1/29/24  Yes Bonifacio Drake DO   atorvastatin (LIPITOR) 40 MG tablet Take 1 tablet by mouth once daily 1/29/24  Yes Noelle

## 2024-02-08 RX ORDER — POTASSIUM CHLORIDE 750 MG/1
10 TABLET, FILM COATED, EXTENDED RELEASE ORAL DAILY
Qty: 90 TABLET | Refills: 0 | Status: SHIPPED | OUTPATIENT
Start: 2024-02-08

## 2024-02-08 NOTE — TELEPHONE ENCOUNTER
Denae from [facility] called stating patient is saying he lost this medication/and or never received it from the pharm. Per Denae, they went back and re watched the video of them putting this medication in the bag for patient. New script needs to be sent over for patient.         Noah called requesting a refill of the below medication which has been pended for you:     Requested Prescriptions     Pending Prescriptions Disp Refills    potassium chloride (KLOR-CON) 10 MEQ extended release tablet [Pharmacy Med Name: Potassium Chloride ER 10 MEQ Oral Tablet Extended Release] 90 tablet 0     Sig: Take 1 tablet by mouth once daily       Last Appointment Date: 11/16/2023  Next Appointment Date: 5/16/2024    No Known Allergies

## 2024-02-15 DIAGNOSIS — I50.42 CHRONIC COMBINED SYSTOLIC AND DIASTOLIC CHF (CONGESTIVE HEART FAILURE) (HCC): ICD-10-CM

## 2024-02-15 DIAGNOSIS — I95.1 ORTHOSTATIC HYPOTENSION: ICD-10-CM

## 2024-02-15 DIAGNOSIS — E03.9 HYPOTHYROIDISM, UNSPECIFIED TYPE: ICD-10-CM

## 2024-02-15 RX ORDER — FUROSEMIDE 20 MG/1
TABLET ORAL
Qty: 90 TABLET | Refills: 0 | Status: SHIPPED | OUTPATIENT
Start: 2024-02-15

## 2024-02-15 RX ORDER — MIDODRINE HYDROCHLORIDE 5 MG/1
TABLET ORAL
Qty: 270 TABLET | Refills: 0 | Status: SHIPPED | OUTPATIENT
Start: 2024-02-15

## 2024-02-15 RX ORDER — LEVOTHYROXINE SODIUM 0.1 MG/1
100 TABLET ORAL DAILY
Qty: 90 TABLET | Refills: 0 | Status: SHIPPED | OUTPATIENT
Start: 2024-02-15

## 2024-02-15 NOTE — TELEPHONE ENCOUNTER
Noah called requesting a refill of the below medication which has been pended for you:     Requested Prescriptions     Pending Prescriptions Disp Refills    midodrine (PROAMATINE) 5 MG tablet [Pharmacy Med Name: Midodrine HCl 5 MG Oral Tablet] 270 tablet 0     Sig: TAKE 1 TABLET BY MOUTH THREE TIMES DAILY    furosemide (LASIX) 20 MG tablet [Pharmacy Med Name: Furosemide 20 MG Oral Tablet] 90 tablet 0     Sig: TAKE 1 TABLET BY MOUTH ONCE DAILY IN THE MORNING    levothyroxine (SYNTHROID) 100 MCG tablet [Pharmacy Med Name: Levothyroxine Sodium 100 MCG Oral Tablet] 90 tablet 0     Sig: Take 1 tablet by mouth once daily       Last Appointment Date: 11/16/2023  Next Appointment Date: 5/16/2024    No Known Allergies

## 2024-02-22 DIAGNOSIS — F51.01 PRIMARY INSOMNIA: ICD-10-CM

## 2024-02-23 RX ORDER — TRAZODONE HYDROCHLORIDE 50 MG/1
50 TABLET ORAL NIGHTLY
Qty: 90 TABLET | Refills: 1 | Status: SHIPPED | OUTPATIENT
Start: 2024-02-23

## 2024-02-23 NOTE — TELEPHONE ENCOUNTER
Noah called requesting a refill of the below medication which has been pended for you:     Requested Prescriptions     Pending Prescriptions Disp Refills    traZODone (DESYREL) 50 MG tablet [Pharmacy Med Name: traZODone HCl 50 MG Oral Tablet] 90 tablet 0     Sig: Take 1 tablet by mouth nightly       Last Appointment Date: 11/16/2023  Next Appointment Date: 5/16/2024    No Known Allergies

## 2024-03-18 RX ORDER — OXYBUTYNIN CHLORIDE 5 MG/1
5 TABLET, EXTENDED RELEASE ORAL DAILY
Qty: 90 TABLET | Refills: 0 | Status: SHIPPED | OUTPATIENT
Start: 2024-03-18

## 2024-04-15 ENCOUNTER — HOSPITAL ENCOUNTER (OUTPATIENT)
Age: 79
Discharge: HOME | End: 2024-04-17
Payer: MEDICARE

## 2024-04-15 VITALS
HEIGHT: 74 IN | DIASTOLIC BLOOD PRESSURE: 64 MMHG | BODY MASS INDEX: 36.7 KG/M2 | WEIGHT: 286 LBS | HEART RATE: 86 BPM | SYSTOLIC BLOOD PRESSURE: 134 MMHG

## 2024-04-15 DIAGNOSIS — I25.10 CORONARY ARTERY DISEASE INVOLVING NATIVE CORONARY ARTERY OF NATIVE HEART WITHOUT ANGINA PECTORIS: ICD-10-CM

## 2024-04-15 LAB
ECHO AO ASC DIAM: 3.6 CM
ECHO AO ASCENDING AORTA INDEX: 1.42 CM/M2
ECHO AO ROOT DIAM: 3.8 CM
ECHO AO ROOT INDEX: 1.5 CM/M2
ECHO BSA: 2.6 M2
ECHO LA DIAMETER INDEX: 1.74 CM/M2
ECHO LA DIAMETER: 4.4 CM
ECHO LA TO AORTIC ROOT RATIO: 1.16
ECHO LV EDV A4C: 144 ML
ECHO LV EDV INDEX A4C: 57 ML/M2
ECHO LV EJECTION FRACTION A4C: 26 %
ECHO LV ESV A4C: 106 ML
ECHO LV ESV INDEX A4C: 42 ML/M2
ECHO LV FRACTIONAL SHORTENING: 11 % (ref 28–44)
ECHO LV INTERNAL DIMENSION DIASTOLE INDEX: 2.57 CM/M2
ECHO LV INTERNAL DIMENSION DIASTOLIC: 6.5 CM (ref 4.2–5.9)
ECHO LV INTERNAL DIMENSION SYSTOLIC INDEX: 2.29 CM/M2
ECHO LV INTERNAL DIMENSION SYSTOLIC: 5.8 CM
ECHO LV IVSD: 1 CM (ref 0.6–1)
ECHO LV MASS 2D: 265.2 G (ref 88–224)
ECHO LV MASS INDEX 2D: 104.8 G/M2 (ref 49–115)
ECHO LV POSTERIOR WALL DIASTOLIC: 0.9 CM (ref 0.6–1)
ECHO LV RELATIVE WALL THICKNESS RATIO: 0.28
ECHO LVOT AREA: 3.8 CM2
ECHO LVOT DIAM: 2.2 CM

## 2024-04-15 PROCEDURE — 93308 TTE F-UP OR LMTD: CPT

## 2024-04-15 PROCEDURE — 93308 TTE F-UP OR LMTD: CPT | Performed by: INTERNAL MEDICINE

## 2024-04-22 ENCOUNTER — OFFICE VISIT (OUTPATIENT)
Dept: CARDIOLOGY | Age: 79
End: 2024-04-22
Payer: OTHER GOVERNMENT

## 2024-04-22 VITALS
DIASTOLIC BLOOD PRESSURE: 69 MMHG | BODY MASS INDEX: 36.57 KG/M2 | OXYGEN SATURATION: 92 % | SYSTOLIC BLOOD PRESSURE: 119 MMHG | HEART RATE: 89 BPM | WEIGHT: 285 LBS | RESPIRATION RATE: 18 BRPM | HEIGHT: 74 IN

## 2024-04-22 DIAGNOSIS — I25.10 CORONARY ARTERY DISEASE INVOLVING NATIVE CORONARY ARTERY OF NATIVE HEART WITHOUT ANGINA PECTORIS: Primary | ICD-10-CM

## 2024-04-22 DIAGNOSIS — I50.22 CHRONIC SYSTOLIC HEART FAILURE (HCC): ICD-10-CM

## 2024-04-22 DIAGNOSIS — I25.5 ISCHEMIC CARDIOMYOPATHY: ICD-10-CM

## 2024-04-22 DIAGNOSIS — I44.7 LBBB (LEFT BUNDLE BRANCH BLOCK): ICD-10-CM

## 2024-04-22 PROCEDURE — 1123F ACP DISCUSS/DSCN MKR DOCD: CPT | Performed by: INTERNAL MEDICINE

## 2024-04-22 PROCEDURE — 93005 ELECTROCARDIOGRAM TRACING: CPT | Performed by: INTERNAL MEDICINE

## 2024-04-22 PROCEDURE — 3078F DIAST BP <80 MM HG: CPT | Performed by: INTERNAL MEDICINE

## 2024-04-22 PROCEDURE — 3074F SYST BP LT 130 MM HG: CPT | Performed by: INTERNAL MEDICINE

## 2024-04-22 PROCEDURE — 99214 OFFICE O/P EST MOD 30 MIN: CPT | Performed by: INTERNAL MEDICINE

## 2024-04-22 PROCEDURE — 93010 ELECTROCARDIOGRAM REPORT: CPT | Performed by: INTERNAL MEDICINE

## 2024-04-22 RX ORDER — METOPROLOL SUCCINATE 25 MG/1
25 TABLET, EXTENDED RELEASE ORAL NIGHTLY
Qty: 90 TABLET | Refills: 3 | Status: SHIPPED | OUTPATIENT
Start: 2024-04-22

## 2024-04-22 RX ORDER — ERYTHROMYCIN 5 MG/G
OINTMENT OPHTHALMIC
COMMUNITY
Start: 2024-04-03

## 2024-04-24 ENCOUNTER — HOSPITAL ENCOUNTER (OUTPATIENT)
Dept: NUCLEAR MEDICINE | Age: 79
Discharge: HOME | End: 2024-04-26
Attending: INTERNAL MEDICINE
Payer: MEDICARE

## 2024-04-24 DIAGNOSIS — I44.7 LBBB (LEFT BUNDLE BRANCH BLOCK): ICD-10-CM

## 2024-04-24 DIAGNOSIS — I25.10 CORONARY ARTERY DISEASE INVOLVING NATIVE CORONARY ARTERY OF NATIVE HEART WITHOUT ANGINA PECTORIS: ICD-10-CM

## 2024-04-24 DIAGNOSIS — I25.5 ISCHEMIC CARDIOMYOPATHY: ICD-10-CM

## 2024-04-24 DIAGNOSIS — I50.22 CHRONIC SYSTOLIC HEART FAILURE (HCC): ICD-10-CM

## 2024-04-24 PROCEDURE — 6360000002 HC RX W HCPCS

## 2024-04-24 PROCEDURE — 3430000000 HC RX DIAGNOSTIC RADIOPHARMACEUTICAL: Performed by: INTERNAL MEDICINE

## 2024-04-24 PROCEDURE — 78472 GATED HEART PLANAR SINGLE: CPT

## 2024-04-24 PROCEDURE — A9560 TC99M LABELED RBC: HCPCS | Performed by: INTERNAL MEDICINE

## 2024-04-24 RX ADMIN — Medication 25 MILLICURIE: at 12:40

## 2024-04-25 ENCOUNTER — OFFICE VISIT (OUTPATIENT)
Dept: PODIATRY | Age: 79
End: 2024-04-25
Payer: MEDICARE

## 2024-04-25 VITALS
BODY MASS INDEX: 36.67 KG/M2 | HEART RATE: 76 BPM | WEIGHT: 285.6 LBS | DIASTOLIC BLOOD PRESSURE: 70 MMHG | SYSTOLIC BLOOD PRESSURE: 122 MMHG | RESPIRATION RATE: 20 BRPM

## 2024-04-25 DIAGNOSIS — L84 CORNS AND CALLOSITIES: ICD-10-CM

## 2024-04-25 DIAGNOSIS — B35.1 DERMATOPHYTOSIS OF NAIL: ICD-10-CM

## 2024-04-25 DIAGNOSIS — I82.5Z3: Primary | ICD-10-CM

## 2024-04-25 PROCEDURE — 11721 DEBRIDE NAIL 6 OR MORE: CPT | Performed by: PODIATRIST

## 2024-04-25 PROCEDURE — 11056 PARNG/CUTG B9 HYPRKR LES 2-4: CPT | Performed by: PODIATRIST

## 2024-04-25 PROCEDURE — 99999 PR OFFICE/OUTPT VISIT,PROCEDURE ONLY: CPT | Performed by: PODIATRIST

## 2024-04-25 NOTE — PROGRESS NOTES
Foot Care Worksheet  PCP: Bonifacio Drake DO  Last visit: 11 / 16 / 2023    Nail description: Thick , Yellow , Crumbly , Marked limitation of ambulation     Pain resulting from thickened and dystrophy of nail plate Yes    Nails involved  Right   1, 2, 3, 4, 5  (T5-T9)  Left     1, 2, 3, 4, 5  (TA-T4)    Q7 1 Class A Finding - Non traumatic amputation of foot No    Q8 2 Class B Findings - Absent DP pulse No, Absent PT pulse No, Advanced tropic changes (3 required) Yes    Decrease hair growth Yes, Nail changes/thickening Yes, Pigmented changes/discoloration Yes, Skin texture (thin, shiny) No, Skin color (rubor/redness) No    Q9 1 Class B and 2 Class C Findings  Claudication No, Temperature change Yes, Paresthesia No, Burning No, Edema Yes    Subjective:  Patient presents to [facility] today for routine foot care.  Pain at the nails also.    No Known Allergies    Past Medical History:   Diagnosis Date    Acute respiratory failure with hypoxia ([facility]) 12/9/2021    Adenomatous polyp     history of     Cholecystitis 3/19/2019    Chronic venous insufficiency     COPD with acute exacerbation ([facility]) 12/9/2021    DVT (deep venous thrombosis) ([facility])     history of     Edema     related to venous stasis     Gout     history of     Hypertension     Onychomycosis     Osteoarthritis     Plantar fasciitis     Pneumonia due to COVID-19 virus 12/8/2021    Tobacco abuse        Prior to Admission medications    Medication Sig Start Date End Date Taking? Authorizing Provider   hypromellose 0.3 % GEL ophthalmic gel APPLY 1 DROP TO EACH EYE FOUR TIMES DAILY ECTROPION 1/17/24  Yes ProviderDaksha MD   erythromycin (ROMYCIN) 5 MG/GM ophthalmic ointment APPLY TO INCISIONS AND RIGHT EYE TO RIGHT EYE TWICE A DAY FOR  POSTOP CARE -- 4/3/24  Yes Daksha Garnett MD   metoprolol succinate (TOPROL XL) 25 MG extended release tablet Take 1 tablet by mouth at bedtime 4/22/24  Yes Rob Evans DO   oxyBUTYnin (DITROPAN-XL) 5 MG

## 2024-04-26 LAB
NUG MUGA EJECTION FRACTION: 21 %
STRESS TARGET HR: 141 BPM

## 2024-04-30 DIAGNOSIS — M79.674 PAIN AND SWELLING OF TOE OF RIGHT FOOT: ICD-10-CM

## 2024-04-30 DIAGNOSIS — M79.89 PAIN AND SWELLING OF TOE OF RIGHT FOOT: ICD-10-CM

## 2024-04-30 RX ORDER — ALLOPURINOL 300 MG/1
TABLET ORAL
Qty: 90 TABLET | Refills: 0 | Status: SHIPPED | OUTPATIENT
Start: 2024-04-30

## 2024-04-30 NOTE — TELEPHONE ENCOUNTER
Noah called requesting a refill of the below medication which has been pended for you:     Requested Prescriptions     Pending Prescriptions Disp Refills    allopurinol (ZYLOPRIM) 300 MG tablet [Pharmacy Med Name: Allopurinol 300 MG Oral Tablet] 90 tablet 0     Sig: Take 1 tablet by mouth once daily       Last Appointment Date: 11/16/2023  Next Appointment Date: 5/16/2024    No Known Allergies

## 2024-05-06 ENCOUNTER — TELEPHONE (OUTPATIENT)
Dept: CARDIOLOGY | Age: 79
End: 2024-05-06

## 2024-05-06 DIAGNOSIS — I44.7 LBBB (LEFT BUNDLE BRANCH BLOCK): ICD-10-CM

## 2024-05-06 DIAGNOSIS — I25.10 CORONARY ARTERY DISEASE INVOLVING NATIVE CORONARY ARTERY OF NATIVE HEART WITHOUT ANGINA PECTORIS: Primary | ICD-10-CM

## 2024-05-06 DIAGNOSIS — I50.22 CHRONIC SYSTOLIC HEART FAILURE (HCC): ICD-10-CM

## 2024-05-06 NOTE — TELEPHONE ENCOUNTER
Called and spoke to wife and patient and informed them of results of scan as well as recommendation for AICD. After thinking about it patient is agreeaableto AICD. Please order.

## 2024-05-06 NOTE — TELEPHONE ENCOUNTER
Called and scheduled patient with Dr Capone on 6/18/2024 10:15am at [facility]   [address] Room 2500.     Notified wife of appt time and date. Confirmed date, time and location

## 2024-05-06 NOTE — TELEPHONE ENCOUNTER
Rob Evans DO P [facility]  Test reviewed, EF severely reduced, 21%, I recommend AICD, let me know if agreeable          Cardiac blood pool MUGA (rest and/or stress)  Order: [id number]  Status: Final result       Visible to patient: No (not released)       Dx: Chronic systolic heart failure (HCC);...    1 Result Note  Details    Reading Physician Reading Date Result Priority   Joanie Ruiz MD  [phone] 4/26/2024 Routine     Result Text  Patient's erythrocytes were labeled with 27.7 millicuries of technetium 99 M   FINDINGS: Cardiac chambers and great vessels appear grossly normal in  configuration. Right ventricular contractility is subjectively normal.   Ventricular ejection fraction is 21%. However, ventricles appear dilated.   IMPRESSION: Abnormally low left ventricular ejection fraction 21%.         PACS Images     Show images for Cardiac blood pool MUGA (rest and/or stress)  Stress Measurements    Exercise Data   Stress Target  bpm              MUGA Findings    Study Details The left ventricle cavity size was moderately enlarged and function was severely decreased. The right ventricle cavity size was moderately enlarged and function was severely decreased.   Prior Study No prior study.

## 2024-05-08 DIAGNOSIS — E78.5 HYPERLIPIDEMIA, UNSPECIFIED HYPERLIPIDEMIA TYPE: ICD-10-CM

## 2024-05-08 RX ORDER — ATORVASTATIN CALCIUM 40 MG/1
TABLET, FILM COATED ORAL
Qty: 90 TABLET | Refills: 0 | Status: SHIPPED | OUTPATIENT
Start: 2024-05-08

## 2024-05-08 NOTE — TELEPHONE ENCOUNTER
Noah called requesting a refill of the below medication which has been pended for you:     Requested Prescriptions     Pending Prescriptions Disp Refills    atorvastatin (LIPITOR) 40 MG tablet [Pharmacy Med Name: Atorvastatin Calcium 40 MG Oral Tablet] 90 tablet 0     Sig: Take 1 tablet by mouth once daily       Last Appointment Date: 11/16/2023  Next Appointment Date: 5/16/2024    No Known Allergies

## 2024-05-15 DIAGNOSIS — I95.1 ORTHOSTATIC HYPOTENSION: ICD-10-CM

## 2024-05-15 DIAGNOSIS — I50.42 CHRONIC COMBINED SYSTOLIC AND DIASTOLIC CHF (CONGESTIVE HEART FAILURE) (HCC): ICD-10-CM

## 2024-05-16 ENCOUNTER — OFFICE VISIT (OUTPATIENT)
Dept: FAMILY MEDICINE CLINIC | Age: 79
End: 2024-05-16
Payer: MEDICARE

## 2024-05-16 VITALS
SYSTOLIC BLOOD PRESSURE: 126 MMHG | WEIGHT: 287 LBS | HEIGHT: 74 IN | HEART RATE: 86 BPM | OXYGEN SATURATION: 92 % | BODY MASS INDEX: 36.83 KG/M2 | DIASTOLIC BLOOD PRESSURE: 60 MMHG

## 2024-05-16 DIAGNOSIS — I77.9 CAROTID ARTERY DISEASE, UNSPECIFIED LATERALITY, UNSPECIFIED TYPE (HCC): ICD-10-CM

## 2024-05-16 DIAGNOSIS — F41.9 ANXIETY AND DEPRESSION: ICD-10-CM

## 2024-05-16 DIAGNOSIS — I95.1 ORTHOSTATIC HYPOTENSION: ICD-10-CM

## 2024-05-16 DIAGNOSIS — Z00.00 MEDICARE ANNUAL WELLNESS VISIT, SUBSEQUENT: Primary | ICD-10-CM

## 2024-05-16 DIAGNOSIS — I50.42 CHRONIC COMBINED SYSTOLIC AND DIASTOLIC CHF (CONGESTIVE HEART FAILURE) (HCC): ICD-10-CM

## 2024-05-16 DIAGNOSIS — G62.81 CRITICAL ILLNESS POLYNEUROPATHY (HCC): ICD-10-CM

## 2024-05-16 DIAGNOSIS — F32.A ANXIETY AND DEPRESSION: ICD-10-CM

## 2024-05-16 DIAGNOSIS — L97.511 SKIN ULCER OF RIGHT GREAT TOE, LIMITED TO BREAKDOWN OF SKIN (HCC): ICD-10-CM

## 2024-05-16 PROCEDURE — 3074F SYST BP LT 130 MM HG: CPT | Performed by: STUDENT IN AN ORGANIZED HEALTH CARE EDUCATION/TRAINING PROGRAM

## 2024-05-16 PROCEDURE — G0439 PPPS, SUBSEQ VISIT: HCPCS | Performed by: STUDENT IN AN ORGANIZED HEALTH CARE EDUCATION/TRAINING PROGRAM

## 2024-05-16 PROCEDURE — 1036F TOBACCO NON-USER: CPT | Performed by: STUDENT IN AN ORGANIZED HEALTH CARE EDUCATION/TRAINING PROGRAM

## 2024-05-16 PROCEDURE — 1123F ACP DISCUSS/DSCN MKR DOCD: CPT | Performed by: STUDENT IN AN ORGANIZED HEALTH CARE EDUCATION/TRAINING PROGRAM

## 2024-05-16 PROCEDURE — 3078F DIAST BP <80 MM HG: CPT | Performed by: STUDENT IN AN ORGANIZED HEALTH CARE EDUCATION/TRAINING PROGRAM

## 2024-05-16 PROCEDURE — 99214 OFFICE O/P EST MOD 30 MIN: CPT | Performed by: STUDENT IN AN ORGANIZED HEALTH CARE EDUCATION/TRAINING PROGRAM

## 2024-05-16 PROCEDURE — G8427 DOCREV CUR MEDS BY ELIG CLIN: HCPCS | Performed by: STUDENT IN AN ORGANIZED HEALTH CARE EDUCATION/TRAINING PROGRAM

## 2024-05-16 PROCEDURE — G8417 CALC BMI ABV UP PARAM F/U: HCPCS | Performed by: STUDENT IN AN ORGANIZED HEALTH CARE EDUCATION/TRAINING PROGRAM

## 2024-05-16 RX ORDER — BUPROPION HYDROCHLORIDE 75 MG/1
TABLET ORAL
Qty: 180 TABLET | Refills: 0 | OUTPATIENT
Start: 2024-05-16

## 2024-05-16 RX ORDER — BUPROPION HYDROCHLORIDE 75 MG/1
75 TABLET ORAL 2 TIMES DAILY
Qty: 180 TABLET | Refills: 1 | Status: SHIPPED | OUTPATIENT
Start: 2024-05-16

## 2024-05-16 RX ORDER — FUROSEMIDE 20 MG/1
20 TABLET ORAL EVERY MORNING
Qty: 90 TABLET | Refills: 1 | Status: SHIPPED | OUTPATIENT
Start: 2024-05-16

## 2024-05-16 RX ORDER — FUROSEMIDE 20 MG/1
TABLET ORAL
Qty: 90 TABLET | Refills: 0 | OUTPATIENT
Start: 2024-05-16

## 2024-05-16 RX ORDER — MIDODRINE HYDROCHLORIDE 5 MG/1
5 TABLET ORAL 3 TIMES DAILY
Qty: 270 TABLET | Refills: 1 | Status: SHIPPED | OUTPATIENT
Start: 2024-05-16

## 2024-05-16 RX ORDER — MIDODRINE HYDROCHLORIDE 5 MG/1
TABLET ORAL
Qty: 270 TABLET | Refills: 0 | OUTPATIENT
Start: 2024-05-16

## 2024-05-16 SDOH — ECONOMIC STABILITY: FOOD INSECURITY: WITHIN THE PAST 12 MONTHS, YOU WORRIED THAT YOUR FOOD WOULD RUN OUT BEFORE YOU GOT MONEY TO BUY MORE.: NEVER TRUE

## 2024-05-16 SDOH — ECONOMIC STABILITY: FOOD INSECURITY: WITHIN THE PAST 12 MONTHS, THE FOOD YOU BOUGHT JUST DIDN'T LAST AND YOU DIDN'T HAVE MONEY TO GET MORE.: NEVER TRUE

## 2024-05-16 SDOH — ECONOMIC STABILITY: INCOME INSECURITY: HOW HARD IS IT FOR YOU TO PAY FOR THE VERY BASICS LIKE FOOD, HOUSING, MEDICAL CARE, AND HEATING?: NOT HARD AT ALL

## 2024-05-16 ASSESSMENT — PATIENT HEALTH QUESTIONNAIRE - PHQ9
SUM OF ALL RESPONSES TO PHQ QUESTIONS 1-9: 0
7. TROUBLE CONCENTRATING ON THINGS, SUCH AS READING THE NEWSPAPER OR WATCHING TELEVISION: NOT AT ALL
SUM OF ALL RESPONSES TO PHQ QUESTIONS 1-9: 0
3. TROUBLE FALLING OR STAYING ASLEEP: NOT AT ALL
4. FEELING TIRED OR HAVING LITTLE ENERGY: NOT AT ALL
1. LITTLE INTEREST OR PLEASURE IN DOING THINGS: NOT AT ALL
SUM OF ALL RESPONSES TO PHQ9 QUESTIONS 1 & 2: 0
9. THOUGHTS THAT YOU WOULD BE BETTER OFF DEAD, OR OF HURTING YOURSELF: NOT AT ALL
8. MOVING OR SPEAKING SO SLOWLY THAT OTHER PEOPLE COULD HAVE NOTICED. OR THE OPPOSITE, BEING SO FIGETY OR RESTLESS THAT YOU HAVE BEEN MOVING AROUND A LOT MORE THAN USUAL: NOT AT ALL
SUM OF ALL RESPONSES TO PHQ QUESTIONS 1-9: 0
10. IF YOU CHECKED OFF ANY PROBLEMS, HOW DIFFICULT HAVE THESE PROBLEMS MADE IT FOR YOU TO DO YOUR WORK, TAKE CARE OF THINGS AT HOME, OR GET ALONG WITH OTHER PEOPLE: NOT DIFFICULT AT ALL
SUM OF ALL RESPONSES TO PHQ QUESTIONS 1-9: 0
2. FEELING DOWN, DEPRESSED OR HOPELESS: NOT AT ALL
5. POOR APPETITE OR OVEREATING: NOT AT ALL
6. FEELING BAD ABOUT YOURSELF - OR THAT YOU ARE A FAILURE OR HAVE LET YOURSELF OR YOUR FAMILY DOWN: NOT AT ALL

## 2024-05-16 ASSESSMENT — LIFESTYLE VARIABLES
HOW OFTEN DO YOU HAVE A DRINK CONTAINING ALCOHOL: NEVER
HOW MANY STANDARD DRINKS CONTAINING ALCOHOL DO YOU HAVE ON A TYPICAL DAY: PATIENT DOES NOT DRINK

## 2024-05-16 NOTE — PATIENT INSTRUCTIONS
and tests that look at your brain.  These questions and tests can make sure you don't have other conditions that can cause symptoms like MCI. These include depression, sleep problems, and side effects from medicines.  How is it treated?  There are no medicines to treat MCI or to keep it from progressing to dementia. But treating conditions like high blood pressure and diabetes may help. A person with MCI needs routine follow-up visits with their doctor to check on changes in the person's mental skills.  How can you care for yourself at home?  Keeping your body active can help slow MCI. Exercises like walking can help. Try to stay active mentally too. Read or do things like crossword puzzles if you enjoy doing them.  If you need help coping with MCI, you may want to get support from family, friends, a support group, or a counselor who works with people who have MCI.  Though the future isn't always clear, it can be good to plan ahead with instructions for your care. These are called advanced directives. Having a plan can help make sure that you get the care you want.  Current as of: December 20, 2023               Content Version: 14.0  © 2006-2024 [employer].   Care instructions adapted under license by [employer]. If you have questions about a medical condition or this instruction, always ask your healthcare professional. [employer] disclaims any warranty or liability for your use of this information.           Learning About Being Active as an Older Adult  Why is being active important as you get older?     Being active is one of the best things you can do for your health. And it's never too late to start. Being active--or getting active, if you aren't already--has definite benefits. It can:  Give you more energy,  Keep your mind sharp.  Improve balance to reduce your risk of falls.  Help you manage chronic illness with fewer medicines.  No matter how old you are, how fit you are, or

## 2024-05-16 NOTE — PROGRESS NOTES
outside providers/suppliers regularly involved in providing care):   Patient Care Team:  Bonifacio Drake DO as PCP - General (Family Medicine)  Bonifacio Drake DO as PCP - Empaneled Provider     Reviewed and updated this visit:  Tobacco  Allergies  Meds  Med Hx  Surg Hx  Soc Hx  Fam Hx           
External ear normal.      Left Ear: External ear normal.      Nose: Nose normal.   Eyes:      General: No scleral icterus.        Right eye: No discharge.         Left eye: No discharge.      Conjunctiva/sclera: Conjunctivae normal.   Cardiovascular:      Rate and Rhythm: Normal rate and regular rhythm.      Heart sounds: Normal heart sounds. No murmur heard.  Pulmonary:      Effort: Pulmonary effort is normal.      Breath sounds: Normal breath sounds.   Musculoskeletal:      Cervical back: Normal range of motion.   Skin:     General: Skin is warm and dry.      Findings: No erythema or rash.   Neurological:      Mental Status: He is alert and oriented to person, place, and time.      Cranial Nerves: No cranial nerve deficit.   Psychiatric:         Behavior: Behavior normal.         Thought Content: Thought content normal.         Judgment: Judgment normal.         Lab Results   Component Value Date    WBC 8.6 10/05/2023    HGB 15.2 10/05/2023    HCT 48.8 10/05/2023     12/22/2023    CHOL 97 10/05/2023    TRIG 170 10/05/2023    HDL 32 (A) 10/05/2023    LDL 30 10/05/2023    AST 27 10/05/2023     10/05/2023    K 4.2 10/05/2023     10/05/2023    CREATININE 1.0 12/22/2023    BUN 16 10/05/2023    CO2 23.2 10/05/2023    TSH 3.31 10/05/2023    PSA 2.80 06/22/2021    INR 1.3 12/09/2021    LABA1C 5.9 10/05/2023    LABGLOM 68 10/05/2023    CALCIUM 9.7 10/05/2023    VITD25 23.20 10/05/2023       Imaging Results:    Cardiac blood pool MUGA (rest and/or stress)    Result Date: 4/26/2024  Patient's erythrocytes were labeled with 27.7 millicuries of technetium 99 M FINDINGS: Cardiac chambers and great vessels appear grossly normal in  configuration. Right ventricular contractility is subjectively normal. Ventricular ejection fraction is 21%. However, ventricles appear dilated.      Abnormally low left ventricular ejection fraction 21%.         Assessment/Plan:     Noah was seen today for medicare awv and

## 2024-05-21 ASSESSMENT — ENCOUNTER SYMPTOMS
EYE PAIN: 0
SHORTNESS OF BREATH: 0
BLOOD IN STOOL: 0
DIARRHEA: 0
ABDOMINAL PAIN: 0
VOMITING: 0
COUGH: 0
TROUBLE SWALLOWING: 0
CONSTIPATION: 0
NAUSEA: 0

## 2024-06-05 DIAGNOSIS — F41.9 ANXIETY AND DEPRESSION: ICD-10-CM

## 2024-06-05 DIAGNOSIS — F32.A ANXIETY AND DEPRESSION: ICD-10-CM

## 2024-06-05 RX ORDER — FINASTERIDE 5 MG/1
5 TABLET, FILM COATED ORAL DAILY
Qty: 90 TABLET | Refills: 0 | Status: SHIPPED | OUTPATIENT
Start: 2024-06-05

## 2024-06-05 RX ORDER — SERTRALINE HYDROCHLORIDE 100 MG/1
100 TABLET, FILM COATED ORAL DAILY
Qty: 90 TABLET | Refills: 0 | Status: SHIPPED | OUTPATIENT
Start: 2024-06-05

## 2024-06-05 NOTE — TELEPHONE ENCOUNTER
Noah called requesting a refill of the below medication which has been pended for you:     Requested Prescriptions     Pending Prescriptions Disp Refills    sertraline (ZOLOFT) 100 MG tablet [Pharmacy Med Name: Sertraline HCl 100 MG Oral Tablet] 90 tablet 0     Sig: Take 1 tablet by mouth once daily       Last Appointment Date: 5/16/2024  Next Appointment Date: 8/20/2024    No Known Allergies

## 2024-06-18 PROBLEM — E11.9 TYPE 2 DIABETES MELLITUS (HCC): Status: ACTIVE | Noted: 2024-06-18

## 2024-06-19 RX ORDER — OXYBUTYNIN CHLORIDE 5 MG/1
5 TABLET, EXTENDED RELEASE ORAL DAILY
Qty: 90 TABLET | Refills: 0 | Status: SHIPPED | OUTPATIENT
Start: 2024-06-19

## 2024-07-09 DIAGNOSIS — E03.9 HYPOTHYROIDISM, UNSPECIFIED TYPE: ICD-10-CM

## 2024-07-09 RX ORDER — EMPAGLIFLOZIN 10 MG/1
10 TABLET, FILM COATED ORAL DAILY
Qty: 90 TABLET | Refills: 3 | Status: SHIPPED | OUTPATIENT
Start: 2024-07-09

## 2024-07-09 RX ORDER — SPIRONOLACTONE 25 MG/1
TABLET ORAL
Qty: 45 TABLET | Refills: 3 | Status: SHIPPED | OUTPATIENT
Start: 2024-07-09

## 2024-07-09 RX ORDER — LEVOTHYROXINE SODIUM 0.1 MG/1
100 TABLET ORAL DAILY
Qty: 90 TABLET | Refills: 0 | Status: SHIPPED | OUTPATIENT
Start: 2024-07-09

## 2024-07-09 NOTE — TELEPHONE ENCOUNTER
Noah called requesting a refill of the below medication which has been pended for you:     Requested Prescriptions     Pending Prescriptions Disp Refills    levothyroxine (SYNTHROID) 100 MCG tablet [Pharmacy Med Name: Levothyroxine Sodium 100 MCG Oral Tablet] 90 tablet 0     Sig: Take 1 tablet by mouth once daily       Last Appointment Date: 5/16/2024  Next Appointment Date: 8/20/2024    No Known Allergies

## 2024-07-16 ENCOUNTER — OFFICE VISIT (OUTPATIENT)
Dept: PODIATRY | Age: 79
End: 2024-07-16
Payer: MEDICARE

## 2024-07-16 ENCOUNTER — TELEPHONE (OUTPATIENT)
Dept: CARDIOLOGY | Age: 79
End: 2024-07-16

## 2024-07-16 VITALS
WEIGHT: 286 LBS | SYSTOLIC BLOOD PRESSURE: 126 MMHG | BODY MASS INDEX: 36.7 KG/M2 | HEART RATE: 76 BPM | HEIGHT: 74 IN | DIASTOLIC BLOOD PRESSURE: 70 MMHG

## 2024-07-16 DIAGNOSIS — B35.1 DERMATOPHYTOSIS OF NAIL: ICD-10-CM

## 2024-07-16 DIAGNOSIS — I82.5Z3: Primary | ICD-10-CM

## 2024-07-16 DIAGNOSIS — L84 CORNS AND CALLOSITIES: ICD-10-CM

## 2024-07-16 PROCEDURE — 11056 PARNG/CUTG B9 HYPRKR LES 2-4: CPT | Performed by: PODIATRIST

## 2024-07-16 PROCEDURE — 99999 PR OFFICE/OUTPT VISIT,PROCEDURE ONLY: CPT | Performed by: PODIATRIST

## 2024-07-16 PROCEDURE — 11721 DEBRIDE NAIL 6 OR MORE: CPT | Performed by: PODIATRIST

## 2024-07-16 NOTE — TELEPHONE ENCOUNTER
Received request for cardiac clearance for Eye Surgery on 7/30/24.     Please review request attached and advise.     Last Appt:  4/22/2024  Next Appt:   10/14/2024  Med verified in Epic

## 2024-07-18 ENCOUNTER — OFFICE VISIT (OUTPATIENT)
Dept: UROLOGY | Age: 79
End: 2024-07-18
Payer: OTHER GOVERNMENT

## 2024-07-18 VITALS
HEIGHT: 74 IN | DIASTOLIC BLOOD PRESSURE: 72 MMHG | HEART RATE: 76 BPM | WEIGHT: 286 LBS | BODY MASS INDEX: 36.7 KG/M2 | SYSTOLIC BLOOD PRESSURE: 128 MMHG

## 2024-07-18 DIAGNOSIS — R33.9 INCOMPLETE BLADDER EMPTYING: ICD-10-CM

## 2024-07-18 DIAGNOSIS — N40.1 BPH WITH OBSTRUCTION/LOWER URINARY TRACT SYMPTOMS: Primary | ICD-10-CM

## 2024-07-18 DIAGNOSIS — R39.12 WEAK URINARY STREAM: ICD-10-CM

## 2024-07-18 DIAGNOSIS — N13.8 BPH WITH OBSTRUCTION/LOWER URINARY TRACT SYMPTOMS: Primary | ICD-10-CM

## 2024-07-18 PROCEDURE — 1123F ACP DISCUSS/DSCN MKR DOCD: CPT

## 2024-07-18 PROCEDURE — 99212 OFFICE O/P EST SF 10 MIN: CPT

## 2024-07-18 PROCEDURE — 3074F SYST BP LT 130 MM HG: CPT

## 2024-07-18 PROCEDURE — 3078F DIAST BP <80 MM HG: CPT

## 2024-07-18 PROCEDURE — 99213 OFFICE O/P EST LOW 20 MIN: CPT

## 2024-07-18 RX ORDER — FINASTERIDE 5 MG/1
5 TABLET, FILM COATED ORAL DAILY
Qty: 90 TABLET | Refills: 3 | Status: SHIPPED | OUTPATIENT
Start: 2024-07-18

## 2024-07-18 RX ORDER — ALFUZOSIN HYDROCHLORIDE 10 MG/1
10 TABLET, EXTENDED RELEASE ORAL DAILY
Qty: 90 TABLET | Refills: 3 | Status: SHIPPED | OUTPATIENT
Start: 2024-07-18

## 2024-07-18 NOTE — TELEPHONE ENCOUNTER
Called and spoke to nurse Marquis at [facility] and informed her per Dr LUIS Evans of no clearance until after AICD is placed on 8/5/24. Nurse verbalized understanding and stated she will let their surgery scheduler know. No further questions at the time.

## 2024-07-18 NOTE — PROGRESS NOTES
[facility]  [address] [facility] [address]  Dept: [phone]  Visit Date: 7/18/2024      [facility]  Noah Jauregui is a 78 y.o. male that presents to the urology clinic for BPH follow-up.    Long-standing history of BPH for which the patient takes Finasteride and Uroxatral. Stable symptoms. Not at goal, but patient prefers to abstain from surgery.    Denies suprapubic pain, flank pain, or feelings of incomplete emptying.    Most Recent PSA: No longer checking.      Last BUN and Creatinine:  Lab Results   Component Value Date    BUN 16 10/05/2023     Lab Results   Component Value Date    CREATININE 1.0 12/22/2023           PAST MEDICAL, FAMILY AND SOCIAL HISTORY UPDATE:  Past Medical History:   Diagnosis Date    Acute respiratory failure with hypoxia (HCC) 12/9/2021    Adenomatous polyp     history of     Cholecystitis 3/19/2019    Chronic venous insufficiency     COPD with acute exacerbation (HCC) 12/9/2021    DVT (deep venous thrombosis) ([facility])     history of     Edema     related to venous stasis     Gout     history of     Hypertension     Onychomycosis     Osteoarthritis     Plantar fasciitis     Pneumonia due to COVID-19 virus 12/8/2021    Tobacco abuse      Past Surgical History:   Procedure Laterality Date    CARDIAC CATHETERIZATION N/A 12/22/2023    DR. AJ    CARDIAC PROCEDURE N/A 12/22/2023    aj / Left heart cath / coronary angiography performed by Micheal Aj MD at [facility] CARDIAC CATH LAB    CATARACT REMOVAL Right 06/07/2023    [facility] Dr. Garcia    CHOLECYSTECTOMY  03/19/2019    COLONOSCOPY  12/12/2012    polyp    COLONOSCOPY  2003    adenomatous polyps     COLONOSCOPY  08/2009    COLONOSCOPY  06/2008    with polypectomy    CORONARY ARTERY BYPASS GRAFT  10/28/2014    SVG-OM1, SVG-OM2, and SVG-PDA    OTHER SURGICAL HISTORY      teeth extracted     TOTAL KNEE

## 2024-07-22 ENCOUNTER — HOSPITAL ENCOUNTER (OUTPATIENT)
Age: 79
Discharge: HOME | End: 2024-07-22
Payer: OTHER GOVERNMENT

## 2024-07-22 ENCOUNTER — OFFICE VISIT (OUTPATIENT)
Dept: FAMILY MEDICINE CLINIC | Age: 79
End: 2024-07-22
Payer: MEDICARE

## 2024-07-22 VITALS
WEIGHT: 291 LBS | BODY MASS INDEX: 37.35 KG/M2 | SYSTOLIC BLOOD PRESSURE: 118 MMHG | DIASTOLIC BLOOD PRESSURE: 70 MMHG | HEIGHT: 74 IN | HEART RATE: 72 BPM

## 2024-07-22 DIAGNOSIS — I10 PRIMARY HYPERTENSION: ICD-10-CM

## 2024-07-22 DIAGNOSIS — Z01.818 PRE-OPERATIVE CLEARANCE: Primary | ICD-10-CM

## 2024-07-22 DIAGNOSIS — N40.1 LOWER URINARY TRACT SYMPTOMS DUE TO BENIGN PROSTATIC HYPERPLASIA: ICD-10-CM

## 2024-07-22 DIAGNOSIS — Z01.818 PRE-OPERATIVE CLEARANCE: ICD-10-CM

## 2024-07-22 DIAGNOSIS — I50.42 CHRONIC COMBINED SYSTOLIC AND DIASTOLIC CHF (CONGESTIVE HEART FAILURE) (HCC): ICD-10-CM

## 2024-07-22 DIAGNOSIS — M1A.9XX0 CHRONIC GOUT WITHOUT TOPHUS, UNSPECIFIED CAUSE, UNSPECIFIED SITE: ICD-10-CM

## 2024-07-22 DIAGNOSIS — F32.A ANXIETY AND DEPRESSION: ICD-10-CM

## 2024-07-22 DIAGNOSIS — I25.10 CORONARY ARTERY DISEASE INVOLVING NATIVE CORONARY ARTERY OF NATIVE HEART WITHOUT ANGINA PECTORIS: ICD-10-CM

## 2024-07-22 DIAGNOSIS — E03.4 HYPOTHYROIDISM DUE TO ACQUIRED ATROPHY OF THYROID: ICD-10-CM

## 2024-07-22 DIAGNOSIS — E78.2 MIXED HYPERLIPIDEMIA: ICD-10-CM

## 2024-07-22 DIAGNOSIS — F51.01 PRIMARY INSOMNIA: ICD-10-CM

## 2024-07-22 DIAGNOSIS — F41.9 ANXIETY AND DEPRESSION: ICD-10-CM

## 2024-07-22 DIAGNOSIS — J43.9 PULMONARY EMPHYSEMA, UNSPECIFIED EMPHYSEMA TYPE (HCC): ICD-10-CM

## 2024-07-22 LAB
ALBUMIN SERPL-MCNC: 4.3 G/DL (ref 3.5–5.2)
ALBUMIN/GLOB SERPL: 1.5 {RATIO} (ref 1–2.5)
ALP SERPL-CCNC: 91 U/L (ref 40–129)
ALT SERPL-CCNC: 14 U/L (ref 5–41)
ANION GAP SERPL CALCULATED.3IONS-SCNC: 14 MMOL/L (ref 9–17)
AST SERPL-CCNC: 23 U/L
BASOPHILS # BLD: 0.05 K/UL (ref 0–0.2)
BASOPHILS NFR BLD: 1 % (ref 0–2)
BILIRUB SERPL-MCNC: 0.5 MG/DL (ref 0.3–1.2)
BUN SERPL-MCNC: 19 MG/DL (ref 8–23)
BUN/CREAT SERPL: 16 (ref 9–20)
CALCIUM SERPL-MCNC: 9.4 MG/DL (ref 8.6–10.4)
CHLORIDE SERPL-SCNC: 105 MMOL/L (ref 98–107)
CO2 SERPL-SCNC: 24 MMOL/L (ref 20–31)
CREAT SERPL-MCNC: 1.2 MG/DL (ref 0.7–1.2)
EOSINOPHIL # BLD: 0.09 K/UL (ref 0–0.44)
EOSINOPHILS RELATIVE PERCENT: 1 % (ref 1–4)
ERYTHROCYTE [DISTWIDTH] IN BLOOD BY AUTOMATED COUNT: 15.1 % (ref 11.8–14.4)
GFR, ESTIMATED: 62 ML/MIN/1.73M2
GLUCOSE SERPL-MCNC: 106 MG/DL (ref 70–99)
HCT VFR BLD AUTO: 49.2 % (ref 40.7–50.3)
HGB BLD-MCNC: 15.5 G/DL (ref 13–17)
IMM GRANULOCYTES # BLD AUTO: 0.04 K/UL (ref 0–0.3)
IMM GRANULOCYTES NFR BLD: 0 %
LYMPHOCYTES NFR BLD: 2.36 K/UL (ref 1.1–3.7)
LYMPHOCYTES RELATIVE PERCENT: 26 % (ref 24–43)
MCH RBC QN AUTO: 31.1 PG (ref 25.2–33.5)
MCHC RBC AUTO-ENTMCNC: 31.5 G/DL (ref 25.2–33.5)
MCV RBC AUTO: 98.6 FL (ref 82.6–102.9)
MONOCYTES NFR BLD: 0.89 K/UL (ref 0.1–1.2)
MONOCYTES NFR BLD: 10 % (ref 3–12)
NEUTROPHILS NFR BLD: 62 % (ref 36–65)
NEUTS SEG NFR BLD: 5.56 K/UL (ref 1.5–8.1)
NRBC BLD-RTO: 0 PER 100 WBC
PLATELET # BLD AUTO: 154 K/UL (ref 138–453)
PMV BLD AUTO: 9.4 FL (ref 8.1–13.5)
POTASSIUM SERPL-SCNC: 4.5 MMOL/L (ref 3.7–5.3)
PROT SERPL-MCNC: 7.2 G/DL (ref 6.4–8.3)
RBC # BLD AUTO: 4.99 M/UL (ref 4.21–5.77)
RBC # BLD: ABNORMAL 10*6/UL
SODIUM SERPL-SCNC: 143 MMOL/L (ref 135–144)
WBC OTHER # BLD: 9 K/UL (ref 3.5–11.3)

## 2024-07-22 PROCEDURE — 3023F SPIROM DOC REV: CPT | Performed by: NURSE PRACTITIONER

## 2024-07-22 PROCEDURE — 3074F SYST BP LT 130 MM HG: CPT | Performed by: NURSE PRACTITIONER

## 2024-07-22 PROCEDURE — G8417 CALC BMI ABV UP PARAM F/U: HCPCS | Performed by: NURSE PRACTITIONER

## 2024-07-22 PROCEDURE — 99214 OFFICE O/P EST MOD 30 MIN: CPT

## 2024-07-22 PROCEDURE — 99214 OFFICE O/P EST MOD 30 MIN: CPT | Performed by: NURSE PRACTITIONER

## 2024-07-22 PROCEDURE — 36415 COLL VENOUS BLD VENIPUNCTURE: CPT

## 2024-07-22 PROCEDURE — G8427 DOCREV CUR MEDS BY ELIG CLIN: HCPCS | Performed by: NURSE PRACTITIONER

## 2024-07-22 PROCEDURE — 80053 COMPREHEN METABOLIC PANEL: CPT

## 2024-07-22 PROCEDURE — 85025 COMPLETE CBC W/AUTO DIFF WBC: CPT

## 2024-07-22 PROCEDURE — 1123F ACP DISCUSS/DSCN MKR DOCD: CPT | Performed by: NURSE PRACTITIONER

## 2024-07-22 PROCEDURE — 3078F DIAST BP <80 MM HG: CPT | Performed by: NURSE PRACTITIONER

## 2024-07-22 PROCEDURE — 1036F TOBACCO NON-USER: CPT | Performed by: NURSE PRACTITIONER

## 2024-07-22 NOTE — PROGRESS NOTES
[facility]  [address] [facility] [address]  Dept: [phone]  Dept Fax: [phone]  Loc: [phone]    Noah Jauregui is a 78 y.o. male who presents today for his medical conditions/complaintsas noted below.  Noah Jauregui is c/o of   Chief Complaint   Patient presents with    Pre-op Exam     Left cataract DR Naik 7/30/2024     HPI:     Patient presents to the office for pre op clearance. Is normally a patient of Dr. Drake. Presents today needing pre op clearance for cataract surgery. Is supposed to have left cataract surgery with Dr. Garcia on 7/30/2024. Reports right cataract surgery went well. Denies issues with anesthesia in the past.   Gout - takes allopurinol. Tolerating medication well. Denies side effects or issues with medication. Denies any recent flares  CAD - takes aspirin, atorvastatin, Jardiance. Tolerating medication well. Denies side effects or issues with medication. Denies chest pain, SOB, dizziness, edema. Follows with cardiology. Is scheduled to have a defibrillator put in the next few weeks.  Depression/Anxiety - takes wellbutrin, zoloft. Tolerating medication well. Denies side effects or issues with medication. Feels medication is working well and adequately controlling symptoms. Denies any current feelings of depression, anxiety, thoughts of harming self or anyone else.   CHF - takes lasix, aldactone. Tolerating medication well. Denies side effects or issues with medication. Denies any current SOB, edema, chest pain.   Hypothyroidism - takes synthroid. Tolerating medication well. Denies side effects or issues with medication. Denies any current symptoms.   Insomnia - takes trazodone. Tolerating medication well. Denies side effects or issues with medication. Denies any issues with sleep.   BPH - takes finasteride, oxybutynin, uroxatral. Tolerating medication well. Denies side

## 2024-07-23 ASSESSMENT — ENCOUNTER SYMPTOMS
WHEEZING: 0
ABDOMINAL DISTENTION: 0
BACK PAIN: 0
NAUSEA: 0
ABDOMINAL PAIN: 0
CHEST TIGHTNESS: 0
SHORTNESS OF BREATH: 0
COUGH: 0
VOMITING: 0
CONSTIPATION: 0
BLURRED VISION: 0
DIARRHEA: 0
ORTHOPNEA: 0
COLOR CHANGE: 0

## 2024-07-29 DIAGNOSIS — M79.674 PAIN AND SWELLING OF TOE OF RIGHT FOOT: ICD-10-CM

## 2024-07-29 DIAGNOSIS — M79.89 PAIN AND SWELLING OF TOE OF RIGHT FOOT: ICD-10-CM

## 2024-07-30 RX ORDER — ALLOPURINOL 300 MG/1
TABLET ORAL
Qty: 30 TABLET | Refills: 0 | Status: SHIPPED | OUTPATIENT
Start: 2024-07-30

## 2024-07-30 RX ORDER — POTASSIUM CHLORIDE 750 MG/1
10 TABLET, FILM COATED, EXTENDED RELEASE ORAL DAILY
Qty: 30 TABLET | Refills: 0 | Status: SHIPPED | OUTPATIENT
Start: 2024-07-30

## 2024-07-30 NOTE — TELEPHONE ENCOUNTER
Noah called requesting a refill of the below medication which has been pended for you:     Requested Prescriptions     Pending Prescriptions Disp Refills    allopurinol (ZYLOPRIM) 300 MG tablet [Pharmacy Med Name: Allopurinol 300 MG Oral Tablet] 30 tablet 0     Sig: Take 1 tablet by mouth once daily    potassium chloride (KLOR-CON) 10 MEQ extended release tablet [Pharmacy Med Name: Potassium Chloride ER 10 MEQ Oral Tablet Extended Release] 30 tablet 0     Sig: Take 1 tablet by mouth once daily       Last Appointment Date: 5/16/2024  Next Appointment Date: 9/11/2024    No Known Allergies

## 2024-08-05 ENCOUNTER — ANESTHESIA (OUTPATIENT)
Age: 79
End: 2024-08-05
Payer: OTHER GOVERNMENT

## 2024-08-05 ENCOUNTER — ANESTHESIA EVENT (OUTPATIENT)
Age: 79
End: 2024-08-05
Payer: OTHER GOVERNMENT

## 2024-08-05 ENCOUNTER — HOSPITAL ENCOUNTER (INPATIENT)
Age: 79
LOS: 1 days | Discharge: HOME | DRG: 277 | End: 2024-08-08
Attending: INTERNAL MEDICINE | Admitting: INTERNAL MEDICINE
Payer: OTHER GOVERNMENT

## 2024-08-05 DIAGNOSIS — I25.5 ISCHEMIC CARDIOMYOPATHY: ICD-10-CM

## 2024-08-05 PROBLEM — Z95.810 ICD (IMPLANTABLE CARDIOVERTER-DEFIBRILLATOR) IN PLACE: Status: ACTIVE | Noted: 2024-08-05

## 2024-08-05 PROBLEM — Z95.810 S/P ICD (INTERNAL CARDIAC DEFIBRILLATOR) PROCEDURE: Status: ACTIVE | Noted: 2024-08-05

## 2024-08-05 LAB
ECHO BSA: 2.61 M2
SARS-COV-2 RDRP RESP QL NAA+PROBE: NOT DETECTED
SPECIMEN DESCRIPTION: NORMAL

## 2024-08-05 PROCEDURE — 2709999900 HC NON-CHARGEABLE SUPPLY: Performed by: INTERNAL MEDICINE

## 2024-08-05 PROCEDURE — 0JH608Z INSERTION OF DEFIBRILLATOR GENERATOR INTO CHEST SUBCUTANEOUS TISSUE AND FASCIA, OPEN APPROACH: ICD-10-PCS | Performed by: INTERNAL MEDICINE

## 2024-08-05 PROCEDURE — 02HK3KZ INSERTION OF DEFIBRILLATOR LEAD INTO RIGHT VENTRICLE, PERCUTANEOUS APPROACH: ICD-10-PCS | Performed by: INTERNAL MEDICINE

## 2024-08-05 PROCEDURE — 6370000000 HC RX 637 (ALT 250 FOR IP): Performed by: INTERNAL MEDICINE

## 2024-08-05 PROCEDURE — 2580000003 HC RX 258: Performed by: INTERNAL MEDICINE

## 2024-08-05 PROCEDURE — C1887 CATHETER, GUIDING: HCPCS | Performed by: INTERNAL MEDICINE

## 2024-08-05 PROCEDURE — 3700000000 HC ANESTHESIA ATTENDED CARE: Performed by: INTERNAL MEDICINE

## 2024-08-05 PROCEDURE — 02H63KZ INSERTION OF DEFIBRILLATOR LEAD INTO RIGHT ATRIUM, PERCUTANEOUS APPROACH: ICD-10-PCS | Performed by: INTERNAL MEDICINE

## 2024-08-05 PROCEDURE — 3E0102A INTRODUCTION OF ANTI-INFECTIVE ENVELOPE INTO SUBCUTANEOUS TISSUE, OPEN APPROACH: ICD-10-PCS | Performed by: INTERNAL MEDICINE

## 2024-08-05 PROCEDURE — C1730 CATH, EP, 19 OR FEW ELECT: HCPCS | Performed by: INTERNAL MEDICINE

## 2024-08-05 PROCEDURE — C1889 IMPLANT/INSERT DEVICE, NOC: HCPCS | Performed by: INTERNAL MEDICINE

## 2024-08-05 PROCEDURE — 2580000003 HC RX 258

## 2024-08-05 PROCEDURE — C1721 AICD, DUAL CHAMBER: HCPCS | Performed by: INTERNAL MEDICINE

## 2024-08-05 PROCEDURE — 33249 INSJ/RPLCMT DEFIB W/LEAD(S): CPT | Performed by: INTERNAL MEDICINE

## 2024-08-05 PROCEDURE — 7100000010 HC PHASE II RECOVERY - FIRST 15 MIN: Performed by: INTERNAL MEDICINE

## 2024-08-05 PROCEDURE — 7100000011 HC PHASE II RECOVERY - ADDTL 15 MIN: Performed by: INTERNAL MEDICINE

## 2024-08-05 PROCEDURE — C1777 LEAD, AICD, ENDO SINGLE COIL: HCPCS | Performed by: INTERNAL MEDICINE

## 2024-08-05 PROCEDURE — 87635 SARS-COV-2 COVID-19 AMP PRB: CPT

## 2024-08-05 PROCEDURE — 6360000002 HC RX W HCPCS

## 2024-08-05 PROCEDURE — C1892 INTRO/SHEATH,FIXED,PEEL-AWAY: HCPCS | Performed by: INTERNAL MEDICINE

## 2024-08-05 PROCEDURE — C1769 GUIDE WIRE: HCPCS | Performed by: INTERNAL MEDICINE

## 2024-08-05 PROCEDURE — 6360000002 HC RX W HCPCS: Performed by: INTERNAL MEDICINE

## 2024-08-05 PROCEDURE — 2500000003 HC RX 250 WO HCPCS: Performed by: INTERNAL MEDICINE

## 2024-08-05 PROCEDURE — C1898 LEAD, PMKR, OTHER THAN TRANS: HCPCS | Performed by: INTERNAL MEDICINE

## 2024-08-05 PROCEDURE — 3700000001 HC ADD 15 MINUTES (ANESTHESIA): Performed by: INTERNAL MEDICINE

## 2024-08-05 RX ORDER — FINASTERIDE 5 MG/1
5 TABLET, FILM COATED ORAL DAILY
Status: DISCONTINUED | OUTPATIENT
Start: 2024-08-05 | End: 2024-08-08 | Stop reason: HOSPADM

## 2024-08-05 RX ORDER — ATORVASTATIN CALCIUM 40 MG/1
40 TABLET, FILM COATED ORAL DAILY
Status: DISCONTINUED | OUTPATIENT
Start: 2024-08-05 | End: 2024-08-08 | Stop reason: HOSPADM

## 2024-08-05 RX ORDER — PROPOFOL 10 MG/ML
INJECTION, EMULSION INTRAVENOUS CONTINUOUS PRN
Status: DISCONTINUED | OUTPATIENT
Start: 2024-08-05 | End: 2024-08-05 | Stop reason: SDUPTHER

## 2024-08-05 RX ORDER — FENTANYL CITRATE 50 UG/ML
25 INJECTION, SOLUTION INTRAMUSCULAR; INTRAVENOUS EVERY 5 MIN PRN
Status: DISCONTINUED | OUTPATIENT
Start: 2024-08-05 | End: 2024-08-05 | Stop reason: HOSPADM

## 2024-08-05 RX ORDER — FENTANYL CITRATE 50 UG/ML
50 INJECTION, SOLUTION INTRAMUSCULAR; INTRAVENOUS EVERY 5 MIN PRN
Status: DISCONTINUED | OUTPATIENT
Start: 2024-08-05 | End: 2024-08-05 | Stop reason: HOSPADM

## 2024-08-05 RX ORDER — SODIUM CHLORIDE 9 MG/ML
INJECTION, SOLUTION INTRAVENOUS CONTINUOUS
Status: DISCONTINUED | OUTPATIENT
Start: 2024-08-05 | End: 2024-08-05

## 2024-08-05 RX ORDER — SODIUM CHLORIDE 0.9 % (FLUSH) 0.9 %
5-40 SYRINGE (ML) INJECTION EVERY 12 HOURS SCHEDULED
Status: DISCONTINUED | OUTPATIENT
Start: 2024-08-05 | End: 2024-08-05 | Stop reason: HOSPADM

## 2024-08-05 RX ORDER — ALLOPURINOL 100 MG/1
300 TABLET ORAL DAILY
Status: DISCONTINUED | OUTPATIENT
Start: 2024-08-05 | End: 2024-08-08 | Stop reason: HOSPADM

## 2024-08-05 RX ORDER — POTASSIUM CHLORIDE 750 MG/1
10 CAPSULE, EXTENDED RELEASE ORAL DAILY
Status: DISCONTINUED | OUTPATIENT
Start: 2024-08-06 | End: 2024-08-08 | Stop reason: HOSPADM

## 2024-08-05 RX ORDER — VANCOMYCIN HYDROCHLORIDE 1 G/20ML
INJECTION, POWDER, LYOPHILIZED, FOR SOLUTION INTRAVENOUS PRN
Status: DISCONTINUED | OUTPATIENT
Start: 2024-08-05 | End: 2024-08-05 | Stop reason: SDUPTHER

## 2024-08-05 RX ORDER — OXYBUTYNIN CHLORIDE 5 MG/1
5 TABLET, EXTENDED RELEASE ORAL DAILY
Status: DISCONTINUED | OUTPATIENT
Start: 2024-08-05 | End: 2024-08-08 | Stop reason: HOSPADM

## 2024-08-05 RX ORDER — BUPROPION HYDROCHLORIDE 75 MG/1
75 TABLET ORAL 2 TIMES DAILY
Status: DISCONTINUED | OUTPATIENT
Start: 2024-08-05 | End: 2024-08-08 | Stop reason: HOSPADM

## 2024-08-05 RX ORDER — VITAMIN B COMPLEX
1000 TABLET ORAL DAILY
Status: DISCONTINUED | OUTPATIENT
Start: 2024-08-05 | End: 2024-08-08 | Stop reason: HOSPADM

## 2024-08-05 RX ORDER — NALOXONE HYDROCHLORIDE 0.4 MG/ML
INJECTION, SOLUTION INTRAMUSCULAR; INTRAVENOUS; SUBCUTANEOUS PRN
Status: DISCONTINUED | OUTPATIENT
Start: 2024-08-05 | End: 2024-08-05 | Stop reason: HOSPADM

## 2024-08-05 RX ORDER — SODIUM CHLORIDE 9 MG/ML
INJECTION, SOLUTION INTRAVENOUS PRN
Status: DISCONTINUED | OUTPATIENT
Start: 2024-08-05 | End: 2024-08-05 | Stop reason: HOSPADM

## 2024-08-05 RX ORDER — METOPROLOL SUCCINATE 25 MG/1
25 TABLET, EXTENDED RELEASE ORAL NIGHTLY
Status: DISCONTINUED | OUTPATIENT
Start: 2024-08-05 | End: 2024-08-08 | Stop reason: HOSPADM

## 2024-08-05 RX ORDER — SODIUM CHLORIDE 0.9 % (FLUSH) 0.9 %
5-40 SYRINGE (ML) INJECTION EVERY 12 HOURS SCHEDULED
Status: DISCONTINUED | OUTPATIENT
Start: 2024-08-05 | End: 2024-08-08 | Stop reason: HOSPADM

## 2024-08-05 RX ORDER — SODIUM CHLORIDE 9 MG/ML
INJECTION, SOLUTION INTRAVENOUS CONTINUOUS
Status: DISCONTINUED | OUTPATIENT
Start: 2024-08-05 | End: 2024-08-06

## 2024-08-05 RX ORDER — MIDODRINE HYDROCHLORIDE 5 MG/1
5 TABLET ORAL 3 TIMES DAILY
Status: DISCONTINUED | OUTPATIENT
Start: 2024-08-05 | End: 2024-08-08 | Stop reason: HOSPADM

## 2024-08-05 RX ORDER — SPIRONOLACTONE 25 MG/1
12.5 TABLET ORAL ONCE
Status: COMPLETED | OUTPATIENT
Start: 2024-08-05 | End: 2024-08-05

## 2024-08-05 RX ORDER — SODIUM CHLORIDE 9 MG/ML
INJECTION, SOLUTION INTRAVENOUS PRN
Status: DISCONTINUED | OUTPATIENT
Start: 2024-08-05 | End: 2024-08-08 | Stop reason: HOSPADM

## 2024-08-05 RX ORDER — LIDOCAINE HYDROCHLORIDE AND EPINEPHRINE 10; 10 MG/ML; UG/ML
INJECTION, SOLUTION INFILTRATION; PERINEURAL PRN
Status: DISCONTINUED | OUTPATIENT
Start: 2024-08-05 | End: 2024-08-05 | Stop reason: HOSPADM

## 2024-08-05 RX ORDER — PROPOFOL 10 MG/ML
INJECTION, EMULSION INTRAVENOUS
Status: DISPENSED
Start: 2024-08-05 | End: 2024-08-05

## 2024-08-05 RX ORDER — ACETAMINOPHEN 325 MG/1
650 TABLET ORAL EVERY 4 HOURS PRN
Status: DISCONTINUED | OUTPATIENT
Start: 2024-08-05 | End: 2024-08-08 | Stop reason: HOSPADM

## 2024-08-05 RX ORDER — FUROSEMIDE 20 MG/1
20 TABLET ORAL EVERY MORNING
Status: DISCONTINUED | OUTPATIENT
Start: 2024-08-06 | End: 2024-08-08 | Stop reason: HOSPADM

## 2024-08-05 RX ORDER — SODIUM CHLORIDE 0.9 % (FLUSH) 0.9 %
5-40 SYRINGE (ML) INJECTION PRN
Status: DISCONTINUED | OUTPATIENT
Start: 2024-08-05 | End: 2024-08-05 | Stop reason: HOSPADM

## 2024-08-05 RX ORDER — TRAZODONE HYDROCHLORIDE 50 MG/1
50 TABLET ORAL NIGHTLY
Status: DISCONTINUED | OUTPATIENT
Start: 2024-08-05 | End: 2024-08-08 | Stop reason: HOSPADM

## 2024-08-05 RX ORDER — SODIUM CHLORIDE 9 MG/ML
INJECTION, SOLUTION INTRAVENOUS CONTINUOUS PRN
Status: DISCONTINUED | OUTPATIENT
Start: 2024-08-05 | End: 2024-08-05 | Stop reason: SDUPTHER

## 2024-08-05 RX ORDER — FENTANYL CITRATE 50 UG/ML
INJECTION, SOLUTION INTRAMUSCULAR; INTRAVENOUS PRN
Status: DISCONTINUED | OUTPATIENT
Start: 2024-08-05 | End: 2024-08-05 | Stop reason: SDUPTHER

## 2024-08-05 RX ORDER — LOSARTAN POTASSIUM 25 MG/1
12.5 TABLET ORAL DAILY
Status: DISCONTINUED | OUTPATIENT
Start: 2024-08-05 | End: 2024-08-08 | Stop reason: HOSPADM

## 2024-08-05 RX ORDER — LEVOTHYROXINE SODIUM 0.1 MG/1
100 TABLET ORAL DAILY
Status: DISCONTINUED | OUTPATIENT
Start: 2024-08-05 | End: 2024-08-08 | Stop reason: HOSPADM

## 2024-08-05 RX ORDER — SODIUM CHLORIDE 0.9 % (FLUSH) 0.9 %
5-40 SYRINGE (ML) INJECTION PRN
Status: DISCONTINUED | OUTPATIENT
Start: 2024-08-05 | End: 2024-08-08 | Stop reason: HOSPADM

## 2024-08-05 RX ORDER — ONDANSETRON 2 MG/ML
4 INJECTION INTRAMUSCULAR; INTRAVENOUS
Status: DISCONTINUED | OUTPATIENT
Start: 2024-08-05 | End: 2024-08-05 | Stop reason: HOSPADM

## 2024-08-05 RX ADMIN — BUPROPION HYDROCHLORIDE 75 MG: 75 TABLET, FILM COATED ORAL at 21:28

## 2024-08-05 RX ADMIN — ALLOPURINOL 300 MG: 100 TABLET ORAL at 21:37

## 2024-08-05 RX ADMIN — FENTANYL CITRATE 25 MCG: 50 INJECTION, SOLUTION INTRAMUSCULAR; INTRAVENOUS at 15:52

## 2024-08-05 RX ADMIN — OXYBUTYNIN CHLORIDE 5 MG: 5 TABLET, EXTENDED RELEASE ORAL at 21:29

## 2024-08-05 RX ADMIN — PROPOFOL 10 MCG/KG/MIN: 10 INJECTION, EMULSION INTRAVENOUS at 12:55

## 2024-08-05 RX ADMIN — SODIUM CHLORIDE, PRESERVATIVE FREE 10 ML: 5 INJECTION INTRAVENOUS at 22:03

## 2024-08-05 RX ADMIN — SODIUM CHLORIDE: 9 INJECTION, SOLUTION INTRAVENOUS at 12:40

## 2024-08-05 RX ADMIN — TRAZODONE HYDROCHLORIDE 50 MG: 50 TABLET ORAL at 21:29

## 2024-08-05 RX ADMIN — LOSARTAN POTASSIUM 12.5 MG: 25 TABLET, FILM COATED ORAL at 21:37

## 2024-08-05 RX ADMIN — FENTANYL CITRATE 25 MCG: 50 INJECTION, SOLUTION INTRAMUSCULAR; INTRAVENOUS at 15:03

## 2024-08-05 RX ADMIN — MIDODRINE HYDROCHLORIDE 5 MG: 5 TABLET ORAL at 21:29

## 2024-08-05 RX ADMIN — FINASTERIDE 5 MG: 5 TABLET, FILM COATED ORAL at 21:28

## 2024-08-05 RX ADMIN — FENTANYL CITRATE 25 MCG: 50 INJECTION, SOLUTION INTRAMUSCULAR; INTRAVENOUS at 12:38

## 2024-08-05 RX ADMIN — SODIUM CHLORIDE: 9 INJECTION, SOLUTION INTRAVENOUS at 11:06

## 2024-08-05 RX ADMIN — SODIUM CHLORIDE: 9 INJECTION, SOLUTION INTRAVENOUS at 22:17

## 2024-08-05 RX ADMIN — ATORVASTATIN CALCIUM 40 MG: 40 TABLET, FILM COATED ORAL at 21:37

## 2024-08-05 RX ADMIN — VANCOMYCIN HYDROCHLORIDE 1000 MG: 1 INJECTION, POWDER, LYOPHILIZED, FOR SOLUTION INTRAVENOUS at 13:19

## 2024-08-05 RX ADMIN — FENTANYL CITRATE 25 MCG: 50 INJECTION, SOLUTION INTRAMUSCULAR; INTRAVENOUS at 15:27

## 2024-08-05 RX ADMIN — SODIUM CHLORIDE: 9 INJECTION, SOLUTION INTRAVENOUS at 15:18

## 2024-08-05 RX ADMIN — SPIRONOLACTONE 12.5 MG: 25 TABLET, FILM COATED ORAL at 21:29

## 2024-08-05 RX ADMIN — FENTANYL CITRATE 25 MCG: 50 INJECTION, SOLUTION INTRAMUSCULAR; INTRAVENOUS at 13:45

## 2024-08-05 RX ADMIN — ACETAMINOPHEN 650 MG: 325 TABLET ORAL at 21:29

## 2024-08-05 RX ADMIN — FENTANYL CITRATE 25 MCG: 50 INJECTION, SOLUTION INTRAMUSCULAR; INTRAVENOUS at 14:33

## 2024-08-05 RX ADMIN — FENTANYL CITRATE 25 MCG: 50 INJECTION, SOLUTION INTRAMUSCULAR; INTRAVENOUS at 15:45

## 2024-08-05 RX ADMIN — VANCOMYCIN HYDROCHLORIDE 1000 MG: 1 INJECTION, POWDER, LYOPHILIZED, FOR SOLUTION INTRAVENOUS at 13:42

## 2024-08-05 RX ADMIN — CHOLECALCIFEROL TAB 25 MCG (1000 UNIT) 1000 UNITS: 25 TAB at 21:29

## 2024-08-05 RX ADMIN — METOPROLOL SUCCINATE 25 MG: 25 TABLET, EXTENDED RELEASE ORAL at 21:29

## 2024-08-05 RX ADMIN — SERTRALINE HYDROCHLORIDE 100 MG: 50 TABLET ORAL at 21:28

## 2024-08-05 RX ADMIN — SODIUM CHLORIDE: 900 INJECTION INTRAVENOUS at 12:27

## 2024-08-05 RX ADMIN — LEVOTHYROXINE SODIUM 100 MCG: 100 TABLET ORAL at 21:37

## 2024-08-05 RX ADMIN — FENTANYL CITRATE 25 MCG: 50 INJECTION, SOLUTION INTRAMUSCULAR; INTRAVENOUS at 15:35

## 2024-08-05 ASSESSMENT — PAIN DESCRIPTION - LOCATION: LOCATION: SHOULDER

## 2024-08-05 ASSESSMENT — PAIN DESCRIPTION - ORIENTATION: ORIENTATION: LEFT

## 2024-08-05 ASSESSMENT — COPD QUESTIONNAIRES: CAT_SEVERITY: MODERATE

## 2024-08-05 ASSESSMENT — PAIN SCALES - GENERAL: PAINLEVEL_OUTOF10: 4

## 2024-08-05 NOTE — BRIEF OP NOTE
Brief Postoperative Note      Patient: Noah Jauregui  YOB: 1945  MRN: [id number]      DATE OF PROCEDURE:  8/5/2024      Pre-Op Diagnosis Codes:     * Ischemic cardiomyopathy [I25.5]     * LBBB    Post-Op Diagnosis:  Same       Procedure(s):  felipe / icd w/anes *medtronic*  Implantation of dual chamber ICD  Transvenous placement of RA and RV leads w/ fluoro; attempted placement of CS lead  Contrast venogram  Intraoperative lead testing    Surgeon(s):  John Olivares MD    Assistant:  None    Access:  Left subclavian vein ( 9F, 9F)    Anesthesia: Monitor Anesthesia Care    Estimated Blood Loss (mL):  20 mL    Complications:  None      Implants:  Medtronic [place] XT DR ISAIAH                    Medtronic [id number] lead at high RA                    Medtronic 6935-65 lead at RV apex    Implant Name Type Inv. Item Serial No.  Lot No. LRB No. Used Action   LEAD PACE AD 8.6FR L65CM VENT MATT EPTFE DF1 CONN ACT FIX - [id number] Cardiac Lead LEAD PACE AD 8.6FR L65CM VENT MATT EPTFE DF1 CONN ACT FIX [id number] MEDTRONIC CARDIAC RTHYM MGT-WD  N/A 1 Implanted   LEAD PACE 5.7FR L52CM VENT MATT PLAT INSUL BPLR STEROID - [id number] Cardiac Lead LEAD PACE 5.7FR L52CM VENT MATT PLAT INSUL BPLR STEROID [id number] [employer] CARDIAC RTHYM MGT-WD  N/A 1 Implanted   ICD [place] XT DR MRI DF1 2 CHMBR - [id number] Cardiac ICD/CRT-D ICD COBALT XT DR MRI DF1 2 CHMBR [id number] [employer] CARDIAC RTHYM MGT-WD  N/A 1 Implanted   ENVELOPE PACEMKR L W2.9XL3.3IN ABSRB ANTIBACT TYRX - [id number]  ENVELOPE PACEMKR L W2.9XL3.3IN ABSRB ANTIBACT TYRX  MEDTRONIC CARDIAC RTHYM MGT-WD [id number] N/A 1 Implanted           FINDINGS:  All impedances and thresholds at the RA and RVA were stable and WNL.  Unable to place coronary sinus lead due to limited branches not accessible to multiple guidewires.  40 ml of low ionic contrast were used for coronary sinus venograms.  The Medtronic [place] XT DR MRI ICD is programmed in  single-zone tachyarrhythmia detection with rate cut-off of 188 bpm and with all shock energies programmed to 40 J  in reversed polarity.  Woodrow pacing is programmed in AAIR/ DDDR mode, 60/120 ppm.      PLAN:  Bedrest overnight with ICD evaluation and CXR AM 8/6, then discharge if no complications.  Pt will f/u in one week at [facility] in [place].        Electronically signed by John Olivares MD on 8/5/2024 at 7:29 PM

## 2024-08-05 NOTE — ANESTHESIA POSTPROCEDURE EVALUATION
Department of Anesthesiology  Postprocedure Note    Patient: Noah Jauregui  MRN: [id number]  YOB: 1945  Date of evaluation: 8/5/2024    Procedure Summary       Date: 08/05/24 Room / Location: [facility] EP LAB  1 / [facility] CARDIAC CATH LAB    Anesthesia Start: 1228 Anesthesia Stop: 1715    Procedure: felipe / icd w/anes *medtronic* Diagnosis:       Ischemic cardiomyopathy      (Ischemic cardiomyopathy [I25.5])    Providers: John Olivares MD Responsible Provider: Shahab Faulkner MD    Anesthesia Type: MAC, general ASA Status: 4            Anesthesia Type: No value filed.    Laurence Phase I: Laurence Score: 9    Laurence Phase II:    POST-OP ANESTHESIA NOTE       /67   Pulse 65   Temp 97.5 °F (36.4 °C) (Temporal)   Resp 21   Ht 1.88 m (6' 2\")   Wt 130.2 kg (287 lb)   SpO2 92%   BMI 36.85 kg/m²    Pain Assessment: None - Denies Pain          Anesthesia Post Evaluation    Patient location during evaluation: PACU  Patient participation: complete - patient participated  Level of consciousness: awake  Pain score: 0  Airway patency: patent  Nausea & Vomiting: no nausea  Cardiovascular status: hemodynamically stable  Respiratory status: acceptable  Hydration status: stable  Pain management: adequate        No notable events documented.

## 2024-08-05 NOTE — ANESTHESIA PRE PROCEDURE
Department of Anesthesiology  Preprocedure Note       Name:  Noah Jauregui   Age:  78 y.o.  :  1945                                          MRN:  [id number]         Date:  2024      Surgeon: Surgeon(s):  John Olivares MD    Procedure: Procedure(s):  felipe / icd w/anes *medtronic*    Medications prior to admission:   Prior to Admission medications    Medication Sig Start Date End Date Taking? Authorizing Provider   allopurinol (ZYLOPRIM) 300 MG tablet Take 1 tablet by mouth once daily 24   Edith Andres APRN - CNP   potassium chloride (KLOR-CON) 10 MEQ extended release tablet Take 1 tablet by mouth once daily 24   Edith Andres APRN - CNP   alfuzosin (UROXATRAL) 10 MG extended release tablet Take 1 tablet by mouth daily 24   Alex Cook PA-C   finasteride (PROSCAR) 5 MG tablet Take 1 tablet by mouth daily 24   Alex Cook PA-C   levothyroxine (SYNTHROID) 100 MCG tablet Take 1 tablet by mouth once daily 24   Antwan Ramon MD   JARDIANCE 10 MG tablet Take 1 tablet by mouth once daily 24   Yoanna Andrews APRN - CNP   spironolactone (ALDACTONE) 25 MG tablet Take 1/2 (one-half) tablet by mouth once daily 24   Yoanna Andrews APRN - CNP   oxyBUTYnin (DITROPAN-XL) 5 MG extended release tablet Take 1 tablet by mouth once daily 24   Wyatt Marley MD   sertraline (ZOLOFT) 100 MG tablet Take 1 tablet by mouth once daily 24   Bonifacio Drake DO   buPROPion (WELLBUTRIN) 75 MG tablet Take 1 tablet by mouth 2 times daily 24   Bonifacio Drake DO   furosemide (LASIX) 20 MG tablet Take 1 tablet by mouth every morning 24   Bonifacio Drake DO   midodrine (PROAMATINE) 5 MG tablet Take 1 tablet by mouth 3 times daily 24   Bonifacio Drake,    atorvastatin (LIPITOR) 40 MG tablet Take 1 tablet by mouth once daily 24   Bonifacio Drake,    hypromellose 0.3 % GEL ophthalmic gel

## 2024-08-05 NOTE — H&P
[facility]  HISTORY AND PHYSICAL                     Date:   8/5/2024  Patient name: Noah Jauregui  Date of admission:  8/5/2024 10:10 AM  MRN:   [id number]  YOB: 1945      Reason for Admission:  ICD implantation    CHIEF COMPLAINT:  Cardiomyopathy; LBBB     History Obtained From:  Patient and medical record    HISTORY OF PRESENT ILLNESS:        He has been doing well, however with recent MUGA scan on 4/24/24 showing a persistently reduced LVEF of 21%.  He has stable QUILES due mostly to his COPD and chronic respiratory failure, but has had no significant LE edema, weight gain, orthopnea or PND.  He has chronic LBBB with QRS of 176 msec, and has had no palpitation, lightheadedness or syncope.    He comes today for implantation of a cardiac resynchronization defibrillator.       Past Medical History:   has a past medical history of Acute respiratory failure with hypoxia (HCC), Adenomatous polyp, Cholecystitis, Chronic venous insufficiency, COPD with acute exacerbation (HCC), DVT (deep venous thrombosis) ([facility]), Edema, Gout, Hypertension, Onychomycosis, Osteoarthritis, Plantar fasciitis, Pneumonia due to COVID-19 virus, and Tobacco abuse.    Past Surgical History:   has a past surgical history that includes Colonoscopy (12/12/2012); Vasectomy (1980); Colonoscopy (2003); other surgical history; Colonoscopy (08/2009); Colonoscopy (06/2008); Coronary artery bypass graft (10/28/2014); Total knee arthroplasty (Right, 10/2015); Total knee arthroplasty (Left, 03/2018); TURP; Cataract removal (Right, 06/07/2023); Cholecystectomy (03/19/2019); Cardiac catheterization (N/A, 12/22/2023); Cardiac procedure (N/A, 12/22/2023); and Cardiac defibrillator placement (08/05/2024).     Home Medications:    Prior to Admission medications    Medication Sig Start Date End Date Taking? Authorizing Provider   allopurinol (ZYLOPRIM) 300 MG tablet Take 1 tablet by mouth once daily 7/30/24   Mckenna

## 2024-08-05 NOTE — PROGRESS NOTES
Patient returned to PCC room post procedure.  Pressure dressing clean, dry and intact. Sling to stay in place for 1 week and strict bedrest for 12 hours. Post op education given & all questions answered. RN bedside. Side rails up x2 and call light within reach. Pulses obtained and unchanged. CxR needed. VS obtained and documented .  U.O.= 75mls at this time. Will cont to monitor.  ID card and booklet educated on and given to patient.  Report called to 5 A RNKeyshawn. All questions answered. Will transport shortly.

## 2024-08-05 NOTE — PROGRESS NOTES
Patient admitted, consent signed and questions answered. Patient ready for procedure. Call light to reach with side rails up 2 of 2.   Family at bedside with patient.  History and physical needs to be completed.

## 2024-08-05 NOTE — DISCHARGE INSTRUCTIONS
NEW ICD/PACEMAKER DISCHARGE INSTRUCTIONS    HOME CARE  KEEP DRESSING CLEAN & DRY.  DRESSING will be REMOVED at you FOLLOWING APPOINTMENT. If it falls off before appt. Leave it off.  The Steri-Strips (narrow pieces of tape) help support the incision while it is healing & will FALL OFF on there OWN.  STAPLES NEED to be REMOVED in 7 to 10 DAYS at OFFICE APPOINTMENT.  ICE SITE AS NEEDED  BEDREST FOR 18 HOURS.  AVOID RAISING ARM OVER YOUR HEAD UNTIL SEEN AT YOUR FOLLOWING OFFICE VISIT.  WEAR SLING AT ALL TIMES DURING THE ENTIRE FIRST WEEK.  SLIGHT BULGE UNDER THE SKIN FROM THE DEVICE IS NORMAL AND WILL BECOME LESS NOTICEABLE OVER THE NEXT TWO WEEKS.  NO SHOWERING OR BATHS FOR 7 DAYS. SPONGE BATHS ARE OKAY.  DO NOT LIFT anything heavier than 8-10 lbs (gallon of milk) for 1 Week.  AVOID ACTIVITIES THAT MAY RESULT IN HIGH IMPACT OR STRESS INCISIONAL SITE   AVOID ANYTHING THAT WILL RUB AGAINST THE INCISION (Tight Fitting Clothing)  NO DRIVING UNTIL SEEN BY DOCTOR.  HOLD ANTICOAGULANTS (Pradaxa or Eliquis, or Xarelto) FOR 5 Days  HOLD ANTIPLATELETS (Aspirin, Brilinta, Prasugrel, or Plavix) FOR 48 hours  CONTINUE ANTICOAGULANT (Coumadin) Same Night   If any IV Dye/Contrast used; Hold METFORMIN for 48 hours.  TAKE TYLENOL FOR PAIN  FOLLOW-UP WITH WOUND CARE IN 1 WEEK, office will call you.  ANY QUESTIONS OR CONCERNS PLEASE CONTACT YOUR DOCTOR AND/OR SEEK HELP     WATCH FOR SIGNS OF INFECTION WHICH :   -REDNESS / WARMTH / DRAINAGE FROM INCISION / INCREASE SWELLING / TEMPERATURE BY MOUTH 101 OR GREATER     CARRY DEVICE ID AND HOME MEDICATION LIST WITH YOU AT ALL TIMES.   *IF This is a NEW ICD/PM SLING should stay in place for 24 HOURS & a CHEST X-RAY to be completed the following Day.               PACEMAKER PLACEMENT                              Pacemaker placement is surgery to put a pacemaker in your chest. This surgery may be done if you have bradycardia (a slow heart rate). Your doctor made a cut (incision) in your chest.  The doctor put the pacemaker leads through the cut, into a large blood vessel, then into the heart. The doctor put the pacemaker under the skin of your chest and attached the leads to it.  Your chest may be sore where the doctor made the cut. You also may have a bruise and mild swelling. These symptoms usually get better in 1 to 2 weeks. You may feel a hard ridge along the incision. This usually gets softer in the months after surgery. You may be able to see or feel the outline of the pacemaker under your skin.  You will probably be able to go back to work or your usual routine 1 to 2 weeks after surgery.  Pacemaker batteries usually last 5 to 15 years. Your doctor will talk to you about how often you will need to have your pacemaker checked.  You'll need to take steps to safely use electric devices. Some of these devices can stop your pacemaker from working right for a short time. Check with your doctor about what to avoid and what to keep a short distance away from your pacemaker. For example, you will need to stay away from things with strong magnetic and electrical fields. An example is an MRI machine (unless your pacemaker is safe for an MRI). You can use a cellphone and other wireless devices, but keep them at least 6 inches away from your pacemaker. Many household and office electronics don't affect a pacemaker. These include kitchen appliances and computers.  This care sheet gives you a general idea about how long it will take for you to recover. But each person recovers at a different pace. Follow the steps below to get better as quickly as possible.                  SEDATION / ANALGESIA INFORMATION / HOME GOING ADVICE  Sedation/analgesia is used during short medical procedures under controlled supervision. The medication will produce a strong relaxation. You will be able to hear, speak and follow instructions, but your memory and alertness will be decreased.You will be able to swallow and breathe on your

## 2024-08-06 ENCOUNTER — APPOINTMENT (OUTPATIENT)
Dept: GENERAL RADIOLOGY | Age: 79
DRG: 277 | End: 2024-08-06
Attending: INTERNAL MEDICINE
Payer: OTHER GOVERNMENT

## 2024-08-06 LAB
ANION GAP SERPL CALCULATED.3IONS-SCNC: 9 MMOL/L (ref 9–16)
BUN SERPL-MCNC: 16 MG/DL (ref 8–23)
CALCIUM SERPL-MCNC: 8.5 MG/DL (ref 8.6–10.4)
CHLORIDE SERPL-SCNC: 105 MMOL/L (ref 98–107)
CO2 SERPL-SCNC: 23 MMOL/L (ref 20–31)
CREAT SERPL-MCNC: 0.9 MG/DL (ref 0.7–1.2)
ERYTHROCYTE [DISTWIDTH] IN BLOOD BY AUTOMATED COUNT: 14.9 % (ref 11.8–14.4)
GFR, ESTIMATED: 84 ML/MIN/1.73M2
GLUCOSE SERPL-MCNC: 119 MG/DL (ref 74–99)
HCT VFR BLD AUTO: 45.1 % (ref 40.7–50.3)
HCT VFR BLD AUTO: 45.9 % (ref 40.7–50.3)
HCT VFR BLD AUTO: 46.8 % (ref 40.7–50.3)
HGB BLD-MCNC: 13.8 G/DL (ref 13–17)
HGB BLD-MCNC: 14 G/DL (ref 13–17)
HGB BLD-MCNC: 14.4 G/DL (ref 13–17)
MCH RBC QN AUTO: 31.4 PG (ref 25.2–33.5)
MCHC RBC AUTO-ENTMCNC: 30.8 G/DL (ref 28.4–34.8)
MCV RBC AUTO: 102 FL (ref 82.6–102.9)
NRBC BLD-RTO: 0 PER 100 WBC
PLATELET # BLD AUTO: 155 K/UL (ref 138–453)
PMV BLD AUTO: 10.2 FL (ref 8.1–13.5)
POTASSIUM SERPL-SCNC: 4.4 MMOL/L (ref 3.7–5.3)
RBC # BLD AUTO: 4.59 M/UL (ref 4.21–5.77)
SODIUM SERPL-SCNC: 137 MMOL/L (ref 136–145)
WBC OTHER # BLD: 11.1 K/UL (ref 3.5–11.3)

## 2024-08-06 PROCEDURE — 6370000000 HC RX 637 (ALT 250 FOR IP): Performed by: INTERNAL MEDICINE

## 2024-08-06 PROCEDURE — 85027 COMPLETE CBC AUTOMATED: CPT

## 2024-08-06 PROCEDURE — 36415 COLL VENOUS BLD VENIPUNCTURE: CPT

## 2024-08-06 PROCEDURE — 80048 BASIC METABOLIC PNL TOTAL CA: CPT

## 2024-08-06 PROCEDURE — 71045 X-RAY EXAM CHEST 1 VIEW: CPT

## 2024-08-06 PROCEDURE — 85018 HEMOGLOBIN: CPT

## 2024-08-06 PROCEDURE — 99233 SBSQ HOSP IP/OBS HIGH 50: CPT | Performed by: SURGERY

## 2024-08-06 PROCEDURE — 85014 HEMATOCRIT: CPT

## 2024-08-06 PROCEDURE — 2580000003 HC RX 258: Performed by: INTERNAL MEDICINE

## 2024-08-06 RX ADMIN — SODIUM CHLORIDE, PRESERVATIVE FREE 10 ML: 5 INJECTION INTRAVENOUS at 09:05

## 2024-08-06 RX ADMIN — CHOLECALCIFEROL TAB 25 MCG (1000 UNIT) 1000 UNITS: 25 TAB at 09:41

## 2024-08-06 RX ADMIN — BUPROPION HYDROCHLORIDE 75 MG: 75 TABLET, FILM COATED ORAL at 09:42

## 2024-08-06 RX ADMIN — BUPROPION HYDROCHLORIDE 75 MG: 75 TABLET, FILM COATED ORAL at 21:44

## 2024-08-06 RX ADMIN — ATORVASTATIN CALCIUM 40 MG: 40 TABLET, FILM COATED ORAL at 09:02

## 2024-08-06 RX ADMIN — EMPAGLIFLOZIN 10 MG: 10 TABLET, FILM COATED ORAL at 09:02

## 2024-08-06 RX ADMIN — POTASSIUM CHLORIDE 10 MEQ: 750 CAPSULE, EXTENDED RELEASE ORAL at 09:42

## 2024-08-06 RX ADMIN — SODIUM CHLORIDE, PRESERVATIVE FREE 10 ML: 5 INJECTION INTRAVENOUS at 21:47

## 2024-08-06 RX ADMIN — MIDODRINE HYDROCHLORIDE 5 MG: 5 TABLET ORAL at 21:45

## 2024-08-06 RX ADMIN — FINASTERIDE 5 MG: 5 TABLET, FILM COATED ORAL at 09:42

## 2024-08-06 RX ADMIN — SERTRALINE HYDROCHLORIDE 100 MG: 50 TABLET ORAL at 09:42

## 2024-08-06 RX ADMIN — TRAZODONE HYDROCHLORIDE 50 MG: 50 TABLET ORAL at 21:44

## 2024-08-06 RX ADMIN — MIDODRINE HYDROCHLORIDE 5 MG: 5 TABLET ORAL at 09:04

## 2024-08-06 RX ADMIN — ALLOPURINOL 300 MG: 100 TABLET ORAL at 09:02

## 2024-08-06 RX ADMIN — LOSARTAN POTASSIUM 12.5 MG: 25 TABLET, FILM COATED ORAL at 09:02

## 2024-08-06 RX ADMIN — LEVOTHYROXINE SODIUM 100 MCG: 100 TABLET ORAL at 09:02

## 2024-08-06 RX ADMIN — FUROSEMIDE 20 MG: 20 TABLET ORAL at 09:02

## 2024-08-06 RX ADMIN — OXYBUTYNIN CHLORIDE 5 MG: 5 TABLET, EXTENDED RELEASE ORAL at 10:12

## 2024-08-06 RX ADMIN — ACETAMINOPHEN 650 MG: 325 TABLET ORAL at 21:44

## 2024-08-06 RX ADMIN — METOPROLOL SUCCINATE 25 MG: 25 TABLET, EXTENDED RELEASE ORAL at 21:45

## 2024-08-06 ASSESSMENT — PAIN DESCRIPTION - LOCATION: LOCATION: SHOULDER

## 2024-08-06 ASSESSMENT — PAIN SCALES - GENERAL: PAINLEVEL_OUTOF10: 3

## 2024-08-06 ASSESSMENT — PAIN DESCRIPTION - ORIENTATION: ORIENTATION: LEFT

## 2024-08-06 NOTE — PLAN OF CARE
Problem: Chronic Conditions and Co-morbidities  Goal: Patient's chronic conditions and co-morbidity symptoms are monitored and maintained or improved  8/6/2024 1738 by Christine Mustafa RN  Outcome: Progressing  Flowsheets (Taken 8/6/2024 0800)  Care Plan - Patient's Chronic Conditions and Co-Morbidity Symptoms are Monitored and Maintained or Improved: Monitor and assess patient's chronic conditions and comorbid symptoms for stability, deterioration, or improvement  8/6/2024 0723 by Betty Sosa RN  Outcome: Progressing  8/6/2024 0702 by Betty Ssoa RN  Outcome: Progressing  Flowsheets (Taken 8/5/2024 1723 by Nohemy Donaldson, RN)  Care Plan - Patient's Chronic Conditions and Co-Morbidity Symptoms are Monitored and Maintained or Improved: Monitor and assess patient's chronic conditions and comorbid symptoms for stability, deterioration, or improvement     Problem: Discharge Planning  Goal: Discharge to home or other facility with appropriate resources  8/6/2024 1738 by Christine Mustafa RN  Outcome: Progressing  8/6/2024 0723 by Betty Sosa RN  Outcome: Progressing  8/6/2024 0702 by Betty Sosa RN  Outcome: Progressing     Problem: Safety - Adult  Goal: Free from fall injury  8/6/2024 1738 by Christine Mustafa RN  Outcome: Progressing  8/6/2024 0723 by Betty Sosa RN  Outcome: Progressing  8/6/2024 0702 by Betty Sosa RN  Outcome: Progressing     Problem: ABCDS Injury Assessment  Goal: Absence of physical injury  8/6/2024 1738 by Christine Mustafa RN  Outcome: Progressing  8/6/2024 0723 by Betty Sosa RN  Outcome: Progressing  8/6/2024 0702 by Betty Sosa RN  Outcome: Progressing

## 2024-08-06 NOTE — PLAN OF CARE
Problem: Chronic Conditions and Co-morbidities  Goal: Patient's chronic conditions and co-morbidity symptoms are monitored and maintained or improved  Outcome: Progressing  Flowsheets (Taken 8/5/2024 1723 by Nohemy Donaldson, RN)  Care Plan - Patient's Chronic Conditions and Co-Morbidity Symptoms are Monitored and Maintained or Improved: Monitor and assess patient's chronic conditions and comorbid symptoms for stability, deterioration, or improvement     Problem: Discharge Planning  Goal: Discharge to home or other facility with appropriate resources  Outcome: Progressing     Problem: Safety - Adult  Goal: Free from fall injury  Outcome: Progressing     Problem: ABCDS Injury Assessment  Goal: Absence of physical injury  Outcome: Progressing

## 2024-08-07 LAB
ANION GAP SERPL CALCULATED.3IONS-SCNC: 8 MMOL/L (ref 9–16)
BASOPHILS # BLD: 0.04 K/UL (ref 0–0.2)
BASOPHILS NFR BLD: 0 % (ref 0–2)
BUN SERPL-MCNC: 17 MG/DL (ref 8–23)
CALCIUM SERPL-MCNC: 8.3 MG/DL (ref 8.6–10.4)
CHLORIDE SERPL-SCNC: 105 MMOL/L (ref 98–107)
CO2 SERPL-SCNC: 23 MMOL/L (ref 20–31)
CREAT SERPL-MCNC: 1 MG/DL (ref 0.7–1.2)
EOSINOPHIL # BLD: 0.14 K/UL (ref 0–0.44)
EOSINOPHILS RELATIVE PERCENT: 1 % (ref 1–4)
ERYTHROCYTE [DISTWIDTH] IN BLOOD BY AUTOMATED COUNT: 15 % (ref 11.8–14.4)
GFR, ESTIMATED: 75 ML/MIN/1.73M2
GLUCOSE SERPL-MCNC: 100 MG/DL (ref 74–99)
HCT VFR BLD AUTO: 42.6 % (ref 40.7–50.3)
HCT VFR BLD AUTO: 43.3 % (ref 40.7–50.3)
HCT VFR BLD AUTO: 44.1 % (ref 40.7–50.3)
HCT VFR BLD AUTO: 44.2 % (ref 40.7–50.3)
HGB BLD-MCNC: 13.1 G/DL (ref 13–17)
HGB BLD-MCNC: 13.5 G/DL (ref 13–17)
HGB BLD-MCNC: 13.6 G/DL (ref 13–17)
HGB BLD-MCNC: 13.8 G/DL (ref 13–17)
IMM GRANULOCYTES # BLD AUTO: 0.05 K/UL (ref 0–0.3)
IMM GRANULOCYTES NFR BLD: 1 %
LYMPHOCYTES NFR BLD: 2.07 K/UL (ref 1.1–3.7)
LYMPHOCYTES RELATIVE PERCENT: 21 % (ref 24–43)
MCH RBC QN AUTO: 31.4 PG (ref 25.2–33.5)
MCHC RBC AUTO-ENTMCNC: 31.2 G/DL (ref 28.4–34.8)
MCV RBC AUTO: 100.7 FL (ref 82.6–102.9)
MONOCYTES NFR BLD: 1.49 K/UL (ref 0.1–1.2)
MONOCYTES NFR BLD: 15 % (ref 3–12)
NEUTROPHILS NFR BLD: 61 % (ref 36–65)
NEUTS SEG NFR BLD: 6.02 K/UL (ref 1.5–8.1)
NRBC BLD-RTO: 0 PER 100 WBC
PLATELET # BLD AUTO: ABNORMAL K/UL (ref 138–453)
PLATELET, FLUORESCENCE: 124 K/UL (ref 138–453)
PLATELETS.RETICULATED NFR BLD AUTO: 2.1 % (ref 1.1–10.3)
POTASSIUM SERPL-SCNC: 4.1 MMOL/L (ref 3.7–5.3)
RBC # BLD AUTO: 4.3 M/UL (ref 4.21–5.77)
RBC # BLD: ABNORMAL 10*6/UL
SODIUM SERPL-SCNC: 136 MMOL/L (ref 136–145)
WBC OTHER # BLD: 9.8 K/UL (ref 3.5–11.3)

## 2024-08-07 PROCEDURE — 2580000003 HC RX 258: Performed by: INTERNAL MEDICINE

## 2024-08-07 PROCEDURE — 80048 BASIC METABOLIC PNL TOTAL CA: CPT

## 2024-08-07 PROCEDURE — 85025 COMPLETE CBC W/AUTO DIFF WBC: CPT

## 2024-08-07 PROCEDURE — 6370000000 HC RX 637 (ALT 250 FOR IP): Performed by: INTERNAL MEDICINE

## 2024-08-07 PROCEDURE — 2060000000 HC ICU INTERMEDIATE R&B

## 2024-08-07 PROCEDURE — 36415 COLL VENOUS BLD VENIPUNCTURE: CPT

## 2024-08-07 PROCEDURE — 85018 HEMOGLOBIN: CPT

## 2024-08-07 PROCEDURE — 85055 RETICULATED PLATELET ASSAY: CPT

## 2024-08-07 PROCEDURE — 99233 SBSQ HOSP IP/OBS HIGH 50: CPT | Performed by: NURSE PRACTITIONER

## 2024-08-07 PROCEDURE — 85014 HEMATOCRIT: CPT

## 2024-08-07 RX ADMIN — LEVOTHYROXINE SODIUM 100 MCG: 100 TABLET ORAL at 07:36

## 2024-08-07 RX ADMIN — BUPROPION HYDROCHLORIDE 75 MG: 75 TABLET, FILM COATED ORAL at 09:22

## 2024-08-07 RX ADMIN — POTASSIUM CHLORIDE 10 MEQ: 750 CAPSULE, EXTENDED RELEASE ORAL at 09:22

## 2024-08-07 RX ADMIN — SERTRALINE HYDROCHLORIDE 100 MG: 50 TABLET ORAL at 09:22

## 2024-08-07 RX ADMIN — FINASTERIDE 5 MG: 5 TABLET, FILM COATED ORAL at 09:22

## 2024-08-07 RX ADMIN — SODIUM CHLORIDE, PRESERVATIVE FREE 10 ML: 5 INJECTION INTRAVENOUS at 08:59

## 2024-08-07 RX ADMIN — OXYBUTYNIN CHLORIDE 5 MG: 5 TABLET, EXTENDED RELEASE ORAL at 09:21

## 2024-08-07 RX ADMIN — CHOLECALCIFEROL TAB 25 MCG (1000 UNIT) 1000 UNITS: 25 TAB at 09:21

## 2024-08-07 RX ADMIN — FUROSEMIDE 20 MG: 20 TABLET ORAL at 08:56

## 2024-08-07 RX ADMIN — MIDODRINE HYDROCHLORIDE 5 MG: 5 TABLET ORAL at 21:47

## 2024-08-07 RX ADMIN — METOPROLOL SUCCINATE 25 MG: 25 TABLET, EXTENDED RELEASE ORAL at 21:47

## 2024-08-07 RX ADMIN — TRAZODONE HYDROCHLORIDE 50 MG: 50 TABLET ORAL at 21:46

## 2024-08-07 RX ADMIN — ALLOPURINOL 300 MG: 100 TABLET ORAL at 08:56

## 2024-08-07 RX ADMIN — BUPROPION HYDROCHLORIDE 75 MG: 75 TABLET, FILM COATED ORAL at 21:47

## 2024-08-07 RX ADMIN — EMPAGLIFLOZIN 10 MG: 10 TABLET, FILM COATED ORAL at 08:56

## 2024-08-07 RX ADMIN — ATORVASTATIN CALCIUM 40 MG: 40 TABLET, FILM COATED ORAL at 08:56

## 2024-08-07 RX ADMIN — LOSARTAN POTASSIUM 12.5 MG: 25 TABLET, FILM COATED ORAL at 08:57

## 2024-08-07 RX ADMIN — MIDODRINE HYDROCHLORIDE 5 MG: 5 TABLET ORAL at 14:22

## 2024-08-07 RX ADMIN — SODIUM CHLORIDE, PRESERVATIVE FREE 10 ML: 5 INJECTION INTRAVENOUS at 21:00

## 2024-08-07 RX ADMIN — MIDODRINE HYDROCHLORIDE 5 MG: 5 TABLET ORAL at 08:57

## 2024-08-07 RX ADMIN — ACETAMINOPHEN 650 MG: 325 TABLET ORAL at 21:47

## 2024-08-07 ASSESSMENT — PAIN DESCRIPTION - LOCATION: LOCATION: SHOULDER

## 2024-08-07 ASSESSMENT — PAIN SCALES - GENERAL: PAINLEVEL_OUTOF10: 3

## 2024-08-07 ASSESSMENT — PAIN DESCRIPTION - ORIENTATION: ORIENTATION: LEFT

## 2024-08-07 NOTE — PLAN OF CARE
Problem: Chronic Conditions and Co-morbidities  Goal: Patient's chronic conditions and co-morbidity symptoms are monitored and maintained or improved  8/7/2024 0654 by Betty Sosa RN  Outcome: Progressing  8/6/2024 1738 by Christine Mustafa RN  Outcome: Progressing  Flowsheets (Taken 8/6/2024 0800)  Care Plan - Patient's Chronic Conditions and Co-Morbidity Symptoms are Monitored and Maintained or Improved: Monitor and assess patient's chronic conditions and comorbid symptoms for stability, deterioration, or improvement     Problem: Discharge Planning  Goal: Discharge to home or other facility with appropriate resources  8/7/2024 0654 by Betty Sosa RN  Outcome: Progressing  8/6/2024 1738 by Christine Mustafa RN  Outcome: Progressing     Problem: Safety - Adult  Goal: Free from fall injury  8/7/2024 0654 by Betty Sosa RN  Outcome: Progressing  8/6/2024 1738 by Christine Mustafa RN  Outcome: Progressing     Problem: ABCDS Injury Assessment  Goal: Absence of physical injury  8/7/2024 0654 by Betty Sosa RN  Outcome: Progressing  8/6/2024 1738 by hCristine Mustafa RN  Outcome: Progressing

## 2024-08-07 NOTE — DISCHARGE SUMMARY
[facility]  Discharge Note                 Name:  Noah Jauregui  YOB: 1945  Social Security Number:  [id number]  Medical Record Number:  [id number]    Date of Admission:  8/5/2024  Date of Discharge:  8/7/2024    Admitting physician: John Olivares MD    Discharge Attending: KISHOR Nuñez CNP CNP  Primary Care Physician: Bonifacio Drake DO  Consultants: Cardiology  Discharge to Home  Stable Condition   HOSPITAL ADMISSION PROBLEM LIST:  Patient Active Problem List   Diagnosis    Hypertension    Osteoarthritis    Chronic venous insufficiency    Acquired genu valgum    Degeneration of lumbar intervertebral disc    Lower urinary tract symptoms due to benign prostatic hyperplasia    Presence of right artificial knee joint    Bilateral carotid artery stenosis    Hypothyroidism    Ischemic cardiomyopathy    Ventral hernia    Left bundle branch block (LBBB)    Chronic embolism and thrombosis of deep vein of both distal legs ([facility])    Coronary artery disease involving native coronary artery of native heart    Hyperlipidemia    Chronic combined systolic and diastolic CHF (congestive heart failure) ([facility])    Impaired gait    Emphysema/COPD ([facility])    History of COVID-19    Class 2 severe obesity with serious comorbidity and body mass index (BMI) of 39.0 to 39.9 in adult ([facility])    Near syncope    Hypoxia    Orthostatic hypotension    Combined forms of age-related cataract of both eyes    Dermatochalasis of right upper eyelid    Other specified hearing loss, unspecified ear    Sensorineural hearing loss, bilateral    Skin ulcer of right great toe, limited to breakdown of skin ([facility])    Critical illness polyneuropathy ([facility])    Type 2 diabetes mellitus    S/P ICD (internal cardiac defibrillator) procedure    ICD (implantable cardioverter-defibrillator) in place         Procedures:ICD placement    HOSPITAL COURSE :           The patient was admitted for: ICD placement  Hospital  known as: PROAMATINE  Take 1 tablet by mouth 3 times daily     * Misc. Devices Misc  Oxygen via nasal cannula at 2 liters/minute continuously. Diagnosis: COPD     * Misc. Devices Misc  Rodriguez catheter supplies. 18 F/30 mL Rodriguez catheters, leg bags, Ordriguez drainage bags, Rodriguez insertion packs. Dx: BPH with urinary retention     oxyBUTYnin 5 MG extended release tablet  Commonly known as: DITROPAN-XL  Take 1 tablet by mouth once daily     potassium chloride 10 MEQ extended release tablet  Commonly known as: KLOR-CON  Take 1 tablet by mouth once daily     sertraline 100 MG tablet  Commonly known as: ZOLOFT  Take 1 tablet by mouth once daily     spironolactone 25 MG tablet  Commonly known as: ALDACTONE  Take 1/2 (one-half) tablet by mouth once daily     traZODone 50 MG tablet  Commonly known as: DESYREL  Take 1 tablet by mouth nightly     TYLENOL PO     vitamin D 50 MCG (2000 UT) Caps capsule           * This list has 2 medication(s) that are the same as other medications prescribed for you. Read the directions carefully, and ask your doctor or other care provider to review them with you.                       Discussed with patient and nursing. Medications and discharge instructions reviewed with patient and nursing.    Electronically signed by KISHOR Nuñez CNP on 8/7/2024 at 10:38 AM  [facility]      [phone]

## 2024-08-07 NOTE — PROGRESS NOTES
[facility]  Progress Note                   Date:   8/7/2024  Patient name: Noah Jauregui  Date of admission:  8/5/2024 10:10 AM  MRN:   [id number]  YOB: 1945  PCP: Bonifacio Drake DO    Reason for Admission: Ischemic cardiomyopathy [I25.5]  S/P ICD (internal cardiac defibrillator) procedure [Z95.810]  ICD (implantable cardioverter-defibrillator) in place [Z95.810]    Subjective:       Clinical Changes /Abnormalities: Pt seen and examined in the room.  Patient resting in bed. Pt denies any CP or sob.  Labs, vitals and tele reviewed- SR with PVC 72  Bulky dressing removed from ICD site.- significant bruising noted.     Medications:   Scheduled Meds:   vancomycin  1,000 mg IntraVENous Once    sodium chloride flush  5-40 mL IntraVENous 2 times per day    allopurinol  300 mg Oral Daily    atorvastatin  40 mg Oral Daily    buPROPion  75 mg Oral BID    Vitamin D  1,000 Units Oral Daily    finasteride  5 mg Oral Daily    furosemide  20 mg Oral QAM    empagliflozin  10 mg Oral Daily    levothyroxine  100 mcg Oral Daily    losartan  12.5 mg Oral Daily    metoprolol succinate  25 mg Oral Nightly    midodrine  5 mg Oral TID    oxyBUTYnin  5 mg Oral Daily    potassium chloride  10 mEq Oral Daily    sertraline  100 mg Oral Daily    traZODone  50 mg Oral Nightly     Continuous Infusions:   sodium chloride       CBC:   Recent Labs     08/06/24  0445 08/06/24  1424 08/06/24 2001 08/07/24  0143 08/07/24  0547   WBC 11.1  --   --   --  9.8   HGB 14.4   < > 13.8 13.1 13.5     --   --   --  See Reflexed IPF Result    < > = values in this interval not displayed.       BMP:    Recent Labs     08/06/24  0445 08/07/24  0547    136   K 4.4 4.1    105   CO2 23 23   BUN 16 17   CREATININE 0.9 1.0   GLUCOSE 119* 100*       Hepatic:No results for input(s): \"AST\", \"ALT\", \"BILITOT\", \"ALKPHOS\" in the last 72 hours.    Invalid input(s): \"ALB\"  Troponin: No results for input(s): \"TROPHS\"

## 2024-08-07 NOTE — CARE COORDINATION
Case Management Assessment  Initial Evaluation    Date/Time of Evaluation: 8/7/2024 8:00AM  Assessment Completed by: June Varma RN    If patient is discharged prior to next notation, then this note serves as note for discharge by case management.    Patient Name: Noah Jauregui                   YOB: 1945  Diagnosis: Ischemic cardiomyopathy [I25.5]  S/P ICD (internal cardiac defibrillator) procedure [Z95.810]  ICD (implantable cardioverter-defibrillator) in place [Z95.810]                   Date / Time: 8/5/2024 10:10 AM    Patient Admission Status: Outpatient in a bed   Readmission Risk (Low < 19, Mod (19-27), High > 27): Readmission Risk Score: 9.6    Current PCP: Bonifacio Drake, DO  PCP verified by CM? (P) Yes (Bonifacio Drake)    Chart Reviewed: Yes      History Provided by: Patient  Patient Orientation: Alert and Oriented, Person, Place, Situation    Patient Cognition: Alert    Hospitalization in the last 30 days (Readmission):  No    If yes, Readmission Assessment in CM Navigator will be completed.    Advance Directives:      Code Status: Full Code   Patient's Primary Decision Maker is: (P) Named in Scanned ACP Document    Primary Decision Maker: Nataly Jauregui - Spouse - [phone]    Discharge Planning:    Patient lives with: (P) Spouse/Significant Other Type of Home: (P) House  Primary Care Giver: Self  Patient Support Systems include: Spouse/Significant Other, Children   Current Financial resources: (P) [place]  Current community resources: (P) None  Current services prior to admission: (P) Durable Medical Equipment, Oxygen Therapy            Current DME: (P) Oxygen Therapy (Comment), Cane, Wheelchair, Other (Comment) (rollator)            Type of Home Care services:  (P) None    ADLS  Prior functional level: (P) Independent in ADLs/IADLs  Current functional level: (P) Independent in ADLs/IADLs    PT AM-PAC:   /24  OT AM-PAC:   /24    Family can provide  Yes    June Varma RN  Case Management Department  Ph: [phone] Fax: [phone]

## 2024-08-07 NOTE — PLAN OF CARE
Problem: Chronic Conditions and Co-morbidities  Goal: Patient's chronic conditions and co-morbidity symptoms are monitored and maintained or improved  8/7/2024 1841 by Ailin Lombardo RN  Outcome: Progressing  8/7/2024 0654 by Betty Sosa RN  Outcome: Progressing     Problem: Discharge Planning  Goal: Discharge to home or other facility with appropriate resources  8/7/2024 1841 by Ailin Lombardo RN  Outcome: Progressing  8/7/2024 0654 by Betty Sosa RN  Outcome: Progressing     Problem: Safety - Adult  Goal: Free from fall injury  8/7/2024 1841 by Ailin Lombardo RN  Outcome: Progressing  8/7/2024 0654 by Betty Sosa RN  Outcome: Progressing     Problem: ABCDS Injury Assessment  Goal: Absence of physical injury  8/7/2024 1841 by Ailin Lombardo RN  Outcome: Progressing  8/7/2024 0654 by Betty Sosa RN  Outcome: Progressing

## 2024-08-07 NOTE — FLOWSHEET NOTE
Genesis NP in to see pt and pressure dressing removed, original dressing intact and old blood noted, pt has ecchymosis around incision into lt axilla and over lt shoulder and across chest to rt side near axilla. No new bleeding noted. Site still swollen and firm to touch.

## 2024-08-07 NOTE — FLOWSHEET NOTE
pt has ecchymosis around incision into lt axilla and over lt shoulder and across chest to rt side near axilla. No new bleeding noted. Site still swollen and firm to touch.

## 2024-08-08 VITALS
BODY MASS INDEX: 36.83 KG/M2 | SYSTOLIC BLOOD PRESSURE: 125 MMHG | TEMPERATURE: 97.5 F | RESPIRATION RATE: 20 BRPM | WEIGHT: 287 LBS | HEART RATE: 71 BPM | DIASTOLIC BLOOD PRESSURE: 84 MMHG | OXYGEN SATURATION: 94 % | HEIGHT: 74 IN

## 2024-08-08 LAB
BASOPHILS # BLD: 0.04 K/UL (ref 0–0.2)
BASOPHILS NFR BLD: 0 % (ref 0–2)
EOSINOPHIL # BLD: 0.18 K/UL (ref 0–0.44)
EOSINOPHILS RELATIVE PERCENT: 2 % (ref 1–4)
ERYTHROCYTE [DISTWIDTH] IN BLOOD BY AUTOMATED COUNT: 14.6 % (ref 11.8–14.4)
HCT VFR BLD AUTO: 41.3 % (ref 40.7–50.3)
HCT VFR BLD AUTO: 43 % (ref 40.7–50.3)
HGB BLD-MCNC: 12.7 G/DL (ref 13–17)
HGB BLD-MCNC: 13.3 G/DL (ref 13–17)
IMM GRANULOCYTES # BLD AUTO: 0.05 K/UL (ref 0–0.3)
IMM GRANULOCYTES NFR BLD: 1 %
LYMPHOCYTES NFR BLD: 1.99 K/UL (ref 1.1–3.7)
LYMPHOCYTES RELATIVE PERCENT: 22 % (ref 24–43)
MCH RBC QN AUTO: 30.9 PG (ref 25.2–33.5)
MCHC RBC AUTO-ENTMCNC: 30.9 G/DL (ref 28.4–34.8)
MCV RBC AUTO: 99.8 FL (ref 82.6–102.9)
MONOCYTES NFR BLD: 1.43 K/UL (ref 0.1–1.2)
MONOCYTES NFR BLD: 16 % (ref 3–12)
NEUTROPHILS NFR BLD: 59 % (ref 36–65)
NEUTS SEG NFR BLD: 5.3 K/UL (ref 1.5–8.1)
NRBC BLD-RTO: 0 PER 100 WBC
PLATELET # BLD AUTO: ABNORMAL K/UL (ref 138–453)
PLATELET, FLUORESCENCE: 123 K/UL (ref 138–453)
PLATELETS.RETICULATED NFR BLD AUTO: 2.4 % (ref 1.1–10.3)
RBC # BLD AUTO: 4.31 M/UL (ref 4.21–5.77)
RBC # BLD: ABNORMAL 10*6/UL
WBC OTHER # BLD: 9 K/UL (ref 3.5–11.3)

## 2024-08-08 PROCEDURE — 85014 HEMATOCRIT: CPT

## 2024-08-08 PROCEDURE — 99239 HOSP IP/OBS DSCHRG MGMT >30: CPT | Performed by: NURSE PRACTITIONER

## 2024-08-08 PROCEDURE — 6370000000 HC RX 637 (ALT 250 FOR IP): Performed by: INTERNAL MEDICINE

## 2024-08-08 PROCEDURE — 2580000003 HC RX 258: Performed by: INTERNAL MEDICINE

## 2024-08-08 PROCEDURE — 85018 HEMOGLOBIN: CPT

## 2024-08-08 PROCEDURE — 36415 COLL VENOUS BLD VENIPUNCTURE: CPT

## 2024-08-08 PROCEDURE — 85025 COMPLETE CBC W/AUTO DIFF WBC: CPT

## 2024-08-08 PROCEDURE — 85055 RETICULATED PLATELET ASSAY: CPT

## 2024-08-08 RX ADMIN — BUPROPION HYDROCHLORIDE 75 MG: 75 TABLET, FILM COATED ORAL at 08:20

## 2024-08-08 RX ADMIN — OXYBUTYNIN CHLORIDE 5 MG: 5 TABLET, EXTENDED RELEASE ORAL at 08:20

## 2024-08-08 RX ADMIN — ACETAMINOPHEN 650 MG: 325 TABLET ORAL at 08:26

## 2024-08-08 RX ADMIN — FINASTERIDE 5 MG: 5 TABLET, FILM COATED ORAL at 08:21

## 2024-08-08 RX ADMIN — MIDODRINE HYDROCHLORIDE 5 MG: 5 TABLET ORAL at 08:20

## 2024-08-08 RX ADMIN — FUROSEMIDE 20 MG: 20 TABLET ORAL at 08:20

## 2024-08-08 RX ADMIN — LEVOTHYROXINE SODIUM 100 MCG: 100 TABLET ORAL at 11:17

## 2024-08-08 RX ADMIN — POTASSIUM CHLORIDE 10 MEQ: 750 CAPSULE, EXTENDED RELEASE ORAL at 08:20

## 2024-08-08 RX ADMIN — LOSARTAN POTASSIUM 12.5 MG: 25 TABLET, FILM COATED ORAL at 08:20

## 2024-08-08 RX ADMIN — EMPAGLIFLOZIN 10 MG: 10 TABLET, FILM COATED ORAL at 08:20

## 2024-08-08 RX ADMIN — SODIUM CHLORIDE, PRESERVATIVE FREE 10 ML: 5 INJECTION INTRAVENOUS at 08:21

## 2024-08-08 RX ADMIN — CHOLECALCIFEROL TAB 25 MCG (1000 UNIT) 1000 UNITS: 25 TAB at 08:20

## 2024-08-08 RX ADMIN — ATORVASTATIN CALCIUM 40 MG: 40 TABLET, FILM COATED ORAL at 08:20

## 2024-08-08 RX ADMIN — ALLOPURINOL 300 MG: 100 TABLET ORAL at 08:20

## 2024-08-08 RX ADMIN — SERTRALINE HYDROCHLORIDE 100 MG: 50 TABLET ORAL at 08:20

## 2024-08-08 ASSESSMENT — PAIN DESCRIPTION - LOCATION: LOCATION: HEAD

## 2024-08-08 ASSESSMENT — PAIN SCALES - GENERAL: PAINLEVEL_OUTOF10: 4

## 2024-08-08 ASSESSMENT — PAIN DESCRIPTION - ORIENTATION: ORIENTATION: MID

## 2024-08-08 ASSESSMENT — PAIN DESCRIPTION - DESCRIPTORS: DESCRIPTORS: ACHING

## 2024-08-08 NOTE — CARE COORDINATION
Discharge Report    [facility]  Clinical Case Management Department  Written by: June Varma RN    Patient Name: Noah Jauregui  Attending Provider: No att. providers found  Admit Date: 2024 10:10 AM  MRN: [id number]  Account: [id number]                     : 1945  Discharge Date: 2024      Disposition: home    June Varma RN

## 2024-08-08 NOTE — DISCHARGE SUMMARY
[facility]  Discharge Note                 Name:  Noah Jauregui  YOB: 1945  Social Security Number:  [id number]  Medical Record Number:  [id number]    Date of Admission:  8/5/2024  Date of Discharge:  8/8/2024    Admitting physician: John Olivares MD    Discharge Attending: KISHOR Nuñez CNP CNP  Primary Care Physician: Bonifacio Drake DO  Consultants: Cardiology  Discharge to Home  Stable Condition   HOSPITAL ADMISSION PROBLEM LIST:  Patient Active Problem List   Diagnosis    Hypertension    Osteoarthritis    Chronic venous insufficiency    Acquired genu valgum    Degeneration of lumbar intervertebral disc    Lower urinary tract symptoms due to benign prostatic hyperplasia    Presence of right artificial knee joint    Bilateral carotid artery stenosis    Hypothyroidism    Ischemic cardiomyopathy    Ventral hernia    Left bundle branch block (LBBB)    Chronic embolism and thrombosis of deep vein of both distal legs ([facility])    Coronary artery disease involving native coronary artery of native heart    Hyperlipidemia    Chronic combined systolic and diastolic CHF (congestive heart failure) ([facility])    Impaired gait    Emphysema/COPD ([facility])    History of COVID-19    Class 2 severe obesity with serious comorbidity and body mass index (BMI) of 39.0 to 39.9 in adult ([facility])    Near syncope    Hypoxia    Orthostatic hypotension    Combined forms of age-related cataract of both eyes    Dermatochalasis of right upper eyelid    Other specified hearing loss, unspecified ear    Sensorineural hearing loss, bilateral    Skin ulcer of right great toe, limited to breakdown of skin ([facility])    Critical illness polyneuropathy ([facility])    Type 2 diabetes mellitus    S/P ICD (internal cardiac defibrillator) procedure    ICD (implantable cardioverter-defibrillator) in place         Procedures:ICD placement    HOSPITAL COURSE :           The patient was admitted for: ICD placement  Hospital

## 2024-08-08 NOTE — FLOWSHEET NOTE
Patient and family educated on ICD care and restrictions. Advised bandage will be changed at follow up appointment in 1 week, to wear arm sling at all times and medication education. IV removed. All questions answered by patient and family.

## 2024-08-08 NOTE — PLAN OF CARE
Problem: Chronic Conditions and Co-morbidities  Goal: Patient's chronic conditions and co-morbidity symptoms are monitored and maintained or improved  Outcome: Completed     Problem: Discharge Planning  Goal: Discharge to home or other facility with appropriate resources  Outcome: Completed     Problem: Safety - Adult  Goal: Free from fall injury  Outcome: Completed     Problem: ABCDS Injury Assessment  Goal: Absence of physical injury  Outcome: Completed

## 2024-08-08 NOTE — PROGRESS NOTES
[facility]  Progress Note                   Date:   8/8/2024  Patient name: Noah Jauregui  Date of admission:  8/5/2024 10:10 AM  MRN:   [id number]  YOB: 1945  PCP: Bonifacio Drake DO    Reason for Admission: Ischemic cardiomyopathy [I25.5]  S/P ICD (internal cardiac defibrillator) procedure [Z95.810]  ICD (implantable cardioverter-defibrillator) in place [Z95.810]    Subjective:       Clinical Changes /Abnormalities: Pt seen and examined in the room.  Patient resting in bed. Pt denies any CP or sob.  Labs, vitals and tele reviewed- SR with PVC   Continued bruising noted.     Medications:   Scheduled Meds:   vancomycin  1,000 mg IntraVENous Once    sodium chloride flush  5-40 mL IntraVENous 2 times per day    allopurinol  300 mg Oral Daily    atorvastatin  40 mg Oral Daily    buPROPion  75 mg Oral BID    Vitamin D  1,000 Units Oral Daily    finasteride  5 mg Oral Daily    furosemide  20 mg Oral QAM    empagliflozin  10 mg Oral Daily    levothyroxine  100 mcg Oral Daily    losartan  12.5 mg Oral Daily    metoprolol succinate  25 mg Oral Nightly    midodrine  5 mg Oral TID    oxyBUTYnin  5 mg Oral Daily    potassium chloride  10 mEq Oral Daily    sertraline  100 mg Oral Daily    traZODone  50 mg Oral Nightly     Continuous Infusions:   sodium chloride       CBC:   Recent Labs     08/06/24  0445 08/06/24  1424 08/07/24  0547 08/07/24  1120 08/07/24  1857 08/08/24  0127 08/08/24  0534   WBC 11.1  --  9.8  --   --   --  9.0   HGB 14.4   < > 13.5   < > 13.6 12.7* 13.3     --  See Reflexed IPF Result  --   --   --  See Reflexed IPF Result    < > = values in this interval not displayed.       BMP:    Recent Labs     08/06/24  0445 08/07/24  0547    136   K 4.4 4.1    105   CO2 23 23   BUN 16 17   CREATININE 0.9 1.0   GLUCOSE 119* 100*       Hepatic:No results for input(s): \"AST\", \"ALT\", \"BILITOT\", \"ALKPHOS\" in the last 72 hours.    Invalid input(s):  breathing. Use IS  Ok for patient to be discharged per CV standpoint and for him to follow up outpatient as scheduled.     Electronically signed by KISHOR Nuñez CNP on 8/8/2024 at 11:05 AM  [facility]  [phone]

## 2024-08-09 ENCOUNTER — CARE COORDINATION (OUTPATIENT)
Dept: CARE COORDINATION | Age: 79
End: 2024-08-09

## 2024-08-09 NOTE — CARE COORDINATION
Care Transitions Note    Initial Call - Call within 2 business days of discharge: Yes    Attempted to reach patient for transitions of care follow up. Unable to reach patient.    Outreach Attempts:   Reached spouse, states patient not available. CTN left contact number.    Patient: Noah Jauregui    Patient : 1945   MRN: [id number]    Reason for Admission: ICD placement  Discharge Date: 24  RURS: Readmission Risk Score: 9.7    Last Discharge Facility       Date Complaint Diagnosis Description Type Department Provider    24  Ischemic cardiomyopathy Admission (Discharged) John Suggs MD            Was this an external facility discharge? No    Follow Up Appointment:   Patient has hospital follow up appointment scheduled within 7 days of discharge.    Future Appointments         Provider Specialty Dept Phone    8/15/2024 8:30 AM SCHEDULE, [facility] NURSE VISIT Cardiology [phone]    2024 2:00 PM Montana Cárdenas MD Pulmonology [phone]    2024 1:40 PM Bonifacio Drake DO Family Medicine [phone]    2024 3:00 PM Reji Camejo, DPM Podiatry [phone]    10/14/2024 11:45 AM Rob Evans DO Cardiology [phone]    2025 11:15 AM Alex Cook PA-C Urology [phone]            Plan for follow-up on next business day.      Cora Acevedo RN

## 2024-08-12 ENCOUNTER — CARE COORDINATION (OUTPATIENT)
Dept: CARE COORDINATION | Age: 79
End: 2024-08-12

## 2024-08-12 DIAGNOSIS — E78.5 HYPERLIPIDEMIA, UNSPECIFIED HYPERLIPIDEMIA TYPE: ICD-10-CM

## 2024-08-12 DIAGNOSIS — Z95.810 ICD (IMPLANTABLE CARDIOVERTER-DEFIBRILLATOR) IN PLACE: Primary | ICD-10-CM

## 2024-08-12 PROCEDURE — 1111F DSCHRG MED/CURRENT MED MERGE: CPT | Performed by: STUDENT IN AN ORGANIZED HEALTH CARE EDUCATION/TRAINING PROGRAM

## 2024-08-12 RX ORDER — ATORVASTATIN CALCIUM 40 MG/1
TABLET, FILM COATED ORAL
Qty: 30 TABLET | Refills: 0 | Status: SHIPPED | OUTPATIENT
Start: 2024-08-12

## 2024-08-12 NOTE — CARE COORDINATION
[facility], advised to order and pick to and resume. CTN will notify MD. States attempted to schedule HFU with PCP soonest available 9/11. No further questions or concerns at this time. Agrees to follow up in 1 week.       Call to [facility], left  for clinical staff. Updated patient not taking Toprol 25 mg daily, CTN advised spouse to  and resume.     Care Transition Nurse reviewed discharge instructions with spouse/partner. The spouse/partner was given an opportunity to ask questions; no further questions or concerns at this time.. The spouse/partner verbalized understanding.   Were discharge instructions available to patient? Yes.   Reviewed appropriate site of care based on symptoms and resources available to patient including: PCP  Specialist. The spouse/partner agrees to contact the primary care provider and/or specialist office for questions related to their healthcare.      Advance Care Planning:   Does patient have an Advance Directive: deferred at this time, will discuss on future follow up. .    Medication Reconciliation:  Medication reconciliation was performed with spouse/partner,1111F entered: yes.     Remote Patient Monitoring:  Offered patient enrollment in the Remote Patient Monitoring (RPM) program for in-home monitoring: Deferred at this time because; will discuss at next outreach.    Assessments:  Care Transitions 24 Hour Call    Schedule Follow Up Appointment with PCP: Completed  Do you have a copy of your discharge instructions?: Yes  Do you have all of your prescriptions and are they filled?: No  Have you been contacted by a [facility] Pharmacist?: No  Have you scheduled your follow up appointment?: Yes  How are you going to get to your appointment?: Car - family or friend to transport  Do you feel like you have everything you need to keep you well at home?: Yes  Care Transitions Interventions          Follow Up Appointment:   Discussed follow up

## 2024-08-12 NOTE — TELEPHONE ENCOUNTER
Noah called requesting a refill of the below medication which has been pended for you:     Requested Prescriptions     Pending Prescriptions Disp Refills    atorvastatin (LIPITOR) 40 MG tablet [Pharmacy Med Name: Atorvastatin Calcium 40 MG Oral Tablet] 30 tablet 0     Sig: Take 1 tablet by mouth once daily       Last Appointment Date: 5/16/2024  Next Appointment Date: 9/11/2024    No Known Allergies

## 2024-08-15 ENCOUNTER — OFFICE VISIT (OUTPATIENT)
Dept: CARDIOLOGY | Age: 79
End: 2024-08-15
Payer: OTHER GOVERNMENT

## 2024-08-15 VITALS
DIASTOLIC BLOOD PRESSURE: 62 MMHG | HEART RATE: 74 BPM | WEIGHT: 287 LBS | HEIGHT: 74 IN | BODY MASS INDEX: 36.83 KG/M2 | OXYGEN SATURATION: 93 % | SYSTOLIC BLOOD PRESSURE: 112 MMHG

## 2024-08-15 DIAGNOSIS — I10 HYPERTENSION, ESSENTIAL: ICD-10-CM

## 2024-08-15 DIAGNOSIS — Z87.891 FORMER TOBACCO USE: ICD-10-CM

## 2024-08-15 DIAGNOSIS — Z95.810 S/P ICD (INTERNAL CARDIAC DEFIBRILLATOR) PROCEDURE: ICD-10-CM

## 2024-08-15 DIAGNOSIS — J44.9 COPD, MILD (HCC): ICD-10-CM

## 2024-08-15 DIAGNOSIS — I73.9 PAD (PERIPHERAL ARTERY DISEASE) (HCC): ICD-10-CM

## 2024-08-15 DIAGNOSIS — R94.31 ABNORMAL ECG: ICD-10-CM

## 2024-08-15 DIAGNOSIS — E78.00 PURE HYPERCHOLESTEROLEMIA: ICD-10-CM

## 2024-08-15 DIAGNOSIS — I25.10 CORONARY ARTERY DISEASE INVOLVING NATIVE CORONARY ARTERY OF NATIVE HEART WITHOUT ANGINA PECTORIS: Primary | ICD-10-CM

## 2024-08-15 PROCEDURE — 99213 OFFICE O/P EST LOW 20 MIN: CPT | Performed by: INTERNAL MEDICINE

## 2024-08-15 PROCEDURE — 3078F DIAST BP <80 MM HG: CPT | Performed by: INTERNAL MEDICINE

## 2024-08-15 PROCEDURE — 1123F ACP DISCUSS/DSCN MKR DOCD: CPT | Performed by: INTERNAL MEDICINE

## 2024-08-15 PROCEDURE — 3074F SYST BP LT 130 MM HG: CPT | Performed by: INTERNAL MEDICINE

## 2024-08-15 PROCEDURE — 99214 OFFICE O/P EST MOD 30 MIN: CPT | Performed by: INTERNAL MEDICINE

## 2024-08-15 RX ORDER — DOXYCYCLINE HYCLATE 100 MG
100 TABLET ORAL 2 TIMES DAILY
Qty: 20 TABLET | Refills: 0 | Status: SHIPPED | OUTPATIENT
Start: 2024-08-15 | End: 2024-08-25

## 2024-08-15 NOTE — PATIENT INSTRUCTIONS
1. Can take shower at 1 week post.  Start with back to water.  Let water flow over shoulder down chest.  Don't scrub.    2. Let steri-strips fall off on their own.    3. Do not drive until your follow up appointment with the doctor.    4. Do not lift more than 5 pounds for one month at least.  Then may increase weight slowly.    5. Do not raise arm above head for 4-6 weeks.    6. No swimming or golfing for 4-6 weeks.    7. Use cell phone on opposite side.

## 2024-08-15 NOTE — PROGRESS NOTES
technetium 99 M  FINDINGS: Cardiac chambers and great vessels appear grossly normal in  configuration. Right ventricular contractility is subjectively normal.  Ventricular ejection fraction is 21%. However, ventricles appear dilated.    Impression  Abnormally low left ventricular ejection fraction 21%.    Signed by: Joanie Ruiz MD on 4/26/2024  6:30 PM        /62   Pulse 74   Ht 1.88 m (6' 2\")   Wt 130.2 kg (287 lb)   SpO2 93%   BMI 36.85 kg/m²     Past Medical and Surgical History, Problem List, Allergies, Medications, Labs, Imaging, all reviewed extensively in EMR and with the patient.    Assessment and Plan:    S/p ICD placement. Attempted CRT-D but was unable to access coronary sinus. Reviewed Dr. Olivares op notes.  Ecchymosis on front of chest bilaterally and left arm. From ICD site level to just above end of ribs.   Large hematoma around ICD site, about the size of a flattened tennis ball. Recheck in 2 weeks  No signs of infection. Start Abx for prophylaxis.  Ok to continue physical therapy   CAD, h/o CABG; stable with no recent angina; all bypass grafts patent on cardiac catheterization from 12/22/23  Cardiomyopathy, LVEF persistently reduced at 21% by MUGA on 4/24/24; stable NYHA FC II-III status with no CHF on exam today  LBBB,  msec- qualifies for CRT-D but was unsuccessful.  COPD with chronic hypoxic respiratory failure  Orthostatic hypotension, improved on midodrine  Ischemic cardiomyopathy- optimize meds  Hx of DVT in RLE- no pitting currently  CAD- Chronic. LDL goal < 55. Check lipids, ck, cmp every 6 months and optimize medical therapy.  HTN- Chronic. Failry well controlled at this time. Cont to optimize meds.  ECG reviewed and compared to prior. No acute ischemia.  Obesity - Chronic. Encouraged diet, exercise, and discussed weight loss extensively.  Former Tobacco abuse. Chronic. Discussed extensively and gave options to help with quitting.  Hyperlipidemia- Chronic. As above. LDL

## 2024-08-19 LAB — ECHO BSA: 2.61 M2

## 2024-08-20 ENCOUNTER — CARE COORDINATION (OUTPATIENT)
Dept: CARE COORDINATION | Age: 79
End: 2024-08-20

## 2024-08-20 NOTE — CARE COORDINATION
Care Transitions Note    Follow Up Call     Patient Current Location:  Home: [id number] [address] [address]    Care Transition Nurse contacted the patient, spouse/partner  by telephone. Verified name and  as identifiers.    Additional needs identified to be addressed with provider   No needs identified                 Method of communication with provider: none.    Care Summary Note: Patient and spouse reached for follow up call. States continues with hematoma to groin site. Cardiology note reviewed, MD aware, ASA continues on hold, new doxycycline started. Patient will have ICD check  and back to cardiology for wound check on . Reviewed importance of monitoring for impaired circulation to same leg, report pallor, temp changes, numbness, verbalizes understanding. No further concerns noted, agrees to follow up next week.     Plan of care updates since last contact:  Education  Review of patient management of conditions/medications       Advance Care Planning:   Does patient have an Advance Directive: reviewed and current.    Medication Review:  Medications changed since last call, reviewed today.     Remote Patient Monitoring:  Offered patient enrollment in the Remote Patient Monitoring (RPM) program for in-home monitoring: Deferred at this time because; will discuss at next outreach.    Assessments:  Care Transitions Subsequent and Final Call    Schedule Follow Up Appointment with PCP: Completed  Subsequent and Final Calls  Do you have any ongoing symptoms?: No  Have your medications changed?: Yes  Patient Reports: new doxycycline, ASA continues on hold  Do you have any questions related to your medications?: No  Do you currently have any active services?: No  Do you have any needs or concerns that I can assist you with?: No  Identified Barriers: None  Care Transitions Interventions  Other Interventions:              Follow Up Appointment:   Reviewed upcoming appointment(s).  Future

## 2024-08-23 ENCOUNTER — PROCEDURE VISIT (OUTPATIENT)
Dept: CARDIOLOGY | Age: 79
End: 2024-08-23
Payer: OTHER GOVERNMENT

## 2024-08-23 DIAGNOSIS — Z95.810 ICD (IMPLANTABLE CARDIOVERTER-DEFIBRILLATOR) IN PLACE: ICD-10-CM

## 2024-08-23 DIAGNOSIS — I25.5 ISCHEMIC CARDIOMYOPATHY: Primary | ICD-10-CM

## 2024-08-23 PROCEDURE — 93289 INTERROG DEVICE EVAL HEART: CPT | Performed by: INTERNAL MEDICINE

## 2024-08-27 ENCOUNTER — CARE COORDINATION (OUTPATIENT)
Dept: CARE COORDINATION | Age: 79
End: 2024-08-27

## 2024-08-27 DIAGNOSIS — F51.01 PRIMARY INSOMNIA: ICD-10-CM

## 2024-08-27 RX ORDER — TRAZODONE HYDROCHLORIDE 50 MG/1
50 TABLET, FILM COATED ORAL NIGHTLY
Qty: 90 TABLET | Refills: 0 | Status: SHIPPED | OUTPATIENT
Start: 2024-08-27

## 2024-08-27 NOTE — CARE COORDINATION
Care Transitions Note    Follow Up Call     Attempted to reach patient for transitions of care follow up.  Unable to reach patient.      Outreach Attempts:   HIPAA compliant voicemail left for patient.         Follow Up Appointment:   Future Appointments         Provider Specialty Dept Phone    8/28/2024 2:00 PM Montana Cárdenas MD Pulmonology [phone]    9/11/2024 1:40 PM Bonifacio Drake DO Family Medicine [phone]    9/24/2024 3:00 PM Reji Camejo, NOEM Podiatry [phone]    9/26/2024 11:15 AM SCHEDULE, [facility] Cardiology [phone]    10/28/2024 11:00 AM Rob Evans DO Cardiology [phone]    7/17/2025 11:15 AM Alex Cook PA-C Urology [phone]    8/22/2025 12:45 PM SCHEDULE, [facility] Cardiology [phone]    8/22/2025 1:00 PM Jocelyn Gonzalez MD Cardiology [phone]            Plan for follow-up call in 2-5 days based on severity of symptoms and risk factors. Plan for next call: check bruising, any bleeding, how is incision?  follow-up appointment-any changes since cardiology appts? Has anticoagulation started?  possible RPM HTN, COPD     Cora Acevedo RN

## 2024-08-27 NOTE — TELEPHONE ENCOUNTER
Noah called requesting a refill of the below medication which has been pended for you:     Requested Prescriptions     Pending Prescriptions Disp Refills    traZODone (DESYREL) 50 MG tablet [Pharmacy Med Name: traZODone HCl 50 MG Oral Tablet] 90 tablet 0     Sig: Take 1 tablet by mouth nightly       Last Appointment Date: 5/16/2024  Next Appointment Date: 9/11/2024    No Known Allergies

## 2024-08-28 ENCOUNTER — OFFICE VISIT (OUTPATIENT)
Dept: PULMONOLOGY | Age: 79
End: 2024-08-28
Payer: OTHER GOVERNMENT

## 2024-08-28 ENCOUNTER — CARE COORDINATION (OUTPATIENT)
Dept: CARE COORDINATION | Age: 79
End: 2024-08-28

## 2024-08-28 VITALS
DIASTOLIC BLOOD PRESSURE: 62 MMHG | OXYGEN SATURATION: 90 % | WEIGHT: 290 LBS | TEMPERATURE: 97 F | RESPIRATION RATE: 14 BRPM | SYSTOLIC BLOOD PRESSURE: 122 MMHG | HEIGHT: 74 IN | BODY MASS INDEX: 37.22 KG/M2 | HEART RATE: 75 BPM

## 2024-08-28 DIAGNOSIS — J98.4 RESTRICTIVE LUNG DISEASE SECONDARY TO OBESITY: ICD-10-CM

## 2024-08-28 DIAGNOSIS — E66.9 RESTRICTIVE LUNG DISEASE SECONDARY TO OBESITY: ICD-10-CM

## 2024-08-28 DIAGNOSIS — J96.11 CHRONIC RESPIRATORY FAILURE WITH HYPOXIA, ON HOME O2 THERAPY (HCC): Primary | ICD-10-CM

## 2024-08-28 DIAGNOSIS — I50.42 CHRONIC COMBINED SYSTOLIC AND DIASTOLIC CHF (CONGESTIVE HEART FAILURE) (HCC): ICD-10-CM

## 2024-08-28 DIAGNOSIS — Z99.81 CHRONIC RESPIRATORY FAILURE WITH HYPOXIA, ON HOME O2 THERAPY (HCC): Primary | ICD-10-CM

## 2024-08-28 DIAGNOSIS — G62.81 CRITICAL ILLNESS POLYNEUROPATHY (HCC): ICD-10-CM

## 2024-08-28 PROCEDURE — 99213 OFFICE O/P EST LOW 20 MIN: CPT | Performed by: INTERNAL MEDICINE

## 2024-08-28 PROCEDURE — 3074F SYST BP LT 130 MM HG: CPT | Performed by: INTERNAL MEDICINE

## 2024-08-28 PROCEDURE — 3078F DIAST BP <80 MM HG: CPT | Performed by: INTERNAL MEDICINE

## 2024-08-28 PROCEDURE — 1123F ACP DISCUSS/DSCN MKR DOCD: CPT | Performed by: INTERNAL MEDICINE

## 2024-08-28 NOTE — CARE COORDINATION
Care Transitions Note    Follow Up Call     Attempted to reach patient for transitions of care follow up.  Unable to reach patient.      Outreach Attempts:   Multiple attempts to contact patient, spouse/partner  at phone numbers on file.   HIPAA compliant voicemail left for patient, spouse/partner .     Care Summary Note: Second attempt follow up, left VM. Closing for SARITA.    Follow Up Appointment:   Future Appointments         Provider Specialty Dept Phone    8/28/2024 2:00 PM Montana Cárdenas MD Pulmonology [phone]    9/11/2024 1:40 PM Bonifacio Drake DO Family Medicine [phone]    9/24/2024 3:00 PM Reji Camejo DPM Podiatry [phone]    9/26/2024 11:15 AM SCHEDULE, [facility] Cardiology [phone]    10/28/2024 11:00 AM Rob Evans DO Cardiology [phone]    7/17/2025 11:15 AM Alex Cook PA-C Urology [phone]    8/22/2025 12:45 PM SCHEDULE, PAC[facility]ER [facility] Cardiology [phone]    8/22/2025 1:00 PM Jocelyn Gonzalez MD Cardiology [phone]            No further follow-up call indicated based on severity of symptoms and risk factors. Plan for next call:     Cora Acevedo RN

## 2024-08-30 ASSESSMENT — ENCOUNTER SYMPTOMS: SHORTNESS OF BREATH: 1

## 2024-08-30 NOTE — PROGRESS NOTES
continuously. Diagnosis: COPD 1 each 0    Handicap Placard MISC by Does not apply route Permanent 1 each 0    Acetaminophen (TYLENOL PO) Take by mouth Per pt, takes 2 in am; 2 in pm      aspirin 81 MG EC tablet Take 1 tablet by mouth daily (Patient not taking: Reported on 8/20/2024) 90 tablet 1    Omega-3 Fatty Acids (FISH OIL PO) Take 1,200 mg by mouth daily (Patient not taking: Reported on 8/28/2024)       No current facility-administered medications for this visit.      Physical Exam  Vitals and nursing note reviewed.   Constitutional:       Appearance: He is well-developed. He is obese.   HENT:      Nose: Nose normal. No congestion.      Mouth/Throat:      Mouth: Mucous membranes are moist.      Pharynx: Oropharynx is clear. No oropharyngeal exudate.   Eyes:      General: No scleral icterus.     Conjunctiva/sclera: Conjunctivae normal.   Neck:      Thyroid: No thyromegaly.      Vascular: No JVD.      Trachea: No tracheal deviation.   Cardiovascular:      Rate and Rhythm: Normal rate and regular rhythm.      Heart sounds: Normal heart sounds. No murmur heard.     No gallop.   Pulmonary:      Effort: No respiratory distress.      Breath sounds: No wheezing or rales.   Chest:      Chest wall: No tenderness.   Abdominal:      Palpations: Abdomen is soft.      Tenderness: There is no abdominal tenderness.   Musculoskeletal:      Cervical back: Neck supple.      Right lower leg: Edema present.      Left lower leg: Edema present.      Comments: No clubbing   Lymphadenopathy:      Cervical: No cervical adenopathy.   Skin:     General: Skin is warm and dry.   Neurological:      Mental Status: He is alert and oriented to person, place, and time.       Wt Readings from Last 3 Encounters:   08/28/24 131.5 kg (290 lb)   08/15/24 130.2 kg (287 lb)   08/05/24 130.2 kg (287 lb)         :       Diagnosis Orders   1. Chronic respiratory failure with hypoxia, on home O2 therapy (HCC)  Handicap Placard MISC      2. 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 Chronic combined systolic and diastolic CHF (congestive heart failure) (HCC)  [place]icap Placard [facility]      4. Critical illness polyneuropathy (HCC)  Handicap Placard MISC             Plan:    Dyspnea is chronic   Continue oxygen   Maintain active lifestyle     Orders Placed This Encounter   Medications    Handicap Placard MISC     Sig: by Does not apply route 8/28/2024 to 8/28/2029  (J96.11,  Z99.81) Chronic respiratory failure with hypoxia, on home O2 therapy (HCC)  (primary encounter diagnosis)    (J98.4,  E66.9) Restrictive lung disease secondary to obesity    (I50.42) Chronic combined systolic and diastolic CHF (congestive heart failure) ([facility])    (G62.81) Critical illness polyneuropathy (HCC)     Dispense:  1 each     Refill:  0     No orders of the defined types were placed in this encounter.    No follow-ups on file.       Electronically signed by PAOLA BRONSON MD on 8/30/2024at 4:33 PM

## 2024-09-04 ENCOUNTER — TELEPHONE (OUTPATIENT)
Dept: CARDIOLOGY | Age: 79
End: 2024-09-04

## 2024-09-04 NOTE — TELEPHONE ENCOUNTER
When is appropriate for the pt to resume the ASA after having the hematoma in the groin?    Nataly [phone]

## 2024-09-05 NOTE — TELEPHONE ENCOUNTER
Patient wife Nataly notified.to have patient  restart asa 5 days after bleeding stops. Patient agrees. They question appointment that was supposed to be made for Dr Pryor. Please advise

## 2024-09-06 DIAGNOSIS — M79.674 PAIN AND SWELLING OF TOE OF RIGHT FOOT: ICD-10-CM

## 2024-09-06 DIAGNOSIS — M79.89 PAIN AND SWELLING OF TOE OF RIGHT FOOT: ICD-10-CM

## 2024-09-06 RX ORDER — ALLOPURINOL 300 MG/1
TABLET ORAL
Qty: 90 TABLET | Refills: 0 | Status: SHIPPED | OUTPATIENT
Start: 2024-09-06

## 2024-09-06 NOTE — TELEPHONE ENCOUNTER
Device was checked 8/23/24.  Next check planned for 12 months.    Dr. Gonzalez did a quick look at the device area and spoke to Dr. Olivares by phone that day.  I heard Dr. Olivares say by phone that they do not typically drain hematomas.  That a 2 wk check would be fine.  Today is two weeks.      I called Dr. Olivares's [place] office regarding appt.   spoke to the nurses, and I was told that Dr. Olivares's nurses recommend Dr. Evans seeing pt again.  I called and spoke with pt's wife and heard pt in the background, saying the swelling is \"a little bit less.\"      Pt is now scheduled with Dr. FAZAL Evans on Monday 9/9.  They are aware and agree.

## 2024-09-06 NOTE — TELEPHONE ENCOUNTER
Noah called requesting a refill of the below medication which has been pended for you:     Requested Prescriptions     Pending Prescriptions Disp Refills    allopurinol (ZYLOPRIM) 300 MG tablet [Pharmacy Med Name: Allopurinol 300 MG Oral Tablet] 90 tablet 0     Sig: Take 1 tablet by mouth once daily       Last Appointment Date: 5/16/2024  Next Appointment Date: 9/11/2024    No Known Allergies

## 2024-09-09 ENCOUNTER — OFFICE VISIT (OUTPATIENT)
Dept: CARDIOLOGY | Age: 79
End: 2024-09-09
Payer: OTHER GOVERNMENT

## 2024-09-09 VITALS
HEART RATE: 77 BPM | WEIGHT: 279 LBS | SYSTOLIC BLOOD PRESSURE: 126 MMHG | BODY MASS INDEX: 35.81 KG/M2 | HEIGHT: 74 IN | DIASTOLIC BLOOD PRESSURE: 70 MMHG

## 2024-09-09 DIAGNOSIS — Z95.1 S/P CABG (CORONARY ARTERY BYPASS GRAFT): ICD-10-CM

## 2024-09-09 DIAGNOSIS — Z95.810 ICD (IMPLANTABLE CARDIOVERTER-DEFIBRILLATOR) IN PLACE: ICD-10-CM

## 2024-09-09 DIAGNOSIS — I25.10 CORONARY ARTERY DISEASE INVOLVING NATIVE CORONARY ARTERY OF NATIVE HEART WITHOUT ANGINA PECTORIS: ICD-10-CM

## 2024-09-09 DIAGNOSIS — I25.5 ISCHEMIC CARDIOMYOPATHY: Primary | ICD-10-CM

## 2024-09-09 DIAGNOSIS — E78.2 MIXED HYPERLIPIDEMIA: ICD-10-CM

## 2024-09-09 DIAGNOSIS — I10 ESSENTIAL HYPERTENSION: ICD-10-CM

## 2024-09-09 DIAGNOSIS — Z95.810 S/P ICD (INTERNAL CARDIAC DEFIBRILLATOR) PROCEDURE: ICD-10-CM

## 2024-09-09 DIAGNOSIS — I25.10 ATHEROSCLEROSIS OF NATIVE CORONARY ARTERY OF NATIVE HEART WITHOUT ANGINA PECTORIS: ICD-10-CM

## 2024-09-09 DIAGNOSIS — E78.00 PURE HYPERCHOLESTEROLEMIA: ICD-10-CM

## 2024-09-09 PROCEDURE — 93005 ELECTROCARDIOGRAM TRACING: CPT | Performed by: INTERNAL MEDICINE

## 2024-09-09 PROCEDURE — 3078F DIAST BP <80 MM HG: CPT | Performed by: INTERNAL MEDICINE

## 2024-09-09 PROCEDURE — 99214 OFFICE O/P EST MOD 30 MIN: CPT | Performed by: INTERNAL MEDICINE

## 2024-09-09 PROCEDURE — 3074F SYST BP LT 130 MM HG: CPT | Performed by: INTERNAL MEDICINE

## 2024-09-09 PROCEDURE — 1123F ACP DISCUSS/DSCN MKR DOCD: CPT | Performed by: INTERNAL MEDICINE

## 2024-09-09 PROCEDURE — 93010 ELECTROCARDIOGRAM REPORT: CPT | Performed by: INTERNAL MEDICINE

## 2024-09-11 ENCOUNTER — OFFICE VISIT (OUTPATIENT)
Dept: FAMILY MEDICINE CLINIC | Age: 79
End: 2024-09-11
Payer: MEDICARE

## 2024-09-11 VITALS
WEIGHT: 282 LBS | HEART RATE: 58 BPM | SYSTOLIC BLOOD PRESSURE: 124 MMHG | HEIGHT: 74 IN | OXYGEN SATURATION: 94 % | DIASTOLIC BLOOD PRESSURE: 62 MMHG | BODY MASS INDEX: 36.19 KG/M2

## 2024-09-11 DIAGNOSIS — I50.42 CHRONIC COMBINED SYSTOLIC AND DIASTOLIC CHF (CONGESTIVE HEART FAILURE) (HCC): Primary | ICD-10-CM

## 2024-09-11 DIAGNOSIS — F41.9 ANXIETY AND DEPRESSION: ICD-10-CM

## 2024-09-11 DIAGNOSIS — F32.A ANXIETY AND DEPRESSION: ICD-10-CM

## 2024-09-11 DIAGNOSIS — I25.10 CORONARY ARTERY DISEASE INVOLVING NATIVE CORONARY ARTERY OF NATIVE HEART WITHOUT ANGINA PECTORIS: ICD-10-CM

## 2024-09-11 DIAGNOSIS — J44.1 CHRONIC OBSTRUCTIVE PULMONARY DISEASE WITH ACUTE EXACERBATION (HCC): ICD-10-CM

## 2024-09-11 DIAGNOSIS — E11.9 TYPE 2 DIABETES MELLITUS WITHOUT COMPLICATION, WITHOUT LONG-TERM CURRENT USE OF INSULIN (HCC): ICD-10-CM

## 2024-09-11 PROCEDURE — 1123F ACP DISCUSS/DSCN MKR DOCD: CPT | Performed by: STUDENT IN AN ORGANIZED HEALTH CARE EDUCATION/TRAINING PROGRAM

## 2024-09-11 PROCEDURE — G8427 DOCREV CUR MEDS BY ELIG CLIN: HCPCS | Performed by: STUDENT IN AN ORGANIZED HEALTH CARE EDUCATION/TRAINING PROGRAM

## 2024-09-11 PROCEDURE — 99214 OFFICE O/P EST MOD 30 MIN: CPT | Performed by: STUDENT IN AN ORGANIZED HEALTH CARE EDUCATION/TRAINING PROGRAM

## 2024-09-11 PROCEDURE — G8417 CALC BMI ABV UP PARAM F/U: HCPCS | Performed by: STUDENT IN AN ORGANIZED HEALTH CARE EDUCATION/TRAINING PROGRAM

## 2024-09-11 PROCEDURE — G2211 COMPLEX E/M VISIT ADD ON: HCPCS | Performed by: STUDENT IN AN ORGANIZED HEALTH CARE EDUCATION/TRAINING PROGRAM

## 2024-09-11 PROCEDURE — 3078F DIAST BP <80 MM HG: CPT | Performed by: STUDENT IN AN ORGANIZED HEALTH CARE EDUCATION/TRAINING PROGRAM

## 2024-09-11 PROCEDURE — 3023F SPIROM DOC REV: CPT | Performed by: STUDENT IN AN ORGANIZED HEALTH CARE EDUCATION/TRAINING PROGRAM

## 2024-09-11 PROCEDURE — 3074F SYST BP LT 130 MM HG: CPT | Performed by: STUDENT IN AN ORGANIZED HEALTH CARE EDUCATION/TRAINING PROGRAM

## 2024-09-11 PROCEDURE — 1036F TOBACCO NON-USER: CPT | Performed by: STUDENT IN AN ORGANIZED HEALTH CARE EDUCATION/TRAINING PROGRAM

## 2024-09-11 RX ORDER — POTASSIUM CHLORIDE 750 MG/1
10 TABLET, EXTENDED RELEASE ORAL DAILY
Qty: 30 TABLET | Refills: 0 | OUTPATIENT
Start: 2024-09-11

## 2024-09-11 RX ORDER — POTASSIUM CHLORIDE 750 MG/1
10 TABLET, EXTENDED RELEASE ORAL DAILY
Qty: 90 TABLET | Refills: 1 | Status: SHIPPED | OUTPATIENT
Start: 2024-09-11

## 2024-09-11 RX ORDER — SERTRALINE HYDROCHLORIDE 100 MG/1
100 TABLET, FILM COATED ORAL DAILY
Qty: 90 TABLET | Refills: 0 | OUTPATIENT
Start: 2024-09-11

## 2024-09-11 RX ORDER — SERTRALINE HYDROCHLORIDE 100 MG/1
100 TABLET, FILM COATED ORAL DAILY
Qty: 90 TABLET | Refills: 1 | Status: SHIPPED | OUTPATIENT
Start: 2024-09-11

## 2024-09-11 RX ORDER — CEFDINIR 300 MG/1
300 CAPSULE ORAL 2 TIMES DAILY
Qty: 14 CAPSULE | Refills: 0 | Status: SHIPPED | OUTPATIENT
Start: 2024-09-11 | End: 2024-09-18

## 2024-09-16 ASSESSMENT — ENCOUNTER SYMPTOMS
DIARRHEA: 0
NAUSEA: 0
SHORTNESS OF BREATH: 0
RHINORRHEA: 1
ABDOMINAL PAIN: 0
VOMITING: 0
TROUBLE SWALLOWING: 0
BLOOD IN STOOL: 0
CONSTIPATION: 0
COUGH: 1
EYE PAIN: 0

## 2024-09-18 DIAGNOSIS — E78.5 HYPERLIPIDEMIA, UNSPECIFIED HYPERLIPIDEMIA TYPE: ICD-10-CM

## 2024-09-18 RX ORDER — ATORVASTATIN CALCIUM 40 MG/1
TABLET, FILM COATED ORAL
Qty: 90 TABLET | Refills: 1 | Status: SHIPPED | OUTPATIENT
Start: 2024-09-18

## 2024-09-24 ENCOUNTER — OFFICE VISIT (OUTPATIENT)
Dept: PODIATRY | Age: 79
End: 2024-09-24
Payer: MEDICARE

## 2024-09-24 VITALS
WEIGHT: 285.4 LBS | BODY MASS INDEX: 36.64 KG/M2 | RESPIRATION RATE: 20 BRPM | DIASTOLIC BLOOD PRESSURE: 60 MMHG | HEART RATE: 68 BPM | SYSTOLIC BLOOD PRESSURE: 122 MMHG

## 2024-09-24 DIAGNOSIS — B35.1 DERMATOPHYTOSIS OF NAIL: ICD-10-CM

## 2024-09-24 DIAGNOSIS — L84 CORNS AND CALLOSITIES: ICD-10-CM

## 2024-09-24 DIAGNOSIS — I82.5Z3: Primary | ICD-10-CM

## 2024-09-24 DIAGNOSIS — N32.81 OVERACTIVE BLADDER: Primary | ICD-10-CM

## 2024-09-24 PROCEDURE — 11721 DEBRIDE NAIL 6 OR MORE: CPT | Performed by: PODIATRIST

## 2024-09-24 PROCEDURE — 99999 PR OFFICE/OUTPT VISIT,PROCEDURE ONLY: CPT | Performed by: PODIATRIST

## 2024-09-24 PROCEDURE — 11056 PARNG/CUTG B9 HYPRKR LES 2-4: CPT | Performed by: PODIATRIST

## 2024-09-25 RX ORDER — OXYBUTYNIN CHLORIDE 5 MG/1
5 TABLET, EXTENDED RELEASE ORAL DAILY
Qty: 90 TABLET | Refills: 0 | Status: SHIPPED | OUTPATIENT
Start: 2024-09-25

## 2024-10-07 DIAGNOSIS — E03.9 HYPOTHYROIDISM, UNSPECIFIED TYPE: ICD-10-CM

## 2024-10-07 RX ORDER — LEVOTHYROXINE SODIUM 100 UG/1
100 TABLET ORAL DAILY
Qty: 90 TABLET | Refills: 0 | Status: SHIPPED | OUTPATIENT
Start: 2024-10-07

## 2024-10-07 NOTE — TELEPHONE ENCOUNTER
Noah called requesting a refill of the below medication which has been pended for you:     Requested Prescriptions     Pending Prescriptions Disp Refills    levothyroxine (SYNTHROID) 100 MCG tablet [Pharmacy Med Name: Levothyroxine Sodium 100 MCG Oral Tablet] 90 tablet 0     Sig: Take 1 tablet by mouth once daily       Last Appointment Date: Visit date not found  Next Appointment Date: Visit date not found    No Known Allergies

## 2024-10-28 ENCOUNTER — OFFICE VISIT (OUTPATIENT)
Dept: CARDIOLOGY | Age: 79
End: 2024-10-28
Payer: OTHER GOVERNMENT

## 2024-10-28 VITALS
RESPIRATION RATE: 19 BRPM | SYSTOLIC BLOOD PRESSURE: 129 MMHG | OXYGEN SATURATION: 89 % | WEIGHT: 284.4 LBS | DIASTOLIC BLOOD PRESSURE: 71 MMHG | BODY MASS INDEX: 36.5 KG/M2 | HEIGHT: 74 IN | HEART RATE: 84 BPM

## 2024-10-28 DIAGNOSIS — Z95.810 ICD (IMPLANTABLE CARDIOVERTER-DEFIBRILLATOR) IN PLACE: ICD-10-CM

## 2024-10-28 DIAGNOSIS — I25.10 CORONARY ARTERY DISEASE INVOLVING NATIVE CORONARY ARTERY OF NATIVE HEART WITHOUT ANGINA PECTORIS: ICD-10-CM

## 2024-10-28 DIAGNOSIS — I25.5 ISCHEMIC CARDIOMYOPATHY: Primary | ICD-10-CM

## 2024-10-28 DIAGNOSIS — I44.7 LEFT BUNDLE BRANCH BLOCK (LBBB): ICD-10-CM

## 2024-10-28 PROCEDURE — 3078F DIAST BP <80 MM HG: CPT | Performed by: INTERNAL MEDICINE

## 2024-10-28 PROCEDURE — 99213 OFFICE O/P EST LOW 20 MIN: CPT | Performed by: INTERNAL MEDICINE

## 2024-10-28 PROCEDURE — 3074F SYST BP LT 130 MM HG: CPT | Performed by: INTERNAL MEDICINE

## 2024-10-28 PROCEDURE — 99214 OFFICE O/P EST MOD 30 MIN: CPT | Performed by: INTERNAL MEDICINE

## 2024-10-28 PROCEDURE — 1123F ACP DISCUSS/DSCN MKR DOCD: CPT | Performed by: INTERNAL MEDICINE

## 2024-10-28 PROCEDURE — 93010 ELECTROCARDIOGRAM REPORT: CPT | Performed by: INTERNAL MEDICINE

## 2024-10-28 PROCEDURE — 93005 ELECTROCARDIOGRAM TRACING: CPT | Performed by: INTERNAL MEDICINE

## 2024-10-28 RX ORDER — PREDNISONE 20 MG/1
1 TABLET ORAL DAILY
COMMUNITY
Start: 2024-10-24

## 2024-10-28 RX ORDER — ASPIRIN 81 MG/1
81 TABLET ORAL DAILY
Qty: 90 TABLET | Refills: 3 | Status: SHIPPED | OUTPATIENT
Start: 2024-10-28

## 2024-10-28 RX ORDER — AZITHROMYCIN 250 MG/1
1 TABLET, FILM COATED ORAL DAILY
COMMUNITY
Start: 2024-10-24

## 2024-10-28 RX ORDER — ALBUTEROL SULFATE 90 UG/1
INHALANT RESPIRATORY (INHALATION)
COMMUNITY
Start: 2024-10-24

## 2024-10-28 NOTE — PROGRESS NOTES
Today's Date: 10/28/2024  Patient's Name: Noah Jauregui  Patient's age: 79 y.o., 1945    CC: For follow-up    HPI:  Is here for follow-up.  Has not been able to see Dr. Toussaint yet for his hematoma.  On examination his hematoma is completely resolved.  He will be seeing Dr. Toussaint on Friday.  Throughout this time has been off of his aspirin.  No cp.   No sob.   No palpitations.   No le edema.   No syncope.       Past Medical History:   has a past medical history of Acute respiratory failure with hypoxia, Adenomatous polyp, Cholecystitis, Chronic venous insufficiency, COPD with acute exacerbation (HCC), DVT (deep venous thrombosis) (HCC), Edema, Gout, Hypertension, Onychomycosis, Osteoarthritis, Plantar fasciitis, Pneumonia due to COVID-19 virus, and Tobacco abuse.    Past Surgical History:   has a past surgical history that includes Colonoscopy (12/12/2012); Vasectomy (1980); Colonoscopy (2003); other surgical history; Colonoscopy (08/2009); Colonoscopy (06/2008); Coronary artery bypass graft (10/28/2014); Total knee arthroplasty (Right, 10/2015); Total knee arthroplasty (Left, 03/2018); TURP; Cataract removal (Right, 06/07/2023); Cholecystectomy (03/19/2019); Cardiac catheterization (N/A, 12/22/2023); Cardiac procedure (N/A, 12/22/2023); Cardiac defibrillator placement (08/05/2024); and ep device procedure (N/A, 8/5/2024).    Home Medications:  Prior to Admission medications    Medication Sig Start Date End Date Taking? Authorizing Provider   albuterol sulfate HFA (PROVENTIL;VENTOLIN;PROAIR) 108 (90 Base) MCG/ACT inhaler Inhale into the lungs 10/24/24  Yes ProviderDaksha MD   azithromycin (ZITHROMAX) 250 MG tablet Take 1 tablet by mouth daily 10/24/24  Yes Provider, MD Daksha   predniSONE (DELTASONE) 20 MG tablet Take 1 tablet by mouth daily 10/24/24  Yes Provider, MD Daksha   levothyroxine (SYNTHROID) 100 MCG tablet Take 1 tablet by mouth once daily 10/7/24  Yes Bonifacio Drake

## 2024-11-05 DIAGNOSIS — F41.9 ANXIETY AND DEPRESSION: ICD-10-CM

## 2024-11-05 DIAGNOSIS — F32.A ANXIETY AND DEPRESSION: ICD-10-CM

## 2024-11-05 RX ORDER — BUPROPION HYDROCHLORIDE 75 MG/1
75 TABLET ORAL 2 TIMES DAILY
Qty: 180 TABLET | Refills: 0 | Status: SHIPPED | OUTPATIENT
Start: 2024-11-05

## 2024-11-05 NOTE — TELEPHONE ENCOUNTER
Noah called requesting a refill of the below medication which has been pended for you:     Requested Prescriptions     Pending Prescriptions Disp Refills    buPROPion (WELLBUTRIN) 75 MG tablet [Pharmacy Med Name: buPROPion HCl 75 MG Oral Tablet] 180 tablet 0     Sig: Take 1 tablet by mouth twice daily       Last Appointment Date: 9/11/2024  Next Appointment Date: 3/12/2025    No Known Allergies

## 2024-11-08 DIAGNOSIS — I95.1 ORTHOSTATIC HYPOTENSION: ICD-10-CM

## 2024-11-11 RX ORDER — MIDODRINE HYDROCHLORIDE 5 MG/1
5 TABLET ORAL 3 TIMES DAILY
Qty: 270 TABLET | Refills: 0 | Status: SHIPPED | OUTPATIENT
Start: 2024-11-11

## 2024-11-11 NOTE — TELEPHONE ENCOUNTER
Noah called requesting a refill of the below medication which has been pended for you:     Requested Prescriptions     Pending Prescriptions Disp Refills    midodrine (PROAMATINE) 5 MG tablet [Pharmacy Med Name: Midodrine HCl 5 MG Oral Tablet] 270 tablet 0     Sig: TAKE 1 TABLET BY MOUTH THREE TIMES DAILY       Last Appointment Date: 9/11/2024  Next Appointment Date: 3/12/2025    No Known Allergies

## 2024-12-03 ENCOUNTER — OFFICE VISIT (OUTPATIENT)
Dept: PODIATRY | Age: 79
End: 2024-12-03
Payer: MEDICARE

## 2024-12-03 VITALS
BODY MASS INDEX: 36.67 KG/M2 | HEART RATE: 68 BPM | RESPIRATION RATE: 20 BRPM | SYSTOLIC BLOOD PRESSURE: 128 MMHG | DIASTOLIC BLOOD PRESSURE: 70 MMHG | WEIGHT: 285.6 LBS

## 2024-12-03 DIAGNOSIS — B35.1 DERMATOPHYTOSIS OF NAIL: ICD-10-CM

## 2024-12-03 DIAGNOSIS — L84 CORNS AND CALLOSITIES: ICD-10-CM

## 2024-12-03 DIAGNOSIS — I82.5Z3: ICD-10-CM

## 2024-12-03 DIAGNOSIS — L97.511 SKIN ULCER OF TOE OF RIGHT FOOT, LIMITED TO BREAKDOWN OF SKIN (HCC): Primary | ICD-10-CM

## 2024-12-03 PROCEDURE — 3078F DIAST BP <80 MM HG: CPT | Performed by: PODIATRIST

## 2024-12-03 PROCEDURE — G8417 CALC BMI ABV UP PARAM F/U: HCPCS | Performed by: PODIATRIST

## 2024-12-03 PROCEDURE — 3074F SYST BP LT 130 MM HG: CPT | Performed by: PODIATRIST

## 2024-12-03 PROCEDURE — 99213 OFFICE O/P EST LOW 20 MIN: CPT | Performed by: PODIATRIST

## 2024-12-03 PROCEDURE — G8427 DOCREV CUR MEDS BY ELIG CLIN: HCPCS | Performed by: PODIATRIST

## 2024-12-03 PROCEDURE — 11055 PARING/CUTG B9 HYPRKER LES 1: CPT | Performed by: PODIATRIST

## 2024-12-03 PROCEDURE — 1036F TOBACCO NON-USER: CPT | Performed by: PODIATRIST

## 2024-12-03 PROCEDURE — 11721 DEBRIDE NAIL 6 OR MORE: CPT | Performed by: PODIATRIST

## 2024-12-03 PROCEDURE — 1159F MED LIST DOCD IN RCRD: CPT | Performed by: PODIATRIST

## 2024-12-03 PROCEDURE — G8484 FLU IMMUNIZE NO ADMIN: HCPCS | Performed by: PODIATRIST

## 2024-12-03 PROCEDURE — 97597 DBRDMT OPN WND 1ST 20 CM/<: CPT | Performed by: PODIATRIST

## 2024-12-03 PROCEDURE — 1123F ACP DISCUSS/DSCN MKR DOCD: CPT | Performed by: PODIATRIST

## 2024-12-03 NOTE — PROGRESS NOTES
Foot Care Worksheet  PCP: Bonifacio Drake DO  Last visit: 09 / 11 / 2024    Nail description: Thick , Yellow , Crumbly , Marked limitation of ambulation     Pain resulting from thickened and dystrophy of nail plate Yes    Nails involved  Right   1, 2, 3, 4, 5  (T5-T9)  Left     1, 2, 3, 4, 5  (TA-T4)    Q7 1 Class A Finding - Non traumatic amputation of foot No    Q8 2 Class B Findings - Absent DP pulse No, Absent PT pulse No, Advanced tropic changes (3 required) Yes    Decrease hair growth Yes, Nail changes/thickening Yes, Pigmented changes/discoloration Yes, Skin texture (thin, shiny) No, Skin color (rubor/redness) No    Q9 1 Class B and 2 Class C Findings  Claudication No, Temperature change Yes, Paresthesia No, Burning No, Edema Yes    Subjective:  Patient [facility] to [facility] today for soreness right third toe.  Started just recently.  No new trauma.  Patient has been a lot more active overall.  Patient also request routine foot care.  Pain at the nails also.    No Known Allergies    Past Medical History:   Diagnosis Date    Acute respiratory failure with hypoxia 12/9/2021    Adenomatous polyp     history of     Cholecystitis 3/19/2019    Chronic venous insufficiency     COPD with acute exacerbation (HCC) 12/9/2021    DVT (deep venous thrombosis) (HCC)     history of     Edema     related to venous stasis     Gout     history of     Hypertension     Onychomycosis     Osteoarthritis     Plantar fasciitis     Pneumonia due to COVID-19 virus 12/8/2021    Tobacco abuse        Prior to Admission medications    Medication Sig Start Date End Date Taking? Authorizing Provider   midodrine (PROAMATINE) 5 MG tablet TAKE 1 TABLET BY MOUTH THREE TIMES DAILY 11/11/24  Yes Bonifacio Drake DO   buPROPion (WELLBUTRIN) 75 MG tablet Take 1 tablet by mouth twice daily 11/5/24  Yes Bonifacio Drake DO   albuterol sulfate HFA (PROVENTIL;VENTOLIN;PROAIR) 108 (90 Base) MCG/ACT inhaler Inhale into the

## 2024-12-03 NOTE — PATIENT INSTRUCTIONS
Apply Silvadene to right 3rd toe daily and cover with a dry dressing or band aid, an antimicrobial cream which is a prescription medicine. Apply once or twice a day to your wound as instructed by your doctor. Cover with a dry dressing. Call with any concerns.    SIGNS OF INFECTION  - Redness, swelling, skin hot  - Wound bed turns black or stringy yellow  - Foul odor  - Increased drainage or pus  - Increased pain  - Fever greater than 100F    CALL YOUR DOCTOR OR SEEK MEDICAL ATTENTION IF SIGNS OF INFECTION.  DO NOT WAIT UNTIL YOUR NEXT APPOINTMENT    Call  with any other questions or concerns- [phone].    Return to see  in 2 weeks for the toe wound and in 10 weeks for the routine nail care.

## 2024-12-25 ENCOUNTER — HOSPITAL ENCOUNTER (INPATIENT)
Age: 79
LOS: 2 days | Discharge: HOME HEALTH | End: 2024-12-28
Attending: EMERGENCY MEDICINE | Admitting: FAMILY MEDICINE
Payer: OTHER GOVERNMENT

## 2024-12-25 ENCOUNTER — APPOINTMENT (OUTPATIENT)
Dept: GENERAL RADIOLOGY | Age: 79
End: 2024-12-25
Attending: EMERGENCY MEDICINE
Payer: OTHER GOVERNMENT

## 2024-12-25 DIAGNOSIS — R65.10 SIRS (SYSTEMIC INFLAMMATORY RESPONSE SYNDROME) (HCC): Primary | ICD-10-CM

## 2024-12-25 DIAGNOSIS — J44.1 COPD WITH ACUTE EXACERBATION (HCC): ICD-10-CM

## 2024-12-25 DIAGNOSIS — R09.02 HYPOXEMIA: ICD-10-CM

## 2024-12-25 LAB
ABSOLUTE BANDS: 2.02 K/UL
ALBUMIN SERPL-MCNC: 4.2 G/DL (ref 3.5–5.2)
ALBUMIN/GLOB SERPL: 1.4 {RATIO} (ref 1–2.5)
ALP SERPL-CCNC: 77 U/L (ref 40–129)
ALT SERPL-CCNC: 13 U/L (ref 5–41)
ANION GAP SERPL CALCULATED.3IONS-SCNC: 13 MMOL/L (ref 9–17)
AST SERPL-CCNC: 17 U/L
BANDS: 10 %
BASOPHILS # BLD: 0 K/UL (ref 0–0.2)
BASOPHILS NFR BLD: 0 % (ref 0–2)
BILIRUB SERPL-MCNC: 1 MG/DL (ref 0.3–1.2)
BNP SERPL-MCNC: 2432 PG/ML
BUN SERPL-MCNC: 19 MG/DL (ref 8–23)
BUN/CREAT SERPL: 17 (ref 9–20)
CALCIUM SERPL-MCNC: 9.1 MG/DL (ref 8.6–10.4)
CHLORIDE SERPL-SCNC: 99 MMOL/L (ref 98–107)
CO2 SERPL-SCNC: 23 MMOL/L (ref 20–31)
CREAT SERPL-MCNC: 1.1 MG/DL (ref 0.7–1.2)
EOSINOPHIL # BLD: 0 K/UL (ref 0–0.4)
EOSINOPHILS RELATIVE PERCENT: 0 % (ref 1–8)
ERYTHROCYTE [DISTWIDTH] IN BLOOD BY AUTOMATED COUNT: 15 % (ref 11.8–14.4)
FLUAV AG SPEC QL: NEGATIVE
FLUBV AG SPEC QL: NEGATIVE
GFR, ESTIMATED: 68 ML/MIN/1.73M2
GLUCOSE SERPL-MCNC: 140 MG/DL (ref 70–99)
HCT VFR BLD AUTO: 49.2 % (ref 40.7–50.3)
HGB BLD-MCNC: 15.3 G/DL (ref 13–17)
IMM GRANULOCYTES # BLD AUTO: 0 K/UL (ref 0–0.3)
IMM GRANULOCYTES NFR BLD: 0 %
INR PPP: 1.1
LACTATE BLDV-SCNC: 2.1 MMOL/L (ref 0.5–1.9)
LYMPHOCYTES NFR BLD: 0.81 K/UL (ref 1–4.8)
LYMPHOCYTES RELATIVE PERCENT: 4 % (ref 15–43)
MAGNESIUM SERPL-MCNC: 1.7 MG/DL (ref 1.6–2.6)
MCH RBC QN AUTO: 30.3 PG (ref 25.2–33.5)
MCHC RBC AUTO-ENTMCNC: 31.1 G/DL (ref 25.2–33.5)
MCV RBC AUTO: 97.4 FL (ref 82.6–102.9)
MONOCYTES NFR BLD: 1.21 K/UL (ref 0.1–1.2)
MONOCYTES NFR BLD: 6 % (ref 6–14)
MORPHOLOGY: ABNORMAL
NEUTROPHILS NFR BLD: 80 % (ref 44–74)
NEUTS SEG NFR BLD: 16.16 K/UL (ref 1.5–8.1)
PARTIAL THROMBOPLASTIN TIME: 29.8 SEC (ref 23.9–33.8)
PLATELET # BLD AUTO: 132 K/UL (ref 138–453)
PMV BLD AUTO: 9.6 FL (ref 8.1–13.5)
POTASSIUM SERPL-SCNC: 4.1 MMOL/L (ref 3.7–5.3)
PROT SERPL-MCNC: 7.2 G/DL (ref 6.4–8.3)
PROTHROMBIN TIME: 13.9 SEC (ref 11.5–14.2)
RBC # BLD AUTO: 5.05 M/UL (ref 4.21–5.77)
RSV BY PCR: NEGATIVE
SARS-COV-2 RDRP RESP QL NAA+PROBE: NOT DETECTED
SODIUM SERPL-SCNC: 135 MMOL/L (ref 135–144)
SPECIMEN DESCRIPTION: NORMAL
SPECIMEN SOURCE: NORMAL
TROPONIN I SERPL HS-MCNC: 30 NG/L (ref 0–22)
WBC OTHER # BLD: 20.2 K/UL (ref 3.5–11.3)

## 2024-12-25 PROCEDURE — 85730 THROMBOPLASTIN TIME PARTIAL: CPT

## 2024-12-25 PROCEDURE — 93005 ELECTROCARDIOGRAM TRACING: CPT | Performed by: EMERGENCY MEDICINE

## 2024-12-25 PROCEDURE — 2580000003 HC RX 258: Performed by: EMERGENCY MEDICINE

## 2024-12-25 PROCEDURE — 84484 ASSAY OF TROPONIN QUANT: CPT

## 2024-12-25 PROCEDURE — 99285 EMERGENCY DEPT VISIT HI MDM: CPT

## 2024-12-25 PROCEDURE — 83036 HEMOGLOBIN GLYCOSYLATED A1C: CPT

## 2024-12-25 PROCEDURE — 87635 SARS-COV-2 COVID-19 AMP PRB: CPT

## 2024-12-25 PROCEDURE — 87040 BLOOD CULTURE FOR BACTERIA: CPT

## 2024-12-25 PROCEDURE — 87804 INFLUENZA ASSAY W/OPTIC: CPT

## 2024-12-25 PROCEDURE — 71045 X-RAY EXAM CHEST 1 VIEW: CPT

## 2024-12-25 PROCEDURE — 6370000000 HC RX 637 (ALT 250 FOR IP): Performed by: EMERGENCY MEDICINE

## 2024-12-25 PROCEDURE — 96365 THER/PROPH/DIAG IV INF INIT: CPT

## 2024-12-25 PROCEDURE — 87798 DETECT AGENT NOS DNA AMP: CPT

## 2024-12-25 PROCEDURE — 83605 ASSAY OF LACTIC ACID: CPT

## 2024-12-25 PROCEDURE — 36415 COLL VENOUS BLD VENIPUNCTURE: CPT

## 2024-12-25 PROCEDURE — 83880 ASSAY OF NATRIURETIC PEPTIDE: CPT

## 2024-12-25 PROCEDURE — 85610 PROTHROMBIN TIME: CPT

## 2024-12-25 PROCEDURE — 80053 COMPREHEN METABOLIC PANEL: CPT

## 2024-12-25 PROCEDURE — 85025 COMPLETE CBC W/AUTO DIFF WBC: CPT

## 2024-12-25 PROCEDURE — 83735 ASSAY OF MAGNESIUM: CPT

## 2024-12-25 PROCEDURE — 84145 PROCALCITONIN (PCT): CPT

## 2024-12-25 PROCEDURE — 6360000002 HC RX W HCPCS: Performed by: EMERGENCY MEDICINE

## 2024-12-25 RX ORDER — ACETAMINOPHEN 500 MG
1000 TABLET ORAL ONCE
Status: COMPLETED | OUTPATIENT
Start: 2024-12-25 | End: 2024-12-25

## 2024-12-25 RX ORDER — IPRATROPIUM BROMIDE AND ALBUTEROL SULFATE 2.5; .5 MG/3ML; MG/3ML
1 SOLUTION RESPIRATORY (INHALATION)
Status: COMPLETED | OUTPATIENT
Start: 2024-12-25 | End: 2024-12-25

## 2024-12-25 RX ADMIN — ACETAMINOPHEN 1000 MG: 500 TABLET ORAL at 23:03

## 2024-12-25 RX ADMIN — IPRATROPIUM BROMIDE AND ALBUTEROL SULFATE 1 DOSE: .5; 2.5 SOLUTION RESPIRATORY (INHALATION) at 23:02

## 2024-12-25 RX ADMIN — CEFTRIAXONE 1000 MG: 1 INJECTION, POWDER, FOR SOLUTION INTRAMUSCULAR; INTRAVENOUS at 23:57

## 2024-12-25 ASSESSMENT — ENCOUNTER SYMPTOMS
GASTROINTESTINAL NEGATIVE: 1
COUGH: 1
SHORTNESS OF BREATH: 1

## 2024-12-25 ASSESSMENT — PAIN - FUNCTIONAL ASSESSMENT: PAIN_FUNCTIONAL_ASSESSMENT: NONE - DENIES PAIN

## 2024-12-26 ENCOUNTER — APPOINTMENT (OUTPATIENT)
Dept: GENERAL RADIOLOGY | Age: 79
End: 2024-12-26
Payer: OTHER GOVERNMENT

## 2024-12-26 PROBLEM — R65.10 SIRS (SYSTEMIC INFLAMMATORY RESPONSE SYNDROME) (HCC): Status: ACTIVE | Noted: 2024-12-26

## 2024-12-26 PROBLEM — F32.A DEPRESSIVE DISORDER: Status: ACTIVE | Noted: 2024-12-26

## 2024-12-26 PROBLEM — M62.81 MUSCLE WEAKNESS (GENERALIZED): Status: ACTIVE | Noted: 2024-12-26

## 2024-12-26 PROBLEM — R53.1 WEAKNESS: Status: ACTIVE | Noted: 2024-12-26

## 2024-12-26 PROBLEM — N13.8 BENIGN PROSTATIC HYPERPLASIA WITH URINARY OBSTRUCTION: Status: ACTIVE | Noted: 2024-12-26

## 2024-12-26 PROBLEM — N40.1 BENIGN PROSTATIC HYPERPLASIA WITH URINARY OBSTRUCTION: Status: ACTIVE | Noted: 2024-12-26

## 2024-12-26 LAB
ABSOLUTE BANDS: 2.9 K/UL
ANION GAP SERPL CALCULATED.3IONS-SCNC: 12 MMOL/L (ref 9–17)
BANDS: 13 %
BASOPHILS # BLD: 0 K/UL (ref 0–0.2)
BASOPHILS NFR BLD: 0 % (ref 0–2)
BUN SERPL-MCNC: 21 MG/DL (ref 8–23)
BUN/CREAT SERPL: 21 (ref 9–20)
CALCIUM SERPL-MCNC: 8.9 MG/DL (ref 8.6–10.4)
CHLORIDE SERPL-SCNC: 103 MMOL/L (ref 98–107)
CO2 SERPL-SCNC: 23 MMOL/L (ref 20–31)
CREAT SERPL-MCNC: 1 MG/DL (ref 0.7–1.2)
EKG ATRIAL RATE: 104 BPM
EKG P AXIS: 3 DEGREES
EKG P-R INTERVAL: 216 MS
EKG Q-T INTERVAL: 358 MS
EKG QRS DURATION: 132 MS
EKG QTC CALCULATION (BAZETT): 470 MS
EKG R AXIS: -60 DEGREES
EKG T AXIS: 118 DEGREES
EKG VENTRICULAR RATE: 104 BPM
EOSINOPHIL # BLD: 0 K/UL (ref 0–0.4)
EOSINOPHILS RELATIVE PERCENT: 0 % (ref 1–8)
ERYTHROCYTE [DISTWIDTH] IN BLOOD BY AUTOMATED COUNT: 15.1 % (ref 11.8–14.4)
EST. AVERAGE GLUCOSE BLD GHB EST-MCNC: 117 MG/DL
GFR, ESTIMATED: 76 ML/MIN/1.73M2
GLUCOSE BLD-MCNC: 140 MG/DL (ref 75–110)
GLUCOSE BLD-MCNC: 172 MG/DL (ref 75–110)
GLUCOSE BLD-MCNC: 182 MG/DL (ref 75–110)
GLUCOSE SERPL-MCNC: 136 MG/DL (ref 70–99)
HBA1C MFR BLD: 5.7 % (ref 4–6)
HCT VFR BLD AUTO: 47.2 % (ref 40.7–50.3)
HGB BLD-MCNC: 14.8 G/DL (ref 13–17)
IMM GRANULOCYTES # BLD AUTO: 0 K/UL (ref 0–0.3)
IMM GRANULOCYTES NFR BLD: 0 %
LACTATE BLDV-SCNC: 1.5 MMOL/L (ref 0.5–2.2)
LACTATE BLDV-SCNC: 2.8 MMOL/L (ref 0.5–1.9)
LYMPHOCYTES NFR BLD: 0.89 K/UL (ref 1–4.8)
LYMPHOCYTES RELATIVE PERCENT: 4 % (ref 15–43)
MCH RBC QN AUTO: 30.7 PG (ref 25.2–33.5)
MCHC RBC AUTO-ENTMCNC: 31.4 G/DL (ref 25.2–33.5)
MCV RBC AUTO: 97.9 FL (ref 82.6–102.9)
MONOCYTES NFR BLD: 1.12 K/UL (ref 0.1–1.2)
MONOCYTES NFR BLD: 5 % (ref 6–14)
MORPHOLOGY: ABNORMAL
NEUTROPHILS NFR BLD: 78 % (ref 44–74)
NEUTS SEG NFR BLD: 17.39 K/UL (ref 1.5–8.1)
PLATELET # BLD AUTO: 121 K/UL (ref 138–453)
PMV BLD AUTO: 9.6 FL (ref 8.1–13.5)
POTASSIUM SERPL-SCNC: 4.4 MMOL/L (ref 3.7–5.3)
PROCALCITONIN SERPL-MCNC: 0.25 NG/ML (ref 0–0.09)
RBC # BLD AUTO: 4.82 M/UL (ref 4.21–5.77)
SODIUM SERPL-SCNC: 138 MMOL/L (ref 135–144)
TROPONIN I SERPL HS-MCNC: 26 NG/L (ref 0–22)
TROPONIN I SERPL HS-MCNC: 28 NG/L (ref 0–22)
TROPONIN I SERPL HS-MCNC: 29 NG/L (ref 0–22)
WBC OTHER # BLD: 22.3 K/UL (ref 3.5–11.3)

## 2024-12-26 PROCEDURE — 6370000000 HC RX 637 (ALT 250 FOR IP)

## 2024-12-26 PROCEDURE — 94761 N-INVAS EAR/PLS OXIMETRY MLT: CPT

## 2024-12-26 PROCEDURE — 6360000002 HC RX W HCPCS

## 2024-12-26 PROCEDURE — 6370000000 HC RX 637 (ALT 250 FOR IP): Performed by: FAMILY MEDICINE

## 2024-12-26 PROCEDURE — 82947 ASSAY GLUCOSE BLOOD QUANT: CPT

## 2024-12-26 PROCEDURE — 97161 PT EVAL LOW COMPLEX 20 MIN: CPT | Performed by: PHYSICAL THERAPIST

## 2024-12-26 PROCEDURE — 2700000000 HC OXYGEN THERAPY PER DAY

## 2024-12-26 PROCEDURE — 2580000003 HC RX 258: Performed by: EMERGENCY MEDICINE

## 2024-12-26 PROCEDURE — 71045 X-RAY EXAM CHEST 1 VIEW: CPT

## 2024-12-26 PROCEDURE — 83605 ASSAY OF LACTIC ACID: CPT

## 2024-12-26 PROCEDURE — 80048 BASIC METABOLIC PNL TOTAL CA: CPT

## 2024-12-26 PROCEDURE — 2580000003 HC RX 258

## 2024-12-26 PROCEDURE — 36415 COLL VENOUS BLD VENIPUNCTURE: CPT

## 2024-12-26 PROCEDURE — 94640 AIRWAY INHALATION TREATMENT: CPT

## 2024-12-26 PROCEDURE — 96375 TX/PRO/DX INJ NEW DRUG ADDON: CPT

## 2024-12-26 PROCEDURE — 97116 GAIT TRAINING THERAPY: CPT | Performed by: PHYSICAL THERAPIST

## 2024-12-26 PROCEDURE — 6360000002 HC RX W HCPCS: Performed by: EMERGENCY MEDICINE

## 2024-12-26 PROCEDURE — 84484 ASSAY OF TROPONIN QUANT: CPT

## 2024-12-26 PROCEDURE — 2500000003 HC RX 250 WO HCPCS: Performed by: FAMILY MEDICINE

## 2024-12-26 PROCEDURE — 99222 1ST HOSP IP/OBS MODERATE 55: CPT

## 2024-12-26 PROCEDURE — 85025 COMPLETE CBC W/AUTO DIFF WBC: CPT

## 2024-12-26 PROCEDURE — 6360000002 HC RX W HCPCS: Performed by: FAMILY MEDICINE

## 2024-12-26 PROCEDURE — 2060000000 HC ICU INTERMEDIATE R&B

## 2024-12-26 PROCEDURE — 2500000003 HC RX 250 WO HCPCS

## 2024-12-26 RX ORDER — MIDODRINE HYDROCHLORIDE 5 MG/1
5 TABLET ORAL
Status: DISCONTINUED | OUTPATIENT
Start: 2024-12-26 | End: 2024-12-28 | Stop reason: HOSPADM

## 2024-12-26 RX ORDER — BUPROPION HYDROCHLORIDE 75 MG/1
75 TABLET ORAL 2 TIMES DAILY
Status: DISCONTINUED | OUTPATIENT
Start: 2024-12-26 | End: 2024-12-28 | Stop reason: HOSPADM

## 2024-12-26 RX ORDER — TAMSULOSIN HYDROCHLORIDE 0.4 MG/1
0.4 CAPSULE ORAL
Status: DISCONTINUED | OUTPATIENT
Start: 2024-12-27 | End: 2024-12-28 | Stop reason: HOSPADM

## 2024-12-26 RX ORDER — SPIRONOLACTONE 25 MG/1
12.5 TABLET ORAL
Status: DISCONTINUED | OUTPATIENT
Start: 2024-12-27 | End: 2024-12-28 | Stop reason: HOSPADM

## 2024-12-26 RX ORDER — METOPROLOL SUCCINATE 25 MG/1
25 TABLET, EXTENDED RELEASE ORAL NIGHTLY
Status: DISCONTINUED | OUTPATIENT
Start: 2024-12-26 | End: 2024-12-28 | Stop reason: HOSPADM

## 2024-12-26 RX ORDER — ENOXAPARIN SODIUM 100 MG/ML
30 INJECTION SUBCUTANEOUS 2 TIMES DAILY
Status: DISCONTINUED | OUTPATIENT
Start: 2024-12-26 | End: 2024-12-28 | Stop reason: HOSPADM

## 2024-12-26 RX ORDER — ONDANSETRON 2 MG/ML
4 INJECTION INTRAMUSCULAR; INTRAVENOUS EVERY 6 HOURS PRN
Status: DISCONTINUED | OUTPATIENT
Start: 2024-12-26 | End: 2024-12-28 | Stop reason: HOSPADM

## 2024-12-26 RX ORDER — GLUCAGON 1 MG/ML
1 KIT INJECTION PRN
Status: DISCONTINUED | OUTPATIENT
Start: 2024-12-26 | End: 2024-12-28 | Stop reason: HOSPADM

## 2024-12-26 RX ORDER — INSULIN LISPRO 100 [IU]/ML
0-4 INJECTION, SOLUTION INTRAVENOUS; SUBCUTANEOUS
Status: DISCONTINUED | OUTPATIENT
Start: 2024-12-26 | End: 2024-12-28 | Stop reason: HOSPADM

## 2024-12-26 RX ORDER — POLYETHYLENE GLYCOL 3350 17 G/17G
17 POWDER, FOR SOLUTION ORAL DAILY PRN
Status: DISCONTINUED | OUTPATIENT
Start: 2024-12-26 | End: 2024-12-28 | Stop reason: HOSPADM

## 2024-12-26 RX ORDER — ASPIRIN 81 MG/1
81 TABLET ORAL DAILY
Status: DISCONTINUED | OUTPATIENT
Start: 2024-12-26 | End: 2024-12-28 | Stop reason: HOSPADM

## 2024-12-26 RX ORDER — METHYLPREDNISOLONE SODIUM SUCCINATE 40 MG/ML
40 INJECTION INTRAMUSCULAR; INTRAVENOUS EVERY 6 HOURS
Status: COMPLETED | OUTPATIENT
Start: 2024-12-26 | End: 2024-12-27

## 2024-12-26 RX ORDER — IPRATROPIUM BROMIDE AND ALBUTEROL SULFATE 2.5; .5 MG/3ML; MG/3ML
1 SOLUTION RESPIRATORY (INHALATION)
Status: DISCONTINUED | OUTPATIENT
Start: 2024-12-26 | End: 2024-12-26

## 2024-12-26 RX ORDER — ONDANSETRON 4 MG/1
4 TABLET, ORALLY DISINTEGRATING ORAL EVERY 8 HOURS PRN
Status: DISCONTINUED | OUTPATIENT
Start: 2024-12-26 | End: 2024-12-28 | Stop reason: HOSPADM

## 2024-12-26 RX ORDER — AZITHROMYCIN 250 MG/1
500 TABLET, FILM COATED ORAL EVERY 24 HOURS
Status: DISCONTINUED | OUTPATIENT
Start: 2024-12-26 | End: 2024-12-27

## 2024-12-26 RX ORDER — ACETAMINOPHEN 325 MG/1
650 TABLET ORAL EVERY 6 HOURS PRN
Status: DISCONTINUED | OUTPATIENT
Start: 2024-12-26 | End: 2024-12-28 | Stop reason: HOSPADM

## 2024-12-26 RX ORDER — GUAIFENESIN 600 MG/1
600 TABLET, EXTENDED RELEASE ORAL 2 TIMES DAILY
Status: DISCONTINUED | OUTPATIENT
Start: 2024-12-26 | End: 2024-12-28 | Stop reason: HOSPADM

## 2024-12-26 RX ORDER — FUROSEMIDE 20 MG/1
20 TABLET ORAL EVERY MORNING
Status: DISCONTINUED | OUTPATIENT
Start: 2024-12-26 | End: 2024-12-28 | Stop reason: HOSPADM

## 2024-12-26 RX ORDER — VITAMIN B COMPLEX
2000 TABLET ORAL DAILY
Status: DISCONTINUED | OUTPATIENT
Start: 2024-12-26 | End: 2024-12-26

## 2024-12-26 RX ORDER — DEXTROSE MONOHYDRATE 100 MG/ML
INJECTION, SOLUTION INTRAVENOUS CONTINUOUS PRN
Status: DISCONTINUED | OUTPATIENT
Start: 2024-12-26 | End: 2024-12-28 | Stop reason: HOSPADM

## 2024-12-26 RX ORDER — IPRATROPIUM BROMIDE AND ALBUTEROL SULFATE 2.5; .5 MG/3ML; MG/3ML
1 SOLUTION RESPIRATORY (INHALATION) EVERY 4 HOURS PRN
Status: DISCONTINUED | OUTPATIENT
Start: 2024-12-26 | End: 2024-12-28 | Stop reason: HOSPADM

## 2024-12-26 RX ORDER — SODIUM CHLORIDE 0.9 % (FLUSH) 0.9 %
5-40 SYRINGE (ML) INJECTION EVERY 12 HOURS SCHEDULED
Status: DISCONTINUED | OUTPATIENT
Start: 2024-12-26 | End: 2024-12-28 | Stop reason: HOSPADM

## 2024-12-26 RX ORDER — BENZONATATE 100 MG/1
100 CAPSULE ORAL 3 TIMES DAILY PRN
Status: DISCONTINUED | OUTPATIENT
Start: 2024-12-26 | End: 2024-12-28 | Stop reason: HOSPADM

## 2024-12-26 RX ORDER — TRAZODONE HYDROCHLORIDE 50 MG/1
50 TABLET, FILM COATED ORAL NIGHTLY
Status: DISCONTINUED | OUTPATIENT
Start: 2024-12-26 | End: 2024-12-28 | Stop reason: HOSPADM

## 2024-12-26 RX ORDER — SPIRONOLACTONE 25 MG/1
12.5 TABLET ORAL DAILY
Status: DISCONTINUED | OUTPATIENT
Start: 2024-12-26 | End: 2024-12-26

## 2024-12-26 RX ORDER — ATORVASTATIN CALCIUM 40 MG/1
40 TABLET, FILM COATED ORAL DAILY
Status: DISCONTINUED | OUTPATIENT
Start: 2024-12-26 | End: 2024-12-28 | Stop reason: HOSPADM

## 2024-12-26 RX ORDER — SODIUM CHLORIDE 9 MG/ML
INJECTION, SOLUTION INTRAVENOUS PRN
Status: DISCONTINUED | OUTPATIENT
Start: 2024-12-26 | End: 2024-12-28 | Stop reason: HOSPADM

## 2024-12-26 RX ORDER — 0.9 % SODIUM CHLORIDE 0.9 %
1000 INTRAVENOUS SOLUTION INTRAVENOUS ONCE
Status: COMPLETED | OUTPATIENT
Start: 2024-12-26 | End: 2024-12-26

## 2024-12-26 RX ORDER — VITAMIN B COMPLEX
2000 TABLET ORAL NIGHTLY
Status: DISCONTINUED | OUTPATIENT
Start: 2024-12-27 | End: 2024-12-28 | Stop reason: HOSPADM

## 2024-12-26 RX ORDER — LOSARTAN POTASSIUM 25 MG/1
12.5 TABLET ORAL
Status: DISCONTINUED | OUTPATIENT
Start: 2024-12-27 | End: 2024-12-28 | Stop reason: HOSPADM

## 2024-12-26 RX ORDER — LEVOTHYROXINE SODIUM 100 UG/1
100 TABLET ORAL DAILY
Status: DISCONTINUED | OUTPATIENT
Start: 2024-12-26 | End: 2024-12-28 | Stop reason: HOSPADM

## 2024-12-26 RX ORDER — LOSARTAN POTASSIUM 25 MG/1
12.5 TABLET ORAL DAILY
Status: DISCONTINUED | OUTPATIENT
Start: 2024-12-26 | End: 2024-12-26

## 2024-12-26 RX ORDER — ACETAMINOPHEN 650 MG/1
650 SUPPOSITORY RECTAL EVERY 6 HOURS PRN
Status: DISCONTINUED | OUTPATIENT
Start: 2024-12-26 | End: 2024-12-28 | Stop reason: HOSPADM

## 2024-12-26 RX ORDER — FINASTERIDE 5 MG/1
5 TABLET, FILM COATED ORAL DAILY
Status: DISCONTINUED | OUTPATIENT
Start: 2024-12-27 | End: 2024-12-28 | Stop reason: HOSPADM

## 2024-12-26 RX ORDER — IPRATROPIUM BROMIDE AND ALBUTEROL SULFATE 2.5; .5 MG/3ML; MG/3ML
1 SOLUTION RESPIRATORY (INHALATION)
Status: DISCONTINUED | OUTPATIENT
Start: 2024-12-26 | End: 2024-12-28 | Stop reason: HOSPADM

## 2024-12-26 RX ORDER — POTASSIUM CHLORIDE 750 MG/1
10 TABLET, EXTENDED RELEASE ORAL DAILY
Status: DISCONTINUED | OUTPATIENT
Start: 2024-12-26 | End: 2024-12-28 | Stop reason: HOSPADM

## 2024-12-26 RX ORDER — SODIUM CHLORIDE 0.9 % (FLUSH) 0.9 %
5-40 SYRINGE (ML) INJECTION PRN
Status: DISCONTINUED | OUTPATIENT
Start: 2024-12-26 | End: 2024-12-28 | Stop reason: HOSPADM

## 2024-12-26 RX ORDER — INSULIN GLARGINE 100 [IU]/ML
5 INJECTION, SOLUTION SUBCUTANEOUS DAILY
Status: DISCONTINUED | OUTPATIENT
Start: 2024-12-26 | End: 2024-12-28 | Stop reason: HOSPADM

## 2024-12-26 RX ORDER — PREDNISONE 20 MG/1
40 TABLET ORAL DAILY
Status: DISCONTINUED | OUTPATIENT
Start: 2024-12-28 | End: 2024-12-28 | Stop reason: HOSPADM

## 2024-12-26 RX ADMIN — GUAIFENESIN 600 MG: 600 TABLET, EXTENDED RELEASE ORAL at 08:57

## 2024-12-26 RX ADMIN — METHYLPREDNISOLONE SODIUM SUCCINATE 40 MG: 40 INJECTION INTRAMUSCULAR; INTRAVENOUS at 13:45

## 2024-12-26 RX ADMIN — ENOXAPARIN SODIUM 30 MG: 100 INJECTION SUBCUTANEOUS at 02:21

## 2024-12-26 RX ADMIN — TRAZODONE HYDROCHLORIDE 50 MG: 50 TABLET ORAL at 02:21

## 2024-12-26 RX ADMIN — GUAIFENESIN 600 MG: 600 TABLET, EXTENDED RELEASE ORAL at 02:21

## 2024-12-26 RX ADMIN — ASPIRIN 81 MG: 81 TABLET, COATED ORAL at 08:57

## 2024-12-26 RX ADMIN — INSULIN LISPRO 1 UNITS: 100 INJECTION, SOLUTION INTRAVENOUS; SUBCUTANEOUS at 11:53

## 2024-12-26 RX ADMIN — METHYLPREDNISOLONE SODIUM SUCCINATE 40 MG: 40 INJECTION INTRAMUSCULAR; INTRAVENOUS at 20:33

## 2024-12-26 RX ADMIN — ENOXAPARIN SODIUM 30 MG: 100 INJECTION SUBCUTANEOUS at 20:31

## 2024-12-26 RX ADMIN — METHYLPREDNISOLONE SODIUM SUCCINATE 40 MG: 40 INJECTION INTRAMUSCULAR; INTRAVENOUS at 02:21

## 2024-12-26 RX ADMIN — WATER 1000 MG: 1 INJECTION INTRAMUSCULAR; INTRAVENOUS; SUBCUTANEOUS at 22:53

## 2024-12-26 RX ADMIN — MIDODRINE HYDROCHLORIDE 5 MG: 5 TABLET ORAL at 11:52

## 2024-12-26 RX ADMIN — SODIUM CHLORIDE 1000 ML: 9 INJECTION, SOLUTION INTRAVENOUS at 02:29

## 2024-12-26 RX ADMIN — ENOXAPARIN SODIUM 30 MG: 100 INJECTION SUBCUTANEOUS at 08:59

## 2024-12-26 RX ADMIN — SERTRALINE 100 MG: 50 TABLET, FILM COATED ORAL at 08:56

## 2024-12-26 RX ADMIN — AZITHROMYCIN MONOHYDRATE 500 MG: 500 INJECTION, POWDER, LYOPHILIZED, FOR SOLUTION INTRAVENOUS at 00:31

## 2024-12-26 RX ADMIN — TRAZODONE HYDROCHLORIDE 50 MG: 50 TABLET ORAL at 20:31

## 2024-12-26 RX ADMIN — INSULIN GLARGINE 5 UNITS: 100 INJECTION, SOLUTION SUBCUTANEOUS at 08:58

## 2024-12-26 RX ADMIN — SODIUM CHLORIDE, PRESERVATIVE FREE 10 ML: 5 INJECTION INTRAVENOUS at 08:58

## 2024-12-26 RX ADMIN — EMPAGLIFLOZIN 10 MG: 10 TABLET, FILM COATED ORAL at 08:57

## 2024-12-26 RX ADMIN — SODIUM CHLORIDE, PRESERVATIVE FREE 10 ML: 5 INJECTION INTRAVENOUS at 20:35

## 2024-12-26 RX ADMIN — MIDODRINE HYDROCHLORIDE 5 MG: 5 TABLET ORAL at 08:57

## 2024-12-26 RX ADMIN — ATORVASTATIN CALCIUM 40 MG: 40 TABLET, FILM COATED ORAL at 20:37

## 2024-12-26 RX ADMIN — METOPROLOL SUCCINATE 25 MG: 25 TABLET, EXTENDED RELEASE ORAL at 20:30

## 2024-12-26 RX ADMIN — IPRATROPIUM BROMIDE AND ALBUTEROL SULFATE 1 DOSE: .5; 2.5 SOLUTION RESPIRATORY (INHALATION) at 16:16

## 2024-12-26 RX ADMIN — AZITHROMYCIN DIHYDRATE 500 MG: 250 TABLET ORAL at 22:53

## 2024-12-26 RX ADMIN — SODIUM CHLORIDE, PRESERVATIVE FREE 10 ML: 5 INJECTION INTRAVENOUS at 13:45

## 2024-12-26 RX ADMIN — IPRATROPIUM BROMIDE AND ALBUTEROL SULFATE 1 DOSE: .5; 2.5 SOLUTION RESPIRATORY (INHALATION) at 19:44

## 2024-12-26 RX ADMIN — METOPROLOL SUCCINATE 25 MG: 25 TABLET, EXTENDED RELEASE ORAL at 02:21

## 2024-12-26 RX ADMIN — FUROSEMIDE 20 MG: 20 TABLET ORAL at 08:57

## 2024-12-26 RX ADMIN — IPRATROPIUM BROMIDE AND ALBUTEROL SULFATE 1 DOSE: .5; 2.5 SOLUTION RESPIRATORY (INHALATION) at 08:10

## 2024-12-26 RX ADMIN — Medication 2000 UNITS: at 08:56

## 2024-12-26 RX ADMIN — GUAIFENESIN 600 MG: 600 TABLET, EXTENDED RELEASE ORAL at 20:30

## 2024-12-26 RX ADMIN — METHYLPREDNISOLONE SODIUM SUCCINATE 40 MG: 40 INJECTION INTRAMUSCULAR; INTRAVENOUS at 08:58

## 2024-12-26 RX ADMIN — LOSARTAN POTASSIUM 12.5 MG: 25 TABLET, FILM COATED ORAL at 08:57

## 2024-12-26 RX ADMIN — IPRATROPIUM BROMIDE AND ALBUTEROL SULFATE 1 DOSE: .5; 2.5 SOLUTION RESPIRATORY (INHALATION) at 03:51

## 2024-12-26 RX ADMIN — POTASSIUM CHLORIDE 10 MEQ: 750 TABLET, FILM COATED, EXTENDED RELEASE ORAL at 08:56

## 2024-12-26 RX ADMIN — SPIRONOLACTONE 12.5 MG: 25 TABLET ORAL at 08:56

## 2024-12-26 RX ADMIN — IPRATROPIUM BROMIDE AND ALBUTEROL SULFATE 1 DOSE: .5; 2.5 SOLUTION RESPIRATORY (INHALATION) at 12:04

## 2024-12-26 RX ADMIN — MIDODRINE HYDROCHLORIDE 5 MG: 5 TABLET ORAL at 16:52

## 2024-12-26 ASSESSMENT — LIFESTYLE VARIABLES: HOW OFTEN DO YOU HAVE A DRINK CONTAINING ALCOHOL: NEVER

## 2024-12-26 NOTE — PROGRESS NOTES
[facility]. Hypox[facility] [R09.02]  SIRS (systemic inflammatory response syndrome) ([facility]) [R65.10]  COPD with acute exacerbation ([facility]) [J44.1] .   Vitals:    12/26/24 0811   BP:    Pulse: 94   Resp: 23   Temp:    SpO2: 92%   . Patients home meds are   Prior to Admission medications    Medication Sig Start Date End Date Taking? Authorizing Provider   midodrine (PROAMATINE) 5 MG tablet TAKE 1 TABLET BY MOUTH THREE TIMES DAILY 11/11/24  Yes Bonifacio Drake DO   buPROPion (WELLBUTRIN) 75 MG tablet Take 1 tablet by mouth twice daily 11/5/24  Yes Bonifacio Drake DO   albuterol sulfate HFA (PROVENTIL;VENTOLIN;PROAIR) 108 (90 Base) MCG/ACT inhaler Inhale into the lungs 10/24/24  Yes ProviderDaksha MD   aspirin 81 MG EC tablet Take 1 tablet by mouth daily 10/28/24  Yes Rob Evans,    levothyroxine (SYNTHROID) 100 MCG tablet Take 1 tablet by mouth once daily 10/7/24  Yes Bonifacio Drake DO   sertraline (ZOLOFT) 100 MG tablet Take 1 tablet by mouth daily 9/11/24  Yes Bonifacio Drake DO   Handicap Placard MISC by Does not apply route 8/28/2024 to 8/28/2029  (J96.11,  Z99.81) Chronic respiratory failure with hypoxia, on home O2 therapy ([facility])  (primary encounter diagnosis)    (J98.4,  E66.9) Restrictive lung disease secondary to obesity    (I50.42) Chronic combined systolic and diastolic CHF (congestive heart failure) ([facility])    (G62.81) Critical illness polyneuropathy ([facility]) 8/28/24  Yes Montana Cárdenas MD   traZODone (DESYREL) 50 MG tablet Take 1 tablet by mouth nightly 8/27/24  Yes Bonifacio Drake DO   JARDIANCE 10 MG tablet Take 1 tablet by mouth once daily 7/9/24  Yes Yoanna Andrews, APRN - CNP   spironolactone (ALDACTONE) 25 MG tablet Take 1/2 (one-half) tablet by mouth once daily 7/9/24  Yes Yoanna Andrews, APRN - CNP   furosemide (LASIX) 20 MG tablet Take 1 tablet by mouth every morning 5/16/24  Yes Bonifacio Drake, DO   erythromycin  [phone]   40 314 329 344 359 374 389 404 419 40 416 445 473 502 531 559 588 617 647   45 307 322 336 351 366 381 396 411 45 405 434 463 491 520 549 577 606 636   50 299 314 329 344 359 374 389 404 50 395 424 452 481 510 538 567 596 625   55 292 307 321 336 351 366 381 396 55 384 413 442 470 499 528 556 585 615   60 284 299 314 329 344 359 374 389 60 374 403 431 460 489 517 546 575 605   65 277 292 306 321 336 351 366 381 65 363 392 421 449 478 507 535 564 594   70 269 284 299 314 329 344 359 374 70 353 382 410 439 468 496 525 554 583   75 261 274 289 305 319 334 348 364 75 344 372 400 429 458 487 515 544 573   80 253 266 282 296 312 327 342 356 80 335 362 390 419 448 476 [phone]

## 2024-12-26 NOTE — PROGRESS NOTES
Pt's spouse Nataly shared with this RN pt's home medication list.  This RN reviewed it with her and with Dr. Martinez.  Home medication list and current orders updated appropriately.  Photocopy of list included in chart. NUVIA Geiger RN

## 2024-12-26 NOTE — H&P
HOSPITALIST ADMISSION H&P      REASON FOR ADMISSION:  COPD exacerbation, SIRS, Hypoxia  ESTIMATED LENGTH OF STAY:>2 midnights, 3-4 days    ATTENDING/ADMITTING PHYSICIAN: Arelis Martinez MD  PCP: Bonifacio Drake DO    HISTORY OF PRESENT ILLNESS:      The patient is a 79 y.o. male patient of Bonifacio Drake DO who presents from ER with c/o SOB and cough. Patient reports \"I got tired\" stated he spent most of the day on the sofa. Spouse and family report patient had been coughing frequently and choking on his phlegm at least 4 times, patient became short of breath afterwards with his pulse ox dropping, they called EMS who gave him an aerosol treatment with improvement in patient respiratory distress. Patient has home O2 but has not used it in almost a year per his spouse. Patient denies chest pain, nausea, vomiting, constipation, diarrhea, or difficulty urinating.    Hypertension: Labile.     BPH w/LUTS: Hx Rodriguez catheter, follows with [facility]rology[facility]    CHF: EF 25-30 % on 4/15/24, NT ProBNP: 2432.     In ER: Acetaminophen, Azithromycin, Ceftriaxone, Duoneb.   EKG:   Sinus tachycardia with 1st degree A-V block--104  Left axis deviation  Left bundle branch block  Abnormal ECG  When compared with ECG of 16-APR-2022 05:47,  Vent. rate has increased BY  41 BPM  T wave inversion no longer evident in Inferior leads  T wave inversion less evident in Lateral leads.    CXR:   IMPRESSION:  Cardiomegaly but no acute lung disease     Wounds and LDAs present prior to admission: Right 3rd toe blackened tip.     See below for additional PMH.    Patient gbnu-ugmfbpfsyb-dcfpxpkw-available records reviewed, including, but not limited to ER records, imaging results, lab results, office records, personal records, and OARRS -- no signs of abuse or diversion.     Past Medical History:   Diagnosis Date    Acute respiratory failure with hypoxia 12/9/2021    Adenomatous polyp     history of     Cholecystitis 3/19/2019     tablet by mouth daily  Bonifacio Drake DO   allopurinol (ZYLOPRIM) 300 MG tablet Take 1 tablet by mouth once daily  Bonifacio Drake DO   alfuzosin (UROXATRAL) 10 MG extended release tablet Take 1 tablet by mouth daily  Alex Cook PA-C   finasteride (PROSCAR) 5 MG tablet Take 1 tablet by mouth daily  Alex Cook PA-C   Misc. Devices MISC Rodriguez catheter supplies. 18 F/30 mL Rodriguez catheters, leg bags, Rodriguez drainage bags, Rodriguez insertion packs. Dx: BPH with urinary retention  Malik Sierra DO   Misc. Devices MISC Oxygen via nasal cannula at 2 liters/minute continuously. Diagnosis: COPD  Malik Sierra DO   Omega-3 Fatty Acids (FISH OIL PO) Take 1,200 mg by mouth daily  Patient not taking: Reported on 12/26/2024  Provider, Daksha, MD         Allergies:    Patient has no known allergies.    Social History:   Tobacco:   Social History     Tobacco Use   Smoking Status Former    Current packs/day: 0.00    Average packs/day: 1.5 packs/day for 55.0 years (82.5 ttl pk-yrs)    Types: Cigarettes    Start date: 10/27/1959    Quit date: 10/27/2014    Years since quitting: 10.1    Passive exposure: Past   Smokeless Tobacco Never       Alcohol:   Social History     Substance and Sexual Activity   Alcohol Use Not Currently    Comment: occasionally       Drug:   Social History     Substance and Sexual Activity   Drug Use No       Family History:   family history includes Heart Disease in his brother; Heart Surgery in his sister; Hypertension in an other family member; Kidney Disease in his brother; Other in his brother; Other (age of onset: 47) in his mother; Sudden Death in his father.    REVIEW OF SYSTEMS:  See HPI and problem list; otherwise no other new complaints with respect to eyes, ENT, neck, pulmonary, coronary, chest, GI, , endocrine, musculoskeletal, immune system/connective tissue disease, hematologic, neurologic, psychiatric, skin, lymphatics, or malignancies.     Code  status: patient/family wishes for DNR-CCA at this time.    PHYSICAL EXAM:  Vitals:  /64   Pulse 96   Temp 98.2 °F (36.8 °C) (Temporal)   Resp 24   Wt 129.6 kg (285 lb 11.2 oz)   SpO2 90%   BMI 36.68 kg/m²     General:  No acute distress noted, awake, alert, cooperative, well nourished, and well groomed  HEENT: Oxygen per nasal cannula, PERRLA, EMOI, External nose normal, Normocephalic, Atraumatic, and Neck with full ROM  Neck: Supple, Carotid Pulses Present, No Bruits, No Masses, Tenderness, Nodularity, and No Lymphadenopathy  Chest/Lungs:  Respirations unlabored, able to speak in short sentences without difficulty and Expiratory Wheezes  Cardiac: Regular Rate and Rhythm and Pedal Pulses Palpable Bilaterally  GI/Abdomen: Bowel Sounds Present, Soft, Non-tender, without Guarding or Rebound Tenderness, No Masses, and No Tenderness  : Not examined  Extremities/Musculoskeletal:  1+ BLE edema, right shin with chronic venous stasis discoloration,  and All four extremities with 5/5 strength  Skin:  Right 3rd toe with blackened area to tip, No Cyanosis, and Skin warm and dry  Neuro: Alert and Oriented, to Person, to Time, to Place, to Situation, No Localizing Signs/Symptoms, follows commands with all 4 extremities, and Strength equal bilaterally  Psychiatric: Normal mood and affect      LABS:    CBC with Differential:    Lab Results   Component Value Date/Time    WBC 20.2 12/25/2024 11:24 PM    RBC 5.05 12/25/2024 11:24 PM    HGB 15.3 12/25/2024 11:24 PM    HCT 49.2 12/25/2024 11:24 PM     12/25/2024 11:24 PM    MCV 97.4 12/25/2024 11:24 PM    MCH 30.3 12/25/2024 11:24 PM    MCHC 31.1 12/25/2024 11:24 PM    RDW 15.0 12/25/2024 11:24 PM    LYMPHOPCT 4 12/25/2024 11:24 PM    MONOPCT 6 12/25/2024 11:24 PM    EOSPCT 0 12/25/2024 11:24 PM    BASOPCT 0 12/25/2024 11:24 PM    MONOSABS 1.21 12/25/2024 11:24 PM    LYMPHSABS 0.81 12/25/2024 11:24 PM    EOSABS 0.00 12/25/2024 11:24 PM    BASOSABS 0.00 12/25/2024

## 2024-12-26 NOTE — CARE COORDINATION
Case Management Assessment  Initial Evaluation    Date/Time of Evaluation: 12/26/2024 10:20 AM  Assessment Completed by: Ana Galo RN    If patient is discharged prior to next notation, then this note serves as note for discharge by case management.    Patient Name: Noah Jauregui                   YOB: 1945  Diagnosis: Hypoxemia [R09.02]  SIRS (systemic inflammatory response syndrome) (HCC) [R65.10]  COPD with acute exacerbation (HCC) [J44.1]                   Date / Time: 12/25/2024 10:25 PM    Patient Admission Status: Inpatient   Readmission Risk (Low < 19, Mod (19-27), High > 27): Readmission Risk Score: 15.2    Current PCP: Bonifacio Drake, DO  PCP verified by CM? Yes    Chart Reviewed: Yes      History Provided by: Patient  Patient Orientation: Alert and Oriented    Patient Cognition: Alert    Hospitalization in the last 30 days (Readmission):  No    If yes, Readmission Assessment in CM Navigator will be completed.    Advance Directives:      Code Status: DNR-CCA   Patient's Primary Decision Maker is:      Primary Decision Maker: Nataly Jauregui - Spouse - [phone]    Discharge Planning:    Patient lives with: Spouse/Significant Other Type of Home: House  Primary Care Giver: Self  Patient Support Systems include: Spouse/Significant Other   Current Financial resources: Medicare  Current community resources: None  Current services prior to admission: Durable Medical Equipment, Oxygen Therapy            Current DME: Oxygen Therapy (Comment), Cane, Walker            Type of Home Care services:  None    ADLS  Prior functional level: Independent in ADLs/IADLs  Current functional level: Assistance with the following:, Bathing, Dressing, Toileting, Mobility    PT AM-PAC:   /24  OT AM-PAC:   /24    Family can provide assistance at DC: Yes  Would you like Case Management to discuss the discharge plan with any other family members/significant others, and if so, who? No  Plans to

## 2024-12-26 NOTE — PROGRESS NOTES
Physical Therapy  Facility/Department: [facility]  PROGRESSIVE CARE  Physical Therapy Initial Assessment    Name: Noah Jauregui  : 1945  MRN: [id number]  Date of Service: 2024    Discharge Recommendations:  Home with Home health PT          Patient Diagnosis(es): The primary encounter diagnosis was SIRS (systemic inflammatory response syndrome) (HCC). Diagnoses of Hypoxemia and COPD with acute exacerbation (HCC) were also pertinent to this visit.  Past Medical History:  has a past medical history of Acute respiratory failure with hypoxia, Adenomatous polyp, Cholecystitis, Chronic venous insufficiency, COPD with acute exacerbation (HCC), DVT (deep venous thrombosis) (HCC), Edema, Gout, Hypertension, Onychomycosis, Osteoarthritis, Plantar fasciitis, Pneumonia due to COVID-19 virus, and Tobacco abuse.  Past Surgical History:  has a past surgical history that includes Colonoscopy (2012); Vasectomy (); Colonoscopy (); other surgical history; Colonoscopy (2009); Colonoscopy (2008); Coronary artery bypass graft (10/28/2014); Total knee arthroplasty (Right, 10/2015); Total knee arthroplasty (Left, 2018); TURP; Cataract removal (Right, 2023); Cholecystectomy (2019); Cardiac catheterization (N/A, 2023); Cardiac procedure (N/A, 2023); Cardiac defibrillator placement (2024); and ep device procedure (N/A, 2024).    Assessment  Body Structures, Functions, Activity Limitations Requiring Skilled Therapeutic Intervention: Decreased functional mobility ;Decreased ADL status;Decreased strength;Decreased endurance  Assessment: Patient has decreased functional/dynamic strength, decreased endurance and increased fatigue with activity.  Treatment Diagnosis: generalized weakness, fatigue  Therapy Prognosis: Good  Decision Making: Low Complexity  Activity Tolerance  Activity Tolerance: Patient tolerated evaluation without incident    Plan  Physical Therapy Plan  General Plan: 1  °C)  Pulse: 94  Heart Rate Source: Telemetry  Respirations: 23  SpO2: 92 %  O2 Device: Nasal cannula  BP: 119/64  MAP (Calculated): 82  BP Location: Left upper arm  BP Method: Automatic  Patient Position: Semi fowlers     Observation/Palpation  Observation: pt rec'd in bed, call light within reach, agreeable to therapy evaluation       AROM RLE (degrees)  RLE AROM: WFL  AROM LLE (degrees)  LLE AROM : WFL  Strength RLE  Comment: grossly 4-/5  Strength LLE  Comment: grossly 4-/5           Bed mobility  Bridging: Independent  Rolling to Left: Independent  Rolling to Right: Independent  Supine to Sit: Independent  Sit to Supine: Independent  Scooting: Independent  Transfers  Sit to Stand: Contact guard assistance  Stand to Sit: Contact guard assistance  Bed to Chair: Contact guard assistance  Stand Pivot Transfers: Contact guard assistance  Lateral Transfers: Contact guard assistance  Ambulation  Surface: Level tile  Device: Rolling Walker  Assistance: Contact guard assistance  Gait Deviations: Slow Christina;Increased EARLINE;Decreased step length;Decreased step height;Decreased head and trunk rotation;Decreased arm swing;Deviated path  Distance: 10'     Goals  Short Term Goals  Time Frame for Short Term Goals: 7 days  Short Term Goal 1: Assess need for inpatient PT services, initiate HEP  Short Term Goal 2: Pt to perform functional transfers with mod I to prepare for D/C  Short Term Goal 3: Pt to amb 50' with AD and mod I to prepare for D/C     Education  Patient Education  Education Given To: Patient  Education Provided: Role of Therapy;Plan of Care;Home Exercise Program;Energy Conservation  Education Method: Demonstration;Verbal  Barriers to Learning: None  Education Outcome: Verbalized understanding;Demonstrated understanding    Therapy Time   Individual Concurrent Group Co-treatment   Time In  1015         Time Out  1033         Minutes  18          Gait: 8 minutes     Ra Romano, PT, DPT

## 2024-12-26 NOTE — ED NOTES
ED Report    Presents to ED from: Home    Chief Complaint   Patient presents with    Shortness of Breath    Cough     1. SIRS (systemic inflammatory response syndrome) (HCC)    2. Hypoxemia    3. COPD with acute exacerbation (HCC)      Present Condition: Pt has been resting on cart.  Resp 32, lung sounds with faint wheezes t/o.  Pox 92% 4L NC. Incontinent of urine.  Alert and oriented. Family at bedside.  Patient is unsure of when he takes some of his medications.   Pertinent Event(s): Started to feel very tired, no appetite around 1830 today.  Eventually became very sob with productive cough, pulse ox was in the low 80's.  Baseline Pox is 87 to 92% on room air.      LOC:  A+O x 4  Code Status: FULL    Vital Signs   Vitals:    12/25/24 2227 12/25/24 2300 12/25/24 2330 12/26/24 0000   BP: (!) 157/75 (!) 155/63 (!) 151/61 135/62   Pulse: (!) 109 (!) 103 (!) 101 97   Resp: (!) 36 (!) 37 (!) 39 27   Temp: (!) 101.1 °F (38.4 °C)      TempSrc: Tympanic      SpO2: (!) 86% (!) 88% 90% 91%   Weight: 127.9 kg (282 lb)        Oxygen Baseline:       Current Oxygen Needs: Nasal Cannula at 4L/min  Mobility: x1 Assist       Mobility Aide: Walker    LDAs:   Peripheral IV 12/25/24 Right Antecubital (Active)   Site Assessment Clean, dry & intact 12/25/24 2324   Line Status Flushed 12/25/24 2324   Dressing Status New dressing applied 12/25/24 2324   Dressing Type Transparent 12/25/24 2324   Dressing Intervention New 12/25/24 2324       Wound 04/15/22 Buttocks reddened with open areas,moist stage 2 (Active)       Wound 08/05/24 Chest Left;Upper (Active)       Wound 08/05/24 Femoral Proximal;Right;Anterior Reddened area along groin (Active)     Abnormal ED Labs:    Labs Reviewed   CBC WITH AUTO DIFFERENTIAL - Abnormal; Notable for the following components:       Result Value    WBC 20.2 (*)     RDW 15.0 (*)     Platelets 132 (*)     Lymphocytes % 4 (*)     Neutrophils % 80 (*)     Eosinophils % 0 (*)     Bands 10 (*)     Monocytes  Absolute 1.21 (*)     Lymphocytes Absolute 0.81 (*)     Neutrophils Absolute 16.16 (*)     All other components within normal limits   COMPREHENSIVE METABOLIC PANEL - Abnormal; Notable for the following components:    Glucose 140 (*)     All other components within normal limits   TROPONIN - Abnormal; Notable for the following components:    Troponin, High Sensitivity 30 (*)     All other components within normal limits   LACTATE, SEPSIS - Abnormal; Notable for the following components:    Lactic Acid, Sepsis 2.1 (*)     All other components within normal limits   BRAIN NATRIURETIC PEPTIDE - Abnormal; Notable for the following components:    NT Pro-BNP 2,432 (*)     All other components within normal limits   COVID-19, RAPID   RAPID INFLUENZA A/B ANTIGENS   RSV DETECTION   CULTURE, BLOOD 1   CULTURE, BLOOD 2   MAGNESIUM   URINALYSIS WITH REFLEX TO CULTURE   PROTIME-INR   APTT   LACTATE, SEPSIS   PROCALCITONIN     Imaging:    XR CHEST PORTABLE   Final Result      Cardiomegaly but no acute lung disease           Medications Administered         acetaminophen (TYLENOL) tablet 1,000 mg Admin Date  12/25/2024 Action  Given Dose  1,000 mg Rate   Route  Oral Documented By  Rose Perez RN        cefTRIAXone (ROCEPHIN) 1,000 mg in sodium chloride 0.9 % 50 mL IVPB (mini-bag) Admin Date  12/25/2024 Action  New Bag Dose  1,000 mg Rate  100 mL/hr Route  IntraVENous Documented By  Rose Perez RN        ipratropium 0.5 mg-albuterol 2.5 mg (DUONEB) nebulizer solution 1 Dose Admin Date  12/25/2024 Action  Given Dose  1 Dose Rate   Route  Inhalation Documented By  Eloina Coronado RCP          Medication Reconciliation Completed: Yes      Consults:    [x] Hospitalist  Completed     [x] yes     [] no    [] Cardiology  Completed     [] yes     [] no    [] General Surgery  Completed     [] yes     [] no                 [] Orthopedics  Completed     [] yes     [] no    [] OB/GYN   Completed     [] yes     [] no    []  Oncology   Completed     [] yes     [] no    [] Neurology   Completed     [] yes     [] no    [] Podiatry   Completed     [] yes     [] no    [] Urology   Completed     [] yes     [] no         Electronically signed by Rose Perez RN on 12/26/2024 at 12:15 AM

## 2024-12-26 NOTE — ED PROVIDER NOTES
[facility] Emergency Department  [address] [address] [facility] [address]  Phone: [phone]      Pt Name: Noah Jauregui  MRN:[id number]  Birthdate 1945  Date of evaluation: 12/25/2024      CHIEF COMPLAINT       Chief Complaint   Patient presents with    Shortness of Breath    Cough       HISTORY OF PRESENT ILLNESS   79-year-old presents via EMS after developing episode of coughing from phlegm and then had some breathing difficulties.  When EMS arrived he was working very hard to breathe and was in distress.  They gave him a albuterol treatment and he improved greatly.  He did have O2 saturations in the low 80s.  History of COPD with past respiratory distress requiring transfer and admission.  History of heart disease with AICD.  History of chronic PE and DVT.  He does have oxygen at home but does not use it frequently.  He is febrile here.  He denies any increased cough or shortness of breath over the last few days.  Denies chest pain.    REVIEW OF SYSTEMS     Review of Systems   Constitutional:  Negative for chills and fever.   Respiratory:  Positive for cough and shortness of breath.    Cardiovascular:  Negative for chest pain.   Gastrointestinal: Negative.    All other systems reviewed and are negative.        PAST MEDICAL HISTORY    has a past medical history of Acute respiratory failure with hypoxia, Adenomatous polyp, Cholecystitis, Chronic venous insufficiency, COPD with acute exacerbation (HCC), DVT (deep venous thrombosis) (HCC), Edema, Gout, Hypertension, Onychomycosis, Osteoarthritis, Plantar fasciitis, Pneumonia due to COVID-19 virus, and Tobacco abuse.    SURGICAL HISTORY      has a past surgical history that includes Colonoscopy (12/12/2012); Vasectomy (1980); Colonoscopy (2003); other surgical history; Colonoscopy (08/2009); Colonoscopy (06/2008); Coronary artery bypass graft (10/28/2014); Total knee arthroplasty (Right, 10/2015); Total knee arthroplasty (Left, 03/2018); TURP; Cataract  1,000 mg    cefTRIAXone (ROCEPHIN) 1,000 mg in sodium chloride 0.9 % 50 mL IVPB (mini-bag)     Order Specific Question:   Antimicrobial Indications     Answer:   Pneumonia (CAP)    azithromycin (ZITHROMAX) 500 mg in 250 mL addavial     Order Specific Question:   Antimicrobial Indications     Answer:   Pneumonia (CAP)          Vitals:    Vitals:    12/25/24 2227 12/25/24 2300 12/25/24 2330   BP: (!) 157/75 (!) 155/63 (!) 151/61   Pulse: (!) 109 (!) 103 (!) 101   Resp: (!) 36 (!) 37 (!) 39   Temp: (!) 101.1 °F (38.4 °C)     TempSrc: Tympanic     SpO2: (!) 86% (!) 88% 90%   Weight: 127.9 kg (282 lb)       -------------------------  BP: (!) 151/61, Temp: (!) 101.1 °F (38.4 °C), Pulse: (!) 101, Respirations: (!) 39      Re-evaluation Notes  0002-patient send x-ray does not show any acute infiltrate at this time.  He may have an occult pneumonia that is more obvious in the next day or 2.  His swabs for COVID flu and RSV were negative.  He does have some SIRS criteria and there is concern for pneumonia possibly suggesting sepsis.  We have started Rocephin and azithromycin.  He is given Tylenol for fever.  I held off on any fluids due to his significant heart failure history requiring AICD.  He is hemodynamically stable with good perfusion.  Discussed case with nurse practitioner with hospitalist service will admit.  Family understand plan.    CONSULTS:  Medicine    CRITICAL CARE:   None    PROCEDURES:  None    Differential diagnosis considered: Pneumonia, viral illness, COPD exacerbation  Chronic Conditions affecting care (DM,HTN,CA, etc): COPD, heart failure history    Social Determinants of Health affecting care (unable to care for self, lives alone, unemployed, homeless,etc): N/A    History source(s) (patient,spouse,parent,family,friend,EMS,etc): Patient, family, EMS    Review of external sources ([facility],Hospital records,EMS report, radiology reports, etc): Records    Tests considered but not ordered: Not

## 2024-12-27 ENCOUNTER — APPOINTMENT (OUTPATIENT)
Dept: GENERAL RADIOLOGY | Age: 79
End: 2024-12-27
Payer: OTHER GOVERNMENT

## 2024-12-27 LAB
ANION GAP SERPL CALCULATED.3IONS-SCNC: 11 MMOL/L (ref 9–17)
BASOPHILS # BLD: <0.03 K/UL (ref 0–0.2)
BASOPHILS NFR BLD: 0 % (ref 0–2)
BUN SERPL-MCNC: 27 MG/DL (ref 8–23)
BUN/CREAT SERPL: 27 (ref 9–20)
CALCIUM SERPL-MCNC: 9.4 MG/DL (ref 8.6–10.4)
CHLORIDE SERPL-SCNC: 103 MMOL/L (ref 98–107)
CO2 SERPL-SCNC: 24 MMOL/L (ref 20–31)
CREAT SERPL-MCNC: 1 MG/DL (ref 0.7–1.2)
EOSINOPHIL # BLD: <0.03 K/UL (ref 0–0.44)
EOSINOPHILS RELATIVE PERCENT: 0 % (ref 1–4)
ERYTHROCYTE [DISTWIDTH] IN BLOOD BY AUTOMATED COUNT: 15.4 % (ref 11.8–14.4)
GFR, ESTIMATED: 76 ML/MIN/1.73M2
GLUCOSE BLD-MCNC: 140 MG/DL (ref 75–110)
GLUCOSE BLD-MCNC: 146 MG/DL (ref 75–110)
GLUCOSE BLD-MCNC: 174 MG/DL (ref 75–110)
GLUCOSE SERPL-MCNC: 142 MG/DL (ref 70–99)
HCT VFR BLD AUTO: 45.5 % (ref 40.7–50.3)
HGB BLD-MCNC: 14.1 G/DL (ref 13–17)
IMM GRANULOCYTES # BLD AUTO: 0.11 K/UL (ref 0–0.3)
IMM GRANULOCYTES NFR BLD: 1 %
LYMPHOCYTES NFR BLD: 0.74 K/UL (ref 1.1–3.7)
LYMPHOCYTES RELATIVE PERCENT: 5 % (ref 24–43)
MCH RBC QN AUTO: 30.4 PG (ref 25.2–33.5)
MCHC RBC AUTO-ENTMCNC: 31 G/DL (ref 25.2–33.5)
MCV RBC AUTO: 98.1 FL (ref 82.6–102.9)
MONOCYTES NFR BLD: 0.81 K/UL (ref 0.1–1.2)
MONOCYTES NFR BLD: 5 % (ref 3–12)
NEUTROPHILS NFR BLD: 89 % (ref 36–65)
NEUTS SEG NFR BLD: 13.76 K/UL (ref 1.5–8.1)
NRBC BLD-RTO: 0 PER 100 WBC
PLATELET # BLD AUTO: ABNORMAL K/UL (ref 138–453)
PLATELET, FLUORESCENCE: 132 K/UL (ref 138–453)
PLATELETS.RETICULATED NFR BLD AUTO: 4.1 % (ref 1.1–10.3)
POTASSIUM SERPL-SCNC: 4.8 MMOL/L (ref 3.7–5.3)
RBC # BLD AUTO: 4.64 M/UL (ref 4.21–5.77)
SODIUM SERPL-SCNC: 138 MMOL/L (ref 135–144)
WBC OTHER # BLD: 15.4 K/UL (ref 3.5–11.3)

## 2024-12-27 PROCEDURE — 6360000002 HC RX W HCPCS

## 2024-12-27 PROCEDURE — 71046 X-RAY EXAM CHEST 2 VIEWS: CPT

## 2024-12-27 PROCEDURE — 97110 THERAPEUTIC EXERCISES: CPT

## 2024-12-27 PROCEDURE — 94760 N-INVAS EAR/PLS OXIMETRY 1: CPT

## 2024-12-27 PROCEDURE — 6360000002 HC RX W HCPCS: Performed by: FAMILY MEDICINE

## 2024-12-27 PROCEDURE — 6370000000 HC RX 637 (ALT 250 FOR IP): Performed by: FAMILY MEDICINE

## 2024-12-27 PROCEDURE — 2060000000 HC ICU INTERMEDIATE R&B

## 2024-12-27 PROCEDURE — 97116 GAIT TRAINING THERAPY: CPT

## 2024-12-27 PROCEDURE — 94640 AIRWAY INHALATION TREATMENT: CPT

## 2024-12-27 PROCEDURE — 85025 COMPLETE CBC W/AUTO DIFF WBC: CPT

## 2024-12-27 PROCEDURE — 99231 SBSQ HOSP IP/OBS SF/LOW 25: CPT | Performed by: FAMILY MEDICINE

## 2024-12-27 PROCEDURE — 80048 BASIC METABOLIC PNL TOTAL CA: CPT

## 2024-12-27 PROCEDURE — 82947 ASSAY GLUCOSE BLOOD QUANT: CPT

## 2024-12-27 PROCEDURE — 2500000003 HC RX 250 WO HCPCS: Performed by: FAMILY MEDICINE

## 2024-12-27 PROCEDURE — 94761 N-INVAS EAR/PLS OXIMETRY MLT: CPT

## 2024-12-27 PROCEDURE — 6370000000 HC RX 637 (ALT 250 FOR IP)

## 2024-12-27 PROCEDURE — 36415 COLL VENOUS BLD VENIPUNCTURE: CPT

## 2024-12-27 PROCEDURE — 2700000000 HC OXYGEN THERAPY PER DAY

## 2024-12-27 PROCEDURE — 2500000003 HC RX 250 WO HCPCS

## 2024-12-27 RX ORDER — AZITHROMYCIN 250 MG/1
500 TABLET, FILM COATED ORAL EVERY 24 HOURS
Status: COMPLETED | OUTPATIENT
Start: 2024-12-27 | End: 2024-12-27

## 2024-12-27 RX ADMIN — SODIUM CHLORIDE, PRESERVATIVE FREE 10 ML: 5 INJECTION INTRAVENOUS at 19:35

## 2024-12-27 RX ADMIN — AZITHROMYCIN DIHYDRATE 500 MG: 250 TABLET ORAL at 23:54

## 2024-12-27 RX ADMIN — METHYLPREDNISOLONE SODIUM SUCCINATE 40 MG: 40 INJECTION INTRAMUSCULAR; INTRAVENOUS at 19:35

## 2024-12-27 RX ADMIN — INSULIN GLARGINE 5 UNITS: 100 INJECTION, SOLUTION SUBCUTANEOUS at 10:08

## 2024-12-27 RX ADMIN — CHOLECALCIFEROL TAB 25 MCG (1000 UNIT) 2000 UNITS: 25 TAB at 20:19

## 2024-12-27 RX ADMIN — BUPROPION HYDROCHLORIDE 75 MG: 75 TABLET ORAL at 20:23

## 2024-12-27 RX ADMIN — SODIUM CHLORIDE, PRESERVATIVE FREE 10 ML: 5 INJECTION INTRAVENOUS at 23:55

## 2024-12-27 RX ADMIN — WATER 1000 MG: 1 INJECTION INTRAMUSCULAR; INTRAVENOUS; SUBCUTANEOUS at 23:55

## 2024-12-27 RX ADMIN — ENOXAPARIN SODIUM 30 MG: 100 INJECTION SUBCUTANEOUS at 10:08

## 2024-12-27 RX ADMIN — MIDODRINE HYDROCHLORIDE 5 MG: 5 TABLET ORAL at 16:51

## 2024-12-27 RX ADMIN — ENOXAPARIN SODIUM 30 MG: 100 INJECTION SUBCUTANEOUS at 20:18

## 2024-12-27 RX ADMIN — METOPROLOL SUCCINATE 25 MG: 25 TABLET, EXTENDED RELEASE ORAL at 20:19

## 2024-12-27 RX ADMIN — TAMSULOSIN HYDROCHLORIDE 0.4 MG: 0.4 CAPSULE ORAL at 12:11

## 2024-12-27 RX ADMIN — ATORVASTATIN CALCIUM 40 MG: 40 TABLET, FILM COATED ORAL at 20:19

## 2024-12-27 RX ADMIN — MIDODRINE HYDROCHLORIDE 5 MG: 5 TABLET ORAL at 12:11

## 2024-12-27 RX ADMIN — EMPAGLIFLOZIN 10 MG: 10 TABLET, FILM COATED ORAL at 12:11

## 2024-12-27 RX ADMIN — LEVOTHYROXINE SODIUM 100 MCG: 0.1 TABLET ORAL at 05:25

## 2024-12-27 RX ADMIN — IPRATROPIUM BROMIDE AND ALBUTEROL SULFATE 1 DOSE: .5; 2.5 SOLUTION RESPIRATORY (INHALATION) at 12:29

## 2024-12-27 RX ADMIN — GUAIFENESIN 600 MG: 600 TABLET, EXTENDED RELEASE ORAL at 20:18

## 2024-12-27 RX ADMIN — LOSARTAN POTASSIUM 12.5 MG: 25 TABLET, FILM COATED ORAL at 12:11

## 2024-12-27 RX ADMIN — METHYLPREDNISOLONE SODIUM SUCCINATE 40 MG: 40 INJECTION INTRAMUSCULAR; INTRAVENOUS at 15:25

## 2024-12-27 RX ADMIN — FUROSEMIDE 20 MG: 20 TABLET ORAL at 10:10

## 2024-12-27 RX ADMIN — GUAIFENESIN 600 MG: 600 TABLET, EXTENDED RELEASE ORAL at 10:10

## 2024-12-27 RX ADMIN — ASPIRIN 81 MG: 81 TABLET, COATED ORAL at 10:10

## 2024-12-27 RX ADMIN — IPRATROPIUM BROMIDE AND ALBUTEROL SULFATE 1 DOSE: .5; 2.5 SOLUTION RESPIRATORY (INHALATION) at 20:34

## 2024-12-27 RX ADMIN — SODIUM CHLORIDE, PRESERVATIVE FREE 10 ML: 5 INJECTION INTRAVENOUS at 10:09

## 2024-12-27 RX ADMIN — SPIRONOLACTONE 12.5 MG: 25 TABLET ORAL at 12:11

## 2024-12-27 RX ADMIN — METHYLPREDNISOLONE SODIUM SUCCINATE 40 MG: 40 INJECTION INTRAMUSCULAR; INTRAVENOUS at 10:28

## 2024-12-27 RX ADMIN — METHYLPREDNISOLONE SODIUM SUCCINATE 40 MG: 40 INJECTION INTRAMUSCULAR; INTRAVENOUS at 00:53

## 2024-12-27 RX ADMIN — SERTRALINE 100 MG: 50 TABLET, FILM COATED ORAL at 20:19

## 2024-12-27 RX ADMIN — IPRATROPIUM BROMIDE AND ALBUTEROL SULFATE 1 DOSE: .5; 2.5 SOLUTION RESPIRATORY (INHALATION) at 08:52

## 2024-12-27 RX ADMIN — ACETAMINOPHEN 650 MG: 325 TABLET ORAL at 20:18

## 2024-12-27 RX ADMIN — TRAZODONE HYDROCHLORIDE 50 MG: 50 TABLET ORAL at 20:19

## 2024-12-27 RX ADMIN — MIDODRINE HYDROCHLORIDE 5 MG: 5 TABLET ORAL at 10:11

## 2024-12-27 RX ADMIN — FINASTERIDE 5 MG: 5 TABLET, FILM COATED ORAL at 10:10

## 2024-12-27 RX ADMIN — POTASSIUM CHLORIDE 10 MEQ: 750 TABLET, FILM COATED, EXTENDED RELEASE ORAL at 10:10

## 2024-12-27 ASSESSMENT — PAIN DESCRIPTION - DESCRIPTORS: DESCRIPTORS: DISCOMFORT

## 2024-12-27 ASSESSMENT — PAIN - FUNCTIONAL ASSESSMENT: PAIN_FUNCTIONAL_ASSESSMENT: ACTIVITIES ARE NOT PREVENTED

## 2024-12-27 ASSESSMENT — PAIN DESCRIPTION - LOCATION: LOCATION: GENERALIZED

## 2024-12-27 ASSESSMENT — PAIN SCALES - GENERAL
PAINLEVEL_OUTOF10: 2
PAINLEVEL_OUTOF10: 0

## 2024-12-27 NOTE — PROGRESS NOTES
Physical Therapy  Facility/Department: [facility]  PROGRESSIVE CARE  Daily Treatment Note  NAME: Noah Jauregui  : 1945  MRN: [id number]    Date of Service: 2024    Discharge Recommendations:  Home with Home health PT        Patient Diagnosis(es): The primary encounter diagnosis was SIRS (systemic inflammatory response syndrome) (HCC). Diagnoses of Hypoxemia and COPD with acute exacerbation (HCC) were also pertinent to this visit.    Assessment  Activity Tolerance: Patient tolerated evaluation without incident    Plan  Physical Therapy Plan  General Plan: 1 time a day 7 days a week  Current Treatment Recommendations: Strengthening;Gait training;Balance training;Neuromuscular re-education;Transfer training;Home exercise program    Restrictions        Subjective   Orientation  Overall Orientation Status: Within Normal Limits  Orientation Level: Oriented X4    Objective  Vitals  O2 Device: Nasal cannula  Bed Mobility Training  Bed Mobility Training: Yes  Overall Level of Assistance: Independent  Rolling: Independent  Supine to Sit: Independent  Sit to Supine: Independent  Scooting: Independent  Balance  Sitting: Intact  Standing: High guard  Transfer Training  Transfer Training: Yes  Overall Level of Assistance: Contact-guard assistance  Sit to Stand: Contact-guard assistance  Stand to Sit: Contact-guard assistance  Stand Pivot Transfers: Contact-guard assistance  Gait  Gait Training: Yes  Overall Level of Assistance: Contact-guard assistance  Distance (ft): 20 Feet  Assistive Device: Walker, rolling  Base of Support: Widened  Speed/Christina: Slow;Shuffled     PT Exercises  Exercise Treatment: Supine: ankle pumps, heel slides, hip abd, quad sets x 15 ea. Seated marches, LAQ, hip abd/add x 15 ea (O2 dropped to 82 once during supine ex. Able to increase to 90 after 1-2 minute break.)  Resistive Exercises: Resisted HS curls x 15     Safety Devices  Type of Devices: Bed alarm in place;Call light within reach;Heels  elevated for pressure relief;Nurse notified;Left in bed      Goals  Short Term Goals  Time Frame for Short Term Goals: 7 days  Short Term Goal 1: Assess need for inpatient PT services, initiate HEP  Short Term Goal 2: Pt to perform functional transfers with mod I to prepare for D/C  Short Term Goal 3: Pt to amb 50' with AD and mod I to prepare for D/C    Education  Patient Education  Education Given To: Patient  Education Provided: Role of Therapy;Plan of Care;Home Exercise Program;Energy Conservation  Education Method: Demonstration;Verbal  Barriers to Learning: None  Education Outcome: Verbalized understanding;Demonstrated understanding    AM-PAC - Mobility              Therapy Time   Individual Concurrent Group Co-treatment   Time In 0820         Time Out 0850         Minutes 30                 DEVON STEIN, PTA

## 2024-12-27 NOTE — PROGRESS NOTES
Hospitalist Progress Note  12/27/2024 2:09 PM  Subjective:   Admit Date: 12/25/2024  PCP: Bonifacio Drake DO     DNR-CCA      C/c:  Chief Complaint   Patient presents with    Shortness of Breath    Cough         Interval History: pt doing slightly better, pt has home oxygen and not used for more then a year    Diet: ADULT DIET; Regular; 5 carb choices (75 gm/meal); Low Fat/Low Chol/High Fiber/PARISH                                ip days:1  Medications:   Scheduled Meds:   azithromycin  500 mg Oral Q24H    aspirin  81 mg Oral Daily    atorvastatin  40 mg Oral Daily    buPROPion  75 mg Oral BID    furosemide  20 mg Oral QAM    levothyroxine  100 mcg Oral Daily    metoprolol succinate  25 mg Oral Nightly    midodrine  5 mg Oral TID WC    potassium chloride  10 mEq Oral Daily    traZODone  50 mg Oral Nightly    sodium chloride flush  5-40 mL IntraVENous 2 times per day    enoxaparin  30 mg SubCUTAneous BID    guaiFENesin  600 mg Oral BID    methylPREDNISolone  40 mg IntraVENous Q6H    Followed by    [START ON 12/28/2024] predniSONE  40 mg Oral Daily    insulin glargine  5 Units SubCUTAneous Daily    insulin lispro  0-4 Units SubCUTAneous 4x Daily AC & HS    cefTRIAXone (ROCEPHIN) 1,000 mg in sterile water 10 mL IV syringe  1,000 mg IntraVENous Q24H    ipratropium 0.5 mg-albuterol 2.5 mg  1 Dose Inhalation 4x Daily RT    sertraline  100 mg Oral Nightly    Vitamin D  2,000 Units Oral Nightly    empagliflozin  10 mg Oral Lunch    losartan  12.5 mg Oral Lunch    spironolactone  12.5 mg Oral Lunch    finasteride  5 mg Oral Daily    tamsulosin  0.4 mg Oral Lunch     Continuous Infusions:   dextrose      sodium chloride       PRN Meds:.glucose, dextrose bolus **OR** dextrose bolus, glucagon (rDNA), dextrose, sodium chloride flush, sodium chloride, ondansetron **OR** ondansetron, polyethylene glycol, acetaminophen **OR** acetaminophen, benzonatate, ipratropium 0.5 mg-albuterol 2.5 mg     CBC:   Recent Labs      limited to breakdown of skin (HCC)     Critical illness polyneuropathy (HCC)     Type 2 diabetes mellitus     S/P ICD (internal cardiac defibrillator) procedure     ICD (implantable cardioverter-defibrillator) in place     Benign prostatic hyperplasia with urinary obstruction     Depressive disorder     Muscle weakness (generalized)     Weakness     SIRS (systemic inflammatory response syndrome) (HCC)      Anticipated Disposition upon discharge: [] Home                                                                         [] Home with Home Health                                                                         [] Skilled Nursing Facility                                                                         [] Long-Term Acute Care Hospital      Patient is admitted as inpatient status because of co-morbidities listed above, severity of signs and symptoms as outlined, requirement for current medical therapies and most importantly because of direct risk to patient if care not provided in a hospital setting.          Nathan Harkins MD, MD  Rounding Hospitalist

## 2024-12-27 NOTE — PROGRESS NOTES
Spiritual Health History and Assessment/Progress Note  [facility]    (P) Spiritual/Emotional Needs,  ,  ,      Name: Noah Jauregui MRN: [id number]    Age: 79 y.o.     Sex: male   Language: English   Taoism: Evangelical   COPD with acute exacerbation (HCC)     Date: 12/27/2024            Total Time Calculated: (P) 10 min              Spiritual Assessment began in [facility]  PROGRESSIVE CARE        Referral/Consult From: (P) Rounding   Encounter Overview/Reason: (P) Spiritual/Emotional Needs  Service Provided For: (P) Patient and family together    Patient was accompanied by his wife (Crista) and son (Freddy), and reported feeling a little better.  Patient engaged in conversation with good humor and an optimistic attitude.  Patient has the support of his 3 sons' families and Oriental orthodox community.    Zita, Belief, Meaning:   Patient identifies as spiritual, is connected with a zita tradition or spiritual practice, and has beliefs or practices that help with coping during difficult times  Family/Friends identify as spiritual, are connected with a zita tradition or spiritual practice, and have beliefs or practices that help with coping during difficult times      Importance and Influence:  Patient has spiritual/personal beliefs that influence decisions regarding their health  Family/Friends have spiritual/personal beliefs that influence decisions regarding the patient's health    Community:  Patient is connected with a spiritual community and feels well-supported. Support system includes: Spouse/Partner, Children, Zita Community, and Extended family  Family/Friends are connected with a spiritual community: and feel well-supported. Support system includes: Spouse/Partner, Children, Zita Community, and Extended family    Assessment and Plan of Care:     Patient Interventions include: Facilitated expression of thoughts and feelings, Affirmed coping skills/support systems, and Provided sacramental/Mormon

## 2024-12-27 NOTE — PLAN OF CARE
Problem: Chronic Conditions and Co-morbidities  Goal: Patient's chronic conditions and co-morbidity symptoms are monitored and maintained or improved  12/27/2024 1119 by Maddi Stauffer, RN  Outcome: Progressing  Flowsheets (Taken 12/27/2024 1010)  Care Plan - Patient's Chronic Conditions and Co-Morbidity Symptoms are Monitored and Maintained or Improved:   Monitor and assess patient's chronic conditions and comorbid symptoms for stability, deterioration, or improvement   Collaborate with multidisciplinary team to address chronic and comorbid conditions and prevent exacerbation or deterioration   Update acute care plan with appropriate goals if chronic or comorbid symptoms are exacerbated and prevent overall improvement and discharge  12/27/2024 0306 by Kaylyn Boyce RN  Outcome: Progressing     Problem: Discharge Planning  Goal: Discharge to home or other facility with appropriate resources  12/27/2024 1119 by Maddi Stauffer, RN  Outcome: Progressing  Flowsheets (Taken 12/27/2024 1010)  Discharge to home or other facility with appropriate resources:   Identify barriers to discharge with patient and caregiver   Arrange for needed discharge resources and transportation as appropriate   Identify discharge learning needs (meds, wound care, etc)   Refer to discharge planning if patient needs post-hospital services based on physician order or complex needs related to functional status, cognitive ability or social support system  Note: Plan for discharge back to home when appropriate  12/27/2024 0306 by Kaylyn Boyce, RN  Outcome: Progressing     Problem: Safety - Adult  Goal: Free from fall injury  12/27/2024 1119 by Maddi Stauffer, RN  Outcome: Progressing  Flowsheets (Taken 12/27/2024 1119)  Free From Fall Injury: Instruct family/caregiver on patient safety  Note: Safety maintained; remains free from falls at this time  12/27/2024 0306 by Kaylyn Boyce, RN  Outcome: Progressing     Problem:  ABCDS Injury Assessment  Goal: Absence of physical injury  12/27/2024 1119 by Maddi Stauffer RN  Outcome: Progressing  Flowsheets (Taken 12/27/2024 1119)  Absence of Physical Injury: Implement safety measures based on patient assessment  Note: Safety maintained; remains free from falls at this time  12/27/2024 0306 by Kaylyn Boyce RN  Outcome: Progressing     Problem: Skin/Tissue Integrity  Goal: Absence of new skin breakdown  Description: 1.  Monitor for areas of redness and/or skin breakdown  2.  Assess vascular access sites hourly  3.  Every 4-6 hours minimum:  Change oxygen saturation probe site  4.  Every 4-6 hours:  If on nasal continuous positive airway pressure, respiratory therapy assess nares and determine need for appliance change or resting period.  12/27/2024 1119 by Maddi Stauffer RN  Outcome: Progressing  Note: No evidence of new skin breakdown  12/27/2024 0306 by Kaylyn Boyce RN  Outcome: Progressing     Problem: Respiratory - Adult  Goal: Achieves optimal ventilation and oxygenation  Outcome: Progressing  Flowsheets (Taken 12/27/2024 1010)  Achieves optimal ventilation and oxygenation:   Assess for changes in respiratory status   Assess for changes in mentation and behavior   Position to facilitate oxygenation and minimize respiratory effort   Oxygen supplementation based on oxygen saturation or arterial blood gases   Assess and instruct to report shortness of breath or any respiratory difficulty   Respiratory therapy support as indicated     Problem: Cardiovascular - Adult  Goal: Maintains optimal cardiac output and hemodynamic stability  Outcome: Progressing  Flowsheets (Taken 12/27/2024 1010)  Maintains optimal cardiac output and hemodynamic stability:   Monitor blood pressure and heart rate   Monitor urine output and notify Licensed Independent Practitioner for values outside of normal range   Assess for signs of decreased cardiac output  Goal: Absence of cardiac

## 2024-12-28 ENCOUNTER — APPOINTMENT (OUTPATIENT)
Dept: GENERAL RADIOLOGY | Age: 79
End: 2024-12-28
Payer: OTHER GOVERNMENT

## 2024-12-28 VITALS
WEIGHT: 287.9 LBS | HEART RATE: 80 BPM | RESPIRATION RATE: 18 BRPM | TEMPERATURE: 97.9 F | BODY MASS INDEX: 36.95 KG/M2 | DIASTOLIC BLOOD PRESSURE: 74 MMHG | SYSTOLIC BLOOD PRESSURE: 131 MMHG | OXYGEN SATURATION: 90 % | HEIGHT: 74 IN

## 2024-12-28 LAB
ANION GAP SERPL CALCULATED.3IONS-SCNC: 12 MMOL/L (ref 9–17)
BASOPHILS # BLD: <0.03 K/UL (ref 0–0.2)
BASOPHILS NFR BLD: 0 % (ref 0–2)
BUN SERPL-MCNC: 34 MG/DL (ref 8–23)
BUN/CREAT SERPL: 34 (ref 9–20)
CALCIUM SERPL-MCNC: 9.9 MG/DL (ref 8.6–10.4)
CHLORIDE SERPL-SCNC: 101 MMOL/L (ref 98–107)
CO2 SERPL-SCNC: 24 MMOL/L (ref 20–31)
CREAT SERPL-MCNC: 1 MG/DL (ref 0.7–1.2)
EOSINOPHIL # BLD: <0.03 K/UL (ref 0–0.44)
EOSINOPHILS RELATIVE PERCENT: 0 % (ref 1–4)
ERYTHROCYTE [DISTWIDTH] IN BLOOD BY AUTOMATED COUNT: 15.3 % (ref 11.8–14.4)
GFR, ESTIMATED: 76 ML/MIN/1.73M2
GLUCOSE BLD-MCNC: 118 MG/DL (ref 75–110)
GLUCOSE SERPL-MCNC: 127 MG/DL (ref 70–99)
HCT VFR BLD AUTO: 49 % (ref 40.7–50.3)
HGB BLD-MCNC: 15.4 G/DL (ref 13–17)
IMM GRANULOCYTES # BLD AUTO: 0.1 K/UL (ref 0–0.3)
IMM GRANULOCYTES NFR BLD: 1 %
LYMPHOCYTES NFR BLD: 0.81 K/UL (ref 1.1–3.7)
LYMPHOCYTES RELATIVE PERCENT: 7 % (ref 24–43)
MCH RBC QN AUTO: 30.9 PG (ref 25.2–33.5)
MCHC RBC AUTO-ENTMCNC: 31.4 G/DL (ref 25.2–33.5)
MCV RBC AUTO: 98.2 FL (ref 82.6–102.9)
MONOCYTES NFR BLD: 0.72 K/UL (ref 0.1–1.2)
MONOCYTES NFR BLD: 6 % (ref 3–12)
NEUTROPHILS NFR BLD: 86 % (ref 36–65)
NEUTS SEG NFR BLD: 10.46 K/UL (ref 1.5–8.1)
NRBC BLD-RTO: 0 PER 100 WBC
PLATELET # BLD AUTO: 151 K/UL (ref 138–453)
PMV BLD AUTO: 10.5 FL (ref 8.1–13.5)
POTASSIUM SERPL-SCNC: 4.7 MMOL/L (ref 3.7–5.3)
RBC # BLD AUTO: 4.99 M/UL (ref 4.21–5.77)
RBC # BLD: ABNORMAL 10*6/UL
SODIUM SERPL-SCNC: 137 MMOL/L (ref 135–144)
WBC OTHER # BLD: 12.1 K/UL (ref 3.5–11.3)

## 2024-12-28 PROCEDURE — 85025 COMPLETE CBC W/AUTO DIFF WBC: CPT

## 2024-12-28 PROCEDURE — 97530 THERAPEUTIC ACTIVITIES: CPT

## 2024-12-28 PROCEDURE — 6360000002 HC RX W HCPCS

## 2024-12-28 PROCEDURE — 2500000003 HC RX 250 WO HCPCS

## 2024-12-28 PROCEDURE — 99238 HOSP IP/OBS DSCHRG MGMT 30/<: CPT | Performed by: FAMILY MEDICINE

## 2024-12-28 PROCEDURE — 36415 COLL VENOUS BLD VENIPUNCTURE: CPT

## 2024-12-28 PROCEDURE — 82947 ASSAY GLUCOSE BLOOD QUANT: CPT

## 2024-12-28 PROCEDURE — 2700000000 HC OXYGEN THERAPY PER DAY

## 2024-12-28 PROCEDURE — 6370000000 HC RX 637 (ALT 250 FOR IP): Performed by: FAMILY MEDICINE

## 2024-12-28 PROCEDURE — 71046 X-RAY EXAM CHEST 2 VIEWS: CPT

## 2024-12-28 PROCEDURE — 6370000000 HC RX 637 (ALT 250 FOR IP)

## 2024-12-28 PROCEDURE — 94761 N-INVAS EAR/PLS OXIMETRY MLT: CPT

## 2024-12-28 PROCEDURE — 94640 AIRWAY INHALATION TREATMENT: CPT

## 2024-12-28 PROCEDURE — 80048 BASIC METABOLIC PNL TOTAL CA: CPT

## 2024-12-28 RX ORDER — PREDNISONE 10 MG/1
TABLET ORAL
Qty: 10 TABLET | Refills: 0 | Status: SHIPPED | OUTPATIENT
Start: 2024-12-28 | End: 2025-01-03

## 2024-12-28 RX ORDER — BENZONATATE 100 MG/1
100 CAPSULE ORAL 3 TIMES DAILY PRN
Qty: 30 CAPSULE | Refills: 0 | Status: SHIPPED | OUTPATIENT
Start: 2024-12-28 | End: 2025-01-04

## 2024-12-28 RX ORDER — CEFUROXIME AXETIL 250 MG/1
250 TABLET ORAL 2 TIMES DAILY
Qty: 14 TABLET | Refills: 0 | Status: SHIPPED | OUTPATIENT
Start: 2024-12-28 | End: 2025-01-04

## 2024-12-28 RX ADMIN — PREDNISONE 40 MG: 20 TABLET ORAL at 08:13

## 2024-12-28 RX ADMIN — BUPROPION HYDROCHLORIDE 75 MG: 75 TABLET ORAL at 08:16

## 2024-12-28 RX ADMIN — LEVOTHYROXINE SODIUM 100 MCG: 0.1 TABLET ORAL at 05:41

## 2024-12-28 RX ADMIN — ASPIRIN 81 MG: 81 TABLET, COATED ORAL at 08:13

## 2024-12-28 RX ADMIN — POTASSIUM CHLORIDE 10 MEQ: 750 TABLET, FILM COATED, EXTENDED RELEASE ORAL at 08:13

## 2024-12-28 RX ADMIN — GUAIFENESIN 600 MG: 600 TABLET, EXTENDED RELEASE ORAL at 08:13

## 2024-12-28 RX ADMIN — IPRATROPIUM BROMIDE AND ALBUTEROL SULFATE 1 DOSE: .5; 2.5 SOLUTION RESPIRATORY (INHALATION) at 12:38

## 2024-12-28 RX ADMIN — ENOXAPARIN SODIUM 30 MG: 100 INJECTION SUBCUTANEOUS at 08:14

## 2024-12-28 RX ADMIN — MIDODRINE HYDROCHLORIDE 5 MG: 5 TABLET ORAL at 11:59

## 2024-12-28 RX ADMIN — SPIRONOLACTONE 12.5 MG: 25 TABLET ORAL at 11:59

## 2024-12-28 RX ADMIN — EMPAGLIFLOZIN 10 MG: 10 TABLET, FILM COATED ORAL at 11:59

## 2024-12-28 RX ADMIN — MIDODRINE HYDROCHLORIDE 5 MG: 5 TABLET ORAL at 08:13

## 2024-12-28 RX ADMIN — INSULIN GLARGINE 5 UNITS: 100 INJECTION, SOLUTION SUBCUTANEOUS at 08:13

## 2024-12-28 RX ADMIN — SODIUM CHLORIDE, PRESERVATIVE FREE 10 ML: 5 INJECTION INTRAVENOUS at 08:17

## 2024-12-28 RX ADMIN — TAMSULOSIN HYDROCHLORIDE 0.4 MG: 0.4 CAPSULE ORAL at 11:59

## 2024-12-28 RX ADMIN — FUROSEMIDE 20 MG: 20 TABLET ORAL at 08:13

## 2024-12-28 RX ADMIN — IPRATROPIUM BROMIDE AND ALBUTEROL SULFATE 1 DOSE: .5; 2.5 SOLUTION RESPIRATORY (INHALATION) at 08:39

## 2024-12-28 RX ADMIN — LOSARTAN POTASSIUM 12.5 MG: 25 TABLET, FILM COATED ORAL at 11:59

## 2024-12-28 RX ADMIN — FINASTERIDE 5 MG: 5 TABLET, FILM COATED ORAL at 08:13

## 2024-12-28 NOTE — PROGRESS NOTES
POX dropped to 85% on room air, at rest.  Placed pt on 2 lpm, POX up to 88%.  Turned oxygen up to 3 lpm, POX up to 92%.

## 2024-12-28 NOTE — DISCHARGE SUMMARY
Discharge Summary    Noah Jauregui Patient Status:  Inpatient    1945 MRN [id number]   Location MD  PROGRESSIVE CARE Attending Arelis Martinez MD   Hosp Day # 2 PCP Bonifacio Drake DO     Date of Admission: 2024    Date of Discharge: 2024    Admitting Diagnosis: Hypoxemia [R09.02]  SIRS (systemic inflammatory response syndrome) ([facility]) [R65.10]  COPD with acute exacerbation ([facility]) [J44.1]    Discharge Diagnosis:   COPD exacerbation with hypoxia    Reason for Admission/HPI: Presented with cough and shortness of breath h/o COPD on home oxygen was not using it.    Hospital Course: Patient was on IV rocephin,Zithromax ,IV solumedrol and aerosol,treatment with supplemental oxygen for COPD exacerbation his lactic acid was mildly up on admission has normalized his  Blood cultures were negative,WBC count has trended down, oxygen has been weaned down to 3 litres, breathing is improved .Agrees to go home with continues oxygen at 3 litres and taper dose steroids , ceftin and home health.Continue all home medications    Consultations: None    Procedures: None    Complications: None    Disposition: Home with Home health and oxygen    Discharge Condition: Fair    Discharge Medications:      Medication List        START taking these medications      benzonatate 100 MG capsule  Commonly known as: TESSALON  Take 1 capsule by mouth 3 times daily as needed for Cough     cefUROXime 250 MG tablet  Commonly known as: CEFTIN  Take 1 tablet by mouth 2 times daily for 7 days            CHANGE how you take these medications      predniSONE 10 MG tablet  Commonly known as: DELTASONE  Take 2 tablets by mouth daily for 2 days, THEN 1 tablet daily for 2 days, THEN 0.5 tablets daily for 2 days.  Start taking on: 2024  What changed:   medication strength  See the new instructions.            CONTINUE taking these medications      albuterol sulfate  (90 Base) MCG/ACT inhaler  Commonly known as:

## 2024-12-28 NOTE — PLAN OF CARE
Problem: Chronic Conditions and Co-morbidities  Goal: Patient's chronic conditions and co-morbidity symptoms are monitored and maintained or improved  12/28/2024 0838 by Garfield Geiger RN  Outcome: Progressing  12/28/2024 0230 by Betty Hayes RN  Outcome: Progressing  Flowsheets (Taken 12/27/2024 1935)  Care Plan - Patient's Chronic Conditions and Co-Morbidity Symptoms are Monitored and Maintained or Improved:   Monitor and assess patient's chronic conditions and comorbid symptoms for stability, deterioration, or improvement   Collaborate with multidisciplinary team to address chronic and comorbid conditions and prevent exacerbation or deterioration   Update acute care plan with appropriate goals if chronic or comorbid symptoms are exacerbated and prevent overall improvement and discharge     Problem: Discharge Planning  Goal: Discharge to home or other facility with appropriate resources  12/28/2024 0838 by Garfield Geiger RN  Outcome: Progressing  12/28/2024 0230 by Betty Hayes RN  Outcome: Progressing  Flowsheets (Taken 12/27/2024 1935)  Discharge to home or other facility with appropriate resources:   Identify barriers to discharge with patient and caregiver   Arrange for needed discharge resources and transportation as appropriate   Identify discharge learning needs (meds, wound care, etc)   Refer to discharge planning if patient needs post-hospital services based on physician order or complex needs related to functional status, cognitive ability or social support system     Problem: Safety - Adult  Goal: Free from fall injury  12/28/2024 0838 by Garfield Geiger RN  Outcome: Progressing  12/28/2024 0230 by Betty Hayes RN  Outcome: Progressing     Problem: ABCDS Injury Assessment  Goal: Absence of physical injury  12/28/2024 0838 by Garfield Geiger RN  Outcome: Progressing  12/28/2024 0230 by Betty Hayes RN  Outcome: Progressing     Problem: Skin/Tissue Integrity  Goal: Absence

## 2024-12-28 NOTE — CARE COORDINATION
Patient states the only home O2 he has is an [employer] unit. Pt/wife states they would like to obtain oxygen from [facility] in [place]. Referral faxed.

## 2024-12-28 NOTE — PROGRESS NOTES
CLINICAL PHARMACY NOTE: MEDS TO BEDS    Total # of Prescriptions Filled: 3   The following medications were delivered to the patient:  Prednisone  Cefuroxime  Benzonatate    Additional Documentation:

## 2024-12-28 NOTE — PLAN OF CARE
Adult  Goal: Achieves optimal ventilation and oxygenation  Outcome: Progressing  Flowsheets (Taken 12/27/2024 1935)  Achieves optimal ventilation and oxygenation:   Assess for changes in respiratory status   Assess for changes in mentation and behavior   Position to facilitate oxygenation and minimize respiratory effort   Oxygen supplementation based on oxygen saturation or arterial blood gases   Encourage broncho-pulmonary hygiene including cough, deep breathe, incentive spirometry   Respiratory therapy support as indicated     Problem: Cardiovascular - Adult  Goal: Maintains optimal cardiac output and hemodynamic stability  Outcome: Progressing  Flowsheets (Taken 12/27/2024 1935)  Maintains optimal cardiac output and hemodynamic stability:   Monitor blood pressure and heart rate   Monitor urine output and notify Licensed Independent Practitioner for values outside of normal range   Assess for signs of decreased cardiac output  Goal: Absence of cardiac dysrhythmias or at baseline  Outcome: Progressing  Flowsheets (Taken 12/27/2024 1935)  Absence of cardiac dysrhythmias or at baseline:   Monitor cardiac rate and rhythm   Assess for signs of decreased cardiac output   Administer antiarrhythmia medication and electrolyte replacement as ordered     Problem: Skin/Tissue Integrity - Adult  Goal: Skin integrity remains intact  Outcome: Progressing  Flowsheets (Taken 12/27/2024 1935)  Skin Integrity Remains Intact: Monitor for areas of redness and/or skin breakdown  Goal: Incisions, wounds, or drain sites healing without S/S of infection  Outcome: Progressing  Flowsheets (Taken 12/27/2024 1935)  Incisions, Wounds, or Drain Sites Healing Without Sign and Symptoms of Infection:   ADMISSION and DAILY: Assess and document risk factors for pressure ulcer development   TWICE DAILY: Assess and document skin integrity   TWICE DAILY: Assess and document dressing/incision, wound bed, drain sites and surrounding tissue     Problem:  ordered  Goal: Absence of infection during hospitalization  Outcome: Progressing  Flowsheets (Taken 12/27/2024 1935)  Absence of infection during hospitalization:   Assess and monitor for signs and symptoms of infection   Monitor lab/diagnostic results   Monitor all insertion sites i.e., indwelling lines, tubes and drains   Administer medications as ordered     Problem: Pain  Goal: Verbalizes/displays adequate comfort level or baseline comfort level  Outcome: Progressing

## 2024-12-28 NOTE — CARE COORDINATION
Post Acute Facility/Agency List     Provided patient with the following list, the list includes the overall star ratings obtained from CMS per the Medicare Web site (www.Medicare.gov):     [] Long Term Acute Care Facilities  [] Acute Inpatient Rehabilitation Facilities  [] Skilled Nursing Facilities  [] Hospice Facilities  [x] Home Care    Provided verbal instructions on how to utilize the QR Code to obtain additional detailed star ratings from www.Medicare.gov     offered to print and provide the detailed list:    []Accepted   [x]Declined    Pt and wife considering home health post discharge. Pt states currently utilizes PT at the [facility]. Pt encouraged to choose one to three companies in order of preference and I will begin the referral process.

## 2024-12-28 NOTE — PROGRESS NOTES
Physical Therapy  Facility/Department: [facility]  PROGRESSIVE CARE  Daily Treatment Note  NAME: Noah Jauregui  : 1945  MRN: [id number]    Date of Service: 2024    Discharge Recommendations:  Home with Home health PT        Patient Diagnosis(es): The primary encounter diagnosis was SIRS (systemic inflammatory response syndrome) (HCC). Diagnoses of Hypoxemia and COPD with acute exacerbation (HCC) were also pertinent to this visit.    Assessment   Patient completed mobility with SBA to CGA and cues for safety.  Good tolerance of the standing and transfers with minimal shortness of breath.  The patient was left in chair with breakfast and nursing notified.     Plan  Continue to advance mobility with increased gait and transfers and working on the ther-ex for LE strengthening.        Restrictions        Subjective    The patient reports he is breathing better this morning.      Objective  The patient completed supine to sit to the side of the bed.  Completed with SBA, educated the patient to use the bed rail to assist with the transfer and maintain balance when sitting up. Sit to stand transfers x 4 completed 2 from side of the bed and 2 from the recliner with the patient able to push from the bed  appropriately. The patient demonstrated ability to take steps to the chair from the bed with no device, was mildly unsteady requiring CGA.        Vitals                      Goals  Short Term Goals  Time Frame for Short Term Goals: 7 days  Short Term Goal 1: Assess need for inpatient PT services, initiate HEP  Short Term Goal 2: Pt to perform functional transfers with mod I to prepare for D/C  Short Term Goal 3: Pt to amb 50' with AD and mod I to prepare for D/C    Education       AM-PAC - Mobility              Therapy Time   Individual Concurrent Group Co-treatment   Time In  730         Time Out  754         Minutes  24                 Jake Covington, PT

## 2024-12-29 LAB
MICROORGANISM SPEC CULT: NORMAL
MICROORGANISM SPEC CULT: NORMAL
SERVICE CMNT-IMP: NORMAL
SERVICE CMNT-IMP: NORMAL
SPECIMEN DESCRIPTION: NORMAL
SPECIMEN DESCRIPTION: NORMAL

## 2024-12-30 ENCOUNTER — CARE COORDINATION (OUTPATIENT)
Dept: CARE COORDINATION | Age: 79
End: 2024-12-30

## 2024-12-30 ENCOUNTER — TELEPHONE (OUTPATIENT)
Dept: FAMILY MEDICINE CLINIC | Age: 79
End: 2024-12-30

## 2024-12-30 DIAGNOSIS — J44.1 COPD WITH ACUTE EXACERBATION (HCC): Primary | ICD-10-CM

## 2024-12-30 PROCEDURE — 1111F DSCHRG MED/CURRENT MED MERGE: CPT | Performed by: STUDENT IN AN ORGANIZED HEALTH CARE EDUCATION/TRAINING PROGRAM

## 2024-12-30 NOTE — CARE COORDINATION
ordered and use of breathing exercises. Confirms has tessalon, ceftin, deltasone and device in home. Educated on RPM for chronic condition management, deferred to [facility] at this time. Denies further questions or concerns, agrees to follow up next week.     Call to [facility], spoke with Yovana, confirmed patient referral. States agency will reach out to patient/ spouse.     Care Transition Nurse reviewed discharge instructions with spouse/partner. The spouse/partner was given an opportunity to ask questions; no further questions or concerns at this time.. The spouse/partner verbalized understanding.   Were discharge instructions available to patient? Yes.   Reviewed appropriate site of care based on symptoms and resources available to patient including: PCP  Home health  When to call 911. The spouse/partner agrees to contact the primary care provider and/or specialist office for questions related to their healthcare.      Advance Care Planning:   Does patient have an Advance Directive: reviewed and current.    Medication Reconciliation:  Medication reconciliation was performed with spouse/partner,[id number] entered: yes.     Remote Patient Monitoring:  Offered patient enrollment in the Remote Patient Monitoring (RPM) program for in-home monitoring: Yes, but did not enroll at this time: declined to enroll in the program because deferred to [facility] .    Assessments:  Care Transitions 24 Hour Call    Schedule Follow Up Appointment with PCP: Declined  Do you have a copy of your discharge instructions?: Yes  Do you have all of your prescriptions and are they filled?: Yes  Have you been contacted by a [facility] Pharmacist?: No  Have you scheduled your follow up appointment?: No  Do you feel like you have everything you need to keep you well at home?: Yes  Care Transitions Interventions          Follow Up Appointment:   Patient does not have a follow up appointment scheduled at time of call. Prefers to self-schedule SARITA appointment.   Future

## 2024-12-30 NOTE — TELEPHONE ENCOUNTER
Care Transitions Initial Follow Up Call    Outreach made within 2 business days of discharge: Yes    Patient: Noah Jauregui Patient : 1945   MRN: [id number]  Reason for Admission: COPD  Discharge Date: 24       Spoke with: Wife-Nataly    Discharge department/facility: [facility] Patient Contact:  Was patient able to fill all prescriptions: Yes  Was patient instructed to bring all medications to the follow-up visit: Yes  Is patient taking all medications as directed in the discharge summary? Yes  Does patient understand their discharge instructions: Yes  Does patient have questions or concerns that need addressed prior to 7-14 day follow up office visit: no    Additional needs identified to be addressed with provider  Wife states patient did trip and fall last night. States he tripped over the oxygen tubing. Denies any injury. Wife wants to wait until the week of 25 to schedule appt to help patient get stronger. Patient is scheduled 25.              Scheduled appointment with PCP within 7-14 days    Follow Up  Future Appointments   Date Time Provider Department Center   2025 12:40 PM Bonifacio Drake DO [facility] DEP   2025  9:15 AM SCHEDULE, AFL TCC AYERS [facility] TCC TOLE AFL [facility] C   2025  3:00 PM Reji Camejo DPM DPOD DPP   3/12/2025  1:20 PM [facility]   2025 11:15 AM Alex Cook PA-C [facility]   2025 12:45 PM SCHEDULE, PACEMAKER [facility] CARDIOLOGY [facility]ARDIO [facility]   2025  1:00 PM Gregory Galvan MD DCARDIO [facility]   2025  1:30 PM Montana Cárdenas MD DPULM DPP       Genesis Covington LPN

## 2025-01-02 ENCOUNTER — CARE COORDINATION (OUTPATIENT)
Dept: CARE COORDINATION | Age: 80
End: 2025-01-02

## 2025-01-02 ENCOUNTER — TELEPHONE (OUTPATIENT)
Dept: FAMILY MEDICINE CLINIC | Age: 80
End: 2025-01-02

## 2025-01-02 DIAGNOSIS — R65.10 SIRS (SYSTEMIC INFLAMMATORY RESPONSE SYNDROME) (HCC): ICD-10-CM

## 2025-01-02 DIAGNOSIS — R53.1 WEAKNESS: Primary | ICD-10-CM

## 2025-01-02 DIAGNOSIS — J44.9 CHRONIC OBSTRUCTIVE PULMONARY DISEASE, UNSPECIFIED COPD TYPE (HCC): ICD-10-CM

## 2025-01-02 NOTE — CARE COORDINATION
Care Transitions Note    Follow Up Call     Patient Current Location:  Home: [address] [id number]    Care Transition Nurse contacted the patient by telephone. Verified name and  as identifiers.    Additional needs identified to be addressed with provider   High priority: [facility] has declined patients referral after discharge. Please send C order to [facility] at fax# [phone], they may be able to accept but stated need order first. Thank you!                 Method of communication with provider: chart routing.    Care Summary Note: Spouse reached for follow up. States patient doing a bit better. Cough improved, SpO2 99%, states more alert. Reviewed importance of monitoring for changes to mentation, check Sp02. [facility] HHC has not been out, CTN to follow up. PCP HFU scheduled for . States patient continues with weakness, concerns for his ability to get out of home for appts. Discussed importance of HHC, continue to monitor respiratory status, call 911 for severe symptoms, contact MD office for non-emergent concerns. Agrees to follow up with [facility] update.     Call to [facility], spoke with Vamsi, reviewed hospital discharge referral. States declined due to staffing. CTN inquired if able to accept now or anytime soon. [facility] will need to discuss with DON and take CTN number for call back.     Call to [facility], spoke with Linda, discussed patient needs, states agency accepts patient insurance and covers area. Cannot confirm until order received. CTN will request from MD office.     Plan of care updates since last contact:  Education: when to call MD/ 911  Review of patient management of conditions/medications: symptoms, appts       Advance Care Planning:   Does patient have an Advance Directive: reviewed and current.    Medication Review:  Full medication reconciliation completed during previous call.    Remote Patient Monitoring:  Offered patient enrollment in the Remote Patient

## 2025-01-02 NOTE — TELEPHONE ENCOUNTER
See care coordination encounter 1/2/25, yellow box.    Are you ok if I put an order in for home health     Patient does not come see us until 1/8 for hosp f/u

## 2025-01-03 ENCOUNTER — TELEPHONE (OUTPATIENT)
Dept: FAMILY MEDICINE CLINIC | Age: 80
End: 2025-01-03

## 2025-01-03 NOTE — TELEPHONE ENCOUNTER
Diony from [facility] services calling to inform that they are not able to start seeing patient until Monday and checking to see if that is ok. Please call and advise Diony at [phone].    Patient was set for Nursing and physical therapy services.

## 2025-01-06 ENCOUNTER — CARE COORDINATION (OUTPATIENT)
Dept: CARE COORDINATION | Age: 80
End: 2025-01-06

## 2025-01-06 NOTE — CARE COORDINATION
Care Transitions Note    Follow Up Call     Patient Current Location:  Home: [address]  [place] [address]    [facility] Care Coordinator contacted the spouse/partner  by telephone. Verified name and  as identifiers.    Additional needs identified to be addressed with provider   No needs identified                 Method of communication with provider: none.    Care Summary Note: Writer called to speak to Noah but spouse Nataly answered the phone. She stated that home care has been out for a eval but she is unsure of when or does not have the name of the agency or  will  be they will be out to see pt again. Writer advised her that [facility]  is the agency. Writer will call after call is finished.     She stated that pt is about the same he continues to wear his oxygen.She stated no increased sob, does not really have a cough. He is doing okay today.  She stated he feels that he is better and then wants to take oxygen off. Writer advised that he continue to wear oxygen. She stated he has appointment with his pcp  she stated that because he is week she has trouble getting him out. Writer advised if she can have family to assist her with him so that he can attend appointment since he was recently released from hospital. She verbalized understanding she stated she will have family assist. Pt is eating and drinking. He mostly is sitting in his recliner he uses a walker to ambulate. B/B fine. Appetite fine.   Writer called [facility]  spoke to Anna she stated that pt is scheduled to receive two nursing visit weekly. She stated that pt's nurse is out sick today. If she comes in he can possibly have a visit tomorrow. If not he may have a nurse visit this Thursday/Friday. PT will be out to eval pt on Wednesday of this week. Writer called pt's spouse back to update her x 2's phone line is busy.     Plan of care updates since last contact:  Education: advised to keep HFU when to seek care copd    Home

## 2025-01-06 NOTE — PROGRESS NOTES
Physician Progress Note      PATIENT:               SOBEIDA LEGGETT  [facility] #:                  [id number]  :                       1945  ADMIT DATE:       2024 10:25 PM  DISCH DATE:        2024 2:40 PM  RESPONDING  PROVIDER #:        Arelis HERNÁNDEZ MD          QUERY TEXT:    Patient admitted with COPD exacerbation. Pt noted to have BPH and was treated   with Ditropan.?If possible, please document in progress notes and discharge   summary if you are evaluating and/or treating any of the following:  The medical record reflects the following:    Risk Factors: 79 yr old male,  Clinical Indicators: H&P note  BPH w/LUTS: Hx Rodriguez catheter, follows   with Urology-Ditropan  Treatment: Ditropan    Thank you,  INDU Bangura CDS  Options provided:  -- BPH with partial/complete urinary obstruction  -- BPH with urinary retention without obstruction  -- Other - I will add my own diagnosis  -- Disagree - Not applicable / Not valid  -- Disagree - Clinically unable to determine / Unknown  -- Refer to Clinical Documentation Reviewer    PROVIDER RESPONSE TEXT:    Provider disagreed with this query.  ongoing chronic problem    Query created by: Booker Pulido on 2024 4:52 AM      Electronically signed by:  Arelis HERNÁNDEZ MD 2025 9:24 AM

## 2025-01-07 ENCOUNTER — TELEPHONE (OUTPATIENT)
Dept: WOUND CARE | Age: 80
End: 2025-01-07

## 2025-01-07 NOTE — TELEPHONE ENCOUNTER
[facility] called wanting to let clinic know patient has new right 4th toe skin tear. Home Health is monitoring it and refused to schedule at this time. Patient does have upcoming appt with Dr. Camejo

## 2025-01-07 NOTE — PROGRESS NOTES
Physician Progress Note      PATIENT:               SOBEIDA LEGGETT  CSN #:                  [id number]  :                       1945  ADMIT DATE:       2024 10:25 PM  DISCH DATE:        2024 2:40 PM  RESPONDING  PROVIDER #:        Arelis HERNÁNDEZ MD          QUERY TEXT:    Pt admitted with COPD exacerbation. Pt noted to have Elevated lactic acid in   H&P note on . If possible, please document in the progress notes and   discharge summary if you are evaluating and/or treating any of the following:    The medical record reflects the following:  Risk Factors: 79 yr old male, COPD exacerbation,  Clinical Indicators: H&P note on  Elevated lactic acid: Slow fluid   hydration d/t EF 25-30% on most recent ECHO and elevated NT ProBNP. Serial   troponin, Serial lactic acids until </=2.0.  Lactic Acid, Sepsis  2.8H, 2.1H  Treatment: IVF, monitoring lab,    Thank you,  Booker Pulido [facility] CDS  Options provided:  -- Lactic Acidosis  -- Other - I will add my own diagnosis  -- Disagree - Not applicable / Not valid  -- Disagree - Clinically unable to determine / Unknown  -- Refer to Clinical Documentation Reviewer    PROVIDER RESPONSE TEXT:    This patient has lactic acidosis.    Query created by: Booker Pulido on 2025 10:51 PM      Electronically signed by:  Arelis HERNÁNDEZ MD 2025 9:46 AM

## 2025-01-08 ENCOUNTER — OFFICE VISIT (OUTPATIENT)
Dept: FAMILY MEDICINE CLINIC | Age: 80
End: 2025-01-08

## 2025-01-08 VITALS
BODY MASS INDEX: 36.96 KG/M2 | HEART RATE: 58 BPM | WEIGHT: 288 LBS | OXYGEN SATURATION: 95 % | HEIGHT: 74 IN | DIASTOLIC BLOOD PRESSURE: 76 MMHG | SYSTOLIC BLOOD PRESSURE: 126 MMHG

## 2025-01-08 DIAGNOSIS — Z99.81 CHRONIC RESPIRATORY FAILURE WITH HYPOXIA, ON HOME O2 THERAPY: Primary | ICD-10-CM

## 2025-01-08 DIAGNOSIS — J96.11 CHRONIC RESPIRATORY FAILURE WITH HYPOXIA, ON HOME O2 THERAPY: Primary | ICD-10-CM

## 2025-01-08 DIAGNOSIS — I50.42 CHRONIC COMBINED SYSTOLIC AND DIASTOLIC CHF (CONGESTIVE HEART FAILURE) (HCC): ICD-10-CM

## 2025-01-08 DIAGNOSIS — J44.1 CHRONIC OBSTRUCTIVE PULMONARY DISEASE WITH (ACUTE) EXACERBATION (HCC): ICD-10-CM

## 2025-01-08 DIAGNOSIS — Z09 HOSPITAL DISCHARGE FOLLOW-UP: ICD-10-CM

## 2025-01-08 ASSESSMENT — PATIENT HEALTH QUESTIONNAIRE - PHQ9
4. FEELING TIRED OR HAVING LITTLE ENERGY: NOT AT ALL
SUM OF ALL RESPONSES TO PHQ QUESTIONS 1-9: 0
SUM OF ALL RESPONSES TO PHQ9 QUESTIONS 1 & 2: 0
10. IF YOU CHECKED OFF ANY PROBLEMS, HOW DIFFICULT HAVE THESE PROBLEMS MADE IT FOR YOU TO DO YOUR WORK, TAKE CARE OF THINGS AT HOME, OR GET ALONG WITH OTHER PEOPLE: NOT DIFFICULT AT ALL
7. TROUBLE CONCENTRATING ON THINGS, SUCH AS READING THE NEWSPAPER OR WATCHING TELEVISION: NOT AT ALL
SUM OF ALL RESPONSES TO PHQ QUESTIONS 1-9: 0
SUM OF ALL RESPONSES TO PHQ QUESTIONS 1-9: 0
9. THOUGHTS THAT YOU WOULD BE BETTER OFF DEAD, OR OF HURTING YOURSELF: NOT AT ALL
1. LITTLE INTEREST OR PLEASURE IN DOING THINGS: NOT AT ALL
8. MOVING OR SPEAKING SO SLOWLY THAT OTHER PEOPLE COULD HAVE NOTICED. OR THE OPPOSITE, BEING SO FIGETY OR RESTLESS THAT YOU HAVE BEEN MOVING AROUND A LOT MORE THAN USUAL: NOT AT ALL
2. FEELING DOWN, DEPRESSED OR HOPELESS: NOT AT ALL
3. TROUBLE FALLING OR STAYING ASLEEP: NOT AT ALL
6. FEELING BAD ABOUT YOURSELF - OR THAT YOU ARE A FAILURE OR HAVE LET YOURSELF OR YOUR FAMILY DOWN: NOT AT ALL
SUM OF ALL RESPONSES TO PHQ QUESTIONS 1-9: 0
5. POOR APPETITE OR OVEREATING: NOT AT ALL

## 2025-01-08 NOTE — PROGRESS NOTES
Post-Discharge Transitional Care Follow Up      Noah Jauregui   YOB: 1945    Date of Office Visit:  1/8/2025  Date of Hospital Admission: 12/25/24  Date of Hospital Discharge: 12/28/24  Readmission Risk Score (high >=14%. Medium >=10%):Readmission Risk Score: 15.2      Care management risk score Rising risk (score 2-5) and Complex Care (Scores >=6): No Risk Score On File     Non face to face  following discharge, date last encounter closed (first attempt may have been earlier): 12/30/2024     Call initiated 2 business days of discharge: Yes     Chronic respiratory failure with hypoxia, on home O2 therapy  Chronic combined systolic and diastolic CHF (congestive heart failure) (HCC)  Chronic obstructive pulmonary disease with (acute) exacerbation (HCC)  Hospital discharge follow-up  -     GA DISCHARGE MEDS RECONCILED W/ CURRENT OUTPATIENT MED LIST    Hospital discharge follow-up completed today for COPD exacerbation and chronic respiratory failure.  Currently requiring 3 L of oxygen.  Recommend continuing home oxygen therapy at this time.  Continue to work with home health to the over this as his symptoms improved.  Lung sounds are essentially normal today.  Will continue to monitor his symptoms.  Continue to work with PT to improve strength and stability.    Medical Decision Making: moderate complexity  No follow-ups on file.           Subjective:   HPI  Reason for Admission/HPI: Presented with cough and shortness of breath h/o COPD on home oxygen was not using it.     Hospital Course: Patient was on IV rocephin,Zithromax ,IV solumedrol and aerosol,treatment with supplemental oxygen for COPD exacerbation his lactic acid was mildly up on admission has normalized his  Blood cultures were negative,WBC count has trended down, oxygen has been weaned down to 3 litres, breathing is improved .Agrees to go home with continues oxygen at 3 litres and taper dose steroids , ceftin and home health.Continue all

## 2025-01-09 ENCOUNTER — CARE COORDINATION (OUTPATIENT)
Dept: CARE COORDINATION | Age: 80
End: 2025-01-09

## 2025-01-09 NOTE — CARE COORDINATION
Care Transitions Note    Follow Up Call     Patient Current Location:  Home: [address]  [place] [address]    [facility] Care Coordinator contacted the patient by telephone. Verified name and  as identifiers.    Additional needs identified to be addressed with provider   No needs identified                 Method of communication with provider: none.    Care Summary Note: Writer called to speak to pt but spouse Nataly answered the phone she stated that he is doing good. She stated he is wearing his oxygen and they are checking SPO 2 and numbers are staying in the upper 90's . She stated he has his hospital follow up yesterday and he tried to get out of wearing his oxygen. Notes reviewed. She reports he is eating and drinking , B/B wnl he has only coughed one time today and really has not coughed up any phlegm. PT was out yesterday, he will have nurse visit tomorrow. Reminded to keep a close eye on breathing and seek care if he has new or worsening concerns. She verbalized understanding . Discussed device check on . Thanked writer for calling.     Plan of care updates since last contact:  Had HFU home care nursing PT following.        Advance Care Planning:   Does patient have an Advance Directive: deferred at this time, will discuss on future follow up. .    Medication Review:  No changes since last call.     Remote Patient Monitoring:  Offered patient enrollment in the Remote Patient Monitoring (RPM) program for in-home monitoring: Yes, but did not enroll at this time: already monitoring with home equipment.    Assessments:  Care Transitions Subsequent and Final Call    Subsequent and Final Calls  Care Transitions Interventions  Other Interventions:              Follow Up Appointment:   Reviewed upcoming appointment(s).  Future Appointments         Provider Specialty Dept Phone    2025 9:15 AM SCHEDULE, [facility] Cardiology [phone]    2025 3:00 PM Reji Camejo DPM

## 2025-01-10 ENCOUNTER — TELEPHONE (OUTPATIENT)
Dept: CARDIOLOGY | Age: 80
End: 2025-01-10

## 2025-01-10 DIAGNOSIS — I50.42 CHRONIC COMBINED SYSTOLIC AND DIASTOLIC CHF (CONGESTIVE HEART FAILURE) (HCC): ICD-10-CM

## 2025-01-10 RX ORDER — FUROSEMIDE 40 MG/1
40 TABLET ORAL EVERY MORNING
Qty: 90 TABLET | Refills: 3 | Status: SHIPPED | OUTPATIENT
Start: 2025-01-10

## 2025-01-10 NOTE — TELEPHONE ENCOUNTER
Received communication from [facility] regarding patient gaining 5lbs in 3 days and has +3 pitting edema in legs.     Note scanned to encounter for review.     Please advise.    Fax: [phone]    Last Appt:  10/28/2024  Next Appt:   Visit date not found  Med verified in Epic

## 2025-01-10 NOTE — TELEPHONE ENCOUNTER
Called and spoke to pt who stated he will have his wife call the office back.     Orders faxed to [facility]

## 2025-01-15 ASSESSMENT — ENCOUNTER SYMPTOMS
BLOOD IN STOOL: 0
ABDOMINAL PAIN: 0
EYE PAIN: 0
CONSTIPATION: 0
TROUBLE SWALLOWING: 0
NAUSEA: 0
SHORTNESS OF BREATH: 0
DIARRHEA: 0
VOMITING: 0
COUGH: 1

## 2025-01-16 ENCOUNTER — TELEPHONE (OUTPATIENT)
Dept: FAMILY MEDICINE CLINIC | Age: 80
End: 2025-01-16

## 2025-01-16 DIAGNOSIS — Z99.81 DEPENDENCE ON SUPPLEMENTAL OXYGEN: ICD-10-CM

## 2025-01-16 DIAGNOSIS — J44.1 CHRONIC OBSTRUCTIVE PULMONARY DISEASE WITH (ACUTE) EXACERBATION (HCC): Primary | ICD-10-CM

## 2025-01-16 DIAGNOSIS — Z79.84 LONG TERM (CURRENT) USE OF ORAL HYPOGLYCEMIC DRUGS: ICD-10-CM

## 2025-01-16 DIAGNOSIS — R09.2 RESPIRATORY ARREST (HCC): ICD-10-CM

## 2025-01-16 DIAGNOSIS — Z79.82 LONG TERM (CURRENT) USE OF ASPIRIN: ICD-10-CM

## 2025-01-16 DIAGNOSIS — R65.10: ICD-10-CM

## 2025-01-16 DIAGNOSIS — Z79.52 LONG TERM (CURRENT) USE OF SYSTEMIC STEROIDS: ICD-10-CM

## 2025-01-16 NOTE — TELEPHONE ENCOUNTER
Home health plan of care reviewed and certification completed on patient for service dates 1/3/25 to 3/3/25. Verified current medications, allergies, diagnoses. Physician time spent on activities to coordinate service, documenting, medical decision making, review of reports/treatment plans/ test results is 15 min.

## 2025-01-16 NOTE — TELEPHONE ENCOUNTER
Marii from [facility] calling stating pt has a diagnosis for COPD and is not on an inhaler they are wondering if one can be sent in for him. Marii [phone] please call her if there are questions or if this will be denied. States she did send a fax on this as well. Please advise.

## 2025-01-16 NOTE — TELEPHONE ENCOUNTER
Spoke with patient and wife. States patient has an albuterol inhaler that he got from the hospital. Patient stated to wife that he does not use it and does not need an inhaler at this time. Wife states she will call writer if she notices patient using inhaler that he has or has any issues breathing.

## 2025-01-17 ENCOUNTER — HOSPITAL ENCOUNTER (OUTPATIENT)
Age: 80
Setting detail: SPECIMEN
Discharge: HOME | End: 2025-01-17
Payer: MEDICARE

## 2025-01-17 ENCOUNTER — CARE COORDINATION (OUTPATIENT)
Dept: CARE COORDINATION | Age: 80
End: 2025-01-17

## 2025-01-17 DIAGNOSIS — I50.42 CHRONIC COMBINED SYSTOLIC AND DIASTOLIC CHF (CONGESTIVE HEART FAILURE) (HCC): ICD-10-CM

## 2025-01-17 LAB
ANION GAP SERPL CALCULATED.3IONS-SCNC: 10 MMOL/L (ref 9–17)
BUN SERPL-MCNC: 21 MG/DL (ref 8–23)
BUN/CREAT SERPL: 19 (ref 9–20)
CALCIUM SERPL-MCNC: 9.4 MG/DL (ref 8.6–10.4)
CHLORIDE SERPL-SCNC: 101 MMOL/L (ref 98–107)
CO2 SERPL-SCNC: 27 MMOL/L (ref 20–31)
CREAT SERPL-MCNC: 1.1 MG/DL (ref 0.7–1.2)
GFR, ESTIMATED: 68 ML/MIN/1.73M2
GLUCOSE SERPL-MCNC: 113 MG/DL (ref 70–99)
POTASSIUM SERPL-SCNC: 4.4 MMOL/L (ref 3.7–5.3)
SODIUM SERPL-SCNC: 138 MMOL/L (ref 135–144)

## 2025-01-17 PROCEDURE — 80048 BASIC METABOLIC PNL TOTAL CA: CPT

## 2025-01-17 NOTE — CARE COORDINATION
Interventions  Other Interventions:              Follow Up Appointment:   SARITA appointment attended as scheduled   Future Appointments         Provider Specialty Dept Phone    1/22/2025 9:15 AM SCHEDULE, [facility] Cardiology [phone]    2/13/2025 3:00 PM Reji Camejo, DPM Podiatry [phone]    3/12/2025 1:20 PM Bonifacio Drake, DO Family Medicine [phone]    7/17/2025 11:15 AM Alex Cook PA-C Urology [phone]    8/22/2025 12:45 PM SCHEDULE, [facility] CARDIOLOGY Cardiology [phone]    8/22/2025 1:00 PM Gregory Galvan MD Cardiology [phone]    8/27/2025 1:30 PM Montana Cárdenas MD Pulmonology [phone]            Care Transition Nurse provided contact information.  Plan for follow-up call in 6-10 days based on severity of symptoms and risk factors.  Plan for next call: symptom management-copd symptoms any falls? How is home care going ? Changes since cardiology appt? End vs handoff to ACM      Cora Acevedo RN

## 2025-01-17 NOTE — TELEPHONE ENCOUNTER
Home health certification reviewed. I agree with the plan of care.   Electronically signed by Bonifacio Drake DO on 1/17/2025 at 8:47 AM

## 2025-01-23 ENCOUNTER — CARE COORDINATION (OUTPATIENT)
Dept: CARE COORDINATION | Age: 80
End: 2025-01-23

## 2025-01-23 NOTE — CARE COORDINATION
11:15 AM Alex Cook PA-C Urology [phone]    8/22/2025 12:45 PM SCHEDULE, [facility]ARDIOLOG[facility] Cardiology [phone]    8/22/2025 1:00 PM Gregory Galvan MD Cardiology [phone]    8/27/2025 1:30 PM Montana Cárdenas MD Pulmonology [phone]            Patient has agreed to contact primary care provider and/or specialist for any further questions, concerns, or needs.    Cora Acevedo, RN

## 2025-02-13 ENCOUNTER — OFFICE VISIT (OUTPATIENT)
Dept: PODIATRY | Age: 80
End: 2025-02-13
Payer: MEDICARE

## 2025-02-13 VITALS
RESPIRATION RATE: 20 BRPM | WEIGHT: 287.6 LBS | HEART RATE: 60 BPM | SYSTOLIC BLOOD PRESSURE: 128 MMHG | DIASTOLIC BLOOD PRESSURE: 70 MMHG | BODY MASS INDEX: 36.93 KG/M2

## 2025-02-13 DIAGNOSIS — I82.5Z3: Primary | ICD-10-CM

## 2025-02-13 DIAGNOSIS — B35.1 DERMATOPHYTOSIS OF NAIL: ICD-10-CM

## 2025-02-13 DIAGNOSIS — L84 CORNS AND CALLOSITIES: ICD-10-CM

## 2025-02-13 PROCEDURE — 11056 PARNG/CUTG B9 HYPRKR LES 2-4: CPT | Performed by: PODIATRIST

## 2025-02-13 PROCEDURE — 11721 DEBRIDE NAIL 6 OR MORE: CPT | Performed by: PODIATRIST

## 2025-02-13 NOTE — PROGRESS NOTES
Foot Care Worksheet  PCP: Bonifacio Drake DO  Last visit: 01 / 08 / 2025    Nail description: Thick , Yellow , Crumbly , Marked limitation of ambulation     Pain resulting from thickened and dystrophy of nail plate Yes    Nails involved  Right   1, 2, 3, 4, 5  (T5-T9)  Left     1, 2, 3, 4, 5  (TA-T4)    Q7 1 Class A Finding - Non traumatic amputation of foot No    Q8 2 Class B Findings - Absent DP pulse No, Absent PT pulse No, Advanced tropic changes (3 required) Yes    Decrease hair growth Yes, Nail changes/thickening Yes, Pigmented changes/discoloration Yes, Skin texture (thin, shiny) No, Skin color (rubor/redness) No    Q9 1 Class B and 2 Class C Findings  Claudication No, Temperature change Yes, Paresthesia No, Burning No, Edema Yes    Subjective:  Patient [facility] to [facility] today for foot care.  Pain at the nails also.    No Known Allergies    Past Medical History:   Diagnosis Date    Acute respiratory failure with hypoxia 12/9/2021    Adenomatous polyp     history of     Cholecystitis 3/19/2019    Chronic venous insufficiency     COPD with acute exacerbation (HCC) 12/9/2021    DVT (deep venous thrombosis) (HCC)     history of     Edema     related to venous stasis     Gout     history of     Hypertension     Onychomycosis     Osteoarthritis     Plantar fasciitis     Pneumonia due to COVID-19 virus 12/8/2021    Tobacco abuse        Prior to Admission medications    Medication Sig Start Date End Date Taking? Authorizing Provider   furosemide (LASIX) 40 MG tablet Take 1 tablet by mouth every morning 1/10/25  Yes Rob Evans,    midodrine (PROAMATINE) 5 MG tablet TAKE 1 TABLET BY MOUTH THREE TIMES DAILY 11/11/24  Yes Bonifacio Drake DO   buPROPion (WELLBUTRIN) 75 MG tablet Take 1 tablet by mouth twice daily 11/5/24  Yes Bonifacio Drake DO   albuterol sulfate HFA (PROVENTIL;VENTOLIN;PROAIR) 108 (90 Base) MCG/ACT inhaler Inhale into the lungs 10/24/24  Yes Provider, Historical,

## 2025-02-20 ENCOUNTER — TELEPHONE (OUTPATIENT)
Dept: FAMILY MEDICINE CLINIC | Age: 80
End: 2025-02-20

## 2025-02-20 DIAGNOSIS — M54.50 CHRONIC BILATERAL LOW BACK PAIN WITHOUT SCIATICA: Primary | ICD-10-CM

## 2025-02-20 DIAGNOSIS — G89.29 CHRONIC BILATERAL LOW BACK PAIN WITHOUT SCIATICA: Primary | ICD-10-CM

## 2025-02-20 RX ORDER — LIDOCAINE 50 MG/G
1 PATCH TOPICAL DAILY
Qty: 30 PATCH | Refills: 0 | Status: SHIPPED | OUTPATIENT
Start: 2025-02-20 | End: 2025-03-22

## 2025-02-24 ENCOUNTER — TELEPHONE (OUTPATIENT)
Dept: FAMILY MEDICINE CLINIC | Age: 80
End: 2025-02-24

## 2025-02-24 ENCOUNTER — HOSPITAL ENCOUNTER (INPATIENT)
Age: 80
LOS: 2 days | Discharge: HOME | DRG: 291 | End: 2025-02-26
Attending: EMERGENCY MEDICINE | Admitting: INTERNAL MEDICINE
Payer: OTHER GOVERNMENT

## 2025-02-24 ENCOUNTER — APPOINTMENT (OUTPATIENT)
Dept: GENERAL RADIOLOGY | Age: 80
DRG: 291 | End: 2025-02-24
Payer: OTHER GOVERNMENT

## 2025-02-24 DIAGNOSIS — M79.89 PAIN AND SWELLING OF TOE OF RIGHT FOOT: ICD-10-CM

## 2025-02-24 DIAGNOSIS — I50.43 CHF (CONGESTIVE HEART FAILURE), NYHA CLASS I, ACUTE ON CHRONIC, COMBINED (HCC): ICD-10-CM

## 2025-02-24 DIAGNOSIS — I50.42 CHRONIC COMBINED SYSTOLIC AND DIASTOLIC CHF (CONGESTIVE HEART FAILURE) (HCC): ICD-10-CM

## 2025-02-24 DIAGNOSIS — R06.02 SHORTNESS OF BREATH: ICD-10-CM

## 2025-02-24 DIAGNOSIS — M79.674 PAIN AND SWELLING OF TOE OF RIGHT FOOT: ICD-10-CM

## 2025-02-24 DIAGNOSIS — R09.02 HYPOXIA: Primary | ICD-10-CM

## 2025-02-24 DIAGNOSIS — I50.43 ACUTE ON CHRONIC COMBINED SYSTOLIC AND DIASTOLIC CONGESTIVE HEART FAILURE (HCC): ICD-10-CM

## 2025-02-24 DIAGNOSIS — R06.09 OTHER FORM OF DYSPNEA: ICD-10-CM

## 2025-02-24 PROBLEM — Z96.1 PRESENCE OF INTRAOCULAR LENS: Status: ACTIVE | Noted: 2025-02-24

## 2025-02-24 PROBLEM — Z98.41 HISTORY OF RIGHT CATARACT EXTRACTION: Status: ACTIVE | Noted: 2023-06-08

## 2025-02-24 PROBLEM — R65.10 SIRS (SYSTEMIC INFLAMMATORY RESPONSE SYNDROME) (HCC): Status: RESOLVED | Noted: 2024-12-26 | Resolved: 2025-02-24

## 2025-02-24 LAB
ANION GAP SERPL CALCULATED.3IONS-SCNC: 11 MMOL/L (ref 9–17)
BASOPHILS # BLD: 0.04 K/UL (ref 0–0.2)
BASOPHILS NFR BLD: 1 % (ref 0–2)
BNP SERPL-MCNC: 841 PG/ML
BUN SERPL-MCNC: 21 MG/DL (ref 8–23)
BUN/CREAT SERPL: 18 (ref 9–20)
CALCIUM SERPL-MCNC: 9.5 MG/DL (ref 8.6–10.4)
CHLORIDE SERPL-SCNC: 102 MMOL/L (ref 98–107)
CO2 SERPL-SCNC: 27 MMOL/L (ref 20–31)
CREAT SERPL-MCNC: 1.2 MG/DL (ref 0.7–1.2)
EKG ATRIAL RATE: 80 BPM
EKG P AXIS: 54 DEGREES
EKG P-R INTERVAL: 300 MS
EKG Q-T INTERVAL: 416 MS
EKG QRS DURATION: 174 MS
EKG QTC CALCULATION (BAZETT): 479 MS
EKG R AXIS: -37 DEGREES
EKG T AXIS: -48 DEGREES
EKG VENTRICULAR RATE: 80 BPM
EOSINOPHIL # BLD: 0.11 K/UL (ref 0–0.44)
EOSINOPHILS RELATIVE PERCENT: 1 % (ref 1–4)
ERYTHROCYTE [DISTWIDTH] IN BLOOD BY AUTOMATED COUNT: 14.8 % (ref 11.8–14.4)
FLUAV AG SPEC QL: NEGATIVE
FLUBV AG SPEC QL: NEGATIVE
GFR, ESTIMATED: 61 ML/MIN/1.73M2
GLUCOSE SERPL-MCNC: 101 MG/DL (ref 70–99)
HCT VFR BLD AUTO: 45.8 % (ref 40.7–50.3)
HGB BLD-MCNC: 14.3 G/DL (ref 13–17)
IMM GRANULOCYTES # BLD AUTO: 0.04 K/UL (ref 0–0.3)
IMM GRANULOCYTES NFR BLD: 1 %
INR PPP: 1.1
LYMPHOCYTES NFR BLD: 2.29 K/UL (ref 1.1–3.7)
LYMPHOCYTES RELATIVE PERCENT: 26 % (ref 24–43)
MAGNESIUM SERPL-MCNC: 2.1 MG/DL (ref 1.6–2.6)
MCH RBC QN AUTO: 30.8 PG (ref 25.2–33.5)
MCHC RBC AUTO-ENTMCNC: 31.2 G/DL (ref 25.2–33.5)
MCV RBC AUTO: 98.5 FL (ref 82.6–102.9)
MONOCYTES NFR BLD: 0.99 K/UL (ref 0.1–1.2)
MONOCYTES NFR BLD: 11 % (ref 3–12)
NEUTROPHILS NFR BLD: 60 % (ref 36–65)
NEUTS SEG NFR BLD: 5.2 K/UL (ref 1.5–8.1)
NRBC BLD-RTO: 0 PER 100 WBC
PARTIAL THROMBOPLASTIN TIME: 30.9 SEC (ref 23.9–33.8)
PLATELET # BLD AUTO: 157 K/UL (ref 138–453)
PMV BLD AUTO: 9.3 FL (ref 8.1–13.5)
POTASSIUM SERPL-SCNC: 4.4 MMOL/L (ref 3.7–5.3)
PROTHROMBIN TIME: 13.6 SEC (ref 11.5–14.2)
RBC # BLD AUTO: 4.65 M/UL (ref 4.21–5.77)
RBC # BLD: ABNORMAL 10*6/UL
SARS-COV-2 RDRP RESP QL NAA+PROBE: NOT DETECTED
SODIUM SERPL-SCNC: 140 MMOL/L (ref 135–144)
SPECIMEN DESCRIPTION: NORMAL
TROPONIN I SERPL HS-MCNC: 35 NG/L (ref 0–22)
TROPONIN I SERPL HS-MCNC: 36 NG/L (ref 0–22)
TROPONIN I SERPL HS-MCNC: 37 NG/L (ref 0–22)
WBC OTHER # BLD: 8.7 K/UL (ref 3.5–11.3)

## 2025-02-24 PROCEDURE — 36415 COLL VENOUS BLD VENIPUNCTURE: CPT

## 2025-02-24 PROCEDURE — 83880 ASSAY OF NATRIURETIC PEPTIDE: CPT

## 2025-02-24 PROCEDURE — 83735 ASSAY OF MAGNESIUM: CPT

## 2025-02-24 PROCEDURE — 2060000000 HC ICU INTERMEDIATE R&B

## 2025-02-24 PROCEDURE — 85730 THROMBOPLASTIN TIME PARTIAL: CPT

## 2025-02-24 PROCEDURE — 93005 ELECTROCARDIOGRAM TRACING: CPT | Performed by: EMERGENCY MEDICINE

## 2025-02-24 PROCEDURE — 99285 EMERGENCY DEPT VISIT HI MDM: CPT

## 2025-02-24 PROCEDURE — 6360000002 HC RX W HCPCS: Performed by: NURSE PRACTITIONER

## 2025-02-24 PROCEDURE — 99222 1ST HOSP IP/OBS MODERATE 55: CPT | Performed by: NURSE PRACTITIONER

## 2025-02-24 PROCEDURE — 6360000002 HC RX W HCPCS: Performed by: EMERGENCY MEDICINE

## 2025-02-24 PROCEDURE — 71046 X-RAY EXAM CHEST 2 VIEWS: CPT

## 2025-02-24 PROCEDURE — 80048 BASIC METABOLIC PNL TOTAL CA: CPT

## 2025-02-24 PROCEDURE — 6370000000 HC RX 637 (ALT 250 FOR IP): Performed by: NURSE PRACTITIONER

## 2025-02-24 PROCEDURE — 2500000003 HC RX 250 WO HCPCS: Performed by: NURSE PRACTITIONER

## 2025-02-24 PROCEDURE — 94640 AIRWAY INHALATION TREATMENT: CPT

## 2025-02-24 PROCEDURE — 87635 SARS-COV-2 COVID-19 AMP PRB: CPT

## 2025-02-24 PROCEDURE — 85025 COMPLETE CBC W/AUTO DIFF WBC: CPT

## 2025-02-24 PROCEDURE — 85610 PROTHROMBIN TIME: CPT

## 2025-02-24 PROCEDURE — 87804 INFLUENZA ASSAY W/OPTIC: CPT

## 2025-02-24 PROCEDURE — 84484 ASSAY OF TROPONIN QUANT: CPT

## 2025-02-24 RX ORDER — METOPROLOL SUCCINATE 25 MG/1
25 TABLET, EXTENDED RELEASE ORAL NIGHTLY
Status: DISCONTINUED | OUTPATIENT
Start: 2025-02-24 | End: 2025-02-26 | Stop reason: HOSPADM

## 2025-02-24 RX ORDER — ALLOPURINOL 300 MG/1
150 TABLET ORAL DAILY
Status: DISCONTINUED | OUTPATIENT
Start: 2025-02-25 | End: 2025-02-26 | Stop reason: HOSPADM

## 2025-02-24 RX ORDER — SODIUM CHLORIDE 9 MG/ML
INJECTION, SOLUTION INTRAVENOUS PRN
Status: DISCONTINUED | OUTPATIENT
Start: 2025-02-24 | End: 2025-02-26 | Stop reason: HOSPADM

## 2025-02-24 RX ORDER — ATORVASTATIN CALCIUM 40 MG/1
40 TABLET, FILM COATED ORAL DAILY
Status: DISCONTINUED | OUTPATIENT
Start: 2025-02-25 | End: 2025-02-26 | Stop reason: HOSPADM

## 2025-02-24 RX ORDER — ACETAMINOPHEN 325 MG/1
650 TABLET ORAL EVERY 6 HOURS PRN
Status: DISCONTINUED | OUTPATIENT
Start: 2025-02-24 | End: 2025-02-26 | Stop reason: HOSPADM

## 2025-02-24 RX ORDER — ACETAMINOPHEN 650 MG/1
650 SUPPOSITORY RECTAL EVERY 6 HOURS PRN
Status: DISCONTINUED | OUTPATIENT
Start: 2025-02-24 | End: 2025-02-26 | Stop reason: HOSPADM

## 2025-02-24 RX ORDER — SODIUM CHLORIDE 0.9 % (FLUSH) 0.9 %
5-40 SYRINGE (ML) INJECTION PRN
Status: DISCONTINUED | OUTPATIENT
Start: 2025-02-24 | End: 2025-02-26 | Stop reason: HOSPADM

## 2025-02-24 RX ORDER — BUPROPION HYDROCHLORIDE 75 MG/1
75 TABLET ORAL 2 TIMES DAILY
Status: DISCONTINUED | OUTPATIENT
Start: 2025-02-24 | End: 2025-02-26 | Stop reason: HOSPADM

## 2025-02-24 RX ORDER — ONDANSETRON 4 MG/1
4 TABLET, ORALLY DISINTEGRATING ORAL EVERY 8 HOURS PRN
Status: DISCONTINUED | OUTPATIENT
Start: 2025-02-24 | End: 2025-02-26 | Stop reason: HOSPADM

## 2025-02-24 RX ORDER — ENOXAPARIN SODIUM 100 MG/ML
40 INJECTION SUBCUTANEOUS DAILY
Status: DISCONTINUED | OUTPATIENT
Start: 2025-02-24 | End: 2025-02-25

## 2025-02-24 RX ORDER — POTASSIUM CHLORIDE 750 MG/1
10 TABLET, EXTENDED RELEASE ORAL DAILY
Status: DISCONTINUED | OUTPATIENT
Start: 2025-02-25 | End: 2025-02-26 | Stop reason: HOSPADM

## 2025-02-24 RX ORDER — OXYBUTYNIN CHLORIDE 5 MG/1
5 TABLET, EXTENDED RELEASE ORAL DAILY
Status: DISCONTINUED | OUTPATIENT
Start: 2025-02-25 | End: 2025-02-26 | Stop reason: HOSPADM

## 2025-02-24 RX ORDER — SODIUM CHLORIDE FOR INHALATION 0.9 %
3 VIAL, NEBULIZER (ML) INHALATION
Status: DISCONTINUED | OUTPATIENT
Start: 2025-02-24 | End: 2025-02-26 | Stop reason: HOSPADM

## 2025-02-24 RX ORDER — TRAZODONE HYDROCHLORIDE 50 MG/1
50 TABLET ORAL NIGHTLY
Status: DISCONTINUED | OUTPATIENT
Start: 2025-02-24 | End: 2025-02-26 | Stop reason: HOSPADM

## 2025-02-24 RX ORDER — ALBUTEROL SULFATE 0.83 MG/ML
2.5 SOLUTION RESPIRATORY (INHALATION)
Status: DISCONTINUED | OUTPATIENT
Start: 2025-02-24 | End: 2025-02-26 | Stop reason: HOSPADM

## 2025-02-24 RX ORDER — ONDANSETRON 2 MG/ML
4 INJECTION INTRAMUSCULAR; INTRAVENOUS EVERY 6 HOURS PRN
Status: DISCONTINUED | OUTPATIENT
Start: 2025-02-24 | End: 2025-02-26 | Stop reason: HOSPADM

## 2025-02-24 RX ORDER — MIDODRINE HYDROCHLORIDE 5 MG/1
5 TABLET ORAL 3 TIMES DAILY
Status: DISCONTINUED | OUTPATIENT
Start: 2025-02-24 | End: 2025-02-26 | Stop reason: HOSPADM

## 2025-02-24 RX ORDER — TAMSULOSIN HYDROCHLORIDE 0.4 MG/1
0.4 CAPSULE ORAL DAILY
Status: DISCONTINUED | OUTPATIENT
Start: 2025-02-25 | End: 2025-02-26 | Stop reason: HOSPADM

## 2025-02-24 RX ORDER — LIDOCAINE 4 G/G
1 PATCH TOPICAL DAILY
Status: DISCONTINUED | OUTPATIENT
Start: 2025-02-25 | End: 2025-02-26 | Stop reason: HOSPADM

## 2025-02-24 RX ORDER — VITAMIN B COMPLEX
2000 TABLET ORAL DAILY
Status: DISCONTINUED | OUTPATIENT
Start: 2025-02-25 | End: 2025-02-26 | Stop reason: HOSPADM

## 2025-02-24 RX ORDER — SODIUM CHLORIDE 0.9 % (FLUSH) 0.9 %
5-40 SYRINGE (ML) INJECTION EVERY 12 HOURS SCHEDULED
Status: DISCONTINUED | OUTPATIENT
Start: 2025-02-24 | End: 2025-02-26 | Stop reason: HOSPADM

## 2025-02-24 RX ORDER — LEVOTHYROXINE SODIUM 100 UG/1
100 TABLET ORAL DAILY
Status: DISCONTINUED | OUTPATIENT
Start: 2025-02-25 | End: 2025-02-26 | Stop reason: HOSPADM

## 2025-02-24 RX ORDER — FUROSEMIDE 10 MG/ML
20 INJECTION INTRAMUSCULAR; INTRAVENOUS ONCE
Status: COMPLETED | OUTPATIENT
Start: 2025-02-24 | End: 2025-02-24

## 2025-02-24 RX ORDER — FUROSEMIDE 10 MG/ML
40 INJECTION INTRAMUSCULAR; INTRAVENOUS 2 TIMES DAILY
Status: DISCONTINUED | OUTPATIENT
Start: 2025-02-25 | End: 2025-02-26 | Stop reason: HOSPADM

## 2025-02-24 RX ORDER — FINASTERIDE 5 MG/1
5 TABLET, FILM COATED ORAL DAILY
Status: DISCONTINUED | OUTPATIENT
Start: 2025-02-25 | End: 2025-02-26 | Stop reason: HOSPADM

## 2025-02-24 RX ORDER — POLYETHYLENE GLYCOL 3350 17 G/17G
17 POWDER, FOR SOLUTION ORAL DAILY PRN
Status: DISCONTINUED | OUTPATIENT
Start: 2025-02-24 | End: 2025-02-26 | Stop reason: HOSPADM

## 2025-02-24 RX ORDER — ASPIRIN 81 MG/1
81 TABLET ORAL DAILY
Status: DISCONTINUED | OUTPATIENT
Start: 2025-02-25 | End: 2025-02-26 | Stop reason: HOSPADM

## 2025-02-24 RX ADMIN — EMPAGLIFLOZIN 10 MG: 10 TABLET, FILM COATED ORAL at 22:59

## 2025-02-24 RX ADMIN — SODIUM CHLORIDE, PRESERVATIVE FREE 10 ML: 5 INJECTION INTRAVENOUS at 23:00

## 2025-02-24 RX ADMIN — ENOXAPARIN SODIUM 40 MG: 100 INJECTION SUBCUTANEOUS at 22:50

## 2025-02-24 RX ADMIN — SERTRALINE HYDROCHLORIDE 100 MG: 50 TABLET ORAL at 22:57

## 2025-02-24 RX ADMIN — TRAZODONE HYDROCHLORIDE 50 MG: 50 TABLET ORAL at 22:58

## 2025-02-24 RX ADMIN — FUROSEMIDE 20 MG: 10 INJECTION, SOLUTION INTRAMUSCULAR; INTRAVENOUS at 19:58

## 2025-02-24 RX ADMIN — METOPROLOL SUCCINATE 25 MG: 25 TABLET, EXTENDED RELEASE ORAL at 22:58

## 2025-02-24 RX ADMIN — ALBUTEROL SULFATE 2.5 MG: 2.5 SOLUTION RESPIRATORY (INHALATION) at 23:16

## 2025-02-24 RX ADMIN — MIDODRINE HYDROCHLORIDE 5 MG: 5 TABLET ORAL at 22:57

## 2025-02-24 RX ADMIN — BUPROPION HYDROCHLORIDE 75 MG: 75 TABLET, FILM COATED ORAL at 22:58

## 2025-02-24 ASSESSMENT — ENCOUNTER SYMPTOMS
CONSTIPATION: 0
EYE REDNESS: 0
ABDOMINAL PAIN: 0
CHEST TIGHTNESS: 0
SHORTNESS OF BREATH: 1
NAUSEA: 0
VOMITING: 0
DIARRHEA: 0
COUGH: 1

## 2025-02-24 NOTE — TELEPHONE ENCOUNTER
REMINGTON Luo from [facility] called stating patient's O2 has been low. States she had to bump his O2 up to 4 Liters for patient to maintain a oxygen level of 90 %. States she she got there today, he was sating at 88%. States usually uses 2-3 liters of O2. Puja also states that patient has developed a productive cough and is c/o of SOB with his oxygen at 4 Liters. Advised that patient will need to be brought to the ER for evaluation. Per Puja, will relay information to patient.     Puja also states that patient did miss 4 days of his water pill. Cardio was notified and he changed his water pill to increase lasix 40 mg twice daily for 3 days then 40 mg daily after.

## 2025-02-24 NOTE — ED PROVIDER NOTES
BUPROPION (WELLBUTRIN) 75 MG TABLET    Take 1 tablet by mouth twice daily    CHOLECALCIFEROL (VITAMIN D) 50 MCG (2000 UT) CAPS CAPSULE    Take 1,000 Units by mouth daily    FINASTERIDE (PROSCAR) 5 MG TABLET    Take 1 tablet by mouth daily    FUROSEMIDE (LASIX) 40 MG TABLET    Take 1 tablet by mouth every morning    HANDICAP PLACARD MISC    by Does not apply route Permanent    HANDICAP PLACARD MISC    by Does not apply route 8/28/2024 to 8/28/2029  (J96.11,  Z99.81) Chronic respiratory failure with hypoxia, on home O2 therapy (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)    (G62.81) Critical illness polyneuropathy ([facility])    JARDIANCE 10 MG TABLET    Take 1 tablet by mouth once daily    LEVOTHYROXINE (SYNTHROID) 100 MCG TABLET    Take 1 tablet by mouth once daily    LIDOCAINE (LIDODERM) 5 %    Place 1 patch onto the skin daily 12 hours on, 12 hours off.    LOSARTAN (COZAAR) 25 MG TABLET    Take 0.5 tablets by mouth daily    METOPROLOL SUCCINATE (TOPROL XL) 25 MG EXTENDED RELEASE TABLET    Take 1 tablet by mouth at bedtime    MIDODRINE (PROAMATINE) 5 MG TABLET    TAKE 1 TABLET BY MOUTH THREE TIMES DAILY    MISC. DEVICES MISC    Oxygen via nasal cannula at 2 liters/minute continuously. Diagnosis: COPD    MISC. DEVICES MISC    Rodriguez catheter supplies. 18 F/30 mL Rodriguez catheters, leg bags, Rodriguez drainage bags, Rodriguez insertion packs. Dx: BPH with urinary retention    OXYBUTYNIN (DITROPAN-XL) 5 MG EXTENDED RELEASE TABLET    Take 1 tablet by mouth once daily    POTASSIUM CHLORIDE (KLOR-CON) 10 MEQ EXTENDED RELEASE TABLET    Take 1 tablet by mouth daily    SERTRALINE (ZOLOFT) 100 MG TABLET    Take 1 tablet by mouth daily    SPIRONOLACTONE (ALDACTONE) 25 MG TABLET    Take 1/2 (one-half) tablet by mouth once daily    TRAZODONE (DESYREL) 50 MG TABLET    Take 1 tablet by mouth nightly

## 2025-02-25 ENCOUNTER — APPOINTMENT (OUTPATIENT)
Age: 80
DRG: 291 | End: 2025-02-25
Payer: OTHER GOVERNMENT

## 2025-02-25 PROBLEM — R06.02 SHORTNESS OF BREATH: Status: ACTIVE | Noted: 2025-02-25

## 2025-02-25 PROBLEM — I50.43 ACUTE ON CHRONIC COMBINED SYSTOLIC AND DIASTOLIC CONGESTIVE HEART FAILURE (HCC): Status: ACTIVE | Noted: 2025-02-25

## 2025-02-25 PROBLEM — R06.00 DYSPNEA: Status: ACTIVE | Noted: 2025-02-25

## 2025-02-25 LAB
ANION GAP SERPL CALCULATED.3IONS-SCNC: 13 MMOL/L (ref 9–17)
BASOPHILS # BLD: 0.04 K/UL (ref 0–0.2)
BASOPHILS NFR BLD: 0 % (ref 0–2)
BUN SERPL-MCNC: 20 MG/DL (ref 8–23)
BUN/CREAT SERPL: 17 (ref 9–20)
CALCIUM SERPL-MCNC: 9.2 MG/DL (ref 8.6–10.4)
CHLORIDE SERPL-SCNC: 100 MMOL/L (ref 98–107)
CHOLEST SERPL-MCNC: 92 MG/DL (ref 0–199)
CHOLESTEROL/HDL RATIO: 3
CO2 SERPL-SCNC: 28 MMOL/L (ref 20–31)
CREAT SERPL-MCNC: 1.2 MG/DL (ref 0.7–1.2)
ECHO AO ASC DIAM: 3.7 CM
ECHO AO ASCENDING AORTA INDEX: 1.47 CM/M2
ECHO AO ROOT DIAM: 4.1 CM
ECHO AO ROOT INDEX: 1.63 CM/M2
ECHO AV AREA PEAK VELOCITY: 2.9 CM2
ECHO AV AREA VTI: 2.7 CM2
ECHO AV AREA/BSA PEAK VELOCITY: 1.2 CM2/M2
ECHO AV AREA/BSA VTI: 1.1 CM2/M2
ECHO AV MEAN GRADIENT: 6 MMHG
ECHO AV MEAN VELOCITY: 1.2 M/S
ECHO AV PEAK GRADIENT: 10 MMHG
ECHO AV PEAK VELOCITY: 1.6 M/S
ECHO AV VELOCITY RATIO: 0.56
ECHO AV VTI: 37.8 CM
ECHO BSA: 2.59 M2
ECHO EST RA PRESSURE: 8 MMHG
ECHO IVC PROX: 1.9 CM
ECHO LA AREA 4C: 25.5 CM2
ECHO LA DIAMETER INDEX: 1.47 CM/M2
ECHO LA DIAMETER: 3.7 CM
ECHO LA MAJOR AXIS: 6.8 CM
ECHO LA TO AORTIC ROOT RATIO: 0.9
ECHO LA VOL MOD A4C: 79 ML (ref 18–58)
ECHO LA VOLUME INDEX MOD A4C: 31 ML/M2 (ref 16–34)
ECHO LV E' LATERAL VELOCITY: 9.46 CM/S
ECHO LV E' SEPTAL VELOCITY: 4.57 CM/S
ECHO LV EDV A2C: 154 ML
ECHO LV EDV A4C: 122 ML
ECHO LV EDV INDEX A4C: 48 ML/M2
ECHO LV EDV NDEX A2C: 61 ML/M2
ECHO LV EF PHYSICIAN: 32 %
ECHO LV EJECTION FRACTION A2C: 34 %
ECHO LV EJECTION FRACTION A4C: 30 %
ECHO LV EJECTION FRACTION BIPLANE: 32 % (ref 55–100)
ECHO LV ESV A2C: 101 ML
ECHO LV ESV A4C: 85 ML
ECHO LV ESV INDEX A2C: 40 ML/M2
ECHO LV ESV INDEX A4C: 34 ML/M2
ECHO LV FRACTIONAL SHORTENING: 13 % (ref 28–44)
ECHO LV INTERNAL DIMENSION DIASTOLE INDEX: 2.5 CM/M2
ECHO LV INTERNAL DIMENSION DIASTOLIC: 6.3 CM (ref 4.2–5.9)
ECHO LV INTERNAL DIMENSION SYSTOLIC INDEX: 2.18 CM/M2
ECHO LV INTERNAL DIMENSION SYSTOLIC: 5.5 CM
ECHO LV ISOVOLUMETRIC RELAXATION TIME (IVRT): 79 MS
ECHO LV IVSD: 1.3 CM (ref 0.6–1)
ECHO LV MASS 2D: 399.1 G (ref 88–224)
ECHO LV MASS INDEX 2D: 158.4 G/M2 (ref 49–115)
ECHO LV POSTERIOR WALL DIASTOLIC: 1.4 CM (ref 0.6–1)
ECHO LV RELATIVE WALL THICKNESS RATIO: 0.44
ECHO LVOT AREA: 4.9 CM2
ECHO LVOT AV VTI INDEX: 0.55
ECHO LVOT DIAM: 2.5 CM
ECHO LVOT MEAN GRADIENT: 2 MMHG
ECHO LVOT PEAK GRADIENT: 3 MMHG
ECHO LVOT PEAK VELOCITY: 0.9 M/S
ECHO LVOT STROKE VOLUME INDEX: 40.7 ML/M2
ECHO LVOT SV: 102.5 ML
ECHO LVOT VTI: 20.9 CM
ECHO MV A VELOCITY: 0.91 M/S
ECHO MV AREA VTI: 4.3 CM2
ECHO MV E DECELERATION TIME (DT): 235 MS
ECHO MV E VELOCITY: 0.66 M/S
ECHO MV E/A RATIO: 0.73
ECHO MV E/E' LATERAL: 6.98
ECHO MV E/E' RATIO (AVERAGED): 10.71
ECHO MV E/E' SEPTAL: 14.44
ECHO MV LVOT VTI INDEX: 1.13
ECHO MV MAX VELOCITY: 0.9 M/S
ECHO MV MEAN GRADIENT: 2 MMHG
ECHO MV MEAN VELOCITY: 0.6 M/S
ECHO MV PEAK GRADIENT: 3 MMHG
ECHO MV VTI: 23.6 CM
ECHO PV MAX VELOCITY: 0.9 M/S
ECHO PV PEAK GRADIENT: 4 MMHG
ECHO RIGHT VENTRICULAR SYSTOLIC PRESSURE (RVSP): 33 MMHG
ECHO RV FREE WALL PEAK S': 8.3 CM/S
ECHO RV TAPSE: 1.1 CM (ref 1.7–?)
ECHO TV PEAK GRADIENT: 2 MMHG
ECHO TV REGURGITANT MAX VELOCITY: 2.5 M/S
ECHO TV REGURGITANT PEAK GRADIENT: 25 MMHG
ECHO TV REGURGITANT PEAK GRADIENT: 25 MMHG
EOSINOPHIL # BLD: 0.12 K/UL (ref 0–0.44)
EOSINOPHILS RELATIVE PERCENT: 1 % (ref 1–4)
ERYTHROCYTE [DISTWIDTH] IN BLOOD BY AUTOMATED COUNT: 14.9 % (ref 11.8–14.4)
GFR, ESTIMATED: 61 ML/MIN/1.73M2
GLUCOSE SERPL-MCNC: 122 MG/DL (ref 70–99)
HCT VFR BLD AUTO: 44.3 % (ref 40.7–50.3)
HDLC SERPL-MCNC: 31 MG/DL
HGB BLD-MCNC: 14 G/DL (ref 13–17)
IMM GRANULOCYTES # BLD AUTO: 0.04 K/UL (ref 0–0.3)
IMM GRANULOCYTES NFR BLD: 0 %
LDLC SERPL CALC-MCNC: 29 MG/DL (ref 0–100)
LV EF: 32 %
LYMPHOCYTES NFR BLD: 2.73 K/UL (ref 1.1–3.7)
LYMPHOCYTES RELATIVE PERCENT: 29 % (ref 24–43)
MCH RBC QN AUTO: 30.8 PG (ref 25.2–33.5)
MCHC RBC AUTO-ENTMCNC: 31.6 G/DL (ref 25.2–33.5)
MCV RBC AUTO: 97.6 FL (ref 82.6–102.9)
MONOCYTES NFR BLD: 1.03 K/UL (ref 0.1–1.2)
MONOCYTES NFR BLD: 11 % (ref 3–12)
NEUTROPHILS NFR BLD: 59 % (ref 36–65)
NEUTS SEG NFR BLD: 5.47 K/UL (ref 1.5–8.1)
NRBC BLD-RTO: 0 PER 100 WBC
PLATELET # BLD AUTO: 155 K/UL (ref 138–453)
PMV BLD AUTO: 9.2 FL (ref 8.1–13.5)
POTASSIUM SERPL-SCNC: 3.8 MMOL/L (ref 3.7–5.3)
PROCALCITONIN SERPL-MCNC: 0.06 NG/ML (ref 0–0.09)
RBC # BLD AUTO: 4.54 M/UL (ref 4.21–5.77)
RBC # BLD: ABNORMAL 10*6/UL
SODIUM SERPL-SCNC: 141 MMOL/L (ref 135–144)
TRIGL SERPL-MCNC: 162 MG/DL
TROPONIN I SERPL HS-MCNC: 37 NG/L (ref 0–22)
TSH SERPL DL<=0.05 MIU/L-ACNC: 3.48 UIU/ML (ref 0.3–5)
VLDLC SERPL CALC-MCNC: 32 MG/DL (ref 1–30)
WBC OTHER # BLD: 9.4 K/UL (ref 3.5–11.3)

## 2025-02-25 PROCEDURE — 84484 ASSAY OF TROPONIN QUANT: CPT

## 2025-02-25 PROCEDURE — 6370000000 HC RX 637 (ALT 250 FOR IP): Performed by: NURSE PRACTITIONER

## 2025-02-25 PROCEDURE — 2700000000 HC OXYGEN THERAPY PER DAY

## 2025-02-25 PROCEDURE — 6360000002 HC RX W HCPCS: Performed by: INTERNAL MEDICINE

## 2025-02-25 PROCEDURE — 2060000000 HC ICU INTERMEDIATE R&B

## 2025-02-25 PROCEDURE — 2500000003 HC RX 250 WO HCPCS: Performed by: NURSE PRACTITIONER

## 2025-02-25 PROCEDURE — 84443 ASSAY THYROID STIM HORMONE: CPT

## 2025-02-25 PROCEDURE — 80061 LIPID PANEL: CPT

## 2025-02-25 PROCEDURE — 6360000002 HC RX W HCPCS: Performed by: NURSE PRACTITIONER

## 2025-02-25 PROCEDURE — 36415 COLL VENOUS BLD VENIPUNCTURE: CPT

## 2025-02-25 PROCEDURE — 85025 COMPLETE CBC W/AUTO DIFF WBC: CPT

## 2025-02-25 PROCEDURE — 84145 PROCALCITONIN (PCT): CPT

## 2025-02-25 PROCEDURE — 80048 BASIC METABOLIC PNL TOTAL CA: CPT

## 2025-02-25 PROCEDURE — 93005 ELECTROCARDIOGRAM TRACING: CPT | Performed by: NURSE PRACTITIONER

## 2025-02-25 PROCEDURE — 94761 N-INVAS EAR/PLS OXIMETRY MLT: CPT

## 2025-02-25 PROCEDURE — 93306 TTE W/DOPPLER COMPLETE: CPT

## 2025-02-25 PROCEDURE — 99231 SBSQ HOSP IP/OBS SF/LOW 25: CPT | Performed by: INTERNAL MEDICINE

## 2025-02-25 RX ORDER — ENOXAPARIN SODIUM 100 MG/ML
30 INJECTION SUBCUTANEOUS 2 TIMES DAILY
Status: DISCONTINUED | OUTPATIENT
Start: 2025-02-25 | End: 2025-02-26 | Stop reason: HOSPADM

## 2025-02-25 RX ADMIN — ENOXAPARIN SODIUM 30 MG: 100 INJECTION SUBCUTANEOUS at 22:03

## 2025-02-25 RX ADMIN — TRAZODONE HYDROCHLORIDE 50 MG: 50 TABLET ORAL at 22:02

## 2025-02-25 RX ADMIN — MICONAZOLE NITRATE: 20 POWDER TOPICAL at 10:22

## 2025-02-25 RX ADMIN — FUROSEMIDE 40 MG: 10 INJECTION, SOLUTION INTRAMUSCULAR; INTRAVENOUS at 10:24

## 2025-02-25 RX ADMIN — CHOLECALCIFEROL TAB 25 MCG (1000 UNIT) 2000 UNITS: 25 TAB at 10:23

## 2025-02-25 RX ADMIN — ENOXAPARIN SODIUM 30 MG: 100 INJECTION SUBCUTANEOUS at 10:22

## 2025-02-25 RX ADMIN — EMPAGLIFLOZIN 10 MG: 10 TABLET, FILM COATED ORAL at 10:22

## 2025-02-25 RX ADMIN — TAMSULOSIN HYDROCHLORIDE 0.4 MG: 0.4 CAPSULE ORAL at 10:22

## 2025-02-25 RX ADMIN — MIDODRINE HYDROCHLORIDE 5 MG: 5 TABLET ORAL at 10:24

## 2025-02-25 RX ADMIN — METOPROLOL SUCCINATE 25 MG: 25 TABLET, EXTENDED RELEASE ORAL at 22:02

## 2025-02-25 RX ADMIN — FUROSEMIDE 40 MG: 10 INJECTION, SOLUTION INTRAMUSCULAR; INTRAVENOUS at 18:44

## 2025-02-25 RX ADMIN — SODIUM CHLORIDE, PRESERVATIVE FREE 10 ML: 5 INJECTION INTRAVENOUS at 10:29

## 2025-02-25 RX ADMIN — FINASTERIDE 5 MG: 5 TABLET, FILM COATED ORAL at 10:23

## 2025-02-25 RX ADMIN — POTASSIUM CHLORIDE 10 MEQ: 750 TABLET, EXTENDED RELEASE ORAL at 10:23

## 2025-02-25 RX ADMIN — MIDODRINE HYDROCHLORIDE 5 MG: 5 TABLET ORAL at 22:02

## 2025-02-25 RX ADMIN — LEVOTHYROXINE SODIUM 100 MCG: 0.1 TABLET ORAL at 10:23

## 2025-02-25 RX ADMIN — MIDODRINE HYDROCHLORIDE 5 MG: 5 TABLET ORAL at 13:56

## 2025-02-25 RX ADMIN — BUPROPION HYDROCHLORIDE 75 MG: 75 TABLET, FILM COATED ORAL at 10:24

## 2025-02-25 RX ADMIN — ASPIRIN 81 MG: 81 TABLET, COATED ORAL at 10:23

## 2025-02-25 RX ADMIN — OXYBUTYNIN CHLORIDE 5 MG: 5 TABLET, EXTENDED RELEASE ORAL at 10:23

## 2025-02-25 RX ADMIN — SERTRALINE HYDROCHLORIDE 100 MG: 50 TABLET ORAL at 22:02

## 2025-02-25 RX ADMIN — MICONAZOLE NITRATE: 20 POWDER TOPICAL at 22:02

## 2025-02-25 RX ADMIN — SODIUM CHLORIDE, PRESERVATIVE FREE 10 ML: 5 INJECTION INTRAVENOUS at 22:03

## 2025-02-25 RX ADMIN — ATORVASTATIN CALCIUM 40 MG: 40 TABLET, FILM COATED ORAL at 10:23

## 2025-02-25 RX ADMIN — ALLOPURINOL 150 MG: 300 TABLET ORAL at 10:23

## 2025-02-25 RX ADMIN — BUPROPION HYDROCHLORIDE 75 MG: 75 TABLET, FILM COATED ORAL at 22:02

## 2025-02-25 NOTE — PROGRESS NOTES
02/25/25 1411   Encounter Summary   Encounter Overview/Reason Initial Encounter   Service Provided For Patient and family together   Referral/Consult From Rounding   Support System Spouse;Children   Last Encounter  02/25/25   Complexity of Encounter Low   Begin Time 1355   End Time  1412   Total Time Calculated 17 min   Assessment/Intervention/Outcome   Assessment Calm   Intervention Active listening;Sustaining Presence/Ministry of presence   Outcome Acceptance;Coping;Deescalated;Engaged in conversation;Expressed Gratitude      rounding on PCU    Assessment: Patient is resting in bed with his wife present for support. He is an [employer] vet having served during the sixties in the [facility]. He was given a pin.    Intervention: Engaged in conversation. Patient expressed appreciation for visit and offer of continued prayer.    Plan: Chaplains are available on site or on call 24/7 for spiritual and emotional support.   Complete.

## 2025-02-25 NOTE — CARE COORDINATION
Case Management Assessment  Initial Evaluation    Date/Time of Evaluation: 2/25/2025 12:07 PM  Assessment Completed by: Ana Galo RN    If patient is discharged prior to next notation, then this note serves as note for discharge by case management.    Patient Name: Noah Jauregui                   YOB: 1945  Diagnosis: Shortness of breath [R06.02]  Hypoxia [R09.02]  Acute on chronic combined systolic and diastolic congestive heart failure (HCC) [I50.43]  CHF (congestive heart failure), NYHA class I, acute on chronic, combined (HCC) [I50.43]  Other form of dyspnea [R06.09]                   Date / Time: 2/24/2025 12:46 PM    Patient Admission Status: Inpatient   Readmission Risk (Low < 19, Mod (19-27), High > 27): Readmission Risk Score: 17.4    Current PCP: Bonifacio Drake, DO  PCP verified by CM? Yes    Chart Reviewed: Yes      History Provided by: Patient  Patient Orientation: Alert and Oriented    Patient Cognition: Alert    Hospitalization in the last 30 days (Readmission):  No    If yes, Readmission Assessment in CM Navigator will be completed.    Advance Directives:      Code Status: DNR-CCA   Patient's Primary Decision Maker is: Named in Scanned ACP Document    Primary Decision Maker: Nataly Jauregui - Spouse - [phone]    Discharge Planning:    Patient lives with: Spouse/Significant Other Type of Home: House  Primary Care Giver: Self  Patient Support Systems include: Spouse/Significant Other   Current Financial resources[place]  Current community resources: ECF/Home Care  Current services prior to admission: Durable Medical Equipment, Oxygen Therapy, Home Care            Current DME: Cpap, Oxygen Therapy (Comment), Walker            Type of Home Care services:  PT, Nursing Services    ADLS  Prior functional level: Independent in ADLs/IADLs  Current functional level: Independent in ADLs/IADLs    PT AM-PAC:   /24  OT AM-PAC:   /24    Family can provide

## 2025-02-25 NOTE — CONSULTS
fluid intake, or urination. No tremor.  Hematologic/Lymphatic: No abnormal bruising or bleeding, blood clots or swollen lymph nodes.  Allergic/Immunologic: No nasal congestion or hives.      PHYSICAL EXAM:      /61   Pulse 66   Temp 98 °F (36.7 °C) (Tympanic)   Resp 18   Ht 1.88 m (6' 2\")   Wt 128.7 kg (283 lb 12.8 oz)   SpO2 92%   BMI 36.44 kg/m²    General appearance: alert and cooperative with exam  HEENT: Head: Normocephalic, no lesions, without obvious abnormality.  Eyes: PERRL, EOMI  Ears: Not obvious deformations or lack of hearing  Neck: no carotid bruit, no JVD  Lungs: clear to auscultation bilaterally  Heart: regular rate and rhythm, S1, S2 normal, no murmur, click, rub or gallop  Abdomen: soft, non-tender; bowel sounds normal; no masses,  no organomegaly  Extremities: extremities normal, atraumatic, no cyanosis or edema  Neurologic: Mental status: Alert, oriented, thought content appropriate  Skin: WNL for age and condition, no obvious rashes or leasions        Other active acute problems:  Patient Active Problem List   Diagnosis    Hypertension    Osteoarthritis    Chronic venous insufficiency    Acquired genu valgum    Degeneration of lumbar intervertebral disc    Lower urinary tract symptoms due to benign prostatic hyperplasia    Presence of right artificial knee joint    Bilateral carotid artery stenosis    Hypothyroidism    Ischemic cardiomyopathy    Ventral hernia    Left bundle branch block (LBBB)    Chronic embolism and thrombosis of deep vein of both distal legs (HCC)    Coronary artery disease involving native coronary artery of native heart    Hyperlipidemia    Chronic combined systolic and diastolic CHF (congestive heart failure) (HCC)    Impaired gait    Emphysema/COPD (HCC)    COPD with acute exacerbation (HCC)    History of COVID-19    Class 2 severe obesity with serious comorbidity and body mass index (BMI) of 39.0 to 39.9 in adult    Near syncope    Hypoxia    Orthostatic

## 2025-02-25 NOTE — PROGRESS NOTES
Your patient currently has an order for Lovenox 40 mg daily for DVT prophylaxis. Based upon their weight of 128.7 kg and CrCl of 70 mL/min. The recommended dose should be Lovenox 30 mg  twice daily. Thank you.    Vamsi Parikh RPh  2/25/2025  7:28 AM

## 2025-02-25 NOTE — ED NOTES
Med list was reviewed with pt and pt's wife, both are uncertain exactly what he takes. Wife has a list at home she will bring in.

## 2025-02-25 NOTE — PLAN OF CARE
Problem: ABCDS Injury Assessment  Goal: Absence of physical injury  2/25/2025 0841 by Demi Reyna RN  Outcome: Progressing  2/25/2025 0130 by Trisha Luong RN  Outcome: Progressing     Problem: Chronic Conditions and Co-morbidities  Goal: Patient's chronic conditions and co-morbidity symptoms are monitored and maintained or improved  2/25/2025 0841 by Demi Reyna RN  Outcome: Progressing  2/25/2025 0130 by Trisha Luong RN  Outcome: Progressing     Problem: Discharge Planning  Goal: Discharge to home or other facility with appropriate resources  2/25/2025 0841 by Demi Reyna RN  Outcome: Progressing  2/25/2025 0130 by Trisha Luong RN  Outcome: Progressing     Problem: Safety - Adult  Goal: Free from fall injury  2/25/2025 0841 by Demi Reyna RN  Outcome: Progressing  2/25/2025 0130 by Trisha Luong RN  Outcome: Progressing

## 2025-02-25 NOTE — H&P
HOSPITALIST ADMISSION H&P    REASON FOR ADMISSION:  Acute on chronic CHF  ESTIMATED LENGTH OF STAY:>2 midnights, 3 days    ATTENDING/ADMITTING PHYSICIAN: SARAH Tompkins MD    HISTORY OF PRESENT ILLNESS:      The patient is a 79 y.o. male patient of Bonifacio Drake DO who presents from the ER with c/o cough, worsening dyspnea on exertion, 2 pound weight gain (in two days), and increased bilateral leg edema over the past 2 days. His home health nurse also reported worsening hypoxia, especially with minimal activity. He typically wears 2-3 L nasal cannula oxygen at home at baseline, but yesterday had to increase it to 4L because his oxygen saturation was in the 80's. Today, his oxygen saturation was at 88% at room air at rest, so he was sent to ER for evaluation. Per EMR review it appears the patient missed his home lasix for the past 4 days, so cardiology had increased his lasix dose.    In ER, chest xray was negative for acute process. Afebrile, WBC 8.7. ProBNP 841. Flu and covid negative. EKG Narrative: Sinus rhythm with 1st degree A-V block with frequent Premature ventricular complexes, Left axis deviation,   Left bundle branch block, When compared with ECG of 25-DEC-2024 22:39, Premature ventricular complexes are now Present, QRS duration has increased, T wave inversion now evident in Inferior leads. In ER, per report from Dr. De Jesus, he was ambulated on 4L nasal cannula and his oxygen saturation dropped to 85%.     Last echo was limited in April 2024 showed EF 25-30%; previously December of 2023 echo showed EF 20-25% -- he had an AICD placed in August 2024.  CKD stage 2 -- stable  Orthostatic hypotension  COPD    Wounds and LDAs present prior to admission: bilateral groin folds with yeast dermatitis     See below for additional PMH.    Patient yhlj-qiludtjnxz-[id number]-available records reviewed, including, but not limited to ER records, imaging results, lab results, office records, personal records, and [place]AR[place]

## 2025-02-25 NOTE — CARE COORDINATION
DISCHARGE BARRIERS       Reason for Referral:  SW completed a Psychosocial Assessment for evaluation of patient's mental health, social status, and functional capacity within the community. Sobeida Leggett is a 79 y.o. male admitted due to CHF (congestive heart failure), NYHA class I, acute on chronic, combined (HCC). Patient alone. SW provided supportive listening while patient discussed past medical history and events leading up to hospitalization.       Mental Status:  Alert, oriented, and engaging during assessment.     Decision Making:  Makes own decisions.     Family/Social/Home Environment: lives with their spouse    Employment status: Retired     Health Insurance:     Active Insurance as of 2/24/2025       Primary Coverage       Payor Plan Insurance Group Employer/Plan Group    V[employer] OPTUM V[employer]UM        Payor Plan Address Payor Plan Phone Number Payor Plan Fax Number Effective Dates    [address]  [phone] 4/28/2010 - None Entered    [facility] [id number]         Subscriber Name Subscriber Birth Date Member ID       SOBEIDA LEGGETT 1945 [id number]                     Support: Discussed a good social support network     Current Services:  [facility] [place]        Current DMEs: Walker, Shower chair, Grab bars, CPAP, and home 02 through [facility]     PCP: Bonifacio Drake, DO and repots no issues affording medication.      status:   [employer]      ADLs and means of transportation: independent  in ADLs prior to hospitalization and unable to transport self. Patient states his spouse assists with transportation.     Food insecurity or needed financial assistance: Denies any food insecurity or financial concerns at this time.    Income Source: social security    ACP and Code Status:  EDIE discussed an Advance Directive which included the patient's choices for care and treatment in the case of a health event that adversely affects decision-making abilities. EDIE

## 2025-02-25 NOTE — PROGRESS NOTES
[facility]. Shortness of breath [R06.02]  Hypoxia [R09.02]  Acute on chronic combined systolic and diastolic congestive heart failure (HCC) [I50.43]  CHF (congestive heart failure), NYHA class I, acute on chronic, combined (HCC) [I50.43]  Other form of dyspnea [R06.09] .   Vitals:    02/24/25 2258   BP: 122/71   Pulse: 78   Resp:    Temp:    SpO2:    . Patients home meds are   Prior to Admission medications    Medication Sig Start Date End Date Taking? Authorizing Provider   furosemide (LASIX) 40 MG tablet Take 1 tablet by mouth every morning 1/10/25  Yes Rob Evans DO   midodrine (PROAMATINE) 5 MG tablet TAKE 1 TABLET BY MOUTH THREE TIMES DAILY 11/11/24  Yes Bonifacio Drake DO   buPROPion (WELLBUTRIN) 75 MG tablet Take 1 tablet by mouth twice daily 11/5/24  Yes Bonifacio Drake DO   albuterol sulfate HFA (PROVENTIL;VENTOLIN;PROAIR) 108 (90 Base) MCG/ACT inhaler Inhale into the lungs 10/24/24  Yes ProviderDaksha MD   aspirin 81 MG EC tablet Take 1 tablet by mouth daily 10/28/24  Yes Rob Evans DO   levothyroxine (SYNTHROID) 100 MCG tablet Take 1 tablet by mouth once daily 10/7/24  Yes Bonifacio Drake DO   oxyBUTYnin (DITROPAN-XL) 5 MG extended release tablet Take 1 tablet by mouth once daily 9/25/24  Yes Wyatt Marley MD   atorvastatin (LIPITOR) 40 MG tablet Take 1 tablet by mouth once daily 9/18/24  Yes Bonifacio Drake DO   potassium chloride (KLOR-CON) 10 MEQ extended release tablet Take 1 tablet by mouth daily 9/11/24  Yes Bonifacio Drake DO   sertraline (ZOLOFT) 100 MG tablet Take 1 tablet by mouth daily 9/11/24  Yes Bonifacio Drake DO   allopurinol (ZYLOPRIM) 300 MG tablet Take 1 tablet by mouth once daily 9/6/24  Yes Bonifacio Drake DO   traZODone (DESYREL) 50 MG tablet Take 1 tablet by mouth nightly 8/27/24  Yes Bonifacio Drake DO   alfuzosin (UROXATRAL) 10 MG extended release tablet Take 1 tablet by mouth

## 2025-02-25 NOTE — ED NOTES
ED Report    Presents to ED from: Home    Chief Complaint   Patient presents with    Cough     moist    Shortness of Breath     1. Hypoxia    2. Other form of dyspnea      Present Condition: Pt alert and oriented, resting comfortably on ED cart with oxygen at 4lpm per NC  Pertinent Event(s): Pt to ED with reports of worsening QUILES. Pt wears oxygen at 4lpm per NC at home normally and was having SpO2 readings in the 80s at home per the home care nurse.    LOC:  A+O x 4  Code Status: DNR-CCA    Vital Signs   Vitals:    02/24/25 1300   BP: (!) 126/57   Pulse: 77   Resp: 26   Temp: 98.8 °F (37.1 °C)   TempSrc: Tympanic   SpO2: 94%     Oxygen Baseline: Nasal Cannula at 4L/min       Current Oxygen Needs: Nasal Cannula at 4L/min  Mobility: x1 Assist       Mobility Aide: Walker    LDAs:   Wound 12/26/24 Toe (Comment  which one) Right black area at top of toe, not open or draining (Active)     Abnormal ED Labs:    Labs Reviewed   BASIC METABOLIC PANEL - Abnormal; Notable for the following components:       Result Value    Glucose 101 (*)     All other components within normal limits   CBC WITH AUTO DIFFERENTIAL - Abnormal; Notable for the following components:    RDW 14.8 (*)     Immature Granulocytes % 1 (*)     All other components within normal limits   TROPONIN - Abnormal; Notable for the following components:    Troponin, High Sensitivity 36 (*)     All other components within normal limits   TROPONIN - Abnormal; Notable for the following components:    Troponin, High Sensitivity 35 (*)     All other components within normal limits   BRAIN NATRIURETIC PEPTIDE - Abnormal; Notable for the following components:    NT Pro- (*)     All other components within normal limits   RAPID INFLUENZA A/B ANTIGENS   COVID-19, RAPID   APTT   PROTIME-INR   MAGNESIUM     Imaging:    XR CHEST (2 VW)   Final Result   No acute process.      Stable cardiomegaly             Medication Reconciliation Completed: Yes      Consults:    [x]

## 2025-02-26 VITALS
HEART RATE: 74 BPM | DIASTOLIC BLOOD PRESSURE: 77 MMHG | SYSTOLIC BLOOD PRESSURE: 114 MMHG | RESPIRATION RATE: 20 BRPM | OXYGEN SATURATION: 92 % | TEMPERATURE: 97.8 F | WEIGHT: 278.1 LBS | HEIGHT: 74 IN | BODY MASS INDEX: 35.69 KG/M2

## 2025-02-26 PROBLEM — I50.43 CHF (CONGESTIVE HEART FAILURE), NYHA CLASS I, ACUTE ON CHRONIC, COMBINED (HCC): Status: RESOLVED | Noted: 2025-02-24 | Resolved: 2025-02-26

## 2025-02-26 LAB
ANION GAP SERPL CALCULATED.3IONS-SCNC: 11 MMOL/L (ref 9–17)
BASOPHILS # BLD: 0.03 K/UL (ref 0–0.2)
BASOPHILS NFR BLD: 0 % (ref 0–2)
BUN SERPL-MCNC: 21 MG/DL (ref 8–23)
BUN/CREAT SERPL: 18 (ref 9–20)
CALCIUM SERPL-MCNC: 9.2 MG/DL (ref 8.6–10.4)
CHLORIDE SERPL-SCNC: 102 MMOL/L (ref 98–107)
CO2 SERPL-SCNC: 28 MMOL/L (ref 20–31)
CREAT SERPL-MCNC: 1.2 MG/DL (ref 0.7–1.2)
EKG ATRIAL RATE: 68 BPM
EKG P AXIS: 28 DEGREES
EKG P-R INTERVAL: 310 MS
EKG Q-T INTERVAL: 454 MS
EKG QRS DURATION: 182 MS
EKG QTC CALCULATION (BAZETT): 482 MS
EKG R AXIS: -40 DEGREES
EKG T AXIS: -90 DEGREES
EKG VENTRICULAR RATE: 68 BPM
EOSINOPHIL # BLD: 0.11 K/UL (ref 0–0.44)
EOSINOPHILS RELATIVE PERCENT: 1 % (ref 1–4)
ERYTHROCYTE [DISTWIDTH] IN BLOOD BY AUTOMATED COUNT: 14.8 % (ref 11.8–14.4)
GFR, ESTIMATED: 61 ML/MIN/1.73M2
GLUCOSE SERPL-MCNC: 101 MG/DL (ref 70–99)
HCT VFR BLD AUTO: 45.3 % (ref 40.7–50.3)
HGB BLD-MCNC: 14.3 G/DL (ref 13–17)
IMM GRANULOCYTES # BLD AUTO: 0.06 K/UL (ref 0–0.3)
IMM GRANULOCYTES NFR BLD: 1 %
LYMPHOCYTES NFR BLD: 1.9 K/UL (ref 1.1–3.7)
LYMPHOCYTES RELATIVE PERCENT: 23 % (ref 24–43)
MAGNESIUM SERPL-MCNC: 2 MG/DL (ref 1.6–2.6)
MCH RBC QN AUTO: 31.1 PG (ref 25.2–33.5)
MCHC RBC AUTO-ENTMCNC: 31.6 G/DL (ref 25.2–33.5)
MCV RBC AUTO: 98.5 FL (ref 82.6–102.9)
MONOCYTES NFR BLD: 1.05 K/UL (ref 0.1–1.2)
MONOCYTES NFR BLD: 13 % (ref 3–12)
NEUTROPHILS NFR BLD: 62 % (ref 36–65)
NEUTS SEG NFR BLD: 5.12 K/UL (ref 1.5–8.1)
NRBC BLD-RTO: 0 PER 100 WBC
PLATELET # BLD AUTO: 159 K/UL (ref 138–453)
PMV BLD AUTO: 9.8 FL (ref 8.1–13.5)
POTASSIUM SERPL-SCNC: 4 MMOL/L (ref 3.7–5.3)
RBC # BLD AUTO: 4.6 M/UL (ref 4.21–5.77)
RBC # BLD: ABNORMAL 10*6/UL
SODIUM SERPL-SCNC: 141 MMOL/L (ref 135–144)
WBC OTHER # BLD: 8.3 K/UL (ref 3.5–11.3)

## 2025-02-26 PROCEDURE — 6370000000 HC RX 637 (ALT 250 FOR IP): Performed by: NURSE PRACTITIONER

## 2025-02-26 PROCEDURE — 2500000003 HC RX 250 WO HCPCS: Performed by: NURSE PRACTITIONER

## 2025-02-26 PROCEDURE — 6360000002 HC RX W HCPCS: Performed by: NURSE PRACTITIONER

## 2025-02-26 PROCEDURE — 36415 COLL VENOUS BLD VENIPUNCTURE: CPT

## 2025-02-26 PROCEDURE — 85025 COMPLETE CBC W/AUTO DIFF WBC: CPT

## 2025-02-26 PROCEDURE — 99238 HOSP IP/OBS DSCHRG MGMT 30/<: CPT | Performed by: INTERNAL MEDICINE

## 2025-02-26 PROCEDURE — 6360000002 HC RX W HCPCS: Performed by: INTERNAL MEDICINE

## 2025-02-26 PROCEDURE — 83735 ASSAY OF MAGNESIUM: CPT

## 2025-02-26 PROCEDURE — 80048 BASIC METABOLIC PNL TOTAL CA: CPT

## 2025-02-26 RX ORDER — ALLOPURINOL 300 MG/1
150 TABLET ORAL DAILY
Qty: 90 TABLET | Refills: 0
Start: 2025-02-26

## 2025-02-26 RX ORDER — FUROSEMIDE 40 MG/1
40 TABLET ORAL 2 TIMES DAILY
Qty: 33 TABLET | Refills: 0
Start: 2025-02-26 | End: 2025-03-01

## 2025-02-26 RX ADMIN — LEVOTHYROXINE SODIUM 100 MCG: 0.1 TABLET ORAL at 08:44

## 2025-02-26 RX ADMIN — SODIUM CHLORIDE, PRESERVATIVE FREE 10 ML: 5 INJECTION INTRAVENOUS at 08:40

## 2025-02-26 RX ADMIN — ASPIRIN 81 MG: 81 TABLET, COATED ORAL at 08:43

## 2025-02-26 RX ADMIN — BUPROPION HYDROCHLORIDE 75 MG: 75 TABLET, FILM COATED ORAL at 08:44

## 2025-02-26 RX ADMIN — ALLOPURINOL 150 MG: 300 TABLET ORAL at 08:44

## 2025-02-26 RX ADMIN — ENOXAPARIN SODIUM 30 MG: 100 INJECTION SUBCUTANEOUS at 08:41

## 2025-02-26 RX ADMIN — MICONAZOLE NITRATE: 20 POWDER TOPICAL at 08:48

## 2025-02-26 RX ADMIN — TAMSULOSIN HYDROCHLORIDE 0.4 MG: 0.4 CAPSULE ORAL at 08:44

## 2025-02-26 RX ADMIN — FINASTERIDE 5 MG: 5 TABLET, FILM COATED ORAL at 08:44

## 2025-02-26 RX ADMIN — EMPAGLIFLOZIN 10 MG: 10 TABLET, FILM COATED ORAL at 08:44

## 2025-02-26 RX ADMIN — OXYBUTYNIN CHLORIDE 5 MG: 5 TABLET, EXTENDED RELEASE ORAL at 08:44

## 2025-02-26 RX ADMIN — ATORVASTATIN CALCIUM 40 MG: 40 TABLET, FILM COATED ORAL at 08:44

## 2025-02-26 RX ADMIN — CHOLECALCIFEROL TAB 25 MCG (1000 UNIT) 2000 UNITS: 25 TAB at 08:43

## 2025-02-26 RX ADMIN — FUROSEMIDE 40 MG: 10 INJECTION, SOLUTION INTRAMUSCULAR; INTRAVENOUS at 08:40

## 2025-02-26 RX ADMIN — MIDODRINE HYDROCHLORIDE 5 MG: 5 TABLET ORAL at 14:59

## 2025-02-26 RX ADMIN — POTASSIUM CHLORIDE 10 MEQ: 750 TABLET, EXTENDED RELEASE ORAL at 08:43

## 2025-02-26 RX ADMIN — MIDODRINE HYDROCHLORIDE 5 MG: 5 TABLET ORAL at 08:44

## 2025-02-26 NOTE — PLAN OF CARE
Problem: ABCDS Injury Assessment  Goal: Absence of physical injury  2/26/2025 0903 by Demi Reyna RN  Outcome: Progressing  2/25/2025 2024 by Martita Thomas RN  Outcome: Progressing     Problem: Chronic Conditions and Co-morbidities  Goal: Patient's chronic conditions and co-morbidity symptoms are monitored and maintained or improved  2/26/2025 0903 by Demi Reyna RN  Outcome: Progressing  2/25/2025 2024 by Martita Thomas RN  Outcome: Progressing  Flowsheets (Taken 2/25/2025 2000)  Care Plan - Patient's Chronic Conditions and Co-Morbidity Symptoms are Monitored and Maintained or Improved:   Monitor and assess patient's chronic conditions and comorbid symptoms for stability, deterioration, or improvement   Collaborate with multidisciplinary team to address chronic and comorbid conditions and prevent exacerbation or deterioration   Update acute care plan with appropriate goals if chronic or comorbid symptoms are exacerbated and prevent overall improvement and discharge     Problem: Discharge Planning  Goal: Discharge to home or other facility with appropriate resources  2/26/2025 0903 by Demi Reyna RN  Outcome: Progressing  2/25/2025 2024 by Martita Thomas RN  Outcome: Progressing  Flowsheets (Taken 2/25/2025 2000)  Discharge to home or other facility with appropriate resources:   Identify barriers to discharge with patient and caregiver   Refer to discharge planning if patient needs post-hospital services based on physician order or complex needs related to functional status, cognitive ability or social support system     Problem: Safety - Adult  Goal: Free from fall injury  2/26/2025 0903 by Demi Reyna RN  Outcome: Progressing  2/25/2025 2024 by Martita Thomas RN  Outcome: Progressing

## 2025-02-26 NOTE — DISCHARGE INSTR - DIET
Good nutrition is important when healing from an illness, injury, or surgery.  Follow any nutrition recommendations given to you during your hospital stay.   If you were given an oral nutrition supplement while in the hospital, continue to take this supplement at home.  You can take it with meals, in-between meals, and/or before bedtime. These supplements can be purchased at most local grocery stores, pharmacies, and chain [employer]-stores.   If you have any questions about your diet or nutrition, call the hospital and ask for the dietitian.  Regular Cardiac, Diabetic Diet. 1800ml Fluid Restriction

## 2025-02-26 NOTE — PROGRESS NOTES
Hospitalist Progress Note    Patient:  Noah Jauregui     YOB: 1945    MRN: [id number]   Admit date: 2/24/2025     Acct: [id number]     PCP: Bonifacio Drake,     CC--Interval History: CHF----acute-on-chronic combined systolic--diastolic---markedly reduced LVSF---LVEF ~ 32%---see below---down ~ 4#    Home----2.26.2025    COPD emphysema----on supplemental oxygen---4 liters NC --> 3 liters NC---typically 3 liters NC    ASCVD--ischemic cardiomyopathy    CKD---Stage 2 stable     See note below    All other ROS negative except noted in HPI    Diet:  ADULT DIET; Regular; 4 carb choices (60 gm/meal); Low Fat/Low Chol/High Fiber/2 gm Na; 1800 ml    Medications:  Scheduled Meds:   enoxaparin  30 mg SubCUTAneous BID    sodium chloride flush  5-40 mL IntraVENous 2 times per day    furosemide  40 mg IntraVENous BID    tamsulosin  0.4 mg Oral Daily    allopurinol  150 mg Oral Daily    aspirin  81 mg Oral Daily    atorvastatin  40 mg Oral Daily    buPROPion  75 mg Oral BID    Vitamin D  2,000 Units Oral Daily    finasteride  5 mg Oral Daily    empagliflozin  10 mg Oral Daily    levothyroxine  100 mcg Oral Daily    lidocaine  1 patch Topical Daily    metoprolol succinate  25 mg Oral Nightly    midodrine  5 mg Oral TID    oxyBUTYnin  5 mg Oral Daily    potassium chloride  10 mEq Oral Daily    sertraline  100 mg Oral Nightly    traZODone  50 mg Oral Nightly    miconazole   Topical BID     Continuous Infusions:   sodium chloride       PRN Meds:sodium chloride flush, sodium chloride, ondansetron **OR** ondansetron, polyethylene glycol, acetaminophen **OR** acetaminophen, albuterol, sodium chloride nebulizer, albuterol    Objective:  Labs:  CBC with Differential:    Lab Results   Component Value Date/Time    WBC 8.3 02/26/2025 05:39 AM    RBC 4.60 02/26/2025 05:39 AM    HGB 14.3 02/26/2025 05:39 AM    HCT 45.3 02/26/2025 05:39 AM     02/26/2025 05:39 AM    MCV 98.5 02/26/2025 05:39 AM    MCH 31.1 02/26/2025

## 2025-02-26 NOTE — PLAN OF CARE
Problem: ABCDS Injury Assessment  Goal: Absence of physical injury  2/25/2025 2024 by Martita Thomas RN  Outcome: Progressing  2/25/2025 0841 by Demi Reyna RN  Outcome: Progressing     Problem: Chronic Conditions and Co-morbidities  Goal: Patient's chronic conditions and co-morbidity symptoms are monitored and maintained or improved  2/25/2025 2024 by Martita Thomas RN  Outcome: Progressing  Flowsheets (Taken 2/25/2025 2000)  Care Plan - Patient's Chronic Conditions and Co-Morbidity Symptoms are Monitored and Maintained or Improved:   Monitor and assess patient's chronic conditions and comorbid symptoms for stability, deterioration, or improvement   Collaborate with multidisciplinary team to address chronic and comorbid conditions and prevent exacerbation or deterioration   Update acute care plan with appropriate goals if chronic or comorbid symptoms are exacerbated and prevent overall improvement and discharge  2/25/2025 0841 by Demi Reyna RN  Outcome: Progressing     Problem: Discharge Planning  Goal: Discharge to home or other facility with appropriate resources  2/25/2025 2024 by Martita Thomas RN  Outcome: Progressing  Flowsheets (Taken 2/25/2025 2000)  Discharge to home or other facility with appropriate resources:   Identify barriers to discharge with patient and caregiver   Refer to discharge planning if patient needs post-hospital services based on physician order or complex needs related to functional status, cognitive ability or social support system  2/25/2025 0841 by Demi Reyna, RN  Outcome: Progressing     Problem: Safety - Adult  Goal: Free from fall injury  2/25/2025 2024 by Martita Thomas RN  Outcome: Progressing  2/25/2025 0841 by Demi Reyna RN  Outcome: Progressing

## 2025-02-26 NOTE — CARE COORDINATION
02/26/25 1133   IMM Letter   IMM Letter given to Patient/Family/Significant other/Guardian/POA/by: Second IMM given to pt by NAVYA Galo RN   IMM Letter date given: 02/26/25   IMM Letter time given: 1133     IMM letter provided to patient.  Patient offered four hours to make informed decision regarding appeal process; patient agreeable to discharge.

## 2025-02-27 ENCOUNTER — TELEPHONE (OUTPATIENT)
Dept: FAMILY MEDICINE CLINIC | Age: 80
End: 2025-02-27

## 2025-02-27 NOTE — DISCHARGE SUMMARY
[address] [place] [address]                            DISCHARGE SUMMARY      PATIENT NAME: SOBEIDA LEGGETT             : 1945  MED REC NO: [id number]                         ROOM: 0215  ACCOUNT NO: [id number]                       ADMIT DATE: 2025  PROVIDER: Jesus lAberto Tompkins MD      PRIMARY CARE PHYSICIAN:  Arelis Martinez MD    The patient was seen by [facility], outpatient.  [facility], [facility].    DIAGNOSES:    1. Congestive heart failure, acute on chronic combined systolic diastolic 2025.   2. 2D echo 2025; LA, dilated LVH, reduced left ventricular systolic function.  Global hypokinesis.  Normal right ventricular systolic function.  JENNY.  No stenosis.  Mild MR, Trace TR.  Dilated AR 4.0 cm dilated. AA.  IVC not well visualized.  RVSP 33 mmHg grade 1. Mild diastolic dysfunction, LVEF 32%.  3. Chest x-ray 2024; no acute changes.  ProBNP 2025, of 841. EKG 2025; sinus rhythm, rate 68, first-degree AV block, PVCs, LAD, left bundle-branch block and T-wave inversion.  Troponin 36, 35, 57, 35.   4. Chronic obstructive pulmonary disease, emphysema.  5. Coronary artery disease.  Cardiac catheterization 2023. CABG 10/18/2014, SVG, OM1. SVG, OM2. SVG, PDA.  Cardiac catheterization .  6. Ischemic cardiomyopathy.  2D echo 2021; wall motion abnormalities present.  Normal left ventricular ejection fraction 40%.  7. Orthostatic hypotension, chronic pulmonary embolus.   8. Bilateral lower extremity deep venous thrombosis.  9. Chronic kidney disease stage 2.  10. Peripheral vascular disease, bilateral carotid stenosis.  11. Hypertension.  12. Hyperlipidemia.  13. Hypothyroidism.    14. Left bundle-branch block.  Left lower extremity venous stasis edema.  15. Gait balance instability, falls.  16. Insomnia.   17. Tobacco abuse, quit in .  18.

## 2025-02-27 NOTE — TELEPHONE ENCOUNTER
Pt was discharged on 02/26/25 from [facility]      DX   Acute on chronic combined systolic and diastolic congestive heart failure

## 2025-02-27 NOTE — TELEPHONE ENCOUNTER
Care Transitions Initial Follow Up Call    Outreach made within 2 business days of discharge: Yes    Patient: Noah Jauregui Patient : 1945   MRN: [id number]  Reason for Admission: Acute on chronic combined systolic and diastolic congestive heart failure   Discharge Date: 25       Spoke with: patient's wife    Discharge department/facility: [facility] Interactive Patient Contact:  Was patient able to fill all prescriptions: Yes  Was patient instructed to bring all medications to the follow-up visit: Yes  Is patient taking all medications as directed in the discharge summary? Yes  Does patient understand their discharge instructions: yes  Does patient have questions or concerns that need addressed prior to 7-14 day follow up office visit: no    Additional needs identified to be addressed with provider  No needs identified             Scheduled appointment with PCP within 7-14 days    Follow Up  Future Appointments   Date Time Provider Department Center   3/3/2025 11:00 AM Rob Evans DO [facility]   3/3[facility]2025  2:00 PM [facility]   3/5/2025 10:15 AM SCHEDULE, AFL TCC AYERS CARELINK AFL TCC TOLE AFL AYERS C   3/12/2025  1:20 PM Bonifacio Drake DO DFAM BS ECC DEP   2025  2:00 PM Reji Camejo DPM DPOD [facility]   2025 11:15 AM Alex Cook PA-C DURO [facility]   2025 12:45 PM SCHEDULE, PACEMAKER [facility] CARDIOLOGY DCARDIO [facility]   2025  1:00 PM Gregory Galvan MD DCARDIO [facility]   2025  1:30 PM Montana Cárdenas MD DPULM [facility]       Genesis Covington LPN

## 2025-03-03 ENCOUNTER — OFFICE VISIT (OUTPATIENT)
Dept: CARDIOLOGY | Age: 80
End: 2025-03-03
Payer: MEDICARE

## 2025-03-03 ENCOUNTER — HOSPITAL ENCOUNTER (OUTPATIENT)
Age: 80
Discharge: HOME | End: 2025-03-03
Payer: MEDICARE

## 2025-03-03 ENCOUNTER — OFFICE VISIT (OUTPATIENT)
Dept: FAMILY MEDICINE CLINIC | Age: 80
End: 2025-03-03

## 2025-03-03 VITALS
SYSTOLIC BLOOD PRESSURE: 122 MMHG | HEIGHT: 74 IN | HEART RATE: 80 BPM | OXYGEN SATURATION: 92 % | BODY MASS INDEX: 37.09 KG/M2 | DIASTOLIC BLOOD PRESSURE: 62 MMHG | WEIGHT: 289 LBS

## 2025-03-03 VITALS
HEART RATE: 79 BPM | DIASTOLIC BLOOD PRESSURE: 60 MMHG | RESPIRATION RATE: 20 BRPM | WEIGHT: 289 LBS | SYSTOLIC BLOOD PRESSURE: 118 MMHG | OXYGEN SATURATION: 96 % | BODY MASS INDEX: 37.09 KG/M2 | HEIGHT: 74 IN

## 2025-03-03 DIAGNOSIS — I10 ESSENTIAL HYPERTENSION: ICD-10-CM

## 2025-03-03 DIAGNOSIS — Z95.810 ICD (IMPLANTABLE CARDIOVERTER-DEFIBRILLATOR) IN PLACE: ICD-10-CM

## 2025-03-03 DIAGNOSIS — I50.43 CHF (CONGESTIVE HEART FAILURE), NYHA CLASS I, ACUTE ON CHRONIC, COMBINED (HCC): ICD-10-CM

## 2025-03-03 DIAGNOSIS — I50.42 CHRONIC COMBINED SYSTOLIC AND DIASTOLIC CHF (CONGESTIVE HEART FAILURE) (HCC): ICD-10-CM

## 2025-03-03 DIAGNOSIS — F32.A ANXIETY AND DEPRESSION: ICD-10-CM

## 2025-03-03 DIAGNOSIS — E78.2 MIXED HYPERLIPIDEMIA: ICD-10-CM

## 2025-03-03 DIAGNOSIS — I44.7 LEFT BUNDLE BRANCH BLOCK (LBBB): ICD-10-CM

## 2025-03-03 DIAGNOSIS — Z95.810 AICD (AUTOMATIC CARDIOVERTER/DEFIBRILLATOR) PRESENT: ICD-10-CM

## 2025-03-03 DIAGNOSIS — F41.9 ANXIETY AND DEPRESSION: ICD-10-CM

## 2025-03-03 DIAGNOSIS — I25.10 CORONARY ARTERY DISEASE INVOLVING NATIVE CORONARY ARTERY OF NATIVE HEART WITHOUT ANGINA PECTORIS: ICD-10-CM

## 2025-03-03 DIAGNOSIS — Z09 HOSPITAL DISCHARGE FOLLOW-UP: Primary | ICD-10-CM

## 2025-03-03 DIAGNOSIS — I50.43 ACUTE ON CHRONIC COMBINED SYSTOLIC AND DIASTOLIC CONGESTIVE HEART FAILURE (HCC): ICD-10-CM

## 2025-03-03 DIAGNOSIS — Z95.1 S/P CABG (CORONARY ARTERY BYPASS GRAFT): ICD-10-CM

## 2025-03-03 DIAGNOSIS — I73.9 PAD (PERIPHERAL ARTERY DISEASE): Primary | ICD-10-CM

## 2025-03-03 DIAGNOSIS — I25.5 ISCHEMIC CARDIOMYOPATHY: ICD-10-CM

## 2025-03-03 LAB
ANION GAP SERPL CALCULATED.3IONS-SCNC: 12 MMOL/L (ref 9–17)
BUN SERPL-MCNC: 25 MG/DL (ref 8–23)
BUN/CREAT SERPL: 19 (ref 9–20)
CALCIUM SERPL-MCNC: 9.8 MG/DL (ref 8.6–10.4)
CHLORIDE SERPL-SCNC: 100 MMOL/L (ref 98–107)
CO2 SERPL-SCNC: 29 MMOL/L (ref 20–31)
CREAT SERPL-MCNC: 1.3 MG/DL (ref 0.7–1.2)
GFR, ESTIMATED: 56 ML/MIN/1.73M2
GLUCOSE SERPL-MCNC: 94 MG/DL (ref 70–99)
POTASSIUM SERPL-SCNC: 4.9 MMOL/L (ref 3.7–5.3)
SODIUM SERPL-SCNC: 141 MMOL/L (ref 135–144)

## 2025-03-03 PROCEDURE — 1036F TOBACCO NON-USER: CPT | Performed by: INTERNAL MEDICINE

## 2025-03-03 PROCEDURE — 1123F ACP DISCUSS/DSCN MKR DOCD: CPT | Performed by: INTERNAL MEDICINE

## 2025-03-03 PROCEDURE — 99214 OFFICE O/P EST MOD 30 MIN: CPT | Performed by: INTERNAL MEDICINE

## 2025-03-03 PROCEDURE — 3074F SYST BP LT 130 MM HG: CPT | Performed by: INTERNAL MEDICINE

## 2025-03-03 PROCEDURE — G8417 CALC BMI ABV UP PARAM F/U: HCPCS | Performed by: INTERNAL MEDICINE

## 2025-03-03 PROCEDURE — 1159F MED LIST DOCD IN RCRD: CPT | Performed by: INTERNAL MEDICINE

## 2025-03-03 PROCEDURE — 93010 ELECTROCARDIOGRAM REPORT: CPT | Performed by: INTERNAL MEDICINE

## 2025-03-03 PROCEDURE — 80048 BASIC METABOLIC PNL TOTAL CA: CPT

## 2025-03-03 PROCEDURE — 36415 COLL VENOUS BLD VENIPUNCTURE: CPT

## 2025-03-03 PROCEDURE — 1111F DSCHRG MED/CURRENT MED MERGE: CPT | Performed by: INTERNAL MEDICINE

## 2025-03-03 PROCEDURE — 3078F DIAST BP <80 MM HG: CPT | Performed by: INTERNAL MEDICINE

## 2025-03-03 PROCEDURE — 93005 ELECTROCARDIOGRAM TRACING: CPT | Performed by: INTERNAL MEDICINE

## 2025-03-03 PROCEDURE — G8427 DOCREV CUR MEDS BY ELIG CLIN: HCPCS | Performed by: INTERNAL MEDICINE

## 2025-03-03 RX ORDER — FUROSEMIDE 40 MG/1
40 TABLET ORAL 2 TIMES DAILY
Qty: 180 TABLET | Refills: 3 | Status: SHIPPED | OUTPATIENT
Start: 2025-03-03

## 2025-03-03 NOTE — PROGRESS NOTES
[facility] Follow Up    Today's Date: 3/3/2025  Patient's Name: Noah Jauregui  Patient's age: 79 y.o., 1945    CC: For follow-up posthospital for heart failure.    HPI:  Is here for follow-up posthospitalization for heart failure.  Was recently admitted for acute on chronic systolic heart failure.  While in the hospital was on Lasix 40 IV twice daily.  He was then discharged on Lasix 40 p.o. twice daily for 3 days then 40 daily.  Since being on the 40 daily he has noticed some edema coming back, as well as some weight gain.  He is on chronic O2.  Uses a walker with ambulation.  Denies any chest pains, orthopnea, PND, palpitations.  His AICD site is now completely healed without any evidence of hematoma    Past Medical History:   has a past medical history of Acute respiratory failure with hypoxia ([facility]), Adenomatous polyp, CAD (coronary artery disease), CHF (congestive heart failure) ([facility]), Cholecystitis, Chronic kidney disease, Chronic venous insufficiency, COPD with acute exacerbation ([facility]), DVT (deep venous thrombosis) ([facility]), Edema, Gout, Hyperlipidemia, Hypertension, Onychomycosis, Osteoarthritis, Plantar fasciitis, Pneumonia due to COVID-19 virus, SIRS (systemic inflammatory response syndrome) ([facility]), and Tobacco abuse.    Past Surgical History:   has a past surgical history that includes Colonoscopy (12/12/2012); Vasectomy (1980); Colonoscopy (2003); other surgical history; Colonoscopy (08/2009); Colonoscopy (06/2008); Coronary artery bypass graft (10/28/2014); Total knee arthroplasty (Right, 10/2015); Total knee arthroplasty (Left, 03/2018); TURP; Cataract removal (Right, 06/07/2023); Cholecystectomy (03/19/2019); Cardiac catheterization (N/A, 12/22/2023); Cardiac procedure (N/A, 12/22/2023); Cardiac defibrillator placement (08/05/2024); and ep device procedure (N/A, 8/5/2024).    Home Medications:  Prior to Admission medications    Medication Sig Start Date End Date Taking?

## 2025-03-03 NOTE — PROGRESS NOTES
Post-Discharge Transitional Care Follow Up      Noah Jauregui   YOB: 1945    Date of Office Visit:  3/3/2025  Date of Hospital Admission: 2/24/25  Date of Hospital Discharge: 2/26/25  Readmission Risk Score (high >=14%. Medium >=10%):Readmission Risk Score: 17.4      Care management risk score Rising risk (score 2-5) and Complex Care (Scores >=6): No Risk Score On File     Non face to face  following discharge, date last encounter closed (first attempt may have been earlier): 02/27/2025     Call initiated 2 business days of discharge: Yes     Hospital discharge follow-up  -     DC DISCHARGE MEDS RECONCILED W/ CURRENT OUTPATIENT MED LIST  Acute on chronic combined systolic and diastolic congestive heart failure (HCC)  Anxiety and depression    Hospital discharge follow-up for acute on chronic congestive heart failure with hypoxia.  Improved at this time.  Continue increased dose of Lasix at this time.  Follow-up if symptoms worsen or fail to improve.    Medical Decision Making: moderate complexity  No follow-ups on file.           Subjective:   HPI   An 79-year-old white male, patient of Arelis Martinez MD, [facility], [facility].       The patient presented with increasing shortness of breath, found to have acute on chronic combined systolic diastolic congestive heart failure.  The patient received IV diuretics together with respiratory therapy.  The patient responded to this regimen.  The patient able to be discharged to home on 02/26/2025.       The patient has underlying emphysema, COPD.       coronary artery disease together with ischemic cardiomyopathy.       Prior history of orthostatic hypotension, fairly well maintained during this hospitalization.       The patient has hypertension, blood pressure 126/68.       Chronic kidney disease remained stable at stage 2 level.     Bilateral lower extremity venous

## 2025-03-10 DIAGNOSIS — M54.50 CHRONIC BILATERAL LOW BACK PAIN WITHOUT SCIATICA: ICD-10-CM

## 2025-03-10 DIAGNOSIS — G89.29 CHRONIC BILATERAL LOW BACK PAIN WITHOUT SCIATICA: ICD-10-CM

## 2025-03-10 RX ORDER — LIDOCAINE 50 MG/G
1 PATCH TOPICAL DAILY
Qty: 30 PATCH | Refills: 0 | Status: SHIPPED | OUTPATIENT
Start: 2025-03-10 | End: 2025-04-09

## 2025-03-10 NOTE — TELEPHONE ENCOUNTER
Karina with [employer] calling as this script went to [facility] and it needs to go to [facility]

## 2025-03-10 NOTE — TELEPHONE ENCOUNTER
Noah called requesting a refill of the below medication which has been pended for you:     Requested Prescriptions     Pending Prescriptions Disp Refills    lidocaine (LIDODERM) 5 % 30 patch 0     Sig: Place 1 patch onto the skin daily 12 hours on, 12 hours off.       Last Appointment Date: 3/3/2025  Next Appointment Date: Visit date not found    No Known Allergies

## 2025-03-12 ASSESSMENT — ENCOUNTER SYMPTOMS
ABDOMINAL PAIN: 0
EYE PAIN: 0
NAUSEA: 0
COUGH: 0
TROUBLE SWALLOWING: 0
VOMITING: 0
CONSTIPATION: 0
BLOOD IN STOOL: 0
SHORTNESS OF BREATH: 1
DIARRHEA: 0

## 2025-03-17 ENCOUNTER — RESULTS FOLLOW-UP (OUTPATIENT)
Dept: CARDIOLOGY | Age: 80
End: 2025-03-17

## 2025-03-17 ENCOUNTER — HOSPITAL ENCOUNTER (OUTPATIENT)
Age: 80
Setting detail: SPECIMEN
Discharge: HOME | End: 2025-03-17
Payer: MEDICARE

## 2025-03-17 DIAGNOSIS — I50.42 CHRONIC COMBINED SYSTOLIC AND DIASTOLIC CHF (CONGESTIVE HEART FAILURE) (HCC): ICD-10-CM

## 2025-03-17 DIAGNOSIS — Z95.810 AICD (AUTOMATIC CARDIOVERTER/DEFIBRILLATOR) PRESENT: ICD-10-CM

## 2025-03-17 DIAGNOSIS — I73.9 PAD (PERIPHERAL ARTERY DISEASE): ICD-10-CM

## 2025-03-17 DIAGNOSIS — I25.10 CORONARY ARTERY DISEASE INVOLVING NATIVE CORONARY ARTERY OF NATIVE HEART WITHOUT ANGINA PECTORIS: ICD-10-CM

## 2025-03-17 LAB
ANION GAP SERPL CALCULATED.3IONS-SCNC: 12 MMOL/L (ref 9–17)
BUN SERPL-MCNC: 25 MG/DL (ref 8–23)
BUN/CREAT SERPL: 17 (ref 9–20)
CALCIUM SERPL-MCNC: 9.8 MG/DL (ref 8.6–10.4)
CHLORIDE SERPL-SCNC: 102 MMOL/L (ref 98–107)
CO2 SERPL-SCNC: 28 MMOL/L (ref 20–31)
CREAT SERPL-MCNC: 1.5 MG/DL (ref 0.7–1.2)
GFR, ESTIMATED: 47 ML/MIN/1.73M2
GLUCOSE SERPL-MCNC: 143 MG/DL (ref 70–99)
POTASSIUM SERPL-SCNC: 4.2 MMOL/L (ref 3.7–5.3)
SODIUM SERPL-SCNC: 142 MMOL/L (ref 135–144)

## 2025-03-17 PROCEDURE — 80048 BASIC METABOLIC PNL TOTAL CA: CPT

## 2025-03-17 RX ORDER — FUROSEMIDE 40 MG/1
60 TABLET ORAL DAILY
Qty: 135 TABLET | Refills: 3 | Status: SHIPPED | OUTPATIENT
Start: 2025-03-17

## 2025-03-17 NOTE — TELEPHONE ENCOUNTER
Creatinine higher  Change Lasix to 60 mg p.o. daily, was previously on 40 twice daily  Check BMP in 1 week

## 2025-04-01 ENCOUNTER — HOSPITAL ENCOUNTER (OUTPATIENT)
Dept: OTHER | Age: 80
Discharge: HOME | End: 2025-04-01
Payer: MEDICARE

## 2025-04-01 VITALS
SYSTOLIC BLOOD PRESSURE: 112 MMHG | RESPIRATION RATE: 20 BRPM | HEIGHT: 74 IN | HEART RATE: 84 BPM | BODY MASS INDEX: 36.88 KG/M2 | WEIGHT: 287.4 LBS | DIASTOLIC BLOOD PRESSURE: 74 MMHG | OXYGEN SATURATION: 97 %

## 2025-04-01 PROCEDURE — 99202 OFFICE O/P NEW SF 15 MIN: CPT

## 2025-04-01 NOTE — PROGRESS NOTES
Patient and wife here for initial CHF Education visit.  They watched the CHF video.  Introduced CHF Playbook and reviewed CHF Zone tool worksheet with patient and wife and they verbalized understanding of material reviewed.  Patient reports he weighs himself daily at home, stable per patient.  Weight at home today was 282.2 lbs, weight in clinic was 287.4 lbs.  Patient does have mild edema of bilateral lower extremities, patient states this is normal.  Patient is scheduled to see Dr. Evans on 4-7-25 for follow up appointment.  Patient reports that he has a Home Health nurse that comes once a week and she will be coming later today.      Verbally reviewed importance of medication compliance with patient; patient verbalized understanding.  Patient and wife state they set up his medications.    Discussed 2000mg/day sodium restricted diet; patient verbalized understanding.  Patient and wife state they do not add salt to foods when cooking or at the table.      Moderate daily exercise encouraged as tolerated. Discussed rest breaks as needed; patient verbalized understanding.  Patient's activity is limited.  He is in a wheelchair today and he uses a walker or cane at home.     Patient instructed to weigh self at the same time of each day, using same clothes and same scale; reinforced teaching to monitor for 2-3 lb weight increase over 1-2 days, and to notify the CHF clinic at [phone] or [phone] or physician office if weight change noted.  Patient verbalized understanding.    Signs and symptoms of CHF discussed with patient, such as feeling more tired than normal, feeling short of breath, coughing that increases when you lie down, sudden weight gain, swelling of your feet, legs or belly.  Patient verbalized understanding to notify the CHF clinic at [phone] or [phone] or physician office if these symptoms occur.    Compliance with plan of care and further disease process causes discussed with

## 2025-04-07 ENCOUNTER — OFFICE VISIT (OUTPATIENT)
Dept: CARDIOLOGY | Age: 80
End: 2025-04-07
Payer: MEDICARE

## 2025-04-07 VITALS
HEART RATE: 64 BPM | SYSTOLIC BLOOD PRESSURE: 112 MMHG | HEIGHT: 74 IN | WEIGHT: 286 LBS | BODY MASS INDEX: 36.7 KG/M2 | DIASTOLIC BLOOD PRESSURE: 56 MMHG

## 2025-04-07 DIAGNOSIS — Z95.810 AICD (AUTOMATIC CARDIOVERTER/DEFIBRILLATOR) PRESENT: ICD-10-CM

## 2025-04-07 DIAGNOSIS — F41.9 ANXIETY AND DEPRESSION: ICD-10-CM

## 2025-04-07 DIAGNOSIS — F32.A ANXIETY AND DEPRESSION: ICD-10-CM

## 2025-04-07 DIAGNOSIS — Z95.810 ICD (IMPLANTABLE CARDIOVERTER-DEFIBRILLATOR) IN PLACE: ICD-10-CM

## 2025-04-07 DIAGNOSIS — I10 ESSENTIAL HYPERTENSION: ICD-10-CM

## 2025-04-07 DIAGNOSIS — I25.10 CORONARY ARTERY DISEASE INVOLVING NATIVE CORONARY ARTERY OF NATIVE HEART WITHOUT ANGINA PECTORIS: ICD-10-CM

## 2025-04-07 DIAGNOSIS — I44.7 LEFT BUNDLE BRANCH BLOCK (LBBB): ICD-10-CM

## 2025-04-07 DIAGNOSIS — I73.9 PAD (PERIPHERAL ARTERY DISEASE): ICD-10-CM

## 2025-04-07 DIAGNOSIS — E78.2 MIXED HYPERLIPIDEMIA: ICD-10-CM

## 2025-04-07 DIAGNOSIS — I50.42 CHRONIC COMBINED SYSTOLIC AND DIASTOLIC CHF (CONGESTIVE HEART FAILURE) (HCC): Primary | ICD-10-CM

## 2025-04-07 DIAGNOSIS — I25.5 ISCHEMIC CARDIOMYOPATHY: ICD-10-CM

## 2025-04-07 DIAGNOSIS — E78.00 PURE HYPERCHOLESTEROLEMIA: ICD-10-CM

## 2025-04-07 DIAGNOSIS — Z95.1 S/P CABG (CORONARY ARTERY BYPASS GRAFT): ICD-10-CM

## 2025-04-07 PROCEDURE — 3078F DIAST BP <80 MM HG: CPT | Performed by: INTERNAL MEDICINE

## 2025-04-07 PROCEDURE — 99213 OFFICE O/P EST LOW 20 MIN: CPT | Performed by: INTERNAL MEDICINE

## 2025-04-07 PROCEDURE — 3074F SYST BP LT 130 MM HG: CPT | Performed by: INTERNAL MEDICINE

## 2025-04-07 PROCEDURE — 1123F ACP DISCUSS/DSCN MKR DOCD: CPT | Performed by: INTERNAL MEDICINE

## 2025-04-07 PROCEDURE — 99214 OFFICE O/P EST MOD 30 MIN: CPT | Performed by: INTERNAL MEDICINE

## 2025-04-07 RX ORDER — BUPROPION HYDROCHLORIDE 75 MG/1
75 TABLET ORAL 2 TIMES DAILY
Qty: 60 TABLET | Refills: 0 | Status: SHIPPED | OUTPATIENT
Start: 2025-04-07

## 2025-04-07 NOTE — TELEPHONE ENCOUNTER
Noah called requesting a refill of the below medication which has been pended for you:     Requested Prescriptions     Pending Prescriptions Disp Refills    buPROPion (WELLBUTRIN) 75 MG tablet 60 tablet 0     Sig: Take 1 tablet by mouth 2 times daily       Last Appointment Date: 3/3/2025  Next Appointment Date: Visit date not found    No Known Allergies

## 2025-04-07 NOTE — PROGRESS NOTES
[facility] Follow Up    Today's Date: 4/7/2025  Patient's Name: Noah Jauregui  Patient's age: 79 y.o., 1945    CC: For follow-up.    HPI:  For follow-up.  At my last office visit we increased Lasix to 40 twice daily.  This was too much for him.  He is now on Lasix 60 daily.  Has trace edema.  Denies any orthopnea, palpitations, chest pain, syncope.  Has chronic shortness of breath.  Is on chronic O2.    Past Medical History:   has a past medical history of Acute respiratory failure with hypoxia, Adenomatous polyp, CAD (coronary artery disease), CHF (congestive heart failure) ([facility]), Cholecystitis, Chronic kidney disease, Chronic venous insufficiency, COPD with acute exacerbation ([facility]), DVT (deep venous thrombosis) ([facility]), Edema, Gout, Hyperlipidemia, Hypertension, Onychomycosis, Osteoarthritis, Plantar fasciitis, Pneumonia due to COVID-19 virus, SIRS (systemic inflammatory response syndrome) ([facility]), and Tobacco abuse.    Past Surgical History:   has a past surgical history that includes Colonoscopy (12/12/2012); Vasectomy (1980); Colonoscopy (2003); other surgical history; Colonoscopy (08/2009); Colonoscopy (06/2008); Coronary artery bypass graft (10/28/2014); Total knee arthroplasty (Right, 10/2015); Total knee arthroplasty (Left, 03/2018); TURP; Cataract removal (Right, 06/07/2023); Cholecystectomy (03/19/2019); Cardiac catheterization (N/A, 12/22/2023); Cardiac procedure (N/A, 12/22/2023); Cardiac defibrillator placement (08/05/2024); and ep device procedure (N/A, 8/5/2024).    Home Medications:  Prior to Admission medications    Medication Sig Start Date End Date Taking? Authorizing Provider   furosemide (LASIX) 40 MG tablet Take 1.5 tablets by mouth daily 3/17/25  Yes Rob Evans, DO   lidocaine (LIDODERM) 5 % Place 1 patch onto the skin daily 12 hours on, 12 hours off. 3/10/25 4/9/25 Yes Bonifacio Drake, DO   miconazole (MICOTIN) 2 % powder Apply topically 2 times daily.

## 2025-04-28 ENCOUNTER — TELEPHONE (OUTPATIENT)
Dept: FAMILY MEDICINE CLINIC | Age: 80
End: 2025-04-28

## 2025-04-28 NOTE — TELEPHONE ENCOUNTER
Spoke with wife. Informed wife that we sent stuff over to [facility] and writer verified if patient already has a portable o2. Wife states she ordered one through [facility], but they need a script sent there so wife and patient dont have to pay taxes on it. Will send script to [facility] .

## 2025-04-29 DIAGNOSIS — F41.9 ANXIETY AND DEPRESSION: ICD-10-CM

## 2025-04-29 DIAGNOSIS — F32.A ANXIETY AND DEPRESSION: ICD-10-CM

## 2025-04-29 RX ORDER — BUPROPION HYDROCHLORIDE 75 MG/1
75 TABLET ORAL 2 TIMES DAILY
Qty: 180 TABLET | Refills: 1 | Status: SHIPPED | OUTPATIENT
Start: 2025-04-29

## 2025-04-29 NOTE — TELEPHONE ENCOUNTER
Noah called requesting a refill of the below medication which has been pended for you:     Requested Prescriptions     Pending Prescriptions Disp Refills    buPROPion (WELLBUTRIN) 75 MG tablet [Pharmacy Med Name: buPROPion HCl 75 MG Oral Tablet] 60 tablet 0     Sig: Take 1 tablet by mouth twice daily       Last Appointment Date: Visit date not found  Next Appointment Date: Visit date not found    No Known Allergies

## 2025-05-07 ENCOUNTER — TELEPHONE (OUTPATIENT)
Dept: FAMILY MEDICINE CLINIC | Age: 80
End: 2025-05-07
Payer: MEDICARE

## 2025-05-07 DIAGNOSIS — R65.10 SYSTEMIC INFLAMMATORY RESPONSE SYNDROME (SIRS) OF NON-INFECTIOUS ORIGIN WITHOUT ACUTE ORGAN DYSFUNCTION (HCC): ICD-10-CM

## 2025-05-07 DIAGNOSIS — Z79.82 LONG TERM (CURRENT) USE OF ASPIRIN: ICD-10-CM

## 2025-05-07 DIAGNOSIS — Z79.84 LONG TERM (CURRENT) USE OF ORAL HYPOGLYCEMIC DRUGS: ICD-10-CM

## 2025-05-07 DIAGNOSIS — R09.2 RESPIRATORY ARREST (HCC): ICD-10-CM

## 2025-05-07 DIAGNOSIS — Z79.52 LONG TERM (CURRENT) USE OF SYSTEMIC STEROIDS: ICD-10-CM

## 2025-05-07 DIAGNOSIS — Z99.81 DEPENDENCE ON SUPPLEMENTAL OXYGEN: ICD-10-CM

## 2025-05-07 DIAGNOSIS — J44.1 CHRONIC OBSTRUCTIVE PULMONARY DISEASE WITH (ACUTE) EXACERBATION (HCC): Primary | ICD-10-CM

## 2025-05-07 PROCEDURE — G0179 MD RECERTIFICATION HHA PT: HCPCS | Performed by: STUDENT IN AN ORGANIZED HEALTH CARE EDUCATION/TRAINING PROGRAM

## 2025-05-07 NOTE — TELEPHONE ENCOUNTER
Home health plan of care reviewed and certification completed on patient for service dates 5/3/25 to /1/25. Verified current medications, allergies, diagnoses. Physician time spent on activities to coordinate service, documenting, medical decision making, review of reports/treatment plans/ test results is 15 min.

## 2025-06-05 ENCOUNTER — OFFICE VISIT (OUTPATIENT)
Dept: PODIATRY | Age: 80
End: 2025-06-05
Payer: MEDICARE

## 2025-06-05 ENCOUNTER — OFFICE VISIT (OUTPATIENT)
Dept: FAMILY MEDICINE CLINIC | Age: 80
End: 2025-06-05
Payer: MEDICARE

## 2025-06-05 VITALS
OXYGEN SATURATION: 93 % | SYSTOLIC BLOOD PRESSURE: 118 MMHG | HEART RATE: 73 BPM | HEIGHT: 74 IN | WEIGHT: 291 LBS | BODY MASS INDEX: 37.35 KG/M2 | DIASTOLIC BLOOD PRESSURE: 62 MMHG

## 2025-06-05 VITALS
DIASTOLIC BLOOD PRESSURE: 62 MMHG | SYSTOLIC BLOOD PRESSURE: 118 MMHG | HEART RATE: 72 BPM | BODY MASS INDEX: 37.36 KG/M2 | WEIGHT: 291 LBS | RESPIRATION RATE: 20 BRPM

## 2025-06-05 DIAGNOSIS — E03.4 HYPOTHYROIDISM DUE TO ACQUIRED ATROPHY OF THYROID: Primary | ICD-10-CM

## 2025-06-05 DIAGNOSIS — E11.9 TYPE 2 DIABETES MELLITUS WITHOUT COMPLICATION, WITHOUT LONG-TERM CURRENT USE OF INSULIN (HCC): ICD-10-CM

## 2025-06-05 DIAGNOSIS — I82.5Z3: Primary | ICD-10-CM

## 2025-06-05 DIAGNOSIS — E78.2 MIXED HYPERLIPIDEMIA: ICD-10-CM

## 2025-06-05 DIAGNOSIS — I50.42 CHRONIC COMBINED SYSTOLIC AND DIASTOLIC CHF (CONGESTIVE HEART FAILURE) (HCC): ICD-10-CM

## 2025-06-05 DIAGNOSIS — B35.1 DERMATOPHYTOSIS OF NAIL: ICD-10-CM

## 2025-06-05 DIAGNOSIS — F32.A DEPRESSIVE DISORDER: ICD-10-CM

## 2025-06-05 PROCEDURE — 1123F ACP DISCUSS/DSCN MKR DOCD: CPT | Performed by: NURSE PRACTITIONER

## 2025-06-05 PROCEDURE — 3074F SYST BP LT 130 MM HG: CPT | Performed by: NURSE PRACTITIONER

## 2025-06-05 PROCEDURE — 3078F DIAST BP <80 MM HG: CPT | Performed by: NURSE PRACTITIONER

## 2025-06-05 PROCEDURE — G8427 DOCREV CUR MEDS BY ELIG CLIN: HCPCS | Performed by: NURSE PRACTITIONER

## 2025-06-05 PROCEDURE — 1159F MED LIST DOCD IN RCRD: CPT | Performed by: NURSE PRACTITIONER

## 2025-06-05 PROCEDURE — G8417 CALC BMI ABV UP PARAM F/U: HCPCS | Performed by: NURSE PRACTITIONER

## 2025-06-05 PROCEDURE — 99214 OFFICE O/P EST MOD 30 MIN: CPT | Performed by: NURSE PRACTITIONER

## 2025-06-05 PROCEDURE — 1036F TOBACCO NON-USER: CPT | Performed by: NURSE PRACTITIONER

## 2025-06-05 PROCEDURE — 11721 DEBRIDE NAIL 6 OR MORE: CPT | Performed by: PODIATRIST

## 2025-06-05 NOTE — PROGRESS NOTES
Foot Care Worksheet  PCP: Edith Andres APRN - CNP  Last visit: 06 / 05 / 2025    Nail description: Thick , Yellow , Crumbly , Marked limitation of ambulation     Pain resulting from thickened and dystrophy of nail plate Yes    Nails involved  Right   1, 2, 3, 4, 5  (T5-T9)  Left     1, 2, 3, 4, 5  (TA-T4)    Q7 1 Class A Finding - Non traumatic amputation of foot No    Q8 2 Class B Findings - Absent DP pulse No, Absent PT pulse No, Advanced tropic changes (3 required) Yes    Decrease hair growth Yes, Nail changes/thickening Yes, Pigmented changes/discoloration Yes, Skin texture (thin, shiny) No, Skin color (rubor/redness) No    Q9 1 Class B and 2 Class C Findings  Claudication No, Temperature change Yes, Paresthesia No, Burning No, Edema Yes    Subjective:  Patient presents to [facility] today for foot care.  Pain at the nails also.    No Known Allergies    Past Medical History:   Diagnosis Date    Acute respiratory failure with hypoxia ([facility]) 12/09/2021    Adenomatous polyp     history of     CAD (coronary artery disease)     CHF (congestive heart failure) ([facility])     Cholecystitis 03/19/2019    Chronic kidney disease     Chronic venous insufficiency     COPD with acute exacerbation ([facility]) 12/09/2021    DVT (deep venous thrombosis) ([facility])     history of     Edema     related to venous stasis     Gout     history of     Hyperlipidemia     Hypertension     Onychomycosis     Osteoarthritis     Plantar fasciitis     Pneumonia due to COVID-19 virus 12/08/2021    SIRS (systemic inflammatory response syndrome) ([facility]) 12/26/2024    Tobacco abuse        Prior to Admission medications    Medication Sig Start Date End Date Taking? Authorizing Provider   buPROPion (WELLBUTRIN) 75 MG tablet Take 1 tablet by mouth twice daily 4/29/25  Yes Bonifacio Drake DO   furosemide (LASIX) 40 MG tablet Take 1.5 tablets by mouth daily 3/17/25  Yes Rob Evans,    miconazole (MICOTIN) 2 % powder Apply topically 2

## 2025-06-06 ASSESSMENT — ENCOUNTER SYMPTOMS
NAUSEA: 0
COUGH: 0
DIARRHEA: 0
VOMITING: 0
WHEEZING: 0
SHORTNESS OF BREATH: 0

## 2025-06-06 NOTE — PROGRESS NOTES
[facility]  [address] [facility] [address]  Dept: [phone]  Dept Fax: [phone]  Loc: [phone]    Noah Jauregui is a 79 y.o. male who presents today for his medical conditions/complaintsas noted below.  Noah Jauregui is c/o of   Chief Complaint   Patient presents with    [facility]         HPI:     History of Present Illness  The patient is an 79-year-old male who presents to the office for new [facility]. He is accompanied by his wife.    He has been under the care of a home health nurse, with weekly visits scheduled. His last consultation with his previous healthcare provider was in 03/2025. He reports no significant changes in his condition, including the absence of any abnormal swelling or shortness of breath. He uses O2 consistently. During his last hospital admission, he experienced an episode of dyspnea, which led to his hospitalization. His current medication regimen includes midodrine, Lipitor, potassium, Zoloft, trazodone, levothyroxine, and Synthroid.     History of hypothyroidism. He is on synthroid 100mcg daily. Will be due for labs in December    History of type 2 diabetes. Does not check blood sugar. He is due for labs.     He has a history of hyperlipidemia. He takes lipitor 40mg daily. Due for labs.     History of depression. Stable. Denies thoughts of suicide or homicide.     He also reports a hernia, which he was advised not to be concerned about. However, he notes that the hernia has been progressively enlarging. He reports no associated pain or bowel movement irregularities. He has a history of cholecystectomy, after which the hernia developed. His mood remains stable. He is currently on diuretic therapy, as prescribed by his cardiologist. Stable CHF.    PAST SURGICAL HISTORY:  Cholecystectomy  Bilateral knee replacements    Hemoglobin A1C (%)   Date Value   12/25/2024 5.7

## 2025-08-22 ENCOUNTER — OFFICE VISIT (OUTPATIENT)
Dept: PODIATRY | Age: 80
End: 2025-08-22
Payer: MEDICARE

## 2025-08-22 VITALS
DIASTOLIC BLOOD PRESSURE: 80 MMHG | WEIGHT: 288 LBS | SYSTOLIC BLOOD PRESSURE: 132 MMHG | HEART RATE: 61 BPM | BODY MASS INDEX: 36.96 KG/M2 | HEIGHT: 74 IN

## 2025-08-22 DIAGNOSIS — B35.1 DERMATOPHYTOSIS OF NAIL: ICD-10-CM

## 2025-08-22 DIAGNOSIS — I82.5Z3: Primary | ICD-10-CM

## 2025-08-22 DIAGNOSIS — L84 CORNS AND CALLOSITIES: ICD-10-CM

## 2025-08-22 PROCEDURE — 11721 DEBRIDE NAIL 6 OR MORE: CPT | Performed by: PODIATRIST

## 2025-08-22 PROCEDURE — 11055 PARING/CUTG B9 HYPRKER LES 1: CPT | Performed by: PODIATRIST

## (undated) DEVICE — ANGIOGRAPHIC CATHETER: Brand: EXPO™

## (undated) DEVICE — SURGICAL PROCEDURE TRAY CRD CATH SVMMC

## (undated) DEVICE — INTRODUCER SHTH 6FR L11CM 0.038IN STD SIDEPRT EXTN 3 W

## (undated) DEVICE — GUIDEWIRE 35/260/FC/PTFE/3J: Brand: GUIDEWIRE

## (undated) DEVICE — KIT MICRO INTRO 4FR STIFF 40CM NIGHTENALL TUNGSTEN 7SMT